# Patient Record
Sex: FEMALE | Race: ASIAN | NOT HISPANIC OR LATINO | URBAN - METROPOLITAN AREA
[De-identification: names, ages, dates, MRNs, and addresses within clinical notes are randomized per-mention and may not be internally consistent; named-entity substitution may affect disease eponyms.]

---

## 2020-08-17 ENCOUNTER — EMERGENCY (EMERGENCY)
Facility: HOSPITAL | Age: 45
LOS: 0 days | Discharge: HOME | End: 2020-08-18
Attending: STUDENT IN AN ORGANIZED HEALTH CARE EDUCATION/TRAINING PROGRAM | Admitting: STUDENT IN AN ORGANIZED HEALTH CARE EDUCATION/TRAINING PROGRAM
Payer: MEDICAID

## 2020-08-17 VITALS
DIASTOLIC BLOOD PRESSURE: 97 MMHG | OXYGEN SATURATION: 100 % | SYSTOLIC BLOOD PRESSURE: 128 MMHG | WEIGHT: 156.09 LBS | HEART RATE: 89 BPM | TEMPERATURE: 98 F | RESPIRATION RATE: 16 BRPM

## 2020-08-17 DIAGNOSIS — N93.9 ABNORMAL UTERINE AND VAGINAL BLEEDING, UNSPECIFIED: ICD-10-CM

## 2020-08-17 DIAGNOSIS — R10.9 UNSPECIFIED ABDOMINAL PAIN: ICD-10-CM

## 2020-08-17 DIAGNOSIS — D25.9 LEIOMYOMA OF UTERUS, UNSPECIFIED: ICD-10-CM

## 2020-08-17 PROCEDURE — 99285 EMERGENCY DEPT VISIT HI MDM: CPT

## 2020-08-17 NOTE — ED ADULT TRIAGE NOTE - CHIEF COMPLAINT QUOTE
pt c.o. lower abdominal pain associated with vaginal bleeding   pt states she was sent in from her OB who spoke with

## 2020-08-18 ENCOUNTER — APPOINTMENT (OUTPATIENT)
Dept: GYNECOLOGIC ONCOLOGY | Facility: CLINIC | Age: 45
End: 2020-08-18
Payer: MEDICAID

## 2020-08-18 ENCOUNTER — INPATIENT (INPATIENT)
Facility: HOSPITAL | Age: 45
LOS: 2 days | Discharge: HOME | End: 2020-08-21
Attending: OBSTETRICS & GYNECOLOGY | Admitting: OBSTETRICS & GYNECOLOGY
Payer: MEDICAID

## 2020-08-18 VITALS
SYSTOLIC BLOOD PRESSURE: 131 MMHG | HEART RATE: 86 BPM | OXYGEN SATURATION: 97 % | TEMPERATURE: 99 F | RESPIRATION RATE: 18 BRPM | DIASTOLIC BLOOD PRESSURE: 70 MMHG

## 2020-08-18 VITALS
HEIGHT: 62 IN | DIASTOLIC BLOOD PRESSURE: 78 MMHG | TEMPERATURE: 97.6 F | BODY MASS INDEX: 28.71 KG/M2 | SYSTOLIC BLOOD PRESSURE: 122 MMHG | WEIGHT: 156 LBS

## 2020-08-18 DIAGNOSIS — Z78.9 OTHER SPECIFIED HEALTH STATUS: ICD-10-CM

## 2020-08-18 DIAGNOSIS — N92.0 EXCESSIVE AND FREQUENT MENSTRUATION WITH REGULAR CYCLE: ICD-10-CM

## 2020-08-18 PROBLEM — Z00.00 ENCOUNTER FOR PREVENTIVE HEALTH EXAMINATION: Status: ACTIVE | Noted: 2020-08-18

## 2020-08-18 LAB
ALBUMIN SERPL ELPH-MCNC: 4.2 G/DL — SIGNIFICANT CHANGE UP (ref 3.5–5.2)
ALP SERPL-CCNC: 100 U/L — SIGNIFICANT CHANGE UP (ref 30–115)
ALT FLD-CCNC: 9 U/L — SIGNIFICANT CHANGE UP (ref 0–41)
ANION GAP SERPL CALC-SCNC: 8 MMOL/L — SIGNIFICANT CHANGE UP (ref 7–14)
APPEARANCE UR: ABNORMAL
APPEARANCE UR: ABNORMAL
APTT BLD: 36.6 SEC — SIGNIFICANT CHANGE UP (ref 27–39.2)
APTT BLD: 40.2 SEC — HIGH (ref 27–39.2)
AST SERPL-CCNC: 14 U/L — SIGNIFICANT CHANGE UP (ref 0–41)
BACTERIA # UR AUTO: NEGATIVE — SIGNIFICANT CHANGE UP
BACTERIA # UR AUTO: NEGATIVE — SIGNIFICANT CHANGE UP
BASOPHILS # BLD AUTO: 0.04 K/UL — SIGNIFICANT CHANGE UP (ref 0–0.2)
BASOPHILS # BLD AUTO: 0.04 K/UL — SIGNIFICANT CHANGE UP (ref 0–0.2)
BASOPHILS NFR BLD AUTO: 0.5 % — SIGNIFICANT CHANGE UP (ref 0–1)
BASOPHILS NFR BLD AUTO: 0.6 % — SIGNIFICANT CHANGE UP (ref 0–1)
BILIRUB SERPL-MCNC: <0.2 MG/DL — SIGNIFICANT CHANGE UP (ref 0.2–1.2)
BILIRUB UR-MCNC: NEGATIVE — SIGNIFICANT CHANGE UP
BILIRUB UR-MCNC: NEGATIVE — SIGNIFICANT CHANGE UP
BLD GP AB SCN SERPL QL: SIGNIFICANT CHANGE UP
BUN SERPL-MCNC: 10 MG/DL — SIGNIFICANT CHANGE UP (ref 10–20)
CALCIUM SERPL-MCNC: 8.7 MG/DL — SIGNIFICANT CHANGE UP (ref 8.5–10.1)
CHLORIDE SERPL-SCNC: 106 MMOL/L — SIGNIFICANT CHANGE UP (ref 98–110)
CO2 SERPL-SCNC: 25 MMOL/L — SIGNIFICANT CHANGE UP (ref 17–32)
COLOR SPEC: ABNORMAL
COLOR SPEC: ABNORMAL
CREAT SERPL-MCNC: 0.7 MG/DL — SIGNIFICANT CHANGE UP (ref 0.7–1.5)
DIFF PNL FLD: ABNORMAL
DIFF PNL FLD: ABNORMAL
EOSINOPHIL # BLD AUTO: 0.09 K/UL — SIGNIFICANT CHANGE UP (ref 0–0.7)
EOSINOPHIL # BLD AUTO: 0.13 K/UL — SIGNIFICANT CHANGE UP (ref 0–0.7)
EOSINOPHIL NFR BLD AUTO: 1.1 % — SIGNIFICANT CHANGE UP (ref 0–8)
EOSINOPHIL NFR BLD AUTO: 1.8 % — SIGNIFICANT CHANGE UP (ref 0–8)
EPI CELLS # UR: 1 /HPF — SIGNIFICANT CHANGE UP (ref 0–5)
EPI CELLS # UR: 5 /HPF — SIGNIFICANT CHANGE UP (ref 0–5)
GLUCOSE SERPL-MCNC: 171 MG/DL — HIGH (ref 70–99)
GLUCOSE UR QL: NEGATIVE — SIGNIFICANT CHANGE UP
GLUCOSE UR QL: SIGNIFICANT CHANGE UP
HCG SERPL QL: NEGATIVE — SIGNIFICANT CHANGE UP
HCT VFR BLD CALC: 38.4 % — SIGNIFICANT CHANGE UP (ref 37–47)
HCT VFR BLD CALC: 39 % — SIGNIFICANT CHANGE UP (ref 37–47)
HGB BLD-MCNC: 12.1 G/DL — SIGNIFICANT CHANGE UP (ref 12–16)
HGB BLD-MCNC: 12.2 G/DL — SIGNIFICANT CHANGE UP (ref 12–16)
HYALINE CASTS # UR AUTO: 1 /LPF — SIGNIFICANT CHANGE UP (ref 0–7)
HYALINE CASTS # UR AUTO: 9 /LPF — HIGH (ref 0–7)
IMM GRANULOCYTES NFR BLD AUTO: 0.2 % — SIGNIFICANT CHANGE UP (ref 0.1–0.3)
IMM GRANULOCYTES NFR BLD AUTO: 0.3 % — SIGNIFICANT CHANGE UP (ref 0.1–0.3)
INR BLD: 0.93 RATIO — SIGNIFICANT CHANGE UP (ref 0.65–1.3)
INR BLD: 0.97 RATIO — SIGNIFICANT CHANGE UP (ref 0.65–1.3)
KETONES UR-MCNC: NEGATIVE — SIGNIFICANT CHANGE UP
KETONES UR-MCNC: NEGATIVE — SIGNIFICANT CHANGE UP
LACTATE SERPL-SCNC: 2 MMOL/L — SIGNIFICANT CHANGE UP (ref 0.7–2)
LEUKOCYTE ESTERASE UR-ACNC: ABNORMAL
LEUKOCYTE ESTERASE UR-ACNC: ABNORMAL
LYMPHOCYTES # BLD AUTO: 1.41 K/UL — SIGNIFICANT CHANGE UP (ref 1.2–3.4)
LYMPHOCYTES # BLD AUTO: 1.92 K/UL — SIGNIFICANT CHANGE UP (ref 1.2–3.4)
LYMPHOCYTES # BLD AUTO: 16.7 % — LOW (ref 20.5–51.1)
LYMPHOCYTES # BLD AUTO: 26.6 % — SIGNIFICANT CHANGE UP (ref 20.5–51.1)
MCHC RBC-ENTMCNC: 27.5 PG — SIGNIFICANT CHANGE UP (ref 27–31)
MCHC RBC-ENTMCNC: 27.6 PG — SIGNIFICANT CHANGE UP (ref 27–31)
MCHC RBC-ENTMCNC: 31.3 G/DL — LOW (ref 32–37)
MCHC RBC-ENTMCNC: 31.5 G/DL — LOW (ref 32–37)
MCV RBC AUTO: 87.5 FL — SIGNIFICANT CHANGE UP (ref 81–99)
MCV RBC AUTO: 88 FL — SIGNIFICANT CHANGE UP (ref 81–99)
MONOCYTES # BLD AUTO: 0.53 K/UL — SIGNIFICANT CHANGE UP (ref 0.1–0.6)
MONOCYTES # BLD AUTO: 0.57 K/UL — SIGNIFICANT CHANGE UP (ref 0.1–0.6)
MONOCYTES NFR BLD AUTO: 6.8 % — SIGNIFICANT CHANGE UP (ref 1.7–9.3)
MONOCYTES NFR BLD AUTO: 7.4 % — SIGNIFICANT CHANGE UP (ref 1.7–9.3)
NEUTROPHILS # BLD AUTO: 4.57 K/UL — SIGNIFICANT CHANGE UP (ref 1.4–6.5)
NEUTROPHILS # BLD AUTO: 6.29 K/UL — SIGNIFICANT CHANGE UP (ref 1.4–6.5)
NEUTROPHILS NFR BLD AUTO: 63.3 % — SIGNIFICANT CHANGE UP (ref 42.2–75.2)
NEUTROPHILS NFR BLD AUTO: 74.7 % — SIGNIFICANT CHANGE UP (ref 42.2–75.2)
NITRITE UR-MCNC: NEGATIVE — SIGNIFICANT CHANGE UP
NITRITE UR-MCNC: NEGATIVE — SIGNIFICANT CHANGE UP
NRBC # BLD: 0 /100 WBCS — SIGNIFICANT CHANGE UP (ref 0–0)
NRBC # BLD: 0 /100 WBCS — SIGNIFICANT CHANGE UP (ref 0–0)
PH UR: 8 — SIGNIFICANT CHANGE UP (ref 5–8)
PH UR: 8.5 — HIGH (ref 5–8)
PLATELET # BLD AUTO: 272 K/UL — SIGNIFICANT CHANGE UP (ref 130–400)
PLATELET # BLD AUTO: 288 K/UL — SIGNIFICANT CHANGE UP (ref 130–400)
POTASSIUM SERPL-MCNC: 4.1 MMOL/L — SIGNIFICANT CHANGE UP (ref 3.5–5)
POTASSIUM SERPL-SCNC: 4.1 MMOL/L — SIGNIFICANT CHANGE UP (ref 3.5–5)
PROT SERPL-MCNC: 6.8 G/DL — SIGNIFICANT CHANGE UP (ref 6–8)
PROT UR-MCNC: ABNORMAL
PROT UR-MCNC: ABNORMAL
PROTHROM AB SERPL-ACNC: 10.7 SEC — SIGNIFICANT CHANGE UP (ref 9.95–12.87)
PROTHROM AB SERPL-ACNC: 11.2 SEC — SIGNIFICANT CHANGE UP (ref 9.95–12.87)
RBC # BLD: 4.39 M/UL — SIGNIFICANT CHANGE UP (ref 4.2–5.4)
RBC # BLD: 4.43 M/UL — SIGNIFICANT CHANGE UP (ref 4.2–5.4)
RBC # FLD: 14.6 % — HIGH (ref 11.5–14.5)
RBC # FLD: 14.6 % — HIGH (ref 11.5–14.5)
RBC CASTS # UR COMP ASSIST: >720 /HPF — HIGH (ref 0–4)
RBC CASTS # UR COMP ASSIST: >720 /HPF — HIGH (ref 0–4)
SODIUM SERPL-SCNC: 139 MMOL/L — SIGNIFICANT CHANGE UP (ref 135–146)
SP GR SPEC: 1.02 — SIGNIFICANT CHANGE UP (ref 1.01–1.02)
SP GR SPEC: 1.02 — SIGNIFICANT CHANGE UP (ref 1.01–1.02)
UROBILINOGEN FLD QL: SIGNIFICANT CHANGE UP
UROBILINOGEN FLD QL: SIGNIFICANT CHANGE UP
WBC # BLD: 7.21 K/UL — SIGNIFICANT CHANGE UP (ref 4.8–10.8)
WBC # BLD: 8.42 K/UL — SIGNIFICANT CHANGE UP (ref 4.8–10.8)
WBC # FLD AUTO: 7.21 K/UL — SIGNIFICANT CHANGE UP (ref 4.8–10.8)
WBC # FLD AUTO: 8.42 K/UL — SIGNIFICANT CHANGE UP (ref 4.8–10.8)
WBC UR QL: 88 /HPF — HIGH (ref 0–5)
WBC UR QL: >720 /HPF — HIGH (ref 0–5)

## 2020-08-18 PROCEDURE — 99285 EMERGENCY DEPT VISIT HI MDM: CPT

## 2020-08-18 PROCEDURE — 99204 OFFICE O/P NEW MOD 45 MIN: CPT

## 2020-08-18 PROCEDURE — 99222 1ST HOSP IP/OBS MODERATE 55: CPT | Mod: 57

## 2020-08-18 PROCEDURE — 76856 US EXAM PELVIC COMPLETE: CPT | Mod: 26

## 2020-08-18 RX ORDER — ACETAMINOPHEN 500 MG
650 TABLET ORAL ONCE
Refills: 0 | Status: COMPLETED | OUTPATIENT
Start: 2020-08-18 | End: 2020-08-18

## 2020-08-18 RX ORDER — SODIUM CHLORIDE 9 MG/ML
1000 INJECTION, SOLUTION INTRAVENOUS ONCE
Refills: 0 | Status: COMPLETED | OUTPATIENT
Start: 2020-08-18 | End: 2020-08-18

## 2020-08-18 RX ORDER — SODIUM CHLORIDE 9 MG/ML
1000 INJECTION, SOLUTION INTRAVENOUS
Refills: 0 | Status: DISCONTINUED | OUTPATIENT
Start: 2020-08-19 | End: 2020-08-19

## 2020-08-18 RX ORDER — IBUPROFEN 200 MG
600 TABLET ORAL EVERY 6 HOURS
Refills: 0 | Status: DISCONTINUED | OUTPATIENT
Start: 2020-08-18 | End: 2020-08-19

## 2020-08-18 RX ORDER — ACETAMINOPHEN 500 MG
650 TABLET ORAL EVERY 6 HOURS
Refills: 0 | Status: DISCONTINUED | OUTPATIENT
Start: 2020-08-18 | End: 2020-08-19

## 2020-08-18 RX ADMIN — Medication 650 MILLIGRAM(S): at 02:38

## 2020-08-18 RX ADMIN — SODIUM CHLORIDE 1000 MILLILITER(S): 9 INJECTION, SOLUTION INTRAVENOUS at 00:36

## 2020-08-18 RX ADMIN — SODIUM CHLORIDE 1000 MILLILITER(S): 9 INJECTION, SOLUTION INTRAVENOUS at 02:38

## 2020-08-18 RX ADMIN — Medication 650 MILLIGRAM(S): at 00:36

## 2020-08-18 NOTE — ED PROVIDER NOTE - NS ED ROS FT
Eyes:  No visual changes, eye pain or discharge.  ENMT:  No hearing changes, pain, no sore throat or runny nose, no difficulty swallowing  Cardiac:  No chest pain, SOB or edema. No chest pain with exertion.  Respiratory:  No cough or respiratory distress.    GI:  + abdominal pain.  :  No dysuria, frequency or burning. +vaginal bleeding  MS:  No myalgia, muscle weakness, joint pain or back pain.  Neuro:  No headache or weakness.  No LOC.  Skin:  No skin rash.   Endocrine: No history of thyroid disease or diabetes.

## 2020-08-18 NOTE — ED PROVIDER NOTE - ATTENDING CONTRIBUTION TO CARE
46 yo F vaginal bleeding. recent dx of fibroids. seen by gyn onc. dr bowles. sent in for admission.   vss, nontoxic, well appearing, pink conj, anicteric, MMM, neck supple, CTAB, RRR, equal radial pulses bilat, abd soft/nt/nd, no cva tend. no calves tend, no edema, no fnd. no rashes.  a/p: labs, imaging, reassess

## 2020-08-18 NOTE — HISTORY OF PRESENT ILLNESS
[FreeTextEntry1] : 45 year old patient of Dr. Robles  NSVDx1,  referred for menorrhagia and large fibroid Intramural myoma (10CM).    LMP 2020.   Patient states that after she had vaginal exam yesterday in Dr. Robles's office,after which,  she started bleeding accompanied by pain. Her pain scale is 7/10. \par She is requesting further management. the patient was seen in the [facility] ER w/u with sonogram confirmed the above Large intramural myoma. Her hct in the ER was 39%.

## 2020-08-18 NOTE — ED PROVIDER NOTE - NSFOLLOWUPINSTRUCTIONS_ED_ALL_ED_FT
Uterine Fibroids    WHAT YOU NEED TO KNOW:    What are uterine fibroids? Uterine fibroids are growths found inside your uterus. Uterine fibroids also may be called tumors or leiomyomas. Uterine fibroids often appear in groups, or you may have only one. They can be small or large, and they can grow. They are almost always benign (not cancer) and likely will not spread to other parts of your body. They may grow when you are pregnant and shrink after you no longer have a monthly period.    What increases my risk for uterine fibroids? The cause of uterine fibroids is not clear. Ask your healthcare provider about these and other risk factors for uterine fibroids:    - A family history of uterine fibroids  - Too many female hormones, especially estrogen  - Menstrual periods starting before age 13  - Too much body weight  - Not having children  - Drinking alcohol    What are the signs and symptoms of uterine fibroids? You may have no signs or symptoms. Symptoms depend on the size, type, and number of fibroids you have. Symptoms also depend on where the fibroids are inside your uterus:    - Heavy or painful menstrual bleeding  - Pelvic pressure and pain  - Increased pelvic pain during sex  - Constipation or pain when you have a bowel movement  - Need to urinate often    How are uterine fibroids diagnosed? Your healthcare provider will examine you and ask about your symptoms. Tell the provider if any women if your family have had uterine fibroids. You may also need any of the following:     A pelvic exam is also called an internal or vaginal exam. During a pelvic exam, your healthcare provider gently puts a speculum into your vagina. A speculum is a tool that opens your vagina. This lets your healthcare provider see your cervix (bottom part of your uterus). With gloved hands, your healthcare provider will check the size and shape of your uterus and ovaries.     A vaginal ultrasound is used to see inside your uterus and to check your ovaries. During this test, your healthcare provider places a small wand in your vagina. Sound waves from the wand show pictures of the uterus and ovaries.    A biopsy is a tissue sample of a fibroid that your healthcare provider takes from your uterus for testing.    How are uterine fibroids treated? You may not need treatment for your fibroids if you do not have symptoms. The following treatments may shrink your fibroids and help your pain:     Hormones may help shrink your fibroids.    Contraceptives help prevent pregnancy. They also may help shrink your fibroids.    NSAIDs help decrease swelling and pain or fever. This medicine is available with or without a doctor's order. NSAIDs can cause stomach bleeding or kidney problems in certain people. If you take blood thinner medicine, always ask your healthcare provider if NSAIDs are safe for you. Always read the medicine label and follow directions.    Surgery may be used to remove your uterine fibroids. Surgery may instead be used to block or slow the flow of blood to the fibroid. This may make your fibroids shrink or disappear. Surgery called a hysterectomy may be needed if your fibroids are severe. For this surgery, your healthcare provider removes your entire uterus. After this surgery, you will no longer be able to have children.    What can I do to prevent uterine fibroids?     Maintain a healthy weight. Extra weight can cause fibroids to grow. Talk to your healthcare provider about a healthy weight for you. He or she can help you create a healthy weight loss plan if you are overweight.    Eat a variety of healthy foods. Healthy foods include fruits, vegetables, lean meats, fish, low-fat dairy foods, cooked beans, and whole-grain breads and cereals. Fruits and vegetables are especially important for helping lower the risk for fibroids. Your healthcare provider or a dietitian can help you create a healthy meal plan.    Limit or do not drink alcohol as directed. Alcohol can increase your risk for fibroids. A drink of alcohol is 12 ounces of beer, 1½ ounces of liquor, or 5 ounces of wine. Ask your healthcare provider for information if you need help to quit drinking alcohol.    When should I seek immediate care?     - Your heart begins to race, and you feel faint.  - You begin to pass large blood clots from your vagina.    When should I contact my healthcare provider?     - Your symptoms, such as heavy bleeding, pain, or pelvic pressure, worsen.  - You feel weak and are more tired than usual.  - You do not feel like your bladder is empty after you urinate. You also may urinate small amounts more often.   - You have questions or concerns about your condition or care.    CARE AGREEMENT:    You have the right to help plan your care. Learn about your health condition and how it may be treated. Discuss treatment options with your healthcare providers to decide what care you want to receive. You always have the right to refuse treatment.     © Copyright [employer] 2020

## 2020-08-18 NOTE — ED PROVIDER NOTE - CARE PROVIDER_API CALL
Arianne Villela  GYNECOLOGIC ONCOLOGY  [address] [place] [id number]  Phone: [phone]  Fax: [phone]  Follow Up Time:

## 2020-08-18 NOTE — ED PROVIDER NOTE - PATIENT PORTAL LINK FT
Assumed care of pt You can access the FollowMyHealth Patient Portal offered by [facility] by registering at the following website: [email or web address]. By joining [employer]’s FollowMyHealth portal, you will also be able to view your health information using other applications (apps) compatible with our system.

## 2020-08-18 NOTE — ED PROVIDER NOTE - PHYSICAL EXAMINATION
Vital Signs: I have reviewed the initial vital signs.  Constitutional: NAD, well-nourished, appears stated age, no acute distress.  HEENT: Airway patent, moist MM, no erythema/swelling/deformity of oral structures. EOMI, PERRLA.  CV: regular rate, regular rhythm, well-perfused extremities, 2+ b/l DP and radial pulses equal.  Lungs: BCTA, no increased WOB.  ABD: BL LQ ttp otherwise NTND, no guarding or rebound, no pulsatile mass, no hernias.   : no active vaginal bleeding  MSK: Neck supple, nontender, nl ROM, no stepoff. Chest nontender. Back nontender in TLS spine or to b/l bony structures or flanks. Ext nontender, nl rom, no deformity.   INTEG: Skin warm, dry, no rash.  NEURO: A&Ox3, moving all extremities, normal speech  PSYCH: Calm, cooperative, normal affect and interaction.

## 2020-08-18 NOTE — ED PROVIDER NOTE - NS ED ROS FT
GEN:  no fever, no chills, no generalized weakness  NEURO:  no headache, no dizziness  ENT: no sore throat, no runny nose  CV:  no chest pain, no palpitations  RESP:  no sob, no cough  GI:  no nausea, no vomiting, + abdominal pain, no diarrhea  :  no dysuria, no urinary frequency, no hematuria  MSK:  no joint pain, no edema  SKIN:  no rash, no bruising  HEME: no hematochezia, no melena

## 2020-08-18 NOTE — H&P ADULT - NSHPLABSRESULTS_GEN_ALL_CORE
LABS:  serum pregnancy- negative                         12.1   8.42  )-----------( 272      ( 18 Aug 2020 11:00 )             38.4       Antibody Screen: NEG (20 @ 00:15)  ABO RH Interpretation: O POS (20 @ 00:15)        139  |  106  |  10  ----------------------------<  171<H>  4.1   |  25  |  0.7    Ca    8.7      18 Aug 2020 00:15    TPro  6.8  /  Alb  4.2  /  TBili  <0.2  /  DBili  x   /  AST  14  /  ALT  9   /  AlkPhos  100      PT/INR - ( 18 Aug 2020 11:00 )   PT: 11.20 sec;   INR: 0.97 ratio         PTT - ( 18 Aug 2020 11:00 )  PTT:36.6 sec  Urinalysis Basic - ( 18 Aug 2020 10:55 )    Color: Dark Brown / Appearance: Turbid / S.021 / pH: x  Gluc: x / Ketone: Negative  / Bili: Negative / Urobili: <2 mg/dL   Blood: x / Protein: >600 mg/dL / Nitrite: Negative   Leuk Esterase: Large / RBC: >720 /HPF / WBC >720 /HPF   Sq Epi: x / Non Sq Epi: 1 /HPF / Bacteria: Negative        RADIOLOGY & ADDITIONAL STUDIES:  < from: US Pelvis Complete (20 @ 02:19) >  EXAM:  US PELVIC COMPLETE        PROCEDURE DATE:  2020    UINTERPRETATION:  CLINICAL HISTORY: Lower abdominal pain, vaginal bleeding.  COMPARISON: None.  PROCEDURE: Transabdominal and transvaginal ultrasound of the pelvis was performed, including Doppler.  LMP: 2020.  FINDINGS:    UTERUS: Anteverted measuring 20 x 11.5 x 10.8, with large intramural fibroid measuring 10.7 x 9.4 x 8.2 cm. The endometrial echo complex is not visualized secondary to large fibroid.    RIGHT OVARY: measures 2.5 x 2.6 x 1.8 cm, and is unremarkable. Doppler flow is demonstrated to the right ovary.    LEFT OVARY: Left ovary not delineated.    OTHER: No free fluid in the pelvis.    IMPRESSION:    Enlarged uterus with 10.7 cm intramural fibroid.    Right ovary unremarkable. Left ovary not delineated.            TRAN FERNÁNDEZ M.D., RESIDENT RADIOLOGIST  This document has been electronically signed.  GABRIELE MEJIA M.D., ATTENDING RADIOLOGIST  This document has been electronically signed. Aug 18 2020  2:24AM  < end of copied text >

## 2020-08-18 NOTE — PROGRESS NOTE ADULT - ASSESSMENT
Fibroid uterus vs ?Uterine Sarcoma  Patient counselled at length through .  For PAULIE/BS vs BSO.  Risks of surgery discussed including but not limited to infection, bleeding, adjacent organ injury. Patient also told that in the event she has cancer, she may need additional therapy.  All questions were answered.

## 2020-08-18 NOTE — H&P ADULT - HISTORY OF PRESENT ILLNESS
ID: [id number]     46yo P1 with LMP  presents to the ED for evaluation after being seen in PMD's office for vaginal bleeding and abdominal pain. Pt reports that for the past six months she has experienced heavy menstrual bleeding associated with the passage of clots. Pt reports passing orange-sized clots. Reports menstrual cycle lasts 3-4 days, soaking two pads per day. Reports associated dizziness, palpitations, and weakness with each menstrual cycle.   Pt noted over the last several months she noticed her abdomen was enlarging. Reports 10lbs weight gain over the last 6 months with abdominal distension. Denies changes in bowel or bladder habits. Denies night sweats, fevers, chills, nausea, vomiting, diarrhea, cough, chest pain, shortness of breath.   Pt reports that she was seen by Dr. Mariann Robles on  where she was told she likely had a fibroid. Immediately after the pelvic exam, she began to bleed heavily and had vaginal packing placed. Pt then subsequently presented to the ED on the evening of  with complaint of vaginal bleeding through the vaginal packing and abdominal pain, RLQ and LLQ rated 7/10 in intensity not responding to OTC medications. Pt seen and evaluated in the ER, determined to be stable, have minimal vaginal bleeding, and discharged home. Pt then followed up with Dr. Villela on  who removed the vaginal packing and saw heavy vaginal bleeding on examination. Since the vaginal packing was removed, pt states that she has only stained a menstrual pad.   Pt reports that she has never had a biopsy of her fibroid.     Ob/Gyn History:   (1 FT )                 LMP-2020                Cycle Length -q28-32 days, lasting 3-4 days in length; using 2-3 pads per day with the passage of clots   Denies history of ovarian cysts, uterine fibroids, abnormal paps, or STIs  Last Pap Smear - Over 5 years ago   Last Mammogram -Never   Last Colonoscopy -Never

## 2020-08-18 NOTE — CONSULT NOTE ADULT - ASSESSMENT
A/P: 44yo  with LMP  with intramural fibroid with intermittent vaginal bleeding, currently clinically and hemodynamically stable    **********************************************************INCOMPLETE: ADDITIONAL RECOMMENDATIONS TO FOLLOW****************************************************************************************

## 2020-08-18 NOTE — ED PROVIDER NOTE - PROGRESS NOTE DETAILS
TC: 44 yo F with no PMHx,  who presents with bilateral lower quadrant pain x 3 wks with new vaginal bleeding 10 min prior to arrival. Sent in by OBGYN in [place] to see Dr. Villela here. Here in ED, vitals wnl. Spoke with OBGYN who state they did not receive any information about pt and do not know why she is here. They said to reconsult prn. Ordered labs, urine, US. Given tylenol, IVF. Will reassess. TC: Labs wnl. Ua with large blood and leuks but no bacteria. US shows enlarged 10.7cm uterus with intramural fibroid. Instructed to f/u with OBGYN. Strict ED return precautions given. Pt verbalized understanding and was agreeable with plan.

## 2020-08-18 NOTE — H&P ADULT - NSHPPHYSICALEXAM_GEN_ALL_CORE
Vital Signs Last 24 Hrs  T(C): 37.1 (18 Aug 2020 10:28), Max: 37.1 (18 Aug 2020 10:28)  T(F): 98.8 (18 Aug 2020 10:28), Max: 98.8 (18 Aug 2020 10:28)  HR: 86 (18 Aug 2020 10:28) (86 - 89)  BP: 131/70 (18 Aug 2020 10:28) (128/97 - 131/70)  RR: 18 (18 Aug 2020 10:28) (16 - 18)  SpO2: 97% (18 Aug 2020 10:28) (97% - 100%)    General Appearance - AAOx3, NAD  Heart - S1S2 regular rate and rhythm  Lung - CTA Bilaterally  Abdomen - Soft, tender to palpation in the RLQ and LLQ region, distended, no rebound, no rigidity, no guarding, bowel sounds present    GYN/Pelvis:  Labia Majora - Normal  Labia Minora - Normal  Clitoris - Normal  Urethra - Normal  Vagina - 5cc of bright red blood noted in the vagina   Cervix - Hard, approx 10cm mass palpable in anterior fornix distorting anatomy, cervix very posterior and difficult to palpate     Uterus:  Size - Approx 20 weeks in size   Tenderness - non tender  Mass - Yes, enlarged to approx 20 weeks in size   Non-mobile     Adnexa:  Masses - None  Tenderness - None

## 2020-08-18 NOTE — ED PROVIDER NOTE - PHYSICAL EXAMINATION
CONSTITUTIONAL: well developed, nontoxic appearing, in no acute distress, speaking in full sentences  SKIN: warm, dry, no rash, cap refill < 2 seconds  HEENT: normocephalic, atraumatic, no conjunctival erythema, moist mucous membranes, patent airway  NECK: supple  CV:  regular rate, regular rhythm, 2+ radial pulses bilaterally  RESP: no wheezes, no rales, no rhonchi, normal work of breathing  ABD: soft, bilateral lower quadrant tenderness, nondistended, no rebound, no guarding  BACK: no CVA tenderness  MSK: normal ROM, no cyanosis, no edema  NEURO: alert, oriented, grossly unremarkable  PSYCH: cooperative, appropriate CONSTITUTIONAL: well developed, nontoxic appearing, in no acute distress, speaking in full sentences  SKIN: warm, dry, no rash, cap refill < 2 seconds  HEENT: normocephalic, atraumatic, no conjunctival erythema, moist mucous membranes, patent airway  NECK: supple  CV:  regular rate, regular rhythm, 2+ radial pulses bilaterally  RESP: no wheezes, no rales, no rhonchi, normal work of breathing  ABD: soft, mild bilateral lower quadrant tenderness, nondistended, no rebound, no guarding  BACK: no CVA tenderness  MSK: normal ROM, no cyanosis, no edema  NEURO: alert, oriented, grossly unremarkable  PSYCH: cooperative, appropriate

## 2020-08-18 NOTE — PROGRESS NOTE ADULT - SUBJECTIVE AND OBJECTIVE BOX
45 p1 admitted to hospital with 20 week fibroid uterus, vaginal bleeding, and pelvic pain. Patient reports that she saw her GYN last year and "everything was fine". She saw her gyn yesterday c/o heavy vaginal bleeding and pain where she was found to have an enlarged uterus and was sent to ER for evaluation and management. Patient interviewed with [facility]     45 p1 admitted to hospital with 20 week fibroid uterus, vaginal bleeding, and pelvic pain. Patient reports that she saw her GYN last year and "everything was fine". She saw her gyn yesterday c/o heavy vaginal bleeding and pain where she was found to have an enlarged uterus and was sent to ER for evaluation and management.    PE:  Patient is visibly uncomfortable. Abdomen: soft/uterus palpable at umbilicus. Diffuse non specific tenderness.  No visible abdominal scars.  VE: Very large (approx 10cm) fibroid palpated in anterior vaginal fornix. Cervix pushed posteriorly and cannot be palpated. Uterus is 20 weeks in size and minimally mobile. Mild tenderness. Minimal vaginal bleeding.

## 2020-08-18 NOTE — H&P ADULT - ASSESSMENT
40yo P1 with LMP 7/30, with pelvic pain and abnormal uterine bleeding, likely secondary to fibroid uterus, currently clinically and hemodynamically stable     - R/B/A of medical vs surgical management discussed with the patient, pt opted for surgical management. Discussed abdominal hysterectomy with bilateral salpingectomy. Discussed that there is small chance of cancer and if cancer found, would proceed with B/L oophorectomy and all indicated procedures. Also discussed risks of bleeding, infection, damage to surrounding structures like bowel/bladder. Informed consent for surgery and blood transfusion obtained.     - Admit to GYN   - monitor vitals, bleeding   - pain management PRN   - NPO from midnight, IVF   - on call to OR 8/19 @1300   - SCDs for DVT ppx   - AM labs ordered   - crossed for 2U pRBC     Discussed with Dr. Molina

## 2020-08-18 NOTE — ED ADULT NURSE REASSESSMENT NOTE - NS ED NURSE REASSESS COMMENT FT1
Spoke with resident at extension [phone]. Pt ordered PRBC to be given during surgery tomorrow if needed. Pt stable H/H . Will continue to monitor.

## 2020-08-18 NOTE — ED ADULT NURSE NOTE - NSIMPLEMENTINTERV_GEN_ALL_ED
Implemented All Universal Safety Interventions:  [place] to call system. Call bell, personal items and telephone within reach. Instruct patient to call for assistance. Room bathroom lighting operational. Non-slip footwear when patient is off stretcher. Physically safe environment: no spills, clutter or unnecessary equipment. Stretcher in lowest position, wheels locked, appropriate side rails in place.

## 2020-08-18 NOTE — ED PROVIDER NOTE - ATTENDING CONTRIBUTION TO CARE
44 yo F with no PMHx,  who presents with constant, lower abd pain x 3 weeks. pt w/ 1 day vaginal bleeding. no vaginal discharge. pt saw her obgyn who told her to "go to the ER at 8am so you can be evaluated by obgyn." no f/c/n/v/dysuria/back pain. no diarrhea. no vaginal discharge.    vss  gen- NAD, aaox3  card-rrr  lungs-ctab, no wheezing or rhonchi  abd-sntnd, no guarding or rebound  neuro- full str/sensation, cn ii-xii grossly intact, normal coordination and gait      ?fibroid?, abdomen benign  will get screening labs/pre-ops, pregnancy test, pelvic sono

## 2020-08-18 NOTE — ED PROVIDER NOTE - OBJECTIVE STATEMENT
44 yo F with no PMHx,  who presents with constant, nonradiating, bilateral lower quadrant pain x 3 wks without alleviating/aggravating factors. Went to OBGYN Dr. Mariann Robles today and was told to see Dr. Villela here today for "procedure" although pt does not know which kind of procedure. Today developed vaginal bleeding 10min prior to arrival so came to ED. No fever, chills, nausea, vomiting, dysuria, other vaginal discharge. LMP . No hx of abd surg. 46 yo F with no PMHx,  who presents with constant, nonradiating, bilateral lower quadrant pain x 3 wks without alleviating/aggravating factors. Went to OBGYN Dr. Mariann Robles today and was told to see Dr. Villela here tomorrow for "procedure" although pt does not know which kind of procedure. Today developed vaginal bleeding 10min prior to arrival so came to ED. No fever, chills, nausea, vomiting, dysuria, other vaginal discharge. LMP . No hx of abd surg.

## 2020-08-18 NOTE — CONSULT NOTE ADULT - SUBJECTIVE AND OBJECTIVE BOX
Chief Complaint: vaginal bleeding and abdominal pain     HPI: 44yo  with LMP  presents to the ED for evaluation after being seen in PMD's office for vaginal bleeding and abdominal pain. Pt reports that for the past six months she has experienced heavy menstrual bleeding associated with the passage of clots. Pt reports passing orange-sized clots. Reports menstrual cycle lasts 3-4 days, soaking two pads per day. Reports associated dizziness, palpitations, and weakness with each menstrual cycle.   Pt noted over the last several months she noticed her abdomen was enlarging. Reports 10lbs weight gain over the last 6 months. Reports associated abdominal distension. Denies changes in bowel or bladder habits. Denies night sweats, fevers, chills, nausea, vomiting, diarrhea, cough, chest pain, shortness of breath.   Pt reports that she was seen by Dr. Mariann Robles on  for the heavy menstrual cycle and abdominal distension, where she was told she likely had a fibroid. Immediately after the pelvic exam, she began to bleed heavily and had vaginal packing placed. Pt then subsequently presented to the ED on the evening of  with complaint of vaginal bleeding through the vaginal packing and abdominal pain, RLQ and LLQ rated 7/10 in intensity not responding to OTC medications. Pt seen and evaluated in the ER, determined to be stable, have minimal vaginal bleeding, and discharged home. Pt then followed up with Dr. Villela on  who removed the vaginal packing and saw heavy vaginal bleeding on examination. Since the vaginal packing was removed, pt states that she has only stained a menstrual pad.   Pt reports that she has never had a biopsy of her fibroid.       Ob/Gyn History:   (1 FT )                 LMP-2020                Cycle Length -q28-32 days, lasting 3-4 days in length; using 2-3 pads per day with the passage of clots   Denies history of ovarian cysts, uterine fibroids, abnormal paps, or STIs  Last Pap Smear - Over 5 years ago   Last Mammogram -Never   Last Colonoscopy -Never         Denies the following: constitutional symptoms, visual symptoms, cardiovascular symptoms, respiratory symptoms, GI symptoms, musculoskeletal symptoms, skin symptoms, neurologic symptoms, hematologic symptoms, allergic symptoms, psychiatric symptoms  Except any pertinent positives listed.     Home Medications: Allergies    No Known Allergies: Intolerances      PAST MEDICAL & SURGICAL HISTORY:  No pertinent past medical history  No significant past surgical history      FAMILY HISTORY: No relevant family history       SOCIAL HISTORY: Denies cigarette use, alcohol use, or illicit drug use    Vital Signs Last 24 Hrs  T(F): 98.8 (18 Aug 2020 10:28), Max: 98.8 (18 Aug 2020 10:28)  HR: 86 (18 Aug 2020 10:28) (86 - 89)  BP: 131/70 (18 Aug 2020 10:28) (128/97 - 131/70)  RR: 18 (18 Aug 2020 10:28) (16 - 18)    General Appearance - AAOx3, NAD  Heart - S1S2 regular rate and rhythm  Lung - CTA Bilaterally  Abdomen - Soft, tender to palpation in the RLQ and LLQ region, distended, no rebound, no rigidity, no guarding, bowel sounds present    GYN/Pelvis:  Labia Majora - Normal  Labia Minora - Normal  Clitoris - Normal  Urethra - Normal  Vagina - 5cc of bright red blood noted in the vagina   Cervix -Flushed with vagina, difficult to visualize secondary to large myoma in lower uterine segment     Uterus:  Size - Approx 20 weeks in size   Tenderness - Fundal tenderness appreciated   Mass - Yes, enlarged to approx 20 weeks in size   Non-mobile     Adnexa:  Masses - None  Tenderness - None      LABS:                        12.1   8.42  )-----------( 272      ( 18 Aug 2020 11:00 )             38.4       Antibody Screen: NEG (20 @ 00:15)  ABO RH Interpretation: O POS (20 @ 00:15)        139  |  106  |  10  ----------------------------<  171<H>  4.1   |  25  |  0.7    Ca    8.7      18 Aug 2020 00:15    TPro  6.8  /  Alb  4.2  /  TBili  <0.2  /  DBili  x   /  AST  14  /  ALT  9   /  AlkPhos  100      PT/INR - ( 18 Aug 2020 11:00 )   PT: 11.20 sec;   INR: 0.97 ratio         PTT - ( 18 Aug 2020 11:00 )  PTT:36.6 sec  Urinalysis Basic - ( 18 Aug 2020 10:55 )    Color: Dark Brown / Appearance: Turbid / S.021 / pH: x  Gluc: x / Ketone: Negative  / Bili: Negative / Urobili: <2 mg/dL   Blood: x / Protein: >600 mg/dL / Nitrite: Negative   Leuk Esterase: Large / RBC: >720 /HPF / WBC >720 /HPF   Sq Epi: x / Non Sq Epi: 1 /HPF / Bacteria: Negative        RADIOLOGY & ADDITIONAL STUDIES:  < from: US Pelvis Complete (20 @ 02:19) >  EXAM:  US PELVIC COMPLETE        PROCEDURE DATE:  2020    UINTERPRETATION:  CLINICAL HISTORY: Lower abdominal pain, vaginal bleeding.  COMPARISON: None.  PROCEDURE: Transabdominal and transvaginal ultrasound of the pelvis was performed, including Doppler.  LMP: 2020.  FINDINGS:    UTERUS: Anteverted measuring 20 x 11.5 x 10.8, with large intramural fibroid measuring 10.7 x 9.4 x 8.2 cm. The endometrial echo complex is not visualized secondary to large fibroid.    RIGHT OVARY: measures 2.5 x 2.6 x 1.8 cm, and is unremarkable. Doppler flow is demonstrated to the right ovary.    LEFT OVARY: Left ovary not delineated.    OTHER: No free fluid in the pelvis.    IMPRESSION:    Enlarged uterus with 10.7 cm intramural fibroid.    Right ovary unremarkable. Left ovary not delineated.            TRAN FERNÁNDEZ M.D., RESIDENT RADIOLOGIST  This document has been electronically signed.  GABRIELE MEJIA M.D., ATTENDING RADIOLOGIST  This document has been electronically signed. Aug 18 2020  2:24AM  < end of copied text >

## 2020-08-18 NOTE — ASSESSMENT
[FreeTextEntry1] : 45 year old patient of Dr. Robles  NSVDx1,  referred for menorrhagia and large fibroid Intramural myoma (10CM).    LMP 2020.   Patient states that after she had vaginal exam yesterday in Dr. Robles's office,after which,  she started bleeding accompanied by pain. Her pain scale was 7/10. but is now 10/10.\par She is requesting further management. The patient was seen in the [facility] ER w/u with sonogram confirmed the above Large intramural myoma. Her hct in the ER was 39%. She is being referred back to ER for immediate admission and urgent surgery.\par

## 2020-08-18 NOTE — ED PROVIDER NOTE - CLINICAL SUMMARY MEDICAL DECISION MAKING FREE TEXT BOX
pt here for chronic pelvic pain, 1 day bleeding. hgb stable, pt not needing to change pads in ED. sono showing fibroid. no emergent pathology found. pt can f/u as outpt w/ gyn to discuss elective surgery. discussed w/ GYN who is not familiar with the patient/is not expecting patient. no need for urgent ER GYN consultation

## 2020-08-18 NOTE — ED PROVIDER NOTE - OBJECTIVE STATEMENT
45yF, diagnosed with uterine fibroids on last ed visit earlier today, p/w vaginal bleeding from Dr. Villela clinic. patient has gone through 2 pads since discharge. no dizziness, nausea. still with BL LQ nonradiating abd pain.  ob Dr. Mariann Robles.  LMP 7/31. No hx of abd surg.

## 2020-08-19 ENCOUNTER — RESULT REVIEW (OUTPATIENT)
Age: 45
End: 2020-08-19

## 2020-08-19 LAB
ALBUMIN SERPL ELPH-MCNC: 4 G/DL — SIGNIFICANT CHANGE UP (ref 3.5–5.2)
ALP SERPL-CCNC: 69 U/L — SIGNIFICANT CHANGE UP (ref 30–115)
ALT FLD-CCNC: 8 U/L — SIGNIFICANT CHANGE UP (ref 0–41)
ANION GAP SERPL CALC-SCNC: 14 MMOL/L — SIGNIFICANT CHANGE UP (ref 7–14)
ANION GAP SERPL CALC-SCNC: 9 MMOL/L — SIGNIFICANT CHANGE UP (ref 7–14)
APTT BLD: 33.2 SEC — SIGNIFICANT CHANGE UP (ref 27–39.2)
AST SERPL-CCNC: 15 U/L — SIGNIFICANT CHANGE UP (ref 0–41)
BASOPHILS # BLD AUTO: 0.03 K/UL — SIGNIFICANT CHANGE UP (ref 0–0.2)
BASOPHILS # BLD AUTO: 0.05 K/UL — SIGNIFICANT CHANGE UP (ref 0–0.2)
BASOPHILS NFR BLD AUTO: 0.2 % — SIGNIFICANT CHANGE UP (ref 0–1)
BASOPHILS NFR BLD AUTO: 0.9 % — SIGNIFICANT CHANGE UP (ref 0–1)
BILIRUB SERPL-MCNC: 0.3 MG/DL — SIGNIFICANT CHANGE UP (ref 0.2–1.2)
BLD GP AB SCN SERPL QL: SIGNIFICANT CHANGE UP
BUN SERPL-MCNC: 7 MG/DL — LOW (ref 10–20)
BUN SERPL-MCNC: 9 MG/DL — LOW (ref 10–20)
CALCIUM SERPL-MCNC: 8.5 MG/DL — SIGNIFICANT CHANGE UP (ref 8.5–10.1)
CALCIUM SERPL-MCNC: 8.8 MG/DL — SIGNIFICANT CHANGE UP (ref 8.5–10.1)
CHLORIDE SERPL-SCNC: 100 MMOL/L — SIGNIFICANT CHANGE UP (ref 98–110)
CHLORIDE SERPL-SCNC: 105 MMOL/L — SIGNIFICANT CHANGE UP (ref 98–110)
CO2 SERPL-SCNC: 23 MMOL/L — SIGNIFICANT CHANGE UP (ref 17–32)
CO2 SERPL-SCNC: 26 MMOL/L — SIGNIFICANT CHANGE UP (ref 17–32)
CREAT SERPL-MCNC: 0.7 MG/DL — SIGNIFICANT CHANGE UP (ref 0.7–1.5)
CREAT SERPL-MCNC: 0.7 MG/DL — SIGNIFICANT CHANGE UP (ref 0.7–1.5)
CULTURE RESULTS: SIGNIFICANT CHANGE UP
EOSINOPHIL # BLD AUTO: 0 K/UL — SIGNIFICANT CHANGE UP (ref 0–0.7)
EOSINOPHIL # BLD AUTO: 0.16 K/UL — SIGNIFICANT CHANGE UP (ref 0–0.7)
EOSINOPHIL NFR BLD AUTO: 0 % — SIGNIFICANT CHANGE UP (ref 0–8)
EOSINOPHIL NFR BLD AUTO: 2.8 % — SIGNIFICANT CHANGE UP (ref 0–8)
GLUCOSE BLDC GLUCOMTR-MCNC: 108 MG/DL — HIGH (ref 70–99)
GLUCOSE BLDC GLUCOMTR-MCNC: 87 MG/DL — SIGNIFICANT CHANGE UP (ref 70–99)
GLUCOSE SERPL-MCNC: 143 MG/DL — HIGH (ref 70–99)
GLUCOSE SERPL-MCNC: 90 MG/DL — SIGNIFICANT CHANGE UP (ref 70–99)
HCG SERPL-ACNC: <0.6 MIU/ML — SIGNIFICANT CHANGE UP
HCT VFR BLD CALC: 39.7 % — SIGNIFICANT CHANGE UP (ref 37–47)
HCT VFR BLD CALC: 41.8 % — SIGNIFICANT CHANGE UP (ref 37–47)
HGB BLD-MCNC: 12.4 G/DL — SIGNIFICANT CHANGE UP (ref 12–16)
HGB BLD-MCNC: 13 G/DL — SIGNIFICANT CHANGE UP (ref 12–16)
IMM GRANULOCYTES NFR BLD AUTO: 0.3 % — SIGNIFICANT CHANGE UP (ref 0.1–0.3)
IMM GRANULOCYTES NFR BLD AUTO: 0.3 % — SIGNIFICANT CHANGE UP (ref 0.1–0.3)
INR BLD: 1.07 RATIO — SIGNIFICANT CHANGE UP (ref 0.65–1.3)
LYMPHOCYTES # BLD AUTO: 0.75 K/UL — LOW (ref 1.2–3.4)
LYMPHOCYTES # BLD AUTO: 1.99 K/UL — SIGNIFICANT CHANGE UP (ref 1.2–3.4)
LYMPHOCYTES # BLD AUTO: 34.5 % — SIGNIFICANT CHANGE UP (ref 20.5–51.1)
LYMPHOCYTES # BLD AUTO: 5.3 % — LOW (ref 20.5–51.1)
MAGNESIUM SERPL-MCNC: 1.8 MG/DL — SIGNIFICANT CHANGE UP (ref 1.8–2.4)
MCHC RBC-ENTMCNC: 27.1 PG — SIGNIFICANT CHANGE UP (ref 27–31)
MCHC RBC-ENTMCNC: 27.1 PG — SIGNIFICANT CHANGE UP (ref 27–31)
MCHC RBC-ENTMCNC: 31.1 G/DL — LOW (ref 32–37)
MCHC RBC-ENTMCNC: 31.2 G/DL — LOW (ref 32–37)
MCV RBC AUTO: 86.9 FL — SIGNIFICANT CHANGE UP (ref 81–99)
MCV RBC AUTO: 87.1 FL — SIGNIFICANT CHANGE UP (ref 81–99)
MONOCYTES # BLD AUTO: 0.43 K/UL — SIGNIFICANT CHANGE UP (ref 0.1–0.6)
MONOCYTES # BLD AUTO: 0.88 K/UL — HIGH (ref 0.1–0.6)
MONOCYTES NFR BLD AUTO: 6.2 % — SIGNIFICANT CHANGE UP (ref 1.7–9.3)
MONOCYTES NFR BLD AUTO: 7.5 % — SIGNIFICANT CHANGE UP (ref 1.7–9.3)
NEUTROPHILS # BLD AUTO: 12.45 K/UL — HIGH (ref 1.4–6.5)
NEUTROPHILS # BLD AUTO: 3.11 K/UL — SIGNIFICANT CHANGE UP (ref 1.4–6.5)
NEUTROPHILS NFR BLD AUTO: 54 % — SIGNIFICANT CHANGE UP (ref 42.2–75.2)
NEUTROPHILS NFR BLD AUTO: 88 % — HIGH (ref 42.2–75.2)
NRBC # BLD: 0 /100 WBCS — SIGNIFICANT CHANGE UP (ref 0–0)
NRBC # BLD: 0 /100 WBCS — SIGNIFICANT CHANGE UP (ref 0–0)
PHOSPHATE SERPL-MCNC: 3.8 MG/DL — SIGNIFICANT CHANGE UP (ref 2.1–4.9)
PLATELET # BLD AUTO: 231 K/UL — SIGNIFICANT CHANGE UP (ref 130–400)
PLATELET # BLD AUTO: 283 K/UL — SIGNIFICANT CHANGE UP (ref 130–400)
POTASSIUM SERPL-MCNC: 3.9 MMOL/L — SIGNIFICANT CHANGE UP (ref 3.5–5)
POTASSIUM SERPL-MCNC: 4.2 MMOL/L — SIGNIFICANT CHANGE UP (ref 3.5–5)
POTASSIUM SERPL-SCNC: 3.9 MMOL/L — SIGNIFICANT CHANGE UP (ref 3.5–5)
POTASSIUM SERPL-SCNC: 4.2 MMOL/L — SIGNIFICANT CHANGE UP (ref 3.5–5)
PROT SERPL-MCNC: 6.6 G/DL — SIGNIFICANT CHANGE UP (ref 6–8)
PROTHROM AB SERPL-ACNC: 12.3 SEC — SIGNIFICANT CHANGE UP (ref 9.95–12.87)
RBC # BLD: 4.57 M/UL — SIGNIFICANT CHANGE UP (ref 4.2–5.4)
RBC # BLD: 4.8 M/UL — SIGNIFICANT CHANGE UP (ref 4.2–5.4)
RBC # FLD: 14.6 % — HIGH (ref 11.5–14.5)
RBC # FLD: 15 % — HIGH (ref 11.5–14.5)
SARS-COV-2 RNA SPEC QL NAA+PROBE: SIGNIFICANT CHANGE UP
SODIUM SERPL-SCNC: 137 MMOL/L — SIGNIFICANT CHANGE UP (ref 135–146)
SODIUM SERPL-SCNC: 140 MMOL/L — SIGNIFICANT CHANGE UP (ref 135–146)
SPECIMEN SOURCE: SIGNIFICANT CHANGE UP
WBC # BLD: 14.15 K/UL — HIGH (ref 4.8–10.8)
WBC # BLD: 5.76 K/UL — SIGNIFICANT CHANGE UP (ref 4.8–10.8)
WBC # FLD AUTO: 14.15 K/UL — HIGH (ref 4.8–10.8)
WBC # FLD AUTO: 5.76 K/UL — SIGNIFICANT CHANGE UP (ref 4.8–10.8)

## 2020-08-19 PROCEDURE — 52332 CYSTOSCOPY AND TREATMENT: CPT | Mod: 50

## 2020-08-19 PROCEDURE — 58150 TOTAL HYSTERECTOMY: CPT

## 2020-08-19 PROCEDURE — 88307 TISSUE EXAM BY PATHOLOGIST: CPT | Mod: 26

## 2020-08-19 RX ORDER — SODIUM CHLORIDE 9 MG/ML
1000 INJECTION, SOLUTION INTRAVENOUS
Refills: 0 | Status: DISCONTINUED | OUTPATIENT
Start: 2020-08-19 | End: 2020-08-21

## 2020-08-19 RX ORDER — SIMETHICONE 80 MG/1
80 TABLET, CHEWABLE ORAL EVERY 4 HOURS
Refills: 0 | Status: DISCONTINUED | OUTPATIENT
Start: 2020-08-19 | End: 2020-08-21

## 2020-08-19 RX ORDER — ONDANSETRON 8 MG/1
4 TABLET, FILM COATED ORAL EVERY 6 HOURS
Refills: 0 | Status: DISCONTINUED | OUTPATIENT
Start: 2020-08-19 | End: 2020-08-21

## 2020-08-19 RX ORDER — GABAPENTIN 400 MG/1
300 CAPSULE ORAL EVERY 8 HOURS
Refills: 0 | Status: DISCONTINUED | OUTPATIENT
Start: 2020-08-19 | End: 2020-08-21

## 2020-08-19 RX ORDER — HYDROMORPHONE HYDROCHLORIDE 2 MG/ML
30 INJECTION INTRAMUSCULAR; INTRAVENOUS; SUBCUTANEOUS
Refills: 0 | Status: DISCONTINUED | OUTPATIENT
Start: 2020-08-19 | End: 2020-08-20

## 2020-08-19 RX ORDER — NALOXONE HYDROCHLORIDE 4 MG/.1ML
0.1 SPRAY NASAL
Refills: 0 | Status: DISCONTINUED | OUTPATIENT
Start: 2020-08-19 | End: 2020-08-21

## 2020-08-19 RX ORDER — MORPHINE SULFATE 50 MG/1
2 CAPSULE, EXTENDED RELEASE ORAL
Refills: 0 | Status: DISCONTINUED | OUTPATIENT
Start: 2020-08-19 | End: 2020-08-21

## 2020-08-19 RX ORDER — SENNA PLUS 8.6 MG/1
1 TABLET ORAL EVERY 12 HOURS
Refills: 0 | Status: DISCONTINUED | OUTPATIENT
Start: 2020-08-19 | End: 2020-08-21

## 2020-08-19 RX ORDER — IBUPROFEN 200 MG
600 TABLET ORAL EVERY 6 HOURS
Refills: 0 | Status: DISCONTINUED | OUTPATIENT
Start: 2020-08-19 | End: 2020-08-21

## 2020-08-19 RX ORDER — ACETAMINOPHEN 500 MG
975 TABLET ORAL EVERY 6 HOURS
Refills: 0 | Status: DISCONTINUED | OUTPATIENT
Start: 2020-08-19 | End: 2020-08-21

## 2020-08-19 RX ADMIN — Medication 650 MILLIGRAM(S): at 12:13

## 2020-08-19 RX ADMIN — SIMETHICONE 80 MILLIGRAM(S): 80 TABLET, CHEWABLE ORAL at 22:09

## 2020-08-19 RX ADMIN — SODIUM CHLORIDE 125 MILLILITER(S): 9 INJECTION, SOLUTION INTRAVENOUS at 01:51

## 2020-08-19 RX ADMIN — SODIUM CHLORIDE 125 MILLILITER(S): 9 INJECTION, SOLUTION INTRAVENOUS at 12:10

## 2020-08-19 RX ADMIN — HYDROMORPHONE HYDROCHLORIDE 30 MILLILITER(S): 2 INJECTION INTRAMUSCULAR; INTRAVENOUS; SUBCUTANEOUS at 17:45

## 2020-08-19 RX ADMIN — SODIUM CHLORIDE 100 MILLILITER(S): 9 INJECTION, SOLUTION INTRAVENOUS at 17:09

## 2020-08-19 NOTE — PROGRESS NOTE ADULT - SUBJECTIVE AND OBJECTIVE BOX
PGY 2 Note    Patient examined at bedside, pain well controlled on PO medications. Currently reports abdominal pain, rated 5/10 in intensity described as sharp and cramping with radiation to the back. Reports using three pads overnight, not soaked. Pt denies associated dizziness or palpitations but reports intermittent weakness. Denies fevers/chills, HA/N/V, CP/SOB/palpitations, hematuria/dysuria, constipation/diarrhea. Currently NPO for procedure, passing flatus. Ambulating.     T(F): 98 (08-18-20 @ 23:12), Max: 98.8 (08-18-20 @ 10:28)  HR: 68 (08-19-20 @ 03:30) (65 - 86)  BP: 104/65 (08-19-20 @ 03:30) (104/65 - 131/70)  RR: 14 (08-19-20 @ 03:30) (12 - 18)  SpO2: 98% (08-19-20 @ 03:30) (97% - 98%)    Physical Exam:  General: AAOx3. NAD  CVS: RRR. Nl S1S2  Lungs: CTAB  Abdomen: soft, tenderness to palpation in LLQ and RLQ, mildly distended, +BSx4  VE: deferred, minimal bleeding on pad/chux  Ext: No edema. SCDs in place    Labs:             12.4   5.76  )-----------( 283      ( 08-19 @ 05:19 )             39.7                12.1   8.42  )-----------( 272      ( 08-18 @ 11:00 )             38.4                12.2   7.21  )-----------( 288      ( 08-18 @ 00:15 )             39.0      08-18    139  |  106  |  10  ----------------------------<  171<H>  4.1   |  25  |  0.7    Ca    8.7      18 Aug 2020 00:15    TPro  6.8  /  Alb  4.2  /  TBili  <0.2  /  DBili  x   /  AST  14  /  ALT  9   /  AlkPhos  100  08-18            Trend:             12.4   5.76  )-----------( 283      ( 08-19 @ 05:19 )             39.7                12.1   8.42  )-----------( 272      ( 08-18 @ 11:00 )             38.4                12.2   7.21  )-----------( 288      ( 08-18 @ 00:15 )             39.0         Creatinine, Serum: 0.7 (08-18)      Medications:  MEDICATIONS  (STANDING):  lactated ringers. 1000 milliLiter(s) (125 mL/Hr) IV Continuous <Continuous>    MEDICATIONS  (PRN):  acetaminophen   Tablet .. 650 milliGRAM(s) Oral every 6 hours PRN Mild Pain (1 - 3)  ibuprofen  Tablet. 600 milliGRAM(s) Oral every 6 hours PRN Moderate Pain (4 - 6)

## 2020-08-19 NOTE — BRIEF OPERATIVE NOTE - OPERATION/FINDINGS
Large bulky fibroid uterus measuring 20 cm in size, largest fibroid cervical 15 cm in diameter. Normal fallopian tubes and ovaries bilaterally.  Urology consulted preop for ureteral stent placement.

## 2020-08-19 NOTE — BRIEF OPERATIVE NOTE - NSICDXBRIEFPREOP_GEN_ALL_CORE_FT
PRE-OP DIAGNOSIS:  Menorrhagia 19-Aug-2020 16:50:14  Trista Carlin  Fibroid uterus 19-Aug-2020 16:50:08  Trista Carlin

## 2020-08-19 NOTE — PROGRESS NOTE ADULT - ASSESSMENT
42yo P1 with LMP 7/30, with pelvic pain and abnormal uterine bleeding, likely secondary to fibroid uterus, admitted for definitive surgical management for abnormal uterine bleeding   - monitor vitals & bleeding   - pain management PRN   - diet: NPO  - IVF hydration   - DVT ppx: SCDs  - s/p urology consult for stent placement prior to OR  - f/u AM labs   - crossed for 2u pRBCs  - on call to OR 8/19 @1300     Dr. Arroyo aware and Dr. Molina to be made aware 42yo P1 with LMP 7/30, with pelvic pain and abnormal uterine bleeding, likely secondary to fibroid uterus, admitted for definitive surgical management for abnormal uterine bleeding   - monitor vitals & bleeding   - pain management PRN   - diet: NPO  - IVF hydration   - DVT ppx: SCDs  - s/p urology consult for stent placement prior to OR  - COVID-19 neg   - f/u Ucx   - f/u AM labs   - crossed for 2u pRBCs  - on call to OR 8/19 @1300     Dr. Arroyo aware and Dr. Molina to be made aware

## 2020-08-19 NOTE — BRIEF OPERATIVE NOTE - NSICDXBRIEFPOSTOP_GEN_ALL_CORE_FT
POST-OP DIAGNOSIS:  Menorrhagia 19-Aug-2020 16:50:40  Trista Carlin  Fibroid uterus 19-Aug-2020 16:50:32  Trista Carlin  Fibroid of cervix 19-Aug-2020 16:50:27  Trista Carlin

## 2020-08-19 NOTE — BRIEF OPERATIVE NOTE - NSICDXBRIEFPROCEDURE_GEN_ALL_CORE_FT
PROCEDURES:  Insertion of ureteral stent 19-Aug-2020 16:51:46 bilateral Trista Carlin  Abdominal myomectomy 19-Aug-2020 16:50:00  Trista Carlin  Salpingectomy, bilateral 19-Aug-2020 16:49:50  Trista Carlin  Total abdominal hysterectomy 19-Aug-2020 16:49:40  Trista Carlin

## 2020-08-20 LAB
ANION GAP SERPL CALC-SCNC: 9 MMOL/L — SIGNIFICANT CHANGE UP (ref 7–14)
BASOPHILS # BLD AUTO: 0.03 K/UL — SIGNIFICANT CHANGE UP (ref 0–0.2)
BASOPHILS NFR BLD AUTO: 0.3 % — SIGNIFICANT CHANGE UP (ref 0–1)
BUN SERPL-MCNC: 6 MG/DL — LOW (ref 10–20)
CALCIUM SERPL-MCNC: 7.8 MG/DL — LOW (ref 8.5–10.1)
CHLORIDE SERPL-SCNC: 100 MMOL/L — SIGNIFICANT CHANGE UP (ref 98–110)
CO2 SERPL-SCNC: 26 MMOL/L — SIGNIFICANT CHANGE UP (ref 17–32)
CREAT SERPL-MCNC: 0.7 MG/DL — SIGNIFICANT CHANGE UP (ref 0.7–1.5)
CULTURE RESULTS: SIGNIFICANT CHANGE UP
EOSINOPHIL # BLD AUTO: 0.03 K/UL — SIGNIFICANT CHANGE UP (ref 0–0.7)
EOSINOPHIL NFR BLD AUTO: 0.3 % — SIGNIFICANT CHANGE UP (ref 0–8)
GLUCOSE SERPL-MCNC: 114 MG/DL — HIGH (ref 70–99)
HCT VFR BLD CALC: 34.5 % — LOW (ref 37–47)
HGB BLD-MCNC: 11.1 G/DL — LOW (ref 12–16)
IMM GRANULOCYTES NFR BLD AUTO: 0.5 % — HIGH (ref 0.1–0.3)
LYMPHOCYTES # BLD AUTO: 1.5 K/UL — SIGNIFICANT CHANGE UP (ref 1.2–3.4)
LYMPHOCYTES # BLD AUTO: 17.3 % — LOW (ref 20.5–51.1)
MAGNESIUM SERPL-MCNC: 1.8 MG/DL — SIGNIFICANT CHANGE UP (ref 1.8–2.4)
MCHC RBC-ENTMCNC: 27.9 PG — SIGNIFICANT CHANGE UP (ref 27–31)
MCHC RBC-ENTMCNC: 32.2 G/DL — SIGNIFICANT CHANGE UP (ref 32–37)
MCV RBC AUTO: 86.7 FL — SIGNIFICANT CHANGE UP (ref 81–99)
MONOCYTES # BLD AUTO: 0.81 K/UL — HIGH (ref 0.1–0.6)
MONOCYTES NFR BLD AUTO: 9.4 % — HIGH (ref 1.7–9.3)
NEUTROPHILS # BLD AUTO: 6.24 K/UL — SIGNIFICANT CHANGE UP (ref 1.4–6.5)
NEUTROPHILS NFR BLD AUTO: 72.2 % — SIGNIFICANT CHANGE UP (ref 42.2–75.2)
NRBC # BLD: 0 /100 WBCS — SIGNIFICANT CHANGE UP (ref 0–0)
PHOSPHATE SERPL-MCNC: 3.1 MG/DL — SIGNIFICANT CHANGE UP (ref 2.1–4.9)
PLATELET # BLD AUTO: 224 K/UL — SIGNIFICANT CHANGE UP (ref 130–400)
POTASSIUM SERPL-MCNC: 4 MMOL/L — SIGNIFICANT CHANGE UP (ref 3.5–5)
POTASSIUM SERPL-SCNC: 4 MMOL/L — SIGNIFICANT CHANGE UP (ref 3.5–5)
RBC # BLD: 3.98 M/UL — LOW (ref 4.2–5.4)
RBC # FLD: 14.6 % — HIGH (ref 11.5–14.5)
SODIUM SERPL-SCNC: 135 MMOL/L — SIGNIFICANT CHANGE UP (ref 135–146)
SPECIMEN SOURCE: SIGNIFICANT CHANGE UP
WBC # BLD: 8.65 K/UL — SIGNIFICANT CHANGE UP (ref 4.8–10.8)
WBC # FLD AUTO: 8.65 K/UL — SIGNIFICANT CHANGE UP (ref 4.8–10.8)

## 2020-08-20 RX ORDER — OXYCODONE HYDROCHLORIDE 5 MG/1
5 TABLET ORAL EVERY 6 HOURS
Refills: 0 | Status: DISCONTINUED | OUTPATIENT
Start: 2020-08-20 | End: 2020-08-21

## 2020-08-20 RX ORDER — ENOXAPARIN SODIUM 100 MG/ML
40 INJECTION SUBCUTANEOUS EVERY 24 HOURS
Refills: 0 | Status: DISCONTINUED | OUTPATIENT
Start: 2020-08-20 | End: 2020-08-21

## 2020-08-20 RX ORDER — SENNA PLUS 8.6 MG/1
1 TABLET ORAL AT BEDTIME
Refills: 0 | Status: DISCONTINUED | OUTPATIENT
Start: 2020-08-20 | End: 2020-08-20

## 2020-08-20 RX ADMIN — SIMETHICONE 80 MILLIGRAM(S): 80 TABLET, CHEWABLE ORAL at 17:07

## 2020-08-20 RX ADMIN — SENNA PLUS 1 TABLET(S): 8.6 TABLET ORAL at 17:09

## 2020-08-20 RX ADMIN — ENOXAPARIN SODIUM 40 MILLIGRAM(S): 100 INJECTION SUBCUTANEOUS at 16:58

## 2020-08-20 RX ADMIN — Medication 975 MILLIGRAM(S): at 23:45

## 2020-08-20 RX ADMIN — Medication 975 MILLIGRAM(S): at 05:34

## 2020-08-20 RX ADMIN — Medication 600 MILLIGRAM(S): at 05:34

## 2020-08-20 RX ADMIN — Medication 975 MILLIGRAM(S): at 17:11

## 2020-08-20 RX ADMIN — GABAPENTIN 300 MILLIGRAM(S): 400 CAPSULE ORAL at 13:11

## 2020-08-20 RX ADMIN — Medication 600 MILLIGRAM(S): at 23:45

## 2020-08-20 RX ADMIN — Medication 600 MILLIGRAM(S): at 13:15

## 2020-08-20 RX ADMIN — SIMETHICONE 80 MILLIGRAM(S): 80 TABLET, CHEWABLE ORAL at 05:34

## 2020-08-20 RX ADMIN — Medication 600 MILLIGRAM(S): at 23:16

## 2020-08-20 RX ADMIN — Medication 975 MILLIGRAM(S): at 23:15

## 2020-08-20 RX ADMIN — Medication 600 MILLIGRAM(S): at 17:12

## 2020-08-20 RX ADMIN — GABAPENTIN 300 MILLIGRAM(S): 400 CAPSULE ORAL at 05:36

## 2020-08-20 RX ADMIN — SENNA PLUS 1 TABLET(S): 8.6 TABLET ORAL at 05:36

## 2020-08-20 RX ADMIN — Medication 975 MILLIGRAM(S): at 13:15

## 2020-08-20 RX ADMIN — SIMETHICONE 80 MILLIGRAM(S): 80 TABLET, CHEWABLE ORAL at 08:57

## 2020-08-20 RX ADMIN — GABAPENTIN 300 MILLIGRAM(S): 400 CAPSULE ORAL at 21:15

## 2020-08-20 RX ADMIN — Medication 975 MILLIGRAM(S): at 17:02

## 2020-08-20 RX ADMIN — Medication 600 MILLIGRAM(S): at 13:10

## 2020-08-20 RX ADMIN — SIMETHICONE 80 MILLIGRAM(S): 80 TABLET, CHEWABLE ORAL at 21:15

## 2020-08-20 RX ADMIN — SIMETHICONE 80 MILLIGRAM(S): 80 TABLET, CHEWABLE ORAL at 13:11

## 2020-08-20 RX ADMIN — Medication 975 MILLIGRAM(S): at 13:14

## 2020-08-20 RX ADMIN — Medication 600 MILLIGRAM(S): at 17:03

## 2020-08-20 NOTE — PROGRESS NOTE ADULT - SUBJECTIVE AND OBJECTIVE BOX
PGY1 Note:  Patient seen and examined at bedside, recovering well. Denies fever, chills, chest pain, SOB, nausea, vomiting, LE pain. Complaining of pain, helped with medication. Minimal bleeding, escalera in place, tolerating PO. Has not passed flatus or ambulated.    Physical Exam  Vital Signs Last 24 Hrs  T(F): 100.2 (20 Aug 2020 05:00), Max: 100.2 (20 Aug 2020 05:00) (repeat at 0600 99F)  HR: 98 (20 Aug 2020 05:00) (62 - 98)  BP: 106/62 (20 Aug 2020 05:00) (99/54 - 130/73)  RR: 17 (20 Aug 2020 05:00) (11 - 18)  SpO2: 99% (19 Aug 2020 21:00) (98% - 100%)    Gen: AAOx3, NAD  Heart: Normal S1S2, RRR, no m/r/g  Lungs: CTA b/l, no r/r/w   Wound: staples in place, dressing c/d/i   Abd: Soft, nontender, nondistended  Ext: No calf tenderness, no swelling, SCDs in place  UO: ([id number]) 175cc    Labs:                        13.0   14.15 )-----------( 231      ( 19 Aug 2020 19:30 )             41.8                         12.4   5.76  )-----------( 283      ( 19 Aug 2020 05:19 )             39.7        acetaminophen   Tablet .. 975 milliGRAM(s) Oral every 6 hours  gabapentin 300 milliGRAM(s) Oral every 8 hours  HYDROmorphone PCA (1 mG/mL) 30 milliLiter(s) PCA Continuous PCA Continuous  ibuprofen  Tablet. 600 milliGRAM(s) Oral every 6 hours  lactated ringers. 1000 milliLiter(s) IV Continuous <Continuous>  lactated ringers. 1000 milliLiter(s) IV Continuous <Continuous>  morphine  - Injectable 2 milliGRAM(s) IV Push every 10 minutes PRN  naloxone Injectable 0.1 milliGRAM(s) IV Push every 3 minutes PRN  ondansetron Injectable 4 milliGRAM(s) IV Push every 6 hours PRN  ondansetron Injectable 4 milliGRAM(s) IV Push every 6 hours PRN  senna 1 Tablet(s) Oral every 12 hours  simethicone 80 milliGRAM(s) Chew every 4 hours PGY1 Note:  Patient seen and examined at bedside, recovering well. Denies fever, chills, chest pain, SOB, nausea, vomiting, LE pain. Complaining of pain, helped with medication. Denies heavy bleeding. Silva in place, tolerating PO fluids. Has not passed flatus or ambulated.    Physical Exam  Vital Signs Last 24 Hrs  T(F): 100.2 (20 Aug 2020 05:00), Max: 100.2 (20 Aug 2020 05:00) (repeat at 0600 99F)  HR: 98 (20 Aug 2020 05:00) (62 - 98)  BP: 106/62 (20 Aug 2020 05:00) (99/54 - 130/73)  RR: 17 (20 Aug 2020 05:00) (11 - 18)  SpO2: 99% (19 Aug 2020 21:00) (98% - 100%)    Gen: AAOx3, NAD  Heart: Normal S1S2, RRR, no m/r/g  Lungs: CTA b/l, no r/r/w   Wound: staples in place, dressing c/d/i   Abd: Soft, nontender, nondistended  Ext: No calf tenderness, no swelling, SCDs in place  UO: ([id number]) 175cc    Labs:                        13.0   14.15 )-----------( 231      ( 19 Aug 2020 19:30 )             41.8                         12.4   5.76  )-----------( 283      ( 19 Aug 2020 05:19 )             39.7        acetaminophen   Tablet .. 975 milliGRAM(s) Oral every 6 hours  gabapentin 300 milliGRAM(s) Oral every 8 hours  HYDROmorphone PCA (1 mG/mL) 30 milliLiter(s) PCA Continuous PCA Continuous  ibuprofen  Tablet. 600 milliGRAM(s) Oral every 6 hours  lactated ringers. 1000 milliLiter(s) IV Continuous <Continuous>  lactated ringers. 1000 milliLiter(s) IV Continuous <Continuous>  morphine  - Injectable 2 milliGRAM(s) IV Push every 10 minutes PRN  naloxone Injectable 0.1 milliGRAM(s) IV Push every 3 minutes PRN  ondansetron Injectable 4 milliGRAM(s) IV Push every 6 hours PRN  ondansetron Injectable 4 milliGRAM(s) IV Push every 6 hours PRN  senna 1 Tablet(s) Oral every 12 hours  simethicone 80 milliGRAM(s) Chew every 4 hours PGY1 Note:  Patient seen and examined at bedside, recovering well. Denies fever, chills, chest pain, SOB, nausea, vomiting, LE pain. Complaining of pain, helped with medication. Denies heavy bleeding. Silva in place, tolerating PO fluids. Has not passed flatus or ambulated.    Physical Exam  Vital Signs Last 24 Hrs  T(F): 100.2 (20 Aug 2020 05:00), Max: 100.2 (20 Aug 2020 05:00) (repeat at 0600 99F)  HR: 98 (20 Aug 2020 05:00) (62 - 98)  BP: 106/62 (20 Aug 2020 05:00) (99/54 - 130/73)  RR: 17 (20 Aug 2020 05:00) (11 - 18)  SpO2: 99% (19 Aug 2020 21:00) (98% - 100%)    Gen: AAOx3, NAD  Heart: Normal S1S2, RRR, no m/r/g  Lungs: CTA b/l, no r/r/w   Wound: staples in place, dressing c/d/i   Abd: Soft, nontender, nondistended, no bowel sounds  Ext: No calf tenderness, no swelling, SCDs in place  SVE: deferred, no blood on pad  UO: ([id number]) 175cc    Labs:                        13.0   14.15 )-----------( 231      ( 19 Aug 2020 19:30 )             41.8              08-20    135  |  100  |  6<L>  ----------------------------<  114<H>  4.0   |  26  |  0.7    Ca    7.8<L>      20 Aug 2020 05:33  Phos  3.1     08-20  Mg     1.8     08-20    TPro  6.6  /  Alb  4.0  /  TBili  0.3  /  DBili  x   /  AST  15  /  ALT  8   /  AlkPhos  69  08-19         MEDICATIONS  (STANDING):  acetaminophen   Tablet .. 975 milliGRAM(s) Oral every 6 hours  gabapentin 300 milliGRAM(s) Oral every 8 hours  ibuprofen  Tablet. 600 milliGRAM(s) Oral every 6 hours  lactated ringers. 1000 milliLiter(s) (100 mL/Hr) IV Continuous <Continuous>  lactated ringers. 1000 milliLiter(s) (125 mL/Hr) IV Continuous <Continuous>  senna 1 Tablet(s) Oral every 12 hours  simethicone 80 milliGRAM(s) Chew every 4 hours    MEDICATIONS  (PRN):  morphine  - Injectable 2 milliGRAM(s) IV Push every 10 minutes PRN Severe Pain (7 - 10)  naloxone Injectable 0.1 milliGRAM(s) IV Push every 3 minutes PRN For ANY of the following changes in patient status:  A. RR LESS THAN 10 breaths per minute, B. Oxygen saturation LESS THAN 90%, C. Sedation score of 6  ondansetron Injectable 4 milliGRAM(s) IV Push every 6 hours PRN Nausea  ondansetron Injectable 4 milliGRAM(s) IV Push every 6 hours PRN Nausea and/or Vomiting  oxyCODONE    IR 5 milliGRAM(s) Oral every 6 hours PRN Severe Pain (7 - 10)

## 2020-08-20 NOTE — PROGRESS NOTE ADULT - ASSESSMENT
44yo P1, s/p PAULIE/BS for fibroid uterus, myomectomy of cervical fibroid, ureteral stent placement, EBL 700cc, s/p 1 unit PRBCs intraop, POD#1, recovering well    - encourage ambulation, PO hydration  - f/u AM CBC   - monitor vitals, bleeding   - advance diet as tolerated   - encourage incentive spirometry   - pain management ERAS, PCA d/c   - simethicone  - routine postop care   - SCDs for DVT ppx  - escalera removed, trial of void 1230     Dr. Arroyo aware, Dr. Molina to be made aware. 44yo P1, s/p PAULIE/BS for fibroid uterus, myomectomy of cervical fibroid, ureteral stent placement, EBL 700cc, s/p 1 unit PRBCs intraop, POD#1, recovering well    - encourage ambulation, PO hydration  - f/u AM labs   - monitor vitals, bleeding   - advance diet as tolerated   - encourage incentive spirometry   - pain management ERAS, PCA d/c   - simethicone  - routine postop care   - SCDs for DVT ppx  - escalera removed, trial of void 1230     Dr. Arroyo aware, Dr. Molina to be made aware.

## 2020-08-20 NOTE — PROGRESS NOTE ADULT - SUBJECTIVE AND OBJECTIVE BOX
PGY 2 Note    Patient seen and evaluated at bedside. Doing well, no acute complaints. Denies fever, chills, CP, SOB, N/V, severe abdominal pain, heavy VB, or LE swelling/pain. Pain well controlled. Tolerating PO fluids, not ambulating, escalera catheter in place.    Physical exam:    Vital Signs Last 24 Hrs  T(F): 98 (19 Aug 2020 21:00), Max: 98.7 (19 Aug 2020 07:41)  HR: 78 (19 Aug 2020 21:00) (62 - 82)  BP: 111/62 (19 Aug 2020 21:00) (104/65 - 130/73)  RR: 17 (19 Aug 2020 21:00) (11 - 18)  SpO2: 99% (19 Aug 2020 21:00) (98% - 100%)    Gen: alert, oriented  CVS: RRR  Lungs: CTABL  Abdomen: Soft, nontender, non distended. No rebound, rigidity or guarding. Dressing clean, dry, intact.  Perineum: no active bleeding. Escalera in place, concentrated urine  Ext: No calf tenderness    f/u UO (min: 36cc/hr): ([id number]) 200cc    Diet: Regular    MEDICATIONS  (STANDING):  acetaminophen   Tablet .. 975 milliGRAM(s) Oral every 6 hours  gabapentin 300 milliGRAM(s) Oral every 8 hours  HYDROmorphone PCA (1 mG/mL) 30 milliLiter(s) PCA Continuous PCA Continuous  ibuprofen  Tablet. 600 milliGRAM(s) Oral every 6 hours  lactated ringers. 1000 milliLiter(s) (100 mL/Hr) IV Continuous <Continuous>  lactated ringers. 1000 milliLiter(s) (125 mL/Hr) IV Continuous <Continuous>  senna 1 Tablet(s) Oral every 12 hours  simethicone 80 milliGRAM(s) Chew every 4 hours    MEDICATIONS  (PRN):  morphine  - Injectable 2 milliGRAM(s) IV Push every 10 minutes PRN Severe Pain (7 - 10)  naloxone Injectable 0.1 milliGRAM(s) IV Push every 3 minutes PRN For ANY of the following changes in patient status:  A. RR LESS THAN 10 breaths per minute, B. Oxygen saturation LESS THAN 90%, C. Sedation score of 6  ondansetron Injectable 4 milliGRAM(s) IV Push every 6 hours PRN Nausea  ondansetron Injectable 4 milliGRAM(s) IV Push every 6 hours PRN Nausea and/or Vomiting      LABS:                        13.0   14.15 )-----------( 231      ( 19 Aug 2020 19:30 )             41.8                         12.4   5.76  )-----------( 283      ( 19 Aug 2020 05:19 )             39.7                         12.1   8.42  )-----------( 272      ( 18 Aug 2020 11:00 )             38.4     Magnesium, Serum: 1.8 mg/dL (08-19 @ 19:30)  Antibody Screen: NEG (08-19 @ 05:19)    08-19-20 @ 19:30      137  |  100  |  7<L>  ----------------------------<  143<H>  4.2   |  23  |  0.7    08-19-20 @ 05:19      140  |  105  |  9<L>  ----------------------------<  90  3.9   |  26  |  0.7    08-18-20 @ 00:15      139  |  106  |  10  ----------------------------<  171<H>  4.1   |  25  |  0.7        Ca    8.5      19 Aug 2020 19:30  Ca    8.8      19 Aug 2020 05:19  Ca    8.7      18 Aug 2020 00:15  Phos  3.8     08-19  Mg     1.8     08-19    TPro  6.6  /  Alb  4.0  /  TBili  0.3  /  DBili  x   /  AST  15  /  ALT  8   /  AlkPhos  69  08-19-20 @ 05:19  TPro  6.8  /  Alb  4.2  /  TBili  <0.2  /  DBili  x   /  AST  14  /  ALT  9   /  AlkPhos  100  08-18-20 @ 00:15        Culture - Urine (collected 08-18-20 @ 01:15)  Source: .Urine Clean Catch (Midstream)  Final Report (08-19-20 @ 13:55):    10,000 - 49,000 CFU/mL Streptococcus agalactiae (Group B)    <10,000 CFU/ml Normal Urogenital wanda present    Streptococcus agalactiae (Group B) isolated    Group B streptococci are susceptible to ampicillin,    penicillin and cefazolin, but may be resistant to    erythromycin and clindamycin.    Recommendations for intrapartum prophylaxis for Group B    streptococci are penicillin or ampicillin.          08-19-20 @ 07:01  -  08-20-20 @ 00:40  --------------------------------------------------------  IN: 200 mL / OUT: 400 mL / NET: -200 mL

## 2020-08-20 NOTE — PROGRESS NOTE ADULT - ASSESSMENT
A/P: 44yo P1, s/p PAULIE/BS for fibroid uterus, myomectomy of cervical fibroid, ureteral stent placement, EBL 700cc, s/p 1 unit PRBCs intraop, POD#1, recovering well  - routine postoperative care  - PCA and ERAS for pain management  - escalera until AM, f/u UO  - IVF/regular diet  - crossed 1U pRBCs  - f/u AM labs  - monitor vitals and bleeding  - SCDs for DVT prophylaxis    Dr. Bird aware, will discuss with Dr. Molina.

## 2020-08-21 ENCOUNTER — TRANSCRIPTION ENCOUNTER (OUTPATIENT)
Age: 45
End: 2020-08-21

## 2020-08-21 VITALS
HEART RATE: 80 BPM | DIASTOLIC BLOOD PRESSURE: 53 MMHG | TEMPERATURE: 97 F | SYSTOLIC BLOOD PRESSURE: 110 MMHG | RESPIRATION RATE: 17 BRPM

## 2020-08-21 LAB
ANION GAP SERPL CALC-SCNC: 8 MMOL/L — SIGNIFICANT CHANGE UP (ref 7–14)
BASOPHILS # BLD AUTO: 0.03 K/UL — SIGNIFICANT CHANGE UP (ref 0–0.2)
BASOPHILS NFR BLD AUTO: 0.3 % — SIGNIFICANT CHANGE UP (ref 0–1)
BUN SERPL-MCNC: 9 MG/DL — LOW (ref 10–20)
CALCIUM SERPL-MCNC: 8.4 MG/DL — LOW (ref 8.5–10.1)
CHLORIDE SERPL-SCNC: 108 MMOL/L — SIGNIFICANT CHANGE UP (ref 98–110)
CO2 SERPL-SCNC: 25 MMOL/L — SIGNIFICANT CHANGE UP (ref 17–32)
CREAT SERPL-MCNC: 0.6 MG/DL — LOW (ref 0.7–1.5)
EOSINOPHIL # BLD AUTO: 0.13 K/UL — SIGNIFICANT CHANGE UP (ref 0–0.7)
EOSINOPHIL NFR BLD AUTO: 1.5 % — SIGNIFICANT CHANGE UP (ref 0–8)
GLUCOSE SERPL-MCNC: 98 MG/DL — SIGNIFICANT CHANGE UP (ref 70–99)
HCT VFR BLD CALC: 34.8 % — LOW (ref 37–47)
HGB BLD-MCNC: 10.9 G/DL — LOW (ref 12–16)
IMM GRANULOCYTES NFR BLD AUTO: 0.3 % — SIGNIFICANT CHANGE UP (ref 0.1–0.3)
LYMPHOCYTES # BLD AUTO: 1.43 K/UL — SIGNIFICANT CHANGE UP (ref 1.2–3.4)
LYMPHOCYTES # BLD AUTO: 16.5 % — LOW (ref 20.5–51.1)
MCHC RBC-ENTMCNC: 27.5 PG — SIGNIFICANT CHANGE UP (ref 27–31)
MCHC RBC-ENTMCNC: 31.3 G/DL — LOW (ref 32–37)
MCV RBC AUTO: 87.9 FL — SIGNIFICANT CHANGE UP (ref 81–99)
MONOCYTES # BLD AUTO: 0.8 K/UL — HIGH (ref 0.1–0.6)
MONOCYTES NFR BLD AUTO: 9.2 % — SIGNIFICANT CHANGE UP (ref 1.7–9.3)
NEUTROPHILS # BLD AUTO: 6.26 K/UL — SIGNIFICANT CHANGE UP (ref 1.4–6.5)
NEUTROPHILS NFR BLD AUTO: 72.2 % — SIGNIFICANT CHANGE UP (ref 42.2–75.2)
NRBC # BLD: 0 /100 WBCS — SIGNIFICANT CHANGE UP (ref 0–0)
PLATELET # BLD AUTO: 226 K/UL — SIGNIFICANT CHANGE UP (ref 130–400)
POTASSIUM SERPL-MCNC: 3.9 MMOL/L — SIGNIFICANT CHANGE UP (ref 3.5–5)
POTASSIUM SERPL-SCNC: 3.9 MMOL/L — SIGNIFICANT CHANGE UP (ref 3.5–5)
RBC # BLD: 3.96 M/UL — LOW (ref 4.2–5.4)
RBC # FLD: 14.9 % — HIGH (ref 11.5–14.5)
SODIUM SERPL-SCNC: 141 MMOL/L — SIGNIFICANT CHANGE UP (ref 135–146)
WBC # BLD: 8.68 K/UL — SIGNIFICANT CHANGE UP (ref 4.8–10.8)
WBC # FLD AUTO: 8.68 K/UL — SIGNIFICANT CHANGE UP (ref 4.8–10.8)

## 2020-08-21 RX ORDER — IBUPROFEN 200 MG
1 TABLET ORAL
Qty: 28 | Refills: 0
Start: 2020-08-21 | End: 2020-08-27

## 2020-08-21 RX ORDER — ACETAMINOPHEN 500 MG
2 TABLET ORAL
Qty: 56 | Refills: 0
Start: 2020-08-21 | End: 2020-08-27

## 2020-08-21 RX ORDER — ACETAMINOPHEN 500 MG
2 TABLET ORAL
Qty: 0 | Refills: 0 | DISCHARGE
Start: 2020-08-21

## 2020-08-21 RX ORDER — IBUPROFEN 200 MG
1 TABLET ORAL
Qty: 0 | Refills: 0 | DISCHARGE
Start: 2020-08-21

## 2020-08-21 RX ADMIN — Medication 975 MILLIGRAM(S): at 11:07

## 2020-08-21 RX ADMIN — Medication 600 MILLIGRAM(S): at 11:07

## 2020-08-21 RX ADMIN — Medication 975 MILLIGRAM(S): at 11:08

## 2020-08-21 RX ADMIN — Medication 600 MILLIGRAM(S): at 11:08

## 2020-08-21 RX ADMIN — Medication 600 MILLIGRAM(S): at 05:40

## 2020-08-21 RX ADMIN — SENNA PLUS 1 TABLET(S): 8.6 TABLET ORAL at 05:10

## 2020-08-21 RX ADMIN — GABAPENTIN 300 MILLIGRAM(S): 400 CAPSULE ORAL at 05:10

## 2020-08-21 RX ADMIN — Medication 600 MILLIGRAM(S): at 05:10

## 2020-08-21 RX ADMIN — Medication 975 MILLIGRAM(S): at 05:10

## 2020-08-21 RX ADMIN — Medication 975 MILLIGRAM(S): at 05:40

## 2020-08-21 RX ADMIN — SIMETHICONE 80 MILLIGRAM(S): 80 TABLET, CHEWABLE ORAL at 05:11

## 2020-08-21 RX ADMIN — SIMETHICONE 80 MILLIGRAM(S): 80 TABLET, CHEWABLE ORAL at 09:29

## 2020-08-21 NOTE — DISCHARGE NOTE PROVIDER - PROVIDER TOKENS
PROVIDER:[TOKEN:[[id number]]] PROVIDER:[TOKEN:[[id number]],SCHEDULEDAPPT:[08/24/2020],SCHEDULEDAPPTTIME:[09:00 AM]]

## 2020-08-21 NOTE — DISCHARGE NOTE PROVIDER - HOSPITAL COURSE
46yo P1, s/p PAULIE/BS for fibroid uterus, myomectomy of cervical fibroid, ureteral stent placement, EBL 700cc, s/p 1 unit PRBCs intraop. Postop course uncomplicated. 44yo P1, s/p PAULIE/BS for fibroid uterus, myomectomy of cervical fibroid, ureteral stent placement, EBL 700cc, s/p 1 unit PRBCs intraop. Postoperative course was uncomplicated except for acute blood loss anemia.

## 2020-08-21 NOTE — DISCHARGE NOTE PROVIDER - NSDCMRMEDTOKEN_GEN_ALL_CORE_FT
acetaminophen 325 mg oral tablet: 2 tab(s) orally every 6 hours, As Needed  ibuprofen 600 mg oral tablet: 1 tab(s) orally every 6 hours, As Needed acetaminophen 325 mg oral tablet: 2 tab(s) orally every 6 hours, As Needed  Alivio 600 mg oral tablet: 1 tab(s) orally every 6 hours, As Needed   ibuprofen 600 mg oral tablet: 1 tab(s) orally every 6 hours, As Needed  Tylenol 325 mg oral tablet: 2 tab(s) orally every 6 hours, As Needed

## 2020-08-21 NOTE — DISCHARGE NOTE NURSING/CASE MANAGEMENT/SOCIAL WORK - NSDCFUADDAPPT_GEN_ALL_CORE_FT
Please follow up with Dr. Molina in 1 week for a postop visit and staple removal. To make an appointment please call [phone].

## 2020-08-21 NOTE — DISCHARGE NOTE PROVIDER - NSDCCPGOAL_GEN_ALL_CORE_FT
Nothing in the vagina for 6 weeks (no tampons, no intercourse, no douching, no bath tubs, no swimming pools). May shower.  If you have a temperature above 100.4F, excessive vaginal bleeding or severe abdominal pain please call your doctor or come to the emergency department.

## 2020-08-21 NOTE — DISCHARGE NOTE PROVIDER - CARE PROVIDER_API CALL
Kehinde Molina  OBSTETRICS AND GYNECOLOGY  [address] [place] [address]  Phone: [phone]  Fax: [phone]  Follow Up Time: Kehinde Molina  OBSTETRICS AND GYNECOLOGY  [address] [place] [address]  Phone: [phone]  Fax: [phone]  Scheduled Appointment: 08/24/2020 09:00 AM

## 2020-08-21 NOTE — CDI QUERY NOTE - NSCDIOTHERTXTBX_GEN_ALL_CORE_HH
DOCUMENTATION CLARIFICATION FORM     Encounter #: [id number]                                                               Patient’s Name: Inna Baptiste  Medical Record #: [id number]                                                     Admit Date: 8-  CDI Specialist/: Jr                                                     Contact #: [phone]    Dear Dr. Molina,                      Date: 8-                   The Physician’s or Provider’s documentation of the patient’s presentation, evaluation and  medical management, as identified below, may support a diagnosis that is not documented in the medical record.  In order to accurately capture all diagnoses to the greatest degree of specificity reflecting the patient’s actual severity of illness, the documentation in this patient’s medical record requires additional clarification.  Please include more specific documentation, either known or suspected, of a corresponding diagnosis associated with the clinical information described below in your Progress Note and/or Discharge Summary.    •	8-19 Hemoglobin 13.0  •	8-20 Hemoglobin 11.1  •	8-21 Hemoglobin 10.9  •	8-18 PN states: “s/p PAULIE/BS for fibroid uterus, myomectomy of cervical fibroid, ureteral stent placement, EBL 700cc, s/p 1 unit PRBCs intraop”  •	Treatment: 1 Unit of Packed Red Blood Cells, Serial CBC Lab Draws      Based on your clinical judgement, can you provide a diagnosis that represents the above clinical indicators?    (  ) Acute blood loss anemia  (  ) Other (please specify)  (  ) Unable to clinically determine          Documentation clarification is required for compliance, accuracy in coding and billing, and reporting severity of illness, quality data   and risk of mortality.  --------------------------------------------------------------------------------------------------------------------------------------------  DO NOT REMOVE THIS RECORD WITHOUT FIRST NOTIFYING THE CDI SPECIALIST  This form is NOT a part of the permanent Medical Record. DOCUMENTATION CLARIFICATION FORM     Encounter #: [id number]                                                               Patient’s Name: Inna Baptiste  Medical Record #: [id number]                                                     Admit Date: 8-  CDI Specialist/: Jr                                                     Contact #: [phone]    Dear Dr. Molina,                      Date: 8-                   The Physician’s or Provider’s documentation of the patient’s presentation, evaluation and  medical management, as identified below, may support a diagnosis that is not documented in the medical record.  In order to accurately capture all diagnoses to the greatest degree of specificity reflecting the patient’s actual severity of illness, the documentation in this patient’s medical record requires additional clarification.  Please include more specific documentation, either known or suspected, of a corresponding diagnosis associated with the clinical information described below in your Progress Note and/or Discharge Summary.    •	8-19 Hemoglobin 13.0  •	8-20 Hemoglobin 11.1  •	8-21 Hemoglobin 10.9  •	8-21 PN states: “s/p PAULIE/BS for fibroid uterus, myomectomy of cervical fibroid, ureteral stent placement, EBL 700cc, s/p 1 unit PRBCs intraop”  •	Treatment: 1 Unit of Packed Red Blood Cells, Serial CBC Lab Draws      Based on your clinical judgement, can you provide a diagnosis that represents the above clinical indicators?    (  ) Acute blood loss anemia  (  ) Other (please specify)  (  ) Unable to clinically determine          Documentation clarification is required for compliance, accuracy in coding and billing, and reporting severity of illness, quality data   and risk of mortality.  --------------------------------------------------------------------------------------------------------------------------------------------  DO NOT REMOVE THIS RECORD WITHOUT FIRST NOTIFYING THE CDI SPECIALIST  This form is NOT a part of the permanent Medical Record.

## 2020-08-21 NOTE — DISCHARGE NOTE PROVIDER - NSDCQMCOGNITION_NEU_ALL_CORE
[FreeTextEntry6] : 6yo F presenting with cough and congestion x 2 days with associated fever Tmax 100.7. Last night started to have complaints of R ear pain. Mom noticed that the R nose appeared "blocked: gave little remedy nasal spray. advil prn for fever. No sick contacts at home. Mom now having URI sxs. Brittny does attend school and after school . Tolerating PO. Denies n/v/d.Today mom states that she looks better.  \par No difficulty breathing or increased WOB. Father with a history of asthma. No hx of albuterol use. 
No difficulties

## 2020-08-21 NOTE — PROGRESS NOTE ADULT - ASSESSMENT
44yo P1, s/p PAULIE/BS for fibroid uterus, myomectomy of cervical fibroid, ureteral stent placement, EBL 700cc, s/p 1 unit PRBCs intraop, POD#2, recovering well.  - regular diet  - encourage hydration and ambulation  - ERAS for pain management  - lovenox for VTE prophylaxis  - vitals per routine  - f/u AM labs  - anticipate d/c home today    Will inform Dr. Molina.

## 2020-08-21 NOTE — PROGRESS NOTE ADULT - SUBJECTIVE AND OBJECTIVE BOX
PGY4 progress note    Patient seen and examined at bedside, resting comfortably. Reports pain well controlled with motrin. Minimal vaginal bleeding. Ambulating and tolerating regular diet. Urinating without difficulty. Passing flatus.     PE:  Vital Signs Last 24 Hrs  T(C): 36.9 (21 Aug 2020 00:13), Max: 37.3 (20 Aug 2020 08:00)  T(F): 98.5 (21 Aug 2020 00:13), Max: 99.1 (20 Aug 2020 08:00)  HR: 88 (21 Aug 2020 00:13) (88 - 99)  BP: 100/55 (21 Aug 2020 00:13) (100/55 - 124/77)  BP(mean): --  RR: 18 (21 Aug 2020 00:13) (16 - 18)  SpO2: --    GEN: NAD, AAOX3  CVS: RRR, normal S1/S2  lungs: CTAB  abd: soft, nontender, nondistended, no r/g/r, +BS  incision: vertical incision c/d/i, staples in place  ext: no calf tenderness or edema  SVE: deferred, no blood on pad    labs:                        11.1   8.65  )-----------( 224      ( 20 Aug 2020 05:33 )             34.5     08-20    135  |  100  |  6<L>  ----------------------------<  114<H>  4.0   |  26  |  0.7    Ca    7.8<L>      20 Aug 2020 05:33  Phos  3.1     08-20  Mg     1.8     08-20      MEDICATIONS  (STANDING):  acetaminophen   Tablet .. 975 milliGRAM(s) Oral every 6 hours  enoxaparin Injectable 40 milliGRAM(s) SubCutaneous every 24 hours  gabapentin 300 milliGRAM(s) Oral every 8 hours  ibuprofen  Tablet. 600 milliGRAM(s) Oral every 6 hours  lactated ringers. 1000 milliLiter(s) (100 mL/Hr) IV Continuous <Continuous>  lactated ringers. 1000 milliLiter(s) (125 mL/Hr) IV Continuous <Continuous>  senna 1 Tablet(s) Oral every 12 hours  simethicone 80 milliGRAM(s) Chew every 4 hours    MEDICATIONS  (PRN):  morphine  - Injectable 2 milliGRAM(s) IV Push every 10 minutes PRN Severe Pain (7 - 10)  naloxone Injectable 0.1 milliGRAM(s) IV Push every 3 minutes PRN For ANY of the following changes in patient status:  A. RR LESS THAN 10 breaths per minute, B. Oxygen saturation LESS THAN 90%, C. Sedation score of 6  ondansetron Injectable 4 milliGRAM(s) IV Push every 6 hours PRN Nausea  ondansetron Injectable 4 milliGRAM(s) IV Push every 6 hours PRN Nausea and/or Vomiting  oxyCODONE    IR 5 milliGRAM(s) Oral every 6 hours PRN Severe Pain (7 - 10)

## 2020-08-21 NOTE — DISCHARGE NOTE PROVIDER - NSDCFUADDAPPT_GEN_ALL_CORE_FT
Please follow up with Dr. Molina in 1 week for a postop visit and staple removal. To make an appointment please call [phone]. Please follow up with Dr. Molina on 8/24/20 at 0900 AM for a  postop visit and staple removal. Appointment location:   [facility]  [address] [place] [address]

## 2020-08-21 NOTE — DISCHARGE NOTE NURSING/CASE MANAGEMENT/SOCIAL WORK - PATIENT PORTAL LINK FT
You can access the FollowMyHealth Patient Portal offered by [facility] by registering at the following website: [email or web address]. By joining [employer]’s FollowMyHealth portal, you will also be able to view your health information using other applications (apps) compatible with our system.

## 2020-08-24 ENCOUNTER — APPOINTMENT (OUTPATIENT)
Dept: OBGYN | Facility: CLINIC | Age: 45
End: 2020-08-24
Payer: MEDICAID

## 2020-08-24 ENCOUNTER — OUTPATIENT (OUTPATIENT)
Dept: OUTPATIENT SERVICES | Facility: HOSPITAL | Age: 45
LOS: 1 days | Discharge: HOME | End: 2020-08-24

## 2020-08-24 VITALS
BODY MASS INDEX: 26.76 KG/M2 | WEIGHT: 145.44 LBS | DIASTOLIC BLOOD PRESSURE: 80 MMHG | HEIGHT: 62 IN | SYSTOLIC BLOOD PRESSURE: 120 MMHG

## 2020-08-24 PROCEDURE — 99024 POSTOP FOLLOW-UP VISIT: CPT

## 2020-08-26 LAB — SURGICAL PATHOLOGY STUDY: SIGNIFICANT CHANGE UP

## 2020-08-27 DIAGNOSIS — D25.9 LEIOMYOMA OF UTERUS, UNSPECIFIED: ICD-10-CM

## 2020-08-27 DIAGNOSIS — D62 ACUTE POSTHEMORRHAGIC ANEMIA: ICD-10-CM

## 2020-08-27 DIAGNOSIS — N92.0 EXCESSIVE AND FREQUENT MENSTRUATION WITH REGULAR CYCLE: ICD-10-CM

## 2020-08-29 ENCOUNTER — INPATIENT (INPATIENT)
Facility: HOSPITAL | Age: 45
LOS: 2 days | Discharge: HOME | End: 2020-09-01
Attending: STUDENT IN AN ORGANIZED HEALTH CARE EDUCATION/TRAINING PROGRAM | Admitting: STUDENT IN AN ORGANIZED HEALTH CARE EDUCATION/TRAINING PROGRAM
Payer: MEDICAID

## 2020-08-29 VITALS
SYSTOLIC BLOOD PRESSURE: 110 MMHG | OXYGEN SATURATION: 98 % | HEART RATE: 117 BPM | RESPIRATION RATE: 111 BRPM | DIASTOLIC BLOOD PRESSURE: 70 MMHG | HEIGHT: 62 IN | TEMPERATURE: 99 F | WEIGHT: 156.09 LBS

## 2020-08-29 LAB
ALBUMIN SERPL ELPH-MCNC: 4.2 G/DL — SIGNIFICANT CHANGE UP (ref 3.5–5.2)
ALP SERPL-CCNC: 73 U/L — SIGNIFICANT CHANGE UP (ref 30–115)
ALT FLD-CCNC: 11 U/L — SIGNIFICANT CHANGE UP (ref 0–41)
ANION GAP SERPL CALC-SCNC: 12 MMOL/L — SIGNIFICANT CHANGE UP (ref 7–14)
AST SERPL-CCNC: 22 U/L — SIGNIFICANT CHANGE UP (ref 0–41)
BASOPHILS # BLD AUTO: 0.03 K/UL — SIGNIFICANT CHANGE UP (ref 0–0.2)
BASOPHILS NFR BLD AUTO: 0.2 % — SIGNIFICANT CHANGE UP (ref 0–1)
BILIRUB SERPL-MCNC: 0.3 MG/DL — SIGNIFICANT CHANGE UP (ref 0.2–1.2)
BUN SERPL-MCNC: 13 MG/DL — SIGNIFICANT CHANGE UP (ref 10–20)
CALCIUM SERPL-MCNC: 9.1 MG/DL — SIGNIFICANT CHANGE UP (ref 8.5–10.1)
CHLORIDE SERPL-SCNC: 102 MMOL/L — SIGNIFICANT CHANGE UP (ref 98–110)
CO2 SERPL-SCNC: 24 MMOL/L — SIGNIFICANT CHANGE UP (ref 17–32)
CREAT SERPL-MCNC: 0.7 MG/DL — SIGNIFICANT CHANGE UP (ref 0.7–1.5)
EOSINOPHIL # BLD AUTO: 0.06 K/UL — SIGNIFICANT CHANGE UP (ref 0–0.7)
EOSINOPHIL NFR BLD AUTO: 0.4 % — SIGNIFICANT CHANGE UP (ref 0–8)
GLUCOSE SERPL-MCNC: 111 MG/DL — HIGH (ref 70–99)
HCG SERPL QL: NEGATIVE — SIGNIFICANT CHANGE UP
HCT VFR BLD CALC: 41.4 % — SIGNIFICANT CHANGE UP (ref 37–47)
HGB BLD-MCNC: 13.1 G/DL — SIGNIFICANT CHANGE UP (ref 12–16)
IMM GRANULOCYTES NFR BLD AUTO: 0.4 % — HIGH (ref 0.1–0.3)
LACTATE SERPL-SCNC: 1.4 MMOL/L — SIGNIFICANT CHANGE UP (ref 0.7–2)
LIDOCAIN IGE QN: 17 U/L — SIGNIFICANT CHANGE UP (ref 7–60)
LYMPHOCYTES # BLD AUTO: 1.13 K/UL — LOW (ref 1.2–3.4)
LYMPHOCYTES # BLD AUTO: 8.2 % — LOW (ref 20.5–51.1)
MCHC RBC-ENTMCNC: 27.7 PG — SIGNIFICANT CHANGE UP (ref 27–31)
MCHC RBC-ENTMCNC: 31.6 G/DL — LOW (ref 32–37)
MCV RBC AUTO: 87.5 FL — SIGNIFICANT CHANGE UP (ref 81–99)
MONOCYTES # BLD AUTO: 0.45 K/UL — SIGNIFICANT CHANGE UP (ref 0.1–0.6)
MONOCYTES NFR BLD AUTO: 3.3 % — SIGNIFICANT CHANGE UP (ref 1.7–9.3)
NEUTROPHILS # BLD AUTO: 12.06 K/UL — HIGH (ref 1.4–6.5)
NEUTROPHILS NFR BLD AUTO: 87.5 % — HIGH (ref 42.2–75.2)
NRBC # BLD: 0 /100 WBCS — SIGNIFICANT CHANGE UP (ref 0–0)
PLATELET # BLD AUTO: 425 K/UL — HIGH (ref 130–400)
POTASSIUM SERPL-MCNC: 4.7 MMOL/L — SIGNIFICANT CHANGE UP (ref 3.5–5)
POTASSIUM SERPL-SCNC: 4.7 MMOL/L — SIGNIFICANT CHANGE UP (ref 3.5–5)
PROT SERPL-MCNC: 7.2 G/DL — SIGNIFICANT CHANGE UP (ref 6–8)
RBC # BLD: 4.73 M/UL — SIGNIFICANT CHANGE UP (ref 4.2–5.4)
RBC # FLD: 13.9 % — SIGNIFICANT CHANGE UP (ref 11.5–14.5)
SODIUM SERPL-SCNC: 138 MMOL/L — SIGNIFICANT CHANGE UP (ref 135–146)
WBC # BLD: 13.79 K/UL — HIGH (ref 4.8–10.8)
WBC # FLD AUTO: 13.79 K/UL — HIGH (ref 4.8–10.8)

## 2020-08-29 PROCEDURE — 99285 EMERGENCY DEPT VISIT HI MDM: CPT

## 2020-08-29 PROCEDURE — 74177 CT ABD & PELVIS W/CONTRAST: CPT | Mod: 26

## 2020-08-29 RX ORDER — SODIUM CHLORIDE 9 MG/ML
1000 INJECTION INTRAMUSCULAR; INTRAVENOUS; SUBCUTANEOUS ONCE
Refills: 0 | Status: COMPLETED | OUTPATIENT
Start: 2020-08-29 | End: 2020-08-29

## 2020-08-29 RX ORDER — MORPHINE SULFATE 50 MG/1
4 CAPSULE, EXTENDED RELEASE ORAL ONCE
Refills: 0 | Status: DISCONTINUED | OUTPATIENT
Start: 2020-08-29 | End: 2020-08-29

## 2020-08-29 RX ADMIN — MORPHINE SULFATE 4 MILLIGRAM(S): 50 CAPSULE, EXTENDED RELEASE ORAL at 20:04

## 2020-08-29 RX ADMIN — MORPHINE SULFATE 4 MILLIGRAM(S): 50 CAPSULE, EXTENDED RELEASE ORAL at 20:50

## 2020-08-29 RX ADMIN — SODIUM CHLORIDE 1000 MILLILITER(S): 9 INJECTION INTRAMUSCULAR; INTRAVENOUS; SUBCUTANEOUS at 21:00

## 2020-08-29 RX ADMIN — SODIUM CHLORIDE 1000 MILLILITER(S): 9 INJECTION INTRAMUSCULAR; INTRAVENOUS; SUBCUTANEOUS at 20:04

## 2020-08-29 NOTE — ED PROVIDER NOTE - PROGRESS NOTE DETAILS
gyn was consulted, will evaluate pt Pt has early SBO. Talked with surgery. They will come evaluate pt. surgery evaluated pt, recommends CT scan with PO contrast

## 2020-08-29 NOTE — ED ADULT NURSE NOTE - OBJECTIVE STATEMENT
Pt. complains of abdominal pain which began yesterday and has since increased in intensity. Pt. accompanied nausea, vomiting and diarrhea. Pt. had uterine fibroid sx last week.

## 2020-08-29 NOTE — ED PROVIDER NOTE - NS ED ROS FT
Review of Systems:  	•	CONSTITUTIONAL - no fever, no diaphoresis, no chills  	•	SKIN - no rash  	•	HEMATOLOGIC - no bleeding, no bruising  	•	EYES - no eye pain, no blurry vision  	•	ENT - no change in hearing, no sore throat, no ear pain or tinnitus  	•	RESPIRATORY - no shortness of breath, no cough  	•	CARDIAC - no chest pain, no palpitations  	•	GI - + abd pain, + nausea, + vomiting, + diarrhea, no constipation  	•	GENITO-URINARY - no discharge, no dysuria; no hematuria, no increased urinary frequency  	•	MUSCULOSKELETAL - no joint paint, no swelling, no redness  	•	NEUROLOGIC - no weakness, no headache, no paresthesias, no LOC  	•	PSYCH - no anxiety, non suicidal, non homicidal, no hallucination, no depression

## 2020-08-29 NOTE — ED PROVIDER NOTE - CLINICAL SUMMARY MEDICAL DECISION MAKING FREE TEXT BOX
44 yo F presented to ED for abdominal pain. Pt found to have SBO vs illeus. Surgery evaluated pt and do not believe it to be SBO. Patient admitted to gyn for further management.

## 2020-08-29 NOTE — ED PROVIDER NOTE - PHYSICAL EXAMINATION
CONST: Well appearing in NAD  EYES: PERRL, EOMI, Sclera and conjunctiva clear.   ENT: No nasal discharge. Oropharynx normal appearing, no erythema or exudates. No abscess or swelling. Uvula midline.   NECK: Non-tender, no meningeal signs. normal ROM. supple   CARD: Normal S1 S2; Normal rate and rhythm  RESP: Equal BS B/L, No wheezes, rhonchi or rales. No distress  GI: Soft, diffuse tenderness to lower abdomen, non-distended. no cva tenderness. normal BS  MS: Normal ROM in all extremities. No midline spinal tenderness. pulses 2 +. no calf tenderness or swelling  SKIN: Warm, dry, no acute rashes. Good turgor. incision clean , with no redness or drainage  NEURO: A&Ox3, No focal deficits. Strength 5/5 with no sensory deficits. Steady gait.

## 2020-08-29 NOTE — ED ADULT TRIAGE NOTE - CHIEF COMPLAINT QUOTE
uterine fibroid removal last week, c/o abdominal pain started today, vaginal bleeding nausea, poor appetite, diarrhea x 2 days

## 2020-08-29 NOTE — ED PROVIDER NOTE - ATTENDING CONTRIBUTION TO CARE
46 yo F with PMH of myomectomy of cervical fibroid, ureteral stent placement on August 19th presents to ED for 1 day history of nausea, vomiting, diarrhea and abdominal pain. Patient has not eaten anything today. She has had 3 episodes of NBNB emesis and 5-6 episodes of diarrhea. Unknown if there is blood in her diarrhea due to vaginal bleeding since her surgery. She states that the wound has been heeling. No discharge, crepitus. She states the abdominal pain improved after surgery until today.     Const: Well nourished, well developed, appears stated age  Eyes: PERRL, no conjunctival injection  HENT:  Neck supple without meningismus   CV: RRR, Warm, well-perfused extremities  RESP: CTA B/L, no tachypnea   GI: soft, diffusely tender. Heeling vertical scar.   MSK: No gross deformities appreciated  Skin: Warm, dry. No rashes  Neuro: Alert, CNs II-XII grossly intact. Sensation and motor function of extremities grossly intact.  Psych: Appropriate mood and affect.    Will do labs, CT, consult GYN

## 2020-08-29 NOTE — CONSULT NOTE ADULT - SUBJECTIVE AND OBJECTIVE BOX
Chief Complaint: abdominal pain    HPI: 44yo P1 with no significant PMH presenting to ED for generalized abdominal pain beginning last night, cramping in nature, nonradiating, not relieved with motrin or tylenol. Patient is s/p abdominal myomectomy for approx 15cm fibroid, now POD#10.  Also reports several episodes of NBNB vomiting since last night and inability to tolerate PO.  Last meal was last night.  Had a loss bowel movement this morning, not passing flatus. Denies fevers or chills.  Minimal vaginal bleeding at this time, denies vaginal discharge.  Otherwise denies HA, CP, SOB, dysuria, hematuria.       Ob/Gyn History:                   LMP                    Cycle Length q30d   Obhx: FT  x1  Gynhx: H/o fibroid uterus s/p abd myomectomy.  Otherwise denies cysts, abnormal pap smears, STDs.  Last Pap Smear - , wnl       Denies the following: constitutional symptoms, visual symptoms, cardiovascular symptoms, respiratory symptoms, GI symptoms, musculoskeletal symptoms, skin symptoms, neurologic symptoms, hematologic symptoms, allergic symptoms, psychiatric symptoms  Except any pertinent positives listed.     Home Medications:  acetaminophen 325 mg oral tablet: 2 tab(s) orally every 6 hours, As Needed (21 Aug 2020 06:17)  ibuprofen 600 mg oral tablet: 1 tab(s) orally every 6 hours, As Needed (21 Aug 2020 06:17)      Allergies  No Known Allergies    PAST MEDICAL & SURGICAL HISTORY:  No pertinent past medical history  No significant past surgical history      FAMILY HISTORY:  No pertinent family history in first degree relatives      SOCIAL HISTORY: Denies cigarette use, alcohol use, or illicit drug use    Vital Signs Last 24 Hrs  T(F): 98.6 (29 Aug 2020 19:02), Max: 98.6 (29 Aug 2020 19:02)  HR: 117 (29 Aug 2020 19:02) (117 - 117)  BP: 110/70 (29 Aug 2020 19:02) (110/70 - 110/70)  RR: 18 (29 Aug 2020 19:45) (18 - 111)  Height (cm): 157.48 (20 @ 19:02)  Weight (kg): 70.8 (20 @ 19:02)  BMI (kg/m2): 28.5 (20 @ 19:02)  BSA (m2): 1.72 (20 @ 19:02)      General Appearance - AAOx3, NAD  Heart - S1S2 regular rate and rhythm  Lung - CTA Bilaterally  Abdomen - Soft, moderate generalized tenderness in all 4 quadrants, nondistended, no rebound, no rigidity, no guarding, bowel sounds present  Incision - vertical skin incision c/d/i, healing well, no induration or erythema    GYN/Pelvis: deferred      Meds:   morphine  - Injectable 4 milliGRAM(s) IV Push Once  sodium chloride 0.9% Bolus 1000 milliLiter(s) IV Bolus once    Height (cm): 157.48 (20 @ 19:02)  Weight (kg): 70.8 (20 @ 19:02)  BMI (kg/m2): 28.5 (20 @ 19:02)  BSA (m2): 1.72 (20 @ 19:02)    LABS:                        13.1   13.79 )-----------( 425      ( 29 Aug 2020 20:01 )             41.4             138  |  102  |  13  ----------------------------<  111<H>  4.7   |  24  |  0.7    Ca    9.1      29 Aug 2020 20:01    TPro  7.2  /  Alb  4.2  /  TBili  0.3  /  DBili  x   /  AST  22  /  ALT  11  /  AlkPhos  73      serum preg neg        RADIOLOGY & ADDITIONAL STUDIES:  CT pending Chief Complaint: abdominal pain    HPI: 46yo P1 with no significant PMH presenting to ED for generalized abdominal pain beginning last night, cramping in nature, nonradiating, not relieved with motrin or tylenol. Patient is s/p PAULIE/BS for approx 15cm fibroid, now POD#10.  Also reports several episodes of NBNB vomiting since last night and inability to tolerate PO.  Last meal was last night.  Had a loss bowel movement this morning, not passing flatus. Denies fevers or chills.  Minimal vaginal bleeding at this time, denies vaginal discharge.  Otherwise denies HA, CP, SOB, dysuria, hematuria.       Ob/Gyn History:                   LMP                    Cycle Length q30d   Obhx: FT  x1  Gynhx: H/o fibroid uterus s/p PAULIE/BS.  Otherwise denies cysts, abnormal pap smears, STDs.  Last Pap Smear - , wnl       Denies the following: constitutional symptoms, visual symptoms, cardiovascular symptoms, respiratory symptoms, GI symptoms, musculoskeletal symptoms, skin symptoms, neurologic symptoms, hematologic symptoms, allergic symptoms, psychiatric symptoms  Except any pertinent positives listed.     Home Medications:  acetaminophen 325 mg oral tablet: 2 tab(s) orally every 6 hours, As Needed (21 Aug 2020 06:17)  ibuprofen 600 mg oral tablet: 1 tab(s) orally every 6 hours, As Needed (21 Aug 2020 06:17)      Allergies  No Known Allergies    PAST MEDICAL HISTORY:  Fibroid uterus    PAST SURGICAL HISTORY:  PAULIE/BS for fibroid uterus     FAMILY HISTORY:  No pertinent family history in first degree relatives      SOCIAL HISTORY: Denies cigarette use, alcohol use, or illicit drug use    Vital Signs Last 24 Hrs  T(F): 98.6 (29 Aug 2020 19:02), Max: 98.6 (29 Aug 2020 19:02)  HR: 117 (29 Aug 2020 19:02) (117 - 117)  BP: 110/70 (29 Aug 2020 19:02) (110/70 - 110/70)  RR: 18 (29 Aug 2020 19:45) (18 - 111)  Height (cm): 157.48 (20 @ 19:02)  Weight (kg): 70.8 (20 @ 19:02)  BMI (kg/m2): 28.5 (20 @ 19:02)  BSA (m2): 1.72 (20 @ 19:02)      General Appearance - AAOx3, NAD  Heart - S1S2 regular rate and rhythm  Lung - CTA Bilaterally  Abdomen - Soft, moderate generalized tenderness in all 4 quadrants, nondistended, no rebound, no rigidity, no guarding, bowel sounds present  Incision - vertical skin incision c/d/i, healing well, no induration or erythema    GYN/Pelvis: deferred      Meds:   morphine  - Injectable 4 milliGRAM(s) IV Push Once  sodium chloride 0.9% Bolus 1000 milliLiter(s) IV Bolus once    Height (cm): 157.48 (20 @ 19:02)  Weight (kg): 70.8 (20 @ 19:02)  BMI (kg/m2): 28.5 (20 @ 19:02)  BSA (m2): 1.72 (20 @ 19:02)    LABS:                        13.1   13.79 )-----------( 425      ( 29 Aug 2020 20:01 )             41.4             138  |  102  |  13  ----------------------------<  111<H>  4.7   |  24  |  0.7    Ca    9.1      29 Aug 2020 20:01    TPro  7.2  /  Alb  4.2  /  TBili  0.3  /  DBili  x   /  AST  22  /  ALT  11  /  AlkPhos  73      serum preg neg        RADIOLOGY & ADDITIONAL STUDIES:  CT pending Chief Complaint: abdominal pain    HPI: 46yo P1 with no significant PMH presenting to ED for generalized abdominal pain beginning last night, cramping in nature, nonradiating, not relieved with motrin or tylenol. Patient is s/p PAULIE/BS for approx 15cm fibroid, now POD#10.  Also reports several episodes of NBNB vomiting since last night and inability to tolerate PO.  Last meal was last night.  Had a loss bowel movement this morning, not passing flatus. Denies fevers or chills.  Minimal vaginal bleeding at this time, denies vaginal discharge.  Otherwise denies HA, CP, SOB, dysuria, hematuria.       Ob/Gyn History:                   LMP                    Cycle Length q30d   Obhx: FT  x1  Gynhx: H/o fibroid uterus s/p PAULIE/BS.  Otherwise denies cysts, abnormal pap smears, STDs.  Last Pap Smear - , wnl       Denies the following: constitutional symptoms, visual symptoms, cardiovascular symptoms, respiratory symptoms, GI symptoms, musculoskeletal symptoms, skin symptoms, neurologic symptoms, hematologic symptoms, allergic symptoms, psychiatric symptoms  Except any pertinent positives listed.     Home Medications:  acetaminophen 325 mg oral tablet: 2 tab(s) orally every 6 hours, As Needed (21 Aug 2020 06:17)  ibuprofen 600 mg oral tablet: 1 tab(s) orally every 6 hours, As Needed (21 Aug 2020 06:17)      Allergies  No Known Allergies    PAST MEDICAL HISTORY:  Fibroid uterus    PAST SURGICAL HISTORY:  PAULIE/BS for fibroid uterus     FAMILY HISTORY:  No pertinent family history in first degree relatives      SOCIAL HISTORY: Denies cigarette use, alcohol use, or illicit drug use    Vital Signs Last 24 Hrs  T(F): 98.6 (29 Aug 2020 19:02), Max: 98.6 (29 Aug 2020 19:02)  HR: 117 (29 Aug 2020 19:02) (117 - 117)  BP: 110/70 (29 Aug 2020 19:02) (110/70 - 110/70)  RR: 18 (29 Aug 2020 19:45) (18 - 111)  Height (cm): 157.48 (20 @ 19:02)  Weight (kg): 70.8 (20 @ 19:02)  BMI (kg/m2): 28.5 (20 @ 19:02)  BSA (m2): 1.72 (20 @ 19:02)      General Appearance - AAOx3, NAD  Heart - S1S2 regular rate and rhythm  Lung - CTA Bilaterally  Abdomen - Soft, moderate generalized tenderness in all 4 quadrants, nondistended, no rebound, no rigidity, no guarding, bowel sounds present  Incision - vertical skin incision c/d/i, healing well, no induration or erythema    GYN/Pelvis: deferred      Meds:   morphine  - Injectable 4 milliGRAM(s) IV Push Once  sodium chloride 0.9% Bolus 1000 milliLiter(s) IV Bolus once    Height (cm): 157.48 (20 @ 19:02)  Weight (kg): 70.8 (20 @ 19:02)  BMI (kg/m2): 28.5 (20 @ 19:02)  BSA (m2): 1.72 (20 @ 19:02)    LABS:                        13.1   13.79 )-----------( 425      ( 29 Aug 2020 20:01 )             41.4             138  |  102  |  13  ----------------------------<  111<H>  4.7   |  24  |  0.7    Ca    9.1      29 Aug 2020 20:01    TPro  7.2  /  Alb  4.2  /  TBili  0.3  /  DBili  x   /  AST  22  /  ALT  11  /  AlkPhos  73      serum preg neg        RADIOLOGY & ADDITIONAL STUDIES:      INTERPRETATION:  CLINICAL STATEMENT: Abdominal pain  TECHNIQUE: Contiguous CT images were obtained of the abdomen and pelvis.  Intravenous Contrast:  Intravenous contrast administered.  Oral contrast: was not administered.  COMPARISON:  None  FINDINGS:  LOWER CHEST: There is mild bibasilar subsegmental atelectasis.  LIVER: Unremarkable.  SPLEEN: Unremarkable.  PANCREAS: Unremarkable.  GALLBLADDERAND BILIARY TREE: Trace nonspecific gallbladder wall edema without definitive pericholecystic fat stranding. No biliary ductal dilatation.  ADRENALS: Unremarkable.  KIDNEYS: Symmetric pattern of renal enhancement. No hydronephrosis. Subcentimeterrenal hypodensities too small to characterize.  LYMPH NODES: There are no enlarged abdominal or pelvic lymph nodes.  VASCULATURE: The abdominal aorta is normal in caliber.  BOWEL: There are dilated loops of small bowel measuring up to 3.5 cm with transition to decompressed distal ileal and colonic loops in the anterior midline posterior to the midline pelvic incision (series 5, image 298). There is wall thickening of the upstream mildly dilated small bowel.  PERITONEUM/RETROPERITONEUM/MESENTERY: There is trace ascites most notably in the pelvis and in the perihepatic and perisplenic regions. No pneumoperitoneum. The trace fluid in the cul-de-sac appears to demonstrate early loculation.  PELVIC VISCERA: The uterus is not visualized. Presumed a post hysterectomy with fluid noted at the vaginal cuff. Unremarkable CT appearance of the urinary bladder  BONES AND SOFT TISSUES: Postsurgical changes to the anterior midline pelvis.  IMPRESSION:  Dilated loops of small bowel measuring up to 3.5 cm with decompressed distal ileal and colonic loops favored to reflect a component of early small bowel obstruction. Changes from dilated to decompressed small bowel is in the anterior midline pelvis posterior to the incision site. Limited ability to differentiate partial versus complete obstruction. There is wall thickening of the mildly dilated upstream small bowel which may be reactive or reflect superimposed enteritis. Chief Complaint: abdominal pain    HPI: 44yo P1 with no significant PMH presenting to ED for generalized abdominal pain beginning last night, cramping in nature, nonradiating, not relieved with motrin or tylenol. Patient is s/p PAULIE/BS for approx 15cm fibroid, now POD#10.  Also reports several episodes of NBNB vomiting since last night and inability to tolerate PO.  Last meal was last night.  Had a loose bowel movement this morning, not passing flatus for the past day. Denies fevers or chills.  Minimal vaginal bleeding at this time, denies vaginal discharge.  Otherwise denies HA, CP, SOB, dysuria, hematuria.       Ob/Gyn History:                   LMP                    Cycle Length q30d   Obhx: FT  x1  Gynhx: H/o fibroid uterus s/p PAULIE/BS.  Otherwise denies cysts, abnormal pap smears, STDs.  Last Pap Smear - , wnl       Denies the following: constitutional symptoms, visual symptoms, cardiovascular symptoms, respiratory symptoms, GI symptoms, musculoskeletal symptoms, skin symptoms, neurologic symptoms, hematologic symptoms, allergic symptoms, psychiatric symptoms  Except any pertinent positives listed.     Home Medications:  acetaminophen 325 mg oral tablet: 2 tab(s) orally every 6 hours, As Needed (21 Aug 2020 06:17)  ibuprofen 600 mg oral tablet: 1 tab(s) orally every 6 hours, As Needed (21 Aug 2020 06:17)      Allergies  No Known Allergies    PAST MEDICAL HISTORY:  Fibroid uterus    PAST SURGICAL HISTORY:  PAULIE/BS for fibroid uterus     FAMILY HISTORY:  No pertinent family history in first degree relatives      SOCIAL HISTORY: Denies cigarette use, alcohol use, or illicit drug use    Vital Signs Last 24 Hrs  T(F): 98.6 (29 Aug 2020 19:02), Max: 98.6 (29 Aug 2020 19:02)  HR: 117 (29 Aug 2020 19:02) (117 - 117)  BP: 110/70 (29 Aug 2020 19:02) (110/70 - 110/70)  RR: 18 (29 Aug 2020 19:45) (18 - 111)  Height (cm): 157.48 (20 @ 19:02)  Weight (kg): 70.8 (20 @ 19:02)  BMI (kg/m2): 28.5 (20 @ 19:02)  BSA (m2): 1.72 (20 @ 19:02)      General Appearance - AAOx3, NAD  Heart - S1S2 regular rate and rhythm  Lung - CTA Bilaterally  Abdomen - Soft, moderate generalized tenderness in all 4 quadrants, nondistended, no rebound, no rigidity, no guarding, bowel sounds present.   Incision - vertical skin incision c/d/i, healing well, no induration or erythema. minimal bruising near incision site.     GYN/Pelvis: deferred      Meds:   morphine  - Injectable 4 milliGRAM(s) IV Push Once  sodium chloride 0.9% Bolus 1000 milliLiter(s) IV Bolus once    Height (cm): 157.48 (20 @ 19:02)  Weight (kg): 70.8 (20 @ 19:02)  BMI (kg/m2): 28.5 (20 @ 19:02)  BSA (m2): 1.72 (20 @ 19:02)    LABS:                        13.1   13.79 )-----------( 425      ( 29 Aug 2020 20:01 )             41.4             138  |  102  |  13  ----------------------------<  111<H>  4.7   |  24  |  0.7    Ca    9.1      29 Aug 2020 20:01    TPro  7.2  /  Alb  4.2  /  TBili  0.3  /  DBili  x   /  AST  22  /  ALT  11  /  AlkPhos  73      serum preg neg        RADIOLOGY & ADDITIONAL STUDIES:      INTERPRETATION:  CLINICAL STATEMENT: Abdominal pain  TECHNIQUE: Contiguous CT images were obtained of the abdomen and pelvis.  Intravenous Contrast:  Intravenous contrast administered.  Oral contrast: was not administered.  COMPARISON:  None  FINDINGS:  LOWER CHEST: There is mild bibasilar subsegmental atelectasis.  LIVER: Unremarkable.  SPLEEN: Unremarkable.  PANCREAS: Unremarkable.  GALLBLADDERAND BILIARY TREE: Trace nonspecific gallbladder wall edema without definitive pericholecystic fat stranding. No biliary ductal dilatation.  ADRENALS: Unremarkable.  KIDNEYS: Symmetric pattern of renal enhancement. No hydronephrosis. Subcentimeterrenal hypodensities too small to characterize.  LYMPH NODES: There are no enlarged abdominal or pelvic lymph nodes.  VASCULATURE: The abdominal aorta is normal in caliber.  BOWEL: There are dilated loops of small bowel measuring up to 3.5 cm with transition to decompressed distal ileal and colonic loops in the anterior midline posterior to the midline pelvic incision (series 5, image 298). There is wall thickening of the upstream mildly dilated small bowel.  PERITONEUM/RETROPERITONEUM/MESENTERY: There is trace ascites most notably in the pelvis and in the perihepatic and perisplenic regions. No pneumoperitoneum. The trace fluid in the cul-de-sac appears to demonstrate early loculation.  PELVIC VISCERA: The uterus is not visualized. Presumed a post hysterectomy with fluid noted at the vaginal cuff. Unremarkable CT appearance of the urinary bladder  BONES AND SOFT TISSUES: Postsurgical changes to the anterior midline pelvis.  IMPRESSION:  Dilated loops of small bowel measuring up to 3.5 cm with decompressed distal ileal and colonic loops favored to reflect a component of early small bowel obstruction. Changes from dilated to decompressed small bowel is in the anterior midline pelvis posterior to the incision site. Limited ability to differentiate partial versus complete obstruction. There is wall thickening of the mildly dilated upstream small bowel which may be reactive or reflect superimposed enteritis.

## 2020-08-29 NOTE — ED PROVIDER NOTE - OBJECTIVE STATEMENT
45 year old female, s/p PAULIE/BS for fibroid uterus, myomectomy of cervical fibroid, ureteral stent placement x 1 week ago, presents with lower abdominal pain since last night. + nausea, vomiting, and diarrhea. no fever, chills, urinary symptoms, chest pain, or sob. no drainage or redness to incision site. no sick contacts. Also admits is still bleeding vaginally s/p procedure. no discharge.

## 2020-08-29 NOTE — CONSULT NOTE ADULT - ASSESSMENT
44yo P1 s/p abdominal myomectomy for fibroid uterus, POD#10, now with N/V and abdominal pain, r/o SBO vs. postop infection, clinically and hemodynamically stable  -f/u CT scan      **NOTE IS INCOMPLETE** 44yo P1 s/p PAULIE/BS for fibroid uterus, POD#10, now with N/V and abdominal pain, r/o SBO vs. postop infection, clinically and hemodynamically stable  -f/u CT scan      **NOTE IS INCOMPLETE** 44yo P1 s/p PAULIE/BS for fibroid uterus, POD#10, with new onset N/V and abdominal pain, differential includes partial vs. complete SBO vs. ileus, no evidence of postoperative infection at this time, currently hemodynamically stable  -no acute gyn intervention at this time  -recommend surgery consult  -gyn to follow      Dr. Ewing and Dr. Eckert aware. 42yo P1 s/p PAULIE/BS for fibroid uterus, POD#10, with new onset N/V and abdominal pain, ddx SBO vs. ileus, pain unlikely secondary to postoperative infection/hematoma at this time, currently hemodynamically stable  -no acute gyn intervention at this time  -recommend surgery consult  -gyn to follow      Dr. Ewing and Dr. Eckert aware.

## 2020-08-30 DIAGNOSIS — Z90.710 ACQUIRED ABSENCE OF BOTH CERVIX AND UTERUS: Chronic | ICD-10-CM

## 2020-08-30 DIAGNOSIS — Z90.79 ACQUIRED ABSENCE OF OTHER GENITAL ORGAN(S): Chronic | ICD-10-CM

## 2020-08-30 LAB
ALBUMIN SERPL ELPH-MCNC: 3.7 G/DL — SIGNIFICANT CHANGE UP (ref 3.5–5.2)
ALP SERPL-CCNC: 67 U/L — SIGNIFICANT CHANGE UP (ref 30–115)
ALT FLD-CCNC: 7 U/L — SIGNIFICANT CHANGE UP (ref 0–41)
ANION GAP SERPL CALC-SCNC: 11 MMOL/L — SIGNIFICANT CHANGE UP (ref 7–14)
APPEARANCE UR: CLEAR — SIGNIFICANT CHANGE UP
AST SERPL-CCNC: 12 U/L — SIGNIFICANT CHANGE UP (ref 0–41)
BASOPHILS # BLD AUTO: 0.03 K/UL — SIGNIFICANT CHANGE UP (ref 0–0.2)
BASOPHILS NFR BLD AUTO: 0.4 % — SIGNIFICANT CHANGE UP (ref 0–1)
BILIRUB SERPL-MCNC: 0.7 MG/DL — SIGNIFICANT CHANGE UP (ref 0.2–1.2)
BILIRUB UR-MCNC: NEGATIVE — SIGNIFICANT CHANGE UP
BUN SERPL-MCNC: 8 MG/DL — LOW (ref 10–20)
CALCIUM SERPL-MCNC: 8.7 MG/DL — SIGNIFICANT CHANGE UP (ref 8.5–10.1)
CHLORIDE SERPL-SCNC: 102 MMOL/L — SIGNIFICANT CHANGE UP (ref 98–110)
CO2 SERPL-SCNC: 25 MMOL/L — SIGNIFICANT CHANGE UP (ref 17–32)
COLOR SPEC: SIGNIFICANT CHANGE UP
CREAT SERPL-MCNC: 0.6 MG/DL — LOW (ref 0.7–1.5)
DIFF PNL FLD: SIGNIFICANT CHANGE UP
EOSINOPHIL # BLD AUTO: 0.17 K/UL — SIGNIFICANT CHANGE UP (ref 0–0.7)
EOSINOPHIL NFR BLD AUTO: 2.2 % — SIGNIFICANT CHANGE UP (ref 0–8)
GLUCOSE SERPL-MCNC: 95 MG/DL — SIGNIFICANT CHANGE UP (ref 70–99)
GLUCOSE UR QL: NEGATIVE — SIGNIFICANT CHANGE UP
HCT VFR BLD CALC: 35.1 % — LOW (ref 37–47)
HGB BLD-MCNC: 11.2 G/DL — LOW (ref 12–16)
IMM GRANULOCYTES NFR BLD AUTO: 0.1 % — SIGNIFICANT CHANGE UP (ref 0.1–0.3)
KETONES UR-MCNC: NEGATIVE — SIGNIFICANT CHANGE UP
LEUKOCYTE ESTERASE UR-ACNC: NEGATIVE — SIGNIFICANT CHANGE UP
LYMPHOCYTES # BLD AUTO: 1.61 K/UL — SIGNIFICANT CHANGE UP (ref 1.2–3.4)
LYMPHOCYTES # BLD AUTO: 21 % — SIGNIFICANT CHANGE UP (ref 20.5–51.1)
MAGNESIUM SERPL-MCNC: 2.2 MG/DL — SIGNIFICANT CHANGE UP (ref 1.8–2.4)
MCHC RBC-ENTMCNC: 27.9 PG — SIGNIFICANT CHANGE UP (ref 27–31)
MCHC RBC-ENTMCNC: 31.9 G/DL — LOW (ref 32–37)
MCV RBC AUTO: 87.3 FL — SIGNIFICANT CHANGE UP (ref 81–99)
MONOCYTES # BLD AUTO: 0.61 K/UL — HIGH (ref 0.1–0.6)
MONOCYTES NFR BLD AUTO: 7.9 % — SIGNIFICANT CHANGE UP (ref 1.7–9.3)
NEUTROPHILS # BLD AUTO: 5.25 K/UL — SIGNIFICANT CHANGE UP (ref 1.4–6.5)
NEUTROPHILS NFR BLD AUTO: 68.4 % — SIGNIFICANT CHANGE UP (ref 42.2–75.2)
NITRITE UR-MCNC: NEGATIVE — SIGNIFICANT CHANGE UP
NRBC # BLD: 0 /100 WBCS — SIGNIFICANT CHANGE UP (ref 0–0)
PH UR: 6.5 — SIGNIFICANT CHANGE UP (ref 5–8)
PHOSPHATE SERPL-MCNC: 3.2 MG/DL — SIGNIFICANT CHANGE UP (ref 2.1–4.9)
PLATELET # BLD AUTO: 390 K/UL — SIGNIFICANT CHANGE UP (ref 130–400)
POTASSIUM SERPL-MCNC: 4 MMOL/L — SIGNIFICANT CHANGE UP (ref 3.5–5)
POTASSIUM SERPL-SCNC: 4 MMOL/L — SIGNIFICANT CHANGE UP (ref 3.5–5)
PROT SERPL-MCNC: 6.1 G/DL — SIGNIFICANT CHANGE UP (ref 6–8)
PROT UR-MCNC: SIGNIFICANT CHANGE UP
RBC # BLD: 4.02 M/UL — LOW (ref 4.2–5.4)
RBC # FLD: 14.1 % — SIGNIFICANT CHANGE UP (ref 11.5–14.5)
SARS-COV-2 RNA SPEC QL NAA+PROBE: SIGNIFICANT CHANGE UP
SODIUM SERPL-SCNC: 138 MMOL/L — SIGNIFICANT CHANGE UP (ref 135–146)
SP GR SPEC: >1.05 (ref 1.01–1.02)
UROBILINOGEN FLD QL: SIGNIFICANT CHANGE UP
WBC # BLD: 7.68 K/UL — SIGNIFICANT CHANGE UP (ref 4.8–10.8)
WBC # FLD AUTO: 7.68 K/UL — SIGNIFICANT CHANGE UP (ref 4.8–10.8)

## 2020-08-30 PROCEDURE — 99283 EMERGENCY DEPT VISIT LOW MDM: CPT

## 2020-08-30 PROCEDURE — 99221 1ST HOSP IP/OBS SF/LOW 40: CPT | Mod: 24,AI

## 2020-08-30 PROCEDURE — 74176 CT ABD & PELVIS W/O CONTRAST: CPT | Mod: 26

## 2020-08-30 RX ORDER — MORPHINE SULFATE 50 MG/1
4 CAPSULE, EXTENDED RELEASE ORAL ONCE
Refills: 0 | Status: DISCONTINUED | OUTPATIENT
Start: 2020-08-30 | End: 2020-08-30

## 2020-08-30 RX ORDER — IOHEXOL 300 MG/ML
30 INJECTION, SOLUTION INTRAVENOUS ONCE
Refills: 0 | Status: COMPLETED | OUTPATIENT
Start: 2020-08-30 | End: 2020-08-30

## 2020-08-30 RX ORDER — ACETAMINOPHEN 500 MG
1000 TABLET ORAL ONCE
Refills: 0 | Status: COMPLETED | OUTPATIENT
Start: 2020-08-30 | End: 2020-08-30

## 2020-08-30 RX ORDER — SODIUM CHLORIDE 9 MG/ML
1000 INJECTION, SOLUTION INTRAVENOUS
Refills: 0 | Status: DISCONTINUED | OUTPATIENT
Start: 2020-08-30 | End: 2020-09-01

## 2020-08-30 RX ORDER — ACETAMINOPHEN 500 MG
650 TABLET ORAL EVERY 6 HOURS
Refills: 0 | Status: DISCONTINUED | OUTPATIENT
Start: 2020-08-30 | End: 2020-09-01

## 2020-08-30 RX ORDER — IBUPROFEN 200 MG
600 TABLET ORAL EVERY 6 HOURS
Refills: 0 | Status: DISCONTINUED | OUTPATIENT
Start: 2020-08-30 | End: 2020-09-01

## 2020-08-30 RX ADMIN — SODIUM CHLORIDE 125 MILLILITER(S): 9 INJECTION, SOLUTION INTRAVENOUS at 19:47

## 2020-08-30 RX ADMIN — Medication 650 MILLIGRAM(S): at 21:18

## 2020-08-30 RX ADMIN — IOHEXOL 30 MILLILITER(S): 300 INJECTION, SOLUTION INTRAVENOUS at 01:43

## 2020-08-30 RX ADMIN — MORPHINE SULFATE 4 MILLIGRAM(S): 50 CAPSULE, EXTENDED RELEASE ORAL at 01:11

## 2020-08-30 RX ADMIN — Medication 400 MILLIGRAM(S): at 09:00

## 2020-08-30 RX ADMIN — Medication 650 MILLIGRAM(S): at 22:02

## 2020-08-30 RX ADMIN — SODIUM CHLORIDE 125 MILLILITER(S): 9 INJECTION, SOLUTION INTRAVENOUS at 13:11

## 2020-08-30 RX ADMIN — Medication 1000 MILLIGRAM(S): at 15:13

## 2020-08-30 RX ADMIN — MORPHINE SULFATE 4 MILLIGRAM(S): 50 CAPSULE, EXTENDED RELEASE ORAL at 01:43

## 2020-08-30 NOTE — H&P ADULT - ATTENDING COMMENTS
46yo POD10 w/ findings and symptoms c/w partial SBO or ileus; admit for bowel rest, IV fluid, and pain control

## 2020-08-30 NOTE — PROGRESS NOTE ADULT - SUBJECTIVE AND OBJECTIVE BOX
PGY3 Note    Pt seen and examined at bedside. Pain is 8/10. She has not had a vomiting episode since yesterday at 1700 and denies nausea currently. She has been NPO. Lat noticed passing gas upon presentation. Denies BM since admission. difficulty. Denies fevers, chills, urinary problems.      PHYSICAL EXAM:  Vital Signs Last 24 Hrs  T(C): 37.1 (30 Aug 2020 04:15), Max: 37.1 (30 Aug 2020 00:00)  T(F): 98.7 (30 Aug 2020 04:15), Max: 98.8 (30 Aug 2020 00:00)  HR: 71 (30 Aug 2020 06:17) (71 - 117)  BP: 109/73 (30 Aug 2020 06:17) (93/79 - 110/70)  RR: 18 (30 Aug 2020 04:15) (18 - 111)  SpO2: 98% (30 Aug 2020 00:00) (98% - 98%)  General Appearance: NAD, AA0x3  Cardiac: RRR  Pulmonary: CTAB  Abdomen: Pos bowel sounds in all four quadrants. Soft, moderate tenderness in the lower abdominal quadrants, nondistended, no rebound, guarding, or rigidity.   Vertical midline incision: clean/dry/inact, healing well  Extremities: no swelling or calf tenderness    LABS:                        13.1   13.79 )-----------( 425      ( 29 Aug 2020 20:01 )             41.4   08-29    138  |  102  |  13  ----------------------------<  111<H>  4.7   |  24  |  0.7    Ca    9.1      29 Aug 2020 20:01    TPro  7.2  /  Alb  4.2  /  TBili  0.3  /  DBili  x   /  AST  22  /  ALT  11  /  AlkPhos  73  08-29      Urinalysis Basic - ( 30 Aug 2020 00:30 )    Color: Light Yellow / Appearance: Clear / SG: >1.050 / pH: x  Gluc: x / Ketone: Negative  / Bili: Negative / Urobili: <2 mg/dL   Blood: x / Protein: Trace / Nitrite: Negative   Leuk Esterase: Negative / RBC: x / WBC x   Sq Epi: x / Non Sq Epi: x / Bacteria: x        RADIOLOGY AND ADDITIONAL TESTS:    EXAM:  CT ABDOMEN AND PELVIS OC            PROCEDURE DATE:  08/30/2020            INTERPRETATION:  CLINICAL STATEMENT: Small bowel obstruction, follow-up examination.    TECHNIQUE: Contiguous axial CT images were obtained from the lower chest to the pubic symphysis without intravenous contrast.  Oral contrast was administered.  Reformatted images in the coronal and sagittal planes were acquired.    COMPARISON CT: Abdomen and pelvis dated 8/29/2020 at 11:38 PM.    FINDINGS:    Following the administration of oral contrast, there is redemonstration of dilated small bowel loops measuring up to 3.5 cm with distally collapsed ileal and colonic loops, however oral contrast is noted to reach the colon. Transition from mildly dilated to decompressed bowel is noted posterior to the incision site. Findings may reflect partial obstruction versus ileus. Redemonstrated is small bowel wall thickening in the lower abdomen and pelvis which may be reactive or reflect superimposed enteritis.    Vicariousexcretion of contrast into the gallbladder. Excreted contrast into the renal collecting systems and urinary bladder. Remainder findings not significantly changed.      IMPRESSION:    When compared to prior study performed approximately 4 hours prior:    Oral contrast reaches the ascending colon excluding complete bowel obstruction.  There are persistent mildly dilated small bowel with distally collapsed distal/terminal ileum compatible with ileus versus partial obstruction.  Mild wall thickening of small bowel in the lower abdomen/pelvis may be reactive or reflect enteritis.

## 2020-08-30 NOTE — CONSULT NOTE ADULT - SUBJECTIVE AND OBJECTIVE BOX
RASHI SCHAFER [place] [id number]  45y Female    HPI: 45F s/p [facility]/BS with Dr. Molina 8/19/20 presents to ED with epigastric and bilateral lower quadrant abdominal pain that began 2-3 days ago, the pain is described as cramping in nature, non-radiating, no alleviating or aggravating factors. The patient reports 3 episodes of vomiting, multiple episodes of diarrhea and inability to tolerate PO for 1 day. The patient had normal bowel function post operatively, last normal bowel movement was 2-3 days ago, was able to tolerate PO without any issue, was seen for a routine post op visit with Dr. Molina. Taking only tylenol and motrin for pain. The patient denies any fever, chills, admits to blood in urine, no bloody bowel movements.          PAST MEDICAL & SURGICAL HISTORY:  No pertinent past medical history  s/p andres/bs 08/19/20        MEDICATIONS  (STANDING):    MEDICATIONS  (PRN):      Allergies    No Known Allergies    Intolerances        REVIEW OF SYSTEMS    [ ] A ten-point review of systems was otherwise negative except as noted.  [ ] Due to altered mental status/intubation, subjective information were not able to be obtained from the patient. History was obtained, to the extent possible, from review of the chart and collateral sources of information.      Vital Signs Last 24 Hrs  T(C): 37.1 (30 Aug 2020 00:00), Max: 37.1 (30 Aug 2020 00:00)  T(F): 98.8 (30 Aug 2020 00:00), Max: 98.8 (30 Aug 2020 00:00)  HR: 85 (30 Aug 2020 00:00) (85 - 117)  BP: 101/57 (30 Aug 2020 00:00) (101/57 - 110/70)  BP(mean): --  RR: 18 (30 Aug 2020 00:00) (18 - 111)  SpO2: 98% (30 Aug 2020 00:00) (98% - 98%)    PHYSICAL EXAM:  GENERAL: NAD, well-appearing  CHEST/LUNG: Clear to auscultation bilaterally  HEART: Regular rate and rhythm  ABDOMEN: mild tenderness in all 4 quadrants, no rebound, guarding or rigidity, nondistended.  Midline incision closed with staples, no induration or erythema  EXTREMITIES:  No clubbing, cyanosis, or edema      LABS:  Labs:  CAPILLARY BLOOD GLUCOSE                              13.1   13.79 )-----------( 425      ( 29 Aug 2020 20:01 )             41.4       Auto Neutrophil %: 87.5 % (08-29-20 @ 20:01)  Auto Immature Granulocyte %: 0.4 % (08-29-20 @ 20:01)    08-29    138  |  102  |  13  ----------------------------<  111<H>  4.7   |  24  |  0.7      Calcium, Total Serum: 9.1 mg/dL (08-29-20 @ 20:01)      LFTs:             7.2  | 0.3  | 22       ------------------[73      ( 29 Aug 2020 20:01 )  4.2  | x    | 11          Lipase:17     Amylase:x         Lactate, Blood: 1.4 mmol/L (08-29-20 @ 20:01)      Coags:            Urinalysis Basic - ( 30 Aug 2020 00:30 )    Color: Light Yellow / Appearance: Clear / SG: >1.050 / pH: x  Gluc: x / Ketone: Negative  / Bili: Negative / Urobili: <2 mg/dL   Blood: x / Protein: Trace / Nitrite: Negative   Leuk Esterase: Negative / RBC: x / WBC x   Sq Epi: x / Non Sq Epi: x / Bacteria: x            RADIOLOGY & ADDITIONAL STUDIES:  < from: CT Abdomen and Pelvis w/ IV Cont (08.29.20 @ 23:38) >  IMPRESSION:    Dilated loops of small bowel measuring up to 3.5 cm with decompressed distal ileal and colonic loops favored to reflect a component of early small bowel obstruction. Changes from dilated to decompressed small bowel is in the anterior midline pelvis posterior to the incision site. Limited ability to differentiate partial versus complete obstruction. There is wall thickening of the mildly dilated upstream small bowel which may be reactive or reflect superimposed enteritis.              < end of copied text > RASHI SCHAFER [place] [id number]  45y Female    HPI: 45F s/p [facility]/BS with Dr. Molina 8/19/20 presents to ED with epigastric and bilateral lower quadrant abdominal pain that began 2-3 days ago, the pain is described as cramping in nature, non-radiating, no alleviating or aggravating factors. The patient reports 3 episodes of vomiting, multiple episodes of diarrhea and inability to tolerate PO for 1 day. The patient had normal bowel function post operatively, last normal bowel movement was 2-3 days ago, was able to tolerate PO without any issue, was seen for a routine post op visit with Dr. Molina. Taking only tylenol and motrin for pain. The patient denies any fever, chills, admits to blood in urine, no bloody bowel movements.     PAST MEDICAL & SURGICAL HISTORY:  No pertinent past medical history  s/p andres/bs 08/19/20    MEDICATIONS  (STANDING): x  MEDICATIONS  (PRN): x    Allergies  No Known Allergies  Intolerances    REVIEW OF SYSTEMS  [ ] A ten-point review of systems was otherwise negative except as noted.  [ ] Due to altered mental status/intubation, subjective information were not able to be obtained from the patient. History was obtained, to the extent possible, from review of the chart and collateral sources of information.    Vital Signs Last 24 Hrs  T(C): 37.1 (30 Aug 2020 00:00), Max: 37.1 (30 Aug 2020 00:00)  T(F): 98.8 (30 Aug 2020 00:00), Max: 98.8 (30 Aug 2020 00:00)  HR: 85 (30 Aug 2020 00:00) (85 - 117)  BP: 101/57 (30 Aug 2020 00:00) (101/57 - 110/70)  BP(mean): --  RR: 18 (30 Aug 2020 00:00) (18 - 111)  SpO2: 98% (30 Aug 2020 00:00) (98% - 98%)    PHYSICAL EXAM:  GENERAL: NAD, well-appearing  CHEST/LUNG: Clear to auscultation bilaterally  HEART: Regular rate and rhythm  ABDOMEN: mild tenderness in all 4 quadrants, no rebound, guarding or rigidity, nondistended.  Midline incision closed with staples, no induration or erythema  EXTREMITIES:  No clubbing, cyanosis, or edema    LABS:  CAPILLARY BLOOD GLUCOSE                       13.1   13.79 )-----------( 425      ( 29 Aug 2020 20:01 )             41.4       Auto Neutrophil %: 87.5 % (08-29-20 @ 20:01)  Auto Immature Granulocyte %: 0.4 % (08-29-20 @ 20:01)    08-29  138  |  102  |  13  ----------------------------<  111<H>  4.7   |  24  |  0.7    Calcium, Total Serum: 9.1 mg/dL (08-29-20 @ 20:01)    LFTs:      7.2  | 0.3  | 22       ------------------[73      ( 29 Aug 2020 20:01 )  4.2  | x    | 11          Lipase:17     Amylase:x      Lactate, Blood: 1.4 mmol/L (08-29-20 @ 20:01)    Coags: x    Urinalysis Basic - ( 30 Aug 2020 00:30 )  Color: Light Yellow / Appearance: Clear / SG: >1.050 / pH: x  Gluc: x / Ketone: Negative  / Bili: Negative / Urobili: <2 mg/dL   Blood: x / Protein: Trace / Nitrite: Negative   Leuk Esterase: Negative / RBC: x / WBC x   Sq Epi: x / Non Sq Epi: x / Bacteria: x    RADIOLOGY & ADDITIONAL STUDIES:  < from: CT Abdomen and Pelvis w/ IV Cont (08.29.20 @ 23:38) >  IMPRESSION:  Dilated loops of small bowel measuring up to 3.5 cm with decompressed distal ileal and colonic loops favored to reflect a component of early small bowel obstruction. Changes from dilated to decompressed small bowel is in the anterior midline pelvis posterior to the incision site. Limited ability to differentiate partial versus complete obstruction. There is wall thickening of the mildly dilated upstream small bowel which may be reactive or reflect superimposed enteritis.  < end of copied text >

## 2020-08-30 NOTE — H&P ADULT - NSHPPHYSICALEXAM_GEN_ALL_CORE
Vital Signs Last 24 Hrs  T(C): 37.1 (30 Aug 2020 04:15), Max: 37.1 (30 Aug 2020 00:00)  T(F): 98.7 (30 Aug 2020 04:15), Max: 98.8 (30 Aug 2020 00:00)  HR: 74 (30 Aug 2020 04:15) (74 - 117)  BP: 93/79 (30 Aug 2020 04:15) (93/79 - 110/70)  RR: 18 (30 Aug 2020 04:15) (18 - 111)  SpO2: 98% (30 Aug 2020 00:00) (98% - 98%)    General Appearance - AAOx3, NAD  Heart - S1S2 regular rate and rhythm  Lung - CTA Bilaterally  Abdomen - Soft, moderate generalized tenderness in all 4 quadrants, nondistended, no rebound, no rigidity, no guarding, bowel sounds present.   Incision - vertical skin incision c/d/i, healing well, no induration or erythema. minimal bruising near incision site.     GYN/Pelvis: deferred

## 2020-08-30 NOTE — H&P ADULT - NSHPLABSRESULTS_GEN_ALL_CORE
13.1   13.79 )-----------( 425      ( 29 Aug 2020 20:01 )             41.4   08-29  138  |  102  |  13  ----------------------------<  111<H>  4.7   |  24  |  0.7  Ca    9.1      29 Aug 2020 20:01  TPro  7.2  /  Alb  4.2  /  TBili  0.3  /  DBili  x   /  AST  22  /  ALT  11  /  AlkPhos  73  08-29  serum preg neg      CT ABD with IV contrast: IMPRESSION:  Dilated loops of small bowel measuring up to 3.5 cm with decompressed distal ileal and colonic loops favored to reflect a component of early small bowel obstruction. Changes from dilated to decompressed small bowel is in the anterior midline pelvis posterior to the incision site. Limited ability to differentiate partial versus complete obstruction. There is wall thickening of the mildly dilated upstream small bowel which may be reactive or reflect superimposed enteritis.      CT ABD oral contrast: IMPRESSION:  Multiple dilated small bowel loops with focal short segment narrowing in the anterior midline, posterior to the ventral abdominal wall incision. There is transit of contrast into the ascending colon. Findings likely to reflect early/partial small bowel obstruction versus ileus.  Mildly thickened small bowel loops proximal to this region may reflect a component of enteritis.  The remaining abdominopelvic findings are without significantly changed on this short interval examination

## 2020-08-30 NOTE — PROGRESS NOTE ADULT - ASSESSMENT
42yo P1 s/p [facility]/BS for fibroid uterus, POD11, presented with abdominal pain, nausea, vomiting and inability to tolerate PO, differential diagnosis on admission include SBO vs. ileus, pain unlikely secondary to postoperative infection/hematoma at this time, last vomiting episode at 1700 yesterday, clinically stable    -No NG tube needed at this time, if vomiting returns and persists we will consider  -Pain management with Tylenol and Iburofen, hold narcotics  -Vital signs  -NPO and IV hydration  -f/u 1100 labs  -Surgery service reccs appreciated    Dr. Hernandez to be made aware

## 2020-08-30 NOTE — CONSULT NOTE ADULT - ASSESSMENT
45f s/p [facility]/BS 08/19 with Dr. Molina presents to ED with 2-3 days of epigastric, bilateral lower quadrant abdominal pain, 3 episodes of vomiting and diarrhea, intolerance to PO x 1 day, ct scan with IV contrast suggests early SBO with dilated loops of small bowel measuring 3.5cm with decompressed distal ileal and colonic loops. On examination that patient has mild diffuse abdominal tenderness, no rebound, guarding or rigidity; midline incision c/d/i, no induration or erythema.   -Repeat CT scan with PO contrast to rule out SBO, wait 2 hours after po contrast is given to scan   -Keep NPO, continue IVF  -Zofran for nausea  -Will follow along with OBGYN ASSESSMENT  This is a 46y/o F patient s/p open PAULIE/BS 08/19 with Dr. Molina for uterine fibroids (15cm) and myomectomy of cervical polyp, presents POD10 to the ED with 2-3 days of stabbing epigastric and bilateral lower quadrant abdominal pain, associated 3 episodes of vomiting and watery diarrhea, intolerance to PO x 1 day. Mild tenderness yanique-incisional on exam with no peritoneal signs, her midline incision c/d/i, no induration or erythema, no drainage. WBC 13, CT A/P IV contrast done in ED suggests possible SBO (dilated small bowel to 3.5cm) with transition at the lower midline incision to decompressed small bowel. She claims to be passing flatus and having watery diarrhea.     PLAN  -Repeat CT scan with PO contrast to rule out SBO, wait 2 hours after po contrast is given to scan, assess for obstruction with contrast progression to r/o gastroenteritis  -Keep NPO, continue IVF for now  -Zofran for nausea, currently no N/V and stomach appears decompressed, will continue to monitor and if she vomits subsequently she will need an NG tube, strict I+O  -Monitor and correct electrolytes, K+ to 4, Mg to 2, and PO4 to 4  -OB/GYN for further management as patient is post-op  - Will f/u pending above w/u    Senior Surgical Resident Note  I have edited the above note and agree with the current treatment plan  Above plan was discussed with Dr. Rubio, patient, and the ED team  ---------------------------------------------------------------------------------------  08-30-20 @ 03:38 ASSESSMENT  This is a 46y/o F patient s/p open PAULIE/BS 08/19 with Dr. Molina for uterine fibroids (15cm) and myomectomy of cervical polyp, presents POD10 to the ED with 2-3 days of stabbing epigastric and bilateral lower quadrant abdominal pain, associated 3 episodes of vomiting and watery diarrhea, intolerance to PO x 1 day. Mild tenderness yanique-incisional on exam with no peritoneal signs, her midline incision c/d/i, no induration or erythema, no drainage. WBC 13, CT A/P IV contrast done in ED suggests possible SBO (dilated small bowel to 3.5cm) with transition at the lower midline incision to decompressed small bowel. She claims to be passing flatus and having watery diarrhea.     PLAN  -Repeat CT scan with PO contrast to rule out SBO, wait 2 hours after po contrast is given to scan, assess for obstruction with contrast progression to r/o gastroenteritis  -Keep NPO, continue IVF for now  -Zofran for nausea, currently no N/V and stomach appears decompressed, will continue to monitor and if she vomits subsequently she will need an NG tube, strict I+O  -Monitor and correct electrolytes, K+ to 4, Mg to 2, and PO4 to 4  -OB/GYN for further management as patient is post-op  - Will f/u pending above w/u: repeat CT A/P with PO contrast demonstrates progression of oral contrast to Cecum with no evidence of obstruction  - No further imaging workup, no acute surgery intervention, will follow patient if admitted; Please notify Blue Team if the surgical team is requested for any potential intervention by the GYN team on this admission; [phone]    Senior Surgical Resident Note  I have edited the above note and agree with the current treatment plan  Above plan was discussed with Dr. Rubio, patient, and the ED team  ---------------------------------------------------------------------------------------  08-30-20 @ 03:38

## 2020-08-30 NOTE — H&P ADULT - ASSESSMENT
44yo P1 s/p [facility]/BS for fibroid uterus, POD#10, with new onset N/V and abdominal pain, ddx SBO vs. ileus, pain unlikely secondary to postoperative infection/hematoma at this time, currently hemodynamically stable  -admit to gyn  -NPO, IVF  -ambulate as tolerated  -SCDs  -vitals per routine  -f/u 1100 labs  -tylenol prn for pain  -appreciate surgery input      Dr. Ewing and Dr. Eckert aware. 44yo P1 s/p [facility]/BS for fibroid uterus, POD#10, with new onset N/V and abdominal pain, ddx SBO vs. ileus, pain unlikely secondary to postoperative infection/hematoma at this time, currently hemodynamically stable, CT abd c/w likely early partial SBO or ileus  -admit to gyn  -NPO, IVF  -ambulate as tolerated  -SCDs  -vitals per routine  -f/u 1100 labs  -pain control PRN  -appreciate surgery input      Dr. Ewing and Dr. Eckert aware.

## 2020-08-30 NOTE — H&P ADULT - HISTORY OF PRESENT ILLNESS
Chief Complaint: abdominal pain    HPI: 46yo P1 with no significant PMH presenting to ED for generalized abdominal pain beginning last night, cramping in nature, nonradiating, not relieved with motrin or tylenol. Patient is s/p PAULIE/BS for approx 15cm fibroid, now POD#10.  Also reports several episodes of NBNB vomiting since last night and inability to tolerate PO.  Last meal was last night.  Had a loose bowel movement this morning, not passing flatus for the past day. Denies fevers or chills.  Minimal vaginal bleeding at this time, denies vaginal discharge.  Otherwise denies HA, CP, SOB, dysuria, hematuria.     Ob/Gyn History:                   LMP                    Cycle Length q30d   Obhx: FT  x1  Gynhx: H/o fibroid uterus s/p PAULIE/BS.  Otherwise denies cysts, abnormal pap smears, STDs.  Last Pap Smear - , wnl per patient

## 2020-08-30 NOTE — PROGRESS NOTE ADULT - SUBJECTIVE AND OBJECTIVE BOX
PGY3 Note    Pt seen and examined at bedside for serial exams. Pain is improving, 6/10 in intensity and is intermittent. She is passing flatus. Denies a bowel movements. Still NPO. Denies fevers, chills, nausea or vomiting since presentation. No vaginal bleeding or discharge.      PHYSICAL EXAM:  Vital Signs Last 24 Hrs  T(C): 35.9 (30 Aug 2020 13:09), Max: 37.1 (30 Aug 2020 00:00)  T(F): 96.7 (30 Aug 2020 13:09), Max: 98.8 (30 Aug 2020 00:00)  HR: 64 (30 Aug 2020 13:09) (64 - 117)  BP: 93/54 (30 Aug 2020 13:09) (93/54 - 110/70)  RR: 16 (30 Aug 2020 13:09) (16 - 111)  SpO2: 98% (30 Aug 2020 00:00) (98% - 98%)  General Appearance: NAD, AA0x3  Cardiac: RRR  Pulmonary: CTAB  Abdomen: soft, very mildly tender in the lower quadrants, nondistended, no rebound, guarding, or rigidity. Pos BS.  Incision: clean, dry and intact. Midline vertical incision  Extremities: no swelling or calf tenderness    PELVIC EXAM  On speculum: Cuff appears intact, no signs of separation or dehiscence, no signs of infection  On bimanual: cuff palpated to be intact.    LABS:                                   11.2   7.68  )-----------( 390      ( 30 Aug 2020 11:47 )             35.1                         13.1   13.79 )-----------( 425      ( 29 Aug 2020 20:01 )             41.4     08-30    138  |  102  |  8<L>  ----------------------------<  95  4.0   |  25  |  0.6<L>    Ca    8.7      30 Aug 2020 11:47  Phos  3.2     08-30  Mg     2.2     08-30    TPro  6.1  /  Alb  3.7  /  TBili  0.7  /  DBili  x   /  AST  12  /  ALT  7   /  AlkPhos  67  08-30      Urinalysis Basic - ( 30 Aug 2020 00:30 )    Color: Light Yellow / Appearance: Clear / SG: >1.050 / pH: x  Gluc: x / Ketone: Negative  / Bili: Negative / Urobili: <2 mg/dL   Blood: x / Protein: Trace / Nitrite: Negative   Leuk Esterase: Negative / RBC: x / WBC x   Sq Epi: x / Non Sq Epi: x / Bacteria: x        MEDICATIONS  (PRN):  acetaminophen   Tablet .. 650 milliGRAM(s) Oral every 6 hours PRN Moderate Pain (4 - 6)  ibuprofen  Tablet. 600 milliGRAM(s) Oral every 6 hours PRN Mild Pain (1 - 3)

## 2020-08-30 NOTE — CONSULT NOTE ADULT - ATTENDING COMMENTS
44yo female with PMHx/PSHx of uterine fibroids s/p open PAULIE/BSO 8/19/2020 presenting with abdominal pain, vomiting and diarrhea for 2 days. Last BM 8/29 around 5PM. Exam significant for tenderness bilateral lower quadrants, well-healing lower midline wound. WBC 13.79. CT A/P with PO contrast demonstrates contrast reaching ascending colon excluding complete bowel obstruction, persistent mildly dilated small bowel with distally collapsed distal/terminal ileum compatible with ileus versus partial obstruction, mild wall thickening of small bowel in the lower abdomen/pelvis may be reactive or reflect enteritis. Recommend NPO, IV fluids, serial exams, monitor bowel function, electrolyte replacement, avoid narcotics and other ileus-causing medications. Recommend NG tube if develops vomiting. No acute surgical intervention. Primary care as per primary team.
44yo POD10 s/p PAULIE/BS w/ acute and new onset abdominal pain, vomiting; pending surgery consult, no fevers/chills, but elevated WBC; ddx infection vs ileus vs obstruction

## 2020-08-30 NOTE — PROGRESS NOTE ADULT - ASSESSMENT
2yo P1 s/p [facility]/BS for fibroid uterus, POD11, presented with abdominal pain, nausea, vomiting and inability to tolerate PO, differential diagnosis on admission include SBO vs. ileus, pain unlikely secondary to postoperative infection/hematoma at this time, last vomiting episode at 1700 yesterday, passing flatus, improving clinically    -No NG tube needed at this time, if vomiting returns and persists we will consider  -Pain management with Tylenol and Iburofen, hold narcotics  -Vital signs  -NPO and IV hydration, may consider advancing to clear liquid diet tomorrow.  -Surgery service reccs appreciated    Dr. Hernandez aware

## 2020-08-30 NOTE — H&P ADULT - NSICDXPASTSURGICALHX_GEN_ALL_CORE_FT
PAST SURGICAL HISTORY:  S/P PAULIE (total abdominal hysterectomy)     Status post bilateral salpingectomy

## 2020-08-31 LAB
ANION GAP SERPL CALC-SCNC: 12 MMOL/L — SIGNIFICANT CHANGE UP (ref 7–14)
BUN SERPL-MCNC: 8 MG/DL — LOW (ref 10–20)
C DIFF BY PCR RESULT: NEGATIVE — SIGNIFICANT CHANGE UP
C DIFF TOX GENS STL QL NAA+PROBE: SIGNIFICANT CHANGE UP
CALCIUM SERPL-MCNC: 8.4 MG/DL — LOW (ref 8.5–10.1)
CHLORIDE SERPL-SCNC: 103 MMOL/L — SIGNIFICANT CHANGE UP (ref 98–110)
CO2 SERPL-SCNC: 25 MMOL/L — SIGNIFICANT CHANGE UP (ref 17–32)
CREAT SERPL-MCNC: 0.6 MG/DL — LOW (ref 0.7–1.5)
GLUCOSE SERPL-MCNC: 73 MG/DL — SIGNIFICANT CHANGE UP (ref 70–99)
HCT VFR BLD CALC: 34.7 % — LOW (ref 37–47)
HGB BLD-MCNC: 11.1 G/DL — LOW (ref 12–16)
MCHC RBC-ENTMCNC: 27.8 PG — SIGNIFICANT CHANGE UP (ref 27–31)
MCHC RBC-ENTMCNC: 32 G/DL — SIGNIFICANT CHANGE UP (ref 32–37)
MCV RBC AUTO: 86.8 FL — SIGNIFICANT CHANGE UP (ref 81–99)
NRBC # BLD: 0 /100 WBCS — SIGNIFICANT CHANGE UP (ref 0–0)
PLATELET # BLD AUTO: 365 K/UL — SIGNIFICANT CHANGE UP (ref 130–400)
POTASSIUM SERPL-MCNC: 4.1 MMOL/L — SIGNIFICANT CHANGE UP (ref 3.5–5)
POTASSIUM SERPL-SCNC: 4.1 MMOL/L — SIGNIFICANT CHANGE UP (ref 3.5–5)
RBC # BLD: 4 M/UL — LOW (ref 4.2–5.4)
RBC # FLD: 13.8 % — SIGNIFICANT CHANGE UP (ref 11.5–14.5)
SODIUM SERPL-SCNC: 140 MMOL/L — SIGNIFICANT CHANGE UP (ref 135–146)
WBC # BLD: 6.5 K/UL — SIGNIFICANT CHANGE UP (ref 4.8–10.8)
WBC # FLD AUTO: 6.5 K/UL — SIGNIFICANT CHANGE UP (ref 4.8–10.8)

## 2020-08-31 PROCEDURE — 99232 SBSQ HOSP IP/OBS MODERATE 35: CPT

## 2020-08-31 PROCEDURE — 99231 SBSQ HOSP IP/OBS SF/LOW 25: CPT | Mod: 24

## 2020-08-31 RX ORDER — SENNA PLUS 8.6 MG/1
1 TABLET ORAL
Refills: 0 | Status: DISCONTINUED | OUTPATIENT
Start: 2020-08-31 | End: 2020-09-01

## 2020-08-31 RX ORDER — ENOXAPARIN SODIUM 100 MG/ML
60 INJECTION SUBCUTANEOUS AT BEDTIME
Refills: 0 | Status: DISCONTINUED | OUTPATIENT
Start: 2020-08-31 | End: 2020-08-31

## 2020-08-31 RX ORDER — SIMETHICONE 80 MG/1
80 TABLET, CHEWABLE ORAL EVERY 4 HOURS
Refills: 0 | Status: DISCONTINUED | OUTPATIENT
Start: 2020-08-31 | End: 2020-09-01

## 2020-08-31 RX ORDER — ENOXAPARIN SODIUM 100 MG/ML
40 INJECTION SUBCUTANEOUS AT BEDTIME
Refills: 0 | Status: DISCONTINUED | OUTPATIENT
Start: 2020-08-31 | End: 2020-09-01

## 2020-08-31 RX ADMIN — SIMETHICONE 80 MILLIGRAM(S): 80 TABLET, CHEWABLE ORAL at 05:27

## 2020-08-31 RX ADMIN — SIMETHICONE 80 MILLIGRAM(S): 80 TABLET, CHEWABLE ORAL at 01:24

## 2020-08-31 RX ADMIN — ENOXAPARIN SODIUM 40 MILLIGRAM(S): 100 INJECTION SUBCUTANEOUS at 21:24

## 2020-08-31 RX ADMIN — SODIUM CHLORIDE 125 MILLILITER(S): 9 INJECTION, SOLUTION INTRAVENOUS at 03:16

## 2020-08-31 NOTE — PROGRESS NOTE ADULT - SUBJECTIVE AND OBJECTIVE BOX
Patient examined and chart reviewed. Hospital course reviewed. Imaging studies reviewed.  PAtient is feeling a bit bitter. C/O diarrhea with up to 7 BMs today so far. No fever. Able to tolerate full liquid diet.    Exam:  Incision -c/d/i. Abdomen soft. No signs of infection.                           11.1   6.50  )-----------( 365      ( 31 Aug 2020 07:37 )             34.7

## 2020-08-31 NOTE — PROGRESS NOTE ADULT - ASSESSMENT
44 y/o P1 s/p PA[facility]IE/BS for fibroid uterus, POD12, presented with abdominal pain, vomiting and lack of flatus, admitted for SBO vs ileus, now with flatus and diarrhea, clinically no obstruction, doing well.   - d/c senna  - will consider advancing diet  - f/u AM labs  - per surgery no evidence of obstruction  - vitals per routine    Will inform GYN attending.

## 2020-08-31 NOTE — PROGRESS NOTE ADULT - SUBJECTIVE AND OBJECTIVE BOX
PGY4 progress note    Patient seen and examined at bedside, resting comfortably. Reports moderate abdominal pain, controlled with tylenol. Passing flatus. Complaining of two loose stools today and urge to defalcate even though she doesn't have any more stools. Stools are soft, nonbloody. Denies fever, n/v, chest pain, SOB, dysuria/urgency/frequency.     PE:  Vital Signs Last 24 Hrs  T(C): 36.1 (31 Aug 2020 04:13), Max: 36.3 (30 Aug 2020 21:00)  T(F): 97 (31 Aug 2020 04:13), Max: 97.3 (30 Aug 2020 21:00)  HR: 80 (31 Aug 2020 04:13) (62 - 80)  BP: 111/75 (31 Aug 2020 04:13) (93/54 - 111/75)  BP(mean): --  RR: 18 (31 Aug 2020 04:13) (16 - 18)  SpO2: 98% (30 Aug 2020 20:31) (98% - 98%)    GEN: NAD, AAOX3  CVS: RRR, normal S1/S2  lungs: CTAB  abd: soft, nontender, no r/g/r, +BS, vertical midline incision c/d/il  ext: no calf tenderness or edema    labs:                        11.2   7.68  )-----------( 390      ( 30 Aug 2020 11:47 )             35.1     08-30    138  |  102  |  8<L>  ----------------------------<  95  4.0   |  25  |  0.6<L>    Ca    8.7      30 Aug 2020 11:47  Phos  3.2     08-30  Mg     2.2     08-30    TPro  6.1  /  Alb  3.7  /  TBili  0.7  /  DBili  x   /  AST  12  /  ALT  7   /  AlkPhos  67  08-30    MEDICATIONS  (STANDING):  lactated ringers. 1000 milliLiter(s) (125 mL/Hr) IV Continuous <Continuous>  senna 1 Tablet(s) Oral two times a day  simethicone 80 milliGRAM(s) Chew every 4 hours    MEDICATIONS  (PRN):  acetaminophen   Tablet .. 650 milliGRAM(s) Oral every 6 hours PRN Moderate Pain (4 - 6)  ibuprofen  Tablet. 600 milliGRAM(s) Oral every 6 hours PRN Mild Pain (1 - 3)

## 2020-08-31 NOTE — PROGRESS NOTE ADULT - ATTENDING COMMENTS
44yo female with PMHx/PSHx of uterine fibroids s/p open PAULIE/BSO 8/19/2020 presenting with abdominal pain, vomiting and diarrhea for 2 days. Last BM 8/29 around 5PM. Exam significant for tenderness bilateral lower quadrants, well-healing lower midline wound. WBC 13.79. CT A/P with PO contrast demonstrates contrast reaching ascending colon excluding complete bowel obstruction, persistent mildly dilated small bowel with distally collapsed distal/terminal ileum compatible with ileus versus partial obstruction, mild wall thickening of small bowel in the lower abdomen/pelvis may be reactive or reflect enteritis.     This morning, patient states she is feeling better overall. Lower abdominal pain improving, +diarrhea, +flatus, continue IV fluids, serial exams, monitor bowel function, electrolyte replacement. Consider advancing diet. No acute surgical intervention. Primary care as per primary team.

## 2020-08-31 NOTE — PROGRESS NOTE ADULT - SUBJECTIVE AND OBJECTIVE BOX
GENERAL SURGERY PROGRESS NOTE     RASHI SCHAFER  45y  Female  Hospital day :1d    OVERNIGHT EVENTS: no acute events overnight     T(F): 97 (08-31-20 @ 04:13), Max: 97.3 (08-30-20 @ 21:00)  HR: 80 (08-31-20 @ 04:13) (62 - 80)  BP: 111/75 (08-31-20 @ 04:13) (93/54 - 111/75)  RR: 18 (08-31-20 @ 04:13) (16 - 18)  SpO2: 98% (08-30-20 @ 20:31) (98% - 98%)    DIET/FLUIDS: lactated ringers. 1000 milliLiter(s) IV Continuous <Continuous>    PHYSICAL EXAM:  GENERAL: NAD, well-appearing  CHEST/LUNG: Clear to auscultation bilaterally  HEART: Regular rate and rhythm  ABDOMEN: Soft, non distended, some tenderness in lower abdomen  EXTREMITIES:  No clubbing, cyanosis, or edema    LABS  CAPILLARY BLOOD GLUCOSE                        11.1   6.50  )-----------( 365      ( 31 Aug 2020 07:37 )             34.7       Auto Neutrophil %: 68.4 % (08-30-20 @ 11:47)  Auto Immature Granulocyte %: 0.1 % (08-30-20 @ 11:47)    08-30    138  |  102  |  8<L>  ----------------------------<  95  4.0   |  25  |  0.6<L>      Calcium, Total Serum: 8.7 mg/dL (08-30-20 @ 11:47)      LFTs:             6.1  | 0.7  | 12       ------------------[67      ( 30 Aug 2020 11:47 )  3.7  | x    | 7           Lipase:x      Amylase:x         Lactate, Blood: 1.4 mmol/L (08-29-20 @ 20:01)    Urinalysis Basic - ( 30 Aug 2020 00:30 )    Color: Light Yellow / Appearance: Clear / SG: >1.050 / pH: x  Gluc: x / Ketone: Negative  / Bili: Negative / Urobili: <2 mg/dL   Blood: x / Protein: Trace / Nitrite: Negative   Leuk Esterase: Negative / RBC: x / WBC x   Sq Epi: x / Non Sq Epi: x / Bacteria: x

## 2020-08-31 NOTE — PROGRESS NOTE ADULT - ASSESSMENT
ASSESSMENT  44y/o F patient s/p open PAULIE/BS 08/19 with Dr. Molina for uterine fibroids (15cm) and myomectomy of cervical polyp, presents POD10 to the ED with 2-3 days of stabbing epigastric and bilateral lower quadrant abdominal pain, associated 3 episodes of vomiting and watery diarrhea, intolerance to PO x 1 day. Mild tenderness yanique-incisional on exam with no peritoneal signs, her midline incision c/d/i, no induration or erythema, no drainage. CT with PO contrast reaches colon, no objective evidence of SBO, patient is passing gas and BM.    PLAN  - no acute surgical intervention  - advance diet as tolerated  - if vomits again consider NG tube  - please recall surgery as needed

## 2020-09-01 ENCOUNTER — TRANSCRIPTION ENCOUNTER (OUTPATIENT)
Age: 45
End: 2020-09-01

## 2020-09-01 VITALS
HEART RATE: 77 BPM | DIASTOLIC BLOOD PRESSURE: 60 MMHG | SYSTOLIC BLOOD PRESSURE: 107 MMHG | TEMPERATURE: 98 F | RESPIRATION RATE: 18 BRPM

## 2020-09-01 LAB
ANION GAP SERPL CALC-SCNC: 10 MMOL/L — SIGNIFICANT CHANGE UP (ref 7–14)
BASOPHILS # BLD AUTO: 0.03 K/UL — SIGNIFICANT CHANGE UP (ref 0–0.2)
BASOPHILS NFR BLD AUTO: 0.5 % — SIGNIFICANT CHANGE UP (ref 0–1)
BUN SERPL-MCNC: 5 MG/DL — LOW (ref 10–20)
CALCIUM SERPL-MCNC: 8.9 MG/DL — SIGNIFICANT CHANGE UP (ref 8.5–10.1)
CHLORIDE SERPL-SCNC: 102 MMOL/L — SIGNIFICANT CHANGE UP (ref 98–110)
CO2 SERPL-SCNC: 26 MMOL/L — SIGNIFICANT CHANGE UP (ref 17–32)
CREAT SERPL-MCNC: 0.6 MG/DL — LOW (ref 0.7–1.5)
EOSINOPHIL # BLD AUTO: 0.17 K/UL — SIGNIFICANT CHANGE UP (ref 0–0.7)
EOSINOPHIL NFR BLD AUTO: 3 % — SIGNIFICANT CHANGE UP (ref 0–8)
GLUCOSE SERPL-MCNC: 89 MG/DL — SIGNIFICANT CHANGE UP (ref 70–99)
HCT VFR BLD CALC: 34.9 % — LOW (ref 37–47)
HGB BLD-MCNC: 11.2 G/DL — LOW (ref 12–16)
IMM GRANULOCYTES NFR BLD AUTO: 0.2 % — SIGNIFICANT CHANGE UP (ref 0.1–0.3)
LYMPHOCYTES # BLD AUTO: 1.55 K/UL — SIGNIFICANT CHANGE UP (ref 1.2–3.4)
LYMPHOCYTES # BLD AUTO: 27.4 % — SIGNIFICANT CHANGE UP (ref 20.5–51.1)
MAGNESIUM SERPL-MCNC: 2 MG/DL — SIGNIFICANT CHANGE UP (ref 1.8–2.4)
MCHC RBC-ENTMCNC: 27.5 PG — SIGNIFICANT CHANGE UP (ref 27–31)
MCHC RBC-ENTMCNC: 32.1 G/DL — SIGNIFICANT CHANGE UP (ref 32–37)
MCV RBC AUTO: 85.7 FL — SIGNIFICANT CHANGE UP (ref 81–99)
MONOCYTES # BLD AUTO: 0.42 K/UL — SIGNIFICANT CHANGE UP (ref 0.1–0.6)
MONOCYTES NFR BLD AUTO: 7.4 % — SIGNIFICANT CHANGE UP (ref 1.7–9.3)
NEUTROPHILS # BLD AUTO: 3.47 K/UL — SIGNIFICANT CHANGE UP (ref 1.4–6.5)
NEUTROPHILS NFR BLD AUTO: 61.5 % — SIGNIFICANT CHANGE UP (ref 42.2–75.2)
NRBC # BLD: 0 /100 WBCS — SIGNIFICANT CHANGE UP (ref 0–0)
PHOSPHATE SERPL-MCNC: 2.9 MG/DL — SIGNIFICANT CHANGE UP (ref 2.1–4.9)
PLATELET # BLD AUTO: 377 K/UL — SIGNIFICANT CHANGE UP (ref 130–400)
POTASSIUM SERPL-MCNC: 4 MMOL/L — SIGNIFICANT CHANGE UP (ref 3.5–5)
POTASSIUM SERPL-SCNC: 4 MMOL/L — SIGNIFICANT CHANGE UP (ref 3.5–5)
RBC # BLD: 4.07 M/UL — LOW (ref 4.2–5.4)
RBC # FLD: 13.7 % — SIGNIFICANT CHANGE UP (ref 11.5–14.5)
SODIUM SERPL-SCNC: 138 MMOL/L — SIGNIFICANT CHANGE UP (ref 135–146)
WBC # BLD: 5.65 K/UL — SIGNIFICANT CHANGE UP (ref 4.8–10.8)
WBC # FLD AUTO: 5.65 K/UL — SIGNIFICANT CHANGE UP (ref 4.8–10.8)

## 2020-09-01 PROCEDURE — 99232 SBSQ HOSP IP/OBS MODERATE 35: CPT

## 2020-09-01 RX ORDER — ACETAMINOPHEN 500 MG
2 TABLET ORAL
Qty: 0 | Refills: 0 | DISCHARGE
Start: 2020-09-01

## 2020-09-01 RX ORDER — IBUPROFEN 200 MG
1 TABLET ORAL
Qty: 0 | Refills: 0 | DISCHARGE
Start: 2020-09-01

## 2020-09-01 RX ADMIN — SODIUM CHLORIDE 125 MILLILITER(S): 9 INJECTION, SOLUTION INTRAVENOUS at 06:45

## 2020-09-01 NOTE — DISCHARGE NOTE PROVIDER - HOSPITAL COURSE
HPI:    Chief Complaint: abdominal pain        HPI: 44yo P1 with no significant PMH presenting to ED for generalized abdominal pain beginning last night, cramping in nature, nonradiating, not relieved with motrin or tylenol. Patient is s/p PAULIE/BS for approx 15cm fibroid, now POD#10.  Also reports several episodes of NBNB vomiting since last night and inability to tolerate PO.  Last meal was last night.  Had a loose bowel movement this morning, not passing flatus for the past day. Denies fevers or chills.  Minimal vaginal bleeding at this time, denies vaginal discharge.  Otherwise denies HA, CP, SOB, dysuria, hematuria.         Patient admitted for presumed SBO vs ileus. During her hospital course passed flatus and started having diarrhea. Cdiff was negative and stool studies are pending. Tolerated regular diet.

## 2020-09-01 NOTE — PROGRESS NOTE ADULT - SUBJECTIVE AND OBJECTIVE BOX
GENERAL SURGERY PROGRESS NOTE     RASHI SCHAFER  [address] [place] [address] day :2d  POD:  Procedure:   Surgical Attending: Jacobo Ewing      24H events/hpi:  Patient seen & examined at bedside. In NAD no acute events ovenright  tolerating cld    T(F): 97.7 (08-31-20 @ 20:52), Max: 97.7 (08-31-20 @ 20:52)  HR: 72 (08-31-20 @ 20:52) (59 - 80)  BP: 90/53 (08-31-20 @ 20:52) (90/53 - 117/67)  ABP: --  ABP(mean): --  RR: 20 (08-31-20 @ 20:52) (16 - 20)  SpO2: --      08-30-20 @ 07:01  -  08-31-20 @ 07:00  --------------------------------------------------------  IN:    lactated ringers.: 1625 mL  Total IN: 1625 mL    OUT:    Voided: 525 mL  Total OUT: 525 mL    Total NET: 1100 mL      08-31-20 @ 07:01  -  09-01-20 @ 01:54  --------------------------------------------------------  IN:    lactated ringers.: 2375 mL    Oral Fluid: 330 mL  Total IN: 2705 mL    OUT:  Total OUT: 0 mL    Total NET: 2705 mL        DIET/FLUIDS: lactated ringers. 1000 milliLiter(s) IV Continuous <Continuous>      I&O    08-30-20 @ 07:01  -  08-31-20 @ 07:00  --------------------------------------------------------  IN: 1625 mL / OUT: 525 mL / NET: 1100 mL    08-31-20 @ 07:01  -  09-01-20 @ 01:54  --------------------------------------------------------  IN: 2705 mL / OUT: 0 mL / NET: 2705 mL        URINE:      GI proph:    AC/ proph:   ABx:       Physical Examination:  General Appearance: NAD  HEENT: EOMI, sclera non-icteric.  Heart: RRR   Lungs: No increased work of breathing or accessory muscle use. Symmetric chest wall rise and fall.   Abdomen:  Soft, nontender, nondistended.   MSK/Extremities: Warm & well-perfused.   Skin: Warm, dry. No jaundice.       LABS  Labs:  CAPILLARY BLOOD GLUCOSE                              11.1   6.50  )-----------( 365      ( 31 Aug 2020 07:37 )             34.7         08-31    140  |  103  |  8<L>  ----------------------------<  73  4.1   |  25  |  0.6<L>      Calcium, Total Serum: 8.4 mg/dL (08-31-20 @ 07:37)      LFTs:             6.1  | 0.7  | 12       ------------------[67      ( 30 Aug 2020 11:47 )  3.7  | x    | 7              Lactate, Blood: 1.4 mmol/L (08-29-20 @ 20:01)

## 2020-09-01 NOTE — DISCHARGE NOTE PROVIDER - CARE PROVIDER_API CALL
Kehinde Molina  OBSTETRICS AND GYNECOLOGY  [address] [place] [address]  Phone: [phone]  Fax: [phone]  Follow Up Time:

## 2020-09-01 NOTE — PROGRESS NOTE ADULT - ATTENDING COMMENTS
44yo female with PMHx/PSHx of uterine fibroids s/p open PAULIE/BSO 8/19/2020 presenting with abdominal pain, vomiting and diarrhea for 2 days. Last BM 8/29 around 5PM. Exam significant for tenderness bilateral lower quadrants, well-healing lower midline wound. WBC 13.79. CT A/P with PO contrast demonstrates contrast reaching ascending colon excluding complete bowel obstruction, persistent mildly dilated small bowel with distally collapsed distal/terminal ileum compatible with ileus versus partial obstruction, mild wall thickening of small bowel in the lower abdomen/pelvis may be reactive or reflect enteritis. Tolerating diet. No acute surgical intervention. Primary care as per primary team.

## 2020-09-01 NOTE — DISCHARGE NOTE PROVIDER - NSDCMRMEDTOKEN_GEN_ALL_CORE_FT
acetaminophen 325 mg oral tablet: 2 tab(s) orally every 6 hours, As needed, Moderate Pain (4 - 6)  ibuprofen 600 mg oral tablet: 1 tab(s) orally every 6 hours, As needed, Mild Pain (1 - 3)

## 2020-09-01 NOTE — DISCHARGE NOTE NURSING/CASE MANAGEMENT/SOCIAL WORK - NSDCFUADDAPPT_GEN_ALL_CORE_FT
Acute Otitis Media with Infection (Child)    Your child has a middle ear infection (acute otitis media). It is caused by bacteria or fungi. The middle ear is the space behind the eardrum. The eustachian tube connects the ear to the nasal passage. The eustachian tubes help drain fluid from the ears. They also keep the air pressure equal inside and outside the ears. These tubes are shorter and more horizontal in children. This makes it more likely for the tubes to become blocked. A blockage lets fluid and pressure build up in the middle ear. Bacteria or fungi can grow in this fluid and cause an ear infection. This infection is commonly known as an earache.  The main symptom of an ear infection is ear pain. Other symptoms may include pulling at the ear, being more fussy than usual, decreased appetie, vomiting or diarrhea.Your child’s hearing may also be affected. Your child may have had a respiratory infection first.  An ear infection may clear up on its own. Or your child may need to take medicine. After the infection goes away, your child may still have fluid in the middle ear. It may take weeks or months for this fluid to go away. During that time, your child may have temporary hearing loss. But all other symptoms of the earache should be gone.  Home care  Follow these guidelines when caring for your child at home:  · The health care provider will likely prescribe medicines for pain. The provider may also prescribe antibiotics or antifungals to treat the infection. These may be liquid medicines to give by mouth. Or they may be ear drops. Follow the provider’s instructions for giving these medicines to your child.  · Because ear infections can clear up on their own, the provider may suggest waiting for a few days before giving your child medicines for infection.  · To reduce pain, have your child rest in an upright position. Hot or cold compresses held against the ear may help ease pain.  · Keep the ear dry. Have  your child wear a shower cap when bathing.  To help prevent future infections:  · Avoid smoking near your child. Secondhand smoke raises the risk for ear infections in children.  · Make sure your child gets all appropriate vaccinations.  · Do not bottle feed while your baby is lying on his or her back. (This position can cause  middle ear infections because it allows milk to run into the eustacian tubes.)      · If you breastfeed ccontinue until your child is 6-12 months of age.  To apply ear drops:  1. Put the bottle in warm water if the medicine is kept in the refrigerator. Cold drops in the ear are uncomfortable.  2. Have your child lie down on a flat surface. Gently hold your child’s head to one side.  3. Remove any drainage from the ear with a clean tissue or cotton swab. Clean only the outer ear. Don’t put the cotton swab into the ear canal.  4. Straighten the ear canal by gently pulling the earlobe up and back.  5. Keep the dropper a half-inch above the ear canal. This will keep the dropper from becoming contaminated. Put the drops against the side of the ear canal.  6. Have your child stay lying down for 2 to 3 minutes. This gives time for the medicine to enter the ear canal. If your child doesn’t have pain, gently massage the outer ear near the opening.  7. Wipe any extra medicine away from the outer ear with a clean cotton ball.  Follow-up care  Follow up with your child’s healthcare provider as directed. Your child will need to have the ear rechecked to make sure the infection has resolved. Check with your doctor to see when they want to see your child.  Special note to parents  If your child continues to get earaches, he or she may need ear tubes. The provider will put small tubes in your child’s eardrum to help keep fluid from building up. This procedure is a simple and works well.  When to seek medical advice  Unless advised otherwise, call your child's healthcare provider if:  · Your child is 3 months  old or younger and has a fever of 100.4°F (38°C) or higher. Your child may need to see a healthcare provider.  · Your child is of any age and has fevers higher than 104°F (40°C) that come back again and again.  Call your child's healthcare provider for any of the following:  · New symptoms, especially swelling around the ear or weakness of face muscles  · Severe pain  · Infection seems to get worse, not better   · Neck pain  · Your child acts very sick or not themself  · Fever or pain do not improve with antibiotics after 48 hours  © 9997-7052 [employer]. [address] [place] [address], [place] [address]. All rights reserved. This information is not intended as a substitute for professional medical care. Always follow your healthcare professional's instructions.      IBUPROFEN (Child)  Ibuprofen is an anti-inflammatory medication, also known as a nonsteroidal anti-inflammatory drug (NSAID). It is used to treat fever, pain, or inflammation in children. Ibuprofen is sold as Motrin, Advil, and other brands. Ibuprofen comes in liquid, chewable tablets, and non-chewable tablets. Be sure to give the form appropriate for your child’s age. If you’re not sure which form you should give to your child, ask your child’s healthcare provider.  DIRECTIONS FOR USE:  · Follow your child’s healthcare provider’s instructions about how to give your child ibuprofen, especially if your child is younger than 6 months old.  · Use the measuring device that comes with the package for liquid ibuprofen.  · Give your child 1 dose every 6 to 8 hours as long as your child has symptoms. Do NOT give more than 4 doses in 24 hours. Giving more than the recommended doses can cause serious health problems (see Warning below).  · You may have your child take the tablets with food or milk to avoid upset stomach.  · To prevent choking when giving your child tablets, make sure he or she is sitting up:  ¨ For chewable tablets, instruct your child to  chew the tablet completely and swallow before getting up.  ¨ For non-chewable tablets, instruct your child to swallow the tablet before getting up.  Ibuprofen is given based on body weight and age. Unless your child’s healthcare provider tells you otherwise, you may use the table below as a guide. Important: Read and follow the directions on the package. Different brands of ibuprofen may have slightly different instructions.  Child’s Weight   (pounds) Child’s Age  Infants’ Drops   (50 mg / 1.25 mL)  Children’s Suspension Liquid   (100 mg / 5 mL)  Frankie Chewable Tablets   (100 mg each)   Frankie Tablets   (100 mg each)    Younger than 6 months Ask child’s healthcare provider. Not recommended Not recommended Not recommended   12-17 6-11 mos 1.25 mL Not recommended Not recommended Not recommended   18-23 12-23 mos 1.875 mL Ask child’s healthcare provider. Ask child’s healthcare provider. Not recommended   24-35 2-3 yrs Not recommended 5 mL (1 tsp) 1 tablet Not recommended   36-47 4-5 yrs Not recommended 7.5 mL (1½ tsp) 1½ tablets Ask child’s healthcare provider.   48-59 6-8 yrs Not recommended  10 mL (2 tsp) 2 tablets 2 tablets   60-71 9-10 yrs Not recommended  12.5 mL (2½ tsp) 2½ tablets 2½ tablets   72-95 11 yrs Not recommended  15 mL (3 tsp) 3 tablets 3 tablets   MEDICAL CONDITIONS  Before starting your child on ibuprofen, be sure your child’s healthcare provider knows if your child has any of the following:  · An allergy to ibuprofen, aspirin, or other NSAIDs (see Warning below) or to any of the ingredients in the product  · Not drinking fluids, or has lost a lot of fluid due to vomiting or diarrhea  · Heartburn, also known as acid reflux or gastroesophageal reflux disease (GERD)  · Stomach bleeding or ulcer (now or in the past) or a history of vomiting blood or bloody stool  · Prior allergic reaction to aspirin or other anti-inflammatory medications  · Nasal polyps  · Swelling of the hands, arms, legs, or  feet  · Liver, kidney, or heart disease, or high blood pressure  · Asthma  · Bleeding disorder  · Phenylketonuria (PKU), an inherited condition that requires your child follow a special diet; some brands of ibuprofen have the sweetener aspartame that contains the chemical phenylalanine, which can worsen PKU; check for aspartame by reading the ingredients listed on the package  DRUG INTERACTIONS  Before starting your child on ibuprofen, be sure your child’s healthcare provider knows if your child is taking other over-the-counter or prescribed medications, including any of the following:  · Other NSAIDs, such as aspirin  · Blood thinning medication, such as warfarin (Coumadin)  · Heart or blood pressure medication  · Water pills (diuretics)  · Steroids, such as prednisone  WARNING  · Before giving your child other over-the-counter medications, such as cold remedies, read the label to be sure that they do not also contain ibuprofen. An overdose of ibuprofen can cause serious harm, such as bleeding.  · Stop giving your child ibuprofen and contact your child’s healthcare provider right away if any of the following occur:  ¨ Fever gets worse or lasts more than 3 days  ¨ Pain gets worse or lasts more than 5 days  ¨ Redness or swelling develops  ¨ New symptoms develop  · Allergy warning: Stop giving ibuprofen and return to this facility right away or contact emergency services if your child develops signs of an allergic reaction to ibuprofen: rash, severe itching, swelling of the face or neck, trouble breathing or swallowing.  · Stomach bleeding warning: Stop giving ibuprofen and contact your child’s healthcare provider right away if your child develops signs of stomach bleeding: stomach pain that doesn’t get better or gets worse, feeling faint, vomiting blood,  or red or black stool.  · Sore throat warning: Stop giving ibuprofen if your child has a severe sore throat that lasts longer than 2 days, and also has a fever,  headache, rash, nausea, or vomiting. These can be signs of a more serious illness. Contact your child’s healthcare provider right away.  · Do NOT give your child adult ibuprofen. The strength and dosing of the adult version are not appropriate for children and can cause serious health problems in your child.  [Note: This information may not include all instructions, precautions, medical conditions, drug/food interactions, and warnings for ibuprofen. Consult your doctor, nurse, or pharmacist if you have questions.]  © 9595-5050 [employer], [address] [place] [address]. All rights reserved. This information is not intended as a substitute for professional medical care. Always follow your healthcare professional's instructions.      MEDICATION: ACETAMINOPHEN (TYLENOL) (Child)  Acetaminophen is a medication that treats fever and pain in children. It has no anti-inflammatory effect. Acetaminophen is sold as Tylenol, Tempra, and other brands. Acetaminophen comes in liquid and chewable tablets. Be sure to give the form appropriate for your child’s age. If you’re not sure which form you should give to your child, ask your child’s healthcare provider.  DIRECTIONS FOR USE  · Follow your child’s healthcare provider’s instructions about how to give your child acetaminophen, especially if your child is younger than 2 years old.  · Use the measuring device that comes with the package for liquid acetaminophen.  · Give your child 1 dose every 4 hours as long as your child has symptoms. Do NOT give more than 5 doses in 24 hours. Giving more than the recommended doses can cause serious liver damage.  · To prevent choking, when giving your child chewable tablets, make sure he or she is sitting up. Instruct your child to chew the tablet completely and swallow before getting up.   Acetaminophen is given based on body weight and age. Unless your child’s healthcare provider tells you otherwise, you may use the table  below as a guide. Important: Read and follow the directions on the package. Different brands of acetaminophen may have slightly different instructions.  Child’s Weight   (pounds) Child’s Age Infants’ Suspension Liquid   (160 mg / 5 mL) Children’s Suspension Liquid   (160 mg / 5 mL) Children’s Chewable Tablets   (80 mg each)  Frankie Chewable Tablets   (160 mg each)   6-11 0-3 mos Ask child’s healthcare provider.   12-17 4-11 mos       18-23 12-23 mos       24-35 2-3 yrs 5 mL 5 mL (1 tsp) 2 tablets Not recommended   36-47 4-5 yrs Not recommended 7.5 mL (1½ tsp) 3 tablets Not recommended   48-59 6-8 yrs Not recommended 10 mL (2 tsp) 4 tablets 2 tablets   60-71 9-10 yrs Not recommended 12.5 mL (2½ tsp) 5 tablets 2½ tablets   72-95 11 yrs Not recommended 15 mL (3 tsp) 6 tablets 3 tablets      ------------------  MEDICAL CONDITIONS  Before starting your child on acetaminophen, be sure your child’s healthcare provider knows if your child has any of the following:  · An allergy to acetaminophen or any of the ingredients in the product  · A serious medical condition, such as liver disease  · Phenylketonuria (PKU), an inherited condition that requires your child follow a special diet; some brands of acetaminophen have the sweetener aspartame that contains the chemical phenylalanine, which can worsen PKU; check for aspartame by reading the ingredients listed on the package  DRUG INTERACTIONS  Before starting your child on acetaminophen, be sure your child’s healthcare provider knows if your child is taking other over-the-counter or prescribed medications, including any of the following:  · Blood-thinning medication, such as warfarin (Coumadin)  · Seizure medication  · Nausea medication  WARNINGS  · Before giving your child other over-the-counter medications, such as cold remedies, read the label to be sure that they do not also contain acetaminophen. An overdose of acetaminophen can cause liver damage.  · Stop giving your  Please follow up with Dr. Molina at your next scheduled appointment 10/5/20 @1015 AM. child acetaminophen and contact your child’s healthcare provider right away if any of the following occur:  ¨ Fever gets worse or lasts more than 3 days  ¨ Pain gets worse or lasts more than 5 days  ¨ Redness or swelling develops  ¨ New symptoms develop  · Sore throat warning: Stop giving acetaminophen if your child has a severe sore throat that lasts longer than 2 days, and also has a fever, headache, rash, nausea, or vomiting. These can be signs of a more serious illness. Contact your child’s healthcare provider right away.  · Do NOT give your child adult acetaminophen. The strength and dosing of the adult version are not appropriate for children and can cause serious health problems in your child.  [Note: This information may not include all instructions, precautions, medical conditions, drug/food interactions, and warnings for acetaminophen. Consult your doctor, nurse, or pharmacist if you have questions.]  © 3073-6649 [employer], [address] [place] [address], [place] [address]. All rights reserved. This information is not intended as a substitute for professional medical care. Always follow your healthcare professional's instructions.

## 2020-09-01 NOTE — PROGRESS NOTE ADULT - SUBJECTIVE AND OBJECTIVE BOX
PGY4 progress note    Patient seen and examined at bedside, resting comfortably. Reports she had several loose stools yesterday and two loose stools since yesterday evening. Passing flatus. Not vomiting. Tolerating clear liquid diet. Ambulating. Denies fever, n/v, chest pain, SOB, abdominal pain, dysuria, leg pain.    PE:  Vital Signs Last 24 Hrs  T(C): 36.6 (01 Sep 2020 04:41), Max: 36.6 (01 Sep 2020 04:41)  T(F): 97.9 (01 Sep 2020 04:41), Max: 97.9 (01 Sep 2020 04:41)  HR: 61 (01 Sep 2020 04:41) (59 - 72)  BP: 126/60 (01 Sep 2020 04:41) (90/53 - 126/60)  BP(mean): --  RR: 18 (01 Sep 2020 04:41) (16 - 20)  SpO2: --    GEN: NAD, AAOX3  CVS: RRR, normal S1/S2  lungs: CTAB  abd: soft, nontender, no r/g/r, +BS, vertical midline incision c/d/i  ext: no calf tenderness or edema    labs:                        11.1   6.50  )-----------( 365      ( 31 Aug 2020 07:37 )             34.7     08-31    140  |  103  |  8<L>  ----------------------------<  73  4.1   |  25  |  0.6<L>    Ca    8.4<L>      31 Aug 2020 07:37  Phos  3.2     08-30  Mg     2.2     08-30    TPro  6.1  /  Alb  3.7  /  TBili  0.7  /  DBili  x   /  AST  12  /  ALT  7   /  AlkPhos  67  08-30    Cdiff negative  f/u stool studies (culture, ova/parasites)      MEDICATIONS  (STANDING):  enoxaparin Injectable 40 milliGRAM(s) SubCutaneous at bedtime  lactated ringers. 1000 milliLiter(s) (125 mL/Hr) IV Continuous <Continuous>  senna 1 Tablet(s) Oral two times a day  simethicone 80 milliGRAM(s) Chew every 4 hours    MEDICATIONS  (PRN):  acetaminophen   Tablet .. 650 milliGRAM(s) Oral every 6 hours PRN Moderate Pain (4 - 6)  ibuprofen  Tablet. 600 milliGRAM(s) Oral every 6 hours PRN Mild Pain (1 - 3)

## 2020-09-01 NOTE — DISCHARGE NOTE NURSING/CASE MANAGEMENT/SOCIAL WORK - PATIENT PORTAL LINK FT
You can access the FollowMyHealth Patient Portal offered by [facility] by registering at the following website: [email or web address]. By joining [facility]’s FollowMyHealth portal, you will also be able to view your health information using other applications (apps) compatible with our system.

## 2020-09-01 NOTE — PROGRESS NOTE ADULT - ASSESSMENT
A/P:  RASHI SCHAFER is a 46y/o F patient s/p open PAULIE/BS 08/19 with Dr. Molina for uterine fibroids (15cm) and myomectomy of cervical polyp, +flatus +bm, pain improving      PLAN  - no acute surgical intervention  - advance diet as tolerated  - if vomits again consider NG tube  - pt tolerating cld  - please recall surgery as needed    09-01-20 @ 01:54

## 2020-09-01 NOTE — PROGRESS NOTE ADULT - ASSESSMENT
42 y/o P1 s/p PAULIE/BS for fibroid uterus, POD13, admitted for suspected SBO vs ileus, now with flatus and diarrhea, likely gastroenteritis, clinically no obstruction.   - f/u stool studies (culture, ova/parasites)  - advance diet  - f/u AM labs  - per surgery no evidence of obstruction  - vitals per routine    Will inform Dr. Molina. 44 y/o P1 s/p PAULIE/BS for fibroid uterus, POD13, admitted for suspected SBO vs ileus, now with flatus and diarrhea, likely gastroenteritis, clinically no obstruction.   - f/u stool studies (culture, ova/parasites)  - advance diet  - f/u AM labs  - per surgery no evidence of obstruction  - vitals per routine  - anticipate d/c home today    Will inform Dr. Molina.

## 2020-09-02 LAB
CULTURE RESULTS: SIGNIFICANT CHANGE UP
SPECIMEN SOURCE: SIGNIFICANT CHANGE UP

## 2020-09-03 DIAGNOSIS — K52.9 NONINFECTIVE GASTROENTERITIS AND COLITIS, UNSPECIFIED: ICD-10-CM

## 2020-09-03 DIAGNOSIS — R10.9 UNSPECIFIED ABDOMINAL PAIN: ICD-10-CM

## 2020-09-03 LAB
CULTURE RESULTS: SIGNIFICANT CHANGE UP
SPECIMEN SOURCE: SIGNIFICANT CHANGE UP

## 2020-09-04 ENCOUNTER — INPATIENT (INPATIENT)
Facility: HOSPITAL | Age: 45
LOS: 2 days | Discharge: HOME | End: 2020-09-07
Attending: OBSTETRICS & GYNECOLOGY | Admitting: OBSTETRICS & GYNECOLOGY
Payer: MEDICAID

## 2020-09-04 VITALS
SYSTOLIC BLOOD PRESSURE: 110 MMHG | OXYGEN SATURATION: 100 % | HEIGHT: 62 IN | RESPIRATION RATE: 20 BRPM | DIASTOLIC BLOOD PRESSURE: 72 MMHG | WEIGHT: 156.09 LBS | HEART RATE: 110 BPM | TEMPERATURE: 98 F

## 2020-09-04 DIAGNOSIS — K29.70 GASTRITIS, UNSPECIFIED, WITHOUT BLEEDING: ICD-10-CM

## 2020-09-04 DIAGNOSIS — Z90.710 ACQUIRED ABSENCE OF BOTH CERVIX AND UTERUS: Chronic | ICD-10-CM

## 2020-09-04 DIAGNOSIS — K25.9 GASTRIC ULCER, UNSPECIFIED AS ACUTE OR CHRONIC, WITHOUT HEMORRHAGE OR PERFORATION: ICD-10-CM

## 2020-09-04 DIAGNOSIS — Z90.79 ACQUIRED ABSENCE OF OTHER GENITAL ORGAN(S): Chronic | ICD-10-CM

## 2020-09-04 LAB
ALBUMIN SERPL ELPH-MCNC: 4.3 G/DL — SIGNIFICANT CHANGE UP (ref 3.5–5.2)
ALP SERPL-CCNC: 71 U/L — SIGNIFICANT CHANGE UP (ref 30–115)
ALT FLD-CCNC: 11 U/L — SIGNIFICANT CHANGE UP (ref 0–41)
ANION GAP SERPL CALC-SCNC: 13 MMOL/L — SIGNIFICANT CHANGE UP (ref 7–14)
APTT BLD: 31.5 SEC — SIGNIFICANT CHANGE UP (ref 27–39.2)
AST SERPL-CCNC: 17 U/L — SIGNIFICANT CHANGE UP (ref 0–41)
BASOPHILS # BLD AUTO: 0.04 K/UL — SIGNIFICANT CHANGE UP (ref 0–0.2)
BASOPHILS NFR BLD AUTO: 0.4 % — SIGNIFICANT CHANGE UP (ref 0–1)
BILIRUB DIRECT SERPL-MCNC: <0.2 MG/DL — SIGNIFICANT CHANGE UP (ref 0–0.2)
BILIRUB INDIRECT FLD-MCNC: >0.2 MG/DL — SIGNIFICANT CHANGE UP (ref 0.2–1.2)
BILIRUB SERPL-MCNC: 0.4 MG/DL — SIGNIFICANT CHANGE UP (ref 0.2–1.2)
BLD GP AB SCN SERPL QL: SIGNIFICANT CHANGE UP
BUN SERPL-MCNC: 11 MG/DL — SIGNIFICANT CHANGE UP (ref 10–20)
CALCIUM SERPL-MCNC: 9.6 MG/DL — SIGNIFICANT CHANGE UP (ref 8.5–10.1)
CHLORIDE SERPL-SCNC: 100 MMOL/L — SIGNIFICANT CHANGE UP (ref 98–110)
CO2 SERPL-SCNC: 26 MMOL/L — SIGNIFICANT CHANGE UP (ref 17–32)
CREAT SERPL-MCNC: 0.7 MG/DL — SIGNIFICANT CHANGE UP (ref 0.7–1.5)
EOSINOPHIL # BLD AUTO: 0.12 K/UL — SIGNIFICANT CHANGE UP (ref 0–0.7)
EOSINOPHIL NFR BLD AUTO: 1.3 % — SIGNIFICANT CHANGE UP (ref 0–8)
GLUCOSE SERPL-MCNC: 122 MG/DL — HIGH (ref 70–99)
HCT VFR BLD CALC: 38.9 % — SIGNIFICANT CHANGE UP (ref 37–47)
HGB BLD-MCNC: 12.6 G/DL — SIGNIFICANT CHANGE UP (ref 12–16)
IMM GRANULOCYTES NFR BLD AUTO: 0.3 % — SIGNIFICANT CHANGE UP (ref 0.1–0.3)
INR BLD: 1.09 RATIO — SIGNIFICANT CHANGE UP (ref 0.65–1.3)
LACTATE SERPL-SCNC: 0.8 MMOL/L — SIGNIFICANT CHANGE UP (ref 0.7–2)
LIDOCAIN IGE QN: 9 U/L — SIGNIFICANT CHANGE UP (ref 7–60)
LYMPHOCYTES # BLD AUTO: 1.11 K/UL — LOW (ref 1.2–3.4)
LYMPHOCYTES # BLD AUTO: 11.7 % — LOW (ref 20.5–51.1)
MCHC RBC-ENTMCNC: 27.2 PG — SIGNIFICANT CHANGE UP (ref 27–31)
MCHC RBC-ENTMCNC: 32.4 G/DL — SIGNIFICANT CHANGE UP (ref 32–37)
MCV RBC AUTO: 84 FL — SIGNIFICANT CHANGE UP (ref 81–99)
MONOCYTES # BLD AUTO: 0.74 K/UL — HIGH (ref 0.1–0.6)
MONOCYTES NFR BLD AUTO: 7.8 % — SIGNIFICANT CHANGE UP (ref 1.7–9.3)
NEUTROPHILS # BLD AUTO: 7.42 K/UL — HIGH (ref 1.4–6.5)
NEUTROPHILS NFR BLD AUTO: 78.5 % — HIGH (ref 42.2–75.2)
NRBC # BLD: 0 /100 WBCS — SIGNIFICANT CHANGE UP (ref 0–0)
PLATELET # BLD AUTO: 437 K/UL — HIGH (ref 130–400)
POTASSIUM SERPL-MCNC: 4 MMOL/L — SIGNIFICANT CHANGE UP (ref 3.5–5)
POTASSIUM SERPL-SCNC: 4 MMOL/L — SIGNIFICANT CHANGE UP (ref 3.5–5)
PROT SERPL-MCNC: 7.2 G/DL — SIGNIFICANT CHANGE UP (ref 6–8)
PROTHROM AB SERPL-ACNC: 12.5 SEC — SIGNIFICANT CHANGE UP (ref 9.95–12.87)
RBC # BLD: 4.63 M/UL — SIGNIFICANT CHANGE UP (ref 4.2–5.4)
RBC # FLD: 14.1 % — SIGNIFICANT CHANGE UP (ref 11.5–14.5)
SODIUM SERPL-SCNC: 139 MMOL/L — SIGNIFICANT CHANGE UP (ref 135–146)
WBC # BLD: 9.46 K/UL — SIGNIFICANT CHANGE UP (ref 4.8–10.8)
WBC # FLD AUTO: 9.46 K/UL — SIGNIFICANT CHANGE UP (ref 4.8–10.8)

## 2020-09-04 PROCEDURE — 71045 X-RAY EXAM CHEST 1 VIEW: CPT | Mod: 26

## 2020-09-04 PROCEDURE — 43753 TX GASTRO INTUB W/ASP: CPT

## 2020-09-04 PROCEDURE — 99285 EMERGENCY DEPT VISIT HI MDM: CPT | Mod: 25

## 2020-09-04 PROCEDURE — 99232 SBSQ HOSP IP/OBS MODERATE 35: CPT

## 2020-09-04 PROCEDURE — 74177 CT ABD & PELVIS W/CONTRAST: CPT | Mod: 26

## 2020-09-04 PROCEDURE — 93010 ELECTROCARDIOGRAM REPORT: CPT

## 2020-09-04 RX ORDER — MORPHINE SULFATE 50 MG/1
4 CAPSULE, EXTENDED RELEASE ORAL ONCE
Refills: 0 | Status: DISCONTINUED | OUTPATIENT
Start: 2020-09-04 | End: 2020-09-04

## 2020-09-04 RX ORDER — IOHEXOL 300 MG/ML
30 INJECTION, SOLUTION INTRAVENOUS ONCE
Refills: 0 | Status: COMPLETED | OUTPATIENT
Start: 2020-09-04 | End: 2020-09-04

## 2020-09-04 RX ORDER — FAMOTIDINE 10 MG/ML
20 INJECTION INTRAVENOUS ONCE
Refills: 0 | Status: COMPLETED | OUTPATIENT
Start: 2020-09-04 | End: 2020-09-04

## 2020-09-04 RX ORDER — SODIUM CHLORIDE 9 MG/ML
1000 INJECTION INTRAMUSCULAR; INTRAVENOUS; SUBCUTANEOUS ONCE
Refills: 0 | Status: COMPLETED | OUTPATIENT
Start: 2020-09-04 | End: 2020-09-04

## 2020-09-04 RX ORDER — SODIUM CHLORIDE 9 MG/ML
1000 INJECTION, SOLUTION INTRAVENOUS
Refills: 0 | Status: DISCONTINUED | OUTPATIENT
Start: 2020-09-04 | End: 2020-09-06

## 2020-09-04 RX ORDER — BENZOCAINE AND MENTHOL 5; 1 G/100ML; G/100ML
1 LIQUID ORAL EVERY 4 HOURS
Refills: 0 | Status: DISCONTINUED | OUTPATIENT
Start: 2020-09-04 | End: 2020-09-07

## 2020-09-04 RX ORDER — ONDANSETRON 8 MG/1
4 TABLET, FILM COATED ORAL ONCE
Refills: 0 | Status: COMPLETED | OUTPATIENT
Start: 2020-09-04 | End: 2020-09-04

## 2020-09-04 RX ADMIN — SODIUM CHLORIDE 1000 MILLILITER(S): 9 INJECTION INTRAMUSCULAR; INTRAVENOUS; SUBCUTANEOUS at 20:54

## 2020-09-04 RX ADMIN — SODIUM CHLORIDE 1000 MILLILITER(S): 9 INJECTION INTRAMUSCULAR; INTRAVENOUS; SUBCUTANEOUS at 21:54

## 2020-09-04 RX ADMIN — MORPHINE SULFATE 4 MILLIGRAM(S): 50 CAPSULE, EXTENDED RELEASE ORAL at 16:40

## 2020-09-04 RX ADMIN — MORPHINE SULFATE 4 MILLIGRAM(S): 50 CAPSULE, EXTENDED RELEASE ORAL at 21:46

## 2020-09-04 RX ADMIN — FAMOTIDINE 20 MILLIGRAM(S): 10 INJECTION INTRAVENOUS at 16:26

## 2020-09-04 RX ADMIN — MORPHINE SULFATE 4 MILLIGRAM(S): 50 CAPSULE, EXTENDED RELEASE ORAL at 22:01

## 2020-09-04 RX ADMIN — MORPHINE SULFATE 4 MILLIGRAM(S): 50 CAPSULE, EXTENDED RELEASE ORAL at 16:25

## 2020-09-04 RX ADMIN — MORPHINE SULFATE 4 MILLIGRAM(S): 50 CAPSULE, EXTENDED RELEASE ORAL at 19:08

## 2020-09-04 RX ADMIN — MORPHINE SULFATE 4 MILLIGRAM(S): 50 CAPSULE, EXTENDED RELEASE ORAL at 18:53

## 2020-09-04 RX ADMIN — IOHEXOL 30 MILLILITER(S): 300 INJECTION, SOLUTION INTRAVENOUS at 16:26

## 2020-09-04 RX ADMIN — ONDANSETRON 4 MILLIGRAM(S): 8 TABLET, FILM COATED ORAL at 16:25

## 2020-09-04 NOTE — H&P ADULT - NSHPLABSRESULTS_GEN_ALL_CORE
LABS:                        12.6   9.46  )-----------( 437      ( 04 Sep 2020 16:35 )             38.9       ABO RH Interpretation: O POS (09-04-20 @ 19:18)  Antibody Screen: NEG (09-04-20 @ 19:18)    09-04    139  |  100  |  11  ----------------------------<  122<H>  4.0   |  26  |  0.7    Ca    9.6      04 Sep 2020 16:35    TPro  7.2  /  Alb  4.3  /  TBili  0.4  /  DBili  <0.2  /  AST  17  /  ALT  11  /  AlkPhos  71  09-04    PT/INR - ( 04 Sep 2020 16:35 )   PT: 12.50 sec;   INR: 1.09 ratio         PTT - ( 04 Sep 2020 16:35 )  PTT:31.5 sec      RADIOLOGY & ADDITIONAL STUDIES:  < from: CT Abdomen and Pelvis w/ Oral Cont and w/ IV Cont (09.04.20 @ 18:17) >  EXAM:  CT ABDOMEN AND PELVIS OC IC        PROCEDURE DATE:  09/04/2020    INTERPRETATION:  CLINICAL STATEMENT: Abdominal pain  TECHNIQUE: Contiguous axial CT images were obtained from the lower chest to the pubic symphysis following administration of 100 cc Omnipaque 350 intravenous contrast.  Oral contrast was administered.  Reformatted images in the coronal and sagittal planes were acquired.  COMPARISON CT: CT abdomen and pelvis 8/30/2020  FINDINGS:    LOWER CHEST: Dependent atelectatic changes.    HEPATOBILIARY: Unremarkable.    SPLEEN: Unremarkable.    PANCREAS: Unremarkable.    ADRENAL GLANDS: Unremarkable.    KIDNEYS: Several peripherally located wedge-shaped enhancement defects in the right kidney suggesting the possibility of pyelonephritis.. No hydronephrosis. Subcentimeter hypodensities, too small to characterize.    ABDOMINOPELVIC NODES: No abdominal or pelvic lymphadenopathy.    PELVIC ORGANS: Status post hysterectomy. 3 cm left adnexal cyst    PERITONEUM/MESENTERY/BOWEL: Unremarkable appendix. Multiple dilated loops of small bowel, measuring up to  3.5 cm, with decompressed distal loops of small and large bowel. Small amount of fluid in the cul-de-sac. No evidence of pneumoperitoneum.    BONES/SOFT TISSUES: Postsurgical appearance anterior abdominal soft tissues.    IMPRESSION:    Multiple dilated loops of small bowel measuring up to 3.5 cm, with distal decompressed small bowel and large bowel, compatible with small bowel obstruction.  Questionable right pyelonephritis.  Dr. Ryan Jensen discussed preliminary findings with LA NENA LACKEY DO on 9/4/2020 6:47 PM with readback.            RYAN JENSEN M.D., RESIDENT RADIOLOGIST  This document has been electronically signed.  RAYNE DIXON M.D., ATTENDING RADIOLOGIST  This document has been electronically signed. Sep  4 2020  7:10PM  < end of copied text >

## 2020-09-04 NOTE — CONSULT NOTE ADULT - SUBJECTIVE AND OBJECTIVE BOX
RASHI SCHAFER [place] [id number]    HPI: 45F s/p PAULIE/BS with Dr. Molina 8/19/20 for fibroid uterus, recently discharged after admission to gyn for partial SBO from POD10 8/30-9/1. She had return of bowel  function on hospital day 2 with diarrhea and was discharged on hospital day 3. Per the patient, upon discharge, she went to the pharmacy to start taking immodium after speaking with her doctor. She has been taking it regularly for the past 3 days with her last bowel movement yesterday. She has been passing gas since discharge. She started to have worsening abdominal pain today accompanied by nonbilious emesis.     PAST MEDICAL & SURGICAL HISTORY:  Uterine fibroid  Status post bilateral salpingectomy  S/P PAULIE (total abdominal hysterectomy)        MEDICATIONS  (STANDING):    MEDICATIONS  (PRN):      Allergies    No Known Allergies    Intolerances        REVIEW OF SYSTEMS    [X ] A ten-point review of systems was otherwise negative except as noted.  [ ] Due to altered mental status/intubation, subjective information were not able to be obtained from the patient. History was obtained, to the extent possible, from review of the chart and collateral sources of information.      Vital Signs Last 24 Hrs  T(C): 36.8 (04 Sep 2020 14:44), Max: 36.8 (04 Sep 2020 14:44)  T(F): 98.3 (04 Sep 2020 14:44), Max: 98.3 (04 Sep 2020 14:44)  HR: 110 (04 Sep 2020 14:44) (110 - 110)  BP: 110/72 (04 Sep 2020 14:44) (110/72 - 110/72)  BP(mean): --  RR: 20 (04 Sep 2020 14:44) (20 - 20)  SpO2: 100% (04 Sep 2020 14:44) (100% - 100%)    PHYSICAL EXAM:  GENERAL: NAD, well-appearing  CHEST/LUNG: Clear to auscultation bilaterally  HEART: Regular rate and rhythm  ABDOMEN: Soft, Nontender, Nondistended;   EXTREMITIES:  No clubbing, cyanosis, or edema      LABS:  Labs:  CAPILLARY BLOOD GLUCOSE                              12.6   9.46  )-----------( 437      ( 04 Sep 2020 16:35 )             38.9       Auto Neutrophil %: 78.5 % (09-04-20 @ 16:35)  Auto Immature Granulocyte %: 0.3 % (09-04-20 @ 16:35)    09-04    139  |  100  |  11  ----------------------------<  122<H>  4.0   |  26  |  0.7      Calcium, Total Serum: 9.6 mg/dL (09-04-20 @ 16:35)      LFTs:             7.2  | 0.4  | 17       ------------------[71      ( 04 Sep 2020 16:35 )  4.3  | <0.2 | 11          Lipase:9      Amylase:x         Lactate, Blood: 0.8 mmol/L (09-04-20 @ 16:35)      Coags:     12.50  ----< 1.09    ( 04 Sep 2020 16:35 )     31.5        RADIOLOGY & ADDITIONAL STUDIES:  < from: CT Abdomen and Pelvis w/ Oral Cont and w/ IV Cont (09.04.20 @ 18:17) >  PERITONEUM/MESENTERY/BOWEL: Unremarkable appendix. Multiple dilated loops of small bowel, measuring up to  3.5 cm, with decompressed distal loops of small and large bowel. Small amount of fluid in the cul-de-sac. No evidence of pneumoperitoneum.    IMPRESSION:    Multiple dilated loops of small bowel measuring up to 3.5 cm, with distal decompressed small bowel and large bowel, compatible with small bowel obstruction.  Questionable right pyelonephritis.    < end of copied text >    < from: CT Abdomen and Pelvis w/ Oral Cont (08.30.20 @ 04:04) >  IMPRESSION:    When compared to prior study performed approximately 4 hours prior:    Oral contrast reaches the ascending colon excluding complete bowel obstruction.  There are persistent mildly dilated small bowel with distally collapsed distal/terminal ileum compatible with ileus versus partial obstruction.  Mild wall thickening of small bowel in the lower abdomen/pelvis may be reactive or reflect enteritis.    FINDINGS:    Following the administration of oral contrast, there is redemonstration of dilated small bowel loops measuring up to 3.5 cm with distally collapsed ileal and colonic loops, however oral contrast is noted to reach the colon.     < end of copied text > RASHI SCHAFER [place] [id number]    HPI: 45F s/p PAULIE/BS with Dr. Molina 8/19/20 for fibroid uterus, recently discharged after admission to gyn for partial SBO from POD10 8/30-9/1. She had return of bowel  function on hospital day 2 with diarrhea and was discharged on hospital day 3. Per the patient, upon discharge, she went to the pharmacy to start taking immodium after speaking with her doctor. She has been taking it regularly for the past 3 days with her last bowel movement yesterday. She has been passing gas since discharge. She started to have worsening abdominal pain today accompanied by nonbilious emesis.     PAST MEDICAL & SURGICAL HISTORY:  Uterine fibroid  Status post bilateral salpingectomy  S/P PAULIE (total abdominal hysterectomy)        MEDICATIONS  (STANDING):    MEDICATIONS  (PRN):      Allergies    No Known Allergies    Intolerances        REVIEW OF SYSTEMS    [X ] A ten-point review of systems was otherwise negative except as noted.  [ ] Due to altered mental status/intubation, subjective information were not able to be obtained from the patient. History was obtained, to the extent possible, from review of the chart and collateral sources of information.      Vital Signs Last 24 Hrs  T(C): 36.8 (04 Sep 2020 14:44), Max: 36.8 (04 Sep 2020 14:44)  T(F): 98.3 (04 Sep 2020 14:44), Max: 98.3 (04 Sep 2020 14:44)  HR: 110 (04 Sep 2020 14:44) (110 - 110)  BP: 110/72 (04 Sep 2020 14:44) (110/72 - 110/72)  BP(mean): --  RR: 20 (04 Sep 2020 14:44) (20 - 20)  SpO2: 100% (04 Sep 2020 14:44) (100% - 100%)    PHYSICAL EXAM:  GENERAL: NAD, well-appearing  CHEST/LUNG: Clear to auscultation bilaterally  HEART: Regular rate and rhythm  ABDOMEN: Soft, generalized tenderness, well-healed surgical scar, Nondistended;   EXTREMITIES:  No clubbing, cyanosis, or edema    LABS:  Labs:  CAPILLARY BLOOD GLUCOSE                              12.6   9.46  )-----------( 437      ( 04 Sep 2020 16:35 )             38.9       Auto Neutrophil %: 78.5 % (09-04-20 @ 16:35)  Auto Immature Granulocyte %: 0.3 % (09-04-20 @ 16:35)    09-04    139  |  100  |  11  ----------------------------<  122<H>  4.0   |  26  |  0.7      Calcium, Total Serum: 9.6 mg/dL (09-04-20 @ 16:35)      LFTs:             7.2  | 0.4  | 17       ------------------[71      ( 04 Sep 2020 16:35 )  4.3  | <0.2 | 11          Lipase:9      Amylase:x         Lactate, Blood: 0.8 mmol/L (09-04-20 @ 16:35)      Coags:     12.50  ----< 1.09    ( 04 Sep 2020 16:35 )     31.5        RADIOLOGY & ADDITIONAL STUDIES:  < from: CT Abdomen and Pelvis w/ Oral Cont and w/ IV Cont (09.04.20 @ 18:17) >  PERITONEUM/MESENTERY/BOWEL: Unremarkable appendix. Multiple dilated loops of small bowel, measuring up to  3.5 cm, with decompressed distal loops of small and large bowel. Small amount of fluid in the cul-de-sac. No evidence of pneumoperitoneum.    IMPRESSION:    Multiple dilated loops of small bowel measuring up to 3.5 cm, with distal decompressed small bowel and large bowel, compatible with small bowel obstruction.  Questionable right pyelonephritis.    < end of copied text >    < from: CT Abdomen and Pelvis w/ Oral Cont (08.30.20 @ 04:04) >  IMPRESSION:    When compared to prior study performed approximately 4 hours prior:    Oral contrast reaches the ascending colon excluding complete bowel obstruction.  There are persistent mildly dilated small bowel with distally collapsed distal/terminal ileum compatible with ileus versus partial obstruction.  Mild wall thickening of small bowel in the lower abdomen/pelvis may be reactive or reflect enteritis.    FINDINGS:    Following the administration of oral contrast, there is redemonstration of dilated small bowel loops measuring up to 3.5 cm with distally collapsed ileal and colonic loops, however oral contrast is noted to reach the colon.     < end of copied text >

## 2020-09-04 NOTE — H&P ADULT - NSHPPHYSICALEXAM_GEN_ALL_CORE
Vital Signs Last 24 Hrs  T(F): 98.3 (04 Sep 2020 14:44), Max: 98.3 (04 Sep 2020 14:44)  HR: 110 (04 Sep 2020 14:44) (110 - 110)  BP: 110/72 (04 Sep 2020 14:44) (110/72 - 110/72)  RR: 20 (04 Sep 2020 14:44) (20 - 20)    General Appearance - AAOx3, NAD  Heart - S1S2 regular rate and rhythm  Lung - CTA Bilaterally  Abdomen - Soft, mild tenderness to palpation in all quadrants, mildly distended, no rebound, no rigidity, no guarding, bowel sounds present; no CVA tenderness   Incision: vertical incision clean/dry/intact; appears well healing     GYN/Pelvis:  Labia Majora - Normal  Labia Minora - Normal  Clitoris - Normal  Urethra - Normal  Vagina - No bleeding noted, vaginal cuff appears to be intact by palpation   Cervix - Absent     Speculum: vaginal cuff intact, no bleeding noted   Uterus: Absent     Adnexa:  Mass: None  Tenderness: none

## 2020-09-04 NOTE — ED ADULT NURSE NOTE - OBJECTIVE STATEMENT
Patient c.o abdominal pain, N/V, and constipation for the past few days. As per patient, she has been treated and discharged here last week.

## 2020-09-04 NOTE — ED PROVIDER NOTE - NS ED ROS FT
Eyes:  No visual changes, eye pain or discharge.  ENMT:  No hearing changes, pain, discharge or infections. No neck pain or stiffness.  Cardiac:  No chest pain, SOB or edema. No chest pain with exertion.  Respiratory:  No cough or respiratory distress. No hemoptysis. No history of asthma or RAD.  GI: +nausea, +vomiting, +abd pain. - diarrhea  :  No dysuria, frequency or burning.  MS:  No myalgia, muscle weakness, joint pain or back pain.  Neuro:  No headache or weakness.  No LOC.  Skin:  No skin rash.   Endocrine: No history of thyroid disease or diabetes.

## 2020-09-04 NOTE — H&P ADULT - ASSESSMENT
A/P: 46yo P1 POD#16 s/p PAULIE-BS for 15cm fibroid and symptomatic anemia, with abdominal distension with NBNB vomiting; admitted for SBO    -discussed with patient r/b/a of NGT insertion, pt demonstrated understanding and is agreeable to NGT insertion at this point in time. Appreciate NGT placement by ED.   -IVF hydration   -NPO   -DVT ppx: SCDs  -NGT to continuos wall suction   -recommend UA and urine culture for possible pyelonephritis noted on CT Abd/pelvis   -pain management prn  -in light of second re-admission for possible SBO vs ileus, PMD (Dr. Molina) recommends admission to surgery service for specialized care of SBO vs ileus. Discussed with surgical team who is refusing admission at this point in time. As surgical team is refusing admission, will admit to GYN service under Dr. Molina. Requesting additional recommendations from surgical team.       Dr. Arroyo and Dr. Dutta aware. Dr. Molina aware A/P: 44yo P1 POD#16 s/p PAULIE-BS for 15cm fibroid and symptomatic anemia, with abdominal distension with NBNB vomiting; admitted for SBO    -discussed with patient r/b/a of NGT insertion, pt demonstrated understanding and is agreeable to NGT insertion at this point in time. Appreciate NGT placement by ED.   -IVF hydration   -NPO   -DVT ppx: SCDs  -NGT to continuos wall suction   -recommend UA and urine culture for possible pyelonephritis noted on CT Abd/pelvis   -pain management prn  -in light of second re-admission for possible SBO vs ileus, PMD (Dr. Molina) recommends admission to surgery service for specialized care of SBO vs ileus. Discussed with surgical team who is declining admission at this point in time. Will admit to GYN service under Dr. Molina. Requesting additional recommendations from surgical team.       Dr. Arroyo and Dr. Dutta aware. Dr. Molina aware

## 2020-09-04 NOTE — CONSULT NOTE ADULT - ASSESSMENT
45F s/p PAULIE/BS with Dr. Molina 8/19/20 for fibroid uterus, recently discharged after admission to gyn for partial SBO from POD10 8/30-9/1 with consult for r/o SBO    Plan:  - patient currently with symptoms consistent with partial SBO  - CT AP relatively unchanged from that performed prior to discharge  - d/w patient that diarrhea after SBO is not uncommon, recommend avoiding medications like immodium so soon after improvement of SBO  - discussed NGT placement w/ patient, currently refusing  - recommend NPO, IVF, nonoperative management  - will follow 45F s/p PAULIE/BS with Dr. Molina 8/19/20 for fibroid uterus, recently discharged after admission to gyn for partial SBO from POD10 8/30-9/1 with consult for r/o SBO    Plan:  - patient currently with symptoms consistent with partial SBO  - CT AP relatively unchanged from that performed prior to discharge  - d/w patient that diarrhea after SBO is not uncommon, recommend avoiding medications like immodium so soon after improvement of SBO  - discussed NGT placement w/ patient, currently refusing  - recommend NPO, IVF, nonoperative management  - will follow  - case d/w Dr. Rubio, ED, patient 45F s/p PAULIE/BS with Dr. Molina 8/19/20 for fibroid uterus, recently discharged after admission to gyn for partial SBO from POD10 8/30-9/1 with consult for r/o SBO    Plan:  - patient currently with symptoms consistent with partial SBO  - CT AP relatively unchanged from that performed prior to discharge  - d/w patient that diarrhea after SBO is not uncommon, recommend avoiding medications like immodium so soon after improvement of SBO  - discussed NGT placement w/ patient, currently refusing  - recommend NPO, IVF, nonoperative management  - recommend UA for questionable pyelonephritis  - will follow  - case d/w Dr. Rubio, ED, patient

## 2020-09-04 NOTE — ED PROVIDER NOTE - PROGRESS NOTE DETAILS
Attending Note: 46 y/o F PMHx s/p PAULIE, hysterectomy 8/19 /20 complicated by SBO, p/w SBO on post op day number 10, pt was admitted and d/c, x3 day s/p medical management. Pt had diarrhea at time and took Imodium, diarrhea resolved.  Pt now has worsening  abd pain, vomiting, and constipation since last night. Pt states she is passing gas. No fevers, chills, CP, SOB, or back pain. On exam: Pt in uncomfortable, actively vomiting in ED, AAOX3. Head NCAT. CN II-XII intact. Lungs CTABL. CV S1S2 regular. Abdomen generalized discomfort on palpation, abd slightly distended, (+) BS. Extremities (-) edema. Motor 5/5 x4, sensation intact. Will do labs, CT, Morphine, Zofran, and reeval. OB, surgery consulted. NGT placed, awaiting dispo from OB/surgery Attending Note: 46 y/o F s/p PAULIE 8/19/2020 complicated by SBO, discharged from the hospital 3 days ago where she was treated medically. Pt had diarrhea at that time and took Imodium, diarrhea resolved.  Pt now has worsening  abd pain, vomiting, and constipation since last night. Pt states she is passing gas. No fevers/chills, CP/SOB, or back pain. No urinary symptoms. On exam: Pt in uncomfortable, actively vomiting in ED, AAOX3. Head NCAT. CN II-XII intact. Lungs CTABL. CV S1S2 regular. Abdomen generalized discomfort on palpation, abd slightly distended, (+)BS. Extremities (-) edema. Motor 5/5 x4, sensation intact. Will do labs, CT, Morphine, Zofran, and reeval.

## 2020-09-04 NOTE — ED PROVIDER NOTE - PHYSICAL EXAMINATION
CONSTITUTIONAL: Well-developed; well-nourished; in no acute distress.   SKIN: warm, dry  HEAD: Normocephalic; atraumatic.  EYES: PERRL, EOMI, normal sclera and conjunctiva   ENT: No nasal discharge; airway clear.  NECK: Supple; non tender.  CARD: S1, S2 normal; no murmurs, gallops, or rubs. Regular rate and rhythm.   RESP: No wheezes, rales or rhonchi.  ABD: soft +diffuse tenderness. non peritonitic  EXT: Normal ROM.  No clubbing, cyanosis or edema.   LYMPH: No acute cervical adenopathy.  NEURO: Alert, oriented, grossly unremarkable  PSYCH: Cooperative, appropriate.

## 2020-09-04 NOTE — H&P ADULT - HISTORY OF PRESENT ILLNESS
Chief Complaint: abdominal pain with vomiting     HPI: 46yo P1 POD#16 s/p PAULIE-BS for 15cm fibroid and symptomatic anemia presents to ED for several episodes of vomiting and generalized abdominal pain. Pt reports since 2020 she has not had a bowel movement. Pt describes abdominal pain as cramping in nature, non-radiating, rated 4/10 in intensity, not relieved with motrin or tylenol.  Pt also reports four episodes of NBNB vomiting since last night and inability to tolerate PO. Pt reports each episode of vomiting occurs after PO intake. Pt reports last attempted PO intake at 1500. Pt states that she has not passed flatus in over 24 hours.   Pt recently admitted on 2020 to 2020 for several episodes of NBNB vomiting associated with abdominal pain and diarrhea. Pt found to be taking Immodium during that admission. Immodium has been discontinued since last admission. Pt has not taken any Immodium since 2020. During the most recent admission, pt was conservatively treated with NPO, IVF hydration; refused NGT at that time.   Denies fevers or chills.  Minimal vaginal bleeding at this time, denies vaginal discharge.  Otherwise denies HA, CP, SOB, dysuria, hematuria.     Ob/Gyn History:                   LMP                    Cycle Length q30d   Obhx: FT  x1  Gynhx: H/o fibroid uterus s/p PAULIE/BS.  Otherwise denies cysts, abnormal pap smears, STDs.  Last Pap Smear - , wnl per patient

## 2020-09-04 NOTE — CONSULT NOTE ADULT - SUBJECTIVE AND OBJECTIVE BOX
Chief Complaint: abdominal pain with vomiting     HPI: 46yo P1 POD#16 s/p PAULIE-BS for 15cm fibroid and symptomatic anemia presents to ED for several episodes of vomiting and generalized abdominal pain. Pt reports since 2020 she has not had a bowel movement. Pt describes abdominal pain as cramping in nature, non-radiating, rated 4/10 in intensity, not relieved with motrin or tylenol.  Pt also reports four episodes of NBNB vomiting since last night and inability to tolerate PO. Pt reports each episode of vomiting occurs after PO intake. Pt reports last attempted PO intake at 1500. Pt states that she has not passed flatus in over 24 hours.   Pt recently admitted on 2020 to 2020 for several episodes of NBNB vomiting associated with abdominal pain and diarrhea. Pt found to be taking Immodium during that admission. Immodium has been discontinued since last admission. Pt has not taken any Immodium since 2020. During the most recent admission, pt was conservatively treated with NPO, IVF hydration; refused NGT at that time.   Denies fevers or chills.  Minimal vaginal bleeding at this time, denies vaginal discharge.  Otherwise denies HA, CP, SOB, dysuria, hematuria.     Ob/Gyn History:                   LMP                    Cycle Length q30d   Obhx: FT  x1  Gynhx: H/o fibroid uterus s/p PAULIE/BS.  Otherwise denies cysts, abnormal pap smears, STDs.  Last Pap Smear - , wnl per patient      Denies the following: constitutional symptoms, visual symptoms, cardiovascular symptoms, respiratory symptoms, GI symptoms, musculoskeletal symptoms, skin symptoms, neurologic symptoms, hematologic symptoms, allergic symptoms, psychiatric symptoms  Except any pertinent positives listed.     Home Medications:  acetaminophen 325 mg oral tablet: 2 tab(s) orally every 6 hours, As needed, Moderate Pain (4 - 6) (01 Sep 2020 06:26)  ibuprofen 600 mg oral tablet: 1 tab(s) orally every 6 hours, As needed, Mild Pain (1 - 3) (01 Sep 2020 06:26)      Allergies: No Known Allergies    Intolerances: None     PAST MEDICAL & SURGICAL HISTORY:  Uterine fibroid  Status post bilateral salpingectomy  S/P PAULIE (total abdominal hysterectomy)      FAMILY HISTORY:  No pertinent family history in first degree relatives      SOCIAL HISTORY: Denies cigarette use, alcohol use, or illicit drug use    Vital Signs Last 24 Hrs  T(F): 98.3 (04 Sep 2020 14:44), Max: 98.3 (04 Sep 2020 14:44)  HR: 110 (04 Sep 2020 14:44) (110 - 110)  BP: 110/72 (04 Sep 2020 14:44) (110/72 - 110/72)  RR: 20 (04 Sep 2020 14:44) (20 - 20)    General Appearance - AAOx3, NAD  Heart - S1S2 regular rate and rhythm  Lung - CTA Bilaterally  Abdomen - Soft, mild tenderness to palpation in all quadrants, mildly distended, no rebound, no rigidity, no guarding, bowel sounds present; no CVA tenderness   Incision: vertical incision clean/dry/intact; appears well healing     GYN/Pelvis:  Labia Majora - Normal  Labia Minora - Normal  Clitoris - Normal  Urethra - Normal  Vagina - No bleeding noted, vaginal cuff appears to be intact by palpation   Cervix - Absent     Speculum: vaginal cuff intact, no bleeding noted     Uterus: Absent     Adnexa:  Mass: None  Tenderness: none      LABS:                        12.6   9.46  )-----------( 437      ( 04 Sep 2020 16:35 )             38.9       ABO RH Interpretation: O POS (20 @ 19:18)  Antibody Screen: NEG (20 @ 19:18)        139  |  100  |  11  ----------------------------<  122<H>  4.0   |  26  |  0.7    Ca    9.6      04 Sep 2020 16:35    TPro  7.2  /  Alb  4.3  /  TBili  0.4  /  DBili  <0.2  /  AST  17  /  ALT  11  /  AlkPhos  71  -    PT/INR - ( 04 Sep 2020 16:35 )   PT: 12.50 sec;   INR: 1.09 ratio         PTT - ( 04 Sep 2020 16:35 )  PTT:31.5 sec        RADIOLOGY & ADDITIONAL STUDIES:  < from: CT Abdomen and Pelvis w/ Oral Cont and w/ IV Cont (20 @ 18:17) >  EXAM:  CT ABDOMEN AND PELVIS OC IC        PROCEDURE DATE:  2020    INTERPRETATION:  CLINICAL STATEMENT: Abdominal pain  TECHNIQUE: Contiguous axial CT images were obtained from the lower chest to the pubic symphysis following administration of 100 cc Omnipaque 350 intravenous contrast.  Oral contrast was administered.  Reformatted images in the coronal and sagittal planes were acquired.  COMPARISON CT: CT abdomen and pelvis 2020  FINDINGS:    LOWER CHEST: Dependent atelectatic changes.    HEPATOBILIARY: Unremarkable.    SPLEEN: Unremarkable.    PANCREAS: Unremarkable.    ADRENAL GLANDS: Unremarkable.    KIDNEYS: Several peripherally located wedge-shaped enhancement defects in the right kidney suggesting the possibility of pyelonephritis.. No hydronephrosis. Subcentimeter hypodensities, too small to characterize.    ABDOMINOPELVIC NODES: No abdominal or pelvic lymphadenopathy.    PELVIC ORGANS: Status post hysterectomy. 3 cm left adnexal cyst    PERITONEUM/MESENTERY/BOWEL: Unremarkable appendix. Multiple dilated loops of small bowel, measuring up to  3.5 cm, with decompressed distal loops of small and large bowel. Small amount of fluid in the cul-de-sac. No evidence of pneumoperitoneum.    BONES/SOFT TISSUES: Postsurgical appearance anterior abdominal soft tissues.    IMPRESSION:    Multiple dilated loops of small bowel measuring up to 3.5 cm, with distal decompressed small bowel and large bowel, compatible with small bowel obstruction.  Questionable right pyelonephritis.  Dr. Yadi Jensen discussed preliminary findings with LA NENA LACKEY DO on 2020 6:47 PM with readback.            YADI JENSEN M.D., RESIDENT RADIOLOGIST  This document has been electronically signed.  RAYNE DIXON M.D., ATTENDING RADIOLOGIST  This document has been electronically signed. Sep  4 2020  7:10PM  < end of copied text >      < from: CT Abdomen and Pelvis w/ IV Cont (20 @ 23:38) >  EXAM:  CT ABDOMEN AND PELVIS IC        PROCEDURE DATE:  2020    INTERPRETATION:  CLINICAL STATEMENT: Abdominal pain  TECHNIQUE: Contiguous CT images were obtained of the abdomen and pelvis.  Intravenous Contrast:  Intravenous contrast administered.  Oral contrast: was not administered.  COMPARISON:  None  FINDINGS:  LOWER CHEST: There is mild bibasilar subsegmental atelectasis.    LIVER: Unremarkable.    SPLEEN: Unremarkable.    PANCREAS: Unremarkable.    GALLBLADDERAND BILIARY TREE: Trace nonspecific gallbladder wall edema without definitive pericholecystic fat stranding. No biliary ductal dilatation.    ADRENALS: Unremarkable.    KIDNEYS: Symmetric pattern of renal enhancement. No hydronephrosis. Subcentimeterrenal hypodensities too small to characterize.    LYMPH NODES: There are no enlarged abdominal or pelvic lymph nodes.    VASCULATURE: The abdominal aorta is normal in caliber.    BOWEL: There are dilated loops of small bowel measuring up to 3.5 cm with transition to decompressed distal ileal and colonic loops in the anterior midline posterior to the midline pelvic incision (series 5, image 298). There is wall thickening of the upstream mildly dilated small bowel.    PERITONEUM/RETROPERITONEUM/MESENTERY: There is trace ascites most notably in the pelvis and in the perihepatic and perisplenic regions. No pneumoperitoneum. The trace fluid in the cul-de-sac appears to demonstrate early loculation.    PELVIC VISCERA: The uterus is not visualized. Presumed a post hysterectomy with fluid noted at the vaginal cuff. Unremarkable CT appearance of the urinary bladder    BONES AND SOFT TISSUES: Postsurgical changes to the anterior midline pelvis.      IMPRESSION:    Dilated loops of small bowel measuring up to 3.5 cm with decompressed distal ileal and colonic loops favored to reflect a component of early small bowel obstruction. Changes from dilated to decompressed small bowel is in the anterior midline pelvis posterior to the incision site. Limited ability to differentiate partial versus complete obstruction. There is wall thickening of the mildly dilated upstream small bowel which may be reactive or reflect superimposed enteritis.            BINTA HUBER M.D., ATTENDING RADIOLOGIST  This document has been electronically signed. Aug 30 2020 12:00AM  < end of copied text >

## 2020-09-04 NOTE — ED PROVIDER NOTE - OBJECTIVE STATEMENT
pt is a 45 yof w/ hx PAULIE 2/2 uterine fibroids 3 weeks ago complicated by SBO vs ileus, admitted to OB and discharged after resolution of sx 3 days ago here for abd pain and n/v x 3 d. pt had diarrhea on discharge so pt decided to take immodium, which she has been taking for 3 days. pt has been passing gas, no stools. denies fever, cp, sob

## 2020-09-04 NOTE — CONSULT NOTE ADULT - ASSESSMENT
A/P: 46yo P1 POD#16 s/p PAULIE-BS for 15cm fibroid and symptomatic anemia, with abdominal distension with NBNB vomiting; likely SBO vs ileus   -discussed with patient r/b/a of NGT insertion, pt demonstrated understanding and is agreeable to NGT insertion at this point in time. Discussed with ED personnel, who will place NGT  -recommend IVF hydration   -recommend NPO   -recommend UA and urine culture for possible pyelonephritis noted on CT Abd/pelvis   -pain management per ED   -will follow while in house   -in light of second re-admission for possible SBO vs ileus, PMD (Dr. Molina) recommends admission to surgery service for specialized care of SBO vs ileus. Will discuss with surgical team.       Dr. Arroyo and Dr. Dutta aware. Dr. Molina aware

## 2020-09-04 NOTE — ED ADULT NURSE NOTE - ALCOHOL PRE SCREEN (AUDIT - C)
Patient presents to [facility] clinic today for lab only.  Kristan Fine CMA(AAMA)..................11/6/2019   10:36 AM    
Statement Selected

## 2020-09-05 LAB
ALBUMIN SERPL ELPH-MCNC: 3.6 G/DL — SIGNIFICANT CHANGE UP (ref 3.5–5.2)
ALP SERPL-CCNC: 61 U/L — SIGNIFICANT CHANGE UP (ref 30–115)
ALT FLD-CCNC: 11 U/L — SIGNIFICANT CHANGE UP (ref 0–41)
ANION GAP SERPL CALC-SCNC: 11 MMOL/L — SIGNIFICANT CHANGE UP (ref 7–14)
APPEARANCE UR: CLEAR — SIGNIFICANT CHANGE UP
AST SERPL-CCNC: 18 U/L — SIGNIFICANT CHANGE UP (ref 0–41)
BACTERIA # UR AUTO: NEGATIVE — SIGNIFICANT CHANGE UP
BASOPHILS # BLD AUTO: 0.03 K/UL — SIGNIFICANT CHANGE UP (ref 0–0.2)
BASOPHILS NFR BLD AUTO: 0.4 % — SIGNIFICANT CHANGE UP (ref 0–1)
BILIRUB SERPL-MCNC: 0.6 MG/DL — SIGNIFICANT CHANGE UP (ref 0.2–1.2)
BILIRUB UR-MCNC: NEGATIVE — SIGNIFICANT CHANGE UP
BUN SERPL-MCNC: 10 MG/DL — SIGNIFICANT CHANGE UP (ref 10–20)
CALCIUM SERPL-MCNC: 8.9 MG/DL — SIGNIFICANT CHANGE UP (ref 8.5–10.1)
CHLORIDE SERPL-SCNC: 103 MMOL/L — SIGNIFICANT CHANGE UP (ref 98–110)
CO2 SERPL-SCNC: 26 MMOL/L — SIGNIFICANT CHANGE UP (ref 17–32)
COLOR SPEC: YELLOW — SIGNIFICANT CHANGE UP
CREAT SERPL-MCNC: 0.7 MG/DL — SIGNIFICANT CHANGE UP (ref 0.7–1.5)
DIFF PNL FLD: ABNORMAL
EOSINOPHIL # BLD AUTO: 0.09 K/UL — SIGNIFICANT CHANGE UP (ref 0–0.7)
EOSINOPHIL NFR BLD AUTO: 1.2 % — SIGNIFICANT CHANGE UP (ref 0–8)
EPI CELLS # UR: 3 /HPF — SIGNIFICANT CHANGE UP (ref 0–5)
GLUCOSE SERPL-MCNC: 109 MG/DL — HIGH (ref 70–99)
GLUCOSE UR QL: NEGATIVE — SIGNIFICANT CHANGE UP
HCT VFR BLD CALC: 37.2 % — SIGNIFICANT CHANGE UP (ref 37–47)
HGB BLD-MCNC: 11.6 G/DL — LOW (ref 12–16)
HYALINE CASTS # UR AUTO: 12 /LPF — HIGH (ref 0–7)
IMM GRANULOCYTES NFR BLD AUTO: 0.3 % — SIGNIFICANT CHANGE UP (ref 0.1–0.3)
KETONES UR-MCNC: ABNORMAL
LEUKOCYTE ESTERASE UR-ACNC: NEGATIVE — SIGNIFICANT CHANGE UP
LYMPHOCYTES # BLD AUTO: 1.16 K/UL — LOW (ref 1.2–3.4)
LYMPHOCYTES # BLD AUTO: 15.5 % — LOW (ref 20.5–51.1)
MAGNESIUM SERPL-MCNC: 2 MG/DL — SIGNIFICANT CHANGE UP (ref 1.8–2.4)
MCHC RBC-ENTMCNC: 27.1 PG — SIGNIFICANT CHANGE UP (ref 27–31)
MCHC RBC-ENTMCNC: 31.2 G/DL — LOW (ref 32–37)
MCV RBC AUTO: 86.9 FL — SIGNIFICANT CHANGE UP (ref 81–99)
MONOCYTES # BLD AUTO: 0.79 K/UL — HIGH (ref 0.1–0.6)
MONOCYTES NFR BLD AUTO: 10.6 % — HIGH (ref 1.7–9.3)
NEUTROPHILS # BLD AUTO: 5.39 K/UL — SIGNIFICANT CHANGE UP (ref 1.4–6.5)
NEUTROPHILS NFR BLD AUTO: 72 % — SIGNIFICANT CHANGE UP (ref 42.2–75.2)
NITRITE UR-MCNC: NEGATIVE — SIGNIFICANT CHANGE UP
NRBC # BLD: 0 /100 WBCS — SIGNIFICANT CHANGE UP (ref 0–0)
PH UR: 6 — SIGNIFICANT CHANGE UP (ref 5–8)
PHOSPHATE SERPL-MCNC: 3.7 MG/DL — SIGNIFICANT CHANGE UP (ref 2.1–4.9)
PLATELET # BLD AUTO: 377 K/UL — SIGNIFICANT CHANGE UP (ref 130–400)
POTASSIUM SERPL-MCNC: 4.1 MMOL/L — SIGNIFICANT CHANGE UP (ref 3.5–5)
POTASSIUM SERPL-SCNC: 4.1 MMOL/L — SIGNIFICANT CHANGE UP (ref 3.5–5)
PROT SERPL-MCNC: 6.2 G/DL — SIGNIFICANT CHANGE UP (ref 6–8)
PROT UR-MCNC: SIGNIFICANT CHANGE UP
RBC # BLD: 4.28 M/UL — SIGNIFICANT CHANGE UP (ref 4.2–5.4)
RBC # FLD: 14.2 % — SIGNIFICANT CHANGE UP (ref 11.5–14.5)
RBC CASTS # UR COMP ASSIST: 15 /HPF — HIGH (ref 0–4)
SARS-COV-2 RNA SPEC QL NAA+PROBE: SIGNIFICANT CHANGE UP
SODIUM SERPL-SCNC: 140 MMOL/L — SIGNIFICANT CHANGE UP (ref 135–146)
SP GR SPEC: 1.02 — SIGNIFICANT CHANGE UP (ref 1.01–1.02)
UROBILINOGEN FLD QL: SIGNIFICANT CHANGE UP
WBC # BLD: 7.48 K/UL — SIGNIFICANT CHANGE UP (ref 4.8–10.8)
WBC # FLD AUTO: 7.48 K/UL — SIGNIFICANT CHANGE UP (ref 4.8–10.8)
WBC UR QL: 7 /HPF — HIGH (ref 0–5)

## 2020-09-05 PROCEDURE — 74021 RADEX ABDOMEN 3+ VIEWS: CPT | Mod: 26

## 2020-09-05 PROCEDURE — 99231 SBSQ HOSP IP/OBS SF/LOW 25: CPT | Mod: 24

## 2020-09-05 RX ORDER — MORPHINE SULFATE 50 MG/1
4 CAPSULE, EXTENDED RELEASE ORAL ONCE
Refills: 0 | Status: DISCONTINUED | OUTPATIENT
Start: 2020-09-05 | End: 2020-09-05

## 2020-09-05 RX ORDER — ACETAMINOPHEN 500 MG
1000 TABLET ORAL ONCE
Refills: 0 | Status: COMPLETED | OUTPATIENT
Start: 2020-09-05 | End: 2020-09-05

## 2020-09-05 RX ORDER — PANTOPRAZOLE SODIUM 20 MG/1
40 TABLET, DELAYED RELEASE ORAL DAILY
Refills: 0 | Status: DISCONTINUED | OUTPATIENT
Start: 2020-09-05 | End: 2020-09-07

## 2020-09-05 RX ORDER — ACETAMINOPHEN 500 MG
650 TABLET ORAL EVERY 6 HOURS
Refills: 0 | Status: DISCONTINUED | OUTPATIENT
Start: 2020-09-05 | End: 2020-09-05

## 2020-09-05 RX ADMIN — PANTOPRAZOLE SODIUM 40 MILLIGRAM(S): 20 TABLET, DELAYED RELEASE ORAL at 11:25

## 2020-09-05 RX ADMIN — MORPHINE SULFATE 4 MILLIGRAM(S): 50 CAPSULE, EXTENDED RELEASE ORAL at 03:54

## 2020-09-05 RX ADMIN — MORPHINE SULFATE 4 MILLIGRAM(S): 50 CAPSULE, EXTENDED RELEASE ORAL at 04:09

## 2020-09-05 RX ADMIN — SODIUM CHLORIDE 125 MILLILITER(S): 9 INJECTION, SOLUTION INTRAVENOUS at 17:09

## 2020-09-05 RX ADMIN — Medication 400 MILLIGRAM(S): at 13:06

## 2020-09-05 RX ADMIN — Medication 1000 MILLIGRAM(S): at 13:20

## 2020-09-05 NOTE — PROVIDER CONTACT NOTE (OTHER) - SITUATION
Patient's current blood pressure is 97/56 HR 84. Patient complaining of abdominal discomfort at this time with no other s/s.

## 2020-09-05 NOTE — PROGRESS NOTE ADULT - SUBJECTIVE AND OBJECTIVE BOX
GENERAL SURGERY PROGRESS NOTE     RASHI SCHAFER  45y  Female  Hospital day :1d    OVERNIGHT EVENTS: NGT in place    T(F): 96.6 (09-05-20 @ 05:00), Max: 98.3 (09-04-20 @ 14:44)  HR: 96 (09-05-20 @ 05:00) (96 - 110)  BP: 113/72 (09-05-20 @ 05:00) (110/72 - 137/79)  RR: 18 (09-05-20 @ 05:00) (17 - 20)  SpO2: 98% (09-05-20 @ 05:00) (98% - 100%)    DIET/FLUIDS: lactated ringers. 1000 milliLiter(s) IV Continuous <Continuous>    PHYSICAL EXAM:  GENERAL: NAD  ABDOMEN: Soft, non tender, no guarding or rigidity      LABS  Labs:  CAPILLARY BLOOD GLUCOSE                              12.6   9.46  )-----------( 437      ( 04 Sep 2020 16:35 )             38.9       Auto Neutrophil %: 78.5 % (09-04-20 @ 16:35)  Auto Immature Granulocyte %: 0.3 % (09-04-20 @ 16:35)    09-04    139  |  100  |  11  ----------------------------<  122<H>  4.0   |  26  |  0.7      Calcium, Total Serum: 9.6 mg/dL (09-04-20 @ 16:35)      LFTs:             7.2  | 0.4  | 17       ------------------[71      ( 04 Sep 2020 16:35 )  4.3  | <0.2 | 11          Lipase:9      Amylase:x         Lactate, Blood: 0.8 mmol/L (09-04-20 @ 16:35)      Coags:     12.50  ----< 1.09    ( 04 Sep 2020 16:35 )     31.5

## 2020-09-05 NOTE — PROGRESS NOTE ADULT - ASSESSMENT
46yo P1, s/p PAULIE-BS for 15cm fibroid and symptomatic anemia, POD#17, now with abdominal distension with non billious, nonbloody vomiting; admitted for small bowel obstruction    -s/p NGT placement on admission  -IVF hydration   -NPO   -DVT ppx: SCDs  -NGT to continuos wall suction, possible d/c in the evening if continues to be wihtout output  -recommend UA and urine culture for possible pyelonephritis noted on CT Abd/pelvis   -pain management prn  -per surgery: recommend C.diff and obstructive series  - IV tylenol for pain management  -Avoid narcotics      Dr. Strong aware. To be discussed with Dr. Molina 44yo P1, s/p PAULIE-BS for 15cm fibroid and symptomatic anemia, POD#17, now with abdominal distension with non billious, nonbloody vomiting; admitted for small bowel obstruction    -s/p NGT placement on admission  -IVF hydration   -NPO   -DVT ppx: SCDs  -NGT to continous suction, possible d/c in the evening if continues to be without output  -recommend UA and urine culture for possible pyelonephritis noted on CT Abd/pelvis   -pain management prn  -per surgery: recommend C.diff and obstructive series  - IV tylenol for pain management  -Avoid narcotics      Dr. Strong aware. To be discussed with Dr. Molina

## 2020-09-05 NOTE — CHART NOTE - NSCHARTNOTEFT_GEN_A_CORE
PGY3 note    Patient has not has any vomiting episodes since presentation to the emergency room, denies nausea. She is passing flatus. NG tube output has been 0 to scant for the past 24 hours.   Upon discussion with Dr. Molina and the surgical team, resident Dr. Nunez, we agreed that given minimal output and no vomiting, it is safe to remove the NGT and cautiously advance her diet.    Risks and benefits were discussed with the patient, she agreed.    NG tube was removed uncomplicated.    Dr. Sarkar aware

## 2020-09-05 NOTE — PROGRESS NOTE ADULT - ASSESSMENT
Assess  45F s/p PAULIE/BS with Dr. Molina 8/19/20 for fibroid uterus, recently discharged after admission to gyn for partial SBO from POD10 8/30-9/1 with consult for r/o SBO. CT AP relatively unchanged from that performed prior to discharge    Plan:  - NGT in place, no output recorded, canister empty, 100cc on insertion  - avoid immodium  - recommend NPO, IVF, nonoperative management  - recommend UA for questionable pyelonephritis  - monitor bowel function Assess  45F s/p PAULIE/BS with Dr. Molina 8/19/20 for fibroid uterus, recently discharged after admission to gyn for partial SBO from POD10 8/30-9/1 with consult for r/o SBO. CT AP relatively unchanged from that performed prior to discharge    Plan:  - NGT in place, no output recorded, canister empty, 100cc on insertion  - avoid immodium  - recommend NPO, IVF, nonoperative management  - recommend UA for questionable pyelonephritis  - monitor bowel function  - obtain obstructive series today  - recommend sending C diff for diarrhea

## 2020-09-05 NOTE — PROGRESS NOTE ADULT - SUBJECTIVE AND OBJECTIVE BOX
PGY3 Note    Pt seen and examined at bedside. No overnight events, no new complaints. Recovering well, tolerating regular diet, passing gas, ambulating without difficulty.    MEDICATIONS  (STANDING):  acetaminophen  IVPB .. 1000 milliGRAM(s) IV Intermittent once  lactated ringers. 1000 milliLiter(s) (125 mL/Hr) IV Continuous <Continuous>  pantoprazole  Injectable 40 milliGRAM(s) IV Push daily    MEDICATIONS  (PRN):  benzocaine 15 mG/menthol 3.6 mG (Sugar-Free) Lozenge 1 Lozenge Oral every 4 hours PRN Sore Throat      PHYSICAL EXAM:  Vital Signs Last 24 Hrs  T(C): 36.9 (05 Sep 2020 09:11), Max: 36.9 (05 Sep 2020 09:11)  T(F): 98.5 (05 Sep 2020 09:11), Max: 98.5 (05 Sep 2020 09:11)  HR: 95 (05 Sep 2020 09:11) (95 - 110)  BP: 113/72 (05 Sep 2020 09:11) (110/72 - 137/79)  RR: 18 (05 Sep 2020 09:11) (17 - 20)  SpO2: 95% (05 Sep 2020 09:11) (95% - 100%)  General Appearance: NAD, AA0x3  NG tube in place, output 0cc since insertion  Cardiac: RRR  Pulmonary: CTAB  Abdomen: High pitched bowel sounds, soft, , moderate tenderness in the epigastric region, mild tenderness in the lower quadrants, nondistended, no rebound, guarding, or rigidity. Pos BS.  Incision: clean, dry and intact; midline vertical  Extremities: no swelling or calf tenderness    LABS:                        11.6   7.48  )-----------( 377      ( 05 Sep 2020 06:43 )             37.2   09-05    140  |  103  |  10  ----------------------------<  109<H>  4.1   |  26  |  0.7    Ca    8.9      05 Sep 2020 06:43  Phos  3.7     09-05  Mg     2.0     09-05    TPro  6.2  /  Alb  3.6  /  TBili  0.6  /  DBili  x   /  AST  18  /  ALT  11  /  AlkPhos  61  09-05    PT/INR - ( 04 Sep 2020 16:35 )   PT: 12.50 sec;   INR: 1.09 ratio         PTT - ( 04 Sep 2020 16:35 )  PTT:31.5 sec      RADIOLOGY AND ADDITIONAL TESTS:    PROCEDURE DATE:  09/04/2020            INTERPRETATION:  CLINICAL STATEMENT: Abdominal pain  COMPARISON CT: CT abdomen and pelvis 8/30/2020    FINDINGS:    LOWER CHEST: Dependent atelectatic changes.  HEPATOBILIARY: Unremarkable.  SPLEEN: Unremarkable.  PANCREAS: Unremarkable.  ADRENAL GLANDS: Unremarkable.  KIDNEYS: Several peripherally located wedge-shaped enhancement defects in the right kidney suggesting the possibility of pyelonephritis.. No hydronephrosis. Subcentimeter hypodensities, too small to characterize.  ABDOMINOPELVIC NODES: No abdominal or pelvic lymphadenopathy.  PELVIC ORGANS: Status post hysterectomy. 3 cm left adnexal cyst  PERITONEUM/MESENTERY/BOWEL: Unremarkable appendix. Multiple dilated loops of small bowel, measuring up to  3.5 cm, with decompressed distal loops of small and large bowel. Small amount of fluid in the cul-de-sac. No evidence of pneumoperitoneum.  BONES/SOFT TISSUES: Postsurgical appearance anterior abdominal soft tissues.    IMPRESSION:    Multiple dilated loops of small bowel measuring up to 3.5 cm, with distal decompressed small bowel and large bowel, compatible with small bowel obstruction.  Questionable right pyelonephritis.

## 2020-09-06 LAB
ANION GAP SERPL CALC-SCNC: 14 MMOL/L — SIGNIFICANT CHANGE UP (ref 7–14)
BASOPHILS # BLD AUTO: 0.02 K/UL — SIGNIFICANT CHANGE UP (ref 0–0.2)
BASOPHILS NFR BLD AUTO: 0.4 % — SIGNIFICANT CHANGE UP (ref 0–1)
BUN SERPL-MCNC: 11 MG/DL — SIGNIFICANT CHANGE UP (ref 10–20)
CALCIUM SERPL-MCNC: 8 MG/DL — LOW (ref 8.5–10.1)
CHLORIDE SERPL-SCNC: 100 MMOL/L — SIGNIFICANT CHANGE UP (ref 98–110)
CO2 SERPL-SCNC: 23 MMOL/L — SIGNIFICANT CHANGE UP (ref 17–32)
CREAT SERPL-MCNC: 0.6 MG/DL — LOW (ref 0.7–1.5)
CULTURE RESULTS: SIGNIFICANT CHANGE UP
EOSINOPHIL # BLD AUTO: 0.18 K/UL — SIGNIFICANT CHANGE UP (ref 0–0.7)
EOSINOPHIL NFR BLD AUTO: 3.3 % — SIGNIFICANT CHANGE UP (ref 0–8)
GLUCOSE SERPL-MCNC: 64 MG/DL — LOW (ref 70–99)
HCT VFR BLD CALC: 31.8 % — LOW (ref 37–47)
HGB BLD-MCNC: 10.1 G/DL — LOW (ref 12–16)
IMM GRANULOCYTES NFR BLD AUTO: 0.4 % — HIGH (ref 0.1–0.3)
LYMPHOCYTES # BLD AUTO: 1.34 K/UL — SIGNIFICANT CHANGE UP (ref 1.2–3.4)
LYMPHOCYTES # BLD AUTO: 24.9 % — SIGNIFICANT CHANGE UP (ref 20.5–51.1)
MAGNESIUM SERPL-MCNC: 1.7 MG/DL — LOW (ref 1.8–2.4)
MCHC RBC-ENTMCNC: 27.4 PG — SIGNIFICANT CHANGE UP (ref 27–31)
MCHC RBC-ENTMCNC: 31.8 G/DL — LOW (ref 32–37)
MCV RBC AUTO: 86.2 FL — SIGNIFICANT CHANGE UP (ref 81–99)
MONOCYTES # BLD AUTO: 0.49 K/UL — SIGNIFICANT CHANGE UP (ref 0.1–0.6)
MONOCYTES NFR BLD AUTO: 9.1 % — SIGNIFICANT CHANGE UP (ref 1.7–9.3)
NEUTROPHILS # BLD AUTO: 3.34 K/UL — SIGNIFICANT CHANGE UP (ref 1.4–6.5)
NEUTROPHILS NFR BLD AUTO: 61.9 % — SIGNIFICANT CHANGE UP (ref 42.2–75.2)
NRBC # BLD: 0 /100 WBCS — SIGNIFICANT CHANGE UP (ref 0–0)
PHOSPHATE SERPL-MCNC: 2.4 MG/DL — SIGNIFICANT CHANGE UP (ref 2.1–4.9)
PLATELET # BLD AUTO: 314 K/UL — SIGNIFICANT CHANGE UP (ref 130–400)
POTASSIUM SERPL-MCNC: 3.7 MMOL/L — SIGNIFICANT CHANGE UP (ref 3.5–5)
POTASSIUM SERPL-SCNC: 3.7 MMOL/L — SIGNIFICANT CHANGE UP (ref 3.5–5)
RBC # BLD: 3.69 M/UL — LOW (ref 4.2–5.4)
RBC # FLD: 13.2 % — SIGNIFICANT CHANGE UP (ref 11.5–14.5)
SODIUM SERPL-SCNC: 137 MMOL/L — SIGNIFICANT CHANGE UP (ref 135–146)
SPECIMEN SOURCE: SIGNIFICANT CHANGE UP
WBC # BLD: 5.39 K/UL — SIGNIFICANT CHANGE UP (ref 4.8–10.8)
WBC # FLD AUTO: 5.39 K/UL — SIGNIFICANT CHANGE UP (ref 4.8–10.8)

## 2020-09-06 PROCEDURE — 99232 SBSQ HOSP IP/OBS MODERATE 35: CPT

## 2020-09-06 RX ORDER — SENNA PLUS 8.6 MG/1
1 TABLET ORAL ONCE
Refills: 0 | Status: COMPLETED | OUTPATIENT
Start: 2020-09-06 | End: 2020-09-06

## 2020-09-06 RX ORDER — MAGNESIUM SULFATE 500 MG/ML
2 VIAL (ML) INJECTION ONCE
Refills: 0 | Status: COMPLETED | OUTPATIENT
Start: 2020-09-06 | End: 2020-09-06

## 2020-09-06 RX ORDER — ACETAMINOPHEN 500 MG
650 TABLET ORAL ONCE
Refills: 0 | Status: COMPLETED | OUTPATIENT
Start: 2020-09-06 | End: 2020-09-06

## 2020-09-06 RX ORDER — SENNA PLUS 8.6 MG/1
1 TABLET ORAL DAILY
Refills: 0 | Status: DISCONTINUED | OUTPATIENT
Start: 2020-09-07 | End: 2020-09-07

## 2020-09-06 RX ORDER — SIMETHICONE 80 MG/1
80 TABLET, CHEWABLE ORAL EVERY 6 HOURS
Refills: 0 | Status: DISCONTINUED | OUTPATIENT
Start: 2020-09-06 | End: 2020-09-07

## 2020-09-06 RX ADMIN — Medication 50 GRAM(S): at 12:00

## 2020-09-06 RX ADMIN — SENNA PLUS 1 TABLET(S): 8.6 TABLET ORAL at 17:20

## 2020-09-06 RX ADMIN — SODIUM CHLORIDE 125 MILLILITER(S): 9 INJECTION, SOLUTION INTRAVENOUS at 13:28

## 2020-09-06 RX ADMIN — PANTOPRAZOLE SODIUM 40 MILLIGRAM(S): 20 TABLET, DELAYED RELEASE ORAL at 12:01

## 2020-09-06 RX ADMIN — SIMETHICONE 80 MILLIGRAM(S): 80 TABLET, CHEWABLE ORAL at 17:20

## 2020-09-06 RX ADMIN — Medication 650 MILLIGRAM(S): at 15:32

## 2020-09-06 RX ADMIN — SIMETHICONE 80 MILLIGRAM(S): 80 TABLET, CHEWABLE ORAL at 15:29

## 2020-09-06 RX ADMIN — Medication 650 MILLIGRAM(S): at 15:29

## 2020-09-06 RX ADMIN — SIMETHICONE 80 MILLIGRAM(S): 80 TABLET, CHEWABLE ORAL at 23:21

## 2020-09-06 NOTE — PROGRESS NOTE ADULT - ASSESSMENT
46yo P1, s/p PAULIE-BS for 15cm fibroid and symptomatic anemia, POD#18, now with abdominal distension with non billious, nonbloody vomiting; admitted for small bowel obstruction  -s/p NGT placement on admission  -IVF hydration   -NPO   -DVT ppx: SCDs  -NGT to continous suction, possible d/c in the evening if continues to be without output  -recommend UA and urine culture for possible pyelonephritis noted on CT Abd/pelvis   -pain management prn  -per surgery: recommend C.diff and obstructive series  - IV tylenol for pain management  -Avoid narcotics    Dr. Garcia         Surgery on board    [ ] UCx    9/5 per surgery obstructive series is good. No signs of obstructions.  s/pNG tube removed at 2130  diet: sips and chips    9/6 if tolerated, advance to clears        9/6 AM labs ordered 46yo P1, s/p PAULIE-BS for 15cm fibroid and symptomatic anemia, POD#18, with abdominal distension with non-billious, nonbloody vomiting; admitted for pain and distention, no signs on obstruction at this time.   -Replace Magnesium  -Advance Diet to Clears   -s/p NGT, removed   -DVT ppx: SCDs  -UA negative, f/u culture   -pain management prn  -per surgery: recommend C.diff and obstructive series  -IV tylenol for pain management; Avoid narcotics  -Surgery on board     Dr. Garcia aware

## 2020-09-06 NOTE — PROGRESS NOTE ADULT - ASSESSMENT
Assessment:  45y Female with nausea/vomiting post op from PAULIE/BSO. Surgery following for possible SBO. Imaging showing dilated loops for small bowel with gas and contrast in colon. XR abdomen shows transit of contrast in distal colon and rectum. GIven imaging findings and benign physical exam, abdomen was soft non distended, doubt any obstruction.     Plan:  - advance diet as tolerated  - monitor bowel function  - aspiration precautions in case patient vomits  - avoid narcotics  - ensure electrolytes corrected  - ambulate as tolerated

## 2020-09-06 NOTE — PROGRESS NOTE ADULT - SUBJECTIVE AND OBJECTIVE BOX
PGY-3 Note:    Patient seen and examined. Pain well controlled at this time. No complaints at this time. Denies fever, chills, nausea, vomiting, chest pain, shortness of breath, severe abdominal pain, heavy vaginal bleeding.   Ambulating.   Passing flatus, Denies bowel movement.   Diet: Regular, tolearting PO  Voiding: Transurethral catheter   /     Voiding without difficulty, no dysuria   Lines:  Peripheral IV      Physical Exam:  Vital Signs:  T(C): 37.2 (09-06-20 @ 04:49), Max: 37.2 (09-05-20 @ 20:25)  HR: 92 (09-06-20 @ 04:49) (84 - 95)  BP: 116/69 (09-06-20 @ 04:49) (97/56 - 116/69)  RR: 18 (09-06-20 @ 04:49) (18 - 18)  SpO2: 94% (09-05-20 @ 14:04) (94% - 98%)      Gen: NAD, A&Ox3  Heart: S1S2,RRR. No murmors.   Lungs: CTAB. Normal resiprations.   Abd: ND, soft, NT, BS+, incision c/d/i with steri strips in place,  dressing c/d/i  VE: Deferred, no active bleeding  Ext: SCDs, no edema or calf tenderness bilaterally    Labs:      Medications:  benzocaine 15 mG/menthol 3.6 mG (Sugar-Free) Lozenge 1 Lozenge Oral every 4 hours PRN  lactated ringers. 1000 milliLiter(s) IV Continuous <Continuous>  pantoprazole  Injectable 40 milliGRAM(s) IV Push daily             S/P C/S POD#  , recovering well   - VS [id number]  encourage ambulation  -PO hydration   - Continue Diet as tolerated  - DVT ppx: SCDs and Lovenox   - Incentive Spirometry bedside and encouraged   - f/u AM CBC   - Vital signs [id number]   - Silva catheter discontinued, TOV BY:     -Hx of hypertension, Metoprolol ordered PRNq6 for systolic BP >160 or diastolic >110 PGY-3 Note:    Patient seen and examined. Pain well controlled at this time. No complaints at this time. Denies fever, chills, nausea, vomiting, chest pain, shortness of breath, severe abdominal pain, heavy vaginal bleeding. Ambulating.   Passing flatus, Denies bowel movement.   Diet: NPO, sips and chips   Voiding:  Voiding without difficulty, no dysuria   Lines:  Peripheral IV      Physical Exam:  Vital Signs:  T(C): 37.2 (09-06-20 @ 04:49), Max: 37.2 (09-05-20 @ 20:25)  HR: 92 (09-06-20 @ 04:49) (84 - 95)  BP: 116/69 (09-06-20 @ 04:49) (97/56 - 116/69)  RR: 18 (09-06-20 @ 04:49) (18 - 18)  SpO2: 94% (09-05-20 @ 14:04) (94% - 98%)      Gen: NAD, A&Ox3  Heart: S1S2,RRR. No murmors.   Lungs: CTAB. Normal resiprations.   Abd: ND, soft, NT, BS+, incision c/d/i with steri strips in place,  dressing c/d/i  VE: Deferred, no active bleeding  Ext: SCDs, no edema or calf tenderness bilaterally    Labs:                        10.1   5.39  )-----------( 314      ( 06 Sep 2020 07:10 )             31.8   09-06    137  |  100  |  11  ----------------------------<  64<L>  3.7   |  23  |  0.6<L>    Ca    8.0<L>      06 Sep 2020 07:10  Phos  2.4     09-06  Mg     1.7     09-06    TPro  6.2  /  Alb  3.6  /  TBili  0.6  /  DBili  x   /  AST  18  /  ALT  11  /  AlkPhos  61  09-05      Medications:  benzocaine 15 mG/menthol 3.6 mG (Sugar-Free) Lozenge 1 Lozenge Oral every 4 hours PRN  lactated ringers. 1000 milliLiter(s) IV Continuous <Continuous>  pantoprazole  Injectable 40 milliGRAM(s) IV Push daily

## 2020-09-06 NOTE — PROGRESS NOTE ADULT - SUBJECTIVE AND OBJECTIVE BOX
GENERAL SURGERY PROGRESS NOTE     RASHI SCHAFER  45y  Female  Hospital day :2d  OVERNIGHT EVENTS: stable, NGT removed without issues    T(F): 98.9 (20 @ 20:25), Max: 98.9 (20 @ 20:25)  HR: 88 (20 @ 20:25) (84 - 96)  BP: 100/61 (20 @ 20:25) (97/56 - 113/72)  RR: 18 (20 @ 20:25) (18 - 18)  SpO2: 94% (20 @ 14:04) (94% - 98%)    DIET/FLUIDS: lactated ringers. 1000 milliLiter(s) IV Continuous <Continuous>     GI proph:  pantoprazole  Injectable 40 milliGRAM(s) IV Push daily    AC/ proph:   ABx:     PHYSICAL EXAM:  GENERAL: NAD, well-appearing  ABDOMEN: Soft, Nontender, Nondistended;   EXTREMITIES:  No clubbing, cyanosis, or edema      LABS  CAPILLARY BLOOD GLUCOSE                              11.6   7.48  )-----------( 377      ( 05 Sep 2020 06:43 )             37.2       Auto Neutrophil %: 72.0 % (20 @ 06:43)  Auto Immature Granulocyte %: 0.3 % (20 @ 06:43)        140  |  103  |  10  ----------------------------<  109<H>  4.1   |  26  |  0.7      Calcium, Total Serum: 8.9 mg/dL (20 @ 06:43)      LFTs:             6.2  | 0.6  | 18       ------------------[61      ( 05 Sep 2020 06:43 )  3.6  | x    | 11          Lipase:x      Amylase:x         Lactate, Blood: 0.8 mmol/L (20 @ 16:35)      Coags:     12.50  ----< 1.09    ( 04 Sep 2020 16:35 )     31.5        Urinalysis Basic - ( 05 Sep 2020 14:34 )    Color: Yellow / Appearance: Clear / S.025 / pH: x  Gluc: x / Ketone: Large  / Bili: Negative / Urobili: <2 mg/dL   Blood: x / Protein: Trace / Nitrite: Negative   Leuk Esterase: Negative / RBC: 15 /HPF / WBC 7 /HPF   Sq Epi: x / Non Sq Epi: 3 /HPF / Bacteria: Negative GENERAL SURGERY PROGRESS NOTE     RASHI SCHAFER  45y  Female  Hospital day :2d  OVERNIGHT EVENTS: stable, NGT removed without issues    T(F): 98.9 (20 @ 20:25), Max: 98.9 (20 @ 20:25)  HR: 88 (20 @ 20:25) (84 - 96)  BP: 100/61 (20 @ 20:25) (97/56 - 113/72)  RR: 18 (20 @ 20:25) (18 - 18)  SpO2: 94% (20 @ 14:04) (94% - 98%)    DIET/FLUIDS: lactated ringers. 1000 milliLiter(s) IV Continuous <Continuous>     GI proph:  pantoprazole  Injectable 40 milliGRAM(s) IV Push daily    AC/ proph:   ABx:     PHYSICAL EXAM:  GENERAL: NAD, well-appearing  ABDOMEN: Soft, Nontender, Nondistended; lower abd wound well healed now  EXTREMITIES:  No clubbing, cyanosis, or edema      LABS  CAPILLARY BLOOD GLUCOSE                              11.6   7.48  )-----------( 377      ( 05 Sep 2020 06:43 )             37.2       Auto Neutrophil %: 72.0 % (20 @ 06:43)  Auto Immature Granulocyte %: 0.3 % (20 @ 06:43)        140  |  103  |  10  ----------------------------<  109<H>  4.1   |  26  |  0.7      Calcium, Total Serum: 8.9 mg/dL (20 @ 06:43)      LFTs:             6.2  | 0.6  | 18       ------------------[61      ( 05 Sep 2020 06:43 )  3.6  | x    | 11          Lipase:x      Amylase:x         Lactate, Blood: 0.8 mmol/L (20 @ 16:35)      Coags:     12.50  ----< 1.09    ( 04 Sep 2020 16:35 )     31.5        Urinalysis Basic - ( 05 Sep 2020 14:34 )    Color: Yellow / Appearance: Clear / S.025 / pH: x  Gluc: x / Ketone: Large  / Bili: Negative / Urobili: <2 mg/dL   Blood: x / Protein: Trace / Nitrite: Negative   Leuk Esterase: Negative / RBC: 15 /HPF / WBC 7 /HPF   Sq Epi: x / Non Sq Epi: 3 /HPF / Bacteria: Negative

## 2020-09-07 ENCOUNTER — TRANSCRIPTION ENCOUNTER (OUTPATIENT)
Age: 45
End: 2020-09-07

## 2020-09-07 VITALS
DIASTOLIC BLOOD PRESSURE: 63 MMHG | TEMPERATURE: 98 F | HEART RATE: 80 BPM | SYSTOLIC BLOOD PRESSURE: 105 MMHG | RESPIRATION RATE: 18 BRPM

## 2020-09-07 LAB
ANION GAP SERPL CALC-SCNC: 9 MMOL/L — SIGNIFICANT CHANGE UP (ref 7–14)
BASOPHILS # BLD AUTO: 0.02 K/UL — SIGNIFICANT CHANGE UP (ref 0–0.2)
BASOPHILS NFR BLD AUTO: 0.4 % — SIGNIFICANT CHANGE UP (ref 0–1)
BUN SERPL-MCNC: 4 MG/DL — LOW (ref 10–20)
CALCIUM SERPL-MCNC: 8.7 MG/DL — SIGNIFICANT CHANGE UP (ref 8.5–10.1)
CHLORIDE SERPL-SCNC: 102 MMOL/L — SIGNIFICANT CHANGE UP (ref 98–110)
CO2 SERPL-SCNC: 25 MMOL/L — SIGNIFICANT CHANGE UP (ref 17–32)
CREAT SERPL-MCNC: 0.5 MG/DL — LOW (ref 0.7–1.5)
EOSINOPHIL # BLD AUTO: 0.21 K/UL — SIGNIFICANT CHANGE UP (ref 0–0.7)
EOSINOPHIL NFR BLD AUTO: 4.2 % — SIGNIFICANT CHANGE UP (ref 0–8)
GLUCOSE SERPL-MCNC: 95 MG/DL — SIGNIFICANT CHANGE UP (ref 70–99)
HCT VFR BLD CALC: 34.5 % — LOW (ref 37–47)
HGB BLD-MCNC: 11 G/DL — LOW (ref 12–16)
IMM GRANULOCYTES NFR BLD AUTO: 0.2 % — SIGNIFICANT CHANGE UP (ref 0.1–0.3)
LYMPHOCYTES # BLD AUTO: 1.35 K/UL — SIGNIFICANT CHANGE UP (ref 1.2–3.4)
LYMPHOCYTES # BLD AUTO: 26.7 % — SIGNIFICANT CHANGE UP (ref 20.5–51.1)
MAGNESIUM SERPL-MCNC: 2.2 MG/DL — SIGNIFICANT CHANGE UP (ref 1.8–2.4)
MCHC RBC-ENTMCNC: 27.2 PG — SIGNIFICANT CHANGE UP (ref 27–31)
MCHC RBC-ENTMCNC: 31.9 G/DL — LOW (ref 32–37)
MCV RBC AUTO: 85.4 FL — SIGNIFICANT CHANGE UP (ref 81–99)
MONOCYTES # BLD AUTO: 0.45 K/UL — SIGNIFICANT CHANGE UP (ref 0.1–0.6)
MONOCYTES NFR BLD AUTO: 8.9 % — SIGNIFICANT CHANGE UP (ref 1.7–9.3)
NEUTROPHILS # BLD AUTO: 3.01 K/UL — SIGNIFICANT CHANGE UP (ref 1.4–6.5)
NEUTROPHILS NFR BLD AUTO: 59.6 % — SIGNIFICANT CHANGE UP (ref 42.2–75.2)
NRBC # BLD: 0 /100 WBCS — SIGNIFICANT CHANGE UP (ref 0–0)
PHOSPHATE SERPL-MCNC: 2.4 MG/DL — SIGNIFICANT CHANGE UP (ref 2.1–4.9)
PLATELET # BLD AUTO: 361 K/UL — SIGNIFICANT CHANGE UP (ref 130–400)
POTASSIUM SERPL-MCNC: 3.5 MMOL/L — SIGNIFICANT CHANGE UP (ref 3.5–5)
POTASSIUM SERPL-SCNC: 3.5 MMOL/L — SIGNIFICANT CHANGE UP (ref 3.5–5)
RBC # BLD: 4.04 M/UL — LOW (ref 4.2–5.4)
RBC # FLD: 13.2 % — SIGNIFICANT CHANGE UP (ref 11.5–14.5)
SODIUM SERPL-SCNC: 136 MMOL/L — SIGNIFICANT CHANGE UP (ref 135–146)
WBC # BLD: 5.05 K/UL — SIGNIFICANT CHANGE UP (ref 4.8–10.8)
WBC # FLD AUTO: 5.05 K/UL — SIGNIFICANT CHANGE UP (ref 4.8–10.8)

## 2020-09-07 RX ORDER — SENNA PLUS 8.6 MG/1
1 TABLET ORAL
Qty: 20 | Refills: 0
Start: 2020-09-07

## 2020-09-07 RX ORDER — FAMOTIDINE 10 MG/ML
1 INJECTION INTRAVENOUS
Qty: 28 | Refills: 0
Start: 2020-09-07 | End: 2020-09-20

## 2020-09-07 RX ORDER — PANTOPRAZOLE SODIUM 20 MG/1
20 TABLET, DELAYED RELEASE ORAL
Refills: 0 | Status: DISCONTINUED | OUTPATIENT
Start: 2020-09-07 | End: 2020-09-07

## 2020-09-07 RX ORDER — ACETAMINOPHEN 500 MG
2 TABLET ORAL
Qty: 50 | Refills: 0
Start: 2020-09-07

## 2020-09-07 RX ORDER — FAMOTIDINE 10 MG/ML
1 INJECTION INTRAVENOUS
Qty: 14 | Refills: 0
Start: 2020-09-07 | End: 2020-09-20

## 2020-09-07 RX ORDER — OXYCODONE HYDROCHLORIDE 5 MG/1
1 TABLET ORAL
Qty: 4 | Refills: 0
Start: 2020-09-07

## 2020-09-07 RX ORDER — PANTOPRAZOLE SODIUM 20 MG/1
40 TABLET, DELAYED RELEASE ORAL DAILY
Refills: 0 | Status: DISCONTINUED | OUTPATIENT
Start: 2020-09-07 | End: 2020-09-07

## 2020-09-07 RX ORDER — PANTOPRAZOLE SODIUM 20 MG/1
1 TABLET, DELAYED RELEASE ORAL
Qty: 60 | Refills: 0
Start: 2020-09-07 | End: 2020-10-06

## 2020-09-07 RX ADMIN — SIMETHICONE 80 MILLIGRAM(S): 80 TABLET, CHEWABLE ORAL at 05:33

## 2020-09-07 RX ADMIN — SENNA PLUS 1 TABLET(S): 8.6 TABLET ORAL at 11:05

## 2020-09-07 RX ADMIN — PANTOPRAZOLE SODIUM 40 MILLIGRAM(S): 20 TABLET, DELAYED RELEASE ORAL at 11:05

## 2020-09-07 RX ADMIN — SIMETHICONE 80 MILLIGRAM(S): 80 TABLET, CHEWABLE ORAL at 17:28

## 2020-09-07 RX ADMIN — SIMETHICONE 80 MILLIGRAM(S): 80 TABLET, CHEWABLE ORAL at 11:05

## 2020-09-07 NOTE — PROGRESS NOTE ADULT - ATTENDING COMMENTS
Patient seen and examined with surgery team on rounds and discussed management plans with patient. NG out started on po liquids. abd benign. Management per gyn.
Pt seen  case reviewed  all questions answered
Patient seen  Agree w/ note as above  Consider gastritis/ulcer given symptoms more likely than obstruction, awaiting GI consult

## 2020-09-07 NOTE — PROGRESS NOTE ADULT - ASSESSMENT
46yo P1, s/p PAULIE-BS for 15cm fibroid and symptomatic anemia, POD19, admitted with suspected small bowel obstruction, course more consistent with ileus, now tolerating     -Regular diet  -DVT ppx: SCDs  -UA and urine culture NEGATIVE for possible pyelonephritis noted on CT Abd/pelvis   -pain management prn  -IV tylenol for pain management  -Avoid narcotics  -Anticipate d/c home today    Dr. Ewing aware. To be discussed with Dr. Molina 44yo P1, s/p PAULIE-BS for 15cm fibroid and symptomatic anemia, POD19, admitted with suspected small bowel obstruction, course more consistent with gastritis/ gastric ulcer, now doing well,     -Regular diet  -GI consult for rule out upper GI pathology  -DVT ppx: SCDs  -UA and urine culture NEGATIVE for possible pyelonephritis noted on CT Abd/pelvis   -pain management prn  -IV tylenol for pain management  -Avoid narcotics  -Anticipate d/c home today    Dr. Ewing aware. To be discussed with Dr. Molina 46yo P1, s/p PAULIE-BS for 15cm fibroid and symptomatic anemia, POD19, admitted with suspected small bowel obstruction vs ileus, course more consistent with gastritis/ gastric ulcer, now doing well    -Regular diet/bland food  -GI consult for rule out upper GI pathology  -GI prophylaxis  -DVT ppx: SCDs  -UA and urine culture NEGATIVE for possible pyelonephritis noted on CT Abd/pelvis   -pain management prn  -IV tylenol for pain management  -Avoid narcotics  -Anticipate d/c home today    Dr. Ewing aware. To be discussed with Dr. Molina

## 2020-09-07 NOTE — DISCHARGE NOTE PROVIDER - NSDCFUADDINST_GEN_ALL_CORE_FT
-Follow up with GI clinic on Friday.  GYN team will schedule an appointment for you and will call you with time and address. It will be Friday in the afternoon.

## 2020-09-07 NOTE — DISCHARGE NOTE PROVIDER - NSDCFUSCHEDAPPT_GEN_ALL_CORE_FT
RASHI SCHAFER ; 10/05/2020 ; MATEO CARRASCO [facility] RASHI SCHAFER ; 10/05/2020 ; MATEO CARRASCO [address] [facility] RASHI SCHAFER ; 10/05/2020 ; MATEO CARRASCO [address] [facility] RASHI SCHAFER ; 10/05/2020 ; MATEO CARRASCO [facility]

## 2020-09-07 NOTE — DISCHARGE NOTE PROVIDER - NSDCMRMEDTOKEN_GEN_ALL_CORE_FT
acetaminophen 325 mg oral tablet: 2 tab(s) orally every 6 hours, As needed, Moderate Pain (4 - 6)  ibuprofen 600 mg oral tablet: 1 tab(s) orally every 6 hours, As needed, Mild Pain (1 - 3)  oxyCODONE 5 mg oral tablet: 1 tab(s) orally every 6 hours MDD:max of 4 pills per day  Pepcid 40 mg oral tablet: 1 tab(s) orally once a day   senna oral tablet: 1 tab(s) orally once a day acetaminophen 325 mg oral tablet: 2 tab(s) orally every 6 hours, As needed, Moderate Pain (4 - 6)  ibuprofen 600 mg oral tablet: 1 tab(s) orally every 6 hours, As needed, Mild Pain (1 - 3)  oxyCODONE 5 mg oral tablet: 1 tab(s) orally every 6 hours MDD:max of 4 pills per day  Protonix 20 mg oral delayed release tablet: 1 tab(s) orally 2 times a day   senna oral tablet: 1 tab(s) orally once a day

## 2020-09-07 NOTE — DISCHARGE NOTE PROVIDER - HOSPITAL COURSE
"44yo P1 POD#16 s/p PAULIE-BS for 15cm fibroid and symptomatic anemia presents to ED for several episodes of vomiting and generalized abdominal pain. Pt reports since 9/1/2020 she has not had a bowel movement. Pt describes abdominal pain as cramping in nature, non-radiating, rated 4/10 in intensity, not relieved with motrin or tylenol.  Pt also reports four episodes of NBNB vomiting since last night and inability to tolerate PO. Pt reports each episode of vomiting occurs after PO intake. Pt reports last attempted PO intake at 1500.     Pt recently admitted on 8/29/2020 to 09/01/2020 for several episodes of NBNB vomiting associated with abdominal pain and diarrhea. Pt found to be taking Immodium during that admission. Immodium has been discontinued since last admission. Pt has not taken any Immodium since 8/28/2020. During the most recent admission, pt was conservatively treated with NPO, IVF hydration, was tolerating regular diet and met all milestones for discharge.        Ddx for admission: SBO vs ileus        Hospital Course    NGT was placed on admission by the ED team. Patient was admitted to GYN service with comanagement with surgery.    Patient had not has any vomiting episodes since presentation to the emergency room, denies nausea. She is passing flatus. NG tube output has been 0 to scant for the past 24 hours.     Upon discussion with Dr. Molina and the surgical team, we agreed that given minimal output and no vomiting, it is safe to remove the NGT and cautiously advance her diet        Patient had been passing flatus throughout her admission and diet was advanced slowly and now she's tolerating regular diet        Labs        9/4 9.46>12.6/38.9<437, 139/4.0/100/26/11/0.7<122, AST/ALT 17/11, lipase 9, lactate 0.8, coags 12.5/1.09/31.5    9/5 7.48>11.6/37.2<377, 140/4.1/103/26/10/0.7<109, AST/ALT 18/11, Mag 2, UA large kets/small blood        9/6: 5.39>10.1/31.8<314, 137/3.7/100/23/ 11/0.6<64, Mag 1.7, Phos 2.4             UA and urine culture obtained to rule out pyelonephritis seen on imaging, both negative        imaging        9/4 CTAbd: LOWER CHEST: Dependent atelectatic changes. HEPATOBILIARY: Unremarkable. SPLEEN: Unremarkable. PANCREAS: Unremarkable. ADRENAL GLANDS: Unremarkable. KIDNEYS: Several peripherally located wedge-shaped enhancement defects in the right kidney suggesting the possibility of pyelonephritis.. No hydronephrosis. Subcentimeter hypodensities, too small to characterize. ABDOMINOPELVIC NODES: No abdominal or pelvic lymphadenopathy. PELVIC ORGANS: Status post hysterectomy. 3 cm left adnexal cyst PERITONEUM/MESENTERY/BOWEL: Unremarkable appendix. Multiple dilated loops of small bowel, measuring up to  3.5 cm, with decompressed distal loops of small and large bowel. Small amount of fluid in the cul-de-sac. No evidence of pneumoperitoneum. BONES/SOFT TISSUES: Postsurgical appearance anterior abdominal soft tissues.    Obstructive series: partial SBO            Discharged home on HD 3 "44yo P1 POD#16 s/p PAULIE-BS for 15cm fibroid and symptomatic anemia presents to ED for several episodes of vomiting and generalized abdominal pain. Pt reports since 9/1/2020 she has not had a bowel movement. Pt describes abdominal pain as cramping in nature, non-radiating, rated 4/10 in intensity, not relieved with motrin or tylenol.  Pt also reports four episodes of NBNB vomiting since last night and inability to tolerate PO. Pt reports each episode of vomiting occurs after PO intake. Pt reports last attempted PO intake at 1500.     Pt recently admitted on 8/29/2020 to 09/01/2020 for several episodes of NBNB vomiting associated with abdominal pain and diarrhea. Pt found to be taking Immodium during that admission. Immodium has been discontinued since last admission. Pt has not taken any Immodium since 8/28/2020. During the most recent admission, pt was conservatively treated with NPO, IVF hydration, was tolerating regular diet and met all milestones for discharge.        Ddx for admission: SBO vs ileus        Hospital Course    NGT was placed on admission by the ED team. Patient was admitted to GYN service with comanagement with surgery.    Patient had not has any vomiting episodes since presentation to the emergency room, denies nausea. She is passing flatus. NG tube output has been 0 to scant for the past 24 hours.     Upon discussion with Dr. Molina and the surgical team, we agreed that given minimal output and no vomiting, it is safe to remove the NGT and cautiously advance her diet        Patient had been passing flatus throughout her admission and diet was advanced slowly and now she's tolerating regular diet.        -GI was consulted for possible gastritis vs gastric ulcer. GYN team spoke to GI fellow who expressed that management would likely be outpatient. Patient was agreeable to outpatient follow up.        Labs        9/4 9.46>12.6/38.9<437, 139/4.0/100/26/11/0.7<122, AST/ALT 17/11, lipase 9, lactate 0.8, coags 12.5/1.09/31.5    9/5 7.48>11.6/37.2<377, 140/4.1/103/26/10/0.7<109, AST/ALT 18/11, Mag 2, UA large kets/small blood        9/6: 5.39>10.1/31.8<314, 137/3.7/100/23/ 11/0.6<64, Mag 1.7, Phos 2.4             UA and urine culture obtained to rule out pyelonephritis seen on imaging, both negative        imaging        9/4 CTAbd: LOWER CHEST: Dependent atelectatic changes. HEPATOBILIARY: Unremarkable. SPLEEN: Unremarkable. PANCREAS: Unremarkable. ADRENAL GLANDS: Unremarkable. KIDNEYS: Several peripherally located wedge-shaped enhancement defects in the right kidney suggesting the possibility of pyelonephritis.. No hydronephrosis. Subcentimeter hypodensities, too small to characterize. ABDOMINOPELVIC NODES: No abdominal or pelvic lymphadenopathy. PELVIC ORGANS: Status post hysterectomy. 3 cm left adnexal cyst PERITONEUM/MESENTERY/BOWEL: Unremarkable appendix. Multiple dilated loops of small bowel, measuring up to  3.5 cm, with decompressed distal loops of small and large bowel. Small amount of fluid in the cul-de-sac. No evidence of pneumoperitoneum. BONES/SOFT TISSUES: Postsurgical appearance anterior abdominal soft tissues.    Obstructive series: partial SBO            Discharged home on HD 3

## 2020-09-07 NOTE — PROGRESS NOTE ADULT - SUBJECTIVE AND OBJECTIVE BOX
PGY3 Note    Pt seen and examined at bedside. No overnight events, no new complaints. Recovering well, tolerating regular diet, passing gas, ambulating without difficulty. Had a bowel movement overnight. Denies N/V, fevers, chills, chest pain, SOB, extremity pain or swelling.      PHYSICAL EXAM:  Vital Signs Last 24 Hrs  T(F): 97.8 (07 Sep 2020 05:53), Max: 97.9 (06 Sep 2020 12:30)  HR: 80 (06 Sep 2020 20:22) (67 - 80)  BP: 103/56 (07 Sep 2020 05:53) (103/56 - 104/70)  RR: 18 (07 Sep 2020 05:53) (18 - 18)    General Appearance: NAD, AA0x3  Cardiac: RRR  Pulmonary: CTAB  Abdomen: BS +, soft, no tenderness, nondistended, no rebound, guarding, or rigidity. Pos BS.  Incision: clean, dry and intact; well healed midline vertical  Extremities: no swelling or calf tenderness    LABS:                                  10.1   5.39  )-----------( 314      ( 06 Sep 2020 07:10 )             31.8                         11.6   7.48  )-----------( 377      ( 05 Sep 2020 06:43 )             37.2                         12.6   9.46  )-----------( 437      ( 04 Sep 2020 16:35 )             38.9   09-06 @ 07:10    137  |  100  |  11  ----------------------------<  64  3.7   |  23  |  0.6    09-05 @ 06:43    140  |  103  |  10  ----------------------------<  109  4.1   |  26  |  0.7    09-04 @ 16:35    139  |  100  |  11  ----------------------------<  122  4.0   |  26  |  0.7      Phos  2.4     09-06 @ 07:10  Phos  3.7     09-05 @ 06:43  Mg     1.7     09-06 @ 07:10  Mg     2.0     09-05 @ 06:43    TPro  6.2  /  Alb  3.6  /  TBili  0.6  /  DBili  x   /  AST  18  /  ALT  11  /  AlkPhos  61  09-05 @ 06:43  TPro  7.2  /  Alb  4.3  /  TBili  0.4  /  DBili  <0.2  /  AST  17  /  ALT  11  /  AlkPhos  71  09-04 @ 16:35 PGY3 Note    Pt seen and examined at bedside. No overnight events, no new complaints. Recovering well, tolerating regular diet, passing gas, ambulating without difficulty. Had a bowel movement overnight. Denies N/V, fevers, chills, chest pain, SOB, extremity pain or swelling. Patient reports that she mainly eats milk and cheese, that she has pain that is mainly epigastric and after eating.      PHYSICAL EXAM:  Vital Signs Last 24 Hrs  T(F): 97.8 (07 Sep 2020 05:53), Max: 97.9 (06 Sep 2020 12:30)  HR: 80 (06 Sep 2020 20:22) (67 - 80)  BP: 103/56 (07 Sep 2020 05:53) (103/56 - 104/70)  RR: 18 (07 Sep 2020 05:53) (18 - 18)    General Appearance: NAD, AA0x3  Cardiac: RRR  Pulmonary: CTAB  Abdomen: BS +, soft, no tenderness, nondistended, no rebound, guarding, or rigidity. Pos BS.  Incision: clean, dry and intact; well healed midline vertical  Extremities: no swelling or calf tenderness    LABS:                                  10.1   5.39  )-----------( 314      ( 06 Sep 2020 07:10 )             31.8                         11.6   7.48  )-----------( 377      ( 05 Sep 2020 06:43 )             37.2                         12.6   9.46  )-----------( 437      ( 04 Sep 2020 16:35 )             38.9   09-06 @ 07:10    137  |  100  |  11  ----------------------------<  64  3.7   |  23  |  0.6    09-05 @ 06:43    140  |  103  |  10  ----------------------------<  109  4.1   |  26  |  0.7    09-04 @ 16:35    139  |  100  |  11  ----------------------------<  122  4.0   |  26  |  0.7      Phos  2.4     09-06 @ 07:10  Phos  3.7     09-05 @ 06:43  Mg     1.7     09-06 @ 07:10  Mg     2.0     09-05 @ 06:43    TPro  6.2  /  Alb  3.6  /  TBili  0.6  /  DBili  x   /  AST  18  /  ALT  11  /  AlkPhos  61  09-05 @ 06:43  TPro  7.2  /  Alb  4.3  /  TBili  0.4  /  DBili  <0.2  /  AST  17  /  ALT  11  /  AlkPhos  71  09-04 @ 16:35

## 2020-09-07 NOTE — DISCHARGE NOTE PROVIDER - CARE PROVIDER_API CALL
Kehinde Molina  OBSTETRICS AND GYNECOLOGY  [address] [place] [address]  Phone: [phone]  Fax: [phone]  Follow Up Time: 1 week

## 2020-09-07 NOTE — DISCHARGE NOTE NURSING/CASE MANAGEMENT/SOCIAL WORK - PATIENT PORTAL LINK FT
You can access the FollowMyHealth Patient Portal offered by [facility] by registering at the following website: [email or web address]. By joining [facility]’s FollowMyHealth portal, you will also be able to view your health information using other applications (apps) compatible with our system.

## 2020-09-09 PROBLEM — D25.9 LEIOMYOMA OF UTERUS, UNSPECIFIED: Chronic | Status: ACTIVE | Noted: 2020-09-04

## 2020-09-11 ENCOUNTER — APPOINTMENT (OUTPATIENT)
Dept: GASTROENTEROLOGY | Facility: CLINIC | Age: 45
End: 2020-09-11
Payer: MEDICAID

## 2020-09-11 ENCOUNTER — OUTPATIENT (OUTPATIENT)
Dept: OUTPATIENT SERVICES | Facility: HOSPITAL | Age: 45
LOS: 1 days | Discharge: HOME | End: 2020-09-11

## 2020-09-11 VITALS
BODY MASS INDEX: 26.68 KG/M2 | HEART RATE: 93 BPM | DIASTOLIC BLOOD PRESSURE: 80 MMHG | SYSTOLIC BLOOD PRESSURE: 110 MMHG | WEIGHT: 145 LBS | HEIGHT: 62 IN | TEMPERATURE: 96.5 F

## 2020-09-11 DIAGNOSIS — Z90.710 ACQUIRED ABSENCE OF BOTH CERVIX AND UTERUS: Chronic | ICD-10-CM

## 2020-09-11 DIAGNOSIS — Z90.79 ACQUIRED ABSENCE OF OTHER GENITAL ORGAN(S): Chronic | ICD-10-CM

## 2020-09-11 PROCEDURE — 99203 OFFICE O/P NEW LOW 30 MIN: CPT

## 2020-09-11 RX ORDER — LACTOSE-REDUCED FOOD
LIQUID (ML) ORAL
Qty: 60 | Refills: 0 | Status: ACTIVE | COMMUNITY
Start: 2020-09-11 | End: 1900-01-01

## 2020-09-11 NOTE — ASSESSMENT
[FreeTextEntry1] : 45 year old female with a PMhx of uterine leiomyoma s/p abdominal hysterectomy and bilateral salpingo-oophorectomy on 8/19/2020 (open surgery)\par As per the patient, she was told to see a gastroenterologist because she was noted to have "inflammation in her bowel" during surgery, \par Since her surgery she has been unable to tolerate solid foods and has been having 3-4 episodes of watery diarrhea \par \par # chronic diarrhea- likely infectious etiology \par - will order stool studies, ova, parasites\par - c.diff colitis, stool PCR\par - TSH\par - ESR/CRP\par - transglutaminase \par - will schedule patient for colonoscopy(should be performed after Oct 19 after stool studies are resulted (and any infection identified has been treated)\par \par # inability to tolerate solid foods\par Crampy abdominal pain with eating solid foods\par - could be secondary to infectious colitis\par - recommend drinking ensure or boostwhile she is unable to tolerate solid foods\par \par Follow up in 3 months\par \par \par

## 2020-09-11 NOTE — HISTORY OF PRESENT ILLNESS
[Heartburn] : denies heartburn [Vomiting] : denies vomiting [Constipation] : denies constipation [Yellow Skin Or Eyes (Jaundice)] : denies jaundice [Abdominal Swelling] : denies abdominal swelling [Nausea] : nausea [Rectal Pain] : denies rectal pain [Diarrhea] : diarrhea [Abdominal Pain] : abdominal pain [Abdominal Surgery] : abdominal surgery [Hiatus Hernia] : no hiatus hernia [GERD] : no gastroesophageal reflux disease [Pancreatitis] : no pancreatitis [Peptic Ulcer Disease] : no peptic ulcer disease [Cholelithiasis] : no cholelithiasis [Kidney Stone] : no kidney stone [Inflammatory Bowel Disease] : no inflammatory bowel disease [Irritable Bowel Syndrome] : no irritable bowel syndrome [Diverticulitis] : no diverticulitis [Malignancy] : no malignancy [Alcohol Abuse] : no alcohol abuse [Cholecystectomy] : no cholecystectomy [Appendectomy] : no appendectomy [de-identified] : 45 year old female with a PMhx of uterine leiomyoma s/p abdominal hysterectomy and bilateral salpingo-oophorectomy on 8/19/2020 (open surgery)\par As per the patient, she was told to see a gastroenterologist because she was noted to have "inflammation in her bowel" during surgery\par Since her surgery she reports abdominal pain. \par Prior to surgery she did not have any gastrointestinal issues. \par Patient is still eating liquid diet since her surgery- has not been able to tolerate solid diet secondary to crampy pain. \par Currently she reports diarrhea, 3-4x/day watery stool since her surgery. \par She reports nausea, but no vomiting. \par \par Patient has never had a colonoscopy\par Family history: negative for any GI malignancies\par

## 2020-09-11 NOTE — PHYSICAL EXAM
[General Appearance - Alert] : alert [General Appearance - Well Nourished] : well nourished [Apical Impulse] : the apical impulse was normal [Edema] : there was no peripheral edema [Bowel Sounds] : normal bowel sounds [Abdomen Soft] : soft [No CVA Tenderness] : no ~M costovertebral angle tenderness [Oriented To Time, Place, And Person] : oriented to person, place, and time [Sclera] : the sclera and conjunctiva were normal [Neck Appearance] : the appearance of the neck was normal [Jugular Venous Distention Increased] : there was no jugular-venous distention [Abnormal Walk] : normal gait [Musculoskeletal - Swelling] : no joint swelling seen [] : no rash [Skin Lesions] : no skin lesions [FreeTextEntry1] : mild tenderness, abdominal binder

## 2020-09-11 NOTE — HISTORY OF PRESENT ILLNESS
[Heartburn] : denies heartburn [Vomiting] : denies vomiting [Constipation] : denies constipation [Yellow Skin Or Eyes (Jaundice)] : denies jaundice [Abdominal Swelling] : denies abdominal swelling [Rectal Pain] : denies rectal pain [Nausea] : nausea [Diarrhea] : diarrhea [Abdominal Pain] : abdominal pain [Abdominal Surgery] : abdominal surgery [GERD] : no gastroesophageal reflux disease [Hiatus Hernia] : no hiatus hernia [Peptic Ulcer Disease] : no peptic ulcer disease [Pancreatitis] : no pancreatitis [Cholelithiasis] : no cholelithiasis [Kidney Stone] : no kidney stone [Inflammatory Bowel Disease] : no inflammatory bowel disease [Irritable Bowel Syndrome] : no irritable bowel syndrome [Diverticulitis] : no diverticulitis [Malignancy] : no malignancy [Alcohol Abuse] : no alcohol abuse [Appendectomy] : no appendectomy [Cholecystectomy] : no cholecystectomy [de-identified] : 45 year old female with a PMhx of uterine leiomyoma s/p abdominal hysterectomy and bilateral salpingo-oophorectomy on 8/19/2020 (open surgery)\par As per the patient, she was told to see a gastroenterologist because she was noted to have "inflammation in her bowel" during surgery\par Since her surgery she reports abdominal pain. \par Prior to surgery she did not have any gastrointestinal issues. \par Patient is still eating liquid diet since her surgery- has not been able to tolerate solid diet secondary to crampy pain. \par Currently she reports diarrhea, 3-4x/day watery stool since her surgery. \par She reports nausea, but no vomiting. \par \par Patient has never had a colonoscopy\par Family history: negative for any GI malignancies\par

## 2020-09-11 NOTE — PHYSICAL EXAM
[General Appearance - Alert] : alert [Apical Impulse] : the apical impulse was normal [General Appearance - Well Nourished] : well nourished [Edema] : there was no peripheral edema [Bowel Sounds] : normal bowel sounds [Abdomen Soft] : soft [No CVA Tenderness] : no ~M costovertebral angle tenderness [Oriented To Time, Place, And Person] : oriented to person, place, and time [Sclera] : the sclera and conjunctiva were normal [Neck Appearance] : the appearance of the neck was normal [Jugular Venous Distention Increased] : there was no jugular-venous distention [Abnormal Walk] : normal gait [Musculoskeletal - Swelling] : no joint swelling seen [] : no rash [Skin Lesions] : no skin lesions [FreeTextEntry1] : mild tenderness, abdominal binder

## 2020-09-11 NOTE — REVIEW OF SYSTEMS
[Abdominal Pain] : abdominal pain [Diarrhea] : diarrhea [Fever] : no fever [Chills] : no chills [Chest Pain] : no chest pain [Shortness Of Breath] : no shortness of breath [Cough] : no cough [Vomiting] : no vomiting [Melena] : no melena [Dysuria] : no dysuria [Incontinence] : no incontinence

## 2020-09-14 ENCOUNTER — INPATIENT (INPATIENT)
Facility: HOSPITAL | Age: 45
LOS: 24 days | Discharge: NURSING, REHAB OR LONG-TERM CARE | End: 2020-10-09
Attending: STUDENT IN AN ORGANIZED HEALTH CARE EDUCATION/TRAINING PROGRAM | Admitting: STUDENT IN AN ORGANIZED HEALTH CARE EDUCATION/TRAINING PROGRAM
Payer: MEDICAID

## 2020-09-14 ENCOUNTER — APPOINTMENT (OUTPATIENT)
Dept: OBGYN | Facility: CLINIC | Age: 45
End: 2020-09-14
Payer: MEDICAID

## 2020-09-14 ENCOUNTER — OUTPATIENT (OUTPATIENT)
Dept: OUTPATIENT SERVICES | Facility: HOSPITAL | Age: 45
LOS: 1 days | Discharge: HOME | End: 2020-09-14

## 2020-09-14 VITALS — SYSTOLIC BLOOD PRESSURE: 100 MMHG | DIASTOLIC BLOOD PRESSURE: 70 MMHG

## 2020-09-14 VITALS
TEMPERATURE: 98 F | OXYGEN SATURATION: 100 % | HEIGHT: 63 IN | RESPIRATION RATE: 18 BRPM | HEART RATE: 115 BPM | SYSTOLIC BLOOD PRESSURE: 107 MMHG | DIASTOLIC BLOOD PRESSURE: 73 MMHG

## 2020-09-14 DIAGNOSIS — Z90.710 ACQUIRED ABSENCE OF BOTH CERVIX AND UTERUS: Chronic | ICD-10-CM

## 2020-09-14 DIAGNOSIS — Z90.79 ACQUIRED ABSENCE OF OTHER GENITAL ORGAN(S): Chronic | ICD-10-CM

## 2020-09-14 LAB
ALBUMIN SERPL ELPH-MCNC: 4.1 G/DL — SIGNIFICANT CHANGE UP (ref 3.5–5.2)
ALP SERPL-CCNC: 82 U/L — SIGNIFICANT CHANGE UP (ref 30–115)
ALT FLD-CCNC: 13 U/L — SIGNIFICANT CHANGE UP (ref 0–41)
ANION GAP SERPL CALC-SCNC: 14 MMOL/L — SIGNIFICANT CHANGE UP (ref 7–14)
APTT BLD: 33.6 SEC — SIGNIFICANT CHANGE UP (ref 27–39.2)
AST SERPL-CCNC: 15 U/L — SIGNIFICANT CHANGE UP (ref 0–41)
BASOPHILS # BLD AUTO: 0.04 K/UL — SIGNIFICANT CHANGE UP (ref 0–0.2)
BASOPHILS NFR BLD AUTO: 0.3 % — SIGNIFICANT CHANGE UP (ref 0–1)
BILIRUB SERPL-MCNC: 0.4 MG/DL — SIGNIFICANT CHANGE UP (ref 0.2–1.2)
BLD GP AB SCN SERPL QL: SIGNIFICANT CHANGE UP
BUN SERPL-MCNC: 12 MG/DL — SIGNIFICANT CHANGE UP (ref 10–20)
CALCIUM SERPL-MCNC: 9.3 MG/DL — SIGNIFICANT CHANGE UP (ref 8.5–10.1)
CHLORIDE SERPL-SCNC: 96 MMOL/L — LOW (ref 98–110)
CO2 SERPL-SCNC: 25 MMOL/L — SIGNIFICANT CHANGE UP (ref 17–32)
CREAT SERPL-MCNC: 0.7 MG/DL — SIGNIFICANT CHANGE UP (ref 0.7–1.5)
EOSINOPHIL # BLD AUTO: 0.09 K/UL — SIGNIFICANT CHANGE UP (ref 0–0.7)
EOSINOPHIL NFR BLD AUTO: 0.8 % — SIGNIFICANT CHANGE UP (ref 0–8)
GLUCOSE SERPL-MCNC: 106 MG/DL — HIGH (ref 70–99)
HCG SERPL QL: NEGATIVE — SIGNIFICANT CHANGE UP
HCT VFR BLD CALC: 40 % — SIGNIFICANT CHANGE UP (ref 37–47)
HGB BLD-MCNC: 13.1 G/DL — SIGNIFICANT CHANGE UP (ref 12–16)
IMM GRANULOCYTES NFR BLD AUTO: 0.4 % — HIGH (ref 0.1–0.3)
INR BLD: 1.1 RATIO — SIGNIFICANT CHANGE UP (ref 0.65–1.3)
LACTATE SERPL-SCNC: 1.1 MMOL/L — SIGNIFICANT CHANGE UP (ref 0.7–2)
LIDOCAIN IGE QN: 62 U/L — HIGH (ref 7–60)
LYMPHOCYTES # BLD AUTO: 1.42 K/UL — SIGNIFICANT CHANGE UP (ref 1.2–3.4)
LYMPHOCYTES # BLD AUTO: 12.2 % — LOW (ref 20.5–51.1)
MCHC RBC-ENTMCNC: 27.2 PG — SIGNIFICANT CHANGE UP (ref 27–31)
MCHC RBC-ENTMCNC: 32.8 G/DL — SIGNIFICANT CHANGE UP (ref 32–37)
MCV RBC AUTO: 83 FL — SIGNIFICANT CHANGE UP (ref 81–99)
MONOCYTES # BLD AUTO: 0.64 K/UL — HIGH (ref 0.1–0.6)
MONOCYTES NFR BLD AUTO: 5.5 % — SIGNIFICANT CHANGE UP (ref 1.7–9.3)
NEUTROPHILS # BLD AUTO: 9.38 K/UL — HIGH (ref 1.4–6.5)
NEUTROPHILS NFR BLD AUTO: 80.8 % — HIGH (ref 42.2–75.2)
NRBC # BLD: 0 /100 WBCS — SIGNIFICANT CHANGE UP (ref 0–0)
PLATELET # BLD AUTO: 417 K/UL — HIGH (ref 130–400)
POTASSIUM SERPL-MCNC: 4.2 MMOL/L — SIGNIFICANT CHANGE UP (ref 3.5–5)
POTASSIUM SERPL-SCNC: 4.2 MMOL/L — SIGNIFICANT CHANGE UP (ref 3.5–5)
PROT SERPL-MCNC: 7 G/DL — SIGNIFICANT CHANGE UP (ref 6–8)
PROTHROM AB SERPL-ACNC: 12.7 SEC — SIGNIFICANT CHANGE UP (ref 9.95–12.87)
RAPID RVP RESULT: SIGNIFICANT CHANGE UP
RBC # BLD: 4.82 M/UL — SIGNIFICANT CHANGE UP (ref 4.2–5.4)
RBC # FLD: 13.3 % — SIGNIFICANT CHANGE UP (ref 11.5–14.5)
SARS-COV-2 RNA SPEC QL NAA+PROBE: SIGNIFICANT CHANGE UP
SODIUM SERPL-SCNC: 135 MMOL/L — SIGNIFICANT CHANGE UP (ref 135–146)
WBC # BLD: 11.62 K/UL — HIGH (ref 4.8–10.8)
WBC # FLD AUTO: 11.62 K/UL — HIGH (ref 4.8–10.8)

## 2020-09-14 PROCEDURE — 99284 EMERGENCY DEPT VISIT MOD MDM: CPT

## 2020-09-14 PROCEDURE — 71045 X-RAY EXAM CHEST 1 VIEW: CPT | Mod: 26,59

## 2020-09-14 PROCEDURE — 93010 ELECTROCARDIOGRAM REPORT: CPT

## 2020-09-14 PROCEDURE — 99285 EMERGENCY DEPT VISIT HI MDM: CPT

## 2020-09-14 PROCEDURE — 99214 OFFICE O/P EST MOD 30 MIN: CPT | Mod: 24

## 2020-09-14 PROCEDURE — 74177 CT ABD & PELVIS W/CONTRAST: CPT | Mod: 26

## 2020-09-14 PROCEDURE — 11005 DBRDMT SKIN ABDOMINAL WALL: CPT | Mod: 78

## 2020-09-14 PROCEDURE — 74022 RADEX COMPL AQT ABD SERIES: CPT | Mod: 26

## 2020-09-14 RX ORDER — PANTOPRAZOLE SODIUM 20 MG/1
40 TABLET, DELAYED RELEASE ORAL ONCE
Refills: 0 | Status: DISCONTINUED | OUTPATIENT
Start: 2020-09-14 | End: 2020-09-14

## 2020-09-14 RX ORDER — HEPARIN SODIUM 5000 [USP'U]/ML
5000 INJECTION INTRAVENOUS; SUBCUTANEOUS EVERY 8 HOURS
Refills: 0 | Status: DISCONTINUED | OUTPATIENT
Start: 2020-09-14 | End: 2020-09-15

## 2020-09-14 RX ORDER — IOHEXOL 300 MG/ML
30 INJECTION, SOLUTION INTRAVENOUS ONCE
Refills: 0 | Status: COMPLETED | OUTPATIENT
Start: 2020-09-14 | End: 2020-09-14

## 2020-09-14 RX ORDER — MORPHINE SULFATE 50 MG/1
4 CAPSULE, EXTENDED RELEASE ORAL ONCE
Refills: 0 | Status: DISCONTINUED | OUTPATIENT
Start: 2020-09-14 | End: 2020-09-14

## 2020-09-14 RX ORDER — PANTOPRAZOLE SODIUM 20 MG/1
40 TABLET, DELAYED RELEASE ORAL DAILY
Refills: 0 | Status: DISCONTINUED | OUTPATIENT
Start: 2020-09-14 | End: 2020-09-15

## 2020-09-14 RX ORDER — ONDANSETRON 8 MG/1
4 TABLET, FILM COATED ORAL ONCE
Refills: 0 | Status: COMPLETED | OUTPATIENT
Start: 2020-09-14 | End: 2020-09-14

## 2020-09-14 RX ORDER — SODIUM CHLORIDE 9 MG/ML
1000 INJECTION INTRAMUSCULAR; INTRAVENOUS; SUBCUTANEOUS ONCE
Refills: 0 | Status: COMPLETED | OUTPATIENT
Start: 2020-09-14 | End: 2020-09-14

## 2020-09-14 RX ADMIN — ONDANSETRON 4 MILLIGRAM(S): 8 TABLET, FILM COATED ORAL at 13:50

## 2020-09-14 RX ADMIN — IOHEXOL 30 MILLILITER(S): 300 INJECTION, SOLUTION INTRAVENOUS at 13:59

## 2020-09-14 RX ADMIN — SODIUM CHLORIDE 2000 MILLILITER(S): 9 INJECTION INTRAMUSCULAR; INTRAVENOUS; SUBCUTANEOUS at 13:56

## 2020-09-14 RX ADMIN — MORPHINE SULFATE 4 MILLIGRAM(S): 50 CAPSULE, EXTENDED RELEASE ORAL at 13:51

## 2020-09-14 NOTE — H&P ADULT - HISTORY OF PRESENT ILLNESS
Patient is a 45F w/recent history of PAULIE/BSO on 8/19/20 for 15cm bleeding fibroid with readmission for abdominal pain and inability to tolerate PO on 8/30, found to have partial obstruction vs. ileus, resolved without operative intervention, patient was discharged. She represented on 9/4 with recurrent abdominal pain, consistent with partial SBO, NGT was placed and patient's symptoms improved with nonoperative management. She presents today with two days of abdominal pain with associated nausea and vomiting since the onset of pain. She reports having her last BM two days ago, passing flatus. In the ED she is afebrile, tachycardic to 115. On exam her abdomen is soft, nondistended, tender in the epigastrium. Labs significant for WBC of 11.6. Lactate 1.1.  CT scan demonstrated persistent findings of small bowel obstruction with a transition point in the lower anterior mid abdomen with mild wall thickening and hyperenhancement of the dilated small bowel loops, suspicious for infectious/inflammatory enteritis.

## 2020-09-14 NOTE — ED PROVIDER NOTE - PHYSICAL EXAMINATION
VITALS: Reviewed  CONSTITUTIONAL: well developed, well nourished, mildly in discomfort, speaking in full sentences, nontoxic appearing  SKIN: warm, dry, no rash  HEAD: normocephalic, atraumatic  EYES: PERRL, EOMI, no conjunctival erythema, sclera clear  ENT: patent airway, moist mucous membranes  NECK: supple, no masses  CV:  regular rate, regular rhythm, 2+ radial pulses bilaterally  RESP: no wheezes, no rales, no rhonchi, normal work of breathing  ABD: soft, distended, guarding, diffuse tenderness  MSK: normal ROM, no cyanosis, no edema  NEURO: alert, oriented x3  PSYCH: cooperative, appropriate

## 2020-09-14 NOTE — H&P ADULT - ASSESSMENT
45F w/recent history of PAULIE/BSO on 8/19/20 for 15cm bleeding fibroid complicated by multiple readmissions for partial SBO. Patient presents today with two days of abdominal pain, n/v with CT findings with persistent partial SBO with transition point located in the lower anterior mid abdomen, just deep to the abdominal wall.     Plan:   -Admission to surgical service  -Added on for diagnostic laparoscopy, possible exploratory laparotomy, possible bowel resection, and all indicated procedures   -NPO  -IVF resuscitation   -Pre-op labs and studies (type & screen, coags, EKG, CXR)   -NGT placed, keep to low continuous suction for decompression (f/u CXR for placement confirmation)  -Continue home medications   -HSQ for DVT ppx   -PTX for GI ppx   -Adequate pain control   -Plan discussed with Dr. Rubio

## 2020-09-14 NOTE — ED PROVIDER NOTE - OBJECTIVE STATEMENT
45Y F with PMH of PAULIE-BS 4 weeks ago complicated by SBO vs Ileus requiring admission recently presents with CC of abdominal distension. Patient sent in by Dr. Molina, seen in office few days ago, patient has been having abdominal distension for 3 days duration, associated with N/V. Denies fevers, chills, LBM 2 days ago. Dr. Molina would like CT abdomen and pelvis with IV and oral contrast.

## 2020-09-14 NOTE — ED PROVIDER NOTE - ATTENDING CONTRIBUTION TO CARE
45 F to ED with abd pain  s/p PAULIE-BSO 1mo ago, and + SBO treated non-operatively  no returns with 3d of worsening abd distention, N/V and pain.  AVSS, exam as noted, CTAB, RRR, abdomen  tender diffusely

## 2020-09-14 NOTE — CONSULT NOTE ADULT - ASSESSMENT
44yo P1, s/p PAULIE-BS for 15cm fibroid and symptomatic anemia, POD#26, suspected small bowel obstruction with transition point seen on CT today, on call to OR with gen surg, currently clinically stable.   -Management Reccs per Gen Surgery  -Recc. UA/Uclx with Catheterized specimen   -GYN to follow     Dr. Molina to be aware 46yo P1, s/p PAULIE-BS for 15cm fibroid and symptomatic anemia, POD#26, persistent partial SBO with transition point seen on CT today, on call to OR with gen surg, currently clinically stable.   -Management Reccs per Gen Surgery  -Recc. UA/Uclx with Catheterized specimen   -GYN to follow     Dr. Molina to be aware

## 2020-09-14 NOTE — H&P ADULT - ATTENDING COMMENTS
44yo female with PSHx PAULIE/BSO 8/19/2020 for fibroids presenting with worsening abdominal pain. She has been admitted multiple times since surgery for abdominal pain, nausea and vomiting, treated non-operatively. Each time, CT A/P demonstrates small bowel obstruction with transition point in lower abdomen. At this time, patient is tender, well-healing scar, +guarding. WBC 11.6. CT A/P consistent with previous imaging, including thickened and dilated small bowel loops. Admit, NPO, IV fluids, NG tube placement, pain control. Risks, benefits, and alternatives were explained the patient, including bleeding, infection, pain, and injury to neighboring structures. All questions were answered. Consent was obtained for diagnostic laparoscopy, possible bowel resection, possible open and all indicated procedures. Patient booked as ADD ON.

## 2020-09-14 NOTE — ED PROVIDER NOTE - NS ED ROS FT
Review of Systems:  CONSTITUTIONAL - No fever  SKIN - No rash  HEMATOLOGIC - No abnormal bleeding or bruising  RESPIRATORY - No shortness of breath, No cough  CARDIAC -No chest pain, No palpitations  - No dysuria, frequency, hematuria  MUSCULOSKELETAL - No joint paint, No swelling, No back pain  NEUROLOGIC - No numbness, No focal weakness  All other systems negative, unless specified in HPI

## 2020-09-14 NOTE — CONSULT NOTE ADULT - SUBJECTIVE AND OBJECTIVE BOX
Chief Complaint:    HPI:   44yo P1, s/p PAULIE-BS for 15cm fibroid and symptomatic anemia, now POD#26, with abdominal pain, nausea and vomiting. She reports the pain is up to 8/10 to 10/10, not alleviated by morphine or zofran. Reports minimal amount of PO intake at home, Reports vomiting today, last vomit upon arrival for the past 48 hours, unsure of amount. Last bowel movement , Last passed flatus last night. Denies fevers, chills, chest pain, shortness of breath, heavy vaginal bleeding. Seen today by Gynecologist, and was sent into the ED for further evaluation.     Pt recently admitted on 2020 to 2020 for several episodes of NBNB vomiting associated with abdominal pain and diarrhea Pt found to be taking Immodium during that admission. Immodium has been discontinued since last admission. Pt has not taken any Immodium since 2020. During the most recent admission, pt was conservatively treated with NPO, IVF hydration; refused NGT at that time. Patient was again admitted on - for abdominal pain, nausea and constipation, likely gastritis vs partial SBO.   Denies fevers or chills.  Minimal vaginal bleeding at this time, denies vaginal discharge.    Ob/Gyn History:                   LMP                    Cycle Length q30d   Obhx: FT  x1  Gynhx: H/o fibroid uterus s/p PAULIE/BS.  Otherwise denies cysts, abnormal pap smears, STDs.  Last Pap Smear - , wnl per patient  Denies the following: constitutional symptoms, visual symptoms, cardiovascular symptoms, respiratory symptoms, GI symptoms, musculoskeletal symptoms, skin symptoms, neurologic symptoms, hematologic symptoms, allergic symptoms, psychiatric symptoms  Except any pertinent positives listed.     Home Medications:  ibuprofen 600 mg oral tablet: 1 tab(s) orally every 6 hours, As needed, Mild Pain (1 - 3) (01 Sep 2020 06:26)    Allergies: No Known Allergies    PAST MEDICAL & SURGICAL HISTORY: Uterine fibroid    tatus post bilateral salpingectomy S/P PAULIE (total abdominal hysterectomy)    FAMILY HISTORY:No pertinent family history in first degree relative    SOCIAL HISTORY: Denies cigarette use, alcohol use, or illicit drug use    Vital Signs Last 24 Hrs  T(F): 97.7 (14 Sep 2020 11:42), Max: 97.7 (14 Sep 2020 11:42)  HR: 115 (14 Sep 2020 11:42) (115 - 115)  BP: 107/73 (14 Sep 2020 11:42) (107/73 - 107/73)  RR: 18 (14 Sep 2020 11:42) (18 - 18)  Height (cm): 160 (20 @ 11:42)    General Appearance - AAOx3, NAD  Heart - S1S2 regular rate and rhythm  Lung - CTA Bilaterally  Abdomen - Soft, moderate tenderness, minimal audible bowel sounds, moderate distention, no rebound, no rigidity, no guarding  LE: No LE edema or erythema   GYN/Pelvis: Deferred at this time     Meds:   (Floorstock) 2 each &lt;see task&gt; GiveOnce  (Floorstock) 1 each &lt;see task&gt; GiveOnce  (Floorstock) 1 each &lt;see task&gt; GiveOnce  iohexol 300 mG (iodine)/mL Oral Solution 30 milliLiter(s) Oral once  morphine  - Injectable 4 milliGRAM(s) IV Push Once  ondansetron Injectable 4 milliGRAM(s) IV Push once  sodium chloride 0.9% Bolus 1000 milliLiter(s) IV Bolus once    Height (cm): 160 (20 @ 11:42)    LABS:                        13.1   11.62 )-----------( 417      ( 14 Sep 2020 12:17 )             40.0         -14    135  |  96<L>  |  12  ----------------------------<  106<H>  4.2   |  25  |  0.7    Ca    9.3      14 Sep 2020 12:17    TPro  7.0  /  Alb  4.1  /  TBili  0.4  /  DBili  x   /  AST  15  /  ALT  13  /  AlkPhos  82  -14          RADIOLOGY & ADDITIONAL STUDIES:  LOWER CHEST: Subsegmental dependent atelectasis.    HEPATOBILIARY: Unremarkable.    SPLEEN: Unremarkable.    PANCREAS: Unremarkable.    ADRENAL GLANDS: Unremarkable.    KIDNEYS: Few ill-defined linear hypodense foci in the right kidney again noted, decreased since prior CT. No hydronephrosis. Few subcentimeter hypodense lesions in both kidneys, too small to characterize. Punctate nonobstructing left intrarenal calculus.    ABDOMINOPELVIC NODES: No adenopathy.    PELVIC ORGANS: Post hysterectomy. Left adnexal cyst measuring up to 3.4 cm.    PERITONEUM/MESENTERY/BOWEL: Multiple dilated, fluid-filled loops of small bowel seen throughout the abdomen, measuring up to 4 cm in diameter with a transition point in the lower anterior mid abdomen, just deep to the abdominal wall (series 3 image 69). Mild circumferential wall thickening and hyperenhancement of several dilated small bowel loops noted. No evidence for pneumatosis. No intraperitoneal free air. Small volume pelvic ascites. Colonic diverticulosis, without evidence for acute diverticulitis. Normal appendix.    BONES/SOFT TISSUES: Stable postsurgical changes in the anterior abdominal wall. Stable degenerative changes.    IMPRESSION:  1.  Since 2020, persistent findings of small bowel obstruction with a transition point in the lower anterior mid abdomen.  2.  Mild wall thickening and hyperenhancement of the dilated small bowel loops, suspicious for infectious/inflammatory enteritis.  3.  Ill-defined linear hypodense foci in the right kidney, decreased since prior CT, may represent sequela of pyelonephritis.

## 2020-09-14 NOTE — PHYSICAL EXAM
[Soft] : soft [FreeTextEntry7] : mildly distended. No rebound. No guarding. No masses. Incision is healed well. [FreeTextEntry2] : Appears ill [Labia Majora] : normal [Labia Minora] : normal [Normal] : normal [Absent] : absent [Uterine Adnexae] : non-palpable

## 2020-09-14 NOTE — HISTORY OF PRESENT ILLNESS
[FreeTextEntry1] : 45 s/p  PAULIE/BS 4 weeks ago for very large cervical fibroid. Surgery complicated with two readmissions for Ileus and bowel obstruction. Patient was discharged about a week ago after being treated with an NG tube. Patient reports that she felt better for a day or two after returning home but that the pain and distention returned. She has been eating light food including oatmeal. She is not taking any narcotics and is using Tylenol, ibuprofen, and Pepcid. Patient reports that she lost 10 lbs since the surgery and is unable to eat. She has been vomiting almost every day for the past 4-5 days. She has not had a bowel movement in the past 2-3 days. Prior to that - she was having diarrhea.\par

## 2020-09-14 NOTE — PLAN
[FreeTextEntry1] : I counselled that patient at length with her brother in the room.\par I referred her to the ER for CT scan with IV/PO contrast. \par Patient will likely require an NGT and admission again.\par

## 2020-09-14 NOTE — ED PROVIDER NOTE - PROGRESS NOTE DETAILS
Patient sent in by Dr. Molina for CTAP IV/PO contrast for likely re-current SBO vs Ileus. s/o to Nneka f/u and dispo Nneka:  Pt signed out to me by Dr. Soriano. 44 yo F recent PAULIE/BSO 8/9, afterwards had an obstruction vs ileus that resolved with no surgery. Returned 9/4 with partial SBO and was treated with NGT. P/w 2 days of abd pain, nausea, vomiting and no BM. CT showing small bowel obstruction. Pt seen by surgery and GYN. NGT placed. Admitted for diagnostic and exploratory laparoctomy. Nneka:  Pt signed out to me by Dr. Soriano. 46 yo F recent PAULIE/BSO 8/9, afterwards had an obstruction vs ileus that resolved with no surgery. Returned 9/4 with partial SBO and was treated with NGT. P/w 2 days of abd pain, nausea, vomiting and no BM. CT showing small bowel obstruction. Pt seen by surgery and GYN. NGT placed. Admitted for diagnostic and exploratory laparotomy.

## 2020-09-14 NOTE — H&P ADULT - NSHPLABSRESULTS_GEN_ALL_CORE
LABS:                         13.1   11.62 )-----------( 417      ( 14 Sep 2020 12:17 )             40.0       Auto Neutrophil %: 80.8 % (09-14-20 @ 12:17)  Auto Immature Granulocyte %: 0.4 % (09-14-20 @ 12:17)    09-14    135  |  96<L>  |  12  ----------------------------<  106<H>  4.2   |  25  |  0.7    Calcium, Total Serum: 9.3 mg/dL (09-14-20 @ 12:17)    LFTs:             7.0  | 0.4  | 15       ------------------[82      ( 14 Sep 2020 12:17 )  4.1  | x    | 13          Lipase:62       Lactate, Blood: 1.1 mmol/L (09-14-20 @ 12:17)    RADIOLOGY:   < from: CT Abdomen and Pelvis w/ Oral Cont and w/ IV Cont (09.14.20 @ 16:23) >  PERITONEUM/MESENTERY/BOWEL: Multiple dilated, fluid-filled loops of small bowel seen throughout the abdomen, measuring up to 4 cm in diameter with a transition point in the lower anterior mid abdomen, just deep to the abdominal wall (series 3 image 69). Mild circumferential wall thickening and hyperenhancement of several dilated small bowel loops noted. No evidence for pneumatosis. No intraperitoneal free air. Small volume pelvic ascites. Colonic diverticulosis, without evidence for acute diverticulitis. Normal appendix.    IMPRESSION:  1.  Since September 4, 2020, persistent findings of small bowel obstruction with a transition point in the lower anterior mid abdomen.    2.  Mild wall thickening and hyperenhancement of the dilated small bowel loops, suspicious for infectious/inflammatory enteritis.    3.  Ill-defined linear hypodense foci in the right kidney, decreased since prior CT, may represent sequela of pyelonephritis.

## 2020-09-14 NOTE — REASON FOR VISIT
[Follow-Up] : a follow-up evaluation of [[employer] ] : provided by [employer]   [FreeTextEntry1] : [id number]

## 2020-09-14 NOTE — H&P ADULT - NSHPPHYSICALEXAM_GEN_ALL_CORE
PHYSICAL EXAM:  GENERAL: NAD  CHEST/LUNG: Clear to auscultation bilaterally  HEART: Regular rate and rhythm  ABDOMEN: Soft, nondistended, tender in the epigastrium, no rebound or guarding   EXTREMITIES:  No clubbing, cyanosis, or edema

## 2020-09-15 ENCOUNTER — RESULT REVIEW (OUTPATIENT)
Age: 45
End: 2020-09-15

## 2020-09-15 DIAGNOSIS — K56.600 PARTIAL INTESTINAL OBSTRUCTION, UNSPECIFIED AS TO CAUSE: ICD-10-CM

## 2020-09-15 DIAGNOSIS — K56.609 UNSPECIFIED INTESTINAL OBSTRUCTION, UNSPECIFIED AS TO PARTIAL VERSUS COMPLETE OBSTRUCTION: ICD-10-CM

## 2020-09-15 DIAGNOSIS — Z02.9 ENCOUNTER FOR ADMINISTRATIVE EXAMINATIONS, UNSPECIFIED: ICD-10-CM

## 2020-09-15 DIAGNOSIS — D64.9 ANEMIA, UNSPECIFIED: ICD-10-CM

## 2020-09-15 LAB
ANION GAP SERPL CALC-SCNC: 11 MMOL/L — SIGNIFICANT CHANGE UP (ref 7–14)
ANION GAP SERPL CALC-SCNC: 13 MMOL/L — SIGNIFICANT CHANGE UP (ref 7–14)
BASOPHILS # BLD AUTO: 0.02 K/UL — SIGNIFICANT CHANGE UP (ref 0–0.2)
BASOPHILS NFR BLD AUTO: 0.2 % — SIGNIFICANT CHANGE UP (ref 0–1)
BUN SERPL-MCNC: 5 MG/DL — LOW (ref 10–20)
BUN SERPL-MCNC: 8 MG/DL — LOW (ref 10–20)
CALCIUM SERPL-MCNC: 6.8 MG/DL — LOW (ref 8.5–10.1)
CALCIUM SERPL-MCNC: 7.7 MG/DL — LOW (ref 8.5–10.1)
CHLORIDE SERPL-SCNC: 100 MMOL/L — SIGNIFICANT CHANGE UP (ref 98–110)
CHLORIDE SERPL-SCNC: 99 MMOL/L — SIGNIFICANT CHANGE UP (ref 98–110)
CK SERPL-CCNC: 241 U/L — HIGH (ref 0–225)
CO2 SERPL-SCNC: 18 MMOL/L — SIGNIFICANT CHANGE UP (ref 17–32)
CO2 SERPL-SCNC: 22 MMOL/L — SIGNIFICANT CHANGE UP (ref 17–32)
CREAT SERPL-MCNC: 0.6 MG/DL — LOW (ref 0.7–1.5)
CREAT SERPL-MCNC: 0.6 MG/DL — LOW (ref 0.7–1.5)
EOSINOPHIL # BLD AUTO: 0.08 K/UL — SIGNIFICANT CHANGE UP (ref 0–0.7)
EOSINOPHIL NFR BLD AUTO: 0.7 % — SIGNIFICANT CHANGE UP (ref 0–8)
GLUCOSE BLDC GLUCOMTR-MCNC: 167 MG/DL — HIGH (ref 70–99)
GLUCOSE BLDC GLUCOMTR-MCNC: 199 MG/DL — HIGH (ref 70–99)
GLUCOSE SERPL-MCNC: 130 MG/DL — HIGH (ref 70–99)
GLUCOSE SERPL-MCNC: 233 MG/DL — HIGH (ref 70–99)
HCT VFR BLD CALC: 31.4 % — LOW (ref 37–47)
HCT VFR BLD CALC: 34.1 % — LOW (ref 37–47)
HCT VFR BLD CALC: 36.1 % — LOW (ref 37–47)
HGB BLD-MCNC: 10.1 G/DL — LOW (ref 12–16)
HGB BLD-MCNC: 10.9 G/DL — LOW (ref 12–16)
HGB BLD-MCNC: 11.3 G/DL — LOW (ref 12–16)
IMM GRANULOCYTES NFR BLD AUTO: 0.2 % — SIGNIFICANT CHANGE UP (ref 0.1–0.3)
INR BLD: 1.95 RATIO — HIGH (ref 0.65–1.3)
LACTATE SERPL-SCNC: 1.8 MMOL/L — SIGNIFICANT CHANGE UP (ref 0.7–2)
LACTATE SERPL-SCNC: 4.6 MMOL/L — CRITICAL HIGH (ref 0.7–2)
LACTATE SERPL-SCNC: 4.9 MMOL/L — CRITICAL HIGH (ref 0.7–2)
LACTATE SERPL-SCNC: 6.5 MMOL/L — CRITICAL HIGH (ref 0.7–2)
LYMPHOCYTES # BLD AUTO: 1.31 K/UL — SIGNIFICANT CHANGE UP (ref 1.2–3.4)
LYMPHOCYTES # BLD AUTO: 10.8 % — LOW (ref 20.5–51.1)
MAGNESIUM SERPL-MCNC: 1.4 MG/DL — LOW (ref 1.8–2.4)
MAGNESIUM SERPL-MCNC: 1.5 MG/DL — LOW (ref 1.8–2.4)
MCHC RBC-ENTMCNC: 27 PG — SIGNIFICANT CHANGE UP (ref 27–31)
MCHC RBC-ENTMCNC: 27.3 PG — SIGNIFICANT CHANGE UP (ref 27–31)
MCHC RBC-ENTMCNC: 27.3 PG — SIGNIFICANT CHANGE UP (ref 27–31)
MCHC RBC-ENTMCNC: 31.3 G/DL — LOW (ref 32–37)
MCHC RBC-ENTMCNC: 32 G/DL — SIGNIFICANT CHANGE UP (ref 32–37)
MCHC RBC-ENTMCNC: 32.2 G/DL — SIGNIFICANT CHANGE UP (ref 32–37)
MCV RBC AUTO: 84.9 FL — SIGNIFICANT CHANGE UP (ref 81–99)
MCV RBC AUTO: 85.5 FL — SIGNIFICANT CHANGE UP (ref 81–99)
MCV RBC AUTO: 86.2 FL — SIGNIFICANT CHANGE UP (ref 81–99)
MONOCYTES # BLD AUTO: 0.2 K/UL — SIGNIFICANT CHANGE UP (ref 0.1–0.6)
MONOCYTES NFR BLD AUTO: 1.7 % — SIGNIFICANT CHANGE UP (ref 1.7–9.3)
NEUTROPHILS # BLD AUTO: 10.46 K/UL — HIGH (ref 1.4–6.5)
NEUTROPHILS NFR BLD AUTO: 86.4 % — HIGH (ref 42.2–75.2)
NRBC # BLD: 0 /100 WBCS — SIGNIFICANT CHANGE UP (ref 0–0)
PHOSPHATE SERPL-MCNC: 2.6 MG/DL — SIGNIFICANT CHANGE UP (ref 2.1–4.9)
PHOSPHATE SERPL-MCNC: 3 MG/DL — SIGNIFICANT CHANGE UP (ref 2.1–4.9)
PLATELET # BLD AUTO: 234 K/UL — SIGNIFICANT CHANGE UP (ref 130–400)
PLATELET # BLD AUTO: 317 K/UL — SIGNIFICANT CHANGE UP (ref 130–400)
PLATELET # BLD AUTO: SIGNIFICANT CHANGE UP K/UL (ref 130–400)
POTASSIUM SERPL-MCNC: 3.4 MMOL/L — LOW (ref 3.5–5)
POTASSIUM SERPL-MCNC: 4.1 MMOL/L — SIGNIFICANT CHANGE UP (ref 3.5–5)
POTASSIUM SERPL-SCNC: 3.4 MMOL/L — LOW (ref 3.5–5)
POTASSIUM SERPL-SCNC: 4.1 MMOL/L — SIGNIFICANT CHANGE UP (ref 3.5–5)
PROTHROM AB SERPL-ACNC: 22.4 SEC — HIGH (ref 9.95–12.87)
RBC # BLD: 3.7 M/UL — LOW (ref 4.2–5.4)
RBC # BLD: 3.99 M/UL — LOW (ref 4.2–5.4)
RBC # BLD: 4.19 M/UL — LOW (ref 4.2–5.4)
RBC # FLD: 13.4 % — SIGNIFICANT CHANGE UP (ref 11.5–14.5)
RBC # FLD: 13.5 % — SIGNIFICANT CHANGE UP (ref 11.5–14.5)
RBC # FLD: 13.7 % — SIGNIFICANT CHANGE UP (ref 11.5–14.5)
SODIUM SERPL-SCNC: 130 MMOL/L — LOW (ref 135–146)
SODIUM SERPL-SCNC: 133 MMOL/L — LOW (ref 135–146)
TROPONIN T SERPL-MCNC: <0.01 NG/ML — SIGNIFICANT CHANGE UP
WBC # BLD: 12.1 K/UL — HIGH (ref 4.8–10.8)
WBC # BLD: 12.54 K/UL — HIGH (ref 4.8–10.8)
WBC # BLD: 4.47 K/UL — LOW (ref 4.8–10.8)
WBC # FLD AUTO: 12.1 K/UL — HIGH (ref 4.8–10.8)
WBC # FLD AUTO: 12.54 K/UL — HIGH (ref 4.8–10.8)
WBC # FLD AUTO: 4.47 K/UL — LOW (ref 4.8–10.8)

## 2020-09-15 PROCEDURE — 88307 TISSUE EXAM BY PATHOLOGIST: CPT | Mod: 26,59

## 2020-09-15 PROCEDURE — 88307 TISSUE EXAM BY PATHOLOGIST: CPT | Mod: 26

## 2020-09-15 PROCEDURE — 88304 TISSUE EXAM BY PATHOLOGIST: CPT | Mod: 26

## 2020-09-15 PROCEDURE — 44120 REMOVAL OF SMALL INTESTINE: CPT

## 2020-09-15 PROCEDURE — 71045 X-RAY EXAM CHEST 1 VIEW: CPT | Mod: 26,77

## 2020-09-15 PROCEDURE — 44955 APPENDECTOMY ADD-ON: CPT

## 2020-09-15 PROCEDURE — 58740 ADHESIOLYSIS TUBE OVARY: CPT

## 2020-09-15 PROCEDURE — 93010 ELECTROCARDIOGRAM REPORT: CPT | Mod: 76

## 2020-09-15 PROCEDURE — 71045 X-RAY EXAM CHEST 1 VIEW: CPT | Mod: 26

## 2020-09-15 PROCEDURE — 99291 CRITICAL CARE FIRST HOUR: CPT | Mod: 24

## 2020-09-15 RX ORDER — SODIUM CHLORIDE 9 MG/ML
1000 INJECTION, SOLUTION INTRAVENOUS
Refills: 0 | Status: DISCONTINUED | OUTPATIENT
Start: 2020-09-15 | End: 2020-09-15

## 2020-09-15 RX ORDER — MORPHINE SULFATE 50 MG/1
4 CAPSULE, EXTENDED RELEASE ORAL EVERY 4 HOURS
Refills: 0 | Status: DISCONTINUED | OUTPATIENT
Start: 2020-09-15 | End: 2020-09-15

## 2020-09-15 RX ORDER — CEFEPIME 1 G/1
1000 INJECTION, POWDER, FOR SOLUTION INTRAMUSCULAR; INTRAVENOUS ONCE
Refills: 0 | Status: COMPLETED | OUTPATIENT
Start: 2020-09-15 | End: 2020-09-15

## 2020-09-15 RX ORDER — METRONIDAZOLE 500 MG
500 TABLET ORAL EVERY 8 HOURS
Refills: 0 | Status: DISCONTINUED | OUTPATIENT
Start: 2020-09-15 | End: 2020-09-16

## 2020-09-15 RX ORDER — METOPROLOL TARTRATE 50 MG
2.5 TABLET ORAL ONCE
Refills: 0 | Status: COMPLETED | OUTPATIENT
Start: 2020-09-15 | End: 2020-09-15

## 2020-09-15 RX ORDER — PANTOPRAZOLE SODIUM 20 MG/1
40 TABLET, DELAYED RELEASE ORAL EVERY 24 HOURS
Refills: 0 | Status: DISCONTINUED | OUTPATIENT
Start: 2020-09-15 | End: 2020-09-15

## 2020-09-15 RX ORDER — METOPROLOL TARTRATE 50 MG
5 TABLET ORAL ONCE
Refills: 0 | Status: COMPLETED | OUTPATIENT
Start: 2020-09-15 | End: 2020-09-15

## 2020-09-15 RX ORDER — PANTOPRAZOLE SODIUM 20 MG/1
40 TABLET, DELAYED RELEASE ORAL DAILY
Refills: 0 | Status: DISCONTINUED | OUTPATIENT
Start: 2020-09-15 | End: 2020-09-15

## 2020-09-15 RX ORDER — KETOROLAC TROMETHAMINE 30 MG/ML
30 SYRINGE (ML) INJECTION EVERY 8 HOURS
Refills: 0 | Status: DISCONTINUED | OUTPATIENT
Start: 2020-09-15 | End: 2020-09-15

## 2020-09-15 RX ORDER — SODIUM CHLORIDE 9 MG/ML
1000 INJECTION, SOLUTION INTRAVENOUS
Refills: 0 | Status: DISCONTINUED | OUTPATIENT
Start: 2020-09-15 | End: 2020-09-16

## 2020-09-15 RX ORDER — SODIUM CHLORIDE 9 MG/ML
250 INJECTION, SOLUTION INTRAVENOUS ONCE
Refills: 0 | Status: COMPLETED | OUTPATIENT
Start: 2020-09-15 | End: 2020-09-15

## 2020-09-15 RX ORDER — HYDROMORPHONE HYDROCHLORIDE 2 MG/ML
1 INJECTION INTRAMUSCULAR; INTRAVENOUS; SUBCUTANEOUS
Refills: 0 | Status: DISCONTINUED | OUTPATIENT
Start: 2020-09-15 | End: 2020-09-15

## 2020-09-15 RX ORDER — SODIUM CHLORIDE 9 MG/ML
1000 INJECTION, SOLUTION INTRAVENOUS ONCE
Refills: 0 | Status: COMPLETED | OUTPATIENT
Start: 2020-09-15 | End: 2020-09-15

## 2020-09-15 RX ORDER — CEFEPIME 1 G/1
INJECTION, POWDER, FOR SOLUTION INTRAMUSCULAR; INTRAVENOUS
Refills: 0 | Status: DISCONTINUED | OUTPATIENT
Start: 2020-09-15 | End: 2020-09-15

## 2020-09-15 RX ORDER — VANCOMYCIN HCL 1 G
1000 VIAL (EA) INTRAVENOUS EVERY 12 HOURS
Refills: 0 | Status: DISCONTINUED | OUTPATIENT
Start: 2020-09-15 | End: 2020-09-15

## 2020-09-15 RX ORDER — HYDROMORPHONE HYDROCHLORIDE 2 MG/ML
0.25 INJECTION INTRAMUSCULAR; INTRAVENOUS; SUBCUTANEOUS EVERY 4 HOURS
Refills: 0 | Status: DISCONTINUED | OUTPATIENT
Start: 2020-09-15 | End: 2020-09-16

## 2020-09-15 RX ORDER — ACETAMINOPHEN 500 MG
1000 TABLET ORAL ONCE
Refills: 0 | Status: COMPLETED | OUTPATIENT
Start: 2020-09-15 | End: 2020-09-15

## 2020-09-15 RX ORDER — HEPARIN SODIUM 5000 [USP'U]/ML
5000 INJECTION INTRAVENOUS; SUBCUTANEOUS EVERY 8 HOURS
Refills: 0 | Status: DISCONTINUED | OUTPATIENT
Start: 2020-09-15 | End: 2020-09-15

## 2020-09-15 RX ORDER — MAGNESIUM SULFATE 500 MG/ML
2 VIAL (ML) INJECTION ONCE
Refills: 0 | Status: COMPLETED | OUTPATIENT
Start: 2020-09-15 | End: 2020-09-15

## 2020-09-15 RX ORDER — POTASSIUM CHLORIDE 20 MEQ
20 PACKET (EA) ORAL ONCE
Refills: 0 | Status: COMPLETED | OUTPATIENT
Start: 2020-09-15 | End: 2020-09-15

## 2020-09-15 RX ORDER — MORPHINE SULFATE 50 MG/1
2 CAPSULE, EXTENDED RELEASE ORAL ONCE
Refills: 0 | Status: DISCONTINUED | OUTPATIENT
Start: 2020-09-15 | End: 2020-09-15

## 2020-09-15 RX ORDER — METRONIDAZOLE 500 MG
500 TABLET ORAL ONCE
Refills: 0 | Status: COMPLETED | OUTPATIENT
Start: 2020-09-15 | End: 2020-09-15

## 2020-09-15 RX ORDER — CEFEPIME 1 G/1
1000 INJECTION, POWDER, FOR SOLUTION INTRAMUSCULAR; INTRAVENOUS EVERY 8 HOURS
Refills: 0 | Status: DISCONTINUED | OUTPATIENT
Start: 2020-09-15 | End: 2020-09-15

## 2020-09-15 RX ORDER — HYDROMORPHONE HYDROCHLORIDE 2 MG/ML
0.25 INJECTION INTRAMUSCULAR; INTRAVENOUS; SUBCUTANEOUS EVERY 4 HOURS
Refills: 0 | Status: DISCONTINUED | OUTPATIENT
Start: 2020-09-15 | End: 2020-09-15

## 2020-09-15 RX ORDER — CEFOTETAN DISODIUM 1 G
2 VIAL (EA) INJECTION EVERY 12 HOURS
Refills: 0 | Status: DISCONTINUED | OUTPATIENT
Start: 2020-09-15 | End: 2020-09-15

## 2020-09-15 RX ORDER — ONDANSETRON 8 MG/1
4 TABLET, FILM COATED ORAL EVERY 6 HOURS
Refills: 0 | Status: DISCONTINUED | OUTPATIENT
Start: 2020-09-15 | End: 2020-09-15

## 2020-09-15 RX ORDER — HYDROMORPHONE HYDROCHLORIDE 2 MG/ML
0.5 INJECTION INTRAMUSCULAR; INTRAVENOUS; SUBCUTANEOUS EVERY 6 HOURS
Refills: 0 | Status: DISCONTINUED | OUTPATIENT
Start: 2020-09-15 | End: 2020-09-15

## 2020-09-15 RX ORDER — ACETAMINOPHEN 500 MG
650 TABLET ORAL EVERY 6 HOURS
Refills: 0 | Status: DISCONTINUED | OUTPATIENT
Start: 2020-09-15 | End: 2020-09-15

## 2020-09-15 RX ORDER — CEFEPIME 1 G/1
1000 INJECTION, POWDER, FOR SOLUTION INTRAMUSCULAR; INTRAVENOUS EVERY 8 HOURS
Refills: 0 | Status: DISCONTINUED | OUTPATIENT
Start: 2020-09-15 | End: 2020-09-16

## 2020-09-15 RX ORDER — HYDROMORPHONE HYDROCHLORIDE 2 MG/ML
0.5 INJECTION INTRAMUSCULAR; INTRAVENOUS; SUBCUTANEOUS
Refills: 0 | Status: DISCONTINUED | OUTPATIENT
Start: 2020-09-15 | End: 2020-09-15

## 2020-09-15 RX ORDER — POTASSIUM PHOSPHATE, MONOBASIC POTASSIUM PHOSPHATE, DIBASIC 236; 224 MG/ML; MG/ML
15 INJECTION, SOLUTION INTRAVENOUS ONCE
Refills: 0 | Status: COMPLETED | OUTPATIENT
Start: 2020-09-15 | End: 2020-09-15

## 2020-09-15 RX ORDER — ELECTROLYTE SOLUTION,INJ
1 VIAL (ML) INTRAVENOUS
Refills: 0 | Status: DISCONTINUED | OUTPATIENT
Start: 2020-09-15 | End: 2020-09-15

## 2020-09-15 RX ORDER — PANTOPRAZOLE SODIUM 20 MG/1
40 TABLET, DELAYED RELEASE ORAL DAILY
Refills: 0 | Status: DISCONTINUED | OUTPATIENT
Start: 2020-09-15 | End: 2020-09-21

## 2020-09-15 RX ORDER — HEPARIN SODIUM 5000 [USP'U]/ML
5000 INJECTION INTRAVENOUS; SUBCUTANEOUS EVERY 8 HOURS
Refills: 0 | Status: DISCONTINUED | OUTPATIENT
Start: 2020-09-15 | End: 2020-09-18

## 2020-09-15 RX ORDER — PHENYLEPHRINE HYDROCHLORIDE 10 MG/ML
0.01 INJECTION INTRAVENOUS
Qty: 160 | Refills: 0 | Status: DISCONTINUED | OUTPATIENT
Start: 2020-09-15 | End: 2020-09-15

## 2020-09-15 RX ORDER — NOREPINEPHRINE BITARTRATE/D5W 8 MG/250ML
0.05 PLASTIC BAG, INJECTION (ML) INTRAVENOUS
Qty: 16 | Refills: 0 | Status: DISCONTINUED | OUTPATIENT
Start: 2020-09-15 | End: 2020-09-17

## 2020-09-15 RX ADMIN — HYDROMORPHONE HYDROCHLORIDE 0.25 MILLIGRAM(S): 2 INJECTION INTRAMUSCULAR; INTRAVENOUS; SUBCUTANEOUS at 23:29

## 2020-09-15 RX ADMIN — HYDROMORPHONE HYDROCHLORIDE 0.25 MILLIGRAM(S): 2 INJECTION INTRAMUSCULAR; INTRAVENOUS; SUBCUTANEOUS at 22:03

## 2020-09-15 RX ADMIN — Medication 12.5 GRAM(S): at 20:17

## 2020-09-15 RX ADMIN — SODIUM CHLORIDE 3000 MILLILITER(S): 9 INJECTION, SOLUTION INTRAVENOUS at 18:50

## 2020-09-15 RX ADMIN — HYDROMORPHONE HYDROCHLORIDE 0.25 MILLIGRAM(S): 2 INJECTION INTRAMUSCULAR; INTRAVENOUS; SUBCUTANEOUS at 18:42

## 2020-09-15 RX ADMIN — SODIUM CHLORIDE 1000 MILLILITER(S): 9 INJECTION, SOLUTION INTRAVENOUS at 07:12

## 2020-09-15 RX ADMIN — Medication 100 MILLIGRAM(S): at 22:04

## 2020-09-15 RX ADMIN — PANTOPRAZOLE SODIUM 40 MILLIGRAM(S): 20 TABLET, DELAYED RELEASE ORAL at 23:31

## 2020-09-15 RX ADMIN — Medication 50 MILLIEQUIVALENT(S): at 20:00

## 2020-09-15 RX ADMIN — HEPARIN SODIUM 5000 UNIT(S): 5000 INJECTION INTRAVENOUS; SUBCUTANEOUS at 22:03

## 2020-09-15 RX ADMIN — CEFEPIME 100 MILLIGRAM(S): 1 INJECTION, POWDER, FOR SOLUTION INTRAMUSCULAR; INTRAVENOUS at 12:07

## 2020-09-15 RX ADMIN — POTASSIUM PHOSPHATE, MONOBASIC POTASSIUM PHOSPHATE, DIBASIC 63.75 MILLIMOLE(S): 236; 224 INJECTION, SOLUTION INTRAVENOUS at 22:04

## 2020-09-15 RX ADMIN — Medication 100 GRAM(S): at 06:35

## 2020-09-15 RX ADMIN — Medication 2.5 MILLIGRAM(S): at 23:52

## 2020-09-15 RX ADMIN — Medication 400 MILLIGRAM(S): at 23:53

## 2020-09-15 RX ADMIN — Medication 5 MILLIGRAM(S): at 17:31

## 2020-09-15 RX ADMIN — ONDANSETRON 4 MILLIGRAM(S): 8 TABLET, FILM COATED ORAL at 06:52

## 2020-09-15 RX ADMIN — Medication 250 MILLIGRAM(S): at 12:07

## 2020-09-15 RX ADMIN — HYDROMORPHONE HYDROCHLORIDE 0.25 MILLIGRAM(S): 2 INJECTION INTRAMUSCULAR; INTRAVENOUS; SUBCUTANEOUS at 19:12

## 2020-09-15 RX ADMIN — Medication 50 MILLIEQUIVALENT(S): at 23:52

## 2020-09-15 RX ADMIN — CEFEPIME 100 MILLIGRAM(S): 1 INJECTION, POWDER, FOR SOLUTION INTRAMUSCULAR; INTRAVENOUS at 23:32

## 2020-09-15 RX ADMIN — SODIUM CHLORIDE 110 MILLILITER(S): 9 INJECTION, SOLUTION INTRAVENOUS at 06:00

## 2020-09-15 RX ADMIN — Medication 1000 MILLIGRAM(S): at 07:30

## 2020-09-15 RX ADMIN — Medication 3.2 MICROGRAM(S)/KG/MIN: at 19:01

## 2020-09-15 RX ADMIN — SODIUM CHLORIDE 110 MILLILITER(S): 9 INJECTION, SOLUTION INTRAVENOUS at 23:53

## 2020-09-15 RX ADMIN — SODIUM CHLORIDE 6000 MILLILITER(S): 9 INJECTION, SOLUTION INTRAVENOUS at 11:49

## 2020-09-15 RX ADMIN — HEPARIN SODIUM 5000 UNIT(S): 5000 INJECTION INTRAVENOUS; SUBCUTANEOUS at 06:12

## 2020-09-15 RX ADMIN — Medication 400 MILLIGRAM(S): at 07:13

## 2020-09-15 RX ADMIN — Medication 2.5 MILLIGRAM(S): at 20:45

## 2020-09-15 RX ADMIN — SODIUM CHLORIDE 1000 MILLILITER(S): 9 INJECTION, SOLUTION INTRAVENOUS at 08:41

## 2020-09-15 RX ADMIN — HYDROMORPHONE HYDROCHLORIDE 0.5 MILLIGRAM(S): 2 INJECTION INTRAMUSCULAR; INTRAVENOUS; SUBCUTANEOUS at 10:00

## 2020-09-15 RX ADMIN — Medication 25 GRAM(S): at 07:55

## 2020-09-15 NOTE — PROGRESS NOTE ADULT - SUBJECTIVE AND OBJECTIVE BOX
GENERAL SURGERY PROGRESS NOTE     GLORIA SCHAFER  [address] [place] [address] day :1d  POD:  Procedure: Small bowel resection with anastomosis    Lysis of intestinal adhesions    Diagnostic laparoscopy      Surgical Attending: Mariann Rubio      24H events/hpi:  Patient seen & examined at bedside. In NAD   afebrile vitals stable.   s/p NARCISO and SB resection with primary anastamosis    OR Stats  OR Time: 4hr              EBL:   50       IV Fluids:   2L    Blood Products:       None         UOP:    500  Findings- diagnostic lap converted to ex-lap, with many adhesions that were lysed, had 43 cm of small bowel resected with primary anastomosis with staples        T(F): 98.6 (09-15-20 @ 07:30), Max: 98.7 (09-15-20 @ 05:30)  HR: 110 (09-15-20 @ 09:00) (88 - 120)  BP: 121/79 (09-15-20 @ 09:00) (107/73 - 135/74)  ABP: --  ABP(mean): --  RR: 14 (09-15-20 @ 09:00) (13 - 18)  SpO2: 98% (09-15-20 @ 09:00) (98% - 100%)      09-14-20 @ 07:01  -  09-15-20 @ 07:00  --------------------------------------------------------  IN:    IV PiggyBack: 150 mL    Lactated Ringers: 110 mL    Lactated Ringers: 100 mL  Total IN: 360 mL    OUT:    Indwelling Catheter - Urethral (mL): 275 mL  Total OUT: 275 mL    Total NET: 85 mL      09-15-20 @ 07:01  -  09-15-20 @ 10:49  --------------------------------------------------------  IN:    IV PiggyBack: 50 mL    Lactated Ringers: 110 mL    Lactated Ringers Bolus: 2000 mL  Total IN: 2160 mL    OUT:    Indwelling Catheter - Urethral (mL): 315 mL    Nasogastric/Oral tube (mL): 100 mL  Total OUT: 415 mL    Total NET: 1745 mL        DIET/FLUIDS: lactated ringers. 1000 milliLiter(s) IV Continuous <Continuous>      I&O    09-14-20 @ 07:01  -  09-15-20 @ 07:00  --------------------------------------------------------  IN: 360 mL / OUT: 275 mL / NET: 85 mL    09-15-20 @ 07:01  -  09-15-20 @ 10:49  --------------------------------------------------------  IN: 2160 mL / OUT: 415 mL / NET: 1745 mL        URINE:   09-14-20 @ 07:01  -  09-15-20 @ 07:00  --------------------------------------------------------  OUT: 275 mL     Indwelling Urethral Catheter:     Connect To:  Straight Drainage/Longs    Indication:  Perioperative use for Selected Surgical Procedures    Additional Instructions:  DO NOT REMOVE without a discontinuation order (09-15-20 @ 05:52)    GI proph:  pantoprazole  Injectable 40 milliGRAM(s) IV Push daily    AC/ proph: heparin   Injectable 5000 Unit(s) SubCutaneous every 8 hours    ABx: cefoTEtan  IVPB 2 Gram(s) IV Intermittent every 12 hours        Physical Examination:  General Appearance: NAD  HEENT: EOMI, sclera non-icteric.  Heart: RRR   Lungs: No increased work of breathing or accessory muscle use. Symmetric chest wall rise and fall.   Abdomen:  Soft, nontender, nondistended.   Incisions: CDI  MSK/Extremities: Warm & well-perfused.   Skin: Warm, dry. No jaundice.       LABS  Labs:  CAPILLARY BLOOD GLUCOSE                              10.9   12.10 )-----------( 317      ( 15 Sep 2020 06:40 )             34.1       Auto Neutrophil %: 86.4 % (09-15-20 @ 06:40)  Auto Immature Granulocyte %: 0.2 % (09-15-20 @ 06:40)  Auto Neutrophil %: 80.8 % (09-14-20 @ 12:17)  Auto Immature Granulocyte %: 0.4 % (09-14-20 @ 12:17)    09-15    133<L>  |  100  |  8<L>  ----------------------------<  130<H>  4.1   |  22  |  0.6<L>      Calcium, Total Serum: 7.7 mg/dL (09-15-20 @ 05:40)      LFTs:             4.7  | 0.4  | 13       ------------------[51      ( 15 Sep 2020 05:40 )  2.7  | 0.2  | 8           Lipase:x      Amylase:x         Lactate, Blood: 1.8 mmol/L (09-15-20 @ 06:00)  Lactate, Blood: 1.1 mmol/L (09-14-20 @ 12:17)      Coags:     12.70  ----< 1.10    ( 14 Sep 2020 19:26 )     33.6

## 2020-09-15 NOTE — CHART NOTE - NSCHARTNOTEFT_GEN_A_CORE
NUTRITION SUPPORT CONSULTATION    HPI:  Patient is a 45F w/recent history of PAULIE/BSO on 8/19/20 for 15cm bleeding fibroid with readmission for abdominal pain and inability to tolerate PO on 8/30, found to have partial obstruction vs. ileus, resolved without operative intervention, patient was discharged. She represented on 9/4 with recurrent abdominal pain, consistent with partial SBO, NGT was placed and patient's symptoms improved with nonoperative management. She presents today with two days of abdominal pain with associated nausea and vomiting since the onset of pain. She reports having her last BM two days ago, passing flatus. In the ED she is afebrile, tachycardic to 115. On exam her abdomen is soft, nondistended, tender in the epigastrium. Labs significant for WBC of 11.6. Lactate 1.1.  CT scan demonstrated persistent findings of small bowel obstruction with a transition point in the lower anterior mid abdomen with mild wall thickening and hyperenhancement of the dilated small bowel loops, suspicious for infectious/inflammatory enteritis. (14 Sep 2020 19:00)      PAST MEDICAL & SURGICAL HISTORY:  Uterine fibroid  Status post bilateral salpingectomy  S/P PAULIE (total abdominal hysterectomy)    Allergies  No Known Allergies    MEDICATIONS  (STANDING):  cefepime   IVPB 1000 milliGRAM(s) IV Intermittent every 8 hours  cefepime   IVPB      cefoTEtan  IVPB 2 Gram(s) IV Intermittent every 12 hours  heparin   Injectable 5000 Unit(s) SubCutaneous every 8 hours  lactated ringers. 1000 milliLiter(s) (110 mL/Hr) IV Continuous <Continuous>  pantoprazole  Injectable 40 milliGRAM(s) IV Push daily  vancomycin  IVPB 1000 milliGRAM(s) IV Intermittent every 12 hours    MEDICATIONS  (PRN):  acetaminophen    Suspension .. 650 milliGRAM(s) Oral every 6 hours PRN Mild Pain (1 - 3)  HYDROmorphone  Injectable 0.5 milliGRAM(s) IV Push every 6 hours PRN Severe Pain (7 - 10)  HYDROmorphone  Injectable 0.25 milliGRAM(s) IV Push every 4 hours PRN Mild Pain (1 - 3)  ketorolac   Injectable 30 milliGRAM(s) IV Push every 8 hours PRN Moderate Pain (4 - 6)  ondansetron Injectable 4 milliGRAM(s) IV Push every 6 hours PRN Nausea    ICU Vital Signs Last 24 Hrs  T(C): 37.3 (15 Sep 2020 11:42), Max: 37.3 (15 Sep 2020 11:42)  T(F): 99.1 (15 Sep 2020 11:42), Max: 99.1 (15 Sep 2020 11:42)  HR: 137 (15 Sep 2020 11:42) (88 - 137)  BP: 91/61 (15 Sep 2020 11:42) (91/61 - 135/74)  RR: 16 (15 Sep 2020 11:42) (13 - 18)  SpO2: 98% (15 Sep 2020 11:42) (98% - 100%)    Drug Dosing Weight  Height (cm): 160 (15 Sep 2020 00:35)  Weight (kg): 68.3 (15 Sep 2020 00:35)  BMI (kg/m2): 26.7 (15 Sep 2020 00:35)  BSA (m2): 1.71 (15 Sep 2020 00:35)    EXAM     Abd:     LE:   Enteral access:  IV access:    LABS  09-15    133<L>  |  100  |  8<L>  ----------------------------<  130<H>  4.1   |  22  |  0.6<L>    Ca    7.7<L>      15 Sep 2020 05:40  Phos  3.0     09-15  Mg     1.5     09-15    TPro  4.7<L>  /  Alb  2.7<L>  /  TBili  0.4  /  DBili  0.2  /  AST  13  /  ALT  8   /  AlkPhos  51  09-15                    10.9   12.10 )-----------( 317      ( 15 Sep 2020 06:40 )             34.1        CAPILLARY BLOOD GLUCOSE  POCT Blood Glucose.: 167 mg/dL (15 Sep 2020 11:40)      Radiology:    Diet, NPO (09-15-20 @ 05:49)      ASSESSMENT        PLAN  - check baseline TG leve  - place central line in OR today  - start TPN tonight via central line  - BMP, In phos, Mg, vitamin D, zinc levels in the AM 9/16 NUTRITION SUPPORT CONSULTATION    HPI:  Patient is a 45F w/recent history of PAULIE/BSO on 8/19/20 for 15cm bleeding fibroid with readmission for abdominal pain and inability to tolerate PO on 8/30, found to have partial obstruction vs. ileus, resolved without operative intervention, patient was discharged. She represented on 9/4 with recurrent abdominal pain, consistent with partial SBO, NGT was placed and patient's symptoms improved with nonoperative management. She presents today with two days of abdominal pain with associated nausea and vomiting since the onset of pain. She reports having her last BM two days ago, passing flatus. In the ED she is afebrile, tachycardic to 115. On exam her abdomen is soft, nondistended, tender in the epigastrium. Labs significant for WBC of 11.6. Lactate 1.1.  CT scan demonstrated persistent findings of small bowel obstruction with a transition point in the lower anterior mid abdomen with mild wall thickening and hyperenhancement of the dilated small bowel loops, suspicious for infectious/inflammatory enteritis. (14 Sep 2020 19:00)    Taken to OR overnight, s/p Small bowel resection with anastomosis, Lysis of intestinal adhesions, Diagnostic laparoscopy    · Operative Findings  multiple, dense adhesions of small and large bowel to anterior abdominal wall at the site of her prior laparotomy incision  43cm of ileum resected secondary to serosal tears, enterotomy  stapled, primary side to side ileoileal anastomosis    This am tachycardic with increased abdominal pain and stool draining from midline abdomen. Pt taken back to the OR now    PAST MEDICAL & SURGICAL HISTORY:  Uterine fibroid  Status post bilateral salpingectomy  S/P PAULIE (total abdominal hysterectomy)    Allergies  No Known Allergies    MEDICATIONS  (STANDING):  cefepime   IVPB 1000 milliGRAM(s) IV Intermittent every 8 hours  cefepime   IVPB      cefoTEtan  IVPB 2 Gram(s) IV Intermittent every 12 hours  heparin   Injectable 5000 Unit(s) SubCutaneous every 8 hours  lactated ringers. 1000 milliLiter(s) (110 mL/Hr) IV Continuous <Continuous>  pantoprazole  Injectable 40 milliGRAM(s) IV Push daily  vancomycin  IVPB 1000 milliGRAM(s) IV Intermittent every 12 hours    MEDICATIONS  (PRN):  acetaminophen    Suspension .. 650 milliGRAM(s) Oral every 6 hours PRN Mild Pain (1 - 3)  HYDROmorphone  Injectable 0.5 milliGRAM(s) IV Push every 6 hours PRN Severe Pain (7 - 10)  HYDROmorphone  Injectable 0.25 milliGRAM(s) IV Push every 4 hours PRN Mild Pain (1 - 3)  ketorolac   Injectable 30 milliGRAM(s) IV Push every 8 hours PRN Moderate Pain (4 - 6)  ondansetron Injectable 4 milliGRAM(s) IV Push every 6 hours PRN Nausea    ICU Vital Signs Last 24 Hrs  T(C): 37.3 (15 Sep 2020 11:42), Max: 37.3 (15 Sep 2020 11:42)  T(F): 99.1 (15 Sep 2020 11:42), Max: 99.1 (15 Sep 2020 11:42)  HR: 137 (15 Sep 2020 11:42) (88 - 137)  BP: 91/61 (15 Sep 2020 11:42) (91/61 - 135/74)  RR: 16 (15 Sep 2020 11:42) (13 - 18)  SpO2: 98% (15 Sep 2020 11:42) (98% - 100%)    Drug Dosing Weight  Height (cm): 160 (15 Sep 2020 00:35)  Weight (kg): 68.3 (15 Sep 2020 00:35)  BMI (kg/m2): 26.7 (15 Sep 2020 00:35)  BSA (m2): 1.71 (15 Sep 2020 00:35)    EXAM (deferred, pt in OR)     Abd:     LE:   Enteral access:  IV access:    LABS  09-15    133<L>  |  100  |  8<L>  ----------------------------<  130<H>  4.1   |  22  |  0.6<L>    Ca    7.7<L>      15 Sep 2020 05:40  Phos  3.0     09-15  Mg     1.5     09-15    TPro  4.7<L>  /  Alb  2.7<L>  /  TBili  0.4  /  DBili  0.2  /  AST  13  /  ALT  8   /  AlkPhos  51  09-15                    10.9   12.10 )-----------( 317      ( 15 Sep 2020 06:40 )             34.1        CAPILLARY BLOOD GLUCOSE  POCT Blood Glucose.: 167 mg/dL (15 Sep 2020 11:40)    Radiology:  < from: CT Abdomen and Pelvis w/ Oral Cont and w/ IV Cont (09.14.20 @ 16:23) >    EXAM:  CT ABDOMEN AND PELVIS OC IC          PROCEDURE DATE:  09/14/2020      INTERPRETATION:  CLINICAL STATEMENT: Abdominal distention.    TECHNIQUE: Contiguous axial CT images were obtained from the lower chest to the pubic symphysis following administration of 100cc Optiray 320 intravenous contrast.  Oral contrast was administered.  Reformatted images in the coronal and sagittal planes were acquired.    COMPARISON CT: September 4, 2020    OTHER STUDIES USED FOR CORRELATION: None.    FINDINGS:    LOWER CHEST: Subsegmental dependent atelectasis.    HEPATOBILIARY: Unremarkable.    SPLEEN: Unremarkable.    PANCREAS: Unremarkable.    ADRENAL GLANDS: Unremarkable.    KIDNEYS: Few ill-defined linear hypodense foci in the right kidney again noted, decreased since prior CT. No hydronephrosis. Few subcentimeter hypodense lesions in both kidneys, too small to characterize. Punctate nonobstructing left intrarenal calculus.    ABDOMINOPELVIC NODES: No adenopathy.    PELVIC ORGANS: Post hysterectomy. Left adnexal cyst measuring up to 3.4 cm.    PERITONEUM/MESENTERY/BOWEL: Multiple dilated, fluid-filled loops of small bowel seen throughout the abdomen, measuring up to 4 cm in diameter with a transition point in the lower anterior mid abdomen, just deep to the abdominal wall (series 3 image 69). Mild circumferential wall thickening and hyperenhancement of several dilated small bowel loops noted. No evidence for pneumatosis. No intraperitoneal free air. Small volume pelvic ascites. Colonic diverticulosis, without evidence for acute diverticulitis. Normal appendix.    BONES/SOFT TISSUES: Stable postsurgical changes in the anterior abdominal wall. Stable degenerative changes.    IMPRESSION:  1.  Since September 4, 2020, persistent findings of small bowel obstruction with a transition point in the lower anterior mid abdomen.    2.  Mild wall thickening and hyperenhancement of the dilated small bowel loops, suspicious for infectious/inflammatory enteritis.    3.  Ill-defined linear hypodense foci in the right kidney, decreased since prior CT, may represent sequela of pyelonephritis    Diet, NPO (09-15-20 @ 05:49)    ASSESSMENT  45y Female s/p small bowel resection with primary anastomosis 9/15/20, taken back to the OR now for possible leak    PLAN  - check baseline TG level  - place central line in OR today  - start TPN tonight via central line  - BMP, In phos, Mg, vitamin D, zinc levels in the AM 9/16

## 2020-09-15 NOTE — PROGRESS NOTE ADULT - ASSESSMENT
44 y/o no Pmhx s/p PAULIE/BS on 8/19 for fibroid uterus POD#27, s/p diagnostic laparoscopy converted to exploratory laparotomy and small bowel reanastamosis, with breakdown of staple line of proximal limb of anastamosis on POD0, now s/p repeat exploratory laparotomy with small bowel resection, reanastomosis and appendectomy, POD0, tachycardic and hypotensive postop requiring pressors, likely septic shock, transferred to SICU for close hemodynamic monitoring.   - Neuro: AAOX3, dilaudid PRN for pain  - CVS: tachycardic, metoprolol PRN, on levophed drip for hypotension  - GI: NPO, NG tube, plan to start TPN  - : IV fluids @110 cc/hr, monitor UO, trend Cr  - heme/onc: H/H stable, heparin for VTE prophylaxis  - Inf: likely septic shock, cefepime and flagyl  - Lines: Right IJ central line, A line left radial, escalera  - management per SICU team  - gyn to follow    Dr. Molina aware.

## 2020-09-15 NOTE — CHART NOTE - NSCHARTNOTEFT_GEN_A_CORE
GLORIA SCHAFER  [id number]    Indication for ICU admission: septic shock  Admit Date:20  ICU Date: 9/15/2020  OR Date: 9/15/2020      Subjective:  44yo female with history of PAULIE/BSO on 20 (POD #27) for 15cm fibroid with readmission for abdominal pain and inability to tolerate PO on , found to have partial obstruction vs. ileus, resolved without operative intervention, patient was discharged. Pt returned on  with similar symptoms, CT unchanged, NG tube placed and pt improved, was discharged home. Pt returned again on  for the same symptoms, worsening abdominal pain, N/V. Last bowel movement was three days ago. While in the ED, pt was afebrile tachy to 115 as per note with documented HR in the 80s. Exam in the ED was abdomen soft, nondistended, and tender to the epigastrium.  CT scan demonstrated persistent findings of small bowel obstruction with a transition point in the lower anterior mid abdomen with mild wall thickening and hyperenhancement of the dilated small bowel loops, suspicious for infectious/inflammatory enteritis. Patient went for diagnostic lap converted to ex-lap, with many adhesions that were lysed, had 43 cm of small bowel resected with primary anastomosis with staples. Pt was hemodynamically stable throughout the case. Pt was extubated, was tachy to 110s in PACU.   Postop, patient began to have stool leaking from her abdominal incision.  Decision was made at that time to take patient back to OR emergent for exploratory laparotomy.  Patient was intubated without difficulty.  Right IJ central line placed immediately after induction.  Intraop patient received 1g vanc, 500mg flagyl, 8mg decadron, 4mg zofran, 200mg fentanyl.  Per anesthesia handoff, patient was initially hypotensive at induction however received 1L bolus LR and BPs returned back to normal. Intraop she remained tachycardic to the 140s, not relayed by anesthesia during handoff.  Surgically, bowel was run and perforation was noted <1cm from staple line of previous small bowel anastamosis.  Small bowel resection was performed, anastasmosis was taken down, appendix was torn so appendectomy was performed.  Distal small bowel was re-anastamosed to terminal ileum.  7cm of terminal ileum remain.  Abdomen was washed, skin remains open and packed with kerlex.  She was extubated without difficulty.   Postop she had a TAP block for pain management.     Spoke with [facility] [employer], made aware 9/15 @181 that patient was started on pressors.    No Known Allergies  PAST MEDICAL & SURGICAL HISTORY:  Uterine fibroid    Status post bilateral salpingectomy  S/P PAULIE (total abdominal hysterectomy)    Home Medications:  ibuprofen 600 mg oral tablet: 1 tab(s) orally every 6 hours, As needed, Mild Pain (1 - 3) (01 Sep 2020 06:26)      Objective:  OR time: 3.5hrs    EBL: 50cc      IVF: 4L LR, 500cc 5% albumin              UO: 1100cc    Daily Height in cm: 160.02 (15 Sep 2020 14:11)    Daily Weight in k.2 (14 Sep 2020 20:45)    Diet, NPO (09-15-20 @ 15:53)      CURRENT MEDS:  Neurologic Medications  HYDROmorphone  Injectable 0.25 milliGRAM(s) IV Push every 4 hours PRN Moderate Pain (4 - 6)    Respiratory Medications    Cardiovascular Medications  norepinephrine Infusion 0.05 MICROgram(s)/kG/Min IV Continuous <Continuous>    Gastrointestinal Medications  lactated ringers Bolus 250 milliLiter(s) IV Bolus once  lactated ringers. 1000 milliLiter(s) IV Continuous <Continuous>  magnesium sulfate  IVPB 2 Gram(s) IV Intermittent once  pantoprazole  Injectable 40 milliGRAM(s) IV Push daily  Parenteral Nutrition - Adult 1 Each TPN Continuous <Continuous>  potassium chloride  20 mEq/100 mL IVPB 20 milliEquivalent(s) IV Intermittent once  potassium chloride  20 mEq/100 mL IVPB 20 milliEquivalent(s) IV Intermittent once  potassium phosphate IVPB 15 milliMole(s) IV Intermittent once    Genitourinary Medications    Hematologic/Oncologic Medications  heparin   Injectable 5000 Unit(s) SubCutaneous every 8 hours    Antimicrobial/Immunologic Medications  cefepime   IVPB 1000 milliGRAM(s) IV Intermittent every 8 hours  metroNIDAZOLE  IVPB 500 milliGRAM(s) IV Intermittent every 8 hours    Endocrine/Metabolic Medications    Topical/Other Medications      ICU Vital Signs Last 24 Hrs  T(C): 37.3 (15 Sep 2020 14:11), Max: 37.3 (15 Sep 2020 11:42)  T(F): 99.1 (15 Sep 2020 11:42), Max: 99.1 (15 Sep 2020 11:42)  HR: 126 (15 Sep 2020 14:11) (88 - 137)  BP: 114/62 (15 Sep 2020 14:11) (91/61 - 135/74)  RR: 16 (15 Sep 2020 14:11) (13 - 18)  SpO2: 98% (15 Sep 2020 14:11) (98% - 100%)        I&O's Summary    14 Sep 2020 07:  -  15 Sep 2020 07:00  --------------------------------------------------------  IN: 360 mL / OUT: 275 mL / NET: 85 mL    15 Sep 2020 07:01  -  15 Sep 2020 19:01  --------------------------------------------------------  IN: 2460 mL / OUT: 660 mL / NET: 1800 mL      I&O's Detail    14 Sep 2020 07:  -  15 Sep 2020 07:00  --------------------------------------------------------  IN:    IV PiggyBack: 150 mL    Lactated Ringers: 110 mL    Lactated Ringers: 100 mL  Total IN: 360 mL    OUT:    Indwelling Catheter - Urethral (mL): 275 mL  Total OUT: 275 mL    Total NET: 85 mL      15 Sep 2020 07:  -  15 Sep 2020 19:01  --------------------------------------------------------  IN:    IV PiggyBack: 350 mL    Lactated Ringers: 110 mL    Lactated Ringers Bolus: 2000 mL  Total IN: 2460 mL    OUT:    Indwelling Catheter - Urethral (mL): 560 mL    Nasogastric/Oral tube (mL): 100 mL  Total OUT: 660 mL    Total NET: 1800 mL          PHYSICAL EXAM:  NEURO:  NAD, alert, oriented x 3, no focal deficits    RESP: lungs clear to auscultation, normal expansion/effort      CV: S1, S2, tachycardic to 120s  Cardiac rhythm: normal sinus rhythm    GI: abdomen soft, nontender, nondistended, midline laparotomy incision packed with kerlex and dressing c/d/i  Current diet:  NPO    EXTREMITIES: extremities warm, pink, well perfused    DERM: good skin turgor, no skin breakdown      : escalera catheter in place***    LINES/TUBES/DRAINS:  - peripheral IV - right AC  - arterial line - left wrist  - central line - right IJ  - urinary escalera catheter - placed 9/15    CXR:     LABS:  CAPILLARY BLOOD GLUCOSE      POCT Blood Glucose.: 199 mg/dL (15 Sep 2020 17:05)  POCT Blood Glucose.: 167 mg/dL (15 Sep 2020 11:40)                          10.1   4.47  )-----------( 234      ( 15 Sep 2020 17:51 )             31.4       09-15    130<L>  |  99  |  5<L>  ----------------------------<  233<H>  3.4<L>   |  18  |  0.6<L>    Ca    6.8<L>      15 Sep 2020 17:51  Phos  2.6     09-15  Mg     1.4     -15    TPro  4.7<L>  /  Alb  2.7<L>  /  TBili  0.4  /  DBili  0.2  /  AST  13  /  ALT  8   /  AlkPhos  51  09-15      PT/INR - ( 15 Sep 2020 17:51 )   PT: 22.40 sec;   INR: 1.95 ratio         PTT - ( 14 Sep 2020 19:26 )  PTT:33.6 sec  CARDIAC MARKERS ( 15 Sep 2020 17:51 )  x     / <0.01 ng/mL / 241 U/L / x     / x          DVT Prophylaxis: heparin   Injectable 5000 Unit(s) SubCutaneous every 8 hours      GI Prophylaxis:pantoprazole  Injectable 40 milliGRAM(s) IV Push daily          [X] A ten-point review of systems was otherwise negative except as noted above.  [  ] Due to altered mental status/intubation, subjective information was not attained from the patient. History was obtained, to the extent possible, from review of the chart and collateral sources of information.    d/w Dr. Yen. GLORIA SCHAFER  [id number]    Indication for ICU admission: septic shock  Admit Date:20  ICU Date: 9/15/2020  OR Date: 9/15/2020      Subjective:  46yo female with history of PAULIE/BSO on 20 (POD #27) for 15cm fibroid with readmission for abdominal pain and inability to tolerate PO on , found to have partial obstruction vs. ileus, resolved without operative intervention, patient was discharged. Pt returned on  with similar symptoms, CT unchanged, NG tube placed and pt improved, was discharged home. Pt returned again on  for the same symptoms, worsening abdominal pain, N/V. Last bowel movement was three days ago. While in the ED, pt was afebrile tachy to 115 as per note with documented HR in the 80s. Exam in the ED was abdomen soft, nondistended, and tender to the epigastrium.  CT scan demonstrated persistent findings of small bowel obstruction with a transition point in the lower anterior mid abdomen with mild wall thickening and hyperenhancement of the dilated small bowel loops, suspicious for infectious/inflammatory enteritis. Patient went for diagnostic lap converted to ex-lap, with many adhesions that were lysed, had 43 cm of small bowel resected with primary anastomosis with staples. Pt was hemodynamically stable throughout the case. Pt was extubated, was tachy to 110s in PACU.   Postop, patient began to have stool leaking from her abdominal incision.  Decision was made at that time to take patient back to OR emergent for exploratory laparotomy.  Patient was intubated without difficulty.  Right IJ central line placed immediately after induction.  Intraop patient received 1g vanc, 500mg flagyl, 8mg decadron, 4mg zofran, 200mg fentanyl.  Per anesthesia handoff, patient was initially hypotensive at induction however received 1L bolus LR and BPs returned back to normal. Intraop she remained tachycardic to the 140s, not relayed by anesthesia during handoff.  Surgically, bowel was run and perforation was noted <1cm from staple line of previous small bowel anastamosis.  Small bowel resection was performed, anastasmosis was taken down, appendix was torn so appendectomy was performed.  Distal small bowel was re-anastamosed to terminal ileum.  7cm of terminal ileum remain.  Abdomen was washed, skin remains open and packed with kerlex.  She was extubated without difficulty.   Postop she had a TAP block for pain management.     Spoke with [facility] [employer], made aware 9/15 @181 that patient was started on pressors.    No Known Allergies  PAST MEDICAL & SURGICAL HISTORY:  Uterine fibroid    Status post bilateral salpingectomy  S/P PAULIE (total abdominal hysterectomy)    Home Medications:  ibuprofen 600 mg oral tablet: 1 tab(s) orally every 6 hours, As needed, Mild Pain (1 - 3) (01 Sep 2020 06:26)      Objective:  OR time: 3.5hrs    EBL: 50cc      IVF: 4L LR, 500cc 5% albumin              UO: 1100cc    Daily Height in cm: 160.02 (15 Sep 2020 14:11)    Daily Weight in k.2 (14 Sep 2020 20:45)    Diet, NPO (09-15-20 @ 15:53)      CURRENT MEDS:  Neurologic Medications  HYDROmorphone  Injectable 0.25 milliGRAM(s) IV Push every 4 hours PRN Moderate Pain (4 - 6)    Respiratory Medications    Cardiovascular Medications  norepinephrine Infusion 0.05 MICROgram(s)/kG/Min IV Continuous <Continuous>    Gastrointestinal Medications  lactated ringers Bolus 250 milliLiter(s) IV Bolus once  lactated ringers. 1000 milliLiter(s) IV Continuous <Continuous>  magnesium sulfate  IVPB 2 Gram(s) IV Intermittent once  pantoprazole  Injectable 40 milliGRAM(s) IV Push daily  Parenteral Nutrition - Adult 1 Each TPN Continuous <Continuous>  potassium chloride  20 mEq/100 mL IVPB 20 milliEquivalent(s) IV Intermittent once  potassium chloride  20 mEq/100 mL IVPB 20 milliEquivalent(s) IV Intermittent once  potassium phosphate IVPB 15 milliMole(s) IV Intermittent once    Genitourinary Medications    Hematologic/Oncologic Medications  heparin   Injectable 5000 Unit(s) SubCutaneous every 8 hours    Antimicrobial/Immunologic Medications  cefepime   IVPB 1000 milliGRAM(s) IV Intermittent every 8 hours  metroNIDAZOLE  IVPB 500 milliGRAM(s) IV Intermittent every 8 hours    Endocrine/Metabolic Medications    Topical/Other Medications      ICU Vital Signs Last 24 Hrs  T(C): 37.3 (15 Sep 2020 14:11), Max: 37.3 (15 Sep 2020 11:42)  T(F): 99.1 (15 Sep 2020 11:42), Max: 99.1 (15 Sep 2020 11:42)  HR: 126 (15 Sep 2020 14:11) (88 - 137)  BP: 114/62 (15 Sep 2020 14:11) (91/61 - 135/74)  RR: 16 (15 Sep 2020 14:11) (13 - 18)  SpO2: 98% (15 Sep 2020 14:11) (98% - 100%)        I&O's Summary    14 Sep 2020 07:  -  15 Sep 2020 07:00  --------------------------------------------------------  IN: 360 mL / OUT: 275 mL / NET: 85 mL    15 Sep 2020 07:01  -  15 Sep 2020 19:01  --------------------------------------------------------  IN: 2460 mL / OUT: 660 mL / NET: 1800 mL      I&O's Detail    14 Sep 2020 07:  -  15 Sep 2020 07:00  --------------------------------------------------------  IN:    IV PiggyBack: 150 mL    Lactated Ringers: 110 mL    Lactated Ringers: 100 mL  Total IN: 360 mL    OUT:    Indwelling Catheter - Urethral (mL): 275 mL  Total OUT: 275 mL    Total NET: 85 mL      15 Sep 2020 07:  -  15 Sep 2020 19:01  --------------------------------------------------------  IN:    IV PiggyBack: 350 mL    Lactated Ringers: 110 mL    Lactated Ringers Bolus: 2000 mL  Total IN: 2460 mL    OUT:    Indwelling Catheter - Urethral (mL): 560 mL    Nasogastric/Oral tube (mL): 100 mL  Total OUT: 660 mL    Total NET: 1800 mL          PHYSICAL EXAM:  NEURO:  NAD, alert, oriented x 3, no focal deficits    RESP: lungs clear to auscultation, normal expansion/effort      CV: S1, S2, tachycardic to 120s  Cardiac rhythm: normal sinus rhythm    GI: abdomen soft, nontender, nondistended, midline laparotomy incision packed with kerlex and dressing c/d/i  Current diet:  NPO    EXTREMITIES: extremities warm, pink, well perfused    DERM: good skin turgor, no skin breakdown      : escalera catheter in place***    LINES/TUBES/DRAINS:  - peripheral IV - right AC  - arterial line - left wrist  - central line - right IJ  - urinary escalera catheter - placed 9/15    CXR:     LABS:  CAPILLARY BLOOD GLUCOSE      POCT Blood Glucose.: 199 mg/dL (15 Sep 2020 17:05)  POCT Blood Glucose.: 167 mg/dL (15 Sep 2020 11:40)                          10.1   4.47  )-----------( 234      ( 15 Sep 2020 17:51 )             31.4       09-15    130<L>  |  99  |  5<L>  ----------------------------<  233<H>  3.4<L>   |  18  |  0.6<L>    Ca    6.8<L>      15 Sep 2020 17:51  Phos  2.6     09-15  Mg     1.4     09-15    TPro  4.7<L>  /  Alb  2.7<L>  /  TBili  0.4  /  DBili  0.2  /  AST  13  /  ALT  8   /  AlkPhos  51  09-15      PT/INR - ( 15 Sep 2020 17:51 )   PT: 22.40 sec;   INR: 1.95 ratio         PTT - ( 14 Sep 2020 19:26 )  PTT:33.6 sec  CARDIAC MARKERS ( 15 Sep 2020 17:51 )  x     / <0.01 ng/mL / 241 U/L / x     / x        [X] A ten-point review of systems was otherwise negative except as noted above.  [  ] Due to altered mental status/intubation, subjective information was not attained from the patient. History was obtained, to the extent possible, from review of the chart and collateral sources of information.      Assessment & Plan    45y Female s/p PAULIE/BSO on , s/p diagnostic laparoscopy converted to exploratory laparotomy and small bowel re-anastamosis, with SBO perforation and repeat exploratory laparotomy with small bowel     NEURO:    Acute pain-controlled with     RESP:     Oxygen insufficiency-wean off NC to RA as tolerate    Activity-      CARDS:     Imaging:     Labs:   norepinephrine Infusion 0.05 <Continuous>      GI/NUTR:     Diet  Diet, NPO      GI Prophylaxis-    Bowel regimen-  pantoprazole  Injectable 40 milliGRAM(s) IV Push daily    GI Prophylaxis:pantoprazole  Injectable 40 milliGRAM(s) IV Push daily      /RENAL:     Strict I/O-    BUN/Cr- 5/0.6   <----,  8/0.6   <----,  12/0.7   <----       14 @ 07:01  -  09-15 @ 07:00  --------------------------------------------------------  IN: 0 mL / OUT: 275 mL / NET: -275 mL    09-15 @ 07:01  -  09-15 @ 19:08  --------------------------------------------------------  IN: 0 mL / OUT: 560 mL / NET: -560 mL        HEME/ONC:     DVT Prophylaxis: heparin   Injectable 5000 Unit(s) SubCutaneous every 8 hours, SCDs    Acute anemia-H/H 10.1 (09-15 @ 17:51)  10.9 (09-15 @ 06:40)  TNP (09-15 @ 05:40)      ID:  cefepime   IVPB 1000 IV Intermittent every 8 hours  metroNIDAZOLE  IVPB 500 IV Intermittent every 8 hours      ENDO:    Glucose Glucose, Serum: 233 (09-15 @ 17:51)      HA1C     LINES/DRAINS:  Jessica RAYO Foley     DISPO:    SICU      d/w Dr. Glover GLORIA SCHAFER  [id number]    Indication for ICU admission: Septic shock  Admit Date:20  ICU Date: 9/15/2020  OR Date: 9/15/2020      Subjective:  46yo female with history of PAULIE/BSO on 20 (POD #27) for 15cm fibroid with readmission for abdominal pain and inability to tolerate PO on , found to have partial obstruction vs. ileus, resolved without operative intervention, patient was discharged. Pt returned on  with similar symptoms, CT unchanged, NG tube placed and pt improved, was discharged home. Pt returned again on  for the same symptoms, worsening abdominal pain, N/V. Last bowel movement was three days ago. While in the ED, pt was afebrile tachy to 115 as per note with documented HR in the 80s. Exam in the ED was abdomen soft, nondistended, and tender to the epigastrium.  CT scan demonstrated persistent findings of small bowel obstruction with a transition point in the lower anterior mid abdomen with mild wall thickening and hyperenhancement of the dilated small bowel loops, suspicious for infectious/inflammatory enteritis. Patient went for diagnostic lap converted to ex-lap, with many adhesions that were lysed, had 43 cm of small bowel resected with primary anastomosis with staples. Pt was hemodynamically stable throughout the case. Pt was extubated, was tachy to 110s in PACU.   Postop, patient began to have stool leaking from her abdominal incision.  Decision was made at that time to take patient back to OR emergent for exploratory laparotomy.  Patient was intubated without difficulty.  Right IJ central line placed immediately after induction.  Intraop patient received 1g vanc, 500mg flagyl, 8mg decadron, 4mg zofran, 200mg fentanyl.  Per anesthesia handoff, patient was initially hypotensive at induction however received 1L bolus LR and BPs returned back to normal. Intraop she remained tachycardic to the 140s, not relayed by anesthesia during handoff.  Surgically, bowel was run and perforation was noted <1cm from staple line of previous small bowel anastamosis.  Small bowel resection was performed, anastasmosis was taken down, appendix was torn so appendectomy was performed.  Distal small bowel was re-anastamosed to terminal ileum.  7cm of terminal ileum remain.  Abdomen was washed, skin remains open and packed with kerlex.  She was extubated without difficulty.   Postop she had a TAP block for pain management.     Patient was seen and evaluated at bedside, found to be A&Ox3 but lethargic, tachycardic to 140s, BP borderline at 95/50 MAP 62, satting 100% on 3L face tent, NGT in place. Abd midlined incision packed with kerlex c/d/i, abd soft, tender, nondistended. SBP dropped to 88. Patient was bolused 250 LR with no response. Decision was made to give Lopressor 5 IVP to increase cardiac filling. Initially SBP improved to 103, however, SBP then decreased to 70s with MAP in 50s. Fluids was run wide open through TLC port. A-line placement was initiated. Patient then stated on levophed through TLC. At the same time, fluids continued to run. Jessica was placed, BP matched cuff. Levophed started with limited response in BP, but MAP titrated to 65. HR post Lopressor 5IVP decreased to 112 but rebounded to 120. NICOM was negative at 1.2% TPR 1330, CO 3.9, CI 2.3.  Patient is getting electrolytes repleted, on Cefepime and Vancomycin. Preop lacate was 4.6, post op lactated increased to 6.5. Upon leaving bedside, patient was on levophed 0.23, BP on [place] 98/49, MAP 65; she had gotten 2L total in boluses and NICOM,  (after Lopressor 5IVP and Dilauided 0.25).  Patient is A&Ox3 and following commands.     Spoke with [facility], made aware 9/15 @1815 that patient was started on pressors.    No Known Allergies  PAST MEDICAL & SURGICAL HISTORY:  Uterine fibroid    Status post bilateral salpingectomy  S/P PAULIE (total abdominal hysterectomy)    Home Medications:  ibuprofen 600 mg oral tablet: 1 tab(s) orally every 6 hours, As needed, Mild Pain (1 - 3) (01 Sep 2020 06:26)      Objective:  OR time: 3.5hrs    EBL: 50cc      IVF: 4L LR, 500cc 5% albumin              UO: 1100cc    Daily Height in cm: 160.02 (15 Sep 2020 14:11)    Daily Weight in k.2 (14 Sep 2020 20:45)    Diet, NPO (09-15-20 @ 15:53)      CURRENT MEDS:  Neurologic Medications  HYDROmorphone  Injectable 0.25 milliGRAM(s) IV Push every 4 hours PRN Moderate Pain (4 - 6)    Respiratory Medications    Cardiovascular Medications  norepinephrine Infusion 0.05 MICROgram(s)/kG/Min IV Continuous <Continuous>    Gastrointestinal Medications  lactated ringers Bolus 250 milliLiter(s) IV Bolus once  lactated ringers. 1000 milliLiter(s) IV Continuous <Continuous>  magnesium sulfate  IVPB 2 Gram(s) IV Intermittent once  pantoprazole  Injectable 40 milliGRAM(s) IV Push daily  Parenteral Nutrition - Adult 1 Each TPN Continuous <Continuous>  potassium chloride  20 mEq/100 mL IVPB 20 milliEquivalent(s) IV Intermittent once  potassium chloride  20 mEq/100 mL IVPB 20 milliEquivalent(s) IV Intermittent once  potassium phosphate IVPB 15 milliMole(s) IV Intermittent once    Genitourinary Medications    Hematologic/Oncologic Medications  heparin   Injectable 5000 Unit(s) SubCutaneous every 8 hours    Antimicrobial/Immunologic Medications  cefepime   IVPB 1000 milliGRAM(s) IV Intermittent every 8 hours  metroNIDAZOLE  IVPB 500 milliGRAM(s) IV Intermittent every 8 hours    Endocrine/Metabolic Medications    Topical/Other Medications      ICU Vital Signs Last 24 Hrs  T(C): 37.3 (15 Sep 2020 14:11), Max: 37.3 (15 Sep 2020 11:42)  T(F): 99.1 (15 Sep 2020 11:42), Max: 99.1 (15 Sep 2020 11:42)  HR: 126 (15 Sep 2020 14:11) (88 - 137)  BP: 114/62 (15 Sep 2020 14:11) (91/61 - 135/74)  RR: 16 (15 Sep 2020 14:11) (13 - 18)  SpO2: 98% (15 Sep 2020 14:11) (98% - 100%)        I&O's Summary    14 Sep 2020 07:01  -  15 Sep 2020 07:00  --------------------------------------------------------  IN: 360 mL / OUT: 275 mL / NET: 85 mL    15 Sep 2020 07:01  -  15 Sep 2020 19:01  --------------------------------------------------------  IN: 2460 mL / OUT: 660 mL / NET: 1800 mL      I&O's Detail    14 Sep 2020 07:01  -  15 Sep 2020 07:00  --------------------------------------------------------  IN:    IV PiggyBack: 150 mL    Lactated Ringers: 110 mL    Lactated Ringers: 100 mL  Total IN: 360 mL    OUT:    Indwelling Catheter - Urethral (mL): 275 mL  Total OUT: 275 mL    Total NET: 85 mL      15 Sep 2020 07:01  -  15 Sep 2020 19:01  --------------------------------------------------------  IN:    IV PiggyBack: 350 mL    Lactated Ringers: 110 mL    Lactated Ringers Bolus: 2000 mL  Total IN: 2460 mL    OUT:    Indwelling Catheter - Urethral (mL): 560 mL    Nasogastric/Oral tube (mL): 100 mL  Total OUT: 660 mL    Total NET: 1800 mL          PHYSICAL EXAM:  NEURO:  NAD, alert, oriented x 3, no focal deficits    RESP: lungs clear to auscultation, normal expansion/effort      CV: S1, S2, tachycardic to 120s  Cardiac rhythm: normal sinus rhythm    GI: abdomen soft, nontender, nondistended, midline laparotomy incision packed with kerlex and dressing c/d/i  Current diet:  NPO    EXTREMITIES: extremities warm, pink, well perfused    DERM: good skin turgor, no skin breakdown      : escalera catheter in place***    LINES/TUBES/DRAINS:  - peripheral IV - right AC  - arterial line - left wrist  - central line - right IJ  - urinary escalera catheter - placed 9/15    CXR:     LABS:  CAPILLARY BLOOD GLUCOSE      POCT Blood Glucose.: 199 mg/dL (15 Sep 2020 17:05)  POCT Blood Glucose.: 167 mg/dL (15 Sep 2020 11:40)                          10.1   4.47  )-----------( 234      ( 15 Sep 2020 17:51 )             31.4       15    130<L>  |  99  |  5<L>  ----------------------------<  233<H>  3.4<L>   |  18  |  0.6<L>    Ca    6.8<L>      15 Sep 2020 17:51  Phos  2.6     09-15  Mg     1.4     09-15    TPro  4.7<L>  /  Alb  2.7<L>  /  TBili  0.4  /  DBili  0.2  /  AST  13  /  ALT  8   /  AlkPhos  51  15      PT/INR - ( 15 Sep 2020 17:51 )   PT: 22.40 sec;   INR: 1.95 ratio         PTT - ( 14 Sep 2020 19:26 )  PTT:33.6 sec  CARDIAC MARKERS ( 15 Sep 2020 17:51 )  x     / <0.01 ng/mL / 241 U/L / x     / x        [X] A ten-point review of systems was otherwise negative except as noted above.  [  ] Due to altered mental status/intubation, subjective information was not attained from the patient. History was obtained, to the extent possible, from review of the chart and collateral sources of information.      Assessment & Plan    45y Female s/p PAULIE/BSO on , s/p diagnostic laparoscopy converted to exploratory laparotomy and small bowel re-anastamosis, with SBO perforation and repeat exploratory laparotomy with small bowel     NEURO:    Acute pain-controlled with     RESP:     Oxygen insufficiency-wean off NC to RA as tolerate    Activity-      CARDS:     Imaging:     Labs:   norepinephrine Infusion 0.05 <Continuous>      GI/NUTR:     Diet  Diet, NPO      GI Prophylaxis-    Bowel regimen-  pantoprazole  Injectable 40 milliGRAM(s) IV Push daily    GI Prophylaxis:pantoprazole  Injectable 40 milliGRAM(s) IV Push daily      /RENAL:     Strict I/O-    BUN/Cr- 5/0.6   <----,  8/0.6   <----,  12/0.7   <----       14 @ 07:01  -  -15 @ 07:00  --------------------------------------------------------  IN: 0 mL / OUT: 275 mL / NET: -275 mL    09-15 @ 07:01  -  09-15 @ 19:08  --------------------------------------------------------  IN: 0 mL / OUT: 560 mL / NET: -560 mL        HEME/ONC:     DVT Prophylaxis: heparin   Injectable 5000 Unit(s) SubCutaneous every 8 hours, SCDs    Acute anemia-H/H 10.1 (09-15 @ 17:51)  10.9 (09-15 @ 06:40)  TNP (09-15 @ 05:40)      ID:  cefepime   IVPB 1000 IV Intermittent every 8 hours  metroNIDAZOLE  IVPB 500 IV Intermittent every 8 hours      ENDO:    Glucose Glucose, Serum: 233 (09-15 @ 17:51)      HA1C     LINES/DRAINS:  Jessica RAYO Foley     DISPO:    SICU      d/w Dr. Yen. GLORIA SCHAFER  [id number]    Indication for ICU admission: Septic shock  Admit Date:20  ICU Date: 9/15/2020  OR Date: 9/15/2020      Subjective:  46yo female with history of PAULIE/BSO on 20 (POD #27) for 15cm fibroid with readmission for abdominal pain and inability to tolerate PO on , found to have partial obstruction vs. ileus, resolved without operative intervention, patient was discharged. Pt returned on  with similar symptoms, CT unchanged, NG tube placed and pt improved, was discharged home. Pt returned again on  for the same symptoms, worsening abdominal pain, N/V. Last bowel movement was three days ago. While in the ED, pt was afebrile tachy to 115 as per note with documented HR in the 80s. Exam in the ED was abdomen soft, nondistended, and tender to the epigastrium.  CT scan demonstrated persistent findings of small bowel obstruction with a transition point in the lower anterior mid abdomen with mild wall thickening and hyperenhancement of the dilated small bowel loops, suspicious for infectious/inflammatory enteritis. Patient went for diagnostic lap converted to ex-lap, with many adhesions that were lysed, had 43 cm of small bowel resected with primary anastomosis with staples. Pt was hemodynamically stable throughout the case. Pt was extubated, was tachy to 110s in PACU.   Postop, patient began to have stool leaking from her abdominal incision.  Decision was made at that time to take patient back to OR emergent for exploratory laparotomy.  Patient was intubated without difficulty.  Right IJ central line placed immediately after induction.  Intraop patient received 1g vanc, 500mg flagyl, 8mg decadron, 4mg zofran, 200mg fentanyl.  Per anesthesia handoff, patient was initially hypotensive at induction however received 1L bolus LR and BPs returned back to normal. Intraop she remained tachycardic to the 140s, not relayed by anesthesia during handoff.  Surgically, bowel was run and perforation was noted <1cm from staple line of previous small bowel anastamosis.  Small bowel resection was performed, anastasmosis was taken down, appendix was torn so appendectomy was performed.  Distal small bowel was re-anastamosed to terminal ileum.  7cm of terminal ileum remain.  Abdomen was washed, skin remains open and packed with kerlex.  She was extubated without difficulty.   Postop she had a TAP block for pain management.     Patient was seen and evaluated at bedside, found to be A&Ox3 but lethargic, tachycardic to 140s, BP borderline at 95/50 MAP 62, satting 100% on 3L face tent, NGT in place. Abd midlined incision packed with kerlex c/d/i, abd soft, tender, nondistended. SBP dropped to 88. Patient was bolused 250 LR with no response. Decision was made to give Lopressor 5 IVP to increase cardiac filling. Initially SBP improved to 103, however, SBP then decreased to 70s with MAP in 50s. Fluids was run wide open through TLC port. A-line placement was initiated. Patient then stated on levophed through TLC. At the same time, fluids continued to run. Jessica was placed, BP matched cuff. Levophed started with limited response in BP, but MAP titrated to 65. HR post Lopressor 5IVP decreased to 112 but rebounded to 120. NICOM was negative at 1.2% TPR 1330, CO 3.9, CI 2.3.  Patient is getting electrolytes repleted, on Cefepime and Vancomycin. Preop lacate was 4.6, post op lactated increased to 6.5. Upon leaving bedside, patient was on levophed 0.23, BP on [place] 98/49, MAP 65; she had gotten 2L total in boluses and NICOM,  (after Lopressor 5IVP and Dilauided 0.25).  Patient is A&Ox3 and following commands. SICU attending made aware, plan is to keep MAP over 65, get ABG in 2 hours, Metoprolol 2.5 IVP if need for persistent tachycardic above 120.     Spoke with Ansley [facility]uff[facility] [employer], made aware 9/15 @1815 that patient was started on pressors.    No Known Allergies  PAST MEDICAL & SURGICAL HISTORY:  Uterine fibroid    Status post bilateral salpingectomy  S/P PAULIE (total abdominal hysterectomy)    Home Medications:  ibuprofen 600 mg oral tablet: 1 tab(s) orally every 6 hours, As needed, Mild Pain (1 - 3) (01 Sep 2020 06:26)      Objective:  OR time: 3.5hrs    EBL: 50cc      IVF: 4L LR, 500cc 5% albumin              UO: 1100cc    Daily Height in cm: 160.02 (15 Sep 2020 14:11)    Daily Weight in k.2 (14 Sep 2020 20:45)    Diet, NPO (09-15-20 @ 15:53)      CURRENT MEDS:  Neurologic Medications  HYDROmorphone  Injectable 0.25 milliGRAM(s) IV Push every 4 hours PRN Moderate Pain (4 - 6)    Respiratory Medications    Cardiovascular Medications  norepinephrine Infusion 0.05 MICROgram(s)/kG/Min IV Continuous <Continuous>    Gastrointestinal Medications  lactated ringers Bolus 250 milliLiter(s) IV Bolus once  lactated ringers. 1000 milliLiter(s) IV Continuous <Continuous>  magnesium sulfate  IVPB 2 Gram(s) IV Intermittent once  pantoprazole  Injectable 40 milliGRAM(s) IV Push daily  Parenteral Nutrition - Adult 1 Each TPN Continuous <Continuous>  potassium chloride  20 mEq/100 mL IVPB 20 milliEquivalent(s) IV Intermittent once  potassium chloride  20 mEq/100 mL IVPB 20 milliEquivalent(s) IV Intermittent once  potassium phosphate IVPB 15 milliMole(s) IV Intermittent once    Genitourinary Medications    Hematologic/Oncologic Medications  heparin   Injectable 5000 Unit(s) SubCutaneous every 8 hours    Antimicrobial/Immunologic Medications  cefepime   IVPB 1000 milliGRAM(s) IV Intermittent every 8 hours  metroNIDAZOLE  IVPB 500 milliGRAM(s) IV Intermittent every 8 hours    Endocrine/Metabolic Medications    Topical/Other Medications      ICU Vital Signs Last 24 Hrs  T(C): 37.3 (15 Sep 2020 14:11), Max: 37.3 (15 Sep 2020 11:42)  T(F): 99.1 (15 Sep 2020 11:42), Max: 99.1 (15 Sep 2020 11:42)  HR: 126 (15 Sep 2020 14:11) (88 - 137)  BP: 114/62 (15 Sep 2020 14:11) (91/61 - 135/74)  RR: 16 (15 Sep 2020 14:11) (13 - 18)  SpO2: 98% (15 Sep 2020 14:11) (98% - 100%)        I&O's Summary    14 Sep 2020 07:01  -  15 Sep 2020 07:00  --------------------------------------------------------  IN: 360 mL / OUT: 275 mL / NET: 85 mL    15 Sep 2020 07:  -  15 Sep 2020 19:01  --------------------------------------------------------  IN: 2460 mL / OUT: 660 mL / NET: 1800 mL      I&O's Detail    14 Sep 2020 07:  -  15 Sep 2020 07:00  --------------------------------------------------------  IN:    IV PiggyBack: 150 mL    Lactated Ringers: 110 mL    Lactated Ringers: 100 mL  Total IN: 360 mL    OUT:    Indwelling Catheter - Urethral (mL): 275 mL  Total OUT: 275 mL    Total NET: 85 mL      15 Sep 2020 07:  -  15 Sep 2020 19:01  --------------------------------------------------------  IN:    IV PiggyBack: 350 mL    Lactated Ringers: 110 mL    Lactated Ringers Bolus: 2000 mL  Total IN: 2460 mL    OUT:    Indwelling Catheter - Urethral (mL): 560 mL    Nasogastric/Oral tube (mL): 100 mL  Total OUT: 660 mL    Total NET: 1800 mL          PHYSICAL EXAM:  NEURO:  NAD, alert, oriented x 3, no focal deficits    RESP: lungs clear to auscultation, normal expansion/effort      CV: S1, S2, tachycardic to 120s  Cardiac rhythm: normal sinus rhythm    GI: abdomen soft, nontender, nondistended, midline laparotomy incision packed with kerlex and dressing c/d/i  Current diet:  NPO    EXTREMITIES: extremities warm, pink, well perfused    DERM: good skin turgor, no skin breakdown      : escalera catheter in place***    LINES/TUBES/DRAINS:  - peripheral IV - right AC  - arterial line - left wrist  - central line - right IJ  - urinary escalera catheter - placed 9/15    CXR:     LABS:  CAPILLARY BLOOD GLUCOSE      POCT Blood Glucose.: 199 mg/dL (15 Sep 2020 17:05)  POCT Blood Glucose.: 167 mg/dL (15 Sep 2020 11:40)                          10.1   4.47  )-----------( 234      ( 15 Sep 2020 17:51 )             31.4       09-15    130<L>  |  99  |  5<L>  ----------------------------<  233<H>  3.4<L>   |  18  |  0.6<L>    Ca    6.8<L>      15 Sep 2020 17:51  Phos  2.6     09-15  Mg     1.4     15    TPro  4.7<L>  /  Alb  2.7<L>  /  TBili  0.4  /  DBili  0.2  /  AST  13  /  ALT  8   /  AlkPhos  51  -15      PT/INR - ( 15 Sep 2020 17:51 )   PT: 22.40 sec;   INR: 1.95 ratio         PTT - ( 14 Sep 2020 19:26 )  PTT:33.6 sec  CARDIAC MARKERS ( 15 Sep 2020 17:51 )  x     / <0.01 ng/mL / 241 U/L / x     / x        [X] A ten-point review of systems was otherwise negative except as noted above.  [  ] Due to altered mental status/intubation, subjective information was not attained from the patient. History was obtained, to the extent possible, from review of the chart and collateral sources of information.      Assessment & Plan    45y Female s/p PAULIE/BSO on , s/p diagnostic laparoscopy converted to exploratory laparotomy and small bowel re-anastamosis, with SBO perforation and repeat exploratory laparotomy with small bowel     NEURO:    Acute pain-controlled with     RESP:     Oxygen insufficiency-wean off NC to RA as tolerate    Activity-      CARDS:     Imaging:     Labs:   norepinephrine Infusion 0.05 <Continuous>      GI/NUTR:     Diet  Diet, NPO      GI Prophylaxis-    Bowel regimen-  pantoprazole  Injectable 40 milliGRAM(s) IV Push daily    GI Prophylaxis:pantoprazole  Injectable 40 milliGRAM(s) IV Push daily      /RENAL:     Strict I/O-    BUN/Cr- 5/0.6   <----,  8/0.6   <----,  12/0.7   <----        @ 07:01  -  09-15 @ 07:00  --------------------------------------------------------  IN: 0 mL / OUT: 275 mL / NET: -275 mL    09-15 @ 07:01  -  09-15 @ 19:08  --------------------------------------------------------  IN: 0 mL / OUT: 560 mL / NET: -560 mL        HEME/ONC:     DVT Prophylaxis: heparin   Injectable 5000 Unit(s) SubCutaneous every 8 hours, SCDs    Acute anemia-H/H 10.1 (09-15 @ 17:51)  10.9 (09-15 @ 06:40)  TNP (09-15 @ 05:40)      ID:  cefepime   IVPB 1000 IV Intermittent every 8 hours  metroNIDAZOLE  IVPB 500 IV Intermittent every 8 hours      ENDO:    Glucose Glucose, Serum: 233 (09-15 @ 17:51)      HA1C     LINES/DRAINS:  Jessica RAYO Foley     DISPO:    SICU      d/w Dr. Glover GLORIA SCHAFER  [id number]    Indication for ICU admission: Septic shock  Admit Date:09-14-20  ICU Date: 9/15/2020  OR Date: 9/15/2020      Subjective:  46yo female with history of PAULIE/BSO on 8/19/20 (POD #27) for 15cm fibroid with readmission for abdominal pain and inability to tolerate PO on 8/30, found to have partial obstruction vs. ileus, resolved without operative intervention, patient was discharged. Pt returned on 9/4 with similar symptoms, CT unchanged, NG tube placed and pt improved, was discharged home. Pt returned again on 9/14 for the same symptoms, worsening abdominal pain, N/V. Last bowel movement was three days ago. While in the ED, pt was afebrile tachy to 115 as per note with documented HR in the 80s. Exam in the ED was abdomen soft, nondistended, and tender to the epigastrium.  CT scan demonstrated persistent findings of small bowel obstruction with a transition point in the lower anterior mid abdomen with mild wall thickening and hyperenhancement of the dilated small bowel loops, suspicious for infectious/inflammatory enteritis. Patient went for diagnostic lap converted to ex-lap, with many adhesions that were lysed, had 43 cm of small bowel resected with primary anastomosis with staples. Pt was hemodynamically stable throughout the case. Pt was extubated, was tachy to 110s in PACU.   Postop, patient began to have stool leaking from her abdominal incision.  Decision was made at that time to take patient back to OR emergent for exploratory laparotomy.  Patient was intubated without difficulty.  Right IJ central line placed immediately after induction.  Intraop patient received 1g vanc, 500mg flagyl, 8mg decadron, 4mg zofran, 200mg fentanyl.  Per anesthesia handoff, patient was initially hypotensive at induction however received 1L bolus LR and BPs returned back to normal. Intraop she remained tachycardic to the 140s, not relayed by anesthesia during handoff.  Surgically, bowel was run and perforation was noted <1cm from staple line of previous small bowel anastamosis.  Small bowel resection was performed, anastasmosis was taken down, appendix was torn so appendectomy was performed.  Distal small bowel was re-anastamosed to terminal ileum.  7cm of terminal ileum remain.  Abdomen was washed, skin remains open and packed with kerlex.  She was extubated without difficulty.   Postop she had a TAP block for pain management.     Patient was seen and evaluated at bedside, found to be A&Ox3 but lethargic, tachycardic to 140s, BP borderline at 95/50 MAP 62, satting 100% on 3L face tent, NGT in place. Abd midlined incision packed with kerlex c/d/i, abd soft, tender, nondistended. SBP dropped to 88. Patient was bolused 250 LR with no response. Decision was made to give Lopressor 5 IVP to increase cardiac filling. Initially SBP improved to 103, however, SBP then decreased to 70s with MAP in 50s. Fluids was run wide open through TLC port. A-line placement was initiated. Patient then stated on levophed through TLC. At the same time, fluids continued to run. Jessica was placed, BP matched cuff. Levophed started with limited response in BP, but MAP titrated to 65. HR post Lopressor 5IVP decreased to 112 but rebounded to 120. NICOM was negative at 1.2% TPR 1330, CO 3.9, CI 2.3.  Patient is getting electrolytes repleted, on Cefepime and Vancomycin. Preop lacate was 4.6, post op lactated increased to 6.5. Upon leaving bedside, patient was on levophed 0.23, BP on [place] 98/49, MAP 65; she had gotten 2L total in boluses and NICOM,  (after Lopressor 5IVP and Dilauided 0.25).  Patient is A&Ox3 and following commands. SICU attending made aware, plan is to keep MAP over 65, get ABG in 2 hours, Metoprolol 2.5 IVP if need for persistent tachycardic above 120.     Spoke with Ansley huff[facility] [employer], made aware 9/15 @1815 that patient was started on pressors.    No Known Allergies  PAST MEDICAL & SURGICAL HISTORY:  Uterine fibroid    Status post bilateral salpingectomy  S/P PAULIE (total abdominal hysterectomy)    Home Medications:  ibuprofen 600 mg oral tablet: 1 tab(s) orally every 6 hours, As needed, Mild Pain (1 - 3) (01 Sep 2020 06:26)      Objective:  OR time: 3.5hrs    EBL: 50cc      IVF: 4L LR, 500cc 5% albumin              UO: 1100cc    CURRENT MEDS:  Neurologic Medications  HYDROmorphone  Injectable 0.25 milliGRAM(s) IV Push every 4 hours PRN Moderate Pain (4 - 6)    Cardiovascular Medications  norepinephrine Infusion 0.05 MICROgram(s)/kG/Min IV Continuous <Continuous>    Gastrointestinal Medications  lactated ringers Bolus 250 milliLiter(s) IV Bolus once  lactated ringers. 1000 milliLiter(s) IV Continuous <Continuous>  magnesium sulfate  IVPB 2 Gram(s) IV Intermittent once  pantoprazole  Injectable 40 milliGRAM(s) IV Push daily  Parenteral Nutrition - Adult 1 Each TPN Continuous <Continuous>  potassium chloride  20 mEq/100 mL IVPB 20 milliEquivalent(s) IV Intermittent once  potassium chloride  20 mEq/100 mL IVPB 20 milliEquivalent(s) IV Intermittent once  potassium phosphate IVPB 15 milliMole(s) IV Intermittent once    Genitourinary Medications    Hematologic/Oncologic Medications  heparin   Injectable 5000 Unit(s) SubCutaneous every 8 hours    Antimicrobial/Immunologic Medications  cefepime   IVPB 1000 milliGRAM(s) IV Intermittent every 8 hours  metroNIDAZOLE  IVPB 500 milliGRAM(s) IV Intermittent every 8 hours    ICU Vital Signs Last 24 Hrs  T(C): 37.3 (15 Sep 2020 14:11), Max: 37.3 (15 Sep 2020 11:42)  T(F): 99.1 (15 Sep 2020 11:42), Max: 99.1 (15 Sep 2020 11:42)  HR: 126 (15 Sep 2020 14:11) (88 - 137)  BP: 114/62 (15 Sep 2020 14:11) (91/61 - 135/74)  RR: 16 (15 Sep 2020 14:11) (13 - 18)  SpO2: 98% (15 Sep 2020 14:11) (98% - 100%)    I&O's Summary    14 Sep 2020 07:01  -  15 Sep 2020 07:00  --------------------------------------------------------  IN: 360 mL / OUT: 275 mL / NET: 85 mL    15 Sep 2020 07:01  -  15 Sep 2020 19:01  --------------------------------------------------------  IN: 2460 mL / OUT: 660 mL / NET: 1800 mL      PHYSICAL EXAM:  NEURO:  NAD, alert, oriented x 3, no focal deficits    RESP: lungs clear to auscultation, normal expansion/effort      CV: S1, S2, tachycardic to 120s  Cardiac rhythm: normal sinus rhythm    GI: abdomen soft, nontender, nondistended, midline laparotomy incision packed with kerlex and dressing c/d/i  Current diet:  NPO    EXTREMITIES: extremities warm, pink, well perfused    DERM: good skin turgor, no skin breakdown      : escalera catheter in place    LINES/TUBES/DRAINS:  - peripheral IV - right AC  - arterial line - left wrist  - central line - right IJ  - urinary escalera catheter - placed 9/15    CXR:     LABS:  CAPILLARY BLOOD GLUCOSE      POCT Blood Glucose.: 199 mg/dL (15 Sep 2020 17:05)  POCT Blood Glucose.: 167 mg/dL (15 Sep 2020 11:40)                          10.1   4.47  )-----------( 234      ( 15 Sep 2020 17:51 )             31.4       09-15    130<L>  |  99  |  5<L>  ----------------------------<  233<H>  3.4<L>   |  18  |  0.6<L>    Ca    6.8<L>      15 Sep 2020 17:51  Phos  2.6     09-15  Mg     1.4     09-15    TPro  4.7<L>  /  Alb  2.7<L>  /  TBili  0.4  /  DBili  0.2  /  AST  13  /  ALT  8   /  AlkPhos  51  09-15      PT/INR - ( 15 Sep 2020 17:51 )   PT: 22.40 sec;   INR: 1.95 ratio         PTT - ( 14 Sep 2020 19:26 )  PTT:33.6 sec  CARDIAC MARKERS ( 15 Sep 2020 17:51 )  x     / <0.01 ng/mL / 241 U/L / x     / x        [X] A ten-point review of systems was otherwise negative except as noted above.  [  ] Due to altered mental status/intubation, subjective information was not attained from the patient. History was obtained, to the extent possible, from review of the chart and collateral sources of information.      Assessment & Plan    45y Female s/p PAULIE/BSO on 8/19, s/p diagnostic laparoscopy converted to exploratory laparotomy and small bowel re-anastamosis, with SBO perforation and now s/p repeat exploratory laparotomy with small bowel resection and re-anastamosis and appendectomy, POD0, tachycardic and hypotensive postop requiring pressors likely septic shock, for close hemodynamic monitoring in SICU.    NEURO:    Lethargic but arousable, AAOx3      Acute pain-controlled with dilaudid and IV tylenol prn    RESP:     Currently on 3L face tent, O2 sat 100% --> wean as tolerated    Activity as tolerated    ABG @2100      CARDS:     Hypotension: norepinephrine Infusion 0.05 <Continuous>, titrate to MAP >65     CE neg x1, f/u repeat     f/u stat EKG      GI/NUTR:     Diet, NPO    IVF 110cc/hr (per NICOM)    GI Prophylaxis:pantoprazole  Injectable 40 milliGRAM(s) IV Push daily      /RENAL:     Strict I/O, escalera in place    UO 150cc/hr    BUN/Cr- 5/0.6   <----,  8/0.6   <----,  12/0.7    Hypokalemia - repleted (K rider x2, kphos x1)    Hypomag - repleted (2g magnesium IVPB)    Hyponatremia - 135-->133-->130, continue to trend, BMP ordered    Lactate uptrending 1.8 --> 4.6 --> 6.5, continue to trend, repeat ordered @2000      HEME/ONC:     DVT Prophylaxis: heparin   Injectable 5000 Unit(s) SubCutaneous every 8 hours, SCDs    Acute anemia-H/H 10.1/31.4 (09-15 @ 17:51)    10.9 (09-15 @ 06:40)    Continue to trend, CBC ordered    ID:    s/p vanc 1g intraop    cefepime   IVPB 1000 IV Intermittent every 8 hours     metroNIDAZOLE  IVPB 500 IV Intermittent every 8 hours    trend WBC - last 4.47    ENDO:    Glucose Serum: 233 (09-15 @ 17:51)    LINES/DRAINS:  Jessica RAYO Foley     DISPO:    SICU      d/w Dr. Yen. GLORIA SCHAFER  [id number]    Indication for ICU admission: Septic shock  Admit Date:09-14-20  ICU Date: 9/15/2020  OR Date: 9/15/2020      Subjective:  44yo female with history of PAULIE/BSO on 8/19/20 (POD #27) for 15cm fibroid with readmission for abdominal pain and inability to tolerate PO on 8/30, found to have partial obstruction vs. ileus, resolved without operative intervention, patient was discharged. Pt returned on 9/4 with similar symptoms, CT unchanged, NG tube placed and pt improved, was discharged home. Pt returned again on 9/14 for the same symptoms, worsening abdominal pain, N/V. Last bowel movement was three days ago. While in the ED, pt was afebrile tachy to 115 as per note with documented HR in the 80s. Exam in the ED was abdomen soft, nondistended, and tender to the epigastrium.  CT scan demonstrated persistent findings of small bowel obstruction with a transition point in the lower anterior mid abdomen with mild wall thickening and hyperenhancement of the dilated small bowel loops, suspicious for infectious/inflammatory enteritis. Patient went for diagnostic lap converted to ex-lap, with many adhesions that were lysed, had 43 cm of small bowel resected with primary anastomosis with staples. Pt was hemodynamically stable throughout the case. Pt was extubated, was tachy to 110s in PACU.   Postop, patient began to have stool leaking from her abdominal incision.  Decision was made at that time to take patient back to OR emergent for exploratory laparotomy.  Patient was intubated without difficulty.  Right IJ central line placed immediately after induction.  Intraop patient received 1g vanc, 500mg flagyl, 8mg decadron, 4mg zofran, 200mg fentanyl.  Per anesthesia handoff, patient was initially hypotensive at induction however received 1L bolus LR and BPs returned back to normal. Intraop she remained tachycardic to the 140s, not relayed by anesthesia during handoff.  Surgically, bowel was run and perforation was noted <1cm from staple line of previous small bowel anastamosis.  Small bowel resection was performed, anastasmosis was taken down, appendix was torn so appendectomy was performed.  Distal small bowel was re-anastamosed to terminal ileum.  7cm of terminal ileum remain.  Abdomen was washed, skin remains open and packed with kerlex.  She was extubated without difficulty.   Postop she had a TAP block for pain management.     Patient was seen and evaluated at bedside, found to be A&Ox3 but lethargic, tachycardic to 140s, BP borderline at 95/50 MAP 62, satting 100% on 3L face tent, NGT in place. Abd midlined incision packed with kerlex c/d/i, abd soft, tender, nondistended. SBP dropped to 88. Patient was bolused 250 LR with no response. Decision was made to give Lopressor 5 IVP to increase cardiac filling. Initially SBP improved to 103, however, SBP then decreased to 70s with MAP in 50s. Fluids was run wide open through TLC port. A-line placement was initiated. Patient then stated on levophed through TLC. At the same time, fluids continued to run. Jessica was placed, BP matched cuff. Levophed started with limited response in BP, but MAP titrated to 65. HR post Lopressor 5IVP decreased to 112 but rebounded to 120. NICOM was negative at 1.2% TPR 1330, CO 3.9, CI 2.3.  Patient is getting electrolytes repleted, on Cefepime and Vancomycin. Preop lacate was 4.6, post op lactated increased to 6.5. Upon leaving bedside, patient was on levophed 0.23, BP on [place] 98/49, MAP 65; she had gotten 2L total in boluses and NICOM,  (after Lopressor 5IVP and Dilaudid 0.25).  Patient is A&Ox3 and following commands. SICU attending made aware, plan is to keep MAP over 65, get ABG in 2 hours, Metoprolol 2.5 IVP if need for persistent tachycardic above 120.     Spoke with [facility] [employer], made aware 9/15 @1815 that patient was started on pressors.    No Known Allergies  PAST MEDICAL & SURGICAL HISTORY:  Uterine fibroid    Status post bilateral salpingectomy  S/P PAULIE (total abdominal hysterectomy)    Home Medications:  ibuprofen 600 mg oral tablet: 1 tab(s) orally every 6 hours, As needed, Mild Pain (1 - 3) (01 Sep 2020 06:26)      Objective:  OR time: 3.5hrs    EBL: 50cc      IVF: 4L LR, 500cc 5% albumin              UO: 1100cc    CURRENT MEDS:  Neurologic Medications  HYDROmorphone  Injectable 0.25 milliGRAM(s) IV Push every 4 hours PRN Moderate Pain (4 - 6)    Cardiovascular Medications  norepinephrine Infusion 0.05 MICROgram(s)/kG/Min IV Continuous <Continuous>    Gastrointestinal Medications  lactated ringers Bolus 250 milliLiter(s) IV Bolus once  lactated ringers. 1000 milliLiter(s) IV Continuous <Continuous>  magnesium sulfate  IVPB 2 Gram(s) IV Intermittent once  pantoprazole  Injectable 40 milliGRAM(s) IV Push daily  Parenteral Nutrition - Adult 1 Each TPN Continuous <Continuous>  potassium chloride  20 mEq/100 mL IVPB 20 milliEquivalent(s) IV Intermittent once  potassium chloride  20 mEq/100 mL IVPB 20 milliEquivalent(s) IV Intermittent once  potassium phosphate IVPB 15 milliMole(s) IV Intermittent once    Genitourinary Medications    Hematologic/Oncologic Medications  heparin   Injectable 5000 Unit(s) SubCutaneous every 8 hours    Antimicrobial/Immunologic Medications  cefepime   IVPB 1000 milliGRAM(s) IV Intermittent every 8 hours  metroNIDAZOLE  IVPB 500 milliGRAM(s) IV Intermittent every 8 hours    ICU Vital Signs Last 24 Hrs  T(C): 37.3 (15 Sep 2020 14:11), Max: 37.3 (15 Sep 2020 11:42)  T(F): 99.1 (15 Sep 2020 11:42), Max: 99.1 (15 Sep 2020 11:42)  HR: 126 (15 Sep 2020 14:11) (88 - 137)  BP: 114/62 (15 Sep 2020 14:11) (91/61 - 135/74)  RR: 16 (15 Sep 2020 14:11) (13 - 18)  SpO2: 98% (15 Sep 2020 14:11) (98% - 100%)    I&O's Summary    14 Sep 2020 07:01  -  15 Sep 2020 07:00  --------------------------------------------------------  IN: 360 mL / OUT: 275 mL / NET: 85 mL    15 Sep 2020 07:01  -  15 Sep 2020 19:01  --------------------------------------------------------  IN: 2460 mL / OUT: 660 mL / NET: 1800 mL      PHYSICAL EXAM:  NEURO:  NAD, alert, oriented x 3, no focal deficits    RESP: lungs clear to auscultation, normal expansion/effort      CV: S1, S2, tachycardic to 120s  Cardiac rhythm: normal sinus rhythm    GI: abdomen soft, nontender, nondistended, midline laparotomy incision packed with kerlex and dressing c/d/i  Current diet:  NPO    EXTREMITIES: extremities warm, pink, well perfused    DERM: good skin turgor, no skin breakdown      : escalera catheter in place    LINES/TUBES/DRAINS:  - peripheral IV - right AC  - arterial line - left wrist  - central line - right IJ  - urinary escalera catheter - placed 9/15    CXR:     LABS:  CAPILLARY BLOOD GLUCOSE      POCT Blood Glucose.: 199 mg/dL (15 Sep 2020 17:05)  POCT Blood Glucose.: 167 mg/dL (15 Sep 2020 11:40)                          10.1   4.47  )-----------( 234      ( 15 Sep 2020 17:51 )             31.4       09-15    130<L>  |  99  |  5<L>  ----------------------------<  233<H>  3.4<L>   |  18  |  0.6<L>    Ca    6.8<L>      15 Sep 2020 17:51  Phos  2.6     09-15  Mg     1.4     09-15    TPro  4.7<L>  /  Alb  2.7<L>  /  TBili  0.4  /  DBili  0.2  /  AST  13  /  ALT  8   /  AlkPhos  51  09-15      PT/INR - ( 15 Sep 2020 17:51 )   PT: 22.40 sec;   INR: 1.95 ratio         PTT - ( 14 Sep 2020 19:26 )  PTT:33.6 sec  CARDIAC MARKERS ( 15 Sep 2020 17:51 )  x     / <0.01 ng/mL / 241 U/L / x     / x        [X] A ten-point review of systems was otherwise negative except as noted above.  [  ] Due to altered mental status/intubation, subjective information was not attained from the patient. History was obtained, to the extent possible, from review of the chart and collateral sources of information.      Assessment & Plan    45y Female s/p PAULIE/BSO on 8/19, s/p diagnostic laparoscopy converted to exploratory laparotomy and small bowel re-anastamosis, with SBO perforation and now s/p repeat exploratory laparotomy with small bowel resection and re-anastamosis and appendectomy, POD0, tachycardic and hypotensive postop requiring pressors likely septic shock, for close hemodynamic monitoring in SICU.    NEURO:    Lethargic but arousable, AAOx3      Acute pain-controlled with dilaudid and IV tylenol prn    RESP:     Currently on 3L face tent, O2 sat 100% --> wean as tolerated    Activity as tolerated    ABG @2100      CARDS:     Hypotension: norepinephrine Infusion 0.05 <Continuous>, titrate to MAP >65     CE neg x1, f/u repeat     f/u stat EKG      GI/NUTR:     Diet, NPO    IVF 110cc/hr (per NICOM)    GI Prophylaxis:pantoprazole  Injectable 40 milliGRAM(s) IV Push daily      /RENAL:     Strict I/O, escalera in place    UO 150cc/hr    BUN/Cr- 5/0.6   <----,  8/0.6   <----,  12/0.7    Hypokalemia - repleted (K rider x2, kphos x1)    Hypomag - repleted (2g magnesium IVPB)    Hyponatremia - 135-->133-->130, continue to trend, BMP ordered    Lactate uptrending 1.8 --> 4.6 --> 6.5, continue to trend, repeat ordered @2000      HEME/ONC:     DVT Prophylaxis: heparin   Injectable 5000 Unit(s) SubCutaneous every 8 hours, SCDs    Acute anemia-H/H 10.1/31.4 (09-15 @ 17:51)    10.9 (09-15 @ 06:40)    Continue to trend, CBC ordered    ID:    s/p vanc 1g intraop    cefepime   IVPB 1000 IV Intermittent every 8 hours     metroNIDAZOLE  IVPB 500 IV Intermittent every 8 hours    trend WBC - last 4.47    ENDO:    Glucose Serum: 233 (09-15 @ 17:51)    LINES/DRAINS:  Jessica RAYO Foley     DISPO:    SICU      d/w Dr. Yen. GLORIA SCHAFER  [id number]    Indication for ICU admission: Septic shock  Admit Date:09-14-20  ICU Date: 9/15/2020  OR Date: 9/15/2020      Subjective:  46yo female with history of PAULIE/BSO on 8/19/20 (POD #27) for 15cm fibroid with readmission for abdominal pain and inability to tolerate PO on 8/30, found to have partial obstruction vs. ileus, resolved without operative intervention, patient was discharged. Pt returned on 9/4 with similar symptoms, CT unchanged, NG tube placed and pt improved, was discharged home. Pt returned again on 9/14 for the same symptoms, worsening abdominal pain, N/V. Last bowel movement was three days ago. While in the ED, pt was afebrile tachy to 115 as per note with documented HR in the 80s. Exam in the ED was abdomen soft, nondistended, and tender to the epigastrium.  CT scan demonstrated persistent findings of small bowel obstruction with a transition point in the lower anterior mid abdomen with mild wall thickening and hyperenhancement of the dilated small bowel loops, suspicious for infectious/inflammatory enteritis. Patient went for diagnostic lap converted to ex-lap, with many adhesions that were lysed, had 43 cm of small bowel resected with primary anastomosis with staples. Pt was hemodynamically stable throughout the case. Pt was extubated, was tachy to 110s in PACU.   Postop, patient began to have stool leaking from her abdominal incision.  Decision was made at that time to take patient back to OR emergent for exploratory laparotomy.  Patient was intubated without difficulty.  Right IJ central line placed immediately after induction.  Intraop patient received 1g vanc, 500mg flagyl, 8mg decadron, 4mg zofran, 200mg fentanyl.  Per anesthesia handoff, patient was initially hypotensive at induction however received 1L bolus LR and BPs returned back to normal. Intraop she remained tachycardic to the 140s, not relayed by anesthesia during handoff.  Surgically, bowel was run and perforation was noted <1cm from staple line of previous small bowel anastamosis.  Small bowel resection was performed, anastasmosis was taken down, appendix was torn so appendectomy was performed.  Distal small bowel was re-anastamosed to terminal ileum.  7cm of terminal ileum remain.  Abdomen was washed, skin remains open and packed with kerlex.  She was extubated without difficulty.   Postop she had a TAP block for pain management.     Patient was seen and evaluated at bedside, found to be A&Ox3 but lethargic, tachycardic to 140s, BP borderline at 95/50 MAP 62, satting 100% on 3L face tent, NGT in place. Abd midlined incision packed with kerlex c/d/i, abd soft, tender, nondistended. SBP dropped to 88. Patient was bolused 250 LR with no response. Decision was made to give Lopressor 5 IVP to increase cardiac filling. Initially SBP improved to 103, however, SBP then decreased to 70s with MAP in 50s. Fluids was run wide open through TLC port. A-line placement was initiated. Patient then stated on levophed through TLC. At the same time, fluids continued to run. Jessica was placed, BP matched cuff. Levophed started with limited response in BP, but MAP titrated to 65. HR post Lopressor 5IVP decreased to 112 but rebounded to 120. NICOM was negative at 1.2% TPR 1330, CO 3.9, CI 2.3.  Patient is getting electrolytes repleted, on Cefepime and Vancomycin. Preop lacate was 4.6, post op lactated increased to 6.5. Upon leaving bedside, patient was on levophed 0.23, BP on [place] 98/49, MAP 65; she had gotten 2L total in boluses and NICOM,  (after Lopressor 5IVP and Dilaudid 0.25).  Patient is A&Ox3 and following commands. SICU attending made aware, plan is to keep MAP over 65, get ABG in 2 hours, Metoprolol 2.5 IVP if need for persistent tachycardic above 120.     Spoke with [facility] [employer], made aware 9/15 @1815 that patient was started on pressors.    No Known Allergies  PAST MEDICAL & SURGICAL HISTORY:  Uterine fibroid    Status post bilateral salpingectomy  S/P PAULIE (total abdominal hysterectomy)    Home Medications:  ibuprofen 600 mg oral tablet: 1 tab(s) orally every 6 hours, As needed, Mild Pain (1 - 3) (01 Sep 2020 06:26)      Objective:  OR time: 3.5hrs    EBL: 50cc      IVF: 4L LR, 500cc 5% albumin              UO: 1100cc    CURRENT MEDS:  Neurologic Medications  HYDROmorphone  Injectable 0.25 milliGRAM(s) IV Push every 4 hours PRN Moderate Pain (4 - 6)    Cardiovascular Medications  norepinephrine Infusion 0.05 MICROgram(s)/kG/Min IV Continuous <Continuous>    Gastrointestinal Medications  lactated ringers Bolus 250 milliLiter(s) IV Bolus once  lactated ringers. 1000 milliLiter(s) IV Continuous <Continuous>  magnesium sulfate  IVPB 2 Gram(s) IV Intermittent once  pantoprazole  Injectable 40 milliGRAM(s) IV Push daily  Parenteral Nutrition - Adult 1 Each TPN Continuous <Continuous>  potassium chloride  20 mEq/100 mL IVPB 20 milliEquivalent(s) IV Intermittent once  potassium chloride  20 mEq/100 mL IVPB 20 milliEquivalent(s) IV Intermittent once  potassium phosphate IVPB 15 milliMole(s) IV Intermittent once    Genitourinary Medications    Hematologic/Oncologic Medications  heparin   Injectable 5000 Unit(s) SubCutaneous every 8 hours    Antimicrobial/Immunologic Medications  cefepime   IVPB 1000 milliGRAM(s) IV Intermittent every 8 hours  metroNIDAZOLE  IVPB 500 milliGRAM(s) IV Intermittent every 8 hours    ICU Vital Signs Last 24 Hrs  T(C): 37.3 (15 Sep 2020 14:11), Max: 37.3 (15 Sep 2020 11:42)  T(F): 99.1 (15 Sep 2020 11:42), Max: 99.1 (15 Sep 2020 11:42)  HR: 126 (15 Sep 2020 14:11) (88 - 137)  BP: 114/62 (15 Sep 2020 14:11) (91/61 - 135/74)  RR: 16 (15 Sep 2020 14:11) (13 - 18)  SpO2: 98% (15 Sep 2020 14:11) (98% - 100%)    I&O's Summary    14 Sep 2020 07:01  -  15 Sep 2020 07:00  --------------------------------------------------------  IN: 360 mL / OUT: 275 mL / NET: 85 mL    15 Sep 2020 07:01  -  15 Sep 2020 19:01  --------------------------------------------------------  IN: 2460 mL / OUT: 660 mL / NET: 1800 mL      PHYSICAL EXAM:  NEURO:  NAD, alert, oriented x 3, no focal deficits    RESP: lungs clear to auscultation, normal expansion/effort      CV: S1, S2, tachycardic to 120s  Cardiac rhythm: normal sinus rhythm    GI: abdomen soft, nontender, nondistended, midline laparotomy incision packed with kerlex and dressing c/d/i  Current diet:  NPO    EXTREMITIES: extremities warm, pink, well perfused    DERM: good skin turgor, no skin breakdown      : escalera catheter in place    LINES/TUBES/DRAINS:  - peripheral IV - right AC  - arterial line - left wrist  - central line - right IJ  - urinary escalera catheter - placed 9/15    CXR:     LABS:  CAPILLARY BLOOD GLUCOSE      POCT Blood Glucose.: 199 mg/dL (15 Sep 2020 17:05)  POCT Blood Glucose.: 167 mg/dL (15 Sep 2020 11:40)                          10.1   4.47  )-----------( 234      ( 15 Sep 2020 17:51 )             31.4       09-15    130<L>  |  99  |  5<L>  ----------------------------<  233<H>  3.4<L>   |  18  |  0.6<L>    Ca    6.8<L>      15 Sep 2020 17:51  Phos  2.6     09-15  Mg     1.4     09-15    TPro  4.7<L>  /  Alb  2.7<L>  /  TBili  0.4  /  DBili  0.2  /  AST  13  /  ALT  8   /  AlkPhos  51  09-15      PT/INR - ( 15 Sep 2020 17:51 )   PT: 22.40 sec;   INR: 1.95 ratio         PTT - ( 14 Sep 2020 19:26 )  PTT:33.6 sec  CARDIAC MARKERS ( 15 Sep 2020 17:51 )  x     / <0.01 ng/mL / 241 U/L / x     / x        [X] A ten-point review of systems was otherwise negative except as noted above.  [  ] Due to altered mental status/intubation, subjective information was not attained from the patient. History was obtained, to the extent possible, from review of the chart and collateral sources of information.      Assessment & Plan    45y Female s/p PAULIE/BSO on 8/19, s/p diagnostic laparoscopy converted to exploratory laparotomy and small bowel re-anastamosis, with SBO perforation and now s/p repeat exploratory laparotomy with small bowel resection and re-anastamosis and appendectomy, POD0, tachycardic and hypotensive postop requiring pressors likely septic shock, for close hemodynamic monitoring in SICU.    NEURO:    Lethargic but arousable, AAOx3      Acute pain-controlled with dilaudid and IV tylenol prn    RESP:     Currently on 3L face tent, O2 sat 100% --> wean as tolerated    Activity as tolerated    ABG @2100      CARDS:     Hypotension: norepinephrine Infusion 0.05 <Continuous>, titrate to MAP >65     CE neg x1, f/u repeat     f/u stat EKG      GI/NUTR:     Diet, NPO    IVF 110cc/hr (per NICOM)    GI Prophylaxis:pantoprazole  Injectable 40 milliGRAM(s) IV Push daily      /RENAL:     Strict I/O, escalera in place    UO 150cc/hr    BUN/Cr- 5/0.6   <----,  8/0.6   <----,  12/0.7    Hypokalemia - repleted (K rider x2, kphos x1)    Hypomag - repleted (2g magnesium IVPB)    Hyponatremia - 135-->133-->130, continue to trend, BMP ordered    Lactate uptrending 1.8 --> 4.6 --> 6.5, continue to trend, repeat ordered @2000      HEME/ONC:     DVT Prophylaxis: heparin   Injectable 5000 Unit(s) SubCutaneous every 8 hours, SCDs    Acute anemia-H/H 10.1/31.4 (09-15 @ 17:51)    10.9 (09-15 @ 06:40)    Continue to trend, CBC ordered    ID:    s/p vanc 1g intraop    cefepime   IVPB 1000 IV Intermittent every 8 hours     metroNIDAZOLE  IVPB 500 IV Intermittent every 8 hours    trend WBC - last 4.47    ENDO:    Glucose Serum: 233 (09-15 @ 17:51)    LINES/DRAINS:  PIV, [place], central line, Escalera     DISPO:    SICU      d/w Dr. Yen. GLORIA SCHAFER  [id number]    Indication for ICU admission: Septic shock  Admit Date:09-14-20  ICU Date: 9/15/2020  OR Date: 9/15/2020      Subjective:  44yo female with history of PAULIE/BSO on 8/19/20 (POD #27) for 15cm fibroid with readmission for abdominal pain and inability to tolerate PO on 8/30, found to have partial obstruction vs. ileus, resolved without operative intervention, patient was discharged. Pt returned on 9/4 with similar symptoms, CT unchanged, NG tube placed and pt improved, was discharged home. Pt returned again on 9/14 for the same symptoms, worsening abdominal pain, N/V. Last bowel movement was three days ago. While in the ED, pt was afebrile tachy to 115 as per note with documented HR in the 80s. Exam in the ED was abdomen soft, nondistended, and tender to the epigastrium.  CT scan demonstrated persistent findings of small bowel obstruction with a transition point in the lower anterior mid abdomen with mild wall thickening and hyperenhancement of the dilated small bowel loops, suspicious for infectious/inflammatory enteritis. Patient went for diagnostic lap converted to ex-lap, with many adhesions that were lysed, had 43 cm of small bowel resected with primary anastomosis with staples. Pt was hemodynamically stable throughout the case. Pt was extubated, was tachy to 110s in PACU.   Postop, patient began to have stool leaking from her abdominal incision.  Decision was made at that time to take patient back to OR emergent for exploratory laparotomy.  Patient was intubated without difficulty.  Right IJ central line placed immediately after induction.  Intraop patient received 1g vanc, 500mg flagyl, 8mg decadron, 4mg zofran, 200mg fentanyl.  Per anesthesia handoff, patient was initially hypotensive at induction however received 1L bolus LR and BPs returned back to normal. Intraop she remained tachycardic to the 140s, not relayed by anesthesia during handoff.  Surgically, bowel was run and perforation was noted <1cm from staple line of previous small bowel anastamosis.  Small bowel resection was performed, anastasmosis was taken down, appendix was torn so appendectomy was performed.  Distal small bowel was re-anastamosed to terminal ileum.  7cm of terminal ileum remain.  Abdomen was washed, skin remains open and packed with kerlex.  She was extubated without difficulty.   Postop she had a TAP block for pain management.     Patient was seen and evaluated at bedside, found to be A&Ox3 but lethargic, tachycardic to 140s, BP borderline at 95/50 MAP 62, satting 100% on 3L face tent, NGT in place. Abd midlined incision packed with kerlex c/d/i, abd soft, tender, nondistended. SBP dropped to 88. Patient was bolused 250 LR with no response. Decision was made to give Lopressor 5 IVP to increase cardiac filling. Initially SBP improved to 103, however, SBP then decreased to 70s with MAP in 50s. Fluids was run wide open through TLC port. A-line placement was initiated. Patient then stated on levophed through TLC. At the same time, fluids continued to run. Jessica was placed, BP matched cuff. Levophed started with limited response in BP, but MAP titrated to 65. HR post Lopressor 5IVP decreased to 112 but rebounded to 120. NICOM was negative at 1.2% TPR 1330, CO 3.9, CI 2.3.  Patient is getting electrolytes repleted, on Cefepime and Vancomycin. Preop lacate was 4.6, post op lactated increased to 6.5. Upon leaving bedside, patient was on levophed 0.23, BP on [place] 98/49, MAP 65; she had gotten 2L total in boluses and NICOM,  (after Lopressor 5IVP and Dilaudid 0.25).  Patient is A&Ox3 and following commands. SICU attending made aware, plan is to keep MAP over 65, get ABG in 2 hours, Metoprolol 2.5 IVP if need for persistent tachycardic above 120.     Spoke with [facility] [employer], made aware 9/15 @1815 that patient was started on pressors.    No Known Allergies  PAST MEDICAL & SURGICAL HISTORY:  Uterine fibroid    Status post bilateral salpingectomy  S/P PAULIE (total abdominal hysterectomy)    Home Medications:  ibuprofen 600 mg oral tablet: 1 tab(s) orally every 6 hours, As needed, Mild Pain (1 - 3) (01 Sep 2020 06:26)      Objective:  OR time: 3.5hrs    EBL: 50cc      IVF: 4L LR, 500cc 5% albumin              UO: 1100cc    CURRENT MEDS:  Neurologic Medications  HYDROmorphone  Injectable 0.25 milliGRAM(s) IV Push every 4 hours PRN Moderate Pain (4 - 6)    Cardiovascular Medications  norepinephrine Infusion 0.05 MICROgram(s)/kG/Min IV Continuous <Continuous>    Gastrointestinal Medications  lactated ringers Bolus 250 milliLiter(s) IV Bolus once  lactated ringers. 1000 milliLiter(s) IV Continuous <Continuous>  magnesium sulfate  IVPB 2 Gram(s) IV Intermittent once  pantoprazole  Injectable 40 milliGRAM(s) IV Push daily  Parenteral Nutrition - Adult 1 Each TPN Continuous <Continuous>  potassium chloride  20 mEq/100 mL IVPB 20 milliEquivalent(s) IV Intermittent once  potassium chloride  20 mEq/100 mL IVPB 20 milliEquivalent(s) IV Intermittent once  potassium phosphate IVPB 15 milliMole(s) IV Intermittent once    Genitourinary Medications    Hematologic/Oncologic Medications  heparin   Injectable 5000 Unit(s) SubCutaneous every 8 hours    Antimicrobial/Immunologic Medications  cefepime   IVPB 1000 milliGRAM(s) IV Intermittent every 8 hours  metroNIDAZOLE  IVPB 500 milliGRAM(s) IV Intermittent every 8 hours    ICU Vital Signs Last 24 Hrs  T(C): 37.3 (15 Sep 2020 14:11), Max: 37.3 (15 Sep 2020 11:42)  T(F): 99.1 (15 Sep 2020 11:42), Max: 99.1 (15 Sep 2020 11:42)  HR: 126 (15 Sep 2020 14:11) (88 - 137)  BP: 114/62 (15 Sep 2020 14:11) (91/61 - 135/74)  RR: 16 (15 Sep 2020 14:11) (13 - 18)  SpO2: 98% (15 Sep 2020 14:11) (98% - 100%)    I&O's Summary    14 Sep 2020 07:01  -  15 Sep 2020 07:00  --------------------------------------------------------  IN: 360 mL / OUT: 275 mL / NET: 85 mL    15 Sep 2020 07:01  -  15 Sep 2020 19:01  --------------------------------------------------------  IN: 2460 mL / OUT: 660 mL / NET: 1800 mL      PHYSICAL EXAM:  NEURO:  NAD, alert, oriented x 3, no focal deficits    RESP: lungs clear to auscultation, normal expansion/effort      CV: S1, S2, tachycardic to 120s  Cardiac rhythm: normal sinus rhythm    GI: abdomen soft, nontender, nondistended, midline laparotomy incision packed with kerlex and dressing c/d/i  Current diet:  NPO    EXTREMITIES: extremities warm, pink, well perfused    DERM: good skin turgor, no skin breakdown      : escalera catheter in place    LINES/TUBES/DRAINS:  - peripheral IV - right AC  - arterial line - left wrist  - central line - right IJ  - urinary escalera catheter - placed 9/15    CXR:     LABS:  CAPILLARY BLOOD GLUCOSE      POCT Blood Glucose.: 199 mg/dL (15 Sep 2020 17:05)  POCT Blood Glucose.: 167 mg/dL (15 Sep 2020 11:40)                          10.1   4.47  )-----------( 234      ( 15 Sep 2020 17:51 )             31.4       09-15    130<L>  |  99  |  5<L>  ----------------------------<  233<H>  3.4<L>   |  18  |  0.6<L>    Ca    6.8<L>      15 Sep 2020 17:51  Phos  2.6     09-15  Mg     1.4     09-15    TPro  4.7<L>  /  Alb  2.7<L>  /  TBili  0.4  /  DBili  0.2  /  AST  13  /  ALT  8   /  AlkPhos  51  09-15      PT/INR - ( 15 Sep 2020 17:51 )   PT: 22.40 sec;   INR: 1.95 ratio         PTT - ( 14 Sep 2020 19:26 )  PTT:33.6 sec  CARDIAC MARKERS ( 15 Sep 2020 17:51 )  x     / <0.01 ng/mL / 241 U/L / x     / x        [X] A ten-point review of systems was otherwise negative except as noted above.  [  ] Due to altered mental status/intubation, subjective information was not attained from the patient. History was obtained, to the extent possible, from review of the chart and collateral sources of information.      Assessment & Plan    45y Female s/p PAULIE/BSO on 8/19, s/p diagnostic laparoscopy converted to exploratory laparotomy and small bowel re-anastamosis, with SBO perforation and now s/p repeat exploratory laparotomy with small bowel resection and re-anastamosis and appendectomy, POD0, tachycardic and hypotensive postop requiring pressors likely septic shock, for close hemodynamic monitoring in SICU.    NEURO:    Lethargic but arousable, AAOx3      Acute pain-controlled with dilaudid and IV tylenol prn    RESP:     Currently on 3L face tent, O2 sat 100% --> wean as tolerated    Activity as tolerated    ABG @2100      CARDS:     Hypotension: norepinephrine Infusion 0.05 <Continuous>, titrate to MAP >65     S/p metprolol 5mg IVP, may give metoprolol 2mg IVP if persistent tachycardia >120bpm    CE neg x1, f/u repeat     f/u stat EKG      GI/NUTR:     Diet, NPO    IVF 110cc/hr (per NICOM)    GI Prophylaxis: pantoprazole  Injectable 40 milliGRAM(s) IV Push daily      /RENAL:     Strict I/O, escalera in place    UO 150cc/hr    BUN/Cr- 5/0.6   <----,  8/0.6   <----,  12/0.7    Hypokalemia - repleted (K rider x2, kphos x1)    Hypomag - repleted (2g magnesium IVPB)    Hyponatremia - 135-->133-->130, continue to trend, BMP ordered    Lactate uptrending 1.8 --> 4.6 --> 6.5, continue to trend, repeat ordered @2000      HEME/ONC:     DVT Prophylaxis: heparin   Injectable 5000 Unit(s) SubCutaneous every 8 hours, SCDs    Acute anemia-H/H 10.1/31.4 (09-15 @ 17:51)    10.9 (09-15 @ 06:40)    Continue to trend, CBC ordered    ID:    s/p vanc 1g intraop    cefepime   IVPB 1000 IV Intermittent every 8 hours     metroNIDAZOLE  IVPB 500 IV Intermittent every 8 hours    trend WBC - last 4.47    ENDO:    Glucose Serum: 233 (09-15 @ 17:51)    LINES/DRAINS:  PIV, [place], central line, [place]     DISPO:    BEHZAD      d/w Dr. Glover

## 2020-09-15 NOTE — CONSULT NOTE ADULT - SUBJECTIVE AND OBJECTIVE BOX
SICU Consultation Note  ======================================================================================================  GLORIA SCHAFER  MRN-[id number]      45F w/recent history of PAULIE/BSO on 8/19/20 (POD #27) for 15cm bleeding fibroid with readmission for abdominal pain and inability to tolerate PO on 8/30, found to have partial obstruction vs. ileus, resolved without operative intervention, patient was discharged. Pt returned on 9/4 with similar symptoms, CT unchanged, NG tube placed and pt improved, was discharged home. Pt returned again on 9/14 for the same symptoms, worsening abdominal pain, N/V. Last bowel movement was three days ago. While in the ED, pt was afebrile tachy to 115 as per note with documented HR in the 80s. Exam in the ED was abdomen soft, nondistended, and tender to the epigastrium.  CT scan demonstrated persistent findings of small bowel obstruction with a transition point in the lower anterior mid abdomen with mild wall thickening and hyperenhancement of the dilated small bowel loops, suspicious for infectious/inflammatory enteritis. Patient went for diagnostic lap converted to ex-lap, with many adhesions that were lysed, had 43 cm of small bowel resected with primary anastomosis with staples. Pt was hemodynamically stable throughout the case. Pt was extubated, was tachy to 110s in PACU. SICU was consulted for CEU to monitor pts hemodynamic status    Consults-  Consult Note Adult-GYN Resident [GILL Melisas] (09-14-20)  Consult Note Adult-GYN Resident [CARLOS Elizalde] (09-04-20)  Consult Note Adult-Surgery Resident [CARLYLE Tineo] (09-04-20)  Consult Note Adult-Surgery Physician Assistant/Attending [CARLYLE Bar N. Champion] (08-30-20)  Consult Note Adult-GYN Resident/Attending [GILL Kebede O. Roy] (08-29-20)  Consult Note Adult-GYN Resident [SWETHA Elizalde] (08-18-20)      Procedure:  OR Stats  OR Time: 4hr              EBL:   50       IV Fluids:   2L    Blood Products:       None         UOP:    500  Findings- diagnostic lap converted to ex-lap, with many adhesions that were lysed, had 43 cm of small bowel resected with primary anastomosis with staples    PMH  PAST MEDICAL & SURGICAL HISTORY:  Uterine fibroid    Status post bilateral salpingectomy    S/P PAULIE (total abdominal hysterectomy)        Home Meds:  Home Medications:  ibuprofen 600 mg oral tablet: 1 tab(s) orally every 6 hours, As needed, Mild Pain (1 - 3) (01 Sep 2020 06:26)         Allergies  Allergies    No Known Allergies    Intolerances         Current Medications:  acetaminophen  IVPB .. 1000 milliGRAM(s) IV Intermittent once  cefoTEtan  IVPB 2 Gram(s) IV Intermittent every 12 hours  heparin   Injectable 5000 Unit(s) SubCutaneous every 8 hours  HYDROmorphone  Injectable 0.5 milliGRAM(s) IV Push every 10 minutes PRN Moderate Pain (4 - 6)  HYDROmorphone  Injectable 1 milliGRAM(s) IV Push every 10 minutes PRN Severe Pain (7 - 10)  ketorolac   Injectable 30 milliGRAM(s) IV Push every 8 hours PRN Moderate Pain (4 - 6)  lactated ringers Bolus 1000 milliLiter(s) IV Bolus once  lactated ringers. 1000 milliLiter(s) (110 mL/Hr) IV Continuous <Continuous>  morphine  - Injectable 4 milliGRAM(s) IV Push every 4 hours PRN Severe Pain (7 - 10)  ondansetron Injectable 4 milliGRAM(s) IV Push every 6 hours PRN Nausea  pantoprazole  Injectable 40 milliGRAM(s) IV Push daily      Advanced Directives: Presumed Full Code    VITAL SIGNS, INS/OUTS (Last 24hours):    ICU Vital Signs Last 24 Hrs  T(C): 36.6 (15 Sep 2020 06:30), Max: 37.1 (15 Sep 2020 05:30)  T(F): 97.9 (15 Sep 2020 06:30), Max: 98.7 (15 Sep 2020 05:30)  HR: 112 (15 Sep 2020 06:30) (88 - 115)  BP: 122/76 (15 Sep 2020 06:30) (107/73 - 135/74)  BP(mean): --  ABP: --  ABP(mean): --  RR: 14 (15 Sep 2020 06:30) (13 - 18)  SpO2: 100% (15 Sep 2020 06:30) (98% - 100%)    I&O's Summary    14 Sep 2020 07:01  -  15 Sep 2020 06:49  --------------------------------------------------------  IN: 150 mL / OUT: 200 mL / NET: -50 mL        Height (cm): 160 (09-15-20)  Weight (kg): 68.3 (09-15-20)  BMI (kg/m2): 26.7 (09-15-20)  BSA (m2): 1.71 (09-15-20)    Physical Exam:   ---------------------------------------------------------------------------------------  NEURO: Pt lethargic from surgery but answering questions by shaking her head    RESPIRATORY:  Normal expansion/effort  Saturating 100% on 3L NC    CARDIOVASCULAR:   S1/S2.  Regular, tachycardic  No peripheral edema    GASTROINTESTINAL:  Abdomen soft, non-tender, non-distended.  Lap surgical sites with skin glue. Ex-lap wound C/D/I  NG tube in place    MUSCULOSKELETAL:  Extremities warm, pink, well-perfused.    DERM:  No skin breakdown     :   Exam: Silva catheter in place.       Tubes/Lines/Drains   ----------------------------------------------------------------------------------------------------------  [x] Peripheral IV  [X]NG tube                                                                    [X] Urinary Catheter Silva                                           LABS  --------------------------------------------------------------------------------------  LFTs  LIVER FUNCTIONS - ( 15 Sep 2020 05:40 )  Alb: 2.7 g/dL / Pro: 4.7 g/dL / ALK PHOS: 51 U/L / ALT: 8 U/L / AST: 13 U/L / GGT: x             Cardiac Markers        Coagulation  PT/INR - ( 14 Sep 2020 19:26 )   PT: 12.70 sec;   INR: 1.10 ratio         PTT - ( 14 Sep 2020 19:26 )  PTT:33.6 sec    Arterial Blood Gas      Blood Sugar  CAPILLARY BLOOD GLUCOSE          Urinalysis          CT/XRAY/ECHO/TCD/EEG  ----------------------------------------------------------------------------------------------            --------------------------------------------------------------------------------------

## 2020-09-15 NOTE — CHART NOTE - NSCHARTNOTEFT_GEN_A_CORE
PACU ANESTHESIA ADMISSION NOTE      Procedure: Washing out stomach contents    Small bowel resection with anastomosis    Lysis of intestinal adhesions    Diagnostic laparoscopy      Post op diagnosis:  Perforated viscus    Small bowel obstruction    Intermittent small bowel obstruction due to adhesions        ____  Intubated  TV:______       Rate: ______      FiO2: ______    __x__  Patent Airway    __x__  Full return of protective reflexes    __x__  Full recovery from anesthesia / back to baseline status    Vitals:  T(C): 37.3 (09-15-20 @ 14:11), Max: 37.3 (09-15-20 @ 11:42)  HR: 126 (09-15-20 @ 14:11) (88 - 137)  BP: 114/62 (09-15-20 @ 14:11) (91/61 - 135/74)  RR: 16 (09-15-20 @ 14:11) (13 - 18)  SpO2: 98% (09-15-20 @ 14:11) (98% - 100%)    Mental Status:  __x__ Awake   ___x__ Alert   _____ Drowsy   _____ Sedated    Nausea/Vomiting:  __x__ NO  ______Yes,   See Post - Op Orders          Pain Scale (0-10):  _____    Treatment: ____ None    __x__ See Post - Op/PCA Orders    Post - Operative Fluids:   ____ Oral   __x__ See Post - Op Orders    Plan: Discharge:   ____Home       _____Floor     _____Critical Care    _____  Other:_________________    Comments: Patient had smooth intraoperative event, no anesthesia complication.  PACU Vital signs: HR:     140       BP:     113   / 65         RR:   20          O2 Sat:    98   %     Temp 97.7

## 2020-09-15 NOTE — PROGRESS NOTE ADULT - ASSESSMENT
A/P:  GLORIA SCHAFER is a 45yFemale with PMH of SBOs s/p PAULIE-BSO for bleeding fibroids. Pt is HD2 POD1 s/p Small bowel resection with anastomosis, Lysis of intestinal adhesions.    · Operative Findings	multiple, dense adhesions of small and large bowel to anterior abdominal wall at the site of her prior laparotomy incision  43cm of ileum resected secondary to serosal tears, enterotomy  stapled, primary side to side ileoileal anastomosis      Plan:   - SICU consult for stepdown unit   - Pain management  - f/u post op labs  - nutrition consult for anticipated NPO status x1week      09-15-20 @ 10:49

## 2020-09-15 NOTE — CONSULT NOTE ADULT - ASSESSMENT
Assessment & Plan    45y Female 1d s/p small bowel resection with primary anastomosis     NEURO:    Acute pain-controlled with Tylenol, Toradol PRN, Morphine PRN, Dilaudid PRN    RESP:     No chronic hx    Oxygen insufficiency-wean off NC to RA as tolerate      CARDS:     -110    BP: 135/75 Postop    No chronic hx    EKG: Sinus tach @ 102     Labs:       GI/NUTR:     Diet, NPO    GI Prophylaxis- PPI    Zofran PRN    NGT LCWS    Hold Bowel Regimen     /RENAL:     Silva    Strict I/O    BUN/Cr- BUN/Cr- 8/0.6   <----,  12/0.7   <----    LR @110cc/hr + 1L bolus    09-14 @ 07:01  -  09-15 @ 06:49  --------------------------------------------------------  IN: 0 mL / OUT: 200 mL / NET: -200 mL    HEME/ONC:     DVT prophylaxis-HSQ    Preop Hgb 13.1    Follow up postop Hgb    ID:    Intraop: cefoTEtan intraop 2 doses     Preop wbc 11.6     Lactate 1.1    Afebrile    ENDO:    No chronic hx    Monitor glucose q4 while NPO    LINES/DRAINS:  PIV, NG , [place]     DISPO:    Will reevaluate for placement    To be discussed with attending Dr. Yen Assessment & Plan    45y Female 1d s/p small bowel resection with primary anastomosis     NEURO:    Acute pain-controlled with Tylenol, Toradol PRN, Morphine PRN, Dilaudid PRN    RESP:     No chronic hx    Oxygen insufficiency-wean off NC to RA as tolerate      CARDS:     -110    BP: 135/75 Postop    No chronic hx    EKG: Sinus tach @ 102     Labs:       GI/NUTR:     Diet, NPO    GI Prophylaxis- PPI    Zofran PRN    NGT LCWS    Hold Bowel Regimen     /RENAL:     Silva    Strict I/O    BUN/Cr- BUN/Cr- 8/0.6   <----,  12/0.7   <----    LR @110cc/hr + 1L bolus    Replete lytes PRN    09-14 @ 07:01  -  09-15 @ 06:49  --------------------------------------------------------  IN: 0 mL / OUT: 200 mL / NET: -200 mL    HEME/ONC:     DVT prophylaxis-HSQ    Preop Hgb 13.1    Follow up postop Hgb    ID:    Intraop: cefoTEtan intraop 2 doses     Preop wbc 11.6     Lactate 1.1    Afebrile    ENDO:    No chronic hx    Monitor glucose q4 while NPO    LINES/DRAINS:  PIV, NG , Silva     DISPO:    Will reevaluate for placement    To be discussed with attending Dr. Yen Assessment & Plan    45y Female 1d s/p small bowel resection with primary anastomosis     NEURO:    Acute pain-controlled with Tylenol, Toradol PRN, Morphine PRN, Dilaudid PRN    RESP:     No chronic hx    Oxygen insufficiency-wean off NC to RA as tolerate      CARDS:     -110    BP: 135/75 Postop    No chronic hx    EKG: Sinus tach @ 102     Labs:       GI/NUTR:     Diet, NPO    GI Prophylaxis- PPI    Zofran PRN    NGT LCWS    Hold Bowel Regimen     /RENAL:     Silva    Strict I/O    BUN/Cr- BUN/Cr- 8/0.6   <----,  12/0.7   <----    LR @110cc/hr + 1L bolus    Replete lytes PRN    09-14 @ 07:01  -  09-15 @ 06:49  --------------------------------------------------------  IN: 0 mL / OUT: 200 mL / NET: -200 mL    HEME/ONC:     DVT prophylaxis-HSQ    Preop Hgb 13.1    Follow up postop Hgb    ID:    Intraop: cefoTEtan intraop 2 doses     Preop wbc 11.6     Lactate 1.1    Afebrile    ENDO:    No chronic hx    Monitor glucose q4 while NPO    LINES/DRAINS:  PIV, NG , Silva     DISPO:    CEU    To be discussed with attending Dr. Yen

## 2020-09-15 NOTE — CHART NOTE - NSCHARTNOTEFT_GEN_A_CORE
46yo female with PSHx PAULIE/BSO 8/19/2020 for fibroids with recurrent SBO s/p laparoscopic NARCISO, exploratory laparotomy, extensive NARCISO, small bowel resection with primary anastomosis 9/15 POD#0. Was recovering in PACU and transferred to CEU. At this time, she is tachycardic to 110's, stool from wound, and patient complains of worsening abdominal pain. Consent obtained for EXPLORATORY LAPAROTOMY, POSSIBLE BOWEL RESECTION, POSSIBLE OSTOMY, AND ALL INDICATED PROCEDURES. Book as EMERGENCY. Concerns explained to patient and her sister-in-law. All questions were answered.

## 2020-09-15 NOTE — PROGRESS NOTE ADULT - SUBJECTIVE AND OBJECTIVE BOX
PGY4 progress note    POD#27 Ex lap PAULIE/BS, ureteral stent placement  POD#0 ex lap, resection of 43 cm of ileum secondary to serosal tears, side-to-side ileoileal anastomosis, enterotomy stapled  POD#0 Ex lap small bowel resection with anastomosis appendectomy    Patient seen and examined at bedside. Reports abdominal pain and requesting more pain medication. Feels very tired. Denies vomiting, chest pain, SOB. On levophed drip.     PE:  Vital Signs Last 24 Hrs  T(C): 36.3 (15 Sep 2020 20:00), Max: 37.3 (15 Sep 2020 11:42)  T(F): 97.4 (15 Sep 2020 20:00), Max: 99.1 (15 Sep 2020 11:42)  HR: 129 (15 Sep 2020 21:00) (88 - 140)  BP: 90/60 (15 Sep 2020 21:00) (70/48 - 135/74)  BP(mean): 71 (15 Sep 2020 21:00) (53 - 90)  RR: 23 (15 Sep 2020 21:00) (13 - 31)  SpO2: 100% (15 Sep 2020 21:00) (96% - 100%)    GEN: AAOX3, appears very weak and tired  CVS: tachycardic, normal rhythm   lungs: CTAB  abd: packed with kerlex, abdominal pad and dressing over vertical incision c/d/i. Abdomen soft, appropriately tender diffusely, no r/g/r  ext: no calf tenderness or edema, SCDs in place      NG tube output: [id number] 100cc  UO: [id number] 225cc    labs:                        10.1   4.47  )-----------( 234      ( 15 Sep 2020 17:51 )             31.4     09-15    130<L>  |  99  |  5<L>  ----------------------------<  233<H>  3.4<L>   |  18  |  0.6<L>    Ca    6.8<L>      15 Sep 2020 17:51  Phos  2.6     09-15  Mg     1.4     09-15    TPro  4.7<L>  /  Alb  2.7<L>  /  TBili  0.4  /  DBili  0.2  /  AST  13  /  ALT  8   /  AlkPhos  51  09-15    @1119 lactate 4.6  @1800 lactate 6.5,  (high), trop neg  @2031 lactate 4.9    PT/INR - ( 15 Sep 2020 17:51 )   PT: 22.40 sec;   INR: 1.95 ratio         PTT - ( 14 Sep 2020 19:26 )  PTT:33.6 sec    MEDICATIONS  (STANDING):  cefepime   IVPB 1000 milliGRAM(s) IV Intermittent every 8 hours  heparin   Injectable 5000 Unit(s) SubCutaneous every 8 hours  lactated ringers Bolus 1000 milliLiter(s) IV Bolus once  lactated ringers Bolus 1000 milliLiter(s) IV Bolus once  lactated ringers. 1000 milliLiter(s) (110 mL/Hr) IV Continuous <Continuous>  metroNIDAZOLE  IVPB 500 milliGRAM(s) IV Intermittent every 8 hours  norepinephrine Infusion 0.05 MICROgram(s)/kG/Min (3.2 mL/Hr) IV Continuous <Continuous>  pantoprazole  Injectable 40 milliGRAM(s) IV Push daily  Parenteral Nutrition - Adult 1 Each (85 mL/Hr) TPN Continuous <Continuous>  potassium chloride  20 mEq/100 mL IVPB 20 milliEquivalent(s) IV Intermittent once    MEDICATIONS  (PRN):  acetaminophen  IVPB .. 1000 milliGRAM(s) IV Intermittent once PRN Moderate Pain (4 - 6)  HYDROmorphone  Injectable 0.25 milliGRAM(s) IV Push every 4 hours PRN Moderate Pain (4 - 6)

## 2020-09-15 NOTE — PROGRESS NOTE ADULT - ATTENDING COMMENTS
46yo female with PSHx PAULIE/BSO 8/19/2020 for fibroids presenting with worsening abdominal pain. She has been admitted multiple times since surgery for abdominal pain, nausea and vomiting, treated non-operatively. Each time, CT A/P demonstrates small bowel obstruction with transition point in lower abdomen. At this time, patient is tender, well-healing scar, +guarding. WBC 11.6. CT A/P consistent with previous imaging, including thickened and dilated small bowel loops s/p laparoscopic NARCISO, exploratory laparotomy, extensive NARCISO, small bowel resection with primary anastomosis 9/15 POD#0. Patient remains in PACU. Tachycardic, normotensive. Complains of pain, pain control. NPO, Ng tube, IV fluids, monitor bowel function. Trend labs, WBC 12. Continue antibiotics x 24h. Nutritional consult - patient has been eating poorly for the past month. Encourage ambulation/OOB, incentive spirometer, DVT ppx. ICU/CEU consult.

## 2020-09-16 LAB
A1C WITH ESTIMATED AVERAGE GLUCOSE RESULT: 5.4 % — SIGNIFICANT CHANGE UP (ref 4–5.6)
ALBUMIN SERPL ELPH-MCNC: 1.7 G/DL — LOW (ref 3.5–5.2)
ALP SERPL-CCNC: 36 U/L — SIGNIFICANT CHANGE UP (ref 30–115)
ALT FLD-CCNC: 14 U/L — SIGNIFICANT CHANGE UP (ref 0–41)
ANION GAP SERPL CALC-SCNC: 11 MMOL/L — SIGNIFICANT CHANGE UP (ref 7–14)
ANION GAP SERPL CALC-SCNC: 7 MMOL/L — SIGNIFICANT CHANGE UP (ref 7–14)
ANION GAP SERPL CALC-SCNC: 9 MMOL/L — SIGNIFICANT CHANGE UP (ref 7–14)
ANION GAP SERPL CALC-SCNC: 9 MMOL/L — SIGNIFICANT CHANGE UP (ref 7–14)
APTT BLD: 48.1 SEC — HIGH (ref 27–39.2)
APTT BLD: 55.2 SEC — HIGH (ref 27–39.2)
AST SERPL-CCNC: 29 U/L — SIGNIFICANT CHANGE UP (ref 0–41)
BASOPHILS # BLD AUTO: 0 K/UL — SIGNIFICANT CHANGE UP (ref 0–0.2)
BASOPHILS NFR BLD AUTO: 0 % — SIGNIFICANT CHANGE UP (ref 0–1)
BILIRUB DIRECT SERPL-MCNC: 0.5 MG/DL — HIGH (ref 0–0.2)
BILIRUB INDIRECT FLD-MCNC: 0.3 MG/DL — SIGNIFICANT CHANGE UP (ref 0.2–1.2)
BILIRUB SERPL-MCNC: 0.8 MG/DL — SIGNIFICANT CHANGE UP (ref 0.2–1.2)
BUN SERPL-MCNC: 5 MG/DL — LOW (ref 10–20)
BUN SERPL-MCNC: 6 MG/DL — LOW (ref 10–20)
CALCIUM SERPL-MCNC: 6.6 MG/DL — LOW (ref 8.5–10.1)
CALCIUM SERPL-MCNC: 6.7 MG/DL — LOW (ref 8.5–10.1)
CHLORIDE SERPL-SCNC: 102 MMOL/L — SIGNIFICANT CHANGE UP (ref 98–110)
CHLORIDE SERPL-SCNC: 103 MMOL/L — SIGNIFICANT CHANGE UP (ref 98–110)
CHLORIDE SERPL-SCNC: 104 MMOL/L — SIGNIFICANT CHANGE UP (ref 98–110)
CHLORIDE SERPL-SCNC: 105 MMOL/L — SIGNIFICANT CHANGE UP (ref 98–110)
CK MB CFR SERPL CALC: 11.6 NG/ML — HIGH (ref 0.6–6.3)
CK MB CFR SERPL CALC: 2.2 NG/ML — SIGNIFICANT CHANGE UP (ref 0.6–6.3)
CK MB CFR SERPL CALC: 5.3 NG/ML — SIGNIFICANT CHANGE UP (ref 0.6–6.3)
CK SERPL-CCNC: 477 U/L — HIGH (ref 0–225)
CK SERPL-CCNC: 705 U/L — HIGH (ref 0–225)
CK SERPL-CCNC: 839 U/L — HIGH (ref 0–225)
CO2 SERPL-SCNC: 19 MMOL/L — SIGNIFICANT CHANGE UP (ref 17–32)
CO2 SERPL-SCNC: 19 MMOL/L — SIGNIFICANT CHANGE UP (ref 17–32)
CO2 SERPL-SCNC: 20 MMOL/L — SIGNIFICANT CHANGE UP (ref 17–32)
CO2 SERPL-SCNC: 21 MMOL/L — SIGNIFICANT CHANGE UP (ref 17–32)
CREAT SERPL-MCNC: 0.5 MG/DL — LOW (ref 0.7–1.5)
CREAT SERPL-MCNC: 0.6 MG/DL — LOW (ref 0.7–1.5)
CRP SERPL-MCNC: 1.71 MG/DL — HIGH (ref 0–0.4)
EOSINOPHIL # BLD AUTO: 0.04 K/UL — SIGNIFICANT CHANGE UP (ref 0–0.7)
EOSINOPHIL NFR BLD AUTO: 0.9 % — SIGNIFICANT CHANGE UP (ref 0–8)
ESTIMATED AVERAGE GLUCOSE: 108 MG/DL — SIGNIFICANT CHANGE UP (ref 68–114)
GAS PNL BLDA: SIGNIFICANT CHANGE UP
GLUCOSE BLDC GLUCOMTR-MCNC: 132 MG/DL — HIGH (ref 70–99)
GLUCOSE BLDC GLUCOMTR-MCNC: 135 MG/DL — HIGH (ref 70–99)
GLUCOSE BLDC GLUCOMTR-MCNC: 141 MG/DL — HIGH (ref 70–99)
GLUCOSE BLDC GLUCOMTR-MCNC: 154 MG/DL — HIGH (ref 70–99)
GLUCOSE BLDC GLUCOMTR-MCNC: 160 MG/DL — HIGH (ref 70–99)
GLUCOSE BLDC GLUCOMTR-MCNC: 165 MG/DL — HIGH (ref 70–99)
GLUCOSE BLDC GLUCOMTR-MCNC: 171 MG/DL — HIGH (ref 70–99)
GLUCOSE BLDC GLUCOMTR-MCNC: 172 MG/DL — HIGH (ref 70–99)
GLUCOSE BLDC GLUCOMTR-MCNC: 175 MG/DL — HIGH (ref 70–99)
GLUCOSE BLDC GLUCOMTR-MCNC: 187 MG/DL — HIGH (ref 70–99)
GLUCOSE BLDC GLUCOMTR-MCNC: 192 MG/DL — HIGH (ref 70–99)
GLUCOSE BLDC GLUCOMTR-MCNC: 197 MG/DL — HIGH (ref 70–99)
GLUCOSE BLDC GLUCOMTR-MCNC: 197 MG/DL — HIGH (ref 70–99)
GLUCOSE BLDC GLUCOMTR-MCNC: 198 MG/DL — HIGH (ref 70–99)
GLUCOSE BLDC GLUCOMTR-MCNC: 209 MG/DL — HIGH (ref 70–99)
GLUCOSE BLDC GLUCOMTR-MCNC: 218 MG/DL — HIGH (ref 70–99)
GLUCOSE SERPL-MCNC: 194 MG/DL — HIGH (ref 70–99)
GLUCOSE SERPL-MCNC: 224 MG/DL — HIGH (ref 70–99)
GLUCOSE SERPL-MCNC: 237 MG/DL — HIGH (ref 70–99)
GLUCOSE SERPL-MCNC: 247 MG/DL — HIGH (ref 70–99)
HCT VFR BLD CALC: 29.9 % — LOW (ref 37–47)
HCT VFR BLD CALC: 30.6 % — LOW (ref 37–47)
HCT VFR BLD CALC: 32.1 % — LOW (ref 37–47)
HGB BLD-MCNC: 10 G/DL — LOW (ref 12–16)
HGB BLD-MCNC: 10.5 G/DL — LOW (ref 12–16)
HGB BLD-MCNC: 9.9 G/DL — LOW (ref 12–16)
INR BLD: 2.35 RATIO — HIGH (ref 0.65–1.3)
INR BLD: 2.58 RATIO — HIGH (ref 0.65–1.3)
LACTATE SERPL-SCNC: 3.4 MMOL/L — HIGH (ref 0.7–2)
LACTATE SERPL-SCNC: 4.4 MMOL/L — CRITICAL HIGH (ref 0.7–2)
LACTATE SERPL-SCNC: 4.5 MMOL/L — CRITICAL HIGH (ref 0.7–2)
LACTATE SERPL-SCNC: 5 MMOL/L — CRITICAL HIGH (ref 0.7–2)
LACTATE SERPL-SCNC: 5.2 MMOL/L — CRITICAL HIGH (ref 0.7–2)
LACTATE SERPL-SCNC: 5.2 MMOL/L — CRITICAL HIGH (ref 0.7–2)
LYMPHOCYTES # BLD AUTO: 0.37 K/UL — LOW (ref 1.2–3.4)
LYMPHOCYTES # BLD AUTO: 7.8 % — LOW (ref 20.5–51.1)
MAGNESIUM SERPL-MCNC: 1.9 MG/DL — SIGNIFICANT CHANGE UP (ref 1.8–2.4)
MAGNESIUM SERPL-MCNC: 1.9 MG/DL — SIGNIFICANT CHANGE UP (ref 1.8–2.4)
MAGNESIUM SERPL-MCNC: 2 MG/DL — SIGNIFICANT CHANGE UP (ref 1.8–2.4)
MAGNESIUM SERPL-MCNC: 2 MG/DL — SIGNIFICANT CHANGE UP (ref 1.8–2.4)
MCHC RBC-ENTMCNC: 27.1 PG — SIGNIFICANT CHANGE UP (ref 27–31)
MCHC RBC-ENTMCNC: 27.2 PG — SIGNIFICANT CHANGE UP (ref 27–31)
MCHC RBC-ENTMCNC: 27.3 PG — SIGNIFICANT CHANGE UP (ref 27–31)
MCHC RBC-ENTMCNC: 32.7 G/DL — SIGNIFICANT CHANGE UP (ref 32–37)
MCHC RBC-ENTMCNC: 32.7 G/DL — SIGNIFICANT CHANGE UP (ref 32–37)
MCHC RBC-ENTMCNC: 33.1 G/DL — SIGNIFICANT CHANGE UP (ref 32–37)
MCV RBC AUTO: 82.4 FL — SIGNIFICANT CHANGE UP (ref 81–99)
MCV RBC AUTO: 82.9 FL — SIGNIFICANT CHANGE UP (ref 81–99)
MCV RBC AUTO: 83.2 FL — SIGNIFICANT CHANGE UP (ref 81–99)
MONOCYTES # BLD AUTO: 0.08 K/UL — LOW (ref 0.1–0.6)
MONOCYTES NFR BLD AUTO: 1.7 % — SIGNIFICANT CHANGE UP (ref 1.7–9.3)
NEUTROPHILS # BLD AUTO: 4.21 K/UL — SIGNIFICANT CHANGE UP (ref 1.4–6.5)
NEUTROPHILS NFR BLD AUTO: 55.6 % — SIGNIFICANT CHANGE UP (ref 42.2–75.2)
NRBC # BLD: 0 /100 WBCS — SIGNIFICANT CHANGE UP (ref 0–0)
NRBC # BLD: 0 /100 WBCS — SIGNIFICANT CHANGE UP (ref 0–0)
PHOSPHATE SERPL-MCNC: 1.2 MG/DL — LOW (ref 2.1–4.9)
PHOSPHATE SERPL-MCNC: 1.7 MG/DL — LOW (ref 2.1–4.9)
PHOSPHATE SERPL-MCNC: 2.2 MG/DL — SIGNIFICANT CHANGE UP (ref 2.1–4.9)
PHOSPHATE SERPL-MCNC: 2.8 MG/DL — SIGNIFICANT CHANGE UP (ref 2.1–4.9)
PLATELET # BLD AUTO: 145 K/UL — SIGNIFICANT CHANGE UP (ref 130–400)
PLATELET # BLD AUTO: 191 K/UL — SIGNIFICANT CHANGE UP (ref 130–400)
PLATELET # BLD AUTO: 278 K/UL — SIGNIFICANT CHANGE UP (ref 130–400)
POTASSIUM SERPL-MCNC: 3.8 MMOL/L — SIGNIFICANT CHANGE UP (ref 3.5–5)
POTASSIUM SERPL-MCNC: 4.1 MMOL/L — SIGNIFICANT CHANGE UP (ref 3.5–5)
POTASSIUM SERPL-MCNC: 4.5 MMOL/L — SIGNIFICANT CHANGE UP (ref 3.5–5)
POTASSIUM SERPL-MCNC: 4.7 MMOL/L — SIGNIFICANT CHANGE UP (ref 3.5–5)
POTASSIUM SERPL-SCNC: 3.8 MMOL/L — SIGNIFICANT CHANGE UP (ref 3.5–5)
POTASSIUM SERPL-SCNC: 4.1 MMOL/L — SIGNIFICANT CHANGE UP (ref 3.5–5)
POTASSIUM SERPL-SCNC: 4.5 MMOL/L — SIGNIFICANT CHANGE UP (ref 3.5–5)
POTASSIUM SERPL-SCNC: 4.7 MMOL/L — SIGNIFICANT CHANGE UP (ref 3.5–5)
PREALB SERPL-MCNC: 13 MG/DL — LOW (ref 20–40)
PROT SERPL-MCNC: 3.2 G/DL — LOW (ref 6–8)
PROTHROM AB SERPL-ACNC: 27 SEC — HIGH (ref 9.95–12.87)
PROTHROM AB SERPL-ACNC: 29.7 SEC — HIGH (ref 9.95–12.87)
RBC # BLD: 3.63 M/UL — LOW (ref 4.2–5.4)
RBC # BLD: 3.69 M/UL — LOW (ref 4.2–5.4)
RBC # BLD: 3.86 M/UL — LOW (ref 4.2–5.4)
RBC # FLD: 14 % — SIGNIFICANT CHANGE UP (ref 11.5–14.5)
RBC # FLD: 14 % — SIGNIFICANT CHANGE UP (ref 11.5–14.5)
RBC # FLD: 14.2 % — SIGNIFICANT CHANGE UP (ref 11.5–14.5)
SODIUM SERPL-SCNC: 130 MMOL/L — LOW (ref 135–146)
SODIUM SERPL-SCNC: 132 MMOL/L — LOW (ref 135–146)
SODIUM SERPL-SCNC: 133 MMOL/L — LOW (ref 135–146)
SODIUM SERPL-SCNC: 134 MMOL/L — LOW (ref 135–146)
SURGICAL PATHOLOGY STUDY: SIGNIFICANT CHANGE UP
TRIGL SERPL-MCNC: 35 MG/DL — SIGNIFICANT CHANGE UP (ref 10–149)
TROPONIN T SERPL-MCNC: 0.04 NG/ML — CRITICAL HIGH
TROPONIN T SERPL-MCNC: 0.16 NG/ML — CRITICAL HIGH
TROPONIN T SERPL-MCNC: <0.01 NG/ML — SIGNIFICANT CHANGE UP
WBC # BLD: 4.8 K/UL — SIGNIFICANT CHANGE UP (ref 4.8–10.8)
WBC # BLD: 5.12 K/UL — SIGNIFICANT CHANGE UP (ref 4.8–10.8)
WBC # BLD: 5.22 K/UL — SIGNIFICANT CHANGE UP (ref 4.8–10.8)
WBC # FLD AUTO: 4.8 K/UL — SIGNIFICANT CHANGE UP (ref 4.8–10.8)
WBC # FLD AUTO: 5.12 K/UL — SIGNIFICANT CHANGE UP (ref 4.8–10.8)
WBC # FLD AUTO: 5.22 K/UL — SIGNIFICANT CHANGE UP (ref 4.8–10.8)

## 2020-09-16 PROCEDURE — 99024 POSTOP FOLLOW-UP VISIT: CPT

## 2020-09-16 PROCEDURE — 71045 X-RAY EXAM CHEST 1 VIEW: CPT | Mod: 26

## 2020-09-16 PROCEDURE — 93010 ELECTROCARDIOGRAM REPORT: CPT

## 2020-09-16 PROCEDURE — 36620 INSERTION CATHETER ARTERY: CPT | Mod: 78

## 2020-09-16 PROCEDURE — 99291 CRITICAL CARE FIRST HOUR: CPT | Mod: 24

## 2020-09-16 PROCEDURE — 93306 TTE W/DOPPLER COMPLETE: CPT | Mod: 26

## 2020-09-16 RX ORDER — MEROPENEM 1 G/30ML
1000 INJECTION INTRAVENOUS EVERY 8 HOURS
Refills: 0 | Status: DISCONTINUED | OUTPATIENT
Start: 2020-09-16 | End: 2020-09-17

## 2020-09-16 RX ORDER — METOPROLOL TARTRATE 50 MG
2.5 TABLET ORAL ONCE
Refills: 0 | Status: COMPLETED | OUTPATIENT
Start: 2020-09-16 | End: 2020-09-16

## 2020-09-16 RX ORDER — ESMOLOL HCL 100MG/10ML
50 VIAL (ML) INTRAVENOUS
Qty: 2500 | Refills: 0 | Status: DISCONTINUED | OUTPATIENT
Start: 2020-09-16 | End: 2020-09-17

## 2020-09-16 RX ORDER — FENTANYL CITRATE 50 UG/ML
25 INJECTION INTRAVENOUS ONCE
Refills: 0 | Status: DISCONTINUED | OUTPATIENT
Start: 2020-09-16 | End: 2020-09-16

## 2020-09-16 RX ORDER — ACETAMINOPHEN 500 MG
1000 TABLET ORAL ONCE
Refills: 0 | Status: COMPLETED | OUTPATIENT
Start: 2020-09-16 | End: 2020-09-16

## 2020-09-16 RX ORDER — HYDROMORPHONE HYDROCHLORIDE 2 MG/ML
30 INJECTION INTRAMUSCULAR; INTRAVENOUS; SUBCUTANEOUS
Refills: 0 | Status: DISCONTINUED | OUTPATIENT
Start: 2020-09-16 | End: 2020-09-18

## 2020-09-16 RX ORDER — POTASSIUM CHLORIDE 20 MEQ
20 PACKET (EA) ORAL ONCE
Refills: 0 | Status: COMPLETED | OUTPATIENT
Start: 2020-09-16 | End: 2020-09-16

## 2020-09-16 RX ORDER — PHENYLEPHRINE HYDROCHLORIDE 10 MG/ML
0.1 INJECTION INTRAVENOUS
Qty: 160 | Refills: 0 | Status: DISCONTINUED | OUTPATIENT
Start: 2020-09-16 | End: 2020-09-18

## 2020-09-16 RX ORDER — CASPOFUNGIN ACETATE 7 MG/ML
50 INJECTION, POWDER, LYOPHILIZED, FOR SOLUTION INTRAVENOUS EVERY 24 HOURS
Refills: 0 | Status: DISCONTINUED | OUTPATIENT
Start: 2020-09-17 | End: 2020-09-21

## 2020-09-16 RX ORDER — SODIUM CHLORIDE 9 MG/ML
1000 INJECTION, SOLUTION INTRAVENOUS
Refills: 0 | Status: DISCONTINUED | OUTPATIENT
Start: 2020-09-16 | End: 2020-09-17

## 2020-09-16 RX ORDER — HYDROMORPHONE HYDROCHLORIDE 2 MG/ML
30 INJECTION INTRAMUSCULAR; INTRAVENOUS; SUBCUTANEOUS
Refills: 0 | Status: DISCONTINUED | OUTPATIENT
Start: 2020-09-16 | End: 2020-09-16

## 2020-09-16 RX ORDER — ELECTROLYTE SOLUTION,INJ
1 VIAL (ML) INTRAVENOUS
Refills: 0 | Status: DISCONTINUED | OUTPATIENT
Start: 2020-09-16 | End: 2020-09-16

## 2020-09-16 RX ORDER — INSULIN HUMAN 100 [IU]/ML
1 INJECTION, SOLUTION SUBCUTANEOUS
Qty: 100 | Refills: 0 | Status: DISCONTINUED | OUTPATIENT
Start: 2020-09-16 | End: 2020-09-17

## 2020-09-16 RX ORDER — CASPOFUNGIN ACETATE 7 MG/ML
70 INJECTION, POWDER, LYOPHILIZED, FOR SOLUTION INTRAVENOUS ONCE
Refills: 0 | Status: COMPLETED | OUTPATIENT
Start: 2020-09-16 | End: 2020-09-16

## 2020-09-16 RX ORDER — SODIUM CHLORIDE 9 MG/ML
500 INJECTION, SOLUTION INTRAVENOUS
Refills: 0 | Status: DISCONTINUED | OUTPATIENT
Start: 2020-09-16 | End: 2020-09-17

## 2020-09-16 RX ORDER — ESMOLOL HCL 100MG/10ML
50 VIAL (ML) INTRAVENOUS
Qty: 2500 | Refills: 0 | Status: DISCONTINUED | OUTPATIENT
Start: 2020-09-16 | End: 2020-09-16

## 2020-09-16 RX ORDER — SODIUM CHLORIDE 9 MG/ML
250 INJECTION, SOLUTION INTRAVENOUS ONCE
Refills: 0 | Status: COMPLETED | OUTPATIENT
Start: 2020-09-16 | End: 2020-09-16

## 2020-09-16 RX ORDER — MAGNESIUM SULFATE 500 MG/ML
1 VIAL (ML) INJECTION ONCE
Refills: 0 | Status: COMPLETED | OUTPATIENT
Start: 2020-09-16 | End: 2020-09-16

## 2020-09-16 RX ORDER — INSULIN HUMAN 100 [IU]/ML
INJECTION, SOLUTION SUBCUTANEOUS EVERY 4 HOURS
Refills: 0 | Status: DISCONTINUED | OUTPATIENT
Start: 2020-09-16 | End: 2020-09-16

## 2020-09-16 RX ORDER — SODIUM CHLORIDE 9 MG/ML
500 INJECTION, SOLUTION INTRAVENOUS ONCE
Refills: 0 | Status: COMPLETED | OUTPATIENT
Start: 2020-09-16 | End: 2020-09-16

## 2020-09-16 RX ORDER — CASPOFUNGIN ACETATE 7 MG/ML
INJECTION, POWDER, LYOPHILIZED, FOR SOLUTION INTRAVENOUS
Refills: 0 | Status: DISCONTINUED | OUTPATIENT
Start: 2020-09-16 | End: 2020-09-21

## 2020-09-16 RX ORDER — NALOXONE HYDROCHLORIDE 4 MG/.1ML
0.1 SPRAY NASAL
Refills: 0 | Status: DISCONTINUED | OUTPATIENT
Start: 2020-09-16 | End: 2020-09-21

## 2020-09-16 RX ADMIN — FENTANYL CITRATE 25 MICROGRAM(S): 50 INJECTION INTRAVENOUS at 15:26

## 2020-09-16 RX ADMIN — Medication 100 MILLIGRAM(S): at 13:11

## 2020-09-16 RX ADMIN — FENTANYL CITRATE 25 MICROGRAM(S): 50 INJECTION INTRAVENOUS at 10:55

## 2020-09-16 RX ADMIN — MEROPENEM 100 MILLIGRAM(S): 1 INJECTION INTRAVENOUS at 22:39

## 2020-09-16 RX ADMIN — Medication 100 MILLIEQUIVALENT(S): at 18:56

## 2020-09-16 RX ADMIN — SODIUM CHLORIDE 110 MILLILITER(S): 9 INJECTION, SOLUTION INTRAVENOUS at 05:59

## 2020-09-16 RX ADMIN — HEPARIN SODIUM 5000 UNIT(S): 5000 INJECTION INTRAVENOUS; SUBCUTANEOUS at 21:40

## 2020-09-16 RX ADMIN — MEROPENEM 100 MILLIGRAM(S): 1 INJECTION INTRAVENOUS at 19:06

## 2020-09-16 RX ADMIN — INSULIN HUMAN 1 UNIT(S)/HR: 100 INJECTION, SOLUTION SUBCUTANEOUS at 12:23

## 2020-09-16 RX ADMIN — CASPOFUNGIN ACETATE 70 MILLIGRAM(S): 7 INJECTION, POWDER, LYOPHILIZED, FOR SOLUTION INTRAVENOUS at 20:03

## 2020-09-16 RX ADMIN — Medication 62.5 MILLIMOLE(S): at 03:48

## 2020-09-16 RX ADMIN — PANTOPRAZOLE SODIUM 40 MILLIGRAM(S): 20 TABLET, DELAYED RELEASE ORAL at 12:23

## 2020-09-16 RX ADMIN — HEPARIN SODIUM 5000 UNIT(S): 5000 INJECTION INTRAVENOUS; SUBCUTANEOUS at 13:11

## 2020-09-16 RX ADMIN — Medication 1 EACH: at 21:38

## 2020-09-16 RX ADMIN — Medication 400 MILLIGRAM(S): at 17:35

## 2020-09-16 RX ADMIN — FENTANYL CITRATE 25 MICROGRAM(S): 50 INJECTION INTRAVENOUS at 13:40

## 2020-09-16 RX ADMIN — Medication 2.5 MILLIGRAM(S): at 12:21

## 2020-09-16 RX ADMIN — HYDROMORPHONE HYDROCHLORIDE 0.25 MILLIGRAM(S): 2 INJECTION INTRAMUSCULAR; INTRAVENOUS; SUBCUTANEOUS at 03:43

## 2020-09-16 RX ADMIN — Medication 100 MILLIGRAM(S): at 06:08

## 2020-09-16 RX ADMIN — Medication 2.5 MILLIGRAM(S): at 15:22

## 2020-09-16 RX ADMIN — Medication 50 GRAM(S): at 20:30

## 2020-09-16 RX ADMIN — SODIUM CHLORIDE 1500 MILLILITER(S): 9 INJECTION, SOLUTION INTRAVENOUS at 12:22

## 2020-09-16 RX ADMIN — HYDROMORPHONE HYDROCHLORIDE 0.25 MILLIGRAM(S): 2 INJECTION INTRAMUSCULAR; INTRAVENOUS; SUBCUTANEOUS at 07:23

## 2020-09-16 RX ADMIN — HYDROMORPHONE HYDROCHLORIDE 0.25 MILLIGRAM(S): 2 INJECTION INTRAMUSCULAR; INTRAVENOUS; SUBCUTANEOUS at 04:15

## 2020-09-16 RX ADMIN — Medication 85 MILLIMOLE(S): at 21:37

## 2020-09-16 RX ADMIN — CEFEPIME 100 MILLIGRAM(S): 1 INJECTION, POWDER, FOR SOLUTION INTRAMUSCULAR; INTRAVENOUS at 14:19

## 2020-09-16 RX ADMIN — FENTANYL CITRATE 25 MICROGRAM(S): 50 INJECTION INTRAVENOUS at 10:11

## 2020-09-16 RX ADMIN — SODIUM CHLORIDE 3000 MILLILITER(S): 9 INJECTION, SOLUTION INTRAVENOUS at 16:40

## 2020-09-16 RX ADMIN — Medication 1000 MILLIGRAM(S): at 00:39

## 2020-09-16 RX ADMIN — CEFEPIME 100 MILLIGRAM(S): 1 INJECTION, POWDER, FOR SOLUTION INTRAMUSCULAR; INTRAVENOUS at 05:58

## 2020-09-16 RX ADMIN — SODIUM CHLORIDE 3000 MILLILITER(S): 9 INJECTION, SOLUTION INTRAVENOUS at 06:01

## 2020-09-16 RX ADMIN — HYDROMORPHONE HYDROCHLORIDE 0.25 MILLIGRAM(S): 2 INJECTION INTRAMUSCULAR; INTRAVENOUS; SUBCUTANEOUS at 07:59

## 2020-09-16 RX ADMIN — Medication 1000 MILLIGRAM(S): at 17:50

## 2020-09-16 RX ADMIN — PHENYLEPHRINE HYDROCHLORIDE 1.33 MICROGRAM(S)/KG/MIN: 10 INJECTION INTRAVENOUS at 17:35

## 2020-09-16 RX ADMIN — Medication 21.3 MICROGRAM(S)/KG/MIN: at 18:28

## 2020-09-16 RX ADMIN — SODIUM CHLORIDE 3000 MILLILITER(S): 9 INJECTION, SOLUTION INTRAVENOUS at 06:33

## 2020-09-16 RX ADMIN — Medication 2.5 MILLIGRAM(S): at 06:33

## 2020-09-16 NOTE — PROGRESS NOTE ADULT - ASSESSMENT
44 y/o no Pmhx s/p PAULIE/BS on 8/19 for fibroid uterus POD#28, s/p diagnostic laparoscopy converted to exploratory laparotomy and small bowel reanastomosis, with breakdown of staple line of proximal limb of anastomosis on POD0, now s/p repeat exploratory laparotomy with small bowel resection, reanastomosis and appendectomy, POD1, tachycardic and hypotensive postop requiring pressors, likely septic shock, transferred to SICU for close hemodynamic monitoring.   - Neuro: AAOX3, dilaudid PRN for pain  - CVS: tachycardic, metoprolol PRN, on levophed drip for hypotension  - GI: NPO, NG tube, TPN started  - : IV fluids @110 cc/hr, monitor UO, trend Cr  - heme/onc: H/H stable, heparin for VTE prophylaxis  - Inf: likely septic shock, cefepime and flagyl  - Lines: Right IJ central line, A line left radial, escalera  - management per SICU team  - gyn to follow    Will inform Dr. Molina. 46 y/o no Pmhx s/p PAULIE/BS on 8/19 for fibroid uterus POD#28, s/p diagnostic laparoscopy converted to exploratory laparotomy and small bowel reanastomosis, with breakdown of staple line of proximal limb of anastomosis on POD0, now s/p repeat exploratory laparotomy with small bowel resection, reanastomosis and appendectomy, POD1, tachycardic and hypotensive postop requiring pressors, likely septic shock, transferred to SICU for close hemodynamic monitoring.   - Neuro: AAOX3, dilaudid PRN for pain  - CVS: tachycardic, metoprolol PRN, on levophed drip for hypotension  - Pulm: sat 100% on 3LNC  - GI: NPO, NG tube, TPN started  - : IV fluids @110 cc/hr, monitor UO, trend Cr  - heme/onc: H/H stable, heparin for VTE prophylaxis  - Inf: likely septic shock, cefepime and flagyl  - Lines: Right IJ central line, A line left radial, escalera  - management per SICU team  - gyn to follow    Will inform Dr. Molina.

## 2020-09-16 NOTE — PROGRESS NOTE ADULT - SUBJECTIVE AND OBJECTIVE BOX
PGY4 progress note    Patient seen and examined at bedside, sleeping. On levophed for hypotension.     PE:  Vital Signs Last 24 Hrs  T(C): 36.5 (16 Sep 2020 02:00), Max: 37.3 (15 Sep 2020 11:42)  T(F): 97.7 (16 Sep 2020 02:00), Max: 99.1 (15 Sep 2020 11:42)  HR: 125 (16 Sep 2020 06:00) (109 - 140)  BP: 100/63 (16 Sep 2020 06:00) (70/48 - 122/76)  BP(mean): 67 (16 Sep 2020 05:00) (53 - 90)  RR: 22 (15 Sep 2020 21:30) (13 - 31)  SpO2: 100% (16 Sep 2020 06:00) (96% - 100%)    UO [id number] 250cc  NG tube [id number] 0cc    GEN: AAOX3  CVS: tachycardic, normal rhythm   lungs: CTAB  abd: packed with kerlex, abdominal pad and dressing over vertical incision c/d/i. Abdomen soft, appropriately tender diffusely, no r/g/r  ext: no calf tenderness or edema, SCDs in place    labs:                        10.0   5.22  )-----------( 278      ( 16 Sep 2020 02:08 )             30.6     09-16    133<L>  |  103  |  5<L>  ----------------------------<  237<H>  4.1   |  19  |  0.6<L>    Ca    6.6<L>      16 Sep 2020 04:30  Phos  2.2     09-16  Mg     2.0     09-16    TPro  4.7<L>  /  Alb  2.7<L>  /  TBili  0.4  /  DBili  0.2  /  AST  13  /  ALT  8   /  AlkPhos  51  09-15  @0430 lactate 5.2    ABG - ( 16 Sep 2020 04:28 )  pH, Arterial: 7.39  pH, Blood: x     /  pCO2: 33    /  pO2: 133   / HCO3: 20    / Base Excess: -4.1  /  SaO2: 99        MEDICATIONS  (STANDING):  cefepime   IVPB 1000 milliGRAM(s) IV Intermittent every 8 hours  heparin   Injectable 5000 Unit(s) SubCutaneous every 8 hours  lactated ringers. 1000 milliLiter(s) (110 mL/Hr) IV Continuous <Continuous>  metroNIDAZOLE  IVPB 500 milliGRAM(s) IV Intermittent every 8 hours  norepinephrine Infusion 0.05 MICROgram(s)/kG/Min (3.2 mL/Hr) IV Continuous <Continuous>  pantoprazole  Injectable 40 milliGRAM(s) IV Push daily  Parenteral Nutrition - Adult 1 Each (85 mL/Hr) TPN Continuous <Continuous>    MEDICATIONS  (PRN):  HYDROmorphone  Injectable 0.25 milliGRAM(s) IV Push every 4 hours PRN Moderate Pain (4 - 6)

## 2020-09-16 NOTE — CONSULT NOTE ADULT - ASSESSMENT
REVIEW OF SYSTEMS    General:	NAD   Skin/Breast:	Neg  Ophthalmologic:	Neg  ENMT: Neg	  Respiratory and Thorax: Neg	  Cardiovascular:	Neg  Gastrointestinal:	Neg  Genitourinary:	hx of uterine fibroids, S/P PAULIE  Musculoskeletal:	Neg  Neurological:	Neg  Psychiatric:	Neg  Hematology/Lymphatics:	Neg  Endocrine:	Neg        PHYSICAL EXAM:    GENERAL: NAD, well-groomed, well-developed  HEAD:  Atraumatic, Normocephalic  EYES: EOMI, PERRLA, conjunctiva and sclera clear  ENMT: No tonsillar erythema, exudates, or enlargement; Moist mucous membranes, Good dentition, No lesions  NECK: Supple, No JVD, Normal thyroid  NERVOUS SYSTEM:  Alert & Oriented X3, Motor Strength 5/5 B/L upper and lower extremities  CHEST/LUNG: Clear to percussion bilaterally; No rales, rhonchi, wheezing, or rubs  HEART: S1, S2 present  ABDOMEN: Soft, tender to touch, Nondistended; Bowel sounds present  EXTREMITIES: No clubbing, cyanosis, or edema  LYMPH: No lymphadenopathy noted  SKIN: No rashes or lesions      Patient lying in bed. Patient states pain is 10/10 now. Pain is sharp and constant. PCA Dilaudid has not been started as of this time, but RN is preparing the medication. NGT in place. Neg resp distress noted.

## 2020-09-16 NOTE — CONSULT NOTE ADULT - SUBJECTIVE AND OBJECTIVE BOX
Rec'd call from Burn ICU. Requesting PCA for pain control. Reviewed chart. PCA Dilaudid started. Rec'd call from Burn ICU. Requesting PCA for pain control. Reviewed chart. PCA Dilaudid started.     Present on unit 1:41P    Chief Complaint:     45y Female w/recent history of PAULIE/BSO on 8/19/20 for 15cm bleeding fibroid with readmission for abdominal pain and inability to tolerate PO on 8/30. Patient was noted to have a partial obstruction vs. ileus which resolved without operative intervention and was discharged. Presented on 9/4 with recurrent abdominal pain, NGT placed, managed non-operative. Presented on 9/14/20 with two days of abdominal pain associated with nausea and vomiting. Patient is now s/p Washing out stomach contents, Small bowel resection with anastomosis, Lysis of intestinal adhesions 15-Sep-2020. Patient started on PCA Dilaudid due to low BP's.      Allergies    No Known Allergies    Intolerances        PAST MEDICAL & SURGICAL HISTORY:  Uterine fibroid    Status post bilateral salpingectomy    S/P PAULIE (total abdominal hysterectomy)          SOCIAL HISTORY:  Denies Smoking, Alcohol, or Drug Use    No Known Allergies      Current PAIN MEDICATIONS:  HYDROmorphone PCA (5 mG/mL) 30 milliLiter(s) PCA Continuous PCA Continuous    Heme:  heparin   Injectable 5000 Unit(s) SubCutaneous every 8 hours    Antibiotics:  cefepime   IVPB 1000 milliGRAM(s) IV Intermittent every 8 hours  metroNIDAZOLE  IVPB 500 milliGRAM(s) IV Intermittent every 8 hours    Cardiovascular:  norepinephrine Infusion 0.05 MICROgram(s)/kG/Min IV Continuous <Continuous>    GI:  pantoprazole  Injectable 40 milliGRAM(s) IV Push daily    Endocrine:  insulin regular Infusion 1 Unit(s)/Hr IV Continuous <Continuous>    All Other Medications:  lactated ringers. 1000 milliLiter(s) IV Continuous <Continuous>  Parenteral Nutrition - Adult 1 Each TPN Continuous <Continuous>  Parenteral Nutrition - Adult 1 Each TPN Continuous <Continuous>      Vital Signs Last 24 Hrs  T(C): 36.3 (16 Sep 2020 12:00), Max: 37.4 (16 Sep 2020 10:00)  T(F): 97.3 (16 Sep 2020 12:00), Max: 99.3 (16 Sep 2020 10:00)  HR: 132 (16 Sep 2020 13:00) (115 - 141)  BP: 84/55 (16 Sep 2020 08:00) (70/48 - 114/62)  BP(mean): 64 (16 Sep 2020 08:00) (53 - 90)  RR: 18 (16 Sep 2020 13:00) (16 - 31)  SpO2: 100% (16 Sep 2020 13:00) (96% - 100%)      09-15-20 @ 07:01  -  09-16-20 @ 07:00  --------------------------------------------------------  IN: 9291.2 mL / OUT: 2567 mL / NET: 6724.2 mL    09-16-20 @ 07:01  -  09-16-20 @ 13:41  --------------------------------------------------------  IN: 1620 mL / OUT: 1115 mL / NET: 505 mL        LABS:                          10.5   5.12  )-----------( 191      ( 16 Sep 2020 10:00 )             32.1     09-16    134<L>  |  105  |  5<L>  ----------------------------<  247<H>  4.5   |  20  |  0.5<L>    Ca    6.7<L>      16 Sep 2020 10:00  Phos  1.7     09-16  Mg     1.9     09-16    TPro  4.7<L>  /  Alb  2.7<L>  /  TBili  0.4  /  DBili  0.2  /  AST  13  /  ALT  8   /  AlkPhos  51  09-15    PT/INR - ( 16 Sep 2020 10:00 )   PT: 29.70 sec;   INR: 2.58 ratio         PTT - ( 16 Sep 2020 10:00 )  PTT:48.1 sec      RADIOLOGY:  EXAM:  CT ABDOMEN AND PELVIS OC IC            PROCEDURE DATE:  09/14/2020            INTERPRETATION:  CLINICAL STATEMENT: Abdominal distention.    TECHNIQUE: Contiguous axial CT images were obtained from the lower chest to the pubic symphysis following administration of 100cc Optiray 320 intravenous contrast.  Oral contrast was administered.  Reformatted images in the coronal and sagittal planes were acquired.    COMPARISON CT: September 4, 2020    OTHER STUDIES USED FOR CORRELATION: None.      FINDINGS:    LOWER CHEST: Subsegmental dependent atelectasis.    HEPATOBILIARY: Unremarkable.    SPLEEN: Unremarkable.    PANCREAS: Unremarkable.    ADRENAL GLANDS: Unremarkable.    KIDNEYS: Few ill-defined linear hypodense foci in the right kidney again noted, decreased since prior CT. No hydronephrosis. Few subcentimeter hypodense lesions in both kidneys, too small to characterize. Punctate nonobstructing left intrarenal calculus.    ABDOMINOPELVIC NODES: No adenopathy.    PELVIC ORGANS: Post hysterectomy. Left adnexal cyst measuring up to 3.4 cm.    PERITONEUM/MESENTERY/BOWEL: Multiple dilated, fluid-filled loops of small bowel seen throughout the abdomen, measuring up to 4 cm in diameter with a transition point in the lower anterior mid abdomen, just deep to the abdominal wall (series 3 image 69). Mild circumferential wall thickening and hyperenhancement of several dilated small bowel loops noted. No evidence for pneumatosis. No intraperitoneal free air. Small volume pelvic ascites. Colonic diverticulosis, without evidence for acute diverticulitis. Normal appendix.    BONES/SOFT TISSUES: Stable postsurgical changes in the anterior abdominal wall. Stable degenerative changes.    IMPRESSION:  1.  Since September 4, 2020, persistent findings of small bowel obstruction with a transition point in the lower anterior mid abdomen.    2.  Mild wall thickening and hyperenhancement of the dilated small bowel loops, suspicious for infectious/inflammatory enteritis.    3.  Ill-defined linear hypodense foci in the right kidney, decreased since prior CT, may represent sequela of pyelonephritis.    JUDI BREWSTER M.D., ATTENDING RADIOLOGIST  This document has been electronically signed. Sep 14 2020  5:05PM          Drug Screen:        [ ]  [place]  Reviewed on 9/16/20, 1:49P  There are no results for the search terms that you entered.

## 2020-09-16 NOTE — PROGRESS NOTE ADULT - ATTENDING COMMENTS
46yo female with PSHx PAULIE/BSO 8/19/2020 for fibroids presenting with worsening abdominal pain. She has been admitted multiple times since surgery for small bowel obstruction. She presented with WBC 11.6 and abdominal tenderness. She underwent laparoscopic NARCISO, laparotomy, small bowel resection with primary anastomosis 9/15 with subsequent return to OR for enteric contents from wound. She underwent re-exploration with small bowel resection with primary anastomosis 9/15. Over night, patient has remained on Levophed 0.5, tachycardia. Resuscitated with 2L, UOP adequate. NG tube 100cc output. WBC 5, Hb 10 stable. Will change dressing tomorrow and plan for possible delayed primary closure if wound is healing well. Monitor lower abdominal erythema. Patient is in pain - receiving multimodal pain regimen, received TAP block yesterday postop. Continue antibiotics IV as patient is high risk of intra-abdominal and wound infection. Nutritional consult - patient has been eating poorly for the past month. Encourage ambulation/OOB, incentive spirometer, DVT ppx. Primary care as per primary team.

## 2020-09-16 NOTE — CHART NOTE - NSCHARTNOTEFT_GEN_A_CORE
POD#1 s/p revisit to the OR with washing out stomach contents, Small bowel resection with anastomosis, Lysis of intestinal adhesions    · Operative Findings  staple line of proximal limb of anastomosis break down with gross spillage; anastomosis was intact, feculent peritonitis/sepsis     Tachycardic and hypotensive overnight, requiring levophed  RIJ TLC placed and TPN started post op    T(F): 97.3 (20 @ 12:00), Max: 99.3 (20 @ 10:00)  HR: 132 (20 @ 13:00) (115 - 141)  BP: 84/55 (20 @ 08:00) (70/48 - 114/62)  RR: 18 (20 @ 13:00) (16 - 31)  SpO2: 100% (20 @ 13:00) (96% - 100%)    I&O's Detail    15 Sep 2020 07:  -  16 Sep 2020 07:00  --------------------------------------------------------  IN:    IV PiggyBack: 275 mL    IV PiggyBack: 1034 mL    IV PiggyBack: 250 mL    Lactated Ringers: 2580 mL    Lactated Ringers: 110 mL    Lactated Ringers Bolus: 4000 mL    Norepinephrine: 362.2 mL    TPN (Total Parenteral Nutrition): 680 mL  Total IN: 9291.2 mL    OUT:    Drain (mL): 22 mL    Indwelling Catheter - Urethral (mL): 2445 mL    Nasogastric/Oral tube (mL): 100 mL  Total OUT: 2567 mL    Total NET: 6724.2 mL      16 Sep 2020 07:  -  16 Sep 2020 13:52  --------------------------------------------------------  IN:    Insulin: 8 mL    Lactated Ringers: 660 mL    Lactated Ringers Bolus: 250 mL    Norepinephrine: 192 mL    TPN (Total Parenteral Nutrition): 510 mL  Total IN: 1620 mL    OUT:    Indwelling Catheter - Urethral (mL): 1115 mL  Total OUT: 1115 mL    Total NET: 505 mL        134<L>  |  105  |  5<L>  ----------------------------<  247<H>  4.5   |  20  |  0.5<L>    Ca    6.7<L>      16 Sep 2020 10:00  Phos  1.7       Mg     1.9         TPro  4.7<L>  /  Alb  2.7<L>  /  TBili  0.4  /  DBili  0.2  /  AST  13  /  ALT  8   /  AlkPhos  51  09-15                       10.5   5.12  )-----------( 191      ( 16 Sep 2020 10:00 )             32.1     CAPILLARY BLOOD GLUCOSE  POCT Blood Glucose.: 165 mg/dL (16 Sep 2020 13:15)  POCT Blood Glucose.: 198 mg/dL (16 Sep 2020 12:03)  POCT Blood Glucose.: 209 mg/dL (16 Sep 2020 11:15)  POCT Blood Glucose.: 197 mg/dL (16 Sep 2020 10:26)  POCT Blood Glucose.: 218 mg/dL (16 Sep 2020 06:23)  POCT Blood Glucose.: 197 mg/dL (16 Sep 2020 01:12)  POCT Blood Glucose.: 199 mg/dL (15 Sep 2020 17:05)    Daily Height in cm: 165.1 (15 Sep 2020 21:30)    Daily Weight in k.3 (16 Sep 2020 06:00)    Diet, NPO (09-15-20 @ 15:53)    ASSESSMENT  45y Female s/p small bowel resection with primary anastomosis 9/15/20, taken back to the OR for washing out stomach contents, small bowel resection with anastomosis, lysis of intestinal adhesions (staple line of proximal limb of anastomosis break down with gross spillage    - feculent peritonitis/sepsis  - severe protein calorie malnutrition   - hyperglycemia  - hypophosphatemia    PLAN  - cont TPN tonight via central line  - monitor gluc closely, minimal insulin added to tonight's TPN  - BMP, In phos, Mg, vitamin D, zinc levels in the AM  POD#1 s/p revisit to the OR with washing out stomach contents, Small bowel resection with anastomosis, Lysis of intestinal adhesions    · Operative Findings  staple line of proximal limb of anastomosis break down with gross spillage; anastomosis was intact, feculent peritonitis/sepsis  Tachycardic and hypotensive overnight, requiring levophed  RIJ TLC placed and TPN started post op    T(F): 97.3 (20 @ 12:00), Max: 99.3 (20 @ 10:00)  HR: 132 (20 @ 13:00) (115 - 141)  BP: 84/55 (20 @ 08:00) (70/48 - 114/62)  RR: 18 (20 @ 13:00) (16 - 31)  SpO2: 100% (20 @ 13:00) (96% - 100%)    I&O's Detail    15 Sep 2020 07:  -  16 Sep 2020 07:00  --------------------------------------------------------  IN:    IV PiggyBack: 275 mL    IV PiggyBack: 1034 mL    IV PiggyBack: 250 mL    Lactated Ringers: 2580 mL    Lactated Ringers: 110 mL    Lactated Ringers Bolus: 4000 mL    Norepinephrine: 362.2 mL    TPN (Total Parenteral Nutrition): 680 mL  Total IN: 9291.2 mL    OUT:    Drain (mL): 22 mL    Indwelling Catheter - Urethral (mL): 2445 mL    Nasogastric/Oral tube (mL): 100 mL  Total OUT: 2567 mL    Total NET: 6724.2 mL      16 Sep 2020 07:  -  16 Sep 2020 13:52  --------------------------------------------------------  IN:    Insulin: 8 mL    Lactated Ringers: 660 mL    Lactated Ringers Bolus: 250 mL    Norepinephrine: 192 mL    TPN (Total Parenteral Nutrition): 510 mL  Total IN: 1620 mL    OUT:    Indwelling Catheter - Urethral (mL): 1115 mL  Total OUT: 1115 mL    Total NET: 505 mL        134<L>  |  105  |  5<L>  ----------------------------<  247<H>  4.5   |  20  |  0.5<L>    Ca    6.7<L>      16 Sep 2020 10:00  Phos  1.7       Mg     1.9         TPro  4.7<L>  /  Alb  2.7<L>  /  TBili  0.4  /  DBili  0.2  /  AST  13  /  ALT  8   /  AlkPhos  51  09-15                       10.5   5.12  )-----------( 191      ( 16 Sep 2020 10:00 )             32.1     CAPILLARY BLOOD GLUCOSE  POCT Blood Glucose.: 165 mg/dL (16 Sep 2020 13:15)  POCT Blood Glucose.: 198 mg/dL (16 Sep 2020 12:03)  POCT Blood Glucose.: 209 mg/dL (16 Sep 2020 11:15)  POCT Blood Glucose.: 197 mg/dL (16 Sep 2020 10:26)  POCT Blood Glucose.: 218 mg/dL (16 Sep 2020 06:23)  POCT Blood Glucose.: 197 mg/dL (16 Sep 2020 01:12)  POCT Blood Glucose.: 199 mg/dL (15 Sep 2020 17:05)    Daily Height in cm: 165.1 (15 Sep 2020 21:30)    Daily Weight in k.3 (16 Sep 2020 06:00)    Diet, NPO (09-15-20 @ 15:53)    ASSESSMENT  45y Female s/p small bowel resection with primary anastomosis 9/15/20, taken back to the OR for washing out stomach contents, small bowel resection with anastomosis, lysis of intestinal adhesions (staple line of proximal limb of anastomosis break down with gross spillage    - feculent peritonitis/sepsis  - severe protein calorie malnutrition   - hyperglycemia  - hypophosphatemia    PLAN  - cont TPN tonight via central line  - monitor gluc closely, minimal insulin added to tonight's TPN  - BMP, In phos, Mg, vitamin D, zinc levels in the AM

## 2020-09-16 NOTE — PROGRESS NOTE ADULT - SUBJECTIVE AND OBJECTIVE BOX
GENERAL SURGERY PROGRESS NOTE     GLORIA SCHAFER  [address] [place] [address] day :2d  POD:  Procedure: Washing out stomach contents    Small bowel resection with anastomosis    Lysis of intestinal adhesions    Diagnostic laparoscopy      Surgical Attending: Mariann Rubio      24H events/hpi:  Patient seen & examined at bedside. tachycardic and hypotensive overnight started on levophed       T(F): 97.7 (09-16-20 @ 02:00), Max: 99.1 (09-15-20 @ 11:42)  HR: 119 (09-16-20 @ 05:00) (104 - 140)  BP: 88/55 (09-16-20 @ 05:00) (70/48 - 135/74)  ABP: 102/55 (09-16-20 @ 05:00) (85/46 - 116/61)  ABP(mean): 69 (09-16-20 @ 05:00) (58 - 77)  RR: 22 (09-15-20 @ 21:30) (13 - 31)  SpO2: 100% (09-16-20 @ 05:00) (96% - 100%)      09-14-20 @ 07:01  -  09-15-20 @ 07:00  --------------------------------------------------------  IN:    IV PiggyBack: 150 mL    Lactated Ringers: 110 mL    Lactated Ringers: 100 mL  Total IN: 360 mL    OUT:    Indwelling Catheter - Urethral (mL): 275 mL  Total OUT: 275 mL    Total NET: 85 mL      09-15-20 @ 07:01  -  09-16-20 @ 05:28  --------------------------------------------------------  IN:    IV PiggyBack: 150 mL    IV PiggyBack: 225 mL    IV PiggyBack: 951 mL    Lactated Ringers: 110 mL    Lactated Ringers: 1470 mL    Lactated Ringers Bolus: 4000 mL    Norepinephrine: 330.2 mL    TPN (Total Parenteral Nutrition): 595 mL  Total IN: 7831.2 mL    OUT:    Drain (mL): 22 mL    Indwelling Catheter - Urethral (mL): 2075 mL    Nasogastric/Oral tube (mL): 100 mL  Total OUT: 2197 mL    Total NET: 5634.2 mL        DIET/FLUIDS: lactated ringers Bolus 500 milliLiter(s) IV Bolus once  lactated ringers. 1000 milliLiter(s) IV Continuous <Continuous>  Parenteral Nutrition - Adult 1 Each TPN Continuous <Continuous>      I&O    09-14-20 @ 07:01  -  09-15-20 @ 07:00  --------------------------------------------------------  IN: 360 mL / OUT: 275 mL / NET: 85 mL    09-15-20 @ 07:01  -  09-16-20 @ 05:28  --------------------------------------------------------  IN: 7831.2 mL / OUT: 2197 mL / NET: 5634.2 mL        URINE:   09-14-20 @ 07:01  -  09-15-20 @ 07:00  --------------------------------------------------------  OUT: 275 mL     Indwelling Urethral Catheter:     Connect To:  Straight Drainage/Gravity    Indication:  Urine Output Monitoring in Critically Ill (09-15-20 @ 19:31)    GI proph:  pantoprazole  Injectable 40 milliGRAM(s) IV Push daily    AC/ proph: heparin   Injectable 5000 Unit(s) SubCutaneous every 8 hours    ABx: cefepime   IVPB 1000 milliGRAM(s) IV Intermittent every 8 hours  metroNIDAZOLE  IVPB 500 milliGRAM(s) IV Intermittent every 8 hours        Physical Examination:  General Appearance: no acute distress   HEENT: EOMI, sclera non-icteric.  Heart: RRR   Lungs: No increased work of breathing or accessory muscle use. Symmetric chest wall rise and fall.   Abdomen:  Soft, nontender, nondistended. midline incision CDI  MSK/Extremities: Warm & well-perfused.   Skin: Warm, dry. No jaundice.       LABS  Labs:  CAPILLARY BLOOD GLUCOSE      POCT Blood Glucose.: 197 mg/dL (16 Sep 2020 01:12)  POCT Blood Glucose.: 199 mg/dL (15 Sep 2020 17:05)  POCT Blood Glucose.: 167 mg/dL (15 Sep 2020 11:40)                          10.0   5.22  )-----------( 278      ( 16 Sep 2020 02:08 )             30.6       Auto Neutrophil %: 86.4 % (09-15-20 @ 06:40)  Auto Immature Granulocyte %: 0.2 % (09-15-20 @ 06:40)    09-16    133<L>  |  103  |  5<L>  ----------------------------<  237<H>  4.1   |  19  |  0.6<L>      Calcium, Total Serum: 6.6 mg/dL (09-16-20 @ 04:30)      LFTs:             4.7  | 0.4  | 13       ------------------[51      ( 15 Sep 2020 05:40 )  2.7  | 0.2  | 8           Lipase:x      Amylase:x         Blood Gas Arterial, Lactate: 4.6 mmoL/L (09-16-20 @ 04:28)  Lactate, Blood: 4.5 mmol/L (09-16-20 @ 03:09)  Lactate, Blood: 4.9 mmol/L (09-15-20 @ 20:31)  Lactate, Blood: 6.5 mmol/L (09-15-20 @ 17:51)  Lactate, Blood: 4.6 mmol/L (09-15-20 @ 11:19)  Lactate, Blood: 1.8 mmol/L (09-15-20 @ 06:00)  Lactate, Blood: 1.1 mmol/L (09-14-20 @ 12:17)    ABG - ( 16 Sep 2020 04:28 )  pH: 7.39  /  pCO2: 33    /  pO2: 133   / HCO3: 20    / Base Excess: -4.1  /  SaO2: 99                Coags:     22.40  ----< 1.95    ( 15 Sep 2020 17:51 )     x           CARDIAC MARKERS ( 16 Sep 2020 02:07 )  x     / <0.01 ng/mL / 477 U/L / x     / 2.2 ng/mL  CARDIAC MARKERS ( 15 Sep 2020 17:51 )  x     / <0.01 ng/mL / 241 U/L / x     / x

## 2020-09-16 NOTE — PROGRESS NOTE ADULT - ATTENDING COMMENTS
hypotensive on levo   tachycardic   high glucose   c/o incisional pain  high lactate , will continue to resuscitate   continue sicu care   ID consult .

## 2020-09-16 NOTE — PROGRESS NOTE ADULT - ASSESSMENT
A/P:  GLORIA SCHAFER is a 45y Female s/p PAULIE/BSO on 8/19, s/p diagnostic laparoscopy converted to exploratory laparotomy and small bowel re-anastamosis, with SBO perforation and now s/p repeat exploratory laparotomy with small bowel resection and re-anastamosis and appendectomy, POD1, tachycardic and hypotensive postop requiring pressors likely septic shock, for close hemodynamic monitoring in SICU.      Plan:   - SICU monitoring  - critically ill    09-16-20 @ 05:28

## 2020-09-16 NOTE — CHART NOTE - NSCHARTNOTEFT_GEN_A_CORE
46yo female with PSHx PAULIE/BSO 8/19/2020 for fibroids who presents with abdominal pain  s/p 9/15 lapascopic NARCISO, laparotomy, with small bowel resection with primary anastomosis.   ID consulted for feculent peritonitis  Overnight, started on pressors, and empirically on vancomycin + cefepime + flagyl.   Currentlt on Levo 0.4, able to titrate from 0.5.    Can continue current regimen for now.  If pressors worsening or with significant clinical change, can broaden to meropenem + caspofungin.     Full Consult to follow.     Please call or message on Microsoft Teams if with any questions.  Spectra [phone]

## 2020-09-16 NOTE — PROGRESS NOTE ADULT - ASSESSMENT
Assessment & Plan    45y Female 1d s/p small bowel resection with primary anastomosis     NEURO:    Acute pain-controlled with Tylenol, Toradol PRN, Morphine PRN, Dilaudid PRN    RESP:     No chronic hx    Oxygen insufficiency-wean off NC to RA as tolerate      CARDS:     -110    BP: 135/75 Postop    No chronic hx    EKG: Sinus tach @ 102     Labs:       GI/NUTR:     Diet, NPO    GI Prophylaxis- PPI    Zofran PRN    NGT LCWS    Hold Bowel Regimen     /RENAL:     Silva    Strict I/O    BUN/Cr- BUN/Cr- 8/0.6   <----,  12/0.7   <----    LR @110cc/hr + 1L bolus    Replete lytes PRN    09-14 @ 07:01  -  09-15 @ 06:49  --------------------------------------------------------  IN: 0 mL / OUT: 200 mL / NET: -200 mL    HEME/ONC:     DVT prophylaxis-HSQ    Preop Hgb 13.1    Follow up postop Hgb    ID:    Intraop: cefoTEtan intraop 2 doses     Preop wbc 11.6     Lactate 1.1    Afebrile    ENDO:    No chronic hx    Monitor glucose q4 while NPO    LINES/DRAINS:  PIV, NG , Silva     DISPO:    CEU    To be discussed with attending Dr. Yen

## 2020-09-16 NOTE — PROGRESS NOTE ADULT - SUBJECTIVE AND OBJECTIVE BOX
GLORIA SCHAFER  [id number]  45y Female    Indication for ICU admission:  Admit Date:09-14-20  ICU Date:  OR Date:    No Known Allergies    PAST MEDICAL & SURGICAL HISTORY:  Uterine fibroid    Status post bilateral salpingectomy    S/P PAULIE (total abdominal hysterectomy)      Home Medications:  ibuprofen 600 mg oral tablet: 1 tab(s) orally every 6 hours, As needed, Mild Pain (1 - 3) (01 Sep 2020 06:26)        24HRS EVENT:  tachycardic to the 100's requiring labetalol   Repelted phos   Remains on levophed       Assessment & Plan    45y Female s/p PAULIE/BSO on 8/19, s/p diagnostic laparoscopy converted to exploratory laparotomy and small bowel re-anastamosis, with SBO perforation and now s/p repeat exploratory laparotomy with small bowel resection and re-anastamosis and appendectomy, POD0, tachycardic and hypotensive postop requiring pressors likely septic shock, for close hemodynamic monitoring in SICU.    NEURO:    Lethargic but arousable, AAOx3      Acute pain-controlled with dilaudid and IV tylenol prn    RESP:     Currently on 3L face tent, O2 sat 100% --> wean as tolerated    Activity as tolerated    CXR done, f/u read    ABG @04:30: pending       CARDS:     Hypotension: norepinephrine Infusion 0.05 <Continuous>, titrate to MAP >65     CE neg x2, f/u repeat     f/u stat EKG      GI/NUTR:     Diet, NPO    IVF 110cc/hr (per NICOM)    GI Prophylaxis:pantoprazole  Injectable 40 milliGRAM(s) IV Push daily      /RENAL:     Strict I/O, escalera in place    -200cc/hr    BUN/Cr- 5/0.6   <----,  8/0.6   <----,  12/0.7    Hypokalemia -     Hypomag -     Hyponatremia - 135-->133-->130 --> 132, continue to trend, BMP ordered    Lactate uptrending 1.8 --> 4.6 --> 6.5 --> 4.9    HEME/ONC:     DVT Prophylaxis: heparin   Injectable 5000 Unit(s) SubCutaneous every 8 hours, SCDs    Acute anemia-H/H 10.1/31.4 > 10.9 > 10    Continue to trend, CBC ordered    ID:    s/p vanc 1g intraop    cefepime   IVPB 1000 IV Intermittent every 8 hours     metroNIDAZOLE  IVPB 500 IV Intermittent every 8 hours    trend WBC - 4.47 > 5.2    ENDO:    Glucose Serum: 224  LINES/DRAINS:  PIV, NG , [place], central line, [place]     DISPO:    SICU    DVT Prophylaxis: heparin   Injectable 5000 Unit(s) SubCutaneous every 8 hours      GI Prophylaxis:pantoprazole  Injectable 40 milliGRAM(s) IV Push daily      ***Tubes/Lines/Drains  ***  Peripheral IV  NG Tube  Urinary Catheter		Indication: Strict I&O          [X] A ten-point review of systems was otherwise negative except as noted above.  [  ] Due to altered mental status/intubation, subjective information was not attained from the patient. History was obtained, to the extent possible, from review of the chart and collateral sources of information.

## 2020-09-17 DIAGNOSIS — R10.84 GENERALIZED ABDOMINAL PAIN: ICD-10-CM

## 2020-09-17 LAB
ALBUMIN SERPL ELPH-MCNC: 1.5 G/DL — LOW (ref 3.5–5.2)
ALP SERPL-CCNC: 40 U/L — SIGNIFICANT CHANGE UP (ref 30–115)
ALT FLD-CCNC: 11 U/L — SIGNIFICANT CHANGE UP (ref 0–41)
ANION GAP SERPL CALC-SCNC: 7 MMOL/L — SIGNIFICANT CHANGE UP (ref 7–14)
ANION GAP SERPL CALC-SCNC: 8 MMOL/L — SIGNIFICANT CHANGE UP (ref 7–14)
ANION GAP SERPL CALC-SCNC: 9 MMOL/L — SIGNIFICANT CHANGE UP (ref 7–14)
APTT BLD: 49.3 SEC — HIGH (ref 27–39.2)
APTT BLD: 59.9 SEC — HIGH (ref 27–39.2)
APTT BLD: 60.1 SEC — HIGH (ref 27–39.2)
AST SERPL-CCNC: 24 U/L — SIGNIFICANT CHANGE UP (ref 0–41)
BILIRUB DIRECT SERPL-MCNC: 0.3 MG/DL — HIGH (ref 0–0.2)
BILIRUB INDIRECT FLD-MCNC: 0.2 MG/DL — SIGNIFICANT CHANGE UP (ref 0.2–1.2)
BILIRUB SERPL-MCNC: 0.5 MG/DL — SIGNIFICANT CHANGE UP (ref 0.2–1.2)
BUN SERPL-MCNC: 10 MG/DL — SIGNIFICANT CHANGE UP (ref 10–20)
BUN SERPL-MCNC: 7 MG/DL — LOW (ref 10–20)
BUN SERPL-MCNC: 7 MG/DL — LOW (ref 10–20)
CALCIUM SERPL-MCNC: 6.5 MG/DL — LOW (ref 8.5–10.1)
CALCIUM SERPL-MCNC: 6.8 MG/DL — LOW (ref 8.5–10.1)
CALCIUM SERPL-MCNC: 6.8 MG/DL — LOW (ref 8.5–10.1)
CHLORIDE SERPL-SCNC: 103 MMOL/L — SIGNIFICANT CHANGE UP (ref 98–110)
CHLORIDE SERPL-SCNC: 105 MMOL/L — SIGNIFICANT CHANGE UP (ref 98–110)
CHLORIDE SERPL-SCNC: 99 MMOL/L — SIGNIFICANT CHANGE UP (ref 98–110)
CK MB CFR SERPL CALC: 11.2 NG/ML — HIGH (ref 0.6–6.3)
CK SERPL-CCNC: 824 U/L — HIGH (ref 0–225)
CO2 SERPL-SCNC: 22 MMOL/L — SIGNIFICANT CHANGE UP (ref 17–32)
CO2 SERPL-SCNC: 22 MMOL/L — SIGNIFICANT CHANGE UP (ref 17–32)
CO2 SERPL-SCNC: 23 MMOL/L — SIGNIFICANT CHANGE UP (ref 17–32)
CREAT SERPL-MCNC: <0.5 MG/DL — LOW (ref 0.7–1.5)
GAS PNL BLDA: SIGNIFICANT CHANGE UP
GLUCOSE BLDC GLUCOMTR-MCNC: 145 MG/DL — HIGH (ref 70–99)
GLUCOSE BLDC GLUCOMTR-MCNC: 157 MG/DL — HIGH (ref 70–99)
GLUCOSE BLDC GLUCOMTR-MCNC: 160 MG/DL — HIGH (ref 70–99)
GLUCOSE BLDC GLUCOMTR-MCNC: 168 MG/DL — HIGH (ref 70–99)
GLUCOSE BLDC GLUCOMTR-MCNC: 174 MG/DL — HIGH (ref 70–99)
GLUCOSE BLDC GLUCOMTR-MCNC: 180 MG/DL — HIGH (ref 70–99)
GLUCOSE BLDC GLUCOMTR-MCNC: 181 MG/DL — HIGH (ref 70–99)
GLUCOSE BLDC GLUCOMTR-MCNC: 187 MG/DL — HIGH (ref 70–99)
GLUCOSE BLDC GLUCOMTR-MCNC: 199 MG/DL — HIGH (ref 70–99)
GLUCOSE BLDC GLUCOMTR-MCNC: 213 MG/DL — HIGH (ref 70–99)
GLUCOSE SERPL-MCNC: 180 MG/DL — HIGH (ref 70–99)
GLUCOSE SERPL-MCNC: 182 MG/DL — HIGH (ref 70–99)
GLUCOSE SERPL-MCNC: 193 MG/DL — HIGH (ref 70–99)
HCT VFR BLD CALC: 26.1 % — LOW (ref 37–47)
HGB BLD-MCNC: 8.5 G/DL — LOW (ref 12–16)
INR BLD: 1.35 RATIO — HIGH (ref 0.65–1.3)
INR BLD: 1.7 RATIO — HIGH (ref 0.65–1.3)
INR BLD: 1.94 RATIO — HIGH (ref 0.65–1.3)
LACTATE SERPL-SCNC: 3 MMOL/L — HIGH (ref 0.7–2)
LACTATE SERPL-SCNC: 3 MMOL/L — HIGH (ref 0.7–2)
LACTATE SERPL-SCNC: 3.9 MMOL/L — HIGH (ref 0.7–2)
MAGNESIUM SERPL-MCNC: 1.9 MG/DL — SIGNIFICANT CHANGE UP (ref 1.8–2.4)
MAGNESIUM SERPL-MCNC: 2.2 MG/DL — SIGNIFICANT CHANGE UP (ref 1.8–2.4)
MAGNESIUM SERPL-MCNC: 2.3 MG/DL — SIGNIFICANT CHANGE UP (ref 1.8–2.4)
MCHC RBC-ENTMCNC: 27.3 PG — SIGNIFICANT CHANGE UP (ref 27–31)
MCHC RBC-ENTMCNC: 32.6 G/DL — SIGNIFICANT CHANGE UP (ref 32–37)
MCV RBC AUTO: 83.9 FL — SIGNIFICANT CHANGE UP (ref 81–99)
NRBC # BLD: 0 /100 WBCS — SIGNIFICANT CHANGE UP (ref 0–0)
NT-PROBNP SERPL-SCNC: 4550 PG/ML — HIGH (ref 0–300)
PHOSPHATE SERPL-MCNC: 2.1 MG/DL — SIGNIFICANT CHANGE UP (ref 2.1–4.9)
PHOSPHATE SERPL-MCNC: 2.2 MG/DL — SIGNIFICANT CHANGE UP (ref 2.1–4.9)
PHOSPHATE SERPL-MCNC: 2.3 MG/DL — SIGNIFICANT CHANGE UP (ref 2.1–4.9)
PLATELET # BLD AUTO: 97 K/UL — LOW (ref 130–400)
POTASSIUM SERPL-MCNC: 3.3 MMOL/L — LOW (ref 3.5–5)
POTASSIUM SERPL-MCNC: 3.5 MMOL/L — SIGNIFICANT CHANGE UP (ref 3.5–5)
POTASSIUM SERPL-MCNC: 3.6 MMOL/L — SIGNIFICANT CHANGE UP (ref 3.5–5)
POTASSIUM SERPL-SCNC: 3.3 MMOL/L — LOW (ref 3.5–5)
POTASSIUM SERPL-SCNC: 3.5 MMOL/L — SIGNIFICANT CHANGE UP (ref 3.5–5)
POTASSIUM SERPL-SCNC: 3.6 MMOL/L — SIGNIFICANT CHANGE UP (ref 3.5–5)
PROT SERPL-MCNC: 3 G/DL — LOW (ref 6–8)
PROTHROM AB SERPL-ACNC: 15.5 SEC — HIGH (ref 9.95–12.87)
PROTHROM AB SERPL-ACNC: 19.5 SEC — HIGH (ref 9.95–12.87)
PROTHROM AB SERPL-ACNC: 22.3 SEC — HIGH (ref 9.95–12.87)
RBC # BLD: 3.11 M/UL — LOW (ref 4.2–5.4)
RBC # FLD: 14.5 % — SIGNIFICANT CHANGE UP (ref 11.5–14.5)
SODIUM SERPL-SCNC: 131 MMOL/L — LOW (ref 135–146)
SODIUM SERPL-SCNC: 133 MMOL/L — LOW (ref 135–146)
SODIUM SERPL-SCNC: 134 MMOL/L — LOW (ref 135–146)
TROPONIN T SERPL-MCNC: 0.07 NG/ML — CRITICAL HIGH
WBC # BLD: 4.4 K/UL — LOW (ref 4.8–10.8)
WBC # FLD AUTO: 4.4 K/UL — LOW (ref 4.8–10.8)

## 2020-09-17 PROCEDURE — 71275 CT ANGIOGRAPHY CHEST: CPT | Mod: 26

## 2020-09-17 PROCEDURE — 71045 X-RAY EXAM CHEST 1 VIEW: CPT | Mod: 26

## 2020-09-17 PROCEDURE — 99291 CRITICAL CARE FIRST HOUR: CPT | Mod: 24

## 2020-09-17 PROCEDURE — 99024 POSTOP FOLLOW-UP VISIT: CPT

## 2020-09-17 PROCEDURE — 74177 CT ABD & PELVIS W/CONTRAST: CPT | Mod: 26

## 2020-09-17 RX ORDER — POTASSIUM CHLORIDE 20 MEQ
20 PACKET (EA) ORAL
Refills: 0 | Status: DISCONTINUED | OUTPATIENT
Start: 2020-09-17 | End: 2020-09-17

## 2020-09-17 RX ORDER — DEXTROSE 50 % IN WATER 50 %
12.5 SYRINGE (ML) INTRAVENOUS ONCE
Refills: 0 | Status: DISCONTINUED | OUTPATIENT
Start: 2020-09-17 | End: 2020-09-18

## 2020-09-17 RX ORDER — POTASSIUM PHOSPHATE, MONOBASIC POTASSIUM PHOSPHATE, DIBASIC 236; 224 MG/ML; MG/ML
30 INJECTION, SOLUTION INTRAVENOUS ONCE
Refills: 0 | Status: COMPLETED | OUTPATIENT
Start: 2020-09-17 | End: 2020-09-17

## 2020-09-17 RX ORDER — POTASSIUM CHLORIDE 20 MEQ
20 PACKET (EA) ORAL
Refills: 0 | Status: COMPLETED | OUTPATIENT
Start: 2020-09-17 | End: 2020-09-17

## 2020-09-17 RX ORDER — IOHEXOL 300 MG/ML
30 INJECTION, SOLUTION INTRAVENOUS ONCE
Refills: 0 | Status: COMPLETED | OUTPATIENT
Start: 2020-09-17 | End: 2020-09-17

## 2020-09-17 RX ORDER — INSULIN HUMAN 100 [IU]/ML
INJECTION, SOLUTION SUBCUTANEOUS EVERY 4 HOURS
Refills: 0 | Status: DISCONTINUED | OUTPATIENT
Start: 2020-09-17 | End: 2020-09-18

## 2020-09-17 RX ORDER — POTASSIUM CHLORIDE 20 MEQ
20 PACKET (EA) ORAL ONCE
Refills: 0 | Status: COMPLETED | OUTPATIENT
Start: 2020-09-17 | End: 2020-09-17

## 2020-09-17 RX ORDER — ELECTROLYTE SOLUTION,INJ
1 VIAL (ML) INTRAVENOUS
Refills: 0 | Status: DISCONTINUED | OUTPATIENT
Start: 2020-09-17 | End: 2020-09-17

## 2020-09-17 RX ORDER — MAGNESIUM SULFATE 500 MG/ML
1 VIAL (ML) INJECTION ONCE
Refills: 0 | Status: COMPLETED | OUTPATIENT
Start: 2020-09-17 | End: 2020-09-17

## 2020-09-17 RX ORDER — DEXTROSE 50 % IN WATER 50 %
25 SYRINGE (ML) INTRAVENOUS ONCE
Refills: 0 | Status: DISCONTINUED | OUTPATIENT
Start: 2020-09-17 | End: 2020-09-18

## 2020-09-17 RX ORDER — DEXTROSE 50 % IN WATER 50 %
15 SYRINGE (ML) INTRAVENOUS ONCE
Refills: 0 | Status: DISCONTINUED | OUTPATIENT
Start: 2020-09-17 | End: 2020-09-18

## 2020-09-17 RX ORDER — BENZOCAINE AND MENTHOL 5; 1 G/100ML; G/100ML
1 LIQUID ORAL
Refills: 0 | Status: DISCONTINUED | OUTPATIENT
Start: 2020-09-17 | End: 2020-09-18

## 2020-09-17 RX ORDER — SODIUM CHLORIDE 9 MG/ML
1000 INJECTION, SOLUTION INTRAVENOUS
Refills: 0 | Status: DISCONTINUED | OUTPATIENT
Start: 2020-09-17 | End: 2020-09-18

## 2020-09-17 RX ORDER — CALCIUM GLUCONATE 100 MG/ML
2 VIAL (ML) INTRAVENOUS ONCE
Refills: 0 | Status: COMPLETED | OUTPATIENT
Start: 2020-09-17 | End: 2020-09-17

## 2020-09-17 RX ORDER — HYDROMORPHONE HYDROCHLORIDE 2 MG/ML
0.5 INJECTION INTRAMUSCULAR; INTRAVENOUS; SUBCUTANEOUS ONCE
Refills: 0 | Status: DISCONTINUED | OUTPATIENT
Start: 2020-09-17 | End: 2020-09-17

## 2020-09-17 RX ORDER — GLUCAGON INJECTION, SOLUTION 0.5 MG/.1ML
1 INJECTION, SOLUTION SUBCUTANEOUS ONCE
Refills: 0 | Status: DISCONTINUED | OUTPATIENT
Start: 2020-09-17 | End: 2020-09-18

## 2020-09-17 RX ADMIN — HEPARIN SODIUM 5000 UNIT(S): 5000 INJECTION INTRAVENOUS; SUBCUTANEOUS at 22:25

## 2020-09-17 RX ADMIN — MEROPENEM 100 MILLIGRAM(S): 1 INJECTION INTRAVENOUS at 06:05

## 2020-09-17 RX ADMIN — Medication 100 MILLIEQUIVALENT(S): at 06:43

## 2020-09-17 RX ADMIN — HEPARIN SODIUM 5000 UNIT(S): 5000 INJECTION INTRAVENOUS; SUBCUTANEOUS at 06:06

## 2020-09-17 RX ADMIN — Medication 85 MILLIMOLE(S): at 20:46

## 2020-09-17 RX ADMIN — IOHEXOL 30 MILLILITER(S): 300 INJECTION, SOLUTION INTRAVENOUS at 12:44

## 2020-09-17 RX ADMIN — HEPARIN SODIUM 5000 UNIT(S): 5000 INJECTION INTRAVENOUS; SUBCUTANEOUS at 14:03

## 2020-09-17 RX ADMIN — Medication 200 GRAM(S): at 05:28

## 2020-09-17 RX ADMIN — PANTOPRAZOLE SODIUM 40 MILLIGRAM(S): 20 TABLET, DELAYED RELEASE ORAL at 13:59

## 2020-09-17 RX ADMIN — HYDROMORPHONE HYDROCHLORIDE 0.5 MILLIGRAM(S): 2 INJECTION INTRAMUSCULAR; INTRAVENOUS; SUBCUTANEOUS at 16:23

## 2020-09-17 RX ADMIN — CASPOFUNGIN ACETATE 50 MILLIGRAM(S): 7 INJECTION, POWDER, LYOPHILIZED, FOR SOLUTION INTRAVENOUS at 20:02

## 2020-09-17 RX ADMIN — Medication 50 GRAM(S): at 18:46

## 2020-09-17 RX ADMIN — INSULIN HUMAN 2: 100 INJECTION, SOLUTION SUBCUTANEOUS at 18:29

## 2020-09-17 RX ADMIN — POTASSIUM PHOSPHATE, MONOBASIC POTASSIUM PHOSPHATE, DIBASIC 83.33 MILLIMOLE(S): 236; 224 INJECTION, SOLUTION INTRAVENOUS at 13:21

## 2020-09-17 RX ADMIN — Medication 100 MILLIEQUIVALENT(S): at 05:28

## 2020-09-17 RX ADMIN — INSULIN HUMAN 4: 100 INJECTION, SOLUTION SUBCUTANEOUS at 22:24

## 2020-09-17 RX ADMIN — Medication 85 MILLIMOLE(S): at 05:43

## 2020-09-17 RX ADMIN — Medication 100 MILLIEQUIVALENT(S): at 09:00

## 2020-09-17 RX ADMIN — Medication 1 EACH: at 20:48

## 2020-09-17 RX ADMIN — Medication 100 MILLIEQUIVALENT(S): at 20:02

## 2020-09-17 NOTE — CONSULT NOTE ADULT - ASSESSMENT
IMPRESSION;  Feculent peritonitis secondary to perforated viscus  9/15 : s/p diagnostic lap, NARCISO, SBR (43cm ileum) w/ primary anastamosis (side to side)  RVP NGTD  WBC 4.4  CXR no infiltrates    RECOMMENDATIONS;  Zosyn 4.5 gm iv q6h over 4h  Flagyl 500 mg iv q6h  Caspofungin 50 mg iv q24h  BCx  OR cultures if sent

## 2020-09-17 NOTE — PROGRESS NOTE ADULT - ASSESSMENT
46 y/o no Pmhx s/p PAULIE/BS on 8/19 for fibroid uterus POD#29, s/p diagnostic laparoscopy converted to exploratory laparotomy and small bowel reanastomosis, with breakdown of staple line of proximal limb of anastomosis, s/p repeat exploratory laparotomy with small bowel resection, reanastomosis and appendectomy, POD2, tachycardic and hypotensive postop requiring pressors, likely septic shock, transferred to SICU for close hemodynamic monitoring.   - Neuro: AAOX3, PCA pump for pain control  - CVS: tachycardic, metoprolol PRN, on phenylephrine. Norepinephrine and esmolol discontinued.   - Pulm: sat 100% on 3LNC  - GI: NPO, NG tube, TPN started  - : IV fluids @110 cc/hr, monitor UO, trend Cr  - heme/onc: H/H stable, heparin for VTE prophylaxis  - Inf: likely septic shock, lactate downtrending, antibiotics changed to meropenem and caspofungin  - Lines: Right IJ central line, A line left radial, escalera, midline  - management per SICU team  - gyn to follow    Will inform Dr. Molina.

## 2020-09-17 NOTE — PROGRESS NOTE ADULT - SUBJECTIVE AND OBJECTIVE BOX
GLORIA SCHAFER  [id number]  45y Female    Indication for ICU admission: intraabdominal sepsis, s/p diagnostic lap, NARCISO, SBR (43cm ileum) w/ primary anastamosis (side to side)  Admit Date:09-14-20  ICU Date: 9/15/2020  OR Date: 9/15/2020 x2    No Known Allergies    PAST MEDICAL & SURGICAL HISTORY:  Uterine fibroid    Status post bilateral salpingectomy    S/P PAULIE (total abdominal hysterectomy)      Home Medications:  ibuprofen 600 mg oral tablet: 1 tab(s) orally every 6 hours, As needed, Mild Pain (1 - 3) (01 Sep 2020 06:26)        24HRS EVENT:    Overnight: Pt continued d/c of esmolol, tachy to 120s. Pt weaned off levo gtt (@1am), now only on sarbjit gtt @ 5.5. Pt continued on insulin gtt. Lactic acid improving to 2.3 with peak of 5 on 9/16 @1400. Pt on maintence fluids of LR @ 110 and TPN. S/p 3 Kriders, 30 NaPhos, K 3.1, Na 129, Mg 2.2, Phos 2.3. Pt's abdomen soft, nondistended. Pain controlled with PCA pump. Tmax 102.1 on 9/16 @18:00, came down with ice packs, since been afebrile.     Added Dilaudid PCA for adequate pain control. Tachycardic 130-140s, given Lopressor 2.5mg x2; started on Esmolol gtt. Changed Levo to Sarbjit gtt. NICOM not responsive, no additional boluses given. Troponins and lactate uptrending. ID c/s, antibiotic regimen changed. Spiked temp to 102, given IV Tylenol, on cooling blanket. Hyperglycemic, started on insulin gtt.      NEURO:    Lethargic but arousable, AAOx3      Acute pain - controlled with Dilaudid PCA    RESP:     On NC @ 3L    ABG: @448- 7.41 l 37 l 137 l 24 l 100 l Lactic of 2.3    IS ____    CXR AM      CARDS:     Hypotension - On Sarbjit gtt, Levo gtt dc    Tachycardia refractory to Lopressor 2.5mg pushes - started on Esmolol gtt for HR > 140,        d/c with HR in 120s    NICOM not responsive x2, no additional boluses given    CE neg, 0.04, 0.16, 0.07    EKG: Sinus tach on 9/16 @12pm    ECHO: EF 50-55%, G1DD, mild MR, mild TR      GI/NUTR:     Diet: NPO, TPN    LR @ 110cc/hr    GI ppx: PPI    no bowel regimen    LFTs wnl      /RENAL:     Silva - UO 30-100cc/hr    BUN/Cr- 6/0.6> 7/.5    Elytes: Na 129 / K 3.1 / Mg 2.2 / Ph 2.3, repleted    Lactate uptrending 1.8 >> 6.5 >> 4.4 > 4.7 > 5.2> 3.9 > 3.0 > 2.3- q4 lactates per dr gave     HEME/ONC:     Hgb 8.5> 9.9     INR 2.5 > 1.7    DVT Prophylaxis: HSQ, SCDs    ID:    Tmax 102 --> IV Tylenol, cooling blanket    WBC 5 > 4.4    Abx: Lachelle, Caspfungin    -ID following       ENDO:    Hyperglycemic - started on insulin gtt      LINES/DRAINS:  PIV, NGT , [place], central line, Silva         DVT Prophylaxis: heparin   Injectable 5000 Unit(s) SubCutaneous every 8 hours  GI Prophylaxis:pantoprazole  Injectable 40 milliGRAM(s) IV Push daily      ***Tubes/Lines/Drains  ***  Peripheral IV  NG Tube  Urinary Catheter		Indication: Strict I&O      R. Basilic Midline                      Date: 9/17      [X] A ten-point review of systems was otherwise negative except as noted above.  [  ] Due to altered mental status/intubation, subjective information was not attained from the patient. History was obtained, to the extent possible, from review of the chart and collateral sources of information.     GLORIA SCHAFER  [id number]  45y Female    Indication for ICU admission: intraabdominal sepsis, s/p diagnostic lap, NARCISO, SBR (43cm ileum) w/ primary anastamosis (side to side)  Admit Date:09-14-20  ICU Date: 9/15/2020  OR Date: 9/15/2020 x2    No Known Allergies    PAST MEDICAL & SURGICAL HISTORY:  Uterine fibroid    Status post bilateral salpingectomy    S/P PAULIE (total abdominal hysterectomy)      Home Medications:  ibuprofen 600 mg oral tablet: 1 tab(s) orally every 6 hours, As needed, Mild Pain (1 - 3) (01 Sep 2020 06:26)        24HRS EVENT:    Overnight: Pt continued d/c of esmolol, tachy to 120s. Pt weaned off levo gtt (@1am), now only on sarbjit gtt @ 5.5. Pt continued on insulin gtt. Lactic acid improving to 2.3 with peak of 5 on 9/16 @1400. Pt on maintence fluids of LR @ 110 and TPN. S/p 3 Kriders, 30 NaPhos, K 3.1, Na 129, Mg 2.2, Phos 2.3. Pt's abdomen soft, nondistended. Pain controlled with PCA pump. Tmax 102.1 on 9/16 @18:00, came down with ice packs, since been afebrile.       NEURO:    Lethargic but arousable, AAOx3      Acute pain - controlled with Dilaudid PCA    RESP:     On NC @ 3L    ABG: @448- 7.41 l 37 l 137 l 24 l 100 l Lactic of 2.3    IS ____    CXR AM      CARDS:     Hypotension - On Sarbjit gtt, Levo gtt dc    Tachycardia refractory to Lopressor 2.5mg pushes - started on Esmolol gtt for HR > 140,        d/c with HR in 120s    NICOM not responsive x2, no additional boluses given    CE neg, 0.04, 0.16, 0.07    EKG: Sinus tach on 9/16 @12pm    ECHO: EF 50-55%, G1DD, mild MR, mild TR      GI/NUTR:     Diet: NPO, TPN    LR @ 110cc/hr    GI ppx: PPI    no bowel regimen    LFTs wnl      /RENAL:     Silva - UO 30-100cc/hr    BUN/Cr- 6/0.6> 7/.5    Elytes: Na 129 / K 3.1 / Mg 2.2 / Ph 2.3, repleted    Lactate uptrending 1.8 >> 6.5 >> 4.4 > 4.7 > 5.2> 3.9 > 3.0 > 2.3- q4 lactates per dr gave     HEME/ONC:     Hgb 8.5> 9.9     INR 2.5 > 1.7    DVT Prophylaxis: HSQ, SCDs    ID:    Tmax 102 --> IV Tylenol, cooling blanket    WBC 5 > 4.4    Abx: Lachelle, Caspfungin    -ID following       ENDO:    Hyperglycemic - started on insulin gtt      LINES/DRAINS:  PIV, NGT , [place], central line, Silva         DVT Prophylaxis: heparin   Injectable 5000 Unit(s) SubCutaneous every 8 hours  GI Prophylaxis:pantoprazole  Injectable 40 milliGRAM(s) IV Push daily      ***Tubes/Lines/Drains  ***  Peripheral IV  NG Tube  Urinary Catheter		Indication: Strict I&O      R. Basilic Midline                      Date: 9/17      [X] A ten-point review of systems was otherwise negative except as noted above.  [  ] Due to altered mental status/intubation, subjective information was not attained from the patient. History was obtained, to the extent possible, from review of the chart and collateral sources of information.     GLORIA SCHAFER  [id number]  45y Female    Indication for ICU admission: intraabdominal sepsis, s/p diagnostic lap, NARCISO, SBR (43cm ileum) w/ primary anastamosis (side to side)  Admit Date:20  ICU Date: 9/15/2020  OR Date: 9/15/2020 x2    No Known Allergies    PAST MEDICAL & SURGICAL HISTORY:  Uterine fibroid    Status post bilateral salpingectomy    S/P PAULIE (total abdominal hysterectomy)      Home Medications:  ibuprofen 600 mg oral tablet: 1 tab(s) orally every 6 hours, As needed, Mild Pain (1 - 3) (01 Sep 2020 06:26)        24HRS EVENT:    Overnight: Pt continued d/c of esmolol, tachy to 120s. Pt weaned off levo gtt (@1am), now only on sarbjit gtt @ 5.5. Pt continued on insulin gtt. Lactic acid improving to 2.3 with peak of 5 on  @1400. Pt on maintence fluids of LR @ 110 and TPN. S/p 3 Kriders, 30 NaPhos, K 3.1, Na 129, Mg 2.2, Phos 2.3. Pt's abdomen soft, nondistended. Pain controlled with PCA pump. Tmax 102.1 on  @18:00, came down with ice packs, since been afebrile.       NEURO:    Lethargic but arousable, AAOx3      Acute pain - controlled with Dilaudid PCA    RESP:     On NC @ 3L    ABG: @448- 7.41 l 37 l 137 l 24 l 100 l Lactic of 2.3    IS ____    CXR AM      CARDS:     Hypotension - On Sarbjit gtt, Levo gtt dc    Tachycardia refractory to Lopressor 2.5mg pushes - started on Esmolol gtt for HR > 140,        d/c with HR in 120s    NICOM not responsive x2, no additional boluses given    CE neg, 0.04, 0.16, 0.07    EKG: Sinus tach on  @12pm    ECHO: EF 50-55%, G1DD, mild MR, mild TR      GI/NUTR:     Diet: NPO, TPN    LR @ 110cc/hr    GI ppx: PPI    no bowel regimen    LFTs wnl      /RENAL:     Silva - UO 30-100cc/hr    BUN/Cr- 6/0.6> 7/.5    Elytes: Na 129 / K 3.1 / Mg 2.2 / Ph 2.3, repleted    Lactate uptrending 1.8 >> 6.5 >> 4.4 > 4.7 > 5.2> 3.9 > 3.0 > 2.3- q4 lactates per dr gave     HEME/ONC:     Hgb 8.5> 9.9     INR 2.5 > 1.7    DVT Prophylaxis: HSQ, SCDs    ID:    Tmax 102 --> IV Tylenol, cooling blanket    WBC 5 > 4.4    Abx: Lachelle, Caspfungin    -ID following       ENDO:    Hyperglycemic - started on insulin gtt      LINES/DRAINS:  PIV, NGT , [place], central line, Silva         DVT Prophylaxis: heparin   Injectable 5000 Unit(s) SubCutaneous every 8 hours  GI Prophylaxis:pantoprazole  Injectable 40 milliGRAM(s) IV Push daily      ***Tubes/Lines/Drains  ***  Peripheral IV  NG Tube  Urinary Catheter		Indication: Strict I&O      R. Basilic Midline                      Date:       [X] A ten-point review of systems was otherwise negative except as noted above.  [  ] Due to altered mental status/intubation, subjective information was not attained from the patient. History was obtained, to the extent possible, from review of the chart and collateral sources of information.    Daily     Daily Weight in k.3 (17 Sep 2020 03:00)    Diet, NPO (09-15-20 @ 15:53)      CURRENT MEDS:  Neurologic Medications  HYDROmorphone PCA (1 mG/mL) 30 milliLiter(s) PCA Continuous PCA Continuous    Respiratory Medications    Cardiovascular Medications  metoprolol tartrate Injectable 2.5 milliGRAM(s) IV Push every 6 hours  phenylephrine    Infusion 0.1 MICROgram(s)/kG/Min IV Continuous <Continuous>    Gastrointestinal Medications  pantoprazole  Injectable 40 milliGRAM(s) IV Push daily  Parenteral Nutrition - Adult 1 Each TPN Continuous <Continuous>  Parenteral Nutrition - Adult 1 Each TPN Continuous <Continuous>  potassium phosphate IVPB 30 milliMole(s) IV Intermittent once    Genitourinary Medications    Hematologic/Oncologic Medications  heparin   Injectable 5000 Unit(s) SubCutaneous every 8 hours    Antimicrobial/Immunologic Medications  caspofungin IVPB      caspofungin IVPB 50 milliGRAM(s) IV Intermittent every 24 hours  metroNIDAZOLE  IVPB 500 milliGRAM(s) IV Intermittent every 6 hours  piperacillin/tazobactam IVPB.. 4.5 Gram(s) IV Intermittent every 6 hours    Endocrine/Metabolic Medications  hydrocortisone sodium succinate Injectable 50 milliGRAM(s) IV Push every 6 hours  insulin regular Infusion 1 Unit(s)/Hr IV Continuous <Continuous>  vasopressin Infusion 0.04 Unit(s)/Min IV Continuous <Continuous>    Topical/Other Medications  naloxone Injectable 0.1 milliGRAM(s) IV Push every 3 minutes PRN For ANY of the following changes in patient status:  A. RR LESS THAN 10 breaths per minute, B. Oxygen saturation LESS THAN 90%, C. Sedation score of 6      ICU Vital Signs Last 24 Hrs  T(C): 36.5 (17 Sep 2020 08:00), Max: 38.9 (16 Sep 2020 16:30)  T(F): 97.7 (17 Sep 2020 08:00), Max: 102.1 (16 Sep 2020 18:00)  HR: 127 (17 Sep 2020 12:30) (115 - 152)  BP: --  BP(mean): --  ABP: 117/59 (17 Sep 2020 12:30) (58/56 - 134/73)  ABP(mean): 75 (17 Sep 2020 12:30) (57 - 90)  RR: 14 (17 Sep 2020 11:15) (14 - 19)  SpO2: 100% (17 Sep 2020 12:30) (97% - 100%)      Adult Advanced Hemodynamics Last 24 Hrs  CVP(mm Hg): --  CVP(cm H2O): --  CO: 4.9 (17 Sep 2020 08:30) (4.8 - 4.9)  CI: 2.7 (17 Sep 2020 08:30) (2.7 - 2.7)  PA: --  PA(mean): --  PCWP: --  SVR: --  SVRI: --  PVR: --  PVRI: --      ABG - ( 17 Sep 2020 04:48 )  pH, Arterial: 7.41  pH, Blood: x     /  pCO2: 37    /  pO2: 137   / HCO3: 24    / Base Excess: -0.7  /  SaO2: 100                 I&O's Summary    16 Sep 2020 07:01  -  17 Sep 2020 07:00  --------------------------------------------------------  IN: 6723.5 mL / OUT: 2735 mL / NET: 3988.5 mL    17 Sep 2020 07:  -  17 Sep 2020 12:59  --------------------------------------------------------  IN: 1594.7 mL / OUT: 690 mL / NET: 904.7 mL      I&O's Detail    16 Sep 2020 07:  -  17 Sep 2020 07:00  --------------------------------------------------------  IN:    Insulin: 66 mL    IV PiggyBack: 150 mL    Lactated Ringers: 280 mL    Lactated Ringers: 1320 mL    Lactated Ringers: 1210 mL    Lactated Ringers Bolus: 250 mL    Norepinephrine: 399.9 mL    Phenylephrine: 1007.6 mL    TPN (Total Parenteral Nutrition): 2040 mL  Total IN: 6723.5 mL    OUT:    Drain (mL): 175 mL    Indwelling Catheter - Urethral (mL): 2385 mL    Nasogastric/Oral tube (mL): 175 mL  Total OUT: 2735 mL    Total NET: 3988.5 mL      17 Sep 2020 07:  -  17 Sep 2020 12:59  --------------------------------------------------------  IN:    Insulin: 12 mL    Lactated Ringers: 440 mL    Lactated Ringers Bolus: 250 mL    Phenylephrine: 377.9 mL    TPN (Total Parenteral Nutrition): 510 mL    Vasopressin: 4.8 mL  Total IN: 1594.7 mL    OUT:    Indwelling Catheter - Urethral (mL): 690 mL  Total OUT: 690 mL    Total NET: 904.7 mL          PHYSICAL EXAM:    General/Neuro   alert & oriented x 3, no focal deficits    Lungs:      clear to auscultation, Normal expansion/effort. on 3L NC    Cardiovascular : Sinus Tachycardia    GI: Abdomen soft, diffusely tender, Non-distended.   Nasogastric tube in place, draining bile  Wound:    Extremities: Extremities cold, pale, mild edema    Derm: no skin breakdown.      :       Silva catheter in place.      LABS:  CAPILLARY BLOOD GLUCOSE      POCT Blood Glucose.: 132 mg/dL (17 Sep 2020 12:06)  POCT Blood Glucose.: 139 mg/dL (17 Sep 2020 10:40)  POCT Blood Glucose.: 145 mg/dL (17 Sep 2020 07:47)  POCT Blood Glucose.: 157 mg/dL (17 Sep 2020 06:00)  POCT Blood Glucose.: 181 mg/dL (17 Sep 2020 03:54)  POCT Blood Glucose.: 199 mg/dL (17 Sep 2020 02:29)  POCT Blood Glucose.: 180 mg/dL (17 Sep 2020 00:35)  POCT Blood Glucose.: 171 mg/dL (16 Sep 2020 23:22)  POCT Blood Glucose.: 172 mg/dL (16 Sep 2020 22:31)  POCT Blood Glucose.: 154 mg/dL (16 Sep 2020 20:59)  POCT Blood Glucose.: 132 mg/dL (16 Sep 2020 19:59)  POCT Blood Glucose.: 141 mg/dL (16 Sep 2020 19:07)  POCT Blood Glucose.: 135 mg/dL (16 Sep 2020 18:05)  POCT Blood Glucose.: 160 mg/dL (16 Sep 2020 17:04)  POCT Blood Glucose.: 187 mg/dL (16 Sep 2020 16:10)  POCT Blood Glucose.: 192 mg/dL (16 Sep 2020 15:18)  POCT Blood Glucose.: 175 mg/dL (16 Sep 2020 14:33)  POCT Blood Glucose.: 165 mg/dL (16 Sep 2020 13:15)                          8.1    7.87  )-----------( 83       ( 17 Sep 2020 10:40 )             25.1       09-17    133<L>  |  103  |  9<L>  ----------------------------<  145<H>  3.8   |  23  |  <0.5<L>    Ca    6.9<L>      17 Sep 2020 10:20  Phos  1.6     09-17  Mg     2.1         TPro  3.0<L>  /  Alb  1.5<L>  /  TBili  0.5  /  DBili  0.3<H>  /  AST  24  /  ALT  11  /  AlkPhos  40        PT/INR - ( 17 Sep 2020 04:20 )   PT: 19.50 sec;   INR: 1.70 ratio         PTT - ( 17 Sep 2020 04:20 )  PTT:59.9 sec  CARDIAC MARKERS ( 16 Sep 2020 23:30 )  x     / 0.07 ng/mL / 824 U/L / x     / 11.2 ng/mL  CARDIAC MARKERS ( 16 Sep 2020 16:03 )  x     / 0.16 ng/mL / 839 U/L / x     / 11.6 ng/mL  CARDIAC MARKERS ( 16 Sep 2020 10:00 )  x     / 0.04 ng/mL / 705 U/L / x     / 5.3 ng/mL  CARDIAC MARKERS ( 16 Sep 2020 02:07 )  x     / <0.01 ng/mL / 477 U/L / x     / 2.2 ng/mL  CARDIAC MARKERS ( 15 Sep 2020 17:51 )  x     / <0.01 ng/mL / 241 U/L / x     / x

## 2020-09-17 NOTE — PROGRESS NOTE ADULT - SUBJECTIVE AND OBJECTIVE BOX
PGY4 progress note    POD#29 Ex lap PAULIE/BS, ureteral stent placement  POD#2 ex lap, resection of 43 cm of ileum secondary to serosal tears, side-to-side ileoileal anastomosis, enterotomy stapled  POD#2 Ex lap small bowel resection with anastomosis appendectomy    Patient seen and examined at bedside, sleeping comfortably. Reports pain better controlled. On phenylephrine drip only. Esmolol and norepinephrine discontinued.     PE:  Vital Signs Last 24 Hrs  T(C): 36.7 (17 Sep 2020 04:00), Max: 38.9 (16 Sep 2020 16:30)  T(F): 98.1 (17 Sep 2020 04:00), Max: 102.1 (16 Sep 2020 18:00)  HR: 122 (17 Sep 2020 03:45) (115 - 152)  BP: 84/55 (16 Sep 2020 08:00) (84/55 - 92/62)  BP(mean): 64 (16 Sep 2020 08:00) (64 - 64)  RR: 18 (16 Sep 2020 19:00) (18 - 19)  SpO2: 100% (17 Sep 2020 03:45) (97% - 100%)    UO: 2011-2058 460cc  NG tube output: 0469-8636 200cc    GEN: AAOX3  CVS: tachycardic, normal rhythm   lungs: CTAB  abd: packed with kerlex, abdominal pad and dressing over vertical incision, staining in inferior part of dressing. Abdomen soft, appropriately tender diffusely, no r/g/r  ext: no calf tenderness or edema, SCDs in place    labs:                        8.5    4.40  )-----------( 97       ( 17 Sep 2020 04:20 )             26.1     09-17    133<L>  |  103  |  7<L>  ----------------------------<  193<H>  3.3<L>   |  22  |  <0.5<L>    Ca    6.5<L>      17 Sep 2020 04:20  Phos  2.3     09-17  Mg     2.2     09-17    TPro  3.0<L>  /  Alb  1.5<L>  /  TBili  0.5  /  DBili  0.3<H>  /  AST  24  /  ALT  11  /  AlkPhos  40  09-17    lactate 2.3    MEDICATIONS  (STANDING):  caspofungin IVPB      caspofungin IVPB 50 milliGRAM(s) IV Intermittent every 24 hours  heparin   Injectable 5000 Unit(s) SubCutaneous every 8 hours  HYDROmorphone PCA (1 mG/mL) 30 milliLiter(s) PCA Continuous PCA Continuous  insulin regular Infusion 1 Unit(s)/Hr (1 mL/Hr) IV Continuous <Continuous>  lactated ringers. 1000 milliLiter(s) (110 mL/Hr) IV Continuous <Continuous>  meropenem  IVPB 1000 milliGRAM(s) IV Intermittent every 8 hours  pantoprazole  Injectable 40 milliGRAM(s) IV Push daily  Parenteral Nutrition - Adult 1 Each (85 mL/Hr) TPN Continuous <Continuous>  phenylephrine    Infusion 0.1 MICROgram(s)/kG/Min (1.33 mL/Hr) IV Continuous <Continuous>  potassium chloride  20 mEq/100 mL IVPB 20 milliEquivalent(s) IV Intermittent every 1 hour  sodium phosphate IVPB 30 milliMole(s) IV Intermittent once    MEDICATIONS  (PRN):  naloxone Injectable 0.1 milliGRAM(s) IV Push every 3 minutes PRN For ANY of the following changes in patient status:  A. RR LESS THAN 10 breaths per minute, B. Oxygen saturation LESS THAN 90%, C. Sedation score of 6

## 2020-09-17 NOTE — PROGRESS NOTE ADULT - ATTENDING COMMENTS
44yo female with PSHx PAULIE/BSO 8/19/2020 for fibroids admitted for persistent small bowel obstruction s/p laparoscopic NARCISO, laparotomy, extensive NARCISO, small bowel resection with primary anastomosis 9/15 complicated by small bowel leak s/p re-exploration, wash out, small bowel resection and primary anastomosis 9/15. Under ICU care at this time. Patient remains tachycardic to 130's on pressors. Extubated and breathing independently. Complains of abdominal pain, NG tube in place 175ml/24h. No bowel function. Dressing change today. WBC 4. Lactate 2.3. Pain control. Monitor bowel function, trend labs, continue antibiotics. Encourage ambulation/OOB, incentive spirometer, DVT ppx. Primary care as per ICU.

## 2020-09-17 NOTE — PROGRESS NOTE ADULT - SUBJECTIVE AND OBJECTIVE BOX
Pain Management Progress Note -   45y Female w/recent history of PAULIE/BSO on 8/19/20 for 15cm bleeding fibroid with readmission for abdominal pain and inability to tolerate PO on 8/30. Patient was noted to have a partial obstruction vs. ileus which resolved without operative intervention and was discharged. Presented on 9/4 with recurrent abdominal pain, NGT placed, managed non-operative. Presented on 9/14/20 with two days of abdominal pain associated with nausea and vomiting. Patient is now s/p Washing out stomach contents, Small bowel resection with anastomosis, Lysis of intestinal adhesions 15-Sep-2020. Patient started on PCA Dilaudid 0.2, bolus only, 10min interval. Patient lying in bed supine. Patient currently having line placed. PCA continues to be in place. Patient states the pain is controlled with PCA. Will maintain PCA  and reevaluate tomorrow for conversion.       Uterine fibroid    No pertinent past medical history    Perforated viscus    Small bowel obstruction    Intermittent small bowel obstruction due to adhesions    Perforated viscus    Intestinal anastomotic leak    Small bowel obstruction    Bowel obstruction    Washing out stomach contents    Small bowel resection with anastomosis    Lysis of intestinal adhesions    Diagnostic laparoscopy    Status post bilateral salpingectomy    S/P PAULIE (total abdominal hysterectomy)    No significant past surgical history    POST OP PROBLEM    6    [email or web address]        potassium chloride  20 mEq/100 mL IVPB  sodium phosphate IVPB  glucagon  Injectable  dextrose 50% Injectable  dextrose 50% Injectable  dextrose 50% Injectable  dextrose 40% Gel  dextrose 5%.  insulin regular  human corrective regimen sliding scale  Parenteral Nutrition - Adult  hydrocortisone sodium succinate Injectable  metroNIDAZOLE  IVPB  piperacillin/tazobactam IVPB..  metoprolol tartrate Injectable  vasopressin Infusion  caspofungin IVPB  caspofungin IVPB  phenylephrine    Infusion  naloxone Injectable  HYDROmorphone PCA (1 mG/mL)  Parenteral Nutrition - Adult  pantoprazole  Injectable  heparin   Injectable      caspofungin IVPB      caspofungin IVPB 50 milliGRAM(s) IV Intermittent every 24 hours  dextrose 40% Gel 15 Gram(s) Oral once PRN  dextrose 5%. 1000 milliLiter(s) IV Continuous <Continuous>  dextrose 50% Injectable 12.5 Gram(s) IV Push once  dextrose 50% Injectable 25 Gram(s) IV Push once  dextrose 50% Injectable 25 Gram(s) IV Push once  glucagon  Injectable 1 milliGRAM(s) IntraMuscular once PRN  heparin   Injectable 5000 Unit(s) SubCutaneous every 8 hours  hydrocortisone sodium succinate Injectable 50 milliGRAM(s) IV Push every 6 hours  HYDROmorphone PCA (1 mG/mL) 30 milliLiter(s) PCA Continuous PCA Continuous  insulin regular  human corrective regimen sliding scale   IV Push every 4 hours  metoprolol tartrate Injectable 2.5 milliGRAM(s) IV Push every 6 hours  metroNIDAZOLE  IVPB 500 milliGRAM(s) IV Intermittent every 6 hours  naloxone Injectable 0.1 milliGRAM(s) IV Push every 3 minutes PRN  pantoprazole  Injectable 40 milliGRAM(s) IV Push daily  Parenteral Nutrition - Adult 1 Each TPN Continuous <Continuous>  Parenteral Nutrition - Adult 1 Each TPN Continuous <Continuous>  phenylephrine    Infusion 0.1 MICROgram(s)/kG/Min IV Continuous <Continuous>  piperacillin/tazobactam IVPB.. 4.5 Gram(s) IV Intermittent every 6 hours  potassium chloride  20 mEq/100 mL IVPB 20 milliEquivalent(s) IV Intermittent once  sodium phosphate IVPB 30 milliMole(s) IV Intermittent once  vasopressin Infusion 0.04 Unit(s)/Min IV Continuous <Continuous>      09-17 @ 16:10184 mL/min/1.73M2      09-17 @ 10:59915 mL/min/1.73M2      09-17 @ 04:72281 mL/min/1.73M2      09-16 @ 23:80075 mL/min/1.73M2          Hemoglobin: 8.1 g/dL (09-17 @ 10:40)  Hemoglobin: 8.5 g/dL (09-17 @ 04:20)  Hemoglobin: 9.9 g/dL (09-16 @ 16:03)  Hemoglobin: 10.5 g/dL (09-16 @ 10:00)  Hemoglobin: 10.0 g/dL (09-16 @ 02:08)        T(C): 37.3 (09-17-20 @ 16:00), Max: 37.3 (09-16-20 @ 20:00)  HR: 126 (09-17-20 @ 19:15) (115 - 146)  BP: --  RR: 14 (09-17-20 @ 19:00) (14 - 16)  SpO2: 99% (09-17-20 @ 19:15) (94% - 100%)  Wt(kg): --     REVIEW OF SYSTEMS    General:	NAD   Skin/Breast:	Neg  Ophthalmologic:	Neg  ENMT: Neg	  Respiratory and Thorax: Neg	  Cardiovascular:	Neg  Gastrointestinal:	Neg  Genitourinary:	Neg  Musculoskeletal:	Neg  Neurological:	Neg  Psychiatric:	Neg  Hematology/Lymphatics:	Neg  Endocrine:	Neg        PHYSICAL EXAM:    GENERAL: NAD, well-groomed, well-developed  HEAD:  Atraumatic, Normocephalic  EYES: EOMI, PERRLA, conjunctiva and sclera clear  ENMT: No tonsillar erythema, exudates, or enlargement; Moist mucous membranes, Good dentition, No lesions  NECK: Supple, No JVD, Normal thyroid  NERVOUS SYSTEM:  Alert & Oriented X3, Good concentration  CHEST/LUNG: Clear to percussion bilaterally; No rales, rhonchi, wheezing, or rubs  HEART: Regular rate and rhythm; No murmurs, rubs, or gallops  ABDOMEN: Soft, Nontender, Nondistended; Bowel sounds present  EXTREMITIES:  No clubbing, cyanosis, or edema  LYMPH: No lymphadenopathy noted  SKIN: No rashes or lesions

## 2020-09-17 NOTE — PROGRESS NOTE ADULT - SUBJECTIVE AND OBJECTIVE BOX
GENERAL SURGERY PROGRESS NOTE     GLORIA SCHAFER  [address] [place] [address] day :3d  POD:  Procedure: Washing out stomach contents    Small bowel resection with anastomosis    Lysis of intestinal adhesions    Diagnostic laparoscopy      Surgical Attending: Mariann Rubio      24H events/hpi:  Patient seen & examined at bedside.   pt remains critical in ICU      T(F): 99.1 (09-16-20 @ 20:00), Max: 102.1 (09-16-20 @ 18:00)  HR: 129 (09-16-20 @ 21:00) (115 - 152)  BP: 84/55 (09-16-20 @ 08:00) (84/55 - 100/63)  ABP: 117/53 (09-16-20 @ 21:00) (75/47 - 134/73)  ABP(mean): 71 (09-16-20 @ 21:00) (57 - 90)  RR: 18 (09-16-20 @ 19:00) (18 - 19)  SpO2: 100% (09-16-20 @ 21:00) (97% - 100%)      09-15-20 @ 07:01  -  09-16-20 @ 07:00  --------------------------------------------------------  IN:    IV PiggyBack: 275 mL    IV PiggyBack: 1034 mL    IV PiggyBack: 250 mL    Lactated Ringers: 2580 mL    Lactated Ringers: 110 mL    Lactated Ringers Bolus: 4000 mL    Norepinephrine: 362.2 mL    TPN (Total Parenteral Nutrition): 680 mL  Total IN: 9291.2 mL    OUT:    Drain (mL): 22 mL    Indwelling Catheter - Urethral (mL): 2445 mL    Nasogastric/Oral tube (mL): 100 mL  Total OUT: 2567 mL    Total NET: 6724.2 mL      09-16-20 @ 07:01  -  09-17-20 @ 02:06  --------------------------------------------------------  IN:    Insulin: 41 mL    IV PiggyBack: 150 mL    Lactated Ringers: 1320 mL    Lactated Ringers: 160 mL    Lactated Ringers: 330 mL    Lactated Ringers Bolus: 250 mL    Norepinephrine: 399.9 mL    Phenylephrine: 421.6 mL    TPN (Total Parenteral Nutrition): 1360 mL  Total IN: 4432.5 mL    OUT:    Indwelling Catheter - Urethral (mL): 2165 mL    Nasogastric/Oral tube (mL): 175 mL  Total OUT: 2340 mL    Total NET: 2092.5 mL        DIET/FLUIDS: lactated ringers. 500 milliLiter(s) IV Continuous <Continuous>  lactated ringers. 1000 milliLiter(s) IV Continuous <Continuous>  Parenteral Nutrition - Adult 1 Each TPN Continuous <Continuous>      I&O    09-15-20 @ 07:01  -  09-16-20 @ 07:00  --------------------------------------------------------  IN: 9291.2 mL / OUT: 2567 mL / NET: 6724.2 mL    09-16-20 @ 07:01  -  09-17-20 @ 02:06  --------------------------------------------------------  IN: 4432.5 mL / OUT: 2340 mL / NET: 2092.5 mL        URINE:   09-15-20 @ 07:01  -  09-16-20 @ 07:00  --------------------------------------------------------  OUT: 2445 mL     Indwelling Urethral Catheter:     Connect To:  Straight Drainage/Gravity    Indication:  Urine Output Monitoring in Critically Ill (09-16-20 @ 09:43)    GI proph:  pantoprazole  Injectable 40 milliGRAM(s) IV Push daily    AC/ proph: heparin   Injectable 5000 Unit(s) SubCutaneous every 8 hours    ABx: caspofungin IVPB 50 milliGRAM(s) IV Intermittent every 24 hours  caspofungin IVPB      meropenem  IVPB 1000 milliGRAM(s) IV Intermittent every 8 hours        Physical Examination:  General Appearance: well developed.   HEENT: EOMI, sclera non-icteric.  Heart: RRR   Lungs: No increased work of breathing or accessory muscle use. Symmetric chest wall rise and fall.   Abdomen:  Soft, nontender, nondistended. midline incision dressing dry   MSK/Extremities: Warm & well-perfused.   Skin: Warm, dry. No jaundice.       LABS  Labs:  CAPILLARY BLOOD GLUCOSE  POCT Blood Glucose.: 180 mg/dL (17 Sep 2020 00:35)  POCT Blood Glucose.: 171 mg/dL (16 Sep 2020 23:22)  POCT Blood Glucose.: 172 mg/dL (16 Sep 2020 22:31)  POCT Blood Glucose.: 154 mg/dL (16 Sep 2020 20:59)  POCT Blood Glucose.: 132 mg/dL (16 Sep 2020 19:59)  POCT Blood Glucose.: 141 mg/dL (16 Sep 2020 19:07)  POCT Blood Glucose.: 135 mg/dL (16 Sep 2020 18:05)  POCT Blood Glucose.: 160 mg/dL (16 Sep 2020 17:04)  POCT Blood Glucose.: 187 mg/dL (16 Sep 2020 16:10)  POCT Blood Glucose.: 192 mg/dL (16 Sep 2020 15:18)  POCT Blood Glucose.: 175 mg/dL (16 Sep 2020 14:33)  POCT Blood Glucose.: 165 mg/dL (16 Sep 2020 13:15)  POCT Blood Glucose.: 198 mg/dL (16 Sep 2020 12:03)  POCT Blood Glucose.: 209 mg/dL (16 Sep 2020 11:15)  POCT Blood Glucose.: 197 mg/dL (16 Sep 2020 10:26)  POCT Blood Glucose.: 218 mg/dL (16 Sep 2020 06:23)                          9.9    4.80  )-----------( 145      ( 16 Sep 2020 16:03 )             29.9       Auto Neutrophil %: 55.6 % (09-16-20 @ 16:03)  Band Neutrophils %: 32.2 % (09-16-20 @ 16:03)    09-16    134<L>  |  105  |  7<L>  ----------------------------<  180<H>  3.6   |  22  |  <0.5<L>      Calcium, Total Serum: 6.8 mg/dL (09-16-20 @ 23:30)      LFTs:             3.2  | 0.8  | 29       ------------------[36      ( 16 Sep 2020 16:03 )  1.7  | 0.5  | 14          Lipase:x      Amylase:x         Lactate, Blood: 3.9 mmol/L (09-16-20 @ 23:30)  Lactate, Blood: 3.4 mmol/L (09-16-20 @ 21:20)  Lactate, Blood: 5.2 mmol/L (09-16-20 @ 16:03)  Lactate, Blood: 5.0 mmol/L (09-16-20 @ 14:00)  Blood Gas Arterial, Lactate: 4.7 mmoL/L (09-16-20 @ 13:35)  Blood Gas Arterial, Lactate: 4.2 mmoL/L (09-16-20 @ 10:10)  Lactate, Blood: 4.4 mmol/L (09-16-20 @ 10:00)  Lactate, Blood: 5.2 mmol/L (09-16-20 @ 04:30)  Blood Gas Arterial, Lactate: 4.6 mmoL/L (09-16-20 @ 04:28)  Lactate, Blood: 4.5 mmol/L (09-16-20 @ 03:09)  Lactate, Blood: 4.9 mmol/L (09-15-20 @ 20:31)  Lactate, Blood: 6.5 mmol/L (09-15-20 @ 17:51)  Lactate, Blood: 4.6 mmol/L (09-15-20 @ 11:19)  Lactate, Blood: 1.8 mmol/L (09-15-20 @ 06:00)  Lactate, Blood: 1.1 mmol/L (09-14-20 @ 12:17)    ABG - ( 16 Sep 2020 13:35 )  pH: 7.43  /  pCO2: 30    /  pO2: 139   / HCO3: 20    / Base Excess: -3.4  /  SaO2: 99              ABG - ( 16 Sep 2020 10:10 )  pH: 7.44  /  pCO2: 31    /  pO2: 122   / HCO3: 21    / Base Excess: -2.9  /  SaO2: 99              ABG - ( 16 Sep 2020 04:28 )  pH: 7.39  /  pCO2: 33    /  pO2: 133   / HCO3: 20    / Base Excess: -4.1  /  SaO2: 99                Coags:     22.30  ----< 1.94    ( 16 Sep 2020 23:30 )     60.1        CARDIAC MARKERS ( 16 Sep 2020 23:30 )  x     / 0.07 ng/mL / 824 U/L / x     / 11.2 ng/mL  CARDIAC MARKERS ( 16 Sep 2020 16:03 )  x     / 0.16 ng/mL / 839 U/L / x     / 11.6 ng/mL  CARDIAC MARKERS ( 16 Sep 2020 10:00 )  x     / 0.04 ng/mL / 705 U/L / x     / 5.3 ng/mL  CARDIAC MARKERS ( 16 Sep 2020 02:07 )  x     / <0.01 ng/mL / 477 U/L / x     / 2.2 ng/mL  CARDIAC MARKERS ( 15 Sep 2020 17:51 )  x     / <0.01 ng/mL / 241 U/L / x     / x          Serum Pro-Brain Natriuretic Peptide: 4550 pg/mL (09-16-20 @ 23:30)

## 2020-09-17 NOTE — PROGRESS NOTE ADULT - ASSESSMENT
NEURO:    Lethargic but arousable, AAOx3      Acute pain - controlled with Dilaudid PCA    RESP:     On NC @ 3L    ABG: @448- 7.41 l 37 l 137 l 24 l 100 l Lactic of 2.3    CXR AM      CARDS:     Hypotension - On Sarbjit gtt, Levo gtt dc    Tachycardia refractory to Lopressor 2.5mg pushes - started on Esmolol gtt for HR > 140,        d/c with HR in 120s    NICOM not responsive x2, no additional boluses given    CE neg, 0.04, 0.16, 0.07    EKG: Sinus tach on 9/16 @12pm    ECHO: EF 50-55%, G1DD, mild MR, mild TR      GI/NUTR:     Diet: NPO, TPN    LR @ 110cc/hr    GI ppx: PPI    no bowel regimen      /RENAL:     Silva - UO 30-100cc/hr    BUN/Cr- 6/0.6    Replete lytes PRN    Elevated lactates, continue  Lactate q4 h    HEME/ONC:     Trend Hgb    DVT Prophylaxis: HSQ, SCDs    ID:    Tmax 102 --> IV Tylenol, cooling blanket    Abx: Lachelle, Caspfungin    -ID following       ENDO:    Hyperglycemic on insulin gtt      LINES/DRAINS:  PIV, NGT , Jessica, central line, Silva , Midline    DISPO: SICU NEURO:    Lethargic but arousable, AAOx3      Acute pain - controlled with Dilaudid PCA    RESP:     On NC @ 3L    ABG: @448- 7.41 l 37 l 137 l 24 l 100 l Lactic of 2.3    CXR AM      CARDS:     Hypotension - On Sarbjit gtt, add Vasopressin.     Tachycardia refractory to Lopressor 2.5mg pushes     CE neg, 0.04, 0.16, 0.07    EKG: Sinus tach on 9/16 @12pm    ECHO: EF 50-55%, G1DD, mild MR, mild TR      GI/NUTR:     Diet: NPO, TPN    LR @ 110cc/hr- IVL     GI ppx: PPI    no bowel regimen      /RENAL:     Silva - UO 30-100cc/hr    BUN/Cr- 6/0.6    Replete lytes PRN    Elevated lactates, continue  Lactate q4 h    HEME/ONC:     Trend Hgb    DVT Prophylaxis: HSQ, SCDs    ID:    Tmax 102 --> IV Tylenol, cooling blanket    Abx: Lachelle, Caspfungin; switch lachelle to zosyn per ID    -ID following       ENDO:    Hyperglycemic on insulin gtt      LINES/DRAINS:  PIV, NGT , Jessica, central line, Silva , Midline    DISPO: SICU

## 2020-09-17 NOTE — CHART NOTE - NSCHARTNOTEFT_GEN_A_CORE
POD#2 s/p revisit to the OR with washing out stomach contents, Small bowel resection with anastomosis, Lysis of intestinal adhesions    · Operative Findings  staple line of proximal limb of anastomosis break down with gross spillage; anastomosis was intact, feculent peritonitis/sepsis  Esmolol d/c'd, tachycardic overnight. Norepinephrine d/c'd, on phenylephrine  Insulin drip minimal. Lactate improved  Abd soft, ND  Lytes replaced  NPO with TPN infusing    T(F): 97.7 (20 @ 08:00), Max: 102.1 (20 @ 18:00)  HR: 123 (20 @ 14:15) (115 - 152)  RR: 14 (20 @ 14:00) (14 - 19)  SpO2: 99% (20 @ 14:15) (97% - 100%)    I&O's Detail    16 Sep 2020 07:01  -  17 Sep 2020 07:00  --------------------------------------------------------  IN:    Insulin: 66 mL    IV PiggyBack: 150 mL    Lactated Ringers: 280 mL    Lactated Ringers: 1320 mL    Lactated Ringers: 1210 mL    Lactated Ringers Bolus: 250 mL    Norepinephrine: 399.9 mL    Phenylephrine: 1007.6 mL    TPN (Total Parenteral Nutrition): 2040 mL  Total IN: 6723.5 mL    OUT:    Drain (mL): 175 mL    Indwelling Catheter - Urethral (mL): 2385 mL    Nasogastric/Oral tube (mL): 175 mL  Total OUT: 2735 mL    Total NET: 3988.5 mL    17 Sep 2020 07:01  -  17 Sep 2020 14:58  --------------------------------------------------------  IN:    Insulin: 11 mL    IV PiggyBack: 600 mL    Lactated Ringers: 440 mL    Lactated Ringers Bolus: 250 mL    Phenylephrine: 444.5 mL    TPN (Total Parenteral Nutrition): 680 mL    Vasopressin: 9.6 mL  Total IN: 2435.1 mL    OUT:    Indwelling Catheter - Urethral (mL): 1040 mL  Total OUT: 1040 mL    Total NET: 1395.1 mL        133<L>  |  103  |  9<L>  ----------------------------<  145<H>  3.8   |  23  |  <0.5<L>    Ca    6.9<L>      17 Sep 2020 10:20  Phos  1.6       Mg     2.1         TPro  3.0<L>  /  Alb  1.5<L>  /  TBili  0.5  /  DBili  0.3<H>  /  AST  24  /  ALT  11  /  AlkPhos  40                          8.1    7.87  )-----------( 83       ( 17 Sep 2020 10:40 )             25.1     CAPILLARY BLOOD GLUCOSE  POCT Blood Glucose.: 160 mg/dL (17 Sep 2020 14:08)  POCT Blood Glucose.: 132 mg/dL (17 Sep 2020 12:06)  POCT Blood Glucose.: 139 mg/dL (17 Sep 2020 10:40)  POCT Blood Glucose.: 145 mg/dL (17 Sep 2020 07:47)  POCT Blood Glucose.: 157 mg/dL (17 Sep 2020 06:00)  POCT Blood Glucose.: 181 mg/dL (17 Sep 2020 03:54)  POCT Blood Glucose.: 199 mg/dL (17 Sep 2020 02:29)  POCT Blood Glucose.: 180 mg/dL (17 Sep 2020 00:35)  POCT Blood Glucose.: 171 mg/dL (16 Sep 2020 23:22)  POCT Blood Glucose.: 172 mg/dL (16 Sep 2020 22:31)  POCT Blood Glucose.: 154 mg/dL (16 Sep 2020 20:59)  POCT Blood Glucose.: 132 mg/dL (16 Sep 2020 19:59)  POCT Blood Glucose.: 141 mg/dL (16 Sep 2020 19:07)  POCT Blood Glucose.: 135 mg/dL (16 Sep 2020 18:05)  POCT Blood Glucose.: 160 mg/dL (16 Sep 2020 17:04)  POCT Blood Glucose.: 187 mg/dL (16 Sep 2020 16:10)  POCT Blood Glucose.: 192 mg/dL (16 Sep 2020 15:18)      Daily Weight in k.3 (17 Sep 2020 03:00)    Diet, NPO (09-15-20 @ 15:53)    ASSESSMENT  45y Female s/p small bowel resection with primary anastomosis 9/15/20, taken back to the OR for washing out stomach contents, small bowel resection with anastomosis, lysis of intestinal adhesions (staple line of proximal limb of anastomosis break down with gross spillage    - feculent peritonitis/sepsis  - severe protein calorie malnutrition   - hyperglycemia  - hypophosphatemia    PLAN  - cont TPN tonight via central line  - monitor gluc closely, minimal insulin added to tonight's TPN  - BMP, In phos, Mg, vitamin D, zinc levels in the AM

## 2020-09-17 NOTE — PROGRESS NOTE ADULT - ASSESSMENT
A/P:  GLORIA SCHAFER is a 45y Female. She underwent laparoscopic NARCISO, laparotomy, small bowel resection with primary anastomosis 9/15 with subsequent return to OR for enteric contents from wound. She underwent re-exploration with small bowel resection with primary anastomosis 9/15., POD2, tachycardic and hypotensive postop requiring pressors likely septic shock, for close hemodynamic monitoring in SICU.      Plan:   - SICU monitoring  - critically ill  - Will change dressing tomorrow and plan for possible delayed primary closure if wound is healing well.   - Monitor lower abdominal erythema    - Continue abx  - Nutritional consult

## 2020-09-17 NOTE — CONSULT NOTE ADULT - COMMENTS
unable to obtain history secondary to patient's mental status and/or sedation  Fio2 30%  on shari, communicative, no drains

## 2020-09-17 NOTE — PROGRESS NOTE ADULT - ATTENDING COMMENTS
remains tachycardic   improved lactate   for CTA chest   continue to titrate pressors   add Vaso, steroids   continue antibiotics   pain control   continue SICU Care

## 2020-09-17 NOTE — CONSULT NOTE ADULT - SUBJECTIVE AND OBJECTIVE BOX
GLORIA SCHAFER  45y, Female  Allergy: No Known Allergies      All historical available data reviewed.    HPI:  Patient is a 45F w/recent history of PAULIE/BSO on 8/19/20 for 15cm bleeding fibroid with readmission for abdominal pain and inability to tolerate PO on 8/30, found to have partial obstruction vs. ileus, resolved without operative intervention, patient was discharged. She represented on 9/4 with recurrent abdominal pain, consistent with partial SBO, NGT was placed and patient's symptoms improved with nonoperative management. She presents today with two days of abdominal pain with associated nausea and vomiting since the onset of pain. She reports having her last BM two days ago, passing flatus. In the ED she is afebrile, tachycardic to 115. On exam her abdomen is soft, nondistended, tender in the epigastrium. Labs significant for WBC of 11.6. Lactate 1.1.    CT scan demonstrated persistent findings of small bowel obstruction with a transition point in the lower anterior mid abdomen with mild wall thickening and hyperenhancement of the dilated small bowel loops, suspicious for infectious/inflammatory enteritis. (14 Sep 2020 19:00)    PROCEDURES: for perforated viscus 9/15  Washing out stomach contents , Small bowel resection with anastomosis, Lysis of intestinal adhesions     FAMILY HISTORY:  No pertinent family history in first degree relatives      PAST MEDICAL & SURGICAL HISTORY:  Uterine fibroid    Status post bilateral salpingectomy    S/P PAULIE (total abdominal hysterectomy)          VITALS:  T(F): 97.7, Max: 102.1 (09-16-20 @ 18:00)  HR: 134  BP: --  RR: 14Vital Signs Last 24 Hrs  T(C): 36.5 (17 Sep 2020 08:00), Max: 38.9 (16 Sep 2020 16:30)  T(F): 97.7 (17 Sep 2020 08:00), Max: 102.1 (16 Sep 2020 18:00)  HR: 134 (17 Sep 2020 09:15) (115 - 152)  BP: --  BP(mean): --  RR: 14 (17 Sep 2020 08:00) (14 - 19)  SpO2: 100% (17 Sep 2020 09:15) (97% - 100%)    TESTS & MEASUREMENTS:                        8.5    4.40  )-----------( 97       ( 17 Sep 2020 04:20 )             26.1     09-17    133<L>  |  103  |  7<L>  ----------------------------<  193<H>  3.3<L>   |  22  |  <0.5<L>    Ca    6.5<L>      17 Sep 2020 04:20  Phos  2.3     09-17  Mg     2.2     09-17    TPro  3.0<L>  /  Alb  1.5<L>  /  TBili  0.5  /  DBili  0.3<H>  /  AST  24  /  ALT  11  /  AlkPhos  40  09-17    LIVER FUNCTIONS - ( 17 Sep 2020 04:20 )  Alb: 1.5 g/dL / Pro: 3.0 g/dL / ALK PHOS: 40 U/L / ALT: 11 U/L / AST: 24 U/L / GGT: x                   RADIOLOGY & ADDITIONAL TESTS:  Personal review of radiological diagnostics performed  Echo and EKG results noted when applicable.     MEDICATIONS:  caspofungin IVPB      caspofungin IVPB 50 milliGRAM(s) IV Intermittent every 24 hours  heparin   Injectable 5000 Unit(s) SubCutaneous every 8 hours  HYDROmorphone PCA (1 mG/mL) 30 milliLiter(s) PCA Continuous PCA Continuous  insulin regular Infusion 1 Unit(s)/Hr IV Continuous <Continuous>  lactated ringers. 1000 milliLiter(s) IV Continuous <Continuous>  meropenem  IVPB 1000 milliGRAM(s) IV Intermittent every 8 hours  naloxone Injectable 0.1 milliGRAM(s) IV Push every 3 minutes PRN  pantoprazole  Injectable 40 milliGRAM(s) IV Push daily  Parenteral Nutrition - Adult 1 Each TPN Continuous <Continuous>  phenylephrine    Infusion 0.1 MICROgram(s)/kG/Min IV Continuous <Continuous>      ANTIBIOTICS:  caspofungin IVPB      caspofungin IVPB 50 milliGRAM(s) IV Intermittent every 24 hours  meropenem  IVPB 1000 milliGRAM(s) IV Intermittent every 8 hours

## 2020-09-18 LAB
ALBUMIN SERPL ELPH-MCNC: 1.8 G/DL — LOW (ref 3.5–5.2)
ALP SERPL-CCNC: 71 U/L — SIGNIFICANT CHANGE UP (ref 30–115)
ALT FLD-CCNC: 11 U/L — SIGNIFICANT CHANGE UP (ref 0–41)
ANION GAP SERPL CALC-SCNC: 8 MMOL/L — SIGNIFICANT CHANGE UP (ref 7–14)
ANION GAP SERPL CALC-SCNC: 9 MMOL/L — SIGNIFICANT CHANGE UP (ref 7–14)
ANION GAP SERPL CALC-SCNC: 9 MMOL/L — SIGNIFICANT CHANGE UP (ref 7–14)
APTT BLD: 45.6 SEC — HIGH (ref 27–39.2)
AST SERPL-CCNC: 18 U/L — SIGNIFICANT CHANGE UP (ref 0–41)
BILIRUB DIRECT SERPL-MCNC: 0.2 MG/DL — SIGNIFICANT CHANGE UP (ref 0–0.2)
BILIRUB INDIRECT FLD-MCNC: 0.2 MG/DL — SIGNIFICANT CHANGE UP (ref 0.2–1.2)
BILIRUB SERPL-MCNC: 0.4 MG/DL — SIGNIFICANT CHANGE UP (ref 0.2–1.2)
BLD GP AB SCN SERPL QL: SIGNIFICANT CHANGE UP
BUN SERPL-MCNC: 12 MG/DL — SIGNIFICANT CHANGE UP (ref 10–20)
BUN SERPL-MCNC: 13 MG/DL — SIGNIFICANT CHANGE UP (ref 10–20)
BUN SERPL-MCNC: 15 MG/DL — SIGNIFICANT CHANGE UP (ref 10–20)
CALCIUM SERPL-MCNC: 6.5 MG/DL — LOW (ref 8.5–10.1)
CALCIUM SERPL-MCNC: 6.6 MG/DL — LOW (ref 8.5–10.1)
CALCIUM SERPL-MCNC: 6.7 MG/DL — LOW (ref 8.5–10.1)
CHLORIDE SERPL-SCNC: 101 MMOL/L — SIGNIFICANT CHANGE UP (ref 98–110)
CHLORIDE SERPL-SCNC: 103 MMOL/L — SIGNIFICANT CHANGE UP (ref 98–110)
CHLORIDE SERPL-SCNC: 103 MMOL/L — SIGNIFICANT CHANGE UP (ref 98–110)
CO2 SERPL-SCNC: 23 MMOL/L — SIGNIFICANT CHANGE UP (ref 17–32)
CO2 SERPL-SCNC: 23 MMOL/L — SIGNIFICANT CHANGE UP (ref 17–32)
CO2 SERPL-SCNC: 25 MMOL/L — SIGNIFICANT CHANGE UP (ref 17–32)
CREAT SERPL-MCNC: <0.5 MG/DL — LOW (ref 0.7–1.5)
GLUCOSE BLDC GLUCOMTR-MCNC: 148 MG/DL — HIGH (ref 70–99)
GLUCOSE BLDC GLUCOMTR-MCNC: 162 MG/DL — HIGH (ref 70–99)
GLUCOSE BLDC GLUCOMTR-MCNC: 175 MG/DL — HIGH (ref 70–99)
GLUCOSE BLDC GLUCOMTR-MCNC: 180 MG/DL — HIGH (ref 70–99)
GLUCOSE BLDC GLUCOMTR-MCNC: 185 MG/DL — HIGH (ref 70–99)
GLUCOSE SERPL-MCNC: 179 MG/DL — HIGH (ref 70–99)
GLUCOSE SERPL-MCNC: 205 MG/DL — HIGH (ref 70–99)
GLUCOSE SERPL-MCNC: 217 MG/DL — HIGH (ref 70–99)
HCT VFR BLD CALC: 20.5 % — LOW (ref 37–47)
HCT VFR BLD CALC: 20.6 % — LOW (ref 37–47)
HCT VFR BLD CALC: 20.8 % — LOW (ref 37–47)
HCT VFR BLD CALC: 22.3 % — LOW (ref 37–47)
HGB BLD-MCNC: 6.7 G/DL — CRITICAL LOW (ref 12–16)
HGB BLD-MCNC: 7.5 G/DL — LOW (ref 12–16)
INR BLD: 1.33 RATIO — HIGH (ref 0.65–1.3)
LACTATE SERPL-SCNC: 2.3 MMOL/L — HIGH (ref 0.7–2)
MAGNESIUM SERPL-MCNC: 2.1 MG/DL — SIGNIFICANT CHANGE UP (ref 1.8–2.4)
MAGNESIUM SERPL-MCNC: 2.2 MG/DL — SIGNIFICANT CHANGE UP (ref 1.8–2.4)
MCHC RBC-ENTMCNC: 26.8 PG — LOW (ref 27–31)
MCHC RBC-ENTMCNC: 26.9 PG — LOW (ref 27–31)
MCHC RBC-ENTMCNC: 27 PG — SIGNIFICANT CHANGE UP (ref 27–31)
MCHC RBC-ENTMCNC: 28 PG — SIGNIFICANT CHANGE UP (ref 27–31)
MCHC RBC-ENTMCNC: 32.2 G/DL — SIGNIFICANT CHANGE UP (ref 32–37)
MCHC RBC-ENTMCNC: 32.5 G/DL — SIGNIFICANT CHANGE UP (ref 32–37)
MCHC RBC-ENTMCNC: 32.7 G/DL — SIGNIFICANT CHANGE UP (ref 32–37)
MCHC RBC-ENTMCNC: 33.6 G/DL — SIGNIFICANT CHANGE UP (ref 32–37)
MCV RBC AUTO: 82.3 FL — SIGNIFICANT CHANGE UP (ref 81–99)
MCV RBC AUTO: 82.4 FL — SIGNIFICANT CHANGE UP (ref 81–99)
MCV RBC AUTO: 83.2 FL — SIGNIFICANT CHANGE UP (ref 81–99)
MCV RBC AUTO: 83.9 FL — SIGNIFICANT CHANGE UP (ref 81–99)
NRBC # BLD: 0 /100 WBCS — SIGNIFICANT CHANGE UP (ref 0–0)
PHOSPHATE SERPL-MCNC: 2.1 MG/DL — SIGNIFICANT CHANGE UP (ref 2.1–4.9)
PLATELET # BLD AUTO: 45 K/UL — LOW (ref 130–400)
PLATELET # BLD AUTO: 51 K/UL — LOW (ref 130–400)
PLATELET # BLD AUTO: 53 K/UL — LOW (ref 130–400)
PLATELET # BLD AUTO: 55 K/UL — LOW (ref 130–400)
POTASSIUM SERPL-MCNC: 3.3 MMOL/L — LOW (ref 3.5–5)
POTASSIUM SERPL-MCNC: 3.3 MMOL/L — LOW (ref 3.5–5)
POTASSIUM SERPL-MCNC: 3.8 MMOL/L — SIGNIFICANT CHANGE UP (ref 3.5–5)
POTASSIUM SERPL-SCNC: 3.3 MMOL/L — LOW (ref 3.5–5)
POTASSIUM SERPL-SCNC: 3.3 MMOL/L — LOW (ref 3.5–5)
POTASSIUM SERPL-SCNC: 3.8 MMOL/L — SIGNIFICANT CHANGE UP (ref 3.5–5)
PROT SERPL-MCNC: 3.5 G/DL — LOW (ref 6–8)
PROTHROM AB SERPL-ACNC: 15.3 SEC — HIGH (ref 9.95–12.87)
RBC # BLD: 2.48 M/UL — LOW (ref 4.2–5.4)
RBC # BLD: 2.49 M/UL — LOW (ref 4.2–5.4)
RBC # BLD: 2.5 M/UL — LOW (ref 4.2–5.4)
RBC # BLD: 2.68 M/UL — LOW (ref 4.2–5.4)
RBC # FLD: 14.6 % — HIGH (ref 11.5–14.5)
RBC # FLD: 14.6 % — HIGH (ref 11.5–14.5)
RBC # FLD: 14.8 % — HIGH (ref 11.5–14.5)
RBC # FLD: 14.8 % — HIGH (ref 11.5–14.5)
SODIUM SERPL-SCNC: 134 MMOL/L — LOW (ref 135–146)
SODIUM SERPL-SCNC: 135 MMOL/L — SIGNIFICANT CHANGE UP (ref 135–146)
SODIUM SERPL-SCNC: 135 MMOL/L — SIGNIFICANT CHANGE UP (ref 135–146)
WBC # BLD: 8.24 K/UL — SIGNIFICANT CHANGE UP (ref 4.8–10.8)
WBC # BLD: 8.68 K/UL — SIGNIFICANT CHANGE UP (ref 4.8–10.8)
WBC # BLD: 9.46 K/UL — SIGNIFICANT CHANGE UP (ref 4.8–10.8)
WBC # BLD: 9.77 K/UL — SIGNIFICANT CHANGE UP (ref 4.8–10.8)
WBC # FLD AUTO: 8.24 K/UL — SIGNIFICANT CHANGE UP (ref 4.8–10.8)
WBC # FLD AUTO: 8.68 K/UL — SIGNIFICANT CHANGE UP (ref 4.8–10.8)
WBC # FLD AUTO: 9.46 K/UL — SIGNIFICANT CHANGE UP (ref 4.8–10.8)
WBC # FLD AUTO: 9.77 K/UL — SIGNIFICANT CHANGE UP (ref 4.8–10.8)

## 2020-09-18 PROCEDURE — 71045 X-RAY EXAM CHEST 1 VIEW: CPT | Mod: 26

## 2020-09-18 PROCEDURE — 99291 CRITICAL CARE FIRST HOUR: CPT | Mod: 24

## 2020-09-18 PROCEDURE — 99024 POSTOP FOLLOW-UP VISIT: CPT

## 2020-09-18 RX ORDER — I.V. FAT EMULSION 20 G/100ML
1.41 EMULSION INTRAVENOUS
Qty: 100.04 | Refills: 0 | Status: DISCONTINUED | OUTPATIENT
Start: 2020-09-20 | End: 2020-09-20

## 2020-09-18 RX ORDER — FUROSEMIDE 40 MG
20 TABLET ORAL ONCE
Refills: 0 | Status: COMPLETED | OUTPATIENT
Start: 2020-09-18 | End: 2020-09-18

## 2020-09-18 RX ORDER — HYDROMORPHONE HYDROCHLORIDE 2 MG/ML
0.5 INJECTION INTRAMUSCULAR; INTRAVENOUS; SUBCUTANEOUS ONCE
Refills: 0 | Status: DISCONTINUED | OUTPATIENT
Start: 2020-09-18 | End: 2020-09-18

## 2020-09-18 RX ORDER — HYDROMORPHONE HYDROCHLORIDE 2 MG/ML
30 INJECTION INTRAMUSCULAR; INTRAVENOUS; SUBCUTANEOUS
Refills: 0 | Status: DISCONTINUED | OUTPATIENT
Start: 2020-09-18 | End: 2020-09-21

## 2020-09-18 RX ORDER — INSULIN HUMAN 100 [IU]/ML
INJECTION, SOLUTION SUBCUTANEOUS EVERY 6 HOURS
Refills: 0 | Status: DISCONTINUED | OUTPATIENT
Start: 2020-09-18 | End: 2020-09-18

## 2020-09-18 RX ORDER — ELECTROLYTE SOLUTION,INJ
1 VIAL (ML) INTRAVENOUS
Refills: 0 | Status: DISCONTINUED | OUTPATIENT
Start: 2020-09-19 | End: 2020-09-19

## 2020-09-18 RX ORDER — FONDAPARINUX SODIUM 2.5 MG/.5ML
5 INJECTION, SOLUTION SUBCUTANEOUS DAILY
Refills: 0 | Status: DISCONTINUED | OUTPATIENT
Start: 2020-09-18 | End: 2020-09-19

## 2020-09-18 RX ORDER — METOPROLOL TARTRATE 50 MG
2.5 TABLET ORAL EVERY 6 HOURS
Refills: 0 | Status: DISCONTINUED | OUTPATIENT
Start: 2020-09-18 | End: 2020-09-19

## 2020-09-18 RX ORDER — CHLORHEXIDINE GLUCONATE 213 G/1000ML
1 SOLUTION TOPICAL DAILY
Refills: 0 | Status: DISCONTINUED | OUTPATIENT
Start: 2020-09-18 | End: 2020-09-23

## 2020-09-18 RX ORDER — INSULIN HUMAN 100 [IU]/ML
INJECTION, SOLUTION SUBCUTANEOUS EVERY 4 HOURS
Refills: 0 | Status: DISCONTINUED | OUTPATIENT
Start: 2020-09-18 | End: 2020-09-21

## 2020-09-18 RX ORDER — ELECTROLYTE SOLUTION,INJ
1 VIAL (ML) INTRAVENOUS
Refills: 0 | Status: DISCONTINUED | OUTPATIENT
Start: 2020-09-18 | End: 2020-09-18

## 2020-09-18 RX ORDER — BENZOCAINE 10 %
1 GEL (GRAM) MUCOUS MEMBRANE EVERY 4 HOURS
Refills: 0 | Status: DISCONTINUED | OUTPATIENT
Start: 2020-09-18 | End: 2020-09-21

## 2020-09-18 RX ORDER — METOPROLOL TARTRATE 50 MG
5 TABLET ORAL EVERY 6 HOURS
Refills: 0 | Status: DISCONTINUED | OUTPATIENT
Start: 2020-09-18 | End: 2020-09-19

## 2020-09-18 RX ORDER — POTASSIUM CHLORIDE 20 MEQ
20 PACKET (EA) ORAL
Refills: 0 | Status: COMPLETED | OUTPATIENT
Start: 2020-09-18 | End: 2020-09-18

## 2020-09-18 RX ORDER — ELECTROLYTE SOLUTION,INJ
1 VIAL (ML) INTRAVENOUS
Refills: 0 | Status: DISCONTINUED | OUTPATIENT
Start: 2020-09-20 | End: 2020-09-20

## 2020-09-18 RX ORDER — POTASSIUM PHOSPHATE, MONOBASIC POTASSIUM PHOSPHATE, DIBASIC 236; 224 MG/ML; MG/ML
30 INJECTION, SOLUTION INTRAVENOUS ONCE
Refills: 0 | Status: COMPLETED | OUTPATIENT
Start: 2020-09-18 | End: 2020-09-18

## 2020-09-18 RX ADMIN — HEPARIN SODIUM 5000 UNIT(S): 5000 INJECTION INTRAVENOUS; SUBCUTANEOUS at 05:21

## 2020-09-18 RX ADMIN — INSULIN HUMAN 6: 100 INJECTION, SOLUTION SUBCUTANEOUS at 06:51

## 2020-09-18 RX ADMIN — Medication 1 EACH: at 21:20

## 2020-09-18 RX ADMIN — CASPOFUNGIN ACETATE 50 MILLIGRAM(S): 7 INJECTION, POWDER, LYOPHILIZED, FOR SOLUTION INTRAVENOUS at 17:00

## 2020-09-18 RX ADMIN — POTASSIUM PHOSPHATE, MONOBASIC POTASSIUM PHOSPHATE, DIBASIC 83.33 MILLIMOLE(S): 236; 224 INJECTION, SOLUTION INTRAVENOUS at 09:50

## 2020-09-18 RX ADMIN — INSULIN HUMAN 6: 100 INJECTION, SOLUTION SUBCUTANEOUS at 22:02

## 2020-09-18 RX ADMIN — Medication 20 MILLIGRAM(S): at 08:21

## 2020-09-18 RX ADMIN — Medication 50 MILLIEQUIVALENT(S): at 05:11

## 2020-09-18 RX ADMIN — Medication 5 MILLIGRAM(S): at 11:07

## 2020-09-18 RX ADMIN — FONDAPARINUX SODIUM 5 MILLIGRAM(S): 2.5 INJECTION, SOLUTION SUBCUTANEOUS at 11:13

## 2020-09-18 RX ADMIN — Medication 20 MILLIGRAM(S): at 16:58

## 2020-09-18 RX ADMIN — Medication 50 MILLIEQUIVALENT(S): at 06:11

## 2020-09-18 RX ADMIN — PANTOPRAZOLE SODIUM 40 MILLIGRAM(S): 20 TABLET, DELAYED RELEASE ORAL at 11:08

## 2020-09-18 RX ADMIN — Medication 50 MILLIEQUIVALENT(S): at 06:52

## 2020-09-18 RX ADMIN — HYDROMORPHONE HYDROCHLORIDE 0.5 MILLIGRAM(S): 2 INJECTION INTRAMUSCULAR; INTRAVENOUS; SUBCUTANEOUS at 17:53

## 2020-09-18 RX ADMIN — INSULIN HUMAN 6: 100 INJECTION, SOLUTION SUBCUTANEOUS at 13:54

## 2020-09-18 RX ADMIN — INSULIN HUMAN 9: 100 INJECTION, SOLUTION SUBCUTANEOUS at 10:07

## 2020-09-18 RX ADMIN — Medication 5 MILLIGRAM(S): at 17:26

## 2020-09-18 RX ADMIN — HYDROMORPHONE HYDROCHLORIDE 0.5 MILLIGRAM(S): 2 INJECTION INTRAMUSCULAR; INTRAVENOUS; SUBCUTANEOUS at 18:33

## 2020-09-18 RX ADMIN — INSULIN HUMAN 3: 100 INJECTION, SOLUTION SUBCUTANEOUS at 17:43

## 2020-09-18 NOTE — PROCEDURE NOTE - NSPROCDETAILS_GEN_ALL_CORE
location identified, draped/prepped, sterile technique used/sterile dressing applied/ultrasound assessment
location identified, draped/prepped, sterile technique used, needle inserted/introduced/Seldinger technique/all materials/supplies accounted for at end of procedure/ultrasound guidance/sutured in place/hemostasis with direct pressure, dressing applied/connected to a pressurized flush line/positive blood return obtained via catheter
sutured in place/positive blood return obtained via catheter/location identified, draped/prepped, sterile technique used, needle inserted/introduced/ultrasound guidance/connected to a pressurized flush line

## 2020-09-18 NOTE — PROGRESS NOTE ADULT - SUBJECTIVE AND OBJECTIVE BOX
GENERAL SURGERY PROGRESS NOTE     GLOIRA SCHAFER  45y  Female  Hospital day :4d  POD:  Procedure: Washing out stomach contents    Small bowel resection with anastomosis    Lysis of intestinal adhesions    Diagnostic laparoscopy      OVERNIGHT EVENTS: persistent tachycardia, remains on pressors    T(F): 98.9 (20 @ 00:00), Max: 99.2 (20 @ 15:00)  HR: 112 (20 @ 00:00) (112 - 146)  ABP: 121/69 (20 @ 00:00) (58/56 - 132/68)  ABP(mean): 87 (20 @ 00:00) (57 - 93)  RR: 19 (20 @ 00:00) (14 - 19)  SpO2: 100% (20 @ 00:00) (94% - 100%)    DIET/FLUIDS: dextrose 5%. 1000 milliLiter(s) IV Continuous <Continuous>  Parenteral Nutrition - Adult 1 Each TPN Continuous <Continuous>  Parenteral Nutrition - Adult 1 Each TPN Continuous <Continuous>    N20 @ 07:01  -  20 @ 07:00  --------------------------------------------------------  OUT: 175 mL                                                                                 DRAINS:   20 @ 07:01  -  20 @ 07:00  --------------------------------------------------------  OUT: 175 mL      BM:     EMESIS:     URINE:   20 @ 07:01  -  20 @ 07:00  --------------------------------------------------------  OUT: 2385 mL     Indwelling Urethral Catheter:     Connect To:  Straight Drainage/Gravity    Indication:  Urine Output Monitoring in Critically Ill (20 @ 09:39)    GI proph:  pantoprazole  Injectable 40 milliGRAM(s) IV Push daily    AC/ proph: heparin   Injectable 5000 Unit(s) SubCutaneous every 8 hours    ABx: caspofungin IVPB 50 milliGRAM(s) IV Intermittent every 24 hours  caspofungin IVPB      metroNIDAZOLE  IVPB 500 milliGRAM(s) IV Intermittent every 6 hours  piperacillin/tazobactam IVPB.. 4.5 Gram(s) IV Intermittent every 6 hours      PHYSICAL EXAM:  GENERAL: uncomfortable  ABDOMEN: tender around incision, no drainage, wound packed and dressed, NG tube in place  EXTREMITIES:  No clubbing, cyanosis, or edema      LABS  Labs:  CAPILLARY BLOOD GLUCOSE      POCT Blood Glucose.: 187 mg/dL (17 Sep 2020 21:49)  POCT Blood Glucose.: 168 mg/dL (17 Sep 2020 18:22)  POCT Blood Glucose.: 174 mg/dL (17 Sep 2020 15:40)  POCT Blood Glucose.: 160 mg/dL (17 Sep 2020 14:08)  POCT Blood Glucose.: 132 mg/dL (17 Sep 2020 12:06)  POCT Blood Glucose.: 139 mg/dL (17 Sep 2020 10:40)  POCT Blood Glucose.: 145 mg/dL (17 Sep 2020 07:47)  POCT Blood Glucose.: 157 mg/dL (17 Sep 2020 06:00)  POCT Blood Glucose.: 181 mg/dL (17 Sep 2020 03:54)  POCT Blood Glucose.: 199 mg/dL (17 Sep 2020 02:29)                          8.1    7.87  )-----------( 83       ( 17 Sep 2020 10:40 )             25.1             131<L>  |  99  |  10  ----------------------------<  182<H>  3.5   |  23  |  <0.5<L>      Calcium, Total Serum: 6.8 mg/dL (20 @ 16:30)      LFTs:             3.0  | 0.5  | 24       ------------------[40      ( 17 Sep 2020 04:20 )  1.5  | 0.3  | 11          Lipase:x      Amylase:x         Lactate, Blood: 3.0 mmol/L (20 @ 16:30)  Lactate, Blood: 2.3 mmol/L (20 @ 10:20)  Blood Gas Arterial, Lactate: 2.3 mmoL/L (20 @ 04:48)  Lactate, Blood: 3.0 mmol/L (20 @ 04:20)  Lactate, Blood: 3.9 mmol/L (20 @ 23:30)  Lactate, Blood: 3.4 mmol/L (20 @ 21:20)  Lactate, Blood: 5.2 mmol/L (20 @ 16:03)  Lactate, Blood: 5.0 mmol/L (20 @ 14:00)  Blood Gas Arterial, Lactate: 4.7 mmoL/L (20 @ 13:35)  Blood Gas Arterial, Lactate: 4.2 mmoL/L (20 @ 10:10)  Lactate, Blood: 4.4 mmol/L (20 @ 10:00)  Lactate, Blood: 5.2 mmol/L (20 @ 04:30)  Blood Gas Arterial, Lactate: 4.6 mmoL/L (20 @ 04:28)  Lactate, Blood: 4.5 mmol/L (20 @ 03:09)  Lactate, Blood: 4.9 mmol/L (09-15-20 @ 20:31)  Lactate, Blood: 6.5 mmol/L (09-15-20 @ 17:51)  Lactate, Blood: 4.6 mmol/L (09-15-20 @ 11:19)  Lactate, Blood: 1.8 mmol/L (09-15-20 @ 06:00)    ABG - ( 17 Sep 2020 04:48 )  pH: 7.41  /  pCO2: 37    /  pO2: 137   / HCO3: 24    / Base Excess: -0.7  /  SaO2: 100             ABG - ( 16 Sep 2020 13:35 )  pH: 7.43  /  pCO2: 30    /  pO2: 139   / HCO3: 20    / Base Excess: -3.4  /  SaO2: 99              ABG - ( 16 Sep 2020 10:10 )  pH: 7.44  /  pCO2: 31    /  pO2: 122   / HCO3: 21    / Base Excess: -2.9  /  SaO2: 99                Coags:     15.50  ----< 1.35    ( 17 Sep 2020 23:15 )     49.3        CARDIAC MARKERS ( 16 Sep 2020 23:30 )  x     / 0.07 ng/mL / 824 U/L / x     / 11.2 ng/mL  CARDIAC MARKERS ( 16 Sep 2020 16:03 )  x     / 0.16 ng/mL / 839 U/L / x     / 11.6 ng/mL  CARDIAC MARKERS ( 16 Sep 2020 10:00 )  x     / 0.04 ng/mL / 705 U/L / x     / 5.3 ng/mL  CARDIAC MARKERS ( 16 Sep 2020 02:07 )  x     / <0.01 ng/mL / 477 U/L / x     / 2.2 ng/mL      Serum Pro-Brain Natriuretic Peptide: 1557 pg/mL (20 @ 00:00)  Serum Pro-Brain Natriuretic Peptide: 4550 pg/mL (20 @ 23:30)

## 2020-09-18 NOTE — PROGRESS NOTE ADULT - ASSESSMENT
NEURO:    AAOx3      Acute pain - controlled with Dilaudid PCA    RESP:     On NC @ 3L    ABG PRN    CXR AM      CARDS:     Hypotension - On Sarbjit gtt, add Vasopressin.     Tachycardia refractory to Lopressor 2.5mg pushes     CE neg, 0.04, 0.16, 0.07    EKG: Sinus tach on 9/16 @12pm    ECHO: EF 50-55%, G1DD, mild MR, mild TR      GI/NUTR:     Diet: NPO, TPN    LR @ 110cc/hr- IVL     GI ppx: PPI    No bowel regimen      /RENAL:     Silva - UO 70-100cc/hr    BUN/Cr- 10/0.5    Replete lytes PRN    Elevated lactates, continue  Lactate q4 h    HEME/ONC:     Trend Hgb    DVT Prophylaxis: HSQ, SCDs    ID:    Monitor temps    Abx: Lachelle, Caspfungin; switch lachelle to zosyn per ID    -ID following       ENDO:    Hyperglycemic on insulin gtt      LINES/DRAINS:  PIV, NGT , Jessica, central line, Silva , Midline    DISPO: SICU

## 2020-09-18 NOTE — CHART NOTE - NSCHARTNOTEFT_GEN_A_CORE
POD#30 Ex lap PAULIE/BS, ureteral stent placement  POD#3 ex lap, resection of 43 cm of ileum secondary to serosal tears, side-to-side ileoileal anastomosis, enterotomy stapled  POD#3 Ex lap small bowel resection with anastomosis appendectomy    Pain improved with PCA. Remains on vaso, phenylephrine off  NPO with TPN infusing    T(F): 99.2 (20 @ 12:00), Max: 100 (20 @ 04:00)  HR: 124 (20 @ 12:00) (111 - 140)  BP: 106/66 (20 @ 12:00) (106/66 - 124/83)  RR: 26 (20 @ 12:00) (14 - 31)  SpO2: 97% (20 @ 12:00) (94% - 100%)    I&O's Detail    17 Sep 2020 07:01  -  18 Sep 2020 07:00  --------------------------------------------------------  IN:    Insulin: 11 mL    IV PiggyBack: 225 mL    IV PiggyBack: 300 mL    IV PiggyBack: 2054 mL    Lactated Ringers: 440 mL    Lactated Ringers Bolus: 250 mL    Phenylephrine: 589.7 mL    TPN (Total Parenteral Nutrition): 2125 mL    Vasopressin: 46.2 mL  Total IN: 6040.9 mL    OUT:    Drain (mL): 0 mL    Indwelling Catheter - Urethral (mL): 2605 mL  Total OUT: 2605 mL    Total NET: 3435.9 mL    18 Sep 2020 07:01  -  18 Sep 2020 12:42  --------------------------------------------------------  IN:    IV PiggyBack: 299.9 mL    IV PiggyBack: 50 mL    IV PiggyBack: 125 mL    TPN (Total Parenteral Nutrition): 425 mL    Vasopressin: 10.8 mL  Total IN: 910.7 mL    OUT:    Indwelling Catheter - Urethral (mL): 1900 mL  Total OUT: 1900 mL    Total NET: -989.3 mL        135  |  103  |  13  ----------------------------<  205<H>  3.3<L>   |  23  |  <0.5<L>    Ca    6.6<L>      18 Sep 2020 04:00  Phos  2.1       Mg     2.2         TPro  3.0<L>  /  Alb  1.5<L>  /  TBili  0.5  /  DBili  0.3<H>  /  AST  24  /  ALT  11  /  AlkPhos  40                         6.7    8.68  )-----------( 55       ( 18 Sep 2020 05:00 )             20.5     CAPILLARY BLOOD GLUCOSE  POCT Blood Glucose.: 185 mg/dL (18 Sep 2020 10:03)  POCT Blood Glucose.: 180 mg/dL (18 Sep 2020 06:35)  POCT Blood Glucose.: 187 mg/dL (17 Sep 2020 21:49)  POCT Blood Glucose.: 168 mg/dL (17 Sep 2020 18:22)  POCT Blood Glucose.: 174 mg/dL (17 Sep 2020 15:40)  POCT Blood Glucose.: 160 mg/dL (17 Sep 2020 14:08)    Daily Weight in k.6 (18 Sep 2020 06:00)    Diet, NPO (09-15-20 @ 15:53)    ASSESSMENT  45y Female s/p small bowel resection with primary anastomosis 9/15/20, taken back to the OR for washing out stomach contents, small bowel resection with anastomosis, lysis of intestinal adhesions (staple line of proximal limb of anastomosis break down with gross spillage    - feculent peritonitis/sepsis  - severe protein calorie malnutrition   - hyperglycemia  - hypophosphatemia    PLAN  - cont TPN tonight   - monitor gluc closely, minimal insulin in TPN  - monitor BMP, In phos, Mg  - check vitamin D, zinc levels

## 2020-09-18 NOTE — PROGRESS NOTE ADULT - ASSESSMENT
IMPRESSION;  Feculent peritonitis secondary to perforated viscus  9/15 : s/p diagnostic lap, NARCISO, SBR (43cm ileum) w/ primary anastamosis (side to side)  RVP NGTD  WBC 8.6  CXR no infiltrates    RECOMMENDATIONS;  Zosyn 4.5 gm iv q6h over 4h  Flagyl 500 mg iv q6h  Caspofungin 50 mg iv q24h  BCx  OR cultures if sent

## 2020-09-18 NOTE — PROCEDURE NOTE - NSINDICATIONS_GEN_A_CORE
monitoring purposes/critical patient
venous access
critical patient/cannulation purposes/monitoring purposes

## 2020-09-18 NOTE — PROGRESS NOTE ADULT - ASSESSMENT
Assessment:  45F. She underwent laparoscopic NARCISO, laparotomy, small bowel resection with primary anastomosis 9/15 with subsequent return to OR for enteric contents from wound. She underwent re-exploration with small bowel resection with primary anastomosis 9/15. Persistently tachycardic on metoprolol, hypotensive on pressors. on TPN.    Plan:   - SICU monitoring  - continue TPN  - wean off pressors  - abx per ID

## 2020-09-18 NOTE — PROCEDURE NOTE - NSPOSTCAREGUIDE_GEN_A_CORE
Care for catheter as per unit/ICU protocols
Verbal/written post procedure instructions were given to patient/caregiver/Instructed patient/caregiver to follow-up with primary care physician

## 2020-09-18 NOTE — PROGRESS NOTE ADULT - ATTENDING COMMENTS
44yo female with PSHx PAULIE/BSO 8/19/2020 for fibroids admitted for persistent small bowel obstruction s/p laparoscopic NARCISO, laparotomy, extensive NARCISO, small bowel resection with primary anastomosis 9/15 complicated by small bowel leak s/p re-exploration, wash out, small bowel resection and primary anastomosis 9/15. Under ICU care at this time. Patient remains tachycardic to 120-130's on vasopressin, off shari and levo. Extubated postop but complains fo shortness of breath. Saturating 100% on room air, but taking shallow breaths. CTA chest 9/17 negative for PE. CT A/P with PO contrast 9/17 shows contrast into ascending colon without leak. Patient reports abdominal pain, but overall improving. NG tube in place 100cc output. No bowel function. Dressing change daily. WBC 8.6, Hb 6.7. Lactate 2.3. Pain control. Monitor bowel function, trend labs, continue antibiotics. Transfuse PRN for anemia. Encourage ambulation/OOB, incentive spirometer, DVT ppx. Primary care as per ICU.

## 2020-09-18 NOTE — PROGRESS NOTE ADULT - SUBJECTIVE AND OBJECTIVE BOX
GLORIA SCHAFER  [id number]  45y Female    Indication for ICU admission: intraabdominal sepsis, s/p diagnostic lap, NARCISO, SBR (43cm ileum) w/ primary anastamosis (side to side)  Admit Date:09-14-20  ICU Date: 9/15/2020  OR Date: 9/15/2020 x2    No Known Allergies    PAST MEDICAL & SURGICAL HISTORY:  Uterine fibroid    Status post bilateral salpingectomy    S/P PAULIE (total abdominal hysterectomy)      Home Medications:  ibuprofen 600 mg oral tablet: 1 tab(s) orally every 6 hours, As needed, Mild Pain (1 - 3) (01 Sep 2020 06:26)        24HRS EVENT:    Overnight: Overnight: Pt complaing of sore throat, given hurrican spray. Pt's BNP elevated to 1557, stat BMP sent to evaluate K as previously hypokalemic, recieving K-ryders. Potassium was ___. Abdomen soft, nondistneded. 430 AM labs (including Lactate) ­­­___. Continued on sarbjit 0.8 and vaso 0.04, has been normotensive. HR 110s-132      NEURO:    AAOx3      Acute pain - controlled with Dilaudid PCA    RESP:     On NC @ 2L    ABG PRN    AM CXR    CT Chest (PE protocol) 9/17 (prelim)- negative for PE; small-moderate b/l pleural effusions with compressive atelectasis      CARDS:     Hypotension - Off Levo; on Sarbjit, added Vaso    Tachycardia to 130s - Lopressor 2.5mg q6 for HR > 120    CE neg, 0.04, 0.16, 0.07 - no longer trending    EKG: Sinus tach on 9/16 @12pm    ECHO: EF 50-55%, G1DD, mild MR, mild TR    BNP 4550 > 1557, s/p 1 dose lasix on 9/17    CT A/P (9/17) (prelim) - negative for leak; postsurgical changes with post-op ileus      GI/NUTR:     Diet: NPO, TPN @85 ml/hr    IVLd    GI ppx: PPI    no bowel regimen    /RENAL:     Silva - UO 75-150cc/hr    BUN/Cr-  10/0.5 (baseline)     Elytes: Na __ / K __ / Mg __  / Ph ___    Lactate uptrending 1.8 >> 6.5 >> 4.4 > 4.7 > 5.2> 3.9 > 3.0 > 2.3- q4 lactates per dr gave     HEME/ONC:     Hgb 9.9 > 8.5 > 8.1 > __    Plt 97 > 83    INR 2.5 > 1.7    DVT Prophylaxis: HSQ, SCDs    ID:    Afebrile    WBC 5 > 4.4 > 7.8 > __    Abx: Caspfungin, Zosyn, Flagyl; d/c'ed Lachelle    -ID following       ENDO:    Hyperglycemic - off insulin gtt, started on ISS      LINES/DRAINS:  PIV, NGT , [place], central line, Silva         DVT Prophylaxis: heparin   Injectable 5000 Unit(s) SubCutaneous every 8 hours  GI Prophylaxis:pantoprazole  Injectable 40 milliGRAM(s) IV Push daily      ***Tubes/Lines/Drains  ***  Peripheral IV  NG Tube  Urinary Catheter		Indication: Strict I&O      R. Basilic Midline                      Date: 9/17      [X] A ten-point review of systems was otherwise negative except as noted above.  [  ] Due to altered mental status/intubation, subjective information was not attained from the patient. History was obtained, to the extent possible, from review of the chart and collateral sources of information.     GLORIA SCHAFER  [id number]  45y Female    Indication for ICU admission: intraabdominal sepsis, s/p diagnostic lap, NARCISO, SBR (43cm ileum) w/ primary anastamosis (side to side)  Admit Date:09-14-20  ICU Date: 9/15/2020  OR Date: 9/15/2020 x2    No Known Allergies    PAST MEDICAL & SURGICAL HISTORY:  Uterine fibroid    Status post bilateral salpingectomy    S/P PAULIE (total abdominal hysterectomy)      Home Medications:  ibuprofen 600 mg oral tablet: 1 tab(s) orally every 6 hours, As needed, Mild Pain (1 - 3) (01 Sep 2020 06:26)        24HRS EVENT:    Overnight: Pt complaing of sore throat, given hurrican spray. Pt's BNP elevated to 1557, stat BMP sent to evaluate K as previously hypokalemic, recieving K-ryders. Potassium was ___. Abdomen soft, mild tenderness throughout. 430 AM labs (including Lactate) ­­­___. K 3.3 (ordered for 3 Kryders). Continued on sarbjit 0.8 and vaso 0.04 for most of night, sarbjit d/c at 4am and vaso changed to 0.02 and has been normotensive. HR 110s-132. Glucose elevated, tightened ISS.   Hgb 8.1> 6.7, stat repeat CBC sent.         NEURO:    AAOx3      Acute pain - controlled with Dilaudid PCA    RESP:     On NC @ 2L    ABG PRN    AM CXR    CT Chest (PE protocol) 9/17 (prelim)- negative for PE; small-moderate b/l pleural effusions with compressive atelectasis      CARDS:     Hypotension - Off Levo; on Sarbjit, added Vaso    Tachycardia to 130s - Lopressor 2.5mg q6 for HR > 120    CE neg, 0.04, 0.16, 0.07 - no longer trending    EKG: Sinus tach on 9/16 @12pm    ECHO: EF 50-55%, G1DD, mild MR, mild TR    BNP 4550 > 1557, s/p 1 dose lasix on 9/17    CT A/P (9/17) (prelim) - negative for leak; postsurgical changes with post-op ileus      GI/NUTR:     Diet: NPO, TPN @85 ml/hr    IVLd    GI ppx: PPI    no bowel regimen    /RENAL:     Silva - UO 75-150cc/hr    BUN/Cr-  10/0.5 (baseline)     Elytes: Na __ / K __ / Mg __  / Ph ___    Lactate uptrending 1.8 >> 6.5 >> 4.4 > 4.7 > 5.2> 3.9 > 3.0 > 2.3- q4 lactates per dr gave     HEME/ONC:     Hgb 9.9 > 8.5 > 8.1 > __    Plt 97 > 83    INR 2.5 > 1.7    DVT Prophylaxis: HSQ, SCDs    ID:    Afebrile    WBC 5 > 4.4 > 7.8 > __    Abx: Caspfungin, Zosyn, Flagyl; d/c'ed Lachelle    -ID following       ENDO:    Hyperglycemic - off insulin gtt, started on ISS      LINES/DRAINS:  PIV, NGT , [place], central line, Silva         DVT Prophylaxis: heparin   Injectable 5000 Unit(s) SubCutaneous every 8 hours  GI Prophylaxis:pantoprazole  Injectable 40 milliGRAM(s) IV Push daily      ***Tubes/Lines/Drains  ***  Peripheral IV  NG Tube  Urinary Catheter		Indication: Strict I&O      R. Basilic Midline                      Date: 9/17      [X] A ten-point review of systems was otherwise negative except as noted above.  [  ] Due to altered mental status/intubation, subjective information was not attained from the patient. History was obtained, to the extent possible, from review of the chart and collateral sources of information.     GLORIA SCHAFER  [id number]  45y Female    Indication for ICU admission: intraabdominal sepsis, s/p diagnostic lap, NARCISO, SBR (43cm ileum) w/ primary anastamosis (side to side)  Admit Date:09-14-20  ICU Date: 9/15/2020  OR Date: 9/15/2020 x2    No Known Allergies    PAST MEDICAL & SURGICAL HISTORY:  Uterine fibroid    Status post bilateral salpingectomy    S/P PAULIE (total abdominal hysterectomy)      Home Medications:  ibuprofen 600 mg oral tablet: 1 tab(s) orally every 6 hours, As needed, Mild Pain (1 - 3) (01 Sep 2020 06:26)        24HRS EVENT:    9/18  Overnight:   -Pt's BNP elevated to 1557, stat BMP sent to evaluate K as previously hypokalemic, recieving K-ryders.   -Lactic acid improved to 2.3 from 3.0   -K 3.3 (ordered for 3 Kryders). Phos 2.1, given 30Kphos.   -Continued on sarbjit 0.8 and vaso 0.04 for most of night, sarbjit d/c at 4am and vaso changed to 0.02 and has been normotensive. HR 110s-132.   -Glucose elevated, tightened ISS.   -Hgb 8.1> 6.7, stat repeat CBC sent.   -Abdomen soft, mild tenderness throughout        NEURO:    AAOx3      Acute pain - controlled with Dilaudid PCA    RESP:     On NC @ 2L    ABG PRN    AM CXR    CT Chest (PE protocol) 9/17 (prelim)- negative for PE; small-moderate b/l pleural effusions with compressive atelectasis      CARDS:     Hypotension - Off Levo; on Sarbjit, added Vaso    Tachycardia to 130s - Lopressor 2.5mg q6 for HR > 120    CE neg, 0.04, 0.16, 0.07 - no longer trending    EKG: Sinus tach on 9/16 @12pm    ECHO: EF 50-55%, G1DD, mild MR, mild TR    BNP 4550 > 1557, s/p 1 dose lasix on 9/17    CT A/P (9/17) (prelim) - negative for leak; postsurgical changes with post-op ileus      GI/NUTR:     Diet: NPO, TPN @85 ml/hr    IVLd    GI ppx: PPI    no bowel regimen    /RENAL:     Silva - UO 75-150cc/hr    BUN/Cr-  10/0.5 (baseline)     Elytes: Na __ / K __ / Mg __  / Ph ___    Lactate uptrending 1.8 >> 6.5 >> 4.4 > 4.7 > 5.2> 3.9 > 3.0 > 2.3- q4 lactates per dr gave     HEME/ONC:     Hgb 9.9 > 8.5 > 8.1 > __    Plt 97 > 83    INR 2.5 > 1.7    DVT Prophylaxis: HSQ, SCDs    ID:    Afebrile    WBC 5 > 4.4 > 7.8 > __    Abx: Caspfungin, Zosyn, Flagyl; d/c'ed Lachelle    -ID following       ENDO:    Hyperglycemic - off insulin gtt, started on ISS      LINES/DRAINS:  PIV, NGT , [place], central line, Silva         DVT Prophylaxis: heparin   Injectable 5000 Unit(s) SubCutaneous every 8 hours  GI Prophylaxis:pantoprazole  Injectable 40 milliGRAM(s) IV Push daily      ***Tubes/Lines/Drains  ***  Peripheral IV  NG Tube  Urinary Catheter		Indication: Strict I&O      R. Basilic Midline                      Date: 9/17      [X] A ten-point review of systems was otherwise negative except as noted above.  [  ] Due to altered mental status/intubation, subjective information was not attained from the patient. History was obtained, to the extent possible, from review of the chart and collateral sources of information.     GLORIA SCHAFER  [id number]  45y Female    Indication for ICU admission: intraabdominal sepsis, s/p diagnostic lap, NARCISO, SBR (43cm ileum) w/ primary anastamosis (side to side)  Admit Date:20  ICU Date: 9/15/2020  OR Date: 9/15/2020 x2    No Known Allergies    PAST MEDICAL & SURGICAL HISTORY:  Uterine fibroid    Status post bilateral salpingectomy    S/P PAULIE (total abdominal hysterectomy)      Home Medications:  ibuprofen 600 mg oral tablet: 1 tab(s) orally every 6 hours, As needed, Mild Pain (1 - 3) (01 Sep 2020 06:26)        24HRS EVENT:      Overnight:   -Pt's BNP elevated to 1557, stat BMP sent to evaluate K as previously hypokalemic, recieving K-ryders.   -Lactic acid improved to 2.3 from 3.0   -K 3.3 (ordered for 3 Kryders). Phos 2.1, given 30Kphos.   -Continued on sarbjit 0.8 and vaso 0.04 for most of night, sarbjit d/c at 4am and vaso changed to 0.02 and has been normotensive. HR 110s-132.   -Glucose elevated, tightened ISS.   -Hgb 8.1> 6.7, stat repeat CBC sent.   -Abdomen soft, mild tenderness throughout        NEURO:    AAOx3      Acute pain - controlled with Dilaudid PCA    RESP:     On NC @ 2L    ABG PRN    AM CXR    CT Chest (PE protocol)  (prelim)- negative for PE; small-moderate b/l pleural effusions with compressive atelectasis      CARDS:     Hypotension - Off Levo; on Sarbjit, added Vaso    Tachycardia to 130s - Lopressor 2.5mg q6 for HR > 120    CE neg, 0.04, 0.16, 0.07 - no longer trending    EKG: Sinus tach on  @12pm    ECHO: EF 50-55%, G1DD, mild MR, mild TR    BNP 4550 > 1557, s/p 1 dose lasix on     CT A/P () (prelim) - negative for leak; postsurgical changes with post-op ileus      GI/NUTR:     Diet: NPO, TPN @85 ml/hr    IVLd    GI ppx: PPI    no bowel regimen    /RENAL:     Silva - UO 75-150cc/hr    BUN/Cr-  10/0.5 (baseline)     Elytes: Na __ / K __ / Mg __  / Ph ___    Lactate uptrending 1.8 >> 6.5 >> 4.4 > 4.7 > 5.2> 3.9 > 3.0 > 2.3- q4 lactates per dr gave     HEME/ONC:     Hgb 9.9 > 8.5 > 8.1 > __    Plt 97 > 83    INR 2.5 > 1.7    DVT Prophylaxis: HSQ, SCDs    ID:    Afebrile    WBC 5 > 4.4 > 7.8 > __    Abx: Caspfungin, Zosyn, Flagyl; d/c'ed Lachelle    -ID following       ENDO:    Hyperglycemic - off insulin gtt, started on ISS      LINES/DRAINS:  PIV, NGT , [place], central line, Silva         DVT Prophylaxis: heparin   Injectable 5000 Unit(s) SubCutaneous every 8 hours  GI Prophylaxis:pantoprazole  Injectable 40 milliGRAM(s) IV Push daily      ***Tubes/Lines/Drains  ***  Peripheral IV  NG Tube  Urinary Catheter		Indication: Strict I&O      R. Basilic Midline                      Date:       [X] A ten-point review of systems was otherwise negative except as noted above.  [  ] Due to altered mental status/intubation, subjective information was not attained from the patient. History was obtained, to the extent possible, from review of the chart and collateral sources of information.    Daily     Daily Weight in k.6 (18 Sep 2020 06:00)    Diet, NPO (09-15-20 @ 15:53)      CURRENT MEDS:  Neurologic Medications  HYDROmorphone PCA (1 mG/mL) 30 milliLiter(s) PCA Continuous PCA Continuous    Respiratory Medications    Cardiovascular Medications  metoprolol tartrate Injectable 2.5 milliGRAM(s) IV Push every 6 hours  phenylephrine    Infusion 0.1 MICROgram(s)/kG/Min IV Continuous <Continuous>    Gastrointestinal Medications  dextrose 5%. 1000 milliLiter(s) IV Continuous <Continuous>  pantoprazole  Injectable 40 milliGRAM(s) IV Push daily  Parenteral Nutrition - Adult 1 Each TPN Continuous <Continuous>  potassium chloride  20 mEq/100 mL IVPB 20 milliEquivalent(s) IV Intermittent every 2 hours  potassium phosphate IVPB 30 milliMole(s) IV Intermittent once    Genitourinary Medications    Hematologic/Oncologic Medications  heparin   Injectable 5000 Unit(s) SubCutaneous every 8 hours    Antimicrobial/Immunologic Medications  caspofungin IVPB      caspofungin IVPB 50 milliGRAM(s) IV Intermittent every 24 hours  metroNIDAZOLE  IVPB 500 milliGRAM(s) IV Intermittent every 6 hours  piperacillin/tazobactam IVPB.. 4.5 Gram(s) IV Intermittent every 6 hours    Endocrine/Metabolic Medications  dextrose 40% Gel 15 Gram(s) Oral once PRN Blood Glucose LESS THAN 70 milliGRAM(s)/deciliter  dextrose 50% Injectable 12.5 Gram(s) IV Push once  dextrose 50% Injectable 25 Gram(s) IV Push once  dextrose 50% Injectable 25 Gram(s) IV Push once  glucagon  Injectable 1 milliGRAM(s) IntraMuscular once PRN Glucose LESS THAN 70 milligrams/deciliter  hydrocortisone sodium succinate Injectable 50 milliGRAM(s) IV Push every 6 hours  insulin regular  human corrective regimen sliding scale   SubCutaneous every 6 hours  vasopressin Infusion 0.04 Unit(s)/Min IV Continuous <Continuous>    Topical/Other Medications  benzocaine 20% Spray 1 Spray(s) Topical every 4 hours PRN sore throat  naloxone Injectable 0.1 milliGRAM(s) IV Push every 3 minutes PRN For ANY of the following changes in patient status:  A. RR LESS THAN 10 breaths per minute, B. Oxygen saturation LESS THAN 90%, C. Sedation score of 6      ICU Vital Signs Last 24 Hrs  T(C): 37.8 (18 Sep 2020 04:00), Max: 37.8 (18 Sep 2020 04:00)  T(F): 100 (18 Sep 2020 04:00), Max: 100 (18 Sep 2020 04:00)  HR: 134 (18 Sep 2020 06:00) (111 - 146)  BP: --  BP(mean): --  ABP: 117/111 (18 Sep 2020 06:00) (58/56 - 198/190)  ABP(mean): 114 (18 Sep 2020 06:00) (57 - 192)  RR: 20 (18 Sep 2020 06:00) (14 - 20)  SpO2: 94% (18 Sep 2020 06:00) (94% - 100%)      Adult Advanced Hemodynamics Last 24 Hrs  CVP(mm Hg): --  CVP(cm H2O): --  CO: 4.9 (17 Sep 2020 08:30) (4.9 - 4.9)  CI: 2.7 (17 Sep 2020 08:30) (2.7 - 2.7)  PA: --  PA(mean): --  PCWP: --  SVR: --  SVRI: --  PVR: --  PVRI: --      ABG - ( 17 Sep 2020 04:48 )  pH, Arterial: 7.41  pH, Blood: x     /  pCO2: 37    /  pO2: 137   / HCO3: 24    / Base Excess: -0.7  /  SaO2: 100                 I&O's Summary    16 Sep 2020 07:  -  17 Sep 2020 07:00  --------------------------------------------------------  IN: 6723.5 mL / OUT: 2735 mL / NET: 3988.5 mL    17 Sep 2020 07:  -  18 Sep 2020 06:32  --------------------------------------------------------  IN: 6040.9 mL / OUT: 2530 mL / NET: 3510.9 mL      I&O's Detail    16 Sep 2020 07:01  -  17 Sep 2020 07:00  --------------------------------------------------------  IN:    Insulin: 66 mL    IV PiggyBack: 150 mL    Lactated Ringers: 280 mL    Lactated Ringers: 1320 mL    Lactated Ringers: 1210 mL    Lactated Ringers Bolus: 250 mL    Norepinephrine: 399.9 mL    Phenylephrine: 1007.6 mL    TPN (Total Parenteral Nutrition): 2040 mL  Total IN: 6723.5 mL    OUT:    Drain (mL): 175 mL    Indwelling Catheter - Urethral (mL): 2385 mL    Nasogastric/Oral tube (mL): 175 mL  Total OUT: 2735 mL    Total NET: 3988.5 mL      17 Sep 2020 07:01  -  18 Sep 2020 06:32  --------------------------------------------------------  IN:    Insulin: 11 mL    IV PiggyBack: 225 mL    IV PiggyBack: 300 mL    IV PiggyBack: 2054 mL    Lactated Ringers: 440 mL    Lactated Ringers Bolus: 250 mL    Phenylephrine: 589.7 mL    TPN (Total Parenteral Nutrition): 2125 mL    Vasopressin: 46.2 mL  Total IN: 6040.9 mL    OUT:    Drain (mL): 0 mL    Indwelling Catheter - Urethral (mL): 2530 mL  Total OUT: 2530 mL    Total NET: 3510.9 mL          PHYSICAL EXAM:    General/Neuro  RASS:      0       GCS: 15      alert & oriented x 3, no focal deficits    Lungs: clear to auscultation, Normal expansion/effort.     Cardiovascular : S1, S2.  Regular rate and rhythm.  Mid peripheral edema     GI: Abdomen soft, non-distended, but diffusely tender.   NG tube in place    Wound: Midline incision C/D/I    Extremities: Extremities warm, pink, well-perfused.     Derm: Good skin turgor, no skin breakdown.      : Silva catheter in place.      CXR:       LABS:  CAPILLARY BLOOD GLUCOSE      POCT Blood Glucose.: 187 mg/dL (17 Sep 2020 21:49)  POCT Blood Glucose.: 168 mg/dL (17 Sep 2020 18:22)  POCT Blood Glucose.: 174 mg/dL (17 Sep 2020 15:40)  POCT Blood Glucose.: 160 mg/dL (17 Sep 2020 14:08)  POCT Blood Glucose.: 132 mg/dL (17 Sep 2020 12:06)  POCT Blood Glucose.: 139 mg/dL (17 Sep 2020 10:40)  POCT Blood Glucose.: 145 mg/dL (17 Sep 2020 07:47)                          6.7    8.68  )-----------( 55       ( 18 Sep 2020 05:00 )             20.5       09-18    135  |  103  |  13  ----------------------------<  205<H>  3.3<L>   |  23  |  <0.5<L>    Ca    6.6<L>      18 Sep 2020 04:00  Phos  2.1     09-18  Mg     2.2     09-18    TPro  3.0<L>  /  Alb  1.5<L>  /  TBili  0.5  /  DBili  0.3<H>  /  AST  24  /  ALT  11  /  AlkPhos  40  09-17      PT/INR - ( 18 Sep 2020 04:00 )   PT: 15.30 sec;   INR: 1.33 ratio         PTT - ( 18 Sep 2020 04:00 )  PTT:45.6 sec  CARDIAC MARKERS ( 16 Sep 2020 23:30 )  x     / 0.07 ng/mL / 824 U/L / x     / 11.2 ng/mL  CARDIAC MARKERS ( 16 Sep 2020 16:03 )  x     / 0.16 ng/mL / 839 U/L / x     / 11.6 ng/mL  CARDIAC MARKERS ( 16 Sep 2020 10:00 )  x     / 0.04 ng/mL / 705 U/L / x     / 5.3 ng/mL

## 2020-09-18 NOTE — PROGRESS NOTE ADULT - ASSESSMENT
46 y/o no Pmhx s/p PAULIE/BS on 8/19 for fibroid uterus POD#30, s/p diagnostic laparoscopy converted to exploratory laparotomy and small bowel reanastomosis, with breakdown of staple line of proximal limb of anastomosis, s/p repeat exploratory laparotomy with small bowel resection, reanastomosis and appendectomy, POD3, tachycardic and hypotensive postop requiring pressors, likely septic shock, transferred to SICU for close hemodynamic monitoring.   - Neuro: AAOX3, dilaudid PCA pump for pain control  - CVS: tachycardic, metoprolol PRN, phenylephrine discontinued. On vasopressin only  - Pulm: tachypneic, 99% on 2LNC, CT negative for PE, f/u AM CXR  - GI: NPO, NG tube, TPN started, yesterday dressing changed by gen surg  - : IV fluids @110 cc/hr, monitor UO, trend Cr  - heme/onc: H/H decreasing 8.1/25.1 ->6.7/20.6, PLT decreasing 83->53 (possible HIT?), heparin for VTE prophylaxis  - Inf: likely septic shock, lactate downtrending, antibiotics changed to zosyn, flagyl and faspofungin per ID, solucortef started  - Lines: Right IJ central line, A line left radial, escalera, midline  - management per SICU team  - gyn to follow    Will inform Dr. Molina.

## 2020-09-18 NOTE — PROGRESS NOTE ADULT - SUBJECTIVE AND OBJECTIVE BOX
PGY4 progress note    POD#30 Ex lap PAULIE/BS, ureteral stent placement  POD#3 ex lap, resection of 43 cm of ileum secondary to serosal tears, side-to-side ileoileal anastomosis, enterotomy stapled  POD#3 Ex lap small bowel resection with anastomosis appendectomy    Patient seen and examined at bedside. Reports pain better controlled with PCA pump. Very bothered by NG tube and requesting to have it taken out. Complaining that it causes her difficulty breathing. Denies vomiting. No faltus but had hard stool yesterday. Silva in place with clear urine.   Phenylephrine discontinued. On vasopressin only.     PE:  Vital Signs Last 24 Hrs  T(C): 37.2 (18 Sep 2020 00:00), Max: 37.3 (17 Sep 2020 15:00)  T(F): 98.9 (18 Sep 2020 00:00), Max: 99.2 (17 Sep 2020 15:00)  HR: 119 (18 Sep 2020 04:00) (111 - 146)  BP: --  BP(mean): --  RR: 16 (18 Sep 2020 04:00) (14 - 19)  SpO2: 100% (18 Sep 2020 04:00) (94% - 100%)    UO: 4280-2094 840cc  NG output: [id number] 0cc    GEN: AAOX3  CVS: tachycardic, normal rhythm   lungs: CTAB, tachypneic  abd: vertical incision packed with kerlex, foam dressing over. Abdomen soft, appropriately tender diffusely, no r/g/r  ext: no calf tenderness or edema, SCDs in place    labs:                        6.7    9.46  )-----------( 53       ( 18 Sep 2020 04:00 )             20.6     09-18    135  |  103  |  13  ----------------------------<  205<H>  3.3<L>   |  23  |  <0.5<L>    Ca    6.6<L>      18 Sep 2020 04:00  Phos  2.1     -  Mg     2.2     -    TPro  3.0<L>  /  Alb  1.5<L>  /  TBili  0.5  /  DBili  0.3<H>  /  AST  24  /  ALT  11  /  AlkPhos  40  -  lactate 2.3    PT/INR - ( 18 Sep 2020 04:00 )   PT: 15.30 sec;   INR: 1.33 ratio         PTT - ( 18 Sep 2020 04:00 )  PTT:45.6 sec      MEDICATIONS  (STANDING):  caspofungin IVPB      caspofungin IVPB 50 milliGRAM(s) IV Intermittent every 24 hours  dextrose 5%. 1000 milliLiter(s) (50 mL/Hr) IV Continuous <Continuous>  dextrose 50% Injectable 12.5 Gram(s) IV Push once  dextrose 50% Injectable 25 Gram(s) IV Push once  dextrose 50% Injectable 25 Gram(s) IV Push once  heparin   Injectable 5000 Unit(s) SubCutaneous every 8 hours  hydrocortisone sodium succinate Injectable 50 milliGRAM(s) IV Push every 6 hours  HYDROmorphone PCA (1 mG/mL) 30 milliLiter(s) PCA Continuous PCA Continuous  insulin regular  human corrective regimen sliding scale   SubCutaneous every 6 hours  metoprolol tartrate Injectable 2.5 milliGRAM(s) IV Push every 6 hours  metroNIDAZOLE  IVPB 500 milliGRAM(s) IV Intermittent every 6 hours  pantoprazole  Injectable 40 milliGRAM(s) IV Push daily  Parenteral Nutrition - Adult 1 Each (85 mL/Hr) TPN Continuous <Continuous>  phenylephrine    Infusion 0.1 MICROgram(s)/kG/Min (1.33 mL/Hr) IV Continuous <Continuous>  piperacillin/tazobactam IVPB.. 4.5 Gram(s) IV Intermittent every 6 hours  potassium chloride  20 mEq/100 mL IVPB 20 milliEquivalent(s) IV Intermittent every 2 hours  potassium phosphate IVPB 30 milliMole(s) IV Intermittent once  vasopressin Infusion 0.04 Unit(s)/Min (2.4 mL/Hr) IV Continuous <Continuous>    MEDICATIONS  (PRN):  benzocaine 20% Spray 1 Spray(s) Topical every 4 hours PRN sore throat  dextrose 40% Gel 15 Gram(s) Oral once PRN Blood Glucose LESS THAN 70 milliGRAM(s)/deciliter  glucagon  Injectable 1 milliGRAM(s) IntraMuscular once PRN Glucose LESS THAN 70 milligrams/deciliter  naloxone Injectable 0.1 milliGRAM(s) IV Push every 3 minutes PRN For ANY of the following changes in patient status:  A. RR LESS THAN 10 breaths per minute, B. Oxygen saturation LESS THAN 90%, C. Sedation score of 6    Imagin/17 CT chest/abd/pelvis:   Interval bowel resection and distal bowel anastomosis. No extraluminal contrast to suggest leak. No bowel obstruction. Findings are compatible with postsurgical ileus. Moderate abdominopelvic fluid possibly with some loosely loculated areas in the pelvis as described above. No abscess. New bilateral small-to-moderate pleural effusions with compressive atelectasis. No pulmonary embolism.

## 2020-09-18 NOTE — PROCEDURE NOTE - NSINFORMCONSENT_GEN_A_CORE
Benefits, risks, and possible complications of procedure explained to patient/caregiver who verbalized understanding and gave verbal consent.
This was an emergent procedure.
Benefits, risks, and possible complications of procedure explained to patient/caregiver who verbalized understanding and gave verbal consent.

## 2020-09-18 NOTE — PROGRESS NOTE ADULT - SUBJECTIVE AND OBJECTIVE BOX
GILMAR, GLORIA  45y, Female    All available historical data reviewed    OVERNIGHT EVENTS:  no fevers  off vent  on vaso    ROS:  General: Denies rigors, nightsweats  HEENT: Denies headache, rhinorrhea, sore throat, eye pain  CV: Denies CP, palpitations  PULM: Denies wheezing, hemoptysis  GI: Denies hematemesis, hematochezia, melena  : Denies discharge, hematuria  MSK: Denies arthralgias, myalgias  SKIN: Denies rash, lesions  NEURO: Denies paresthesias, weakness  PSYCH: Denies depression, anxiety    VITALS:  T(F): 99.2, Max: 100 (09-18-20 @ 04:00)  HR: 124  BP: 106/66  RR: 26Vital Signs Last 24 Hrs  T(C): 37.3 (18 Sep 2020 12:00), Max: 37.8 (18 Sep 2020 04:00)  T(F): 99.2 (18 Sep 2020 12:00), Max: 100 (18 Sep 2020 04:00)  HR: 124 (18 Sep 2020 12:00) (111 - 140)  BP: 106/66 (18 Sep 2020 12:00) (106/66 - 124/83)  BP(mean): 80 (18 Sep 2020 12:00) (80 - 90)  RR: 26 (18 Sep 2020 12:00) (14 - 31)  SpO2: 97% (18 Sep 2020 12:00) (94% - 100%)    TESTS & MEASUREMENTS:                        6.7    8.68  )-----------( 55       ( 18 Sep 2020 05:00 )             20.5     09-18    135  |  103  |  13  ----------------------------<  205<H>  3.3<L>   |  23  |  <0.5<L>    Ca    6.6<L>      18 Sep 2020 04:00  Phos  2.1     09-18  Mg     2.2     09-18    TPro  3.0<L>  /  Alb  1.5<L>  /  TBili  0.5  /  DBili  0.3<H>  /  AST  24  /  ALT  11  /  AlkPhos  40  09-17    LIVER FUNCTIONS - ( 17 Sep 2020 04:20 )  Alb: 1.5 g/dL / Pro: 3.0 g/dL / ALK PHOS: 40 U/L / ALT: 11 U/L / AST: 24 U/L / GGT: x                   RADIOLOGY & ADDITIONAL TESTS:  Personal review of radiological diagnostics performed  Echo and EKG results noted when applicable.     MEDICATIONS:  benzocaine 20% Spray 1 Spray(s) Topical every 4 hours PRN  caspofungin IVPB 50 milliGRAM(s) IV Intermittent every 24 hours  caspofungin IVPB      fondaparinux Injectable 5 milliGRAM(s) SubCutaneous daily  hydrocortisone sodium succinate Injectable 50 milliGRAM(s) IV Push every 6 hours  HYDROmorphone PCA (1 mG/mL) 30 milliLiter(s) PCA Continuous PCA Continuous  insulin regular  human corrective regimen sliding scale   IV Push every 4 hours  metoprolol tartrate Injectable 5 milliGRAM(s) IV Push every 6 hours  metoprolol tartrate Injectable 2.5 milliGRAM(s) IV Push every 6 hours PRN  metroNIDAZOLE  IVPB 500 milliGRAM(s) IV Intermittent every 6 hours  naloxone Injectable 0.1 milliGRAM(s) IV Push every 3 minutes PRN  pantoprazole  Injectable 40 milliGRAM(s) IV Push daily  Parenteral Nutrition - Adult 1 Each TPN Continuous <Continuous>  Parenteral Nutrition - Adult 1 Each TPN Continuous <Continuous>  piperacillin/tazobactam IVPB.. 4.5 Gram(s) IV Intermittent every 6 hours  vasopressin Infusion 0.04 Unit(s)/Min IV Continuous <Continuous>      ANTIBIOTICS:  caspofungin IVPB 50 milliGRAM(s) IV Intermittent every 24 hours  caspofungin IVPB      metroNIDAZOLE  IVPB 500 milliGRAM(s) IV Intermittent every 6 hours  piperacillin/tazobactam IVPB.. 4.5 Gram(s) IV Intermittent every 6 hours

## 2020-09-18 NOTE — PROGRESS NOTE ADULT - SUBJECTIVE AND OBJECTIVE BOX
Pain Management Progress Note -     Patient is a 45F w/recent history of PAULIE/BSO on 8/19/20 for 15cm bleeding fibroid with readmission for abdominal pain and inability to tolerate PO on 8/30, found to have partial obstruction vs. ileus, resolved without operative intervention, patient was discharged. She represented on 9/4 with recurrent abdominal pain, consistent with partial SBO, NGT was placed and patient's symptoms improved with nonoperative management. Patient is s/p Washing out stomach contents, Small bowel resection with anastomosis, Lysis of intestinal adhesions 15-Sep-2020. Patient states her pain to the abdomen is currently 9/10 with the PCA. Pain is sharp, lucinda. with movement.Call pain mgmt for any adjustments, [phone].        No Known Allergies      BOWEL OBSTRUCTION    ^POST OP PROBLEM    No pertinent family history in first degree relatives    Handoff    MEWS Score    Uterine fibroid    No pertinent past medical history    Perforated viscus    Small bowel obstruction    Intermittent small bowel obstruction due to adhesions    Perforated viscus    Intestinal anastomotic leak    Small bowel obstruction    Bowel obstruction    Pain, abdominal, generalized    Washing out stomach contents    Small bowel resection with anastomosis    Lysis of intestinal adhesions    Diagnostic laparoscopy    Status post bilateral salpingectomy    S/P PAULIE (total abdominal hysterectomy)    No significant past surgical history    POST OP PROBLEM    6    [email or web address]        Parenteral Nutrition - Adult  metoprolol tartrate Injectable  metoprolol tartrate Injectable  fondaparinux Injectable  insulin regular  human corrective regimen sliding scale  benzocaine 20% [place]  Parenteral Nutrition - Adult  hydrocortisone sodium succinate Injectable  metroNIDAZOLE  IVPB  piperacillin/tazobactam IVPB..  vasopressin Infusion  caspofungin IVPB  caspofungin IVPB  naloxone Injectable  HYDROmorphone PCA (1 mG/mL)  pantoprazole  Injectable      benzocaine 20% Spray 1 Spray(s) Topical every 4 hours PRN  caspofungin IVPB 50 milliGRAM(s) IV Intermittent every 24 hours  caspofungin IVPB      fondaparinux Injectable 5 milliGRAM(s) SubCutaneous daily  hydrocortisone sodium succinate Injectable 50 milliGRAM(s) IV Push every 6 hours  HYDROmorphone PCA (1 mG/mL) 30 milliLiter(s) PCA Continuous PCA Continuous  insulin regular  human corrective regimen sliding scale   IV Push every 4 hours  metoprolol tartrate Injectable 5 milliGRAM(s) IV Push every 6 hours  metoprolol tartrate Injectable 2.5 milliGRAM(s) IV Push every 6 hours PRN  metroNIDAZOLE  IVPB 500 milliGRAM(s) IV Intermittent every 6 hours  naloxone Injectable 0.1 milliGRAM(s) IV Push every 3 minutes PRN  pantoprazole  Injectable 40 milliGRAM(s) IV Push daily  Parenteral Nutrition - Adult 1 Each TPN Continuous <Continuous>  Parenteral Nutrition - Adult 1 Each TPN Continuous <Continuous>  piperacillin/tazobactam IVPB.. 4.5 Gram(s) IV Intermittent every 6 hours  vasopressin Infusion 0.04 Unit(s)/Min IV Continuous <Continuous>      09-18 @ 04:00118 mL/min/1.73M2      09-18 @ 01:44541 mL/min/1.73M2      09-17 @ 16:86352 mL/min/1.73M2          Hemoglobin: 6.7 g/dL (09-18 @ 05:00)  Hemoglobin: 6.7 g/dL (09-18 @ 04:00)  Hemoglobin: 8.1 g/dL (09-17 @ 10:40)  Hemoglobin: 8.5 g/dL (09-17 @ 04:20)  Hemoglobin: 9.9 g/dL (09-16 @ 16:03)        T(C): 37.3 (09-18-20 @ 12:00), Max: 37.8 (09-18-20 @ 04:00)  HR: 124 (09-18-20 @ 12:00) (111 - 140)  BP: 106/66 (09-18-20 @ 12:00) (106/66 - 124/83)  RR: 26 (09-18-20 @ 12:00) (14 - 31)  SpO2: 97% (09-18-20 @ 12:00) (94% - 100%)  Wt(kg): --     REVIEW OF SYSTEMS    General:	NAD   Skin/Breast:	Neg  Ophthalmologic:	Neg  ENMT: Neg	  Respiratory and Thorax: Neg	  Cardiovascular:	Neg  Gastrointestinal:	Neg  Genitourinary:	Neg  Musculoskeletal:	Neg  Neurological:	Neg  Psychiatric:	Neg  Hematology/Lymphatics:	Neg  Endocrine:	Neg        PHYSICAL EXAM:    GENERAL: NAD, well-groomed, well-developed  HEAD:  Atraumatic, Normocephalic  EYES: EOMI, PERRLA, conjunctiva and sclera clear  ENMT: No tonsillar erythema, exudates, or enlargement; Moist mucous membranes, Good dentition, No lesions  NECK: Supple, No JVD, Normal thyroid  NERVOUS SYSTEM:  Alert & Oriented X3, Good concentration; Motor Strength 5/5 B/L upper and lower extremities; DTRs 2+ intact and symmetric  CHEST/LUNG: Clear to percussion bilaterally; No rales, rhonchi, wheezing, or rubs  HEART: Regular rate and rhythm; No murmurs, rubs, or gallops  ABDOMEN: Soft, Nontender, Nondistended; Bowel sounds present  EXTREMITIES:  2+ Peripheral Pulses, No clubbing, cyanosis, or edema  LYMPH: No lymphadenopathy noted  SKIN: No rashes or lesions

## 2020-09-18 NOTE — PROGRESS NOTE ADULT - ATTENDING COMMENTS
46yo female with PSHx PAULIE/BSO 8/19/2020 for fibroids admitted for persistent small bowel obstruction s/p laparoscopic NARCISO, laparotomy, extensive NARCISO, small bowel resection with primary anastomosis 9/15 complicated by small bowel leak s/p re-exploration, wash out, small bowel resection and primary anastomosis 9/15. Under ICU care at this time. Patient remains tachycardic to 120-130's on vasopressin, off shari and levo. Extubated postop but complains fo shortness of breath. Saturating 100% on room air, but taking shallow breaths. CTA chest 9/17 negative for PE. CT A/P with PO contrast 9/17 shows contrast into ascending colon without leak. Patient reports abdominal pain, but overall improving. NG tube in place 100cc output. No bowel function. Dressing change daily. WBC 8.6, Hb 6.7. Lactate 2.3. Pain control. Monitor bowel function, trend labs, continue antibiotics. Transfuse PRN for anemia. Encourage ambulation/OOB, incentive spirometer, DVT ppx. Primary care as per ICU. off shari   remains tachycardic   CHF   will continue to diurese   continue IV antibiotics   continue Vaso   continue SICU Care   replete KOLTON Murphy IP

## 2020-09-18 NOTE — PROCEDURE NOTE - NSSITEPREP_SKIN_A_CORE
Adherence to aseptic technique: hand hygiene prior to donning barriers (gown, gloves), don cap and mask, sterile drape over patient/chlorhexidine
chlorhexidine
chlorhexidine

## 2020-09-19 LAB
ANION GAP SERPL CALC-SCNC: 10 MMOL/L — SIGNIFICANT CHANGE UP (ref 7–14)
ANION GAP SERPL CALC-SCNC: 7 MMOL/L — SIGNIFICANT CHANGE UP (ref 7–14)
ANION GAP SERPL CALC-SCNC: 7 MMOL/L — SIGNIFICANT CHANGE UP (ref 7–14)
BUN SERPL-MCNC: 17 MG/DL — SIGNIFICANT CHANGE UP (ref 10–20)
BUN SERPL-MCNC: 20 MG/DL — SIGNIFICANT CHANGE UP (ref 10–20)
BUN SERPL-MCNC: 21 MG/DL — HIGH (ref 10–20)
CALCIUM SERPL-MCNC: 6.7 MG/DL — LOW (ref 8.5–10.1)
CALCIUM SERPL-MCNC: 7.1 MG/DL — LOW (ref 8.5–10.1)
CALCIUM SERPL-MCNC: 7.4 MG/DL — LOW (ref 8.5–10.1)
CHLORIDE SERPL-SCNC: 103 MMOL/L — SIGNIFICANT CHANGE UP (ref 98–110)
CHLORIDE SERPL-SCNC: 103 MMOL/L — SIGNIFICANT CHANGE UP (ref 98–110)
CHLORIDE SERPL-SCNC: 106 MMOL/L — SIGNIFICANT CHANGE UP (ref 98–110)
CO2 SERPL-SCNC: 27 MMOL/L — SIGNIFICANT CHANGE UP (ref 17–32)
CO2 SERPL-SCNC: 29 MMOL/L — SIGNIFICANT CHANGE UP (ref 17–32)
CO2 SERPL-SCNC: 29 MMOL/L — SIGNIFICANT CHANGE UP (ref 17–32)
CREAT SERPL-MCNC: 0.5 MG/DL — LOW (ref 0.7–1.5)
CREAT SERPL-MCNC: <0.5 MG/DL — LOW (ref 0.7–1.5)
CREAT SERPL-MCNC: <0.5 MG/DL — LOW (ref 0.7–1.5)
FUNGITELL: <31 PG/ML — SIGNIFICANT CHANGE UP
GLUCOSE BLDC GLUCOMTR-MCNC: 135 MG/DL — HIGH (ref 70–99)
GLUCOSE BLDC GLUCOMTR-MCNC: 138 MG/DL — HIGH (ref 70–99)
GLUCOSE BLDC GLUCOMTR-MCNC: 165 MG/DL — HIGH (ref 70–99)
GLUCOSE BLDC GLUCOMTR-MCNC: 173 MG/DL — HIGH (ref 70–99)
GLUCOSE BLDC GLUCOMTR-MCNC: 174 MG/DL — HIGH (ref 70–99)
GLUCOSE BLDC GLUCOMTR-MCNC: 192 MG/DL — HIGH (ref 70–99)
GLUCOSE SERPL-MCNC: 125 MG/DL — HIGH (ref 70–99)
GLUCOSE SERPL-MCNC: 147 MG/DL — HIGH (ref 70–99)
GLUCOSE SERPL-MCNC: 174 MG/DL — HIGH (ref 70–99)
HCT VFR BLD CALC: 21.9 % — LOW (ref 37–47)
HCT VFR BLD CALC: 25.4 % — LOW (ref 37–47)
HEPARIN-PF4 AB RESULT: <0.6 U/ML — SIGNIFICANT CHANGE UP (ref 0–0.9)
HGB BLD-MCNC: 7.2 G/DL — LOW (ref 12–16)
HGB BLD-MCNC: 8.3 G/DL — LOW (ref 12–16)
LACTATE SERPL-SCNC: 1.3 MMOL/L — SIGNIFICANT CHANGE UP (ref 0.7–2)
MAGNESIUM SERPL-MCNC: 2.1 MG/DL — SIGNIFICANT CHANGE UP (ref 1.8–2.4)
MAGNESIUM SERPL-MCNC: 2.2 MG/DL — SIGNIFICANT CHANGE UP (ref 1.8–2.4)
MAGNESIUM SERPL-MCNC: 2.2 MG/DL — SIGNIFICANT CHANGE UP (ref 1.8–2.4)
MCHC RBC-ENTMCNC: 27.4 PG — SIGNIFICANT CHANGE UP (ref 27–31)
MCHC RBC-ENTMCNC: 28 PG — SIGNIFICANT CHANGE UP (ref 27–31)
MCHC RBC-ENTMCNC: 32.7 G/DL — SIGNIFICANT CHANGE UP (ref 32–37)
MCHC RBC-ENTMCNC: 32.9 G/DL — SIGNIFICANT CHANGE UP (ref 32–37)
MCV RBC AUTO: 83.8 FL — SIGNIFICANT CHANGE UP (ref 81–99)
MCV RBC AUTO: 85.2 FL — SIGNIFICANT CHANGE UP (ref 81–99)
NRBC # BLD: 0 /100 WBCS — SIGNIFICANT CHANGE UP (ref 0–0)
NRBC # BLD: 0 /100 WBCS — SIGNIFICANT CHANGE UP (ref 0–0)
NT-PROBNP SERPL-SCNC: 1595 PG/ML — HIGH (ref 0–300)
PF4 HEPARIN CMPLX AB SER-ACNC: NEGATIVE — SIGNIFICANT CHANGE UP
PHOSPHATE SERPL-MCNC: 2.4 MG/DL — SIGNIFICANT CHANGE UP (ref 2.1–4.9)
PHOSPHATE SERPL-MCNC: 2.4 MG/DL — SIGNIFICANT CHANGE UP (ref 2.1–4.9)
PHOSPHATE SERPL-MCNC: 2.8 MG/DL — SIGNIFICANT CHANGE UP (ref 2.1–4.9)
PLATELET # BLD AUTO: 53 K/UL — LOW (ref 130–400)
PLATELET # BLD AUTO: 74 K/UL — LOW (ref 130–400)
POTASSIUM SERPL-MCNC: 3.1 MMOL/L — LOW (ref 3.5–5)
POTASSIUM SERPL-MCNC: 3.7 MMOL/L — SIGNIFICANT CHANGE UP (ref 3.5–5)
POTASSIUM SERPL-MCNC: 4.2 MMOL/L — SIGNIFICANT CHANGE UP (ref 3.5–5)
POTASSIUM SERPL-SCNC: 3.1 MMOL/L — LOW (ref 3.5–5)
POTASSIUM SERPL-SCNC: 3.7 MMOL/L — SIGNIFICANT CHANGE UP (ref 3.5–5)
POTASSIUM SERPL-SCNC: 4.2 MMOL/L — SIGNIFICANT CHANGE UP (ref 3.5–5)
RBC # BLD: 2.57 M/UL — LOW (ref 4.2–5.4)
RBC # BLD: 3.03 M/UL — LOW (ref 4.2–5.4)
RBC # FLD: 14.5 % — SIGNIFICANT CHANGE UP (ref 11.5–14.5)
RBC # FLD: 14.9 % — HIGH (ref 11.5–14.5)
SODIUM SERPL-SCNC: 137 MMOL/L — SIGNIFICANT CHANGE UP (ref 135–146)
SODIUM SERPL-SCNC: 139 MMOL/L — SIGNIFICANT CHANGE UP (ref 135–146)
SODIUM SERPL-SCNC: 145 MMOL/L — SIGNIFICANT CHANGE UP (ref 135–146)
WBC # BLD: 10.02 K/UL — SIGNIFICANT CHANGE UP (ref 4.8–10.8)
WBC # BLD: 13.08 K/UL — HIGH (ref 4.8–10.8)
WBC # FLD AUTO: 10.02 K/UL — SIGNIFICANT CHANGE UP (ref 4.8–10.8)
WBC # FLD AUTO: 13.08 K/UL — HIGH (ref 4.8–10.8)

## 2020-09-19 PROCEDURE — 99024 POSTOP FOLLOW-UP VISIT: CPT

## 2020-09-19 PROCEDURE — 71045 X-RAY EXAM CHEST 1 VIEW: CPT | Mod: 26

## 2020-09-19 PROCEDURE — 99291 CRITICAL CARE FIRST HOUR: CPT | Mod: 24

## 2020-09-19 RX ORDER — POTASSIUM CHLORIDE 20 MEQ
20 PACKET (EA) ORAL ONCE
Refills: 0 | Status: COMPLETED | OUTPATIENT
Start: 2020-09-19 | End: 2020-09-19

## 2020-09-19 RX ORDER — FUROSEMIDE 40 MG
40 TABLET ORAL ONCE
Refills: 0 | Status: COMPLETED | OUTPATIENT
Start: 2020-09-19 | End: 2020-09-19

## 2020-09-19 RX ORDER — METOPROLOL TARTRATE 50 MG
12.5 TABLET ORAL EVERY 12 HOURS
Refills: 0 | Status: DISCONTINUED | OUTPATIENT
Start: 2020-09-19 | End: 2020-09-19

## 2020-09-19 RX ORDER — HEPARIN SODIUM 5000 [USP'U]/ML
5000 INJECTION INTRAVENOUS; SUBCUTANEOUS EVERY 8 HOURS
Refills: 0 | Status: DISCONTINUED | OUTPATIENT
Start: 2020-09-19 | End: 2020-09-23

## 2020-09-19 RX ORDER — ACETAZOLAMIDE 250 MG/1
500 TABLET ORAL ONCE
Refills: 0 | Status: COMPLETED | OUTPATIENT
Start: 2020-09-19 | End: 2020-09-19

## 2020-09-19 RX ORDER — METOPROLOL TARTRATE 50 MG
12.5 TABLET ORAL EVERY 12 HOURS
Refills: 0 | Status: DISCONTINUED | OUTPATIENT
Start: 2020-09-19 | End: 2020-09-20

## 2020-09-19 RX ORDER — SENNA PLUS 8.6 MG/1
2 TABLET ORAL AT BEDTIME
Refills: 0 | Status: DISCONTINUED | OUTPATIENT
Start: 2020-09-19 | End: 2020-09-23

## 2020-09-19 RX ORDER — POTASSIUM PHOSPHATE, MONOBASIC POTASSIUM PHOSPHATE, DIBASIC 236; 224 MG/ML; MG/ML
30 INJECTION, SOLUTION INTRAVENOUS ONCE
Refills: 0 | Status: COMPLETED | OUTPATIENT
Start: 2020-09-19 | End: 2020-09-19

## 2020-09-19 RX ORDER — LABETALOL HCL 100 MG
10 TABLET ORAL ONCE
Refills: 0 | Status: COMPLETED | OUTPATIENT
Start: 2020-09-19 | End: 2020-09-19

## 2020-09-19 RX ORDER — POTASSIUM CHLORIDE 20 MEQ
20 PACKET (EA) ORAL
Refills: 0 | Status: COMPLETED | OUTPATIENT
Start: 2020-09-19 | End: 2020-09-19

## 2020-09-19 RX ADMIN — INSULIN HUMAN 6: 100 INJECTION, SOLUTION SUBCUTANEOUS at 22:14

## 2020-09-19 RX ADMIN — INSULIN HUMAN 6: 100 INJECTION, SOLUTION SUBCUTANEOUS at 05:56

## 2020-09-19 RX ADMIN — INSULIN HUMAN 6: 100 INJECTION, SOLUTION SUBCUTANEOUS at 14:28

## 2020-09-19 RX ADMIN — Medication 100 MILLIEQUIVALENT(S): at 20:34

## 2020-09-19 RX ADMIN — INSULIN HUMAN 9: 100 INJECTION, SOLUTION SUBCUTANEOUS at 10:20

## 2020-09-19 RX ADMIN — Medication 100 MILLIEQUIVALENT(S): at 01:26

## 2020-09-19 RX ADMIN — Medication 1 EACH: at 20:44

## 2020-09-19 RX ADMIN — Medication 5 MILLIGRAM(S): at 05:23

## 2020-09-19 RX ADMIN — Medication 10 MILLIGRAM(S): at 23:12

## 2020-09-19 RX ADMIN — CASPOFUNGIN ACETATE 50 MILLIGRAM(S): 7 INJECTION, POWDER, LYOPHILIZED, FOR SOLUTION INTRAVENOUS at 18:19

## 2020-09-19 RX ADMIN — POTASSIUM PHOSPHATE, MONOBASIC POTASSIUM PHOSPHATE, DIBASIC 83.33 MILLIMOLE(S): 236; 224 INJECTION, SOLUTION INTRAVENOUS at 01:26

## 2020-09-19 RX ADMIN — HYDROMORPHONE HYDROCHLORIDE 30 MILLILITER(S): 2 INJECTION INTRAMUSCULAR; INTRAVENOUS; SUBCUTANEOUS at 20:44

## 2020-09-19 RX ADMIN — PANTOPRAZOLE SODIUM 40 MILLIGRAM(S): 20 TABLET, DELAYED RELEASE ORAL at 11:55

## 2020-09-19 RX ADMIN — Medication 2.5 MILLIGRAM(S): at 01:26

## 2020-09-19 RX ADMIN — HEPARIN SODIUM 5000 UNIT(S): 5000 INJECTION INTRAVENOUS; SUBCUTANEOUS at 22:06

## 2020-09-19 RX ADMIN — Medication 12.5 MILLIGRAM(S): at 17:17

## 2020-09-19 RX ADMIN — ACETAZOLAMIDE 110 MILLIGRAM(S): 250 TABLET ORAL at 12:44

## 2020-09-19 RX ADMIN — CHLORHEXIDINE GLUCONATE 1 APPLICATION(S): 213 SOLUTION TOPICAL at 11:43

## 2020-09-19 RX ADMIN — HEPARIN SODIUM 5000 UNIT(S): 5000 INJECTION INTRAVENOUS; SUBCUTANEOUS at 14:28

## 2020-09-19 RX ADMIN — Medication 100 MILLIEQUIVALENT(S): at 19:16

## 2020-09-19 RX ADMIN — Medication 100 MILLIEQUIVALENT(S): at 21:32

## 2020-09-19 RX ADMIN — SENNA PLUS 2 TABLET(S): 8.6 TABLET ORAL at 22:07

## 2020-09-19 RX ADMIN — Medication 62.5 MILLIMOLE(S): at 07:46

## 2020-09-19 RX ADMIN — POTASSIUM PHOSPHATE, MONOBASIC POTASSIUM PHOSPHATE, DIBASIC 83.33 MILLIMOLE(S): 236; 224 INJECTION, SOLUTION INTRAVENOUS at 22:45

## 2020-09-19 RX ADMIN — Medication 40 MILLIGRAM(S): at 10:12

## 2020-09-19 NOTE — PROGRESS NOTE ADULT - ASSESSMENT
Assessment:  45y Female patient admitted POD 3 s/p SB resection with NARCISO , with the above physical exam, labs, and imaging findings.    Plan:  - monitor for bowel function  - trend H&H  - daily dressing changes  - GYN following, appreciate recommendations  - ID following, appreciate recommendations  -Pain control as needed  -Hemodynamic monitoring as per routine  -Encourage ambulation and incentive spirometer use (10x/hr when awake)  -GI and DVT prophylaxis  -Check and replete CBC and BMP q daily  -Strict input and output monitoring  -Continue current management    Date/Time: 09-19-20 @ 03:54

## 2020-09-19 NOTE — PROGRESS NOTE ADULT - SUBJECTIVE AND OBJECTIVE BOX
PGY 2 Note:    POD#31 Ex lap PAULIE/BS, ureteral stent placement  POD#4 ex lap, resection of 43 cm of ileum secondary to serosal tears, side-to-side ileoileal anastomosis, enterotomy stapled  POD#4 Ex lap small bowel resection with anastomosis appendectomy      Physical exam:  Vital Signs Last 24 Hrs  T(F): 98 (19 Sep 2020 08:00), Max: 99.2 (18 Sep 2020 12:00)  HR: 117 (19 Sep 2020 08:00) (104 - 124)  BP: 113/72 (18 Sep 2020 14:51) (102/67 - 124/83)  RR: 18 (19 Sep 2020 08:00) (11 - 31)  SpO2: 95% (19 Sep 2020 08:00) (94% - 99%)    9/18 UO: (6843-0364): 770cc  9/18 NGT: 25cc    GEN: AAOX3  CVS: tachycardic, normal rhythm   Lungs: CTAB, tachypneic  Abd: vertical incision packed with kerlex, foam dressing over. Abdomen soft, appropriately tender diffusely, no r/g/r  Ext: no calf tenderness or edema, SCDs in place      MEDICATIONS  (STANDING):  caspofungin IVPB 50 milliGRAM(s) IV Intermittent every 24 hours  caspofungin IVPB      chlorhexidine 4% Liquid 1 Application(s) Topical daily  fondaparinux Injectable 5 milliGRAM(s) SubCutaneous daily  hydrocortisone sodium succinate Injectable 50 milliGRAM(s) IV Push every 6 hours  HYDROmorphone PCA (1 mG/mL) 30 milliLiter(s) PCA Continuous PCA Continuous  insulin regular  human corrective regimen sliding scale   IV Push every 4 hours  metoprolol tartrate Injectable 5 milliGRAM(s) IV Push every 6 hours  metroNIDAZOLE  IVPB 500 milliGRAM(s) IV Intermittent every 6 hours  pantoprazole  Injectable 40 milliGRAM(s) IV Push daily  Parenteral Nutrition - Adult 1 Each (85 mL/Hr) TPN Continuous <Continuous>  Parenteral Nutrition - Adult 1 Each (85 mL/Hr) TPN Continuous <Continuous>  piperacillin/tazobactam IVPB.. 4.5 Gram(s) IV Intermittent every 6 hours  vasopressin Infusion 0.04 Unit(s)/Min (2.4 mL/Hr) IV Continuous <Continuous>    MEDICATIONS  (PRN):  benzocaine 20% Spray 1 Spray(s) Topical every 4 hours PRN sore throat  bisacodyl Suppository 10 milliGRAM(s) Rectal daily PRN Constipation  metoprolol tartrate Injectable 2.5 milliGRAM(s) IV Push every 6 hours PRN If HR >120  naloxone Injectable 0.1 milliGRAM(s) IV Push every 3 minutes PRN For ANY of the following changes in patient status:  A. RR LESS THAN 10 breaths per minute, B. Oxygen saturation LESS THAN 90%, C. Sedation score of 6      LABS:                        7.2    10.02 )-----------( 53       ( 19 Sep 2020 05:00 )             21.9                         7.5    9.77  )-----------( 51       ( 18 Sep 2020 21:52 )             22.3                         6.7    8.24  )-----------( 45       ( 18 Sep 2020 15:08 )             20.8                         6.7    8.68  )-----------( 55       ( 18 Sep 2020 05:00 )             20.5                         6.7    9.46  )-----------( 53       ( 18 Sep 2020 04:00 )             20.6                         8.1    7.87  )-----------( 83       ( 17 Sep 2020 10:40 )             25.1     Magnesium, Serum: 2.2 mg/dL (09-19 @ 05:00)  Magnesium, Serum: 2.2 mg/dL (09-18 @ 23:50)  Magnesium, Serum: 2.1 mg/dL (09-18 @ 15:08)  Antibody Screen: NEG (09-18 @ 09:38)    09-19-20 @ 05:00      137  |  103  |  21<H>  ----------------------------<  174<H>  4.2   |  27  |  <0.5<L>    09-18-20 @ 23:50      139  |  103  |  17  ----------------------------<  125<H>  3.7   |  29  |  <0.5<L>    09-18-20 @ 15:08      134<L>  |  101  |  15  ----------------------------<  179<H>  3.8   |  25  |  <0.5<L>    09-18-20 @ 04:00      135  |  103  |  13  ----------------------------<  205<H>  3.3<L>   |  23  |  <0.5<L>    09-18-20 @ 01:40      135  |  103  |  12  ----------------------------<  217<H>  3.3<L>   |  23  |  <0.5<L>    09-17-20 @ 16:30      131<L>  |  99  |  10  ----------------------------<  182<H>  3.5   |  23  |  <0.5<L>    09-17-20 @ 10:20      133<L>  |  103  |  9<L>  ----------------------------<  145<H>  3.8   |  23  |  <0.5<L>    09-17-20 @ 04:20      133<L>  |  103  |  7<L>  ----------------------------<  193<H>  3.3<L>   |  22  |  <0.5<L>    09-16-20 @ 23:30      134<L>  |  105  |  7<L>  ----------------------------<  180<H>  3.6   |  22  |  <0.5<L>    09-16-20 @ 16:03      130<L>  |  102  |  6<L>  ----------------------------<  194<H>  3.8   |  19  |  0.6<L>    09-16-20 @ 10:00      134<L>  |  105  |  5<L>  ----------------------------<  247<H>  4.5   |  20  |  0.5<L>        Ca    7.1<L>      19 Sep 2020 05:00  Ca    6.7<L>      18 Sep 2020 23:50  Ca    6.7<L>      18 Sep 2020 15:08  Ca    6.6<L>      18 Sep 2020 04:00  Ca    6.5<L>      18 Sep 2020 01:40  Ca    6.8<L>      17 Sep 2020 16:30  Ca    6.9<L>      17 Sep 2020 10:20  Ca    6.5<L>      17 Sep 2020 04:20  Ca    6.8<L>      16 Sep 2020 23:30  Ca    6.6<L>      16 Sep 2020 16:03  Ca    6.7<L>      16 Sep 2020 10:00  Phos  2.8     09-19  Phos  2.4     09-18  Phos  2.1     09-18  Phos  2.2     09-17  Phos  1.6<L>     09-17  Phos  2.3     09-17  Phos  2.1     09-16  Phos  1.2<L>     09-16  Phos  1.7<L>     09-16  Mg     2.2     09-19  Mg     2.2     09-18  Mg     2.1     09-18  Mg     2.2     09-18  Mg     1.9     09-17  Mg     2.1     09-17  Mg     2.2     09-17  Mg     2.3     09-16  Mg     1.9     09-16  Mg     1.9     09-16    TPro  3.5<L>  /  Alb  1.8<L>  /  TBili  0.4  /  DBili  0.2  /  AST  18  /  ALT  11  /  AlkPhos  71  09-18-20 @ 17:02  TPro  3.0<L>  /  Alb  1.5<L>  /  TBili  0.5  /  DBili  0.3<H>  /  AST  24  /  ALT  11  /  AlkPhos  40  09-17-20 @ 04:20  TPro  3.2<L>  /  Alb  1.7<L>  /  TBili  0.8  /  DBili  0.5<H>  /  AST  29  /  ALT  14  /  AlkPhos  36  09-16-20 @ 16:03          Culture - Blood (collected 09-17-20 @ 20:40)  Source: .Blood Blood-Peripheral  Preliminary Report (09-19-20 @ 04:01):    No growth to date.          09-18-20 @ 07:01  -  09-19-20 @ 07:00  --------------------------------------------------------  IN: 4717.3 mL / OUT: 4400 mL / NET: 317.3 mL    09-19-20 @ 07:01  -  09-19-20 @ 08:31  --------------------------------------------------------  IN: 174.8 mL / OUT: 60 mL / NET: 114.8 mL     PGY 2 Note:    POD#31 Ex lap PAULIE/BS, ureteral stent placement  POD#4 ex lap, resection of 43 cm of ileum secondary to serosal tears, side-to-side ileoileal anastomosis, enterotomy stapled  POD#4 Ex lap small bowel resection with anastomosis appendectomy    Patient seen and evaluated at bedside. Reports pain is well controlled with PCA pump. Denies fever, chills, N/V, severe abdominal pain, or VB. Reports passing flatus, had BM yesterday. Silva in place with clear urine. NGT dc'ed patient on sips and chips. Patient transfused 1U pRBCs, post transfusion Hgb 7.5. after 1 unit (split 1/2 and 1/2). Plts decreased to 50s, HIT Ab negative. Heparin switched to fondaparinux.     Physical exam:  Vital Signs Last 24 Hrs  T(F): 98 (19 Sep 2020 08:00), Max: 99.2 (18 Sep 2020 12:00)  HR: 117 (19 Sep 2020 08:00) (104 - 124)  BP: 113/72 (18 Sep 2020 14:51) (102/67 - 124/83)  RR: 18 (19 Sep 2020 08:00) (11 - 31)  SpO2: 95% (19 Sep 2020 08:00) (94% - 99%)    9/18 UO: ([id number]): 770cc  9/18 NGT: 25cc    GEN: AAOX3  CVS: tachycardic, normal rhythm   Lungs: CTABL  Abd: vertical incision packed with kerlex, foam dressing over. Abdomen soft, appropriately tender diffusely, no r/g/r  Ext: no calf tenderness or edema, SCDs in place      MEDICATIONS  (STANDING):  caspofungin IVPB 50 milliGRAM(s) IV Intermittent every 24 hours  caspofungin IVPB      chlorhexidine 4% Liquid 1 Application(s) Topical daily  fondaparinux Injectable 5 milliGRAM(s) SubCutaneous daily  hydrocortisone sodium succinate Injectable 50 milliGRAM(s) IV Push every 6 hours  HYDROmorphone PCA (1 mG/mL) 30 milliLiter(s) PCA Continuous PCA Continuous  insulin regular  human corrective regimen sliding scale   IV Push every 4 hours  metoprolol tartrate Injectable 5 milliGRAM(s) IV Push every 6 hours  metroNIDAZOLE  IVPB 500 milliGRAM(s) IV Intermittent every 6 hours  pantoprazole  Injectable 40 milliGRAM(s) IV Push daily  Parenteral Nutrition - Adult 1 Each (85 mL/Hr) TPN Continuous <Continuous>  Parenteral Nutrition - Adult 1 Each (85 mL/Hr) TPN Continuous <Continuous>  piperacillin/tazobactam IVPB.. 4.5 Gram(s) IV Intermittent every 6 hours  vasopressin Infusion 0.04 Unit(s)/Min (2.4 mL/Hr) IV Continuous <Continuous>    MEDICATIONS  (PRN):  benzocaine 20% Spray 1 Spray(s) Topical every 4 hours PRN sore throat  bisacodyl Suppository 10 milliGRAM(s) Rectal daily PRN Constipation  metoprolol tartrate Injectable 2.5 milliGRAM(s) IV Push every 6 hours PRN If HR >120  naloxone Injectable 0.1 milliGRAM(s) IV Push every 3 minutes PRN For ANY of the following changes in patient status:  A. RR LESS THAN 10 breaths per minute, B. Oxygen saturation LESS THAN 90%, C. Sedation score of 6      LABS:                        7.2    10.02 )-----------( 53       ( 19 Sep 2020 05:00 )             21.9                         7.5    9.77  )-----------( 51       ( 18 Sep 2020 21:52 )             22.3                         6.7    8.24  )-----------( 45       ( 18 Sep 2020 15:08 )             20.8                         6.7    8.68  )-----------( 55       ( 18 Sep 2020 05:00 )             20.5                         6.7    9.46  )-----------( 53       ( 18 Sep 2020 04:00 )             20.6                         8.1    7.87  )-----------( 83       ( 17 Sep 2020 10:40 )             25.1     Magnesium, Serum: 2.2 mg/dL (09-19 @ 05:00)  Magnesium, Serum: 2.2 mg/dL (09-18 @ 23:50)  Magnesium, Serum: 2.1 mg/dL (09-18 @ 15:08)  Antibody Screen: NEG (09-18 @ 09:38)    09-19-20 @ 05:00      137  |  103  |  21<H>  ----------------------------<  174<H>  4.2   |  27  |  <0.5<L>    09-18-20 @ 23:50      139  |  103  |  17  ----------------------------<  125<H>  3.7   |  29  |  <0.5<L>    09-18-20 @ 15:08      134<L>  |  101  |  15  ----------------------------<  179<H>  3.8   |  25  |  <0.5<L>    09-18-20 @ 04:00      135  |  103  |  13  ----------------------------<  205<H>  3.3<L>   |  23  |  <0.5<L>    09-18-20 @ 01:40      135  |  103  |  12  ----------------------------<  217<H>  3.3<L>   |  23  |  <0.5<L>    09-17-20 @ 16:30      131<L>  |  99  |  10  ----------------------------<  182<H>  3.5   |  23  |  <0.5<L>    09-17-20 @ 10:20      133<L>  |  103  |  9<L>  ----------------------------<  145<H>  3.8   |  23  |  <0.5<L>    09-17-20 @ 04:20      133<L>  |  103  |  7<L>  ----------------------------<  193<H>  3.3<L>   |  22  |  <0.5<L>    09-16-20 @ 23:30      134<L>  |  105  |  7<L>  ----------------------------<  180<H>  3.6   |  22  |  <0.5<L>    09-16-20 @ 16:03      130<L>  |  102  |  6<L>  ----------------------------<  194<H>  3.8   |  19  |  0.6<L>    09-16-20 @ 10:00      134<L>  |  105  |  5<L>  ----------------------------<  247<H>  4.5   |  20  |  0.5<L>        Ca    7.1<L>      19 Sep 2020 05:00  Ca    6.7<L>      18 Sep 2020 23:50  Ca    6.7<L>      18 Sep 2020 15:08  Ca    6.6<L>      18 Sep 2020 04:00  Ca    6.5<L>      18 Sep 2020 01:40  Ca    6.8<L>      17 Sep 2020 16:30  Ca    6.9<L>      17 Sep 2020 10:20  Ca    6.5<L>      17 Sep 2020 04:20  Ca    6.8<L>      16 Sep 2020 23:30  Ca    6.6<L>      16 Sep 2020 16:03  Ca    6.7<L>      16 Sep 2020 10:00  Phos  2.8     09-19  Phos  2.4     09-18  Phos  2.1     09-18  Phos  2.2     09-17  Phos  1.6<L>     09-17  Phos  2.3     09-17  Phos  2.1     09-16  Phos  1.2<L>     09-16  Phos  1.7<L>     09-16  Mg     2.2     09-19  Mg     2.2     09-18  Mg     2.1     09-18  Mg     2.2     09-18  Mg     1.9     09-17  Mg     2.1     09-17  Mg     2.2     09-17  Mg     2.3     09-16  Mg     1.9     09-16  Mg     1.9     09-16    TPro  3.5<L>  /  Alb  1.8<L>  /  TBili  0.4  /  DBili  0.2  /  AST  18  /  ALT  11  /  AlkPhos  71  09-18-20 @ 17:02  TPro  3.0<L>  /  Alb  1.5<L>  /  TBili  0.5  /  DBili  0.3<H>  /  AST  24  /  ALT  11  /  AlkPhos  40  09-17-20 @ 04:20  TPro  3.2<L>  /  Alb  1.7<L>  /  TBili  0.8  /  DBili  0.5<H>  /  AST  29  /  ALT  14  /  AlkPhos  36  09-16-20 @ 16:03          Culture - Blood (collected 09-17-20 @ 20:40)  Source: .Blood Blood-Peripheral  Preliminary Report (09-19-20 @ 04:01):    No growth to date.          09-18-20 @ 07:01  -  09-19-20 @ 07:00  --------------------------------------------------------  IN: 4717.3 mL / OUT: 4400 mL / NET: 317.3 mL    09-19-20 @ 07:01  -  09-19-20 @ 08:31  --------------------------------------------------------  IN: 174.8 mL / OUT: 60 mL / NET: 114.8 mL

## 2020-09-19 NOTE — PROGRESS NOTE ADULT - ATTENDING COMMENTS
44yo female with PSHx PAULIE/BSO 8/19/2020 for fibroids admitted for persistent small bowel obstruction s/p laparoscopic NARCISO, laparotomy, extensive NARCISO, small bowel resection with primary anastomosis 9/15 complicated by small bowel leak s/p re-exploration, wash out, small bowel resection and primary anastomosis 9/15. Under ICU care at this time. Tachycardia improving to 110's, on vasopressin. Extubated and breathing independently. Was complaining of shortness of breath yesterday, but feels better today. On nasal cannula O2. Complains of abdominal pain but improving, NG tube in place minimal output. No bowel function. Dressing change today. WBC 10. Lactate 1.3. Pain control. Monitor bowel function, trend labs, continue antibiotics. Encourage ambulation/OOB, incentive spirometer, DVT ppx. Primary care as per ICU.

## 2020-09-19 NOTE — PROGRESS NOTE ADULT - SUBJECTIVE AND OBJECTIVE BOX
GLORIA SCHAFER  [id number]  45y Female    Indication for ICU admission: intraabdominal sepsis, s/p diagnostic lap, NARCISO, SBR (43cm ileum) w/ primary anastamosis (side to side)  Admit Date:09-14-20  ICU Date: 9/15/2020  OR Date: 9/15/2020 x2    No Known Allergies    PAST MEDICAL & SURGICAL HISTORY:  Uterine fibroid    Status post bilateral salpingectomy    S/P PAULIE (total abdominal hysterectomy)      Home Medications:  ibuprofen 600 mg oral tablet: 1 tab(s) orally every 6 hours, As needed, Mild Pain (1 - 3) (01 Sep 2020 06:26)        24HRS EVENT:      NEURO:    AAOx3      Acute pain - controlled with Dilaudid PCA increased to 0.3    RESP:     On NC @ 2L    ABG PRN    AM CXR    CT Chest (PE protocol) 9/17- negative for PE; small-moderate b/l pleural effusions with compressive atelectasis      CARDS:     Hypotension Resolved- Off Levo; Off shari, Remains on Vaso for 24 h    Tachycardia: Lopressor 5mg q6 standing, Lopressor 2.5 q6 PRN for HR > 120    CE neg, 0.04, 0.16, 0.07 - no longer trending    EKG: Sinus tach on 9/16 @12pm    ECHO: EF 50-55%, G1DD, mild MR, mild TR    BNP 4550 > 1557 > 1595       GI/NUTR:     CT A/P (9/17) (prelim) - negative for leak; postsurgical changes with post-op ileus    Diet: NPO w sips and chips, TPN (no free water seen on order)    Hepatic panel 9/18: WNL    NGT dc'd 9/13    IVL    GI ppx: PPI    Dulcolax suppository       /RENAL:     Silva - UO 75-150cc/hr    BUN/Cr-  17/0.5 (at baseline)     Elytes: Lytes-Na 134 // K 3.8 // Phos 2.1 (repleted ON) //  Mag 2.1    Lactate 1.8 >> 6.5 >> 4.4 > 4.7 > 5.2> 3.9 > 3.0 > 2.3 > 3.0 > 2.3> 1.3 (no longer trending)    Lasix 20 IVP x2 9/18: First dose response was 2L UOP, Second dose respons _______    Midnight 9/19: 3.7 Phos 2.4, given 30 Kphos and 20 KChl (as pt had lasix in afternoon)    HEME/ONC:     Hgb 9.9 > 8.5 > 8.1 > 6.7 > 7.5    Post transfusion Hgb 7.5. after 1 u PRBC (split 1/2 and 1/2 on 9/18)    Transfused 1u PRBC divided into 2 doses 9/18 for hgb 6.7    Plt downtrending 97 > 83 > 45 >51    F/u HIT antibody     INR 2.5 > 1.7 >1.3    DVT Prophylaxis: Fondaparaneux, SCDs    ID:    Afebrile    WBC 5 > 4.4 > 7.8 > 9.4 > 8    Abx: Caspfungin, Zosyn, Flagyl; d/c'ed Lachelle    -ID following     ENDO:    Hyperglycemic - off insulin gtt, started on ISS    Glucose controlled, last 125, 162    Solucortef 50 q6      LINES/DRAINS:  PIV, NGT , [place], central line, Silva         DVT Prophylaxis: Fondaparaneux  GI Prophylaxis:pantoprazole  Injectable 40 milliGRAM(s) IV Push daily      ***Tubes/Lines/Drains  ***  Peripheral IV  NG Tube  Urinary Catheter		Indication: Strict I&O      R. Basilic Midline                      Date: 9/17      [X] A ten-point review of systems was otherwise negative except as noted above.  [  ] Due to altered mental status/intubation, subjective information was not attained from the patient. History was obtained, to the extent possible, from review of the chart and collateral sources of information.     GLORIA SCHAFER  [id number]  45y Female    Indication for ICU admission: intraabdominal sepsis, s/p diagnostic lap, NARCISO, SBR (43cm ileum) w/ primary anastamosis (side to side)  Admit Date:09-14-20  ICU Date: 9/15/2020  OR Date: 9/15/2020 x2    No Known Allergies    PAST MEDICAL & SURGICAL HISTORY:  Uterine fibroid    Status post bilateral salpingectomy    S/P PAULIE (total abdominal hysterectomy)      Home Medications:  ibuprofen 600 mg oral tablet: 1 tab(s) orally every 6 hours, As needed, Mild Pain (1 - 3) (01 Sep 2020 06:26)        24HRS EVENT:      NEURO:    AAOx3      Acute pain - controlled with Dilaudid PCA increased to 0.3    RESP:     On NC @ 2L    ABG PRN    AM CXR    CT Chest (PE protocol) 9/17- negative for PE; small-moderate b/l pleural effusions with compressive atelectasis      CARDS:     Hypotension Resolved- Off Levo; Off shari, Remains on Vaso for 24 h    Tachycardia: Lopressor 5mg q6 standing, Lopressor 2.5 q6 PRN for HR > 120    CE neg, 0.04, 0.16, 0.07 - no longer trending    EKG: Sinus tach on 9/16 @12pm    ECHO: EF 50-55%, G1DD, mild MR, mild TR    BNP 4550 > 1557 > 1595       GI/NUTR:     CT A/P (9/17) (prelim) - negative for leak; postsurgical changes with post-op ileus    Diet: NPO w sips and chips, TPN (no free water seen on order)    Hepatic panel 9/18: WNL    NGT dc'd 9/13    IVL    GI ppx: PPI    Dulcolax suppository       /RENAL:     Silva - UO 75-150cc/hr    BUN/Cr-  17/0.5 (at baseline)     Elytes: Lytes-Na 134 // K 3.8 // Phos 2.1 (repleted ON) //  Mag 2.1    Lactate 1.8 >> 6.5 >> 4.4 > 4.7 > 5.2> 3.9 > 3.0 > 2.3 > 3.0 > 2.3> 1.3 (no longer trending)    Lasix 20 IVP x2 9/18: First dose response was 2L UOP, Second dose respons _______    Midnight 9/19: 3.7 Phos 2.4, given 30 Kphos and 20 KChl (as pt had lasix in afternoon)    HEME/ONC:     Hgb 9.9 > 8.5 > 8.1 > 6.7 > 7.5    Post transfusion Hgb 7.5. after 1 u PRBC (split 1/2 and 1/2 on 9/18)    Transfused 1u PRBC divided into 2 doses 9/18 for hgb 6.7    Plt downtrending 97 > 83 > 45 >51    F/u HIT antibody     INR 2.5 > 1.7 >1.3    DVT Prophylaxis: Fondaparaneux, SCDs    ID:    Afebrile    WBC 5 > 4.4 > 7.8 > 9.4 > 8    Abx: Caspfungin, Zosyn, Flagyl; d/c'ed Lachelle    -ID following     ENDO:    Hyperglycemic - off insulin gtt, started on ISS    Glucose controlled, last 125, 162    Solucortef 50 q6      LINES/DRAINS:  PIV, NGT , [place], central line, Silva         DVT Prophylaxis: Fondaparaneux  GI Prophylaxis:pantoprazole  Injectable 40 milliGRAM(s) IV Push daily      ***Tubes/Lines/Drains  ***  Peripheral IV  NG Tube  Urinary Catheter		Indication: Strict I&O      R. Basilic Midline                      Date: 9/17      [X] A ten-point review of systems was otherwise negative except as noted above.  [  ] Due to altered mental status/intubation, subjective information was not attained from the patient. History was obtained, to the extent possible, from review of the chart and collateral sources of information.    Daily     Daily     Diet, Clear Liquid:   Consistent Carbohydrate No Snacks (09-19-20 @ 09:59)      CURRENT MEDS:  Neurologic Medications  HYDROmorphone PCA (1 mG/mL) 30 milliLiter(s) PCA Continuous PCA Continuous    Respiratory Medications    Cardiovascular Medications  metoprolol tartrate 12.5 milliGRAM(s) Oral every 12 hours    Gastrointestinal Medications  bisacodyl Suppository 10 milliGRAM(s) Rectal daily PRN Constipation  pantoprazole  Injectable 40 milliGRAM(s) IV Push daily  Parenteral Nutrition - Adult 1 Each TPN Continuous <Continuous>  Parenteral Nutrition - Adult 1 Each TPN Continuous <Continuous>    Genitourinary Medications    Hematologic/Oncologic Medications  heparin   Injectable 5000 Unit(s) SubCutaneous every 8 hours    Antimicrobial/Immunologic Medications  caspofungin IVPB 50 milliGRAM(s) IV Intermittent every 24 hours  caspofungin IVPB      metroNIDAZOLE  IVPB 500 milliGRAM(s) IV Intermittent every 6 hours  piperacillin/tazobactam IVPB.. 4.5 Gram(s) IV Intermittent every 6 hours    Endocrine/Metabolic Medications  hydrocortisone sodium succinate Injectable 50 milliGRAM(s) IV Push every 6 hours  insulin regular  human corrective regimen sliding scale   IV Push every 4 hours    Topical/Other Medications  benzocaine 20% Spray 1 Spray(s) Topical every 4 hours PRN sore throat  chlorhexidine 4% Liquid 1 Application(s) Topical daily  naloxone Injectable 0.1 milliGRAM(s) IV Push every 3 minutes PRN For ANY of the following changes in patient status:  A. RR LESS THAN 10 breaths per minute, B. Oxygen saturation LESS THAN 90%, C. Sedation score of 6      ICU Vital Signs Last 24 Hrs  T(C): 36.8 (19 Sep 2020 12:00), Max: 37.1 (18 Sep 2020 20:00)  T(F): 98.3 (19 Sep 2020 12:00), Max: 98.7 (18 Sep 2020 20:00)  HR: 117 (19 Sep 2020 14:00) (104 - 119)  BP: --  BP(mean): --  ABP: 156/76 (19 Sep 2020 14:00) (123/70 - 172/88)  ABP(mean): 110 (19 Sep 2020 14:00) (87 - 118)  RR: 18 (19 Sep 2020 14:00) (11 - 26)  SpO2: 98% (19 Sep 2020 14:00) (94% - 99%)      Adult Advanced Hemodynamics Last 24 Hrs  CVP(mm Hg): --  CVP(cm H2O): --  CO: --  CI: --  PA: --  PA(mean): --  PCWP: --  SVR: --  SVRI: --  PVR: --  PVRI: --          I&O's Summary    18 Sep 2020 07:01  -  19 Sep 2020 07:00  --------------------------------------------------------  IN: 4717.3 mL / OUT: 4400 mL / NET: 317.3 mL    19 Sep 2020 07:01  -  19 Sep 2020 15:24  --------------------------------------------------------  IN: 684.8 mL / OUT: 6255 mL / NET: -5570.2 mL      I&O's Detail    18 Sep 2020 07:01  -  19 Sep 2020 07:00  --------------------------------------------------------  IN:    IV PiggyBack: 1120 mL    IV PiggyBack: 600 mL    IV PiggyBack: 716.4 mL    PRBCs (Packed Red Blood Cells): 187.5 mL    TPN (Total Parenteral Nutrition): 2037 mL    Vasopressin: 56.4 mL  Total IN: 4717.3 mL    OUT:    Drain (mL): 25 mL    Indwelling Catheter - Urethral (mL): 4375 mL  Total OUT: 4400 mL    Total NET: 317.3 mL      19 Sep 2020 07:01  -  19 Sep 2020 15:24  --------------------------------------------------------  IN:    TPN (Total Parenteral Nutrition): 680 mL    Vasopressin: 4.8 mL  Total IN: 684.8 mL    OUT:    Indwelling Catheter - Urethral (mL): 6255 mL  Total OUT: 6255 mL    Total NET: -5570.2 mL      PHYSICAL EXAM:    General/Neuro  RASS:      0       GCS: 15      alert & oriented x 3, no focal deficits    Lungs: clear to auscultation, Normal expansion/effort.     Cardiovascular : S1, S2.  Regular rate and rhythm.  Mid peripheral edema     GI: Abdomen soft, mild distension, appropriately tender.     Wound: Midline incision C/D/I    Extremities: Extremities warm, pink, well-perfused.     Derm: Good skin turgor, no skin breakdown.      : Silva catheter in place.        CXR:       LABS:  CAPILLARY BLOOD GLUCOSE      POCT Blood Glucose.: 173 mg/dL (19 Sep 2020 14:07)  POCT Blood Glucose.: 192 mg/dL (19 Sep 2020 10:11)  POCT Blood Glucose.: 174 mg/dL (19 Sep 2020 05:53)  POCT Blood Glucose.: 138 mg/dL (19 Sep 2020 02:19)  POCT Blood Glucose.: 162 mg/dL (18 Sep 2020 21:55)  POCT Blood Glucose.: 148 mg/dL (18 Sep 2020 17:41)                          7.2    10.02 )-----------( 53       ( 19 Sep 2020 05:00 )             21.9       09-19    137  |  103  |  21<H>  ----------------------------<  174<H>  4.2   |  27  |  <0.5<L>    Ca    7.1<L>      19 Sep 2020 05:00  Phos  2.8     09-19  Mg     2.2     09-19    TPro  3.5<L>  /  Alb  1.8<L>  /  TBili  0.4  /  DBili  0.2  /  AST  18  /  ALT  11  /  AlkPhos  71  09-18      PT/INR - ( 18 Sep 2020 04:00 )   PT: 15.30 sec;   INR: 1.33 ratio         PTT - ( 18 Sep 2020 04:00 )  PTT:45.6 sec          Culture - Blood (collected 17 Sep 2020 20:40)  Source: .Blood Blood-Peripheral  Preliminary Report (19 Sep 2020 04:01):    No growth to date.

## 2020-09-19 NOTE — PROGRESS NOTE ADULT - ASSESSMENT
NEURO:    AAOx3      Acute pain - controlled with Dilaudid PCA increased to 0.3    RESP:     On NC @ 3L    ABG PRN    CXR AM    Solucortef      CARDS:     Hypotension:, Off Sarbjit gtt, On Vasopressin     Tachycardia refractory to Lopressor 2.5mg pushes     Started on  Lopressor 5 q6 IVP standing and Lopressor 2.5 PRN for HR >120    CHF: Continue diuresis    CE neg, 0.04, 0.16, 0.07    EKG: Sinus tach on 9/16 @12pm    ECHO: EF 50-55%, G1DD, mild MR, mild TR      GI/NUTR:     Diet: TPN, with no free water (as overloaded)    LR @ 110cc/hr- IVL     GI ppx: PPI    bowel regimen: Dulcolax supossitory      /RENAL:     Silva - UO 70-100cc/hr    BUN/Cr- 10/0.5    Replete lytes PRN    S/p lasix x2 on 9/18, monitor K     HEME/ONC:     Trend Hgb    DVT Prophylaxis: Fondaparaneux, SCDs    Monitor platelets    R/O HIT, pending abx    S/p 1 unit pRBC 9/18    ID:    Monitor temps    Abx: Lachelle, Caspfungin; switch lachelle to zosyn per ID    -ID following       ENDO:    Hyperglycemic on ISS      LINES/DRAINS:  PIV, NGT , [place], central line, Silva , Midline    DISPO: SICU   NEURO:    AAOx3      Acute pain - controlled with Dilaudid PCA increased to 0.3    RESP:     On NC @ 3L    ABG PRN    CXR AM    Solucortef      CARDS:     Hypotension:, Off Sarbjit gtt, On Vasopressin --discontinue vasopression    Tachycardia refractory to Lopressor 2.5mg pushes     Started on  Lopressor 5 q6 IVP standing and Lopressor 2.5 PRN for HR >120; will attempt to transition to PO metoprolol today    CHF: Continue diuresis    CE neg, 0.04, 0.16, 0.07    EKG: Sinus tach on 9/16 @12pm    ECHO: EF 50-55%, G1DD, mild MR, mild TR      GI/NUTR:     Diet: TPN,---> will reach out to dietary/nutrition to concentrate formula to limit volume     IVL     GI ppx: PPI    bowel regimen: Dulcolax supossitory      /RENAL:     Silva - UO 70-100cc/hr    BUN/Cr- 10/0.5    Replete lytes PRN  40 IV Lasix x1     HEME/ONC:     Trend Hgb    DVT Prophylaxis: Fondaparaneux, SCDs    Monitor platelets    R/O HIT, pending abx    S/p 1 unit pRBC 9/18    ID:    Monitor temps    Abx: Lachelle, Caspfungin; switch lachelle to zosyn per ID    -ID following       ENDO:    Hyperglycemic on ISS      LINES/DRAINS:  PIV, NGT , [place], central line, Silva , Midline    DISPO: SICU

## 2020-09-19 NOTE — PROGRESS NOTE ADULT - ASSESSMENT
IMPRESSION;  Feculent peritonitis secondary to perforated viscus  9/15 : s/p diagnostic lap, NARCISO, SBR (43cm ileum) w/ primary anastamosis (side to side)  RVP NGTD  WBC 10.0  CXR no infiltrates    RECOMMENDATIONS;  Zosyn 4.5 gm iv q6h over 4h  Flagyl 500 mg iv q6h  Caspofungin 50 mg iv q24h

## 2020-09-19 NOTE — PROGRESS NOTE ADULT - ASSESSMENT
A/P: 46 y/o no Pmhx s/p PAULIE/BS on 8/19 for fibroid uterus POD#30, s/p diagnostic laparoscopy converted to exploratory laparotomy and small bowel reanastomosis, with breakdown of staple line of proximal limb of anastomosis, s/p repeat exploratory laparotomy with small bowel resection, reanastomosis and appendectomy, POD#4, tachycardic and hypotensive postop requiring pressors, likely septic shock, transferred to SICU for close hemodynamic monitoring. A/P: 46 y/o no Pmhx s/p PAULIE/BS on 8/19 for fibroid uterus POD#30, s/p diagnostic laparoscopy converted to exploratory laparotomy and small bowel reanastomosis, with breakdown of staple line of proximal limb of anastomosis, s/p repeat exploratory laparotomy with small bowel resection, reanastomosis and appendectomy, POD#4, tachycardic and hypotensive postop requiring pressors, likely septic shock, transferred to SICU for close hemodynamic monitoring.   - Neuro: AAOX3, dilaudid PCA pump for pain control  - CVS: tachycardic, metoprolol PRN, phenylephrine discontinued. On vasopressin only  - Pulm: tachypneic, 99% on 2LNC, CT negative for PE, f/u AM CXR  - GI: NPO, NG tube, TPN started, yesterday dressing changed by gen surg  - : IV fluids @110 cc/hr, monitor UO, trend Cr  - heme/onc: s/p 1U pRBCs, post transfusion Hgb 7.5, plts 50s, HIT ab neg  - Inf: likely septic shock, lactate downtrending, antibiotics changed to zosyn, flagyl and faspofungin per ID, solucortef started  - Lines: Right IJ central line, A line left radial, escalera, midline  - management per SICU team  - gyn to follow    Dr. Orozco aware, will discuss with Dr. Gonzalez A/P: 46 y/o no Pmhx s/p PAULIE/BS on 8/19 for fibroid uterus POD#30, s/p diagnostic laparoscopy converted to exploratory laparotomy and small bowel reanastomosis, with breakdown of staple line of proximal limb of anastomosis, s/p repeat exploratory laparotomy with small bowel resection, reanastomosis and appendectomy, POD#4, tachycardic and hypotensive postop requiring pressors, likely septic shock, transferred to SICU for close hemodynamic monitoring.   - Neuro: AAOX3, dilaudid PCA pump for pain control  - CVS: tachycardic, metoprolol PRN, phenylephrine discontinued. On vasopressin only  - Pulm: tachypneic, 99% on 2LNC, CT negative for PE, f/u AM CXR  - GI: s/p NGT, sips and chips, TPN started, yesterday dressing changed by gen surg  - : IV fluids @110 cc/hr, monitor UO, trend Cr  - heme/onc: s/p 1U pRBCs, post transfusion Hgb 7.5, plts 50s, HIT ab neg  - Inf: likely septic shock, lactate downtrending, antibiotics changed to zosyn, flagyl and caspofungin per ID, solucortef started  - Lines: Right IJ central line, A line left radial, escalera, midline  - management per SICU team  - gyn to follow    Dr. Orozco aware, will discuss with Dr. Gonzalez A/P: 46 y/o no Pmhx s/p PAULIE/BS on 8/19 for fibroid uterus POD#30, s/p diagnostic laparoscopy converted to exploratory laparotomy and small bowel reanastomosis, with breakdown of staple line of proximal limb of anastomosis, s/p repeat exploratory laparotomy with small bowel resection, reanastomosis and appendectomy, POD#4, tachycardic and hypotensive postop requiring pressors, likely septic shock, transferred to SICU for close hemodynamic monitoring.   - Neuro: AAOX3, dilaudid PCA pump for pain control  - CVS: tachycardic, metoprolol PRN, phenylephrine discontinued. On vasopressin only  - Pulm: tachypneic, 99% on 2LNC, CT negative for PE, f/u AM CXR  - GI: s/p NGT, sips and chips, TPN started, yesterday dressing changed by gen surg  - : IV fluids @110 cc/hr, monitor UO, trend Cr  - heme/onc: s/p 1U pRBCs, post transfusion Hgb 7.5, plts 50s, HIT ab neg  - Inf: likely septic shock, lactate downtrending, antibiotics changed to zosyn, flagyl and caspofungin per ID, solucortef started  - Lines: Right IJ central line, A line left radial, escalera, midline  - management per SICU team  - gyn to follow    Discussed with Dr. Orozco and Dr. Molina

## 2020-09-19 NOTE — PROGRESS NOTE ADULT - SUBJECTIVE AND OBJECTIVE BOX
Progress Note: Surgery  Patient: GLORIA SCHAFER , 45y (1975)Female   MRN: [id number]  [facility]  Visit: 09-14-20 Inpatient  Date: 09-19-20 @ 03:54    Procedure/Diagnosis: POD 4 s/p SBR and NARCISO  Events over 24h: Hb 6.7 x 2, given 1 uPRBC with repeat Hb 7.5. Tachycardic to 130s, has been controlled with lopressors PRN.     Vitals: T(F): 98.7 (09-18-20 @ 23:50), Max: 100 (09-18-20 @ 04:00)  HR: 104 (09-19-20 @ 00:00)  BP: 113/72 (09-18-20 @ 14:51) (102/67 - 124/83)  RR: 14 (09-19-20 @ 00:00)  SpO2: 99% (09-19-20 @ 00:00)      Diet: Diet, NPO:   Except Medications (09-18-20 @ 18:33)    IV Fluid: Parenteral Nutrition - Adult 1 Each (85 mL/Hr) TPN Continuous <Continuous>  Parenteral Nutrition - Adult 1 Each (85 mL/Hr) TPN Continuous <Continuous>      In:   09-17-20 @ 07:01  -  09-18-20 @ 07:00  --------------------------------------------------------  IN: 6040.9 mL    09-18-20 @ 07:01  -  09-19-20 @ 03:54  --------------------------------------------------------  IN: 4080.3 mL      Out:   09-17-20 @ 07:01  -  09-18-20 @ 07:00  --------------------------------------------------------  OUT:    Drain (mL): 0 mL    Indwelling Catheter - Urethral (mL): 2605 mL  Total OUT: 2605 mL      09-18-20 @ 07:01  -  09-19-20 @ 03:54  --------------------------------------------------------  OUT:    Drain (mL): 25 mL    Indwelling Catheter - Urethral (mL): 4195 mL  Total OUT: 4220 mL        Net:   09-17-20 @ 07:01  -  09-18-20 @ 07:00  --------------------------------------------------------  NET: 3435.9 mL    09-18-20 @ 07:01  -  09-19-20 @ 03:54  --------------------------------------------------------  NET: -139.7 mL        Physical Examination:  General Appearance: NAD, alert and cooperative  Heart: S1 and S2. No murmurs. Rhythm is regular.  Lungs: Clear to auscultation BL without rales, rhonchi, wheezing, crackles or diminished breath sounds.  Abdomen: Soft, nondistended, yanique-incisional tenderness. No rigidity, guarding, or rebound tenderness.       Medications: [Standing]  caspofungin IVPB      caspofungin IVPB 50 milliGRAM(s) IV Intermittent every 24 hours  chlorhexidine 4% Liquid 1 Application(s) Topical daily  fondaparinux Injectable 5 milliGRAM(s) SubCutaneous daily  hydrocortisone sodium succinate Injectable 50 milliGRAM(s) IV Push every 6 hours  HYDROmorphone PCA (1 mG/mL) 30 milliLiter(s) PCA Continuous PCA Continuous  insulin regular  human corrective regimen sliding scale   IV Push every 4 hours  metoprolol tartrate Injectable 5 milliGRAM(s) IV Push every 6 hours  metroNIDAZOLE  IVPB 500 milliGRAM(s) IV Intermittent every 6 hours  pantoprazole  Injectable 40 milliGRAM(s) IV Push daily  Parenteral Nutrition - Adult 1 Each (85 mL/Hr) TPN Continuous <Continuous>  Parenteral Nutrition - Adult 1 Each (85 mL/Hr) TPN Continuous <Continuous>  piperacillin/tazobactam IVPB.. 4.5 Gram(s) IV Intermittent every 6 hours  vasopressin Infusion 0.04 Unit(s)/Min (2.4 mL/Hr) IV Continuous <Continuous>    Medications:[PRN]  benzocaine 20% Spray 1 Spray(s) Topical every 4 hours PRN  bisacodyl Suppository 10 milliGRAM(s) Rectal daily PRN  metoprolol tartrate Injectable 2.5 milliGRAM(s) IV Push every 6 hours PRN  naloxone Injectable 0.1 milliGRAM(s) IV Push every 3 minutes PRN    Labs:                        7.5    9.77  )-----------( 51       ( 18 Sep 2020 21:52 )             22.3   09-18    139  |  103  |  17  ----------------------------<  125<H>  3.7   |  29  |  <0.5<L>    Ca    6.7<L>      18 Sep 2020 23:50  Phos  2.4     09-18  Mg     2.2     09-18    TPro  3.5<L>  /  Alb  1.8<L>  /  TBili  0.4  /  DBili  0.2  /  AST  18  /  ALT  11  /  AlkPhos  71  09-18  LIVER FUNCTIONS - ( 18 Sep 2020 17:02 )  Alb: 1.8 g/dL / Pro: 3.5 g/dL / ALK PHOS: 71 U/L / ALT: 11 U/L / AST: 18 U/L / GGT: x         PT/INR - ( 18 Sep 2020 04:00 )   PT: 15.30 sec;   INR: 1.33 ratio         PTT - ( 18 Sep 2020 04:00 )  PTT:45.6 secABG - ( 17 Sep 2020 04:48 )  pH: 7.41  /  pCO2: 37    /  pO2: 137   / HCO3: 24    / Base Excess: -0.7  /  SaO2: 100       Micro/Urine:    Imaging:  None/24h

## 2020-09-19 NOTE — PROGRESS NOTE ADULT - ATTENDING COMMENTS
less tachycardic   tolerating clears   will continue to diurese ,gained 10 Kg since Surgery   clears   OOBTC   continue SICU Care . I

## 2020-09-19 NOTE — PROGRESS NOTE ADULT - SUBJECTIVE AND OBJECTIVE BOX
GILMAR, GLORIA  45y, Female    All available historical data reviewed    OVERNIGHT EVENTS:  no fevers  weak  no pressors ( on vaso )  no diarrhea    ROS:  General: Denies rigors, nightsweats  HEENT: Denies headache, rhinorrhea, sore throat, eye pain  CV: Denies CP, palpitations  PULM: Denies wheezing, hemoptysis  GI: Denies hematemesis, hematochezia, melena  : Denies discharge, hematuria  MSK: Denies arthralgias, myalgias  SKIN: Denies rash, lesions  NEURO: Denies paresthesias, weakness  PSYCH: Denies depression, anxiety    VITALS:  T(F): 98, Max: 99.2 (09-18-20 @ 12:00)  HR: 113  BP: 113/72  RR: 18Vital Signs Last 24 Hrs  T(C): 36.7 (19 Sep 2020 08:00), Max: 37.3 (18 Sep 2020 12:00)  T(F): 98 (19 Sep 2020 08:00), Max: 99.2 (18 Sep 2020 12:00)  HR: 113 (19 Sep 2020 09:00) (104 - 124)  BP: 113/72 (18 Sep 2020 14:51) (102/67 - 124/83)  BP(mean): 88 (18 Sep 2020 14:51) (80 - 90)  RR: 18 (19 Sep 2020 09:00) (11 - 31)  SpO2: 98% (19 Sep 2020 09:00) (94% - 99%)    TESTS & MEASUREMENTS:                        7.2    10.02 )-----------( 53       ( 19 Sep 2020 05:00 )             21.9     09-19    137  |  103  |  21<H>  ----------------------------<  174<H>  4.2   |  27  |  <0.5<L>    Ca    7.1<L>      19 Sep 2020 05:00  Phos  2.8     09-19  Mg     2.2     09-19    TPro  3.5<L>  /  Alb  1.8<L>  /  TBili  0.4  /  DBili  0.2  /  AST  18  /  ALT  11  /  AlkPhos  71  09-18    LIVER FUNCTIONS - ( 18 Sep 2020 17:02 )  Alb: 1.8 g/dL / Pro: 3.5 g/dL / ALK PHOS: 71 U/L / ALT: 11 U/L / AST: 18 U/L / GGT: x             Culture - Blood (collected 09-17-20 @ 20:40)  Source: .Blood Blood-Peripheral  Preliminary Report (09-19-20 @ 04:01):    No growth to date.            RADIOLOGY & ADDITIONAL TESTS:  Personal review of radiological diagnostics performed  Echo and EKG results noted when applicable.     MEDICATIONS:  acetaZOLAMIDE  IVPB 500 milliGRAM(s) IV Intermittent once  benzocaine 20% Spray 1 Spray(s) Topical every 4 hours PRN  bisacodyl Suppository 10 milliGRAM(s) Rectal daily PRN  caspofungin IVPB 50 milliGRAM(s) IV Intermittent every 24 hours  caspofungin IVPB      chlorhexidine 4% Liquid 1 Application(s) Topical daily  heparin   Injectable 5000 Unit(s) SubCutaneous every 8 hours  hydrocortisone sodium succinate Injectable 50 milliGRAM(s) IV Push every 6 hours  HYDROmorphone PCA (1 mG/mL) 30 milliLiter(s) PCA Continuous PCA Continuous  insulin regular  human corrective regimen sliding scale   IV Push every 4 hours  metoprolol tartrate Injectable 5 milliGRAM(s) IV Push every 6 hours  metoprolol tartrate Injectable 2.5 milliGRAM(s) IV Push every 6 hours PRN  metroNIDAZOLE  IVPB 500 milliGRAM(s) IV Intermittent every 6 hours  naloxone Injectable 0.1 milliGRAM(s) IV Push every 3 minutes PRN  pantoprazole  Injectable 40 milliGRAM(s) IV Push daily  Parenteral Nutrition - Adult 1 Each TPN Continuous <Continuous>  Parenteral Nutrition - Adult 1 Each TPN Continuous <Continuous>  piperacillin/tazobactam IVPB.. 4.5 Gram(s) IV Intermittent every 6 hours      ANTIBIOTICS:  caspofungin IVPB 50 milliGRAM(s) IV Intermittent every 24 hours  caspofungin IVPB      metroNIDAZOLE  IVPB 500 milliGRAM(s) IV Intermittent every 6 hours  piperacillin/tazobactam IVPB.. 4.5 Gram(s) IV Intermittent every 6 hours

## 2020-09-20 LAB
ANION GAP SERPL CALC-SCNC: 10 MMOL/L — SIGNIFICANT CHANGE UP (ref 7–14)
ANION GAP SERPL CALC-SCNC: 8 MMOL/L — SIGNIFICANT CHANGE UP (ref 7–14)
ANION GAP SERPL CALC-SCNC: 9 MMOL/L — SIGNIFICANT CHANGE UP (ref 7–14)
ANION GAP SERPL CALC-SCNC: 9 MMOL/L — SIGNIFICANT CHANGE UP (ref 7–14)
APTT BLD: 34 SEC — SIGNIFICANT CHANGE UP (ref 27–39.2)
BLD GP AB SCN SERPL QL: SIGNIFICANT CHANGE UP
BUN SERPL-MCNC: 12 MG/DL — SIGNIFICANT CHANGE UP (ref 10–20)
BUN SERPL-MCNC: 13 MG/DL — SIGNIFICANT CHANGE UP (ref 10–20)
BUN SERPL-MCNC: 16 MG/DL — SIGNIFICANT CHANGE UP (ref 10–20)
BUN SERPL-MCNC: 19 MG/DL — SIGNIFICANT CHANGE UP (ref 10–20)
CALCIUM SERPL-MCNC: 6.9 MG/DL — LOW (ref 8.5–10.1)
CALCIUM SERPL-MCNC: 7.2 MG/DL — LOW (ref 8.5–10.1)
CALCIUM SERPL-MCNC: 7.3 MG/DL — LOW (ref 8.5–10.1)
CALCIUM SERPL-MCNC: 7.4 MG/DL — LOW (ref 8.5–10.1)
CHLORIDE SERPL-SCNC: 105 MMOL/L — SIGNIFICANT CHANGE UP (ref 98–110)
CHLORIDE SERPL-SCNC: 107 MMOL/L — SIGNIFICANT CHANGE UP (ref 98–110)
CHLORIDE SERPL-SCNC: 111 MMOL/L — HIGH (ref 98–110)
CHLORIDE SERPL-SCNC: 112 MMOL/L — HIGH (ref 98–110)
CO2 SERPL-SCNC: 24 MMOL/L — SIGNIFICANT CHANGE UP (ref 17–32)
CO2 SERPL-SCNC: 25 MMOL/L — SIGNIFICANT CHANGE UP (ref 17–32)
CREAT SERPL-MCNC: 0.5 MG/DL — LOW (ref 0.7–1.5)
CREAT SERPL-MCNC: <0.5 MG/DL — LOW (ref 0.7–1.5)
GLUCOSE BLDC GLUCOMTR-MCNC: 149 MG/DL — HIGH (ref 70–99)
GLUCOSE BLDC GLUCOMTR-MCNC: 156 MG/DL — HIGH (ref 70–99)
GLUCOSE BLDC GLUCOMTR-MCNC: 157 MG/DL — HIGH (ref 70–99)
GLUCOSE BLDC GLUCOMTR-MCNC: 166 MG/DL — HIGH (ref 70–99)
GLUCOSE BLDC GLUCOMTR-MCNC: 175 MG/DL — HIGH (ref 70–99)
GLUCOSE BLDC GLUCOMTR-MCNC: 206 MG/DL — HIGH (ref 70–99)
GLUCOSE SERPL-MCNC: 120 MG/DL — HIGH (ref 70–99)
GLUCOSE SERPL-MCNC: 169 MG/DL — HIGH (ref 70–99)
GLUCOSE SERPL-MCNC: 189 MG/DL — HIGH (ref 70–99)
GLUCOSE SERPL-MCNC: 191 MG/DL — HIGH (ref 70–99)
HCT VFR BLD CALC: 25 % — LOW (ref 37–47)
HGB BLD-MCNC: 8.2 G/DL — LOW (ref 12–16)
INR BLD: 1.5 RATIO — HIGH (ref 0.65–1.3)
MAGNESIUM SERPL-MCNC: 1.9 MG/DL — SIGNIFICANT CHANGE UP (ref 1.8–2.4)
MAGNESIUM SERPL-MCNC: 2 MG/DL — SIGNIFICANT CHANGE UP (ref 1.8–2.4)
MCHC RBC-ENTMCNC: 27.9 PG — SIGNIFICANT CHANGE UP (ref 27–31)
MCHC RBC-ENTMCNC: 32.8 G/DL — SIGNIFICANT CHANGE UP (ref 32–37)
MCV RBC AUTO: 85 FL — SIGNIFICANT CHANGE UP (ref 81–99)
NRBC # BLD: 0 /100 WBCS — SIGNIFICANT CHANGE UP (ref 0–0)
NT-PROBNP SERPL-SCNC: 2013 PG/ML — HIGH (ref 0–300)
PHOSPHATE SERPL-MCNC: 1.8 MG/DL — LOW (ref 2.1–4.9)
PHOSPHATE SERPL-MCNC: 2.1 MG/DL — SIGNIFICANT CHANGE UP (ref 2.1–4.9)
PHOSPHATE SERPL-MCNC: 2.5 MG/DL — SIGNIFICANT CHANGE UP (ref 2.1–4.9)
PHOSPHATE SERPL-MCNC: 2.8 MG/DL — SIGNIFICANT CHANGE UP (ref 2.1–4.9)
PLATELET # BLD AUTO: 74 K/UL — LOW (ref 130–400)
POTASSIUM SERPL-MCNC: 2.8 MMOL/L — LOW (ref 3.5–5)
POTASSIUM SERPL-MCNC: 3.3 MMOL/L — LOW (ref 3.5–5)
POTASSIUM SERPL-MCNC: 3.4 MMOL/L — LOW (ref 3.5–5)
POTASSIUM SERPL-MCNC: 3.5 MMOL/L — SIGNIFICANT CHANGE UP (ref 3.5–5)
POTASSIUM SERPL-SCNC: 2.8 MMOL/L — LOW (ref 3.5–5)
POTASSIUM SERPL-SCNC: 3.3 MMOL/L — LOW (ref 3.5–5)
POTASSIUM SERPL-SCNC: 3.4 MMOL/L — LOW (ref 3.5–5)
POTASSIUM SERPL-SCNC: 3.5 MMOL/L — SIGNIFICANT CHANGE UP (ref 3.5–5)
PROTHROM AB SERPL-ACNC: 17.3 SEC — HIGH (ref 9.95–12.87)
RBC # BLD: 2.94 M/UL — LOW (ref 4.2–5.4)
RBC # FLD: 15.1 % — HIGH (ref 11.5–14.5)
SODIUM SERPL-SCNC: 138 MMOL/L — SIGNIFICANT CHANGE UP (ref 135–146)
SODIUM SERPL-SCNC: 141 MMOL/L — SIGNIFICANT CHANGE UP (ref 135–146)
SODIUM SERPL-SCNC: 145 MMOL/L — SIGNIFICANT CHANGE UP (ref 135–146)
SODIUM SERPL-SCNC: 146 MMOL/L — SIGNIFICANT CHANGE UP (ref 135–146)
WBC # BLD: 9.13 K/UL — SIGNIFICANT CHANGE UP (ref 4.8–10.8)
WBC # FLD AUTO: 9.13 K/UL — SIGNIFICANT CHANGE UP (ref 4.8–10.8)

## 2020-09-20 PROCEDURE — 93010 ELECTROCARDIOGRAM REPORT: CPT

## 2020-09-20 PROCEDURE — 99024 POSTOP FOLLOW-UP VISIT: CPT

## 2020-09-20 PROCEDURE — 71045 X-RAY EXAM CHEST 1 VIEW: CPT | Mod: 26

## 2020-09-20 RX ORDER — POTASSIUM CHLORIDE 20 MEQ
20 PACKET (EA) ORAL ONCE
Refills: 0 | Status: COMPLETED | OUTPATIENT
Start: 2020-09-20 | End: 2020-09-20

## 2020-09-20 RX ORDER — MAGNESIUM SULFATE 500 MG/ML
1 VIAL (ML) INJECTION ONCE
Refills: 0 | Status: COMPLETED | OUTPATIENT
Start: 2020-09-20 | End: 2020-09-20

## 2020-09-20 RX ORDER — POTASSIUM PHOSPHATE, MONOBASIC POTASSIUM PHOSPHATE, DIBASIC 236; 224 MG/ML; MG/ML
30 INJECTION, SOLUTION INTRAVENOUS ONCE
Refills: 0 | Status: COMPLETED | OUTPATIENT
Start: 2020-09-20 | End: 2020-09-20

## 2020-09-20 RX ORDER — POTASSIUM PHOSPHATE, MONOBASIC POTASSIUM PHOSPHATE, DIBASIC 236; 224 MG/ML; MG/ML
15 INJECTION, SOLUTION INTRAVENOUS ONCE
Refills: 0 | Status: DISCONTINUED | OUTPATIENT
Start: 2020-09-20 | End: 2020-09-20

## 2020-09-20 RX ORDER — POTASSIUM PHOSPHATE, MONOBASIC POTASSIUM PHOSPHATE, DIBASIC 236; 224 MG/ML; MG/ML
15 INJECTION, SOLUTION INTRAVENOUS ONCE
Refills: 0 | Status: COMPLETED | OUTPATIENT
Start: 2020-09-20 | End: 2020-09-20

## 2020-09-20 RX ORDER — METOPROLOL TARTRATE 50 MG
12.5 TABLET ORAL EVERY 8 HOURS
Refills: 0 | Status: DISCONTINUED | OUTPATIENT
Start: 2020-09-20 | End: 2020-09-21

## 2020-09-20 RX ORDER — LABETALOL HCL 100 MG
10 TABLET ORAL ONCE
Refills: 0 | Status: COMPLETED | OUTPATIENT
Start: 2020-09-20 | End: 2020-09-20

## 2020-09-20 RX ORDER — FUROSEMIDE 40 MG
20 TABLET ORAL DAILY
Refills: 0 | Status: DISCONTINUED | OUTPATIENT
Start: 2020-09-20 | End: 2020-09-23

## 2020-09-20 RX ORDER — MAGNESIUM SULFATE 500 MG/ML
2 VIAL (ML) INJECTION ONCE
Refills: 0 | Status: COMPLETED | OUTPATIENT
Start: 2020-09-20 | End: 2020-09-20

## 2020-09-20 RX ORDER — POTASSIUM CHLORIDE 20 MEQ
20 PACKET (EA) ORAL
Refills: 0 | Status: COMPLETED | OUTPATIENT
Start: 2020-09-20 | End: 2020-09-20

## 2020-09-20 RX ADMIN — INSULIN HUMAN 6: 100 INJECTION, SOLUTION SUBCUTANEOUS at 17:03

## 2020-09-20 RX ADMIN — Medication 10 MILLIGRAM(S): at 02:02

## 2020-09-20 RX ADMIN — Medication 50 MILLIEQUIVALENT(S): at 13:11

## 2020-09-20 RX ADMIN — Medication 12.5 MILLIGRAM(S): at 06:34

## 2020-09-20 RX ADMIN — HEPARIN SODIUM 5000 UNIT(S): 5000 INJECTION INTRAVENOUS; SUBCUTANEOUS at 05:51

## 2020-09-20 RX ADMIN — POTASSIUM PHOSPHATE, MONOBASIC POTASSIUM PHOSPHATE, DIBASIC 83.33 MILLIMOLE(S): 236; 224 INJECTION, SOLUTION INTRAVENOUS at 01:35

## 2020-09-20 RX ADMIN — INSULIN HUMAN 12: 100 INJECTION, SOLUTION SUBCUTANEOUS at 11:04

## 2020-09-20 RX ADMIN — Medication 50 MILLIEQUIVALENT(S): at 01:34

## 2020-09-20 RX ADMIN — Medication 50 GRAM(S): at 07:53

## 2020-09-20 RX ADMIN — HEPARIN SODIUM 5000 UNIT(S): 5000 INJECTION INTRAVENOUS; SUBCUTANEOUS at 13:12

## 2020-09-20 RX ADMIN — Medication 12.5 MILLIGRAM(S): at 21:54

## 2020-09-20 RX ADMIN — Medication 100 MILLIEQUIVALENT(S): at 20:32

## 2020-09-20 RX ADMIN — Medication 12.5 MILLIGRAM(S): at 13:13

## 2020-09-20 RX ADMIN — Medication 100 MILLIEQUIVALENT(S): at 19:18

## 2020-09-20 RX ADMIN — INSULIN HUMAN 3: 100 INJECTION, SOLUTION SUBCUTANEOUS at 13:15

## 2020-09-20 RX ADMIN — Medication 20 MILLIGRAM(S): at 11:04

## 2020-09-20 RX ADMIN — INSULIN HUMAN 3: 100 INJECTION, SOLUTION SUBCUTANEOUS at 05:48

## 2020-09-20 RX ADMIN — CHLORHEXIDINE GLUCONATE 1 APPLICATION(S): 213 SOLUTION TOPICAL at 11:10

## 2020-09-20 RX ADMIN — INSULIN HUMAN 3: 100 INJECTION, SOLUTION SUBCUTANEOUS at 02:17

## 2020-09-20 RX ADMIN — Medication 100 GRAM(S): at 19:18

## 2020-09-20 RX ADMIN — CASPOFUNGIN ACETATE 50 MILLIGRAM(S): 7 INJECTION, POWDER, LYOPHILIZED, FOR SOLUTION INTRAVENOUS at 17:04

## 2020-09-20 RX ADMIN — I.V. FAT EMULSION 31.3 GM/KG/DAY: 20 EMULSION INTRAVENOUS at 21:53

## 2020-09-20 RX ADMIN — Medication 50 MILLIEQUIVALENT(S): at 13:17

## 2020-09-20 RX ADMIN — INSULIN HUMAN 6: 100 INJECTION, SOLUTION SUBCUTANEOUS at 22:10

## 2020-09-20 RX ADMIN — POTASSIUM PHOSPHATE, MONOBASIC POTASSIUM PHOSPHATE, DIBASIC 63.75 MILLIMOLE(S): 236; 224 INJECTION, SOLUTION INTRAVENOUS at 22:11

## 2020-09-20 RX ADMIN — PANTOPRAZOLE SODIUM 40 MILLIGRAM(S): 20 TABLET, DELAYED RELEASE ORAL at 11:05

## 2020-09-20 RX ADMIN — HEPARIN SODIUM 5000 UNIT(S): 5000 INJECTION INTRAVENOUS; SUBCUTANEOUS at 21:54

## 2020-09-20 RX ADMIN — Medication 1 EACH: at 21:53

## 2020-09-20 RX ADMIN — Medication 100 MILLIEQUIVALENT(S): at 21:15

## 2020-09-20 NOTE — PROGRESS NOTE ADULT - ATTENDING COMMENTS
44yo female with PSHx PAULIE/BSO 8/19/2020 for fibroids admitted for persistent small bowel obstruction   s/p laparoscopic NARCISO, laparotomy, extensive NARCISO, small bowel resection with primary anastomosis 9/15   complicated by small bowel leak s/p re-exploration, wash out, small bowel resection and primary anastomosis 9/15.   Under ICU care at this time. T  achycardia improving to 110's, off vasopressin.   Extubated and breathing independently.   Was complaining of shortness of breath yesterday, but feels better today.   On nasal cannula O2.   Complains of abdominal pain but improving, NG tube removed.  Passed flatus and No bowel function.   may start sips of clears  Pain control. Monitor bowel function, trend labs, continue antibiotics.   Encourage ambulation/OOB, incentive spirometer, DVT ppx.   Primary care as per ICU.

## 2020-09-20 NOTE — PROGRESS NOTE ADULT - SUBJECTIVE AND OBJECTIVE BOX
GENERAL SURGERY PROGRESS NOTE     GLORIA SCHAFER  45y  Female  Hospital day :6d  POD:  Procedure: Insertion, arterial line, percutaneous    Washing out stomach contents    Small bowel resection with anastomosis    Lysis of intestinal adhesions    Diagnostic laparoscopy      OVERNIGHT EVENTS: no acute overnight events     T(F): 99.3 (09-20-20 @ 00:00), Max: 99.3 (09-19-20 @ 15:27)  HR: 112 (09-20-20 @ 02:00) (106 - 118)  BP: --  ABP: 160/77 (09-20-20 @ 02:00) (123/75 - 160/77)  ABP(mean): 106 (09-20-20 @ 02:00) (96 - 110)  RR: 15 (09-20-20 @ 02:00) (13 - 23)  SpO2: 96% (09-20-20 @ 02:00) (94% - 99%)    DIET/FLUIDS: fat emulsion (Fish Oil and Plant Based) 20% Infusion 1.407 Gm/kG/Day IV Continuous <Continuous>  Parenteral Nutrition - Adult 1 Each TPN Continuous <Continuous>  Parenteral Nutrition - Adult 1 Each TPN Continuous <Continuous>    NG:                                                                                DRAINS:   09-18-20 @ 07:01  -  09-19-20 @ 07:00  --------------------------------------------------------  OUT: 25 mL    URINE:   09-18-20 @ 07:01  -  09-19-20 @ 07:00  --------------------------------------------------------  OUT: 4375 mL       GI proph:  pantoprazole  Injectable 40 milliGRAM(s) IV Push daily    AC/ proph: heparin   Injectable 5000 Unit(s) SubCutaneous every 8 hours    ABx: caspofungin IVPB 50 milliGRAM(s) IV Intermittent every 24 hours  caspofungin IVPB      metroNIDAZOLE  IVPB 500 milliGRAM(s) IV Intermittent every 6 hours  piperacillin/tazobactam IVPB.. 4.5 Gram(s) IV Intermittent every 6 hours      PHYSICAL EXAM:  GENERAL: NAD, well-appearing  CHEST/LUNG: Clear to auscultation bilaterally  HEART: Regular rate and rhythm  ABDOMEN: Soft, Nontender, Nondistended;   EXTREMITIES:  No clubbing, cyanosis, or edema      LABS  Labs:  CAPILLARY BLOOD GLUCOSE      POCT Blood Glucose.: 149 mg/dL (20 Sep 2020 02:06)  POCT Blood Glucose.: 165 mg/dL (19 Sep 2020 22:02)  POCT Blood Glucose.: 135 mg/dL (19 Sep 2020 18:00)  POCT Blood Glucose.: 173 mg/dL (19 Sep 2020 14:07)  POCT Blood Glucose.: 192 mg/dL (19 Sep 2020 10:11)  POCT Blood Glucose.: 174 mg/dL (19 Sep 2020 05:53)                          8.3    13.08 )-----------( 74       ( 19 Sep 2020 17:19 )             25.4         09-19    145  |  111<H>  |  19  ----------------------------<  120<H>  3.5   |  25  |  0.5<L>      Calcium, Total Serum: 7.4 mg/dL (09-19-20 @ 23:18)      LFTs:             3.5  | 0.4  | 18       ------------------[71      ( 18 Sep 2020 17:02 )  1.8  | 0.2  | 11          Lipase:x      Amylase:x         Lactate, Blood: 1.3 mmol/L (09-18-20 @ 23:50)  Lactate, Blood: 2.3 mmol/L (09-18-20 @ 04:30)  Lactate, Blood: 3.0 mmol/L (09-17-20 @ 16:30)  Lactate, Blood: 2.3 mmol/L (09-17-20 @ 10:20)  Blood Gas Arterial, Lactate: 2.3 mmoL/L (09-17-20 @ 04:48)  Lactate, Blood: 3.0 mmol/L (09-17-20 @ 04:20)    ABG - ( 17 Sep 2020 04:48 )  pH: 7.41  /  pCO2: 37    /  pO2: 137   / HCO3: 24    / Base Excess: -0.7  /  SaO2: 100             ABG - ( 16 Sep 2020 13:35 )  pH: 7.43  /  pCO2: 30    /  pO2: 139   / HCO3: 20    / Base Excess: -3.4  /  SaO2: 99              ABG - ( 16 Sep 2020 10:10 )  pH: 7.44  /  pCO2: 31    /  pO2: 122   / HCO3: 21    / Base Excess: -2.9  /  SaO2: 99                Coags:     15.30  ----< 1.33    ( 18 Sep 2020 04:00 )     45.6            Serum Pro-Brain Natriuretic Peptide: 1595 pg/mL (09-18-20 @ 23:50)  Serum Pro-Brain Natriuretic Peptide: 1557 pg/mL (09-18-20 @ 00:00)  Serum Pro-Brain Natriuretic Peptide: 4550 pg/mL (09-16-20 @ 23:30)          Culture - Blood (collected 17 Sep 2020 20:40)  Source: .Blood Blood-Peripheral  Preliminary Report (19 Sep 2020 04:01):    No growth to date.          RADIOLOGY & ADDITIONAL TESTS:    no new images

## 2020-09-20 NOTE — PROGRESS NOTE ADULT - ASSESSMENT
Assessment:  45y Female patient admitted POD 3 s/p SB resection with NARCISO , with the above physical exam, labs, and imaging findings.    Plan:  - monitor for bowel function  - trend H&H  - daily dressing changes  - GYN following, appreciate recommendations  - ID following, appreciate recommendations  -Pain control as needed  -Hemodynamic monitoring as per routine  -Encourage ambulation and incentive spirometer use (10x/hr when awake)  -GI and DVT prophylaxis  -Check and replete CBC and BMP q daily  -Strict input and output monitoring  -Continue current management

## 2020-09-20 NOTE — PROGRESS NOTE ADULT - SUBJECTIVE AND OBJECTIVE BOX
GLORIA SCHAFER  [id number]  45y Female    Indication for ICU admission: intraabdominal sepsis, s/p diagnostic lap, NARCISO, SBR (43cm ileum) w/ primary anastamosis (side to side)  Admit Date:09-14-20  ICU Date: 9/15/2020  OR Date: 9/15/2020 x2    No Known Allergies    PAST MEDICAL & SURGICAL HISTORY:  Uterine fibroid    Status post bilateral salpingectomy    S/P PAULIE (total abdominal hysterectomy)      Home Medications:  ibuprofen 600 mg oral tablet: 1 tab(s) orally every 6 hours, As needed, Mild Pain (1 - 3) (01 Sep 2020 06:26)        24HRS EVENT:    Overnight: 30 mmoles k-phos, 1 K-rider    NEURO:    AAOx3      Acute pain - controlled with Dilaudid PCA     RESP:     On NC @ 2L    ABG PRN    AM CXR    CT Chest (PE protocol) 9/17- negative for PE; small-moderate b/l pleural effusions with compressive atelectasis      CARDS:     Hypotension Resolved- Off all pressors    Acute HTN & tachycardia - switched from IV to PO Metoprolol 12.5 q12h    CE neg, 0.04, 0.16, 0.07 - no longer trending    ECHO: EF 50-55%, G1DD, mild MR, mild TR    Pending 23:30 BNP      GI/NUTR:     CT A/P (9/17) - negative for leak; postsurgical changes with post-op ileus    Diet: NPO w sips and chips, TPN  @ 50cc/hr (no free water seen on order)    Hepatic panel 9/18: WNL    IVL    GI ppx: PPI    Dulcolax suppository       /RENAL:     Silva - -250 cc/hr    (9/19) Lasix 40 IV & Diamox 500 --> good response, 5.2L+    BUN/Cr-  19/0.5 (at baseline)     Elytes: Lytes-Na 145 // K 3.5 // Phos 2.1 (repleted) //  Mag 2.0    Lactate 1.3 (max 6.5) (no longer trending)    HEME/ONC:     Hgb ***    Transfused 1 uPRBC (split 1/2 and 1/2 on 9/18)    Plt ***    INR 2.5 > 1.7 >1.3    HIT antibody: negative    DVT Prophylaxis: HSQ, SCDs    ID:    Afebrile    WBC ***    Abx: Caspfungin, Zosyn, Flagyl    -ID following    BCx 9/17- (NGTD)     ENDO:    Hyperglycemic - on ISS    Solucortef 50 q6 to be d/c'd 9/20      LINES/DRAINS:  PIV , Jessica, central line, Silva         DVT Prophylaxis: heparin   Injectable 5000 Unit(s) SubCutaneous every 8 hours  GI Prophylaxis:pantoprazole  Injectable 40 milliGRAM(s) IV Push daily      ***Tubes/Lines/Drains  ***  Peripheral IV  NG Tube  Urinary Catheter		Indication: Strict I&O      R. Basilic Midline                      Date: 9/17      [X] A ten-point review of systems was otherwise negative except as noted above.  [  ] Due to altered mental status/intubation, subjective information was not attained from the patient. History was obtained, to the extent possible, from review of the chart and collateral sources of information.     GLORIA SCHAFER  [id number]  45y Female    Indication for ICU admission: intraabdominal sepsis, s/p diagnostic lap, NARCISO, SBR (43cm ileum) w/ primary anastamosis (side to side)  Admit Date:09-14-20  ICU Date: 9/15/2020  OR Date: 9/15/2020 x2    No Known Allergies    PAST MEDICAL & SURGICAL HISTORY:  Uterine fibroid    Status post bilateral salpingectomy    S/P PAULIE (total abdominal hysterectomy)      Home Medications:  ibuprofen 600 mg oral tablet: 1 tab(s) orally every 6 hours, As needed, Mild Pain (1 - 3) (01 Sep 2020 06:26)        24HRS EVENT:    Overnight: 30 mmoles k-phos, 1 K-rider    NEURO:    AAOx3      Acute pain - controlled with Dilaudid PCA     RESP:     On NC @ 2L    ABG PRN    AM CXR    CT Chest (PE protocol) 9/17- negative for PE; small-moderate b/l pleural effusions with compressive atelectasis      CARDS:     Hypotension Resolved- Off all pressors    Acute HTN & tachycardia - switched from IV to PO Metoprolol 12.5 q12h    CE neg, 0.04, 0.16, 0.07 - no longer trending    ECHO: EF 50-55%, G1DD, mild MR, mild TR    Pending 23:30 BNP      GI/NUTR:     CT A/P (9/17) - negative for leak; postsurgical changes with post-op ileus    Diet: NPO w sips and chips, TPN  @ 50cc/hr (no free water seen on order)    Hepatic panel 9/18: WNL    IVL    GI ppx: PPI    Dulcolax suppository       /RENAL:     Silva - -250 cc/hr    (9/19) Lasix 40 IV & Diamox 500 --> good response, 5.2L+    BUN/Cr-  19/0.5 (at baseline)     Elytes: Lytes-Na 145 // K 3.5 // Phos 2.1 (repleted) //  Mag 2.0    Lactate 1.3 (max 6.5) (no longer trending)    HEME/ONC:     Hgb 8.3 (9/19 17:00)    Transfused 1 uPRBC (split 1/2 and 1/2 on 9/18)    Plt 74 (9/19 17:00)    INR 2.5 > 1.7 >1.3    HIT antibody: negative    DVT Prophylaxis: HSQ, SCDs    ID:    Afebrile    WBC 13.08 (9/19 17:00)    Abx: Caspfungin, Zosyn, Flagyl    -ID following    BCx 9/17- (NGTD)     ENDO:    Hyperglycemic - on ISS    Solucortef 50 q6 to be d/c'd 9/20      LINES/DRAINS:  PIV , Jessica, central line, Silva         DVT Prophylaxis: heparin   Injectable 5000 Unit(s) SubCutaneous every 8 hours  GI Prophylaxis:pantoprazole  Injectable 40 milliGRAM(s) IV Push daily      ***Tubes/Lines/Drains  ***  Peripheral IV  NG Tube  Urinary Catheter		Indication: Strict I&O      R. Basilic Midline                      Date: 9/17      [X] A ten-point review of systems was otherwise negative except as noted above.  [  ] Due to altered mental status/intubation, subjective information was not attained from the patient. History was obtained, to the extent possible, from review of the chart and collateral sources of information.     GLORIA SCHAFER  [id number]  45y Female    Indication for ICU admission: intraabdominal sepsis, s/p diagnostic lap, NARCISO, SBR (43cm ileum) w/ primary anastamosis (side to side)  Admit Date:09-14-20  ICU Date: 9/15/2020  OR Date: 9/15/2020 x2    No Known Allergies    PAST MEDICAL & SURGICAL HISTORY:  Uterine fibroid    Status post bilateral salpingectomy    S/P PAULIE (total abdominal hysterectomy)      Home Medications:  ibuprofen 600 mg oral tablet: 1 tab(s) orally every 6 hours, As needed, Mild Pain (1 - 3) (01 Sep 2020 06:26)        24HRS EVENT:    Overnight: 30 mmoles k-phos, 1 K-rider. On 4:30 labs, K still 3.3 --> 3 more K-riders ordered.     NEURO:    AAOx3      Acute pain - controlled with Dilaudid PCA     RESP:     On NC @ 2L    ABG PRN    AM CXR    CT Chest (PE protocol) 9/17- negative for PE; small-moderate b/l pleural effusions with compressive atelectasis      CARDS:     Hypotension Resolved- Off all pressors    Acute HTN & tachycardia - switched from IV to PO Metoprolol 12.5 q12h    CE neg, 0.04, 0.16, 0.07 - no longer trending    ECHO: EF 50-55%, G1DD, mild MR, mild TR    Pending 23:30 BNP      GI/NUTR:     CT A/P (9/17) - negative for leak; postsurgical changes with post-op ileus    Diet: NPO w sips and chips, TPN  @ 50cc/hr (no free water seen on order)    Hepatic panel 9/18: WNL    IVL    GI ppx: PPI    Dulcolax suppository       /RENAL:     Silva - -250 cc/hr    (9/19) Lasix 40 IV & Diamox 500 --> good response, 5.2L+    BUN/Cr-  16/<0.5 (at baseline)     Elytes: Lytes-Na 146 // K 3.3 // Phos 1.9 //  Mag 2.8    Lactate 1.3 (max 6.5) (no longer trending)    HEME/ONC:     Hgb 8.2    Transfused 1 uPRBC (split 1/2 and 1/2 on 9/18)    Plt 74    INR 2.5 > 1.7 >1.3    HIT antibody: negative    DVT Prophylaxis: HSQ, SCDs    ID:    Afebrile    WBC 9.13     Abx: Caspfungin, Zosyn, Flagyl    -ID following    BCx 9/17- (NGTD)     ENDO:    Hyperglycemic - on ISS    Solucortef 50 q6 to be d/c'd 9/20      LINES/DRAINS:  PIV , Jessica, central line, Silva         DVT Prophylaxis: heparin   Injectable 5000 Unit(s) SubCutaneous every 8 hours  GI Prophylaxis:pantoprazole  Injectable 40 milliGRAM(s) IV Push daily      ***Tubes/Lines/Drains  ***  Peripheral IV  NG Tube  Urinary Catheter		Indication: Strict I&O      R. Basilic Midline                      Date: 9/17      [X] A ten-point review of systems was otherwise negative except as noted above.  [  ] Due to altered mental status/intubation, subjective information was not attained from the patient. History was obtained, to the extent possible, from review of the chart and collateral sources of information.

## 2020-09-20 NOTE — PROGRESS NOTE ADULT - ASSESSMENT
24HRS EVENT:    Overnight: 30 mmoles k-phos, 1 K-rider    NEURO:    AAOx3      Acute pain - controlled with Dilaudid PCA     RESP:     On NC @ 2L    ABG PRN    AM CXR    CT Chest (PE protocol) 9/17- negative for PE; small-moderate b/l pleural effusions with compressive atelectasis      CARDS:     Hypotension Resolved- Off all pressors    Acute HTN & tachycardia - switched from IV to PO Metoprolol 12.5 q12h    CE neg, 0.04, 0.16, 0.07 - no longer trending    ECHO: EF 50-55%, G1DD, mild MR, mild TR    Pending 23:30 BNP      GI/NUTR:     CT A/P (9/17) - negative for leak; postsurgical changes with post-op ileus    Diet: NPO w sips and chips, TPN  @ 50cc/hr (no free water seen on order)    Hepatic panel 9/18: WNL    IVL    GI ppx: PPI    Dulcolax suppository       /RENAL:     Silva - -250 cc/hr    (9/19) Lasix 40 IV & Diamox 500 --> good response, 5.2L+    BUN/Cr-  19/0.5 (at baseline)     Elytes: Lytes-Na 145 // K 3.5 // Phos 2.1 (repleted) //  Mag 2.0    Lactate 1.3 (max 6.5) (no longer trending)    HEME/ONC:     Hgb ***    Transfused 1 uPRBC (split 1/2 and 1/2 on 9/18)    Plt ***    INR 2.5 > 1.7 >1.3    HIT antibody: negative    DVT Prophylaxis: HSQ, SCDs    ID:    Afebrile    WBC ***    Abx: Caspfungin, Zosyn, Flagyl    -ID following    BCx 9/17- (NGTD)     ENDO:    Hyperglycemic - on ISS    Solucortef 50 q6 to be d/c'd 9/20      LINES/DRAINS:  PIV , [place], central line, Silva         DVT Prophylaxis: heparin   Injectable 5000 Unit(s) SubCutaneous every 8 hours  GI Prophylaxis:pantoprazole  Injectable 40 milliGRAM(s) IV Push daily      ***Tubes/Lines/Drains  ***  Peripheral IV  NG Tube  Urinary Catheter		Indication: Strict I&O      R. Basilic Midline                      Date: 9/17   NEURO:    AAOx3      Acute pain - controlled with Dilaudid PCA     RESP:     On NC @ 2L    ABG PRN    AM CXR    CT Chest (PE protocol) 9/17- negative for PE; small-moderate b/l pleural effusions with compressive atelectasis      CARDS:     Hypotension Resolved- Off all pressors    Acute HTN & tachycardia - switched from IV to PO Metoprolol 12.5 q12h    CE neg, 0.04, 0.16, 0.07 - no longer trending    ECHO: EF 50-55%, G1DD, mild MR, mild TR    Pending 23:30 BNP      GI/NUTR:     CT A/P (9/17) - negative for leak; postsurgical changes with post-op ileus    Diet: NPO w sips and chips, TPN  @ 50cc/hr (no free water seen on order)    Hepatic panel 9/18: WNL    IVL    GI ppx: PPI    Dulcolax suppository       /RENAL:     Silva - -250 cc/hr    (9/19) Lasix 40 IV & Diamox 500 --> good response, 5.2L+    BUN/Cr-  19/0.5 (at baseline)     Elytes: Lytes-Na 145 // K 3.5 // Phos 2.1 (repleted) //  Mag 2.0    Lactate 1.3 (max 6.5) (no longer trending)    HEME/ONC:     Hgb 8.3 (9/19 17:00)    Transfused 1 uPRBC (split 1/2 and 1/2 on 9/18)    Plt 74 (9/19 17:00)    INR 2.5 > 1.7 >1.3    HIT antibody: negative    DVT Prophylaxis: HSQ, SCDs    ID:    Afebrile    WBC 13.08 (9/19 17:00)    Abx: Caspfungin, Zosyn, Flagyl    -ID following    BCx 9/17- (NGTD)     ENDO:    Hyperglycemic - on ISS    Solucortef 50 q6 to be d/c'd 9/20      LINES/DRAINS:  PIV , [place], central line, Silva         DVT Prophylaxis: heparin   Injectable 5000 Unit(s) SubCutaneous every 8 hours  GI Prophylaxis:pantoprazole  Injectable 40 milliGRAM(s) IV Push daily      ***Tubes/Lines/Drains  ***  Peripheral IV  NG Tube  Urinary Catheter		Indication: Strict I&O      R. Basilic Midline                      Date: 9/17   NEURO:    AAOx3      Acute pain - controlled with Dilaudid PCA     RESP:     On NC @ 2L    ABG PRN    AM CXR    CT Chest (PE protocol) 9/17- negative for PE; small-moderate b/l pleural effusions with compressive atelectasis      CARDS:     Hypotension Resolved- Off all pressors    Acute HTN & tachycardia - switched from IV to PO Metoprolol 12.5 q12h    CE neg, 0.04, 0.16, 0.07 - no longer trending    ECHO: EF 50-55%, G1DD, mild MR, mild TR    Pending 23:30 BNP      GI/NUTR:     CT A/P (9/17) - negative for leak; postsurgical changes with post-op ileus    Diet: NPO w sips and chips, TPN  @ 50cc/hr (no free water seen on order)    Hepatic panel 9/18: WNL    IVL    GI ppx: PPI    Dulcolax suppository       /RENAL:     Silva - -250 cc/hr    (9/19) Lasix 40 IV & Diamox 500 --> good response, 5.2L+    BUN/Cr-  16/<0.5 (at baseline)     Elytes: Lytes-Na 146 // K 3.3 // Phos 1.9 //  Mag 2.8    Lactate 1.3 (max 6.5) (no longer trending)    HEME/ONC:     Hgb 8.2    Transfused 1 uPRBC (split 1/2 and 1/2 on 9/18)    Plt 74    INR 2.5 > 1.7 >1.3    HIT antibody: negative    DVT Prophylaxis: HSQ, SCDs    ID:    Afebrile    WBC 9.13     Abx: Caspfungin, Zosyn, Flagyl    -ID following    BCx 9/17- (NGTD)     ENDO:    Hyperglycemic - on ISS    Solucortef 50 q6 to be d/c'd 9/20      LINES/DRAINS:  PIV , [place], central line, Silva         DVT Prophylaxis: heparin   Injectable 5000 Unit(s) SubCutaneous every 8 hours  GI Prophylaxis:pantoprazole  Injectable 40 milliGRAM(s) IV Push daily      ***Tubes/Lines/Drains  ***  Peripheral IV  NG Tube  Urinary Catheter		Indication: Strict I&O      R. Basilic Midline                      Date: 9/17

## 2020-09-21 LAB
ANION GAP SERPL CALC-SCNC: 10 MMOL/L — SIGNIFICANT CHANGE UP (ref 7–14)
ANION GAP SERPL CALC-SCNC: 9 MMOL/L — SIGNIFICANT CHANGE UP (ref 7–14)
BUN SERPL-MCNC: 11 MG/DL — SIGNIFICANT CHANGE UP (ref 10–20)
BUN SERPL-MCNC: 11 MG/DL — SIGNIFICANT CHANGE UP (ref 10–20)
CALCIUM SERPL-MCNC: 7 MG/DL — LOW (ref 8.5–10.1)
CALCIUM SERPL-MCNC: 7.1 MG/DL — LOW (ref 8.5–10.1)
CHLORIDE SERPL-SCNC: 106 MMOL/L — SIGNIFICANT CHANGE UP (ref 98–110)
CHLORIDE SERPL-SCNC: 107 MMOL/L — SIGNIFICANT CHANGE UP (ref 98–110)
CO2 SERPL-SCNC: 23 MMOL/L — SIGNIFICANT CHANGE UP (ref 17–32)
CO2 SERPL-SCNC: 25 MMOL/L — SIGNIFICANT CHANGE UP (ref 17–32)
CREAT SERPL-MCNC: <0.5 MG/DL — LOW (ref 0.7–1.5)
CREAT SERPL-MCNC: <0.5 MG/DL — LOW (ref 0.7–1.5)
GLUCOSE BLDC GLUCOMTR-MCNC: 135 MG/DL — HIGH (ref 70–99)
GLUCOSE BLDC GLUCOMTR-MCNC: 144 MG/DL — HIGH (ref 70–99)
GLUCOSE BLDC GLUCOMTR-MCNC: 152 MG/DL — HIGH (ref 70–99)
GLUCOSE BLDC GLUCOMTR-MCNC: 160 MG/DL — HIGH (ref 70–99)
GLUCOSE BLDC GLUCOMTR-MCNC: 191 MG/DL — HIGH (ref 70–99)
GLUCOSE BLDC GLUCOMTR-MCNC: 194 MG/DL — HIGH (ref 70–99)
GLUCOSE BLDC GLUCOMTR-MCNC: 200 MG/DL — HIGH (ref 70–99)
GLUCOSE SERPL-MCNC: 155 MG/DL — HIGH (ref 70–99)
GLUCOSE SERPL-MCNC: 157 MG/DL — HIGH (ref 70–99)
HCT VFR BLD CALC: 27.8 % — LOW (ref 37–47)
HGB BLD-MCNC: 9.1 G/DL — LOW (ref 12–16)
MAGNESIUM SERPL-MCNC: 1.7 MG/DL — LOW (ref 1.8–2.4)
MAGNESIUM SERPL-MCNC: 1.8 MG/DL — SIGNIFICANT CHANGE UP (ref 1.8–2.4)
MCHC RBC-ENTMCNC: 27.9 PG — SIGNIFICANT CHANGE UP (ref 27–31)
MCHC RBC-ENTMCNC: 32.7 G/DL — SIGNIFICANT CHANGE UP (ref 32–37)
MCV RBC AUTO: 85.3 FL — SIGNIFICANT CHANGE UP (ref 81–99)
NRBC # BLD: 0 /100 WBCS — SIGNIFICANT CHANGE UP (ref 0–0)
NT-PROBNP SERPL-SCNC: 1902 PG/ML — HIGH (ref 0–300)
PHOSPHATE SERPL-MCNC: 2.1 MG/DL — SIGNIFICANT CHANGE UP (ref 2.1–4.9)
PHOSPHATE SERPL-MCNC: 2.6 MG/DL — SIGNIFICANT CHANGE UP (ref 2.1–4.9)
PLATELET # BLD AUTO: 105 K/UL — LOW (ref 130–400)
POTASSIUM SERPL-MCNC: 3.2 MMOL/L — LOW (ref 3.5–5)
POTASSIUM SERPL-MCNC: 3.9 MMOL/L — SIGNIFICANT CHANGE UP (ref 3.5–5)
POTASSIUM SERPL-SCNC: 3.2 MMOL/L — LOW (ref 3.5–5)
POTASSIUM SERPL-SCNC: 3.9 MMOL/L — SIGNIFICANT CHANGE UP (ref 3.5–5)
RBC # BLD: 3.26 M/UL — LOW (ref 4.2–5.4)
RBC # FLD: 15.6 % — HIGH (ref 11.5–14.5)
SODIUM SERPL-SCNC: 140 MMOL/L — SIGNIFICANT CHANGE UP (ref 135–146)
SODIUM SERPL-SCNC: 140 MMOL/L — SIGNIFICANT CHANGE UP (ref 135–146)
SURGICAL PATHOLOGY STUDY: SIGNIFICANT CHANGE UP
WBC # BLD: 13.76 K/UL — HIGH (ref 4.8–10.8)
WBC # FLD AUTO: 13.76 K/UL — HIGH (ref 4.8–10.8)

## 2020-09-21 PROCEDURE — 93970 EXTREMITY STUDY: CPT | Mod: 26

## 2020-09-21 PROCEDURE — 71045 X-RAY EXAM CHEST 1 VIEW: CPT | Mod: 26

## 2020-09-21 PROCEDURE — 99024 POSTOP FOLLOW-UP VISIT: CPT

## 2020-09-21 PROCEDURE — 99291 CRITICAL CARE FIRST HOUR: CPT | Mod: 24

## 2020-09-21 RX ORDER — OXYCODONE HYDROCHLORIDE 5 MG/1
10 TABLET ORAL EVERY 6 HOURS
Refills: 0 | Status: DISCONTINUED | OUTPATIENT
Start: 2020-09-21 | End: 2020-09-22

## 2020-09-21 RX ORDER — POTASSIUM CHLORIDE 20 MEQ
20 PACKET (EA) ORAL
Refills: 0 | Status: COMPLETED | OUTPATIENT
Start: 2020-09-21 | End: 2020-09-21

## 2020-09-21 RX ORDER — MAGNESIUM SULFATE 500 MG/ML
2 VIAL (ML) INJECTION ONCE
Refills: 0 | Status: COMPLETED | OUTPATIENT
Start: 2020-09-21 | End: 2020-09-21

## 2020-09-21 RX ORDER — POTASSIUM PHOSPHATE, MONOBASIC POTASSIUM PHOSPHATE, DIBASIC 236; 224 MG/ML; MG/ML
15 INJECTION, SOLUTION INTRAVENOUS ONCE
Refills: 0 | Status: DISCONTINUED | OUTPATIENT
Start: 2020-09-21 | End: 2020-09-21

## 2020-09-21 RX ORDER — HYDROMORPHONE HYDROCHLORIDE 2 MG/ML
0.25 INJECTION INTRAMUSCULAR; INTRAVENOUS; SUBCUTANEOUS ONCE
Refills: 0 | Status: DISCONTINUED | OUTPATIENT
Start: 2020-09-21 | End: 2020-09-21

## 2020-09-21 RX ORDER — POTASSIUM PHOSPHATE, MONOBASIC POTASSIUM PHOSPHATE, DIBASIC 236; 224 MG/ML; MG/ML
15 INJECTION, SOLUTION INTRAVENOUS ONCE
Refills: 0 | Status: COMPLETED | OUTPATIENT
Start: 2020-09-21 | End: 2020-09-21

## 2020-09-21 RX ORDER — METOPROLOL TARTRATE 50 MG
5 TABLET ORAL ONCE
Refills: 0 | Status: COMPLETED | OUTPATIENT
Start: 2020-09-21 | End: 2020-09-21

## 2020-09-21 RX ORDER — OXYCODONE HYDROCHLORIDE 5 MG/1
5 TABLET ORAL EVERY 4 HOURS
Refills: 0 | Status: DISCONTINUED | OUTPATIENT
Start: 2020-09-21 | End: 2020-09-22

## 2020-09-21 RX ORDER — ACETAMINOPHEN 500 MG
650 TABLET ORAL EVERY 6 HOURS
Refills: 0 | Status: DISCONTINUED | OUTPATIENT
Start: 2020-09-21 | End: 2020-09-23

## 2020-09-21 RX ORDER — ELECTROLYTE SOLUTION,INJ
1 VIAL (ML) INTRAVENOUS
Refills: 0 | Status: DISCONTINUED | OUTPATIENT
Start: 2020-09-21 | End: 2020-09-22

## 2020-09-21 RX ORDER — METOPROLOL TARTRATE 50 MG
2.5 TABLET ORAL ONCE
Refills: 0 | Status: COMPLETED | OUTPATIENT
Start: 2020-09-21 | End: 2020-09-21

## 2020-09-21 RX ORDER — INSULIN HUMAN 100 [IU]/ML
INJECTION, SOLUTION SUBCUTANEOUS
Refills: 0 | Status: DISCONTINUED | OUTPATIENT
Start: 2020-09-21 | End: 2020-09-21

## 2020-09-21 RX ORDER — METOPROLOL TARTRATE 50 MG
12.5 TABLET ORAL ONCE
Refills: 0 | Status: COMPLETED | OUTPATIENT
Start: 2020-09-21 | End: 2020-09-21

## 2020-09-21 RX ORDER — METOPROLOL TARTRATE 50 MG
25 TABLET ORAL EVERY 12 HOURS
Refills: 0 | Status: DISCONTINUED | OUTPATIENT
Start: 2020-09-21 | End: 2020-09-23

## 2020-09-21 RX ORDER — PANTOPRAZOLE SODIUM 20 MG/1
40 TABLET, DELAYED RELEASE ORAL
Refills: 0 | Status: DISCONTINUED | OUTPATIENT
Start: 2020-09-21 | End: 2020-09-23

## 2020-09-21 RX ORDER — MAGNESIUM SULFATE 500 MG/ML
1 VIAL (ML) INJECTION ONCE
Refills: 0 | Status: DISCONTINUED | OUTPATIENT
Start: 2020-09-21 | End: 2020-09-21

## 2020-09-21 RX ORDER — INSULIN HUMAN 100 [IU]/ML
1 INJECTION, SOLUTION SUBCUTANEOUS
Qty: 100 | Refills: 0 | Status: DISCONTINUED | OUTPATIENT
Start: 2020-09-21 | End: 2020-09-22

## 2020-09-21 RX ADMIN — Medication 25 GRAM(S): at 09:03

## 2020-09-21 RX ADMIN — HEPARIN SODIUM 5000 UNIT(S): 5000 INJECTION INTRAVENOUS; SUBCUTANEOUS at 13:55

## 2020-09-21 RX ADMIN — CHLORHEXIDINE GLUCONATE 1 APPLICATION(S): 213 SOLUTION TOPICAL at 12:19

## 2020-09-21 RX ADMIN — Medication 100 MILLIEQUIVALENT(S): at 04:03

## 2020-09-21 RX ADMIN — Medication 100 MILLIEQUIVALENT(S): at 03:08

## 2020-09-21 RX ADMIN — INSULIN HUMAN 3: 100 INJECTION, SOLUTION SUBCUTANEOUS at 05:29

## 2020-09-21 RX ADMIN — HEPARIN SODIUM 5000 UNIT(S): 5000 INJECTION INTRAVENOUS; SUBCUTANEOUS at 21:23

## 2020-09-21 RX ADMIN — Medication 12.5 MILLIGRAM(S): at 05:10

## 2020-09-21 RX ADMIN — OXYCODONE HYDROCHLORIDE 10 MILLIGRAM(S): 5 TABLET ORAL at 14:00

## 2020-09-21 RX ADMIN — INSULIN HUMAN 3: 100 INJECTION, SOLUTION SUBCUTANEOUS at 02:56

## 2020-09-21 RX ADMIN — Medication 12.5 MILLIGRAM(S): at 12:17

## 2020-09-21 RX ADMIN — SENNA PLUS 2 TABLET(S): 8.6 TABLET ORAL at 21:08

## 2020-09-21 RX ADMIN — INSULIN HUMAN 9: 100 INJECTION, SOLUTION SUBCUTANEOUS at 11:12

## 2020-09-21 RX ADMIN — INSULIN HUMAN 9: 100 INJECTION, SOLUTION SUBCUTANEOUS at 16:54

## 2020-09-21 RX ADMIN — Medication 650 MILLIGRAM(S): at 13:00

## 2020-09-21 RX ADMIN — Medication 20 MILLIGRAM(S): at 05:10

## 2020-09-21 RX ADMIN — OXYCODONE HYDROCHLORIDE 10 MILLIGRAM(S): 5 TABLET ORAL at 13:54

## 2020-09-21 RX ADMIN — HYDROMORPHONE HYDROCHLORIDE 0.25 MILLIGRAM(S): 2 INJECTION INTRAMUSCULAR; INTRAVENOUS; SUBCUTANEOUS at 21:54

## 2020-09-21 RX ADMIN — Medication 1 EACH: at 20:21

## 2020-09-21 RX ADMIN — Medication 650 MILLIGRAM(S): at 12:17

## 2020-09-21 RX ADMIN — HYDROMORPHONE HYDROCHLORIDE 0.25 MILLIGRAM(S): 2 INJECTION INTRAMUSCULAR; INTRAVENOUS; SUBCUTANEOUS at 22:26

## 2020-09-21 RX ADMIN — Medication 5 MILLIGRAM(S): at 06:57

## 2020-09-21 RX ADMIN — Medication 650 MILLIGRAM(S): at 17:45

## 2020-09-21 RX ADMIN — POTASSIUM PHOSPHATE, MONOBASIC POTASSIUM PHOSPHATE, DIBASIC 83.33 MILLIMOLE(S): 236; 224 INJECTION, SOLUTION INTRAVENOUS at 00:39

## 2020-09-21 RX ADMIN — OXYCODONE HYDROCHLORIDE 10 MILLIGRAM(S): 5 TABLET ORAL at 20:44

## 2020-09-21 RX ADMIN — Medication 650 MILLIGRAM(S): at 17:01

## 2020-09-21 RX ADMIN — Medication 650 MILLIGRAM(S): at 23:15

## 2020-09-21 RX ADMIN — INSULIN HUMAN 1 UNIT(S)/HR: 100 INJECTION, SOLUTION SUBCUTANEOUS at 22:30

## 2020-09-21 RX ADMIN — INSULIN HUMAN 9: 100 INJECTION, SOLUTION SUBCUTANEOUS at 21:22

## 2020-09-21 RX ADMIN — HEPARIN SODIUM 5000 UNIT(S): 5000 INJECTION INTRAVENOUS; SUBCUTANEOUS at 05:10

## 2020-09-21 RX ADMIN — PANTOPRAZOLE SODIUM 40 MILLIGRAM(S): 20 TABLET, DELAYED RELEASE ORAL at 06:19

## 2020-09-21 RX ADMIN — POTASSIUM PHOSPHATE, MONOBASIC POTASSIUM PHOSPHATE, DIBASIC 63.75 MILLIMOLE(S): 236; 224 INJECTION, SOLUTION INTRAVENOUS at 13:55

## 2020-09-21 RX ADMIN — Medication 2.5 MILLIGRAM(S): at 04:10

## 2020-09-21 RX ADMIN — OXYCODONE HYDROCHLORIDE 10 MILLIGRAM(S): 5 TABLET ORAL at 19:44

## 2020-09-21 RX ADMIN — Medication 25 MILLIGRAM(S): at 17:01

## 2020-09-21 NOTE — PROGRESS NOTE ADULT - ATTENDING COMMENTS
46yo female with PSHx PAULIE/BSO 8/19/2020 for fibroids admitted for persistent small bowel obstruction s/p laparoscopic NARCISO, laparotomy, extensive NARCISO, small bowel resection with primary anastomosis 9/15 complicated by small bowel leak s/p re-exploration, wash out, small bowel resection and primary anastomosis 9/15. Under ICU care at this time. Tachycardia improving to 110's, no longer on pressors. Extubated and breathing independently. Was complaining of shortness of breath but states she feels better. Complains of lower abdominal pain at incision and near blister of skin but improving, NG tube in removed, +bowel movements, +flatus. Tolerating CLD. Advance as tolerated. Dressing change today. WBC 13, continue antibiotics. Pain control. Trend labs, continue antibiotics. Encourage ambulation/OOB, incentive spirometer, DVT ppx. Primary care as per ICU.

## 2020-09-21 NOTE — PHYSICAL THERAPY INITIAL EVALUATION ADULT - TRANSFER TRAINING, PT EVAL
Pt will transfer from bed to chair and reverse using RW with stable vital signs with Min A in 5-7 days to decrease level of assistance requires from caregivers

## 2020-09-21 NOTE — CHART NOTE - NSCHARTNOTEFT_GEN_A_CORE
POD#33 Ex lap PAULIE/BS, ureteral stent placement  POD#6 ex lap, resection of 43 cm of ileum secondary to serosal tears, side-to-side ileoileal anastomosis, enterotomy stapled  POD#6 Ex lap small bowel resection with anastomosis appendectomy    Awake, alert. c/o abd pain  NGT out. Diet advanced to clears  On lasix, TPN volume decreased  Tachycardia persists  TPN infusing    T(F): 98.6 (20 @ 08:07), Max: 99.2 (20 @ 04:00)  HR: 126 (20 @ 13:00) (98 - 129)  BP: 118/81 (20 @ 13:00) (116/67 - 136/86)  RR: 18 (20 @ 13:00) (13 - 23)  SpO2: 100% (20 @ 13:00) (90% - 100%)    I&O's Detail    20 Sep 2020 07:01  -  21 Sep 2020 07:00  --------------------------------------------------------  IN:    Fat Emulsion (Fish Oil &amp; Plant Based) 20% Infusion: 281.7 mL    IV PiggyBack: 416.5 mL    IV PiggyBack: 1694.1 mL    IV PiggyBack: 500 mL    Oral Fluid: 1760 mL    TPN (Total Parenteral Nutrition): 1200 mL  Total IN: 5852.3 mL    OUT:    Indwelling Catheter - Urethral (mL): 6800 mL  Total OUT: 6800 mL    Total NET: -947.7 mL    21 Sep 2020 07:01  -  21 Sep 2020 14:32  --------------------------------------------------------  IN:    Fat Emulsion (Fish Oil &amp; Plant Based) 20% Infusion: 187.8 mL    TPN (Total Parenteral Nutrition): 350 mL  Total IN: 537.8 mL    OUT:    Indwelling Catheter - Urethral (mL): 3150 mL  Total OUT: 3150 mL    Total NET: -2612.2 mL        140  |  107  |  11  ----------------------------<  155<H>  3.9   |  23  |  <0.5<L>    Ca    7.1<L>      21 Sep 2020 06:00  Phos  2.6       Mg     1.7                             9.1    13.76 )-----------( 105      ( 21 Sep 2020 06:00 )             27.8     CAPILLARY BLOOD GLUCOSE  POCT Blood Glucose.: 200 mg/dL (21 Sep 2020 10:50)  POCT Blood Glucose.: 152 mg/dL (21 Sep 2020 05:23)  POCT Blood Glucose.: 144 mg/dL (21 Sep 2020 02:52)  POCT Blood Glucose.: 175 mg/dL (20 Sep 2020 22:04)  POCT Blood Glucose.: 166 mg/dL (20 Sep 2020 16:10)    Daily Weight in k.8 (21 Sep 2020 05:00)    Diet, Full Liquid:   Consistent Carbohydrate {Evening Snack} (20 @ 11:35)    ASSESSMENT  45y Female s/p small bowel resection with primary anastomosis 9/15/20, taken back to the OR for washing out stomach contents, small bowel resection with anastomosis, lysis of intestinal adhesions (staple line of proximal limb of anastomosis break down with gross spillage    - feculent peritonitis/sepsis  - severe protein calorie malnutrition   - hyperglycemia  - hypophosphatemia  - hypomagnesemia    PLAN  - cont TPN tonight, begin to taper CHO/volume  - monitor gluc closely, minimal insulin in TPN  - PO diet as tolerated per surgery, advanced to full liquids today  - monitor BMP, In phos, Mg  - check vitamin D, zinc levels POD#33 Ex lap PAULIE/BS, ureteral stent placement  POD#6 ex lap, resection of 43 cm of ileum secondary to serosal tears, side-to-side ileoileal anastomosis, enterotomy stapled  POD#6 Ex lap small bowel resection with anastomosis appendectomy    Awake, alert. c/o abd pain, c/o feeling need to have BM  NGT out. Diet advanced to clears but very little ingested yet  On lasix, TPN volume decreased  Tachycardia persists, even when pt appears to be sleeping  TPN infusing    T(F): 98.6 (20 @ 08:07), Max: 99.2 (20 @ 04:00)  HR: 126 (20 @ 13:00) (98 - 129)  BP: 118/81 (20 @ 13:00) (116/67 - 136/86)  RR: 18 (20 @ 13:00) (13 - 23)  SpO2: 100% (20 @ 13:00) (90% - 100%)    I&O's Detail    20 Sep 2020 07:01  -  21 Sep 2020 07:00  --------------------------------------------------------  IN:    Fat Emulsion (Fish Oil &amp; Plant Based) 20% Infusion: 281.7 mL    IV PiggyBack: 416.5 mL    IV PiggyBack: 1694.1 mL    IV PiggyBack: 500 mL    Oral Fluid: 1760 mL    TPN (Total Parenteral Nutrition): 1200 mL  Total IN: 5852.3 mL    OUT:    Indwelling Catheter - Urethral (mL): 6800 mL  Total OUT: 6800 mL    Total NET: -947.7 mL    21 Sep 2020 07:  -  21 Sep 2020 14:32  --------------------------------------------------------  IN:    Fat Emulsion (Fish Oil &amp; Plant Based) 20% Infusion: 187.8 mL    TPN (Total Parenteral Nutrition): 350 mL  Total IN: 537.8 mL    OUT:    Indwelling Catheter - Urethral (mL): 3150 mL  Total OUT: 3150 mL    Total NET: -2612.2 mL        140  |  107  |  11  ----------------------------<  155<H>  3.9   |  23  |  <0.5<L>    Ca    7.1<L>      21 Sep 2020 06:00  Phos  2.6       Mg     1.7                             9.1    13.76 )-----------( 105      ( 21 Sep 2020 06:00 )             27.8     CAPILLARY BLOOD GLUCOSE  POCT Blood Glucose.: 200 mg/dL (21 Sep 2020 10:50)  POCT Blood Glucose.: 152 mg/dL (21 Sep 2020 05:23)  POCT Blood Glucose.: 144 mg/dL (21 Sep 2020 02:52)  POCT Blood Glucose.: 175 mg/dL (20 Sep 2020 22:04)  POCT Blood Glucose.: 166 mg/dL (20 Sep 2020 16:10)    Daily Weight in k.8 (21 Sep 2020 05:00)    Diet, Full Liquid:   Consistent Carbohydrate {Evening Snack} (20 @ 11:35)    ASSESSMENT  45y Female s/p small bowel resection with primary anastomosis 9/15/20, taken back to the OR for washing out stomach contents, small bowel resection with anastomosis, lysis of intestinal adhesions (staple line of proximal limb of anastomosis break down with gross spillage    - feculent peritonitis/sepsis  - severe protein calorie malnutrition   - hyperglycemia  - hypophosphatemia  - hypomagnesemia    PLAN  - cont TPN tonight, begin to taper CHO/volume  - monitor gluc closely, minimal insulin in TPN  - PO diet as tolerated per surgery, advanced to full liquids today - attn to protein intake  - monitor BMP, In phos, Mg  - check vitamin D, zinc levels  - d/w ICU surgical team on am rounds - pt received about 2/3 of prescribed TPN x past 2 days

## 2020-09-21 NOTE — PHYSICAL THERAPY INITIAL EVALUATION ADULT - BED MOBILITY TRAINING, PT EVAL
Pt will participate in bed mobility using side rails with Min A in 5-7 days to decrease level of assistance requires from caregivers

## 2020-09-21 NOTE — PROGRESS NOTE ADULT - SUBJECTIVE AND OBJECTIVE BOX
GENERAL SURGERY PROGRESS NOTE     GLORIA SCHAFER  [address] [place] [address] day :7d    8/19/20: Ex lap PAULIE/BS, ureteral stent placement for 15cm bleeding fibroid  9/15: ex lap, resection of 43 cm of ileum secondary to serosal tears, side-to-side ileoileal anastomosis, enterotomy stapled  9/15: Ex lap small bowel resection with anastomosis appendectomy      Surgical Attending: Mariann Rubio      24H events/hpi:  Patient seen & examined at bedside. In NAD   breathing on RA  remains tachycardic but improving overall  no acute events overnight  off presssors   dressing changed daily   pre-medicate with PCA pump push      T(F): 97.8 (09-20-20 @ 20:00), Max: 98.7 (09-20-20 @ 04:00)  HR: 106 (09-20-20 @ 22:00) (98 - 125)  BP: 129/76 (09-21-20 @ 00:00) (124/72 - 137/97)  ABP: 105/99 (09-20-20 @ 19:00) (62/52 - 160/87)  ABP(mean): 101 (09-20-20 @ 19:00) (56 - 122)  RR: 18 (09-20-20 @ 22:00) (13 - 23)  SpO2: 99% (09-20-20 @ 22:00) (96% - 100%)      09-19-20 @ 07:01  -  09-20-20 @ 07:00  --------------------------------------------------------  IN:    IV PiggyBack: 666.4 mL    IV PiggyBack: 300 mL    Oral Fluid: 150 mL    TPN (Total Parenteral Nutrition): 1480 mL    Vasopressin: 4.8 mL  Total IN: 2601.2 mL    OUT:    Indwelling Catheter - Urethral (mL): 9885 mL  Total OUT: 9885 mL    Total NET: -7283.8 mL      09-20-20 @ 07:01  -  09-21-20 @ 02:35  --------------------------------------------------------  IN:    Fat Emulsion (Fish Oil &amp; Plant Based) 20% Infusion: 93.9 mL    IV PiggyBack: 416.5 mL    IV PiggyBack: 1125 mL    Oral Fluid: 1660 mL    TPN (Total Parenteral Nutrition): 900 mL  Total IN: 4195.4 mL    OUT:    Indwelling Catheter - Urethral (mL): 4350 mL  Total OUT: 4350 mL    Total NET: -154.6 mL        DIET/FLUIDS: fat emulsion (Fish Oil and Plant Based) 20% Infusion 1.407 Gm/kG/Day IV Continuous <Continuous>  Parenteral Nutrition - Adult 1 Each TPN Continuous <Continuous>  potassium chloride  20 mEq/100 mL IVPB 20 milliEquivalent(s) IV Intermittent every 1 hour      I&O    09-19-20 @ 07:01  -  09-20-20 @ 07:00  --------------------------------------------------------  IN: 2601.2 mL / OUT: 9885 mL / NET: -7283.8 mL    09-20-20 @ 07:01  -  09-21-20 @ 02:35  --------------------------------------------------------  IN: 4195.4 mL / OUT: 4350 mL / NET: -154.6 mL        URINE:   09-19-20 @ 07:01  -  09-20-20 @ 07:00  --------------------------------------------------------  OUT: 9885 mL     Indwelling Urethral Catheter:     Connect To:  Straight Drainage/Gravity    Indication:  Urine Output Monitoring in Critically Ill (09-20-20 @ 13:00)    GI proph:  pantoprazole  Injectable 40 milliGRAM(s) IV Push daily    AC/ proph: heparin   Injectable 5000 Unit(s) SubCutaneous every 8 hours    ABx: caspofungin IVPB 50 milliGRAM(s) IV Intermittent every 24 hours  caspofungin IVPB      metroNIDAZOLE  IVPB 500 milliGRAM(s) IV Intermittent every 6 hours  piperacillin/tazobactam IVPB.. 4.5 Gram(s) IV Intermittent every 6 hours        Physical Examination:  General Appearance: NAD, alert and cooperative  Heart: S1 and S2. No murmurs. Rhythm is regular.  Lungs: Clear to auscultation BL without rales, rhonchi, wheezing, crackles or diminished breath sounds.  Abdomen: Soft, nondistended, yanique-incisional tenderness. No rigidity, guarding, or rebound tenderness.       LABS  Labs:  CAPILLARY BLOOD GLUCOSE  POCT Blood Glucose.: 175 mg/dL (20 Sep 2020 22:04)  POCT Blood Glucose.: 166 mg/dL (20 Sep 2020 16:10)  POCT Blood Glucose.: 157 mg/dL (20 Sep 2020 12:57)  POCT Blood Glucose.: 206 mg/dL (20 Sep 2020 10:31)  POCT Blood Glucose.: 156 mg/dL (20 Sep 2020 05:41)                          8.2    9.13  )-----------( 74       ( 20 Sep 2020 04:30 )             25.0         09-21    140  |  106  |  11  ----------------------------<  157<H>  3.2<L>   |  25  |  <0.5<L>      Calcium, Total Serum: 7.0 mg/dL (09-21-20 @ 00:00)      LFTs:     Lactate, Blood: 1.3 mmol/L (09-18-20 @ 23:50)  Lactate, Blood: 2.3 mmol/L (09-18-20 @ 04:30)    ABG - ( 17 Sep 2020 04:48 )  pH: 7.41  /  pCO2: 37    /  pO2: 137   / HCO3: 24    / Base Excess: -0.7  /  SaO2: 100       ABG - ( 16 Sep 2020 13:35 )  pH: 7.43  /  pCO2: 30    /  pO2: 139   / HCO3: 20    / Base Excess: -3.4  /  SaO2: 99        ABG - ( 16 Sep 2020 10:10 )  pH: 7.44  /  pCO2: 31    /  pO2: 122   / HCO3: 21    / Base Excess: -2.9  /  SaO2: 99                Coags:     17.30  ----< 1.50    ( 20 Sep 2020 04:30 )     34.0            Serum Pro-Brain Natriuretic Peptide: 1902 pg/mL (09-21-20 @ 00:00)  Serum Pro-Brain Natriuretic Peptide: 2013 pg/mL (09-20-20 @ 08:19)  Serum Pro-Brain Natriuretic Peptide: 1595 pg/mL (09-18-20 @ 23:50)  Serum Pro-Brain Natriuretic Peptide: 1557 pg/mL (09-18-20 @ 00:00)  Serum Pro-Brain Natriuretic Peptide: 4550 pg/mL (09-16-20 @ 23:30)

## 2020-09-21 NOTE — PHYSICAL THERAPY INITIAL EVALUATION ADULT - MD ORDER
8/19/20: Ex lap PAULIE/BS, ureteral stent placement for 15cm bleeding fibroid  9/15: ex lap, resection of 43 cm of ileum secondary to serosal tears, side-to-side ileoileal anastomosis, enterotomy stapled  9/15: Ex lap small bowel resection with anastomosis appendectomy

## 2020-09-21 NOTE — PROGRESS NOTE ADULT - SUBJECTIVE AND OBJECTIVE BOX
GLORIA SCHAFER  45y, Female  Allergy: No Known Allergies      LOS  7d    CHIEF COMPLAINT: SBO (21 Sep 2020 06:20)      INTERVAL EVENTS/HPI  - No acute events overnight  - T(F): , Max: 99.2 (09-21-20 @ 04:00)  - Tolerating medication  - WBC Count: 9.13 (09-20-20 @ 04:30) downtrending   WBC Count: 13.08 (09-19-20 @ 17:19)  - Creatinine, Serum: <0.5 (09-21-20 @ 06:00)  Creatinine, Serum: <0.5 (09-21-20 @ 00:00)       ROS  ***    VITALS:  T(F): 99.2, Max: 99.2 (09-21-20 @ 04:00)  HR: 118  BP: 123/80  RR: 18Vital Signs Last 24 Hrs  T(C): 37.3 (21 Sep 2020 04:00), Max: 37.3 (21 Sep 2020 04:00)  T(F): 99.2 (21 Sep 2020 04:00), Max: 99.2 (21 Sep 2020 04:00)  HR: 118 (21 Sep 2020 04:35) (98 - 129)  BP: 123/80 (21 Sep 2020 07:00) (123/80 - 137/97)  BP(mean): 97 (21 Sep 2020 07:00) (90 - 109)  RR: 18 (21 Sep 2020 04:00) (13 - 23)  SpO2: 97% (21 Sep 2020 04:00) (96% - 100%)    PHYSICAL EXAM:  ***    FH: Non-contributory  Social Hx: Non-contributory    TESTS & MEASUREMENTS:                        8.2    9.13  )-----------( 74       ( 20 Sep 2020 04:30 )             25.0     09-21    140  |  107  |  11  ----------------------------<  155<H>  3.9   |  23  |  <0.5<L>    Ca    7.1<L>      21 Sep 2020 06:00  Phos  2.6     09-21  Mg     1.7     09-21      eGFR if : 146 mL/min/1.73M2 (09-21-20 @ 06:00)  eGFR if Non African American: 126 mL/min/1.73M2 (09-21-20 @ 06:00)  eGFR if Non African American: 138 mL/min/1.73M2 (09-21-20 @ 00:00)  eGFR if : 160 mL/min/1.73M2 (09-21-20 @ 00:00)  eGFR if Non African American: 126 mL/min/1.73M2 (09-20-20 @ 22:08)  eGFR if : 146 mL/min/1.73M2 (09-20-20 @ 22:08)  eGFR if Non African American: 126 mL/min/1.73M2 (09-20-20 @ 16:18)  eGFR if : 146 mL/min/1.73M2 (09-20-20 @ 16:18)          Culture - Blood (collected 09-17-20 @ 20:40)  Source: .Blood Blood-Peripheral  Preliminary Report (09-19-20 @ 04:01):    No growth to date.    Culture - Urine (collected 09-05-20 @ 14:34)  Source: .Urine Clean Catch (Midstream)  Final Report (09-06-20 @ 21:46):    <10,000 CFU/mL Normal Urogenital Louise    Culture - Stool (collected 08-31-20 @ 19:36)  Source: .Stool Feces  Final Report (09-03-20 @ 08:34):    No enteric pathogens isolated.    (Stool culture examined for Salmonella,    Shigella, Campylobacter, Aeromonas, Plesiomonas,    Vibrio, E.coli O157 and Yersinia)        Lactate, Blood: 1.3 mmol/L (09-18-20 @ 23:50)  Lactate, Blood: 2.3 mmol/L (09-18-20 @ 04:30)  Lactate, Blood: 3.0 mmol/L (09-17-20 @ 16:30)  Lactate, Blood: 2.3 mmol/L (09-17-20 @ 10:20)  Lactate, Blood: 3.0 mmol/L (09-17-20 @ 04:20)  Lactate, Blood: 3.9 mmol/L (09-16-20 @ 23:30)  Lactate, Blood: 3.4 mmol/L (09-16-20 @ 21:20)  Lactate, Blood: 5.2 mmol/L (09-16-20 @ 16:03)  Lactate, Blood: 5.0 mmol/L (09-16-20 @ 14:00)  Lactate, Blood: 4.4 mmol/L (09-16-20 @ 10:00)      INFECTIOUS DISEASES TESTING  Fungitell: <31 (09-17-20 @ 10:40)  Rapid RVP Result: NotDetec (09-14-20 @ 19:57)  COVID-19 PCR: NotDetec (09-05-20 @ 00:54)  COVID-19 PCR: NotDetec (08-30-20 @ 00:44)  COVID-19 PCR: NotDetec (08-19-20 @ 04:45)      INFLAMMATORY MARKERS  C-Reactive Protein, Serum: 1.71 mg/dL (09-15-20 @ 11:40)      RADIOLOGY & ADDITIONAL TESTS:  I have personally reviewed the last available Chest xray  CXR      CT      CARDIOLOGY TESTING  12 Lead ECG:   Ventricular Rate 141 BPM    Atrial Rate 141 BPM    P-R Interval 144 ms    QRS Duration 66 ms    Q-T Interval 266 ms    QTC Calculation(Bazett) 407 ms    P Axis 143 degrees    R Axis 139 degrees    T Axis 119 degrees    Diagnosis Line *** Suspect arm lead reversal, interpretation assumes no reversal  Sinus tachycardia  Low voltage QRS  Lateral infarct , age undetermined  Abnormal ECG    Confirmed by Hever He ([id number]) on 9/16/2020 7:56:53 PM (09-16-20 @ 12:04)  12 Lead ECG:   Systolic  mmHg    Diastolic BP 58 mmHg    Ventricular Rate 138 BPM    Atrial Rate 138 BPM    P-R Interval 152 ms    QRS Duration 74 ms    Q-T Interval 276 ms    QTC Calculation(Bazett) 418 ms    P Axis 64 degrees    R Axis 64 degrees    T Axis 41 degrees    Diagnosis Line Sinus tachycardia  Low voltage QRS  Nonspecific T wave abnormality  Abnormal ECG    Confirmed by Hever He [id number]) on 9/15/2020 8:55:31 PM (09-15-20 @ 20:37)      MEDICATIONS  caspofungin IVPB 50 IV Intermittent every 24 hours  caspofungin IVPB     chlorhexidine 4% Liquid 1 Topical daily  fat emulsion (Fish Oil and Plant Based) 20% Infusion 1.407 IV Continuous <Continuous>  furosemide    Tablet 20 Oral daily  heparin   Injectable 5000 SubCutaneous every 8 hours  HYDROmorphone PCA (1 mG/mL) 30 PCA Continuous PCA Continuous  insulin regular  human corrective regimen sliding scale  IV Push every 4 hours  metoprolol tartrate 12.5 Oral every 8 hours  metroNIDAZOLE  IVPB 500 IV Intermittent every 6 hours  pantoprazole    Tablet 40 Oral before breakfast  Parenteral Nutrition - Adult 1 TPN Continuous <Continuous>  piperacillin/tazobactam IVPB.. 4.5 IV Intermittent every 6 hours  senna 2 Oral at bedtime      WEIGHT  Weight (kg): 71.1 (09-15-20 @ 21:30)  Creatinine, Serum: <0.5 mg/dL (09-21-20 @ 06:00)  Creatinine, Serum: <0.5 mg/dL (09-21-20 @ 00:00)  Creatinine, Serum: <0.5 mg/dL (09-20-20 @ 22:08)  Creatinine, Serum: <0.5 mg/dL (09-20-20 @ 16:18)      ANTIBIOTICS:  caspofungin IVPB 50 milliGRAM(s) IV Intermittent every 24 hours  caspofungin IVPB      metroNIDAZOLE  IVPB 500 milliGRAM(s) IV Intermittent every 6 hours  piperacillin/tazobactam IVPB.. 4.5 Gram(s) IV Intermittent every 6 hours      All available historical records have been reviewed       GLORIA SCHAFER  45y, Female  Allergy: No Known Allergies      LOS  7d    CHIEF COMPLAINT: SBO (21 Sep 2020 06:20)      INTERVAL EVENTS/HPI  - No acute events overnight  - T(F): , Max: 99.2 (09-21-20 @ 04:00)  - Tolerating medication  - WBC Count: 9.13 (09-20-20 @ 04:30) downtrending   WBC Count: 13.08 (09-19-20 @ 17:19)  - Creatinine, Serum: <0.5 (09-21-20 @ 06:00)  Creatinine, Serum: <0.5 (09-21-20 @ 00:00)       ROS  General: Denies rigors, nightsweats  HEENT: Denies headache, rhinorrhea, sore throat, eye pain  CV: Denies CP, palpitations  PULM: Denies wheezing, hemoptysis  GI: Denies hematemesis, hematochezia, melena  : Denies discharge, hematuria  MSK: Denies arthralgias, myalgias  SKIN: Denies rash, lesions  NEURO: Denies paresthesias, weakness  PSYCH: Denies depression, anxiety     VITALS:  T(F): 99.2, Max: 99.2 (09-21-20 @ 04:00)  HR: 118  BP: 123/80  RR: 18Vital Signs Last 24 Hrs  T(C): 37.3 (21 Sep 2020 04:00), Max: 37.3 (21 Sep 2020 04:00)  T(F): 99.2 (21 Sep 2020 04:00), Max: 99.2 (21 Sep 2020 04:00)  HR: 118 (21 Sep 2020 04:35) (98 - 129)  BP: 123/80 (21 Sep 2020 07:00) (123/80 - 137/97)  BP(mean): 97 (21 Sep 2020 07:00) (90 - 109)  RR: 18 (21 Sep 2020 04:00) (13 - 23)  SpO2: 97% (21 Sep 2020 04:00) (96% - 100%)    PHYSICAL EXAM:  Gen: NAD, resting in bed  HEENT: Normocephalic, atraumatic  Neck: supple, no lymphadenopathy  CV: Regular rate & regular rhythm  Lungs: decreased BS at bases, no fremitus  Abdomen: Soft, BS present, dressings  Ext: Warm, well perfused  Neuro: non focal, awake  Skin: no rash, no erythema  Lines: no phlebitis     FH: Non-contributory  Social Hx: Non-contributory    TESTS & MEASUREMENTS:                        8.2    9.13  )-----------( 74       ( 20 Sep 2020 04:30 )             25.0     09-21    140  |  107  |  11  ----------------------------<  155<H>  3.9   |  23  |  <0.5<L>    Ca    7.1<L>      21 Sep 2020 06:00  Phos  2.6     09-21  Mg     1.7     09-21      eGFR if : 146 mL/min/1.73M2 (09-21-20 @ 06:00)  eGFR if Non African American: 126 mL/min/1.73M2 (09-21-20 @ 06:00)  eGFR if Non African American: 138 mL/min/1.73M2 (09-21-20 @ 00:00)  eGFR if : 160 mL/min/1.73M2 (09-21-20 @ 00:00)  eGFR if Non African American: 126 mL/min/1.73M2 (09-20-20 @ 22:08)  eGFR if : 146 mL/min/1.73M2 (09-20-20 @ 22:08)  eGFR if Non African American: 126 mL/min/1.73M2 (09-20-20 @ 16:18)  eGFR if : 146 mL/min/1.73M2 (09-20-20 @ 16:18)          Culture - Blood (collected 09-17-20 @ 20:40)  Source: .Blood Blood-Peripheral  Preliminary Report (09-19-20 @ 04:01):    No growth to date.    Culture - Urine (collected 09-05-20 @ 14:34)  Source: .Urine Clean Catch (Midstream)  Final Report (09-06-20 @ 21:46):    <10,000 CFU/mL Normal Urogenital Louise    Culture - Stool (collected 08-31-20 @ 19:36)  Source: .Stool Feces  Final Report (09-03-20 @ 08:34):    No enteric pathogens isolated.    (Stool culture examined for Salmonella,    Shigella, Campylobacter, Aeromonas, Plesiomonas,    Vibrio, E.coli O157 and Yersinia)        Lactate, Blood: 1.3 mmol/L (09-18-20 @ 23:50)  Lactate, Blood: 2.3 mmol/L (09-18-20 @ 04:30)  Lactate, Blood: 3.0 mmol/L (09-17-20 @ 16:30)  Lactate, Blood: 2.3 mmol/L (09-17-20 @ 10:20)  Lactate, Blood: 3.0 mmol/L (09-17-20 @ 04:20)  Lactate, Blood: 3.9 mmol/L (09-16-20 @ 23:30)  Lactate, Blood: 3.4 mmol/L (09-16-20 @ 21:20)  Lactate, Blood: 5.2 mmol/L (09-16-20 @ 16:03)  Lactate, Blood: 5.0 mmol/L (09-16-20 @ 14:00)  Lactate, Blood: 4.4 mmol/L (09-16-20 @ 10:00)      INFECTIOUS DISEASES TESTING  Fungitell: <31 (09-17-20 @ 10:40)  Rapid RVP Result: NotDetec (09-14-20 @ 19:57)  COVID-19 PCR: NotDetec (09-05-20 @ 00:54)  COVID-19 PCR: NotDetec (08-30-20 @ 00:44)  COVID-19 PCR: NotDetec (08-19-20 @ 04:45)      INFLAMMATORY MARKERS  C-Reactive Protein, Serum: 1.71 mg/dL (09-15-20 @ 11:40)      RADIOLOGY & ADDITIONAL TESTS:  I have personally reviewed the last available Chest xray  CXR      CT      CARDIOLOGY TESTING  12 Lead ECG:   Ventricular Rate 141 BPM    Atrial Rate 141 BPM    P-R Interval 144 ms    QRS Duration 66 ms    Q-T Interval 266 ms    QTC Calculation(Bazett) 407 ms    P Axis 143 degrees    R Axis 139 degrees    T Axis 119 degrees    Diagnosis Line *** Suspect arm lead reversal, interpretation assumes no reversal  Sinus tachycardia  Low voltage QRS  Lateral infarct , age undetermined  Abnormal ECG    Confirmed by Hever He ([id number]) on 9/16/2020 7:56:53 PM (09-16-20 @ 12:04)  12 Lead ECG:   Systolic  mmHg    Diastolic BP 58 mmHg    Ventricular Rate 138 BPM    Atrial Rate 138 BPM    P-R Interval 152 ms    QRS Duration 74 ms    Q-T Interval 276 ms    QTC Calculation(Bazett) 418 ms    P Axis 64 degrees    R Axis 64 degrees    T Axis 41 degrees    Diagnosis Line Sinus tachycardia  Low voltage QRS  Nonspecific T wave abnormality  Abnormal ECG    Confirmed by Hever He ([id number]) on 9/15/2020 8:55:31 PM (09-15-20 @ 20:37)      MEDICATIONS  caspofungin IVPB 50 IV Intermittent every 24 hours  caspofungin IVPB     chlorhexidine 4% Liquid 1 Topical daily  fat emulsion (Fish Oil and Plant Based) 20% Infusion 1.407 IV Continuous <Continuous>  furosemide    Tablet 20 Oral daily  heparin   Injectable 5000 SubCutaneous every 8 hours  HYDROmorphone PCA (1 mG/mL) 30 PCA Continuous PCA Continuous  insulin regular  human corrective regimen sliding scale  IV Push every 4 hours  metoprolol tartrate 12.5 Oral every 8 hours  metroNIDAZOLE  IVPB 500 IV Intermittent every 6 hours  pantoprazole    Tablet 40 Oral before breakfast  Parenteral Nutrition - Adult 1 TPN Continuous <Continuous>  piperacillin/tazobactam IVPB.. 4.5 IV Intermittent every 6 hours  senna 2 Oral at bedtime      WEIGHT  Weight (kg): 71.1 (09-15-20 @ 21:30)  Creatinine, Serum: <0.5 mg/dL (09-21-20 @ 06:00)  Creatinine, Serum: <0.5 mg/dL (09-21-20 @ 00:00)  Creatinine, Serum: <0.5 mg/dL (09-20-20 @ 22:08)  Creatinine, Serum: <0.5 mg/dL (09-20-20 @ 16:18)      ANTIBIOTICS:  caspofungin IVPB 50 milliGRAM(s) IV Intermittent every 24 hours  caspofungin IVPB      metroNIDAZOLE  IVPB 500 milliGRAM(s) IV Intermittent every 6 hours  piperacillin/tazobactam IVPB.. 4.5 Gram(s) IV Intermittent every 6 hours      All available historical records have been reviewed

## 2020-09-21 NOTE — PROGRESS NOTE ADULT - SUBJECTIVE AND OBJECTIVE BOX
PGY4 progress note    Patient seen and examined at bedside, resting comfortably. Off pressors. Tolerating sips and chips. Reports abdominal pain improved.     PE:  Vital Signs Last 24 Hrs  T(C): 37.3 (21 Sep 2020 04:00), Max: 37.3 (21 Sep 2020 04:00)  T(F): 99.2 (21 Sep 2020 04:00), Max: 99.2 (21 Sep 2020 04:00)  HR: 118 (21 Sep 2020 04:35) (98 - 129)  BP: 128/85 (21 Sep 2020 04:00) (124/72 - 137/97)  BP(mean): 101 (21 Sep 2020 04:00) (90 - 109)  RR: 18 (21 Sep 2020 04:00) (13 - 23)  SpO2: 97% (21 Sep 2020 04:00) (96% - 100%)    UO [id number] 425cc    GEN: AAOX3  CVS: tachycardic, normal rhythm   Lungs: CTAB  Abd: vertical incision packed with kerlex, foam dressing over. Abdomen soft, appropriately tender diffusely, no r/g/r  Ext: no calf tenderness or edema, SCDs in place    labs:                        8.2    9.13  )-----------( 74       ( 20 Sep 2020 04:30 )             25.0     09-21    140  |  106  |  11  ----------------------------<  157<H>  3.2<L>   |  25  |  <0.5<L>    Ca    7.0<L>      21 Sep 2020 00:00  Phos  2.1     09-21  Mg     1.8     09-21      MEDICATIONS  (STANDING):  caspofungin IVPB 50 milliGRAM(s) IV Intermittent every 24 hours  caspofungin IVPB      chlorhexidine 4% Liquid 1 Application(s) Topical daily  fat emulsion (Fish Oil and Plant Based) 20% Infusion 1.407 Gm/kG/Day (31.3 mL/Hr) IV Continuous <Continuous>  furosemide    Tablet 20 milliGRAM(s) Oral daily  heparin   Injectable 5000 Unit(s) SubCutaneous every 8 hours  HYDROmorphone PCA (1 mG/mL) 30 milliLiter(s) PCA Continuous PCA Continuous  insulin regular  human corrective regimen sliding scale   IV Push every 4 hours  metoprolol tartrate 12.5 milliGRAM(s) Oral every 8 hours  metroNIDAZOLE  IVPB 500 milliGRAM(s) IV Intermittent every 6 hours  pantoprazole    Tablet 40 milliGRAM(s) Oral before breakfast  Parenteral Nutrition - Adult 1 Each (85 mL/Hr) TPN Continuous <Continuous>  piperacillin/tazobactam IVPB.. 4.5 Gram(s) IV Intermittent every 6 hours  senna 2 Tablet(s) Oral at bedtime    MEDICATIONS  (PRN):  benzocaine 20% Spray 1 Spray(s) Topical every 4 hours PRN sore throat  bisacodyl Suppository 10 milliGRAM(s) Rectal daily PRN Constipation  naloxone Injectable 0.1 milliGRAM(s) IV Push every 3 minutes PRN For ANY of the following changes in patient status:  A. RR LESS THAN 10 breaths per minute, B. Oxygen saturation LESS THAN 90%, C. Sedation score of 6

## 2020-09-21 NOTE — PHYSICAL THERAPY INITIAL EVALUATION ADULT - GAIT TRAINING, PT EVAL
Pt will ambulate for 40 ft using RW with stable vitals with Min A in 5-7 days to facilitate return to PLOF.

## 2020-09-21 NOTE — PHYSICAL THERAPY INITIAL EVALUATION ADULT - GENERAL OBSERVATIONS, REHAB EVAL
14:00-14:30. chart reviewed. Pt received semi-anderson at B/S, alert, oriented, able to follow multi-step instructions and agreeable to PT evaluation.  + IV (TPN), + O2 2 L via NC, + foly cath, + monitoring, c/o abdominal pain 8-9/10, s/p pain medication. tachycardia 120s, Nurse/surgery team made aware.

## 2020-09-21 NOTE — PHYSICAL THERAPY INITIAL EVALUATION ADULT - PERTINENT HX OF CURRENT PROBLEM, REHAB EVAL
44 y/o no Pmhx s/p PAULIE/BS on 8/19 for fibroid uterus, s/p diagnostic laparoscopy converted to exploratory laparotomy and small bowel reanastomosis, with breakdown of staple line of proximal limb of anastomosis, s/p repeat exploratory laparotomy with small bowel resection, reanastomosis and appendectomy, POD#6 tachycardic and hypotensive postop, now off pressors, likely septic shock, admitted to SICU for close hemodynamic monitoring.

## 2020-09-21 NOTE — PROGRESS NOTE ADULT - ASSESSMENT
46 y/o no Pmhx s/p PAULIE/BS on 8/19 for fibroid uterus POD#33, s/p diagnostic laparoscopy converted to exploratory laparotomy and small bowel reanastomosis, with breakdown of staple line of proximal limb of anastomosis, s/p repeat exploratory laparotomy with small bowel resection, reanastomosis and appendectomy, POD#6 tachycardic and hypotensive postop, now off pressors, likely septic shock, admitted to SICU for close hemodynamic monitoring.   - Neuro: AAOX3, dilaudid PCA pump for pain control  - CVS: tachycardic, metoprolol PRN, off pressors  - Pulm: no longer tachypneic, 99% on 2LNC, CT negative for PE, f/u AM CXR  - GI: s/p NGT, sips and chips, yesterday dressing changes daily by gen surg  - : IV fluids @110 cc/hr, monitor UO, trend Cr, lasix 20 mg daily started, potassium being repleted  - heme/onc: s/p 1U pRBCs, post transfusion Hgb 7.5, plts increasing, last 74, HIT ab neg, transitioned back to heparin  - Inf: likely septic shock, lactate downtrending, antibiotics changed to zosyn, flagyl and caspofungin per ID, solucortef started  - Lines: Right IJ central line, A line left radial, escalera, midline  - management per SICU team  - gyn to follow    Will inform Dr. Molina.

## 2020-09-21 NOTE — PROGRESS NOTE ADULT - ATTENDING COMMENTS
46 y/o female with SBO.  S/P Ex. Lap.  Peritonitis. Abdominal sepsis.  Acute respiratory failure.  Acute postoperative pain.  Abdominal wall cellulitis.  Moderate malnutrition.    PLAN:  - pain control  - incentive spirometer; OOB  - keep MAP>65  - advance to clear liquid diet.  - Continue TPN today.  - ID followup  - GI and DVT prophylaxis

## 2020-09-21 NOTE — PROGRESS NOTE ADULT - ASSESSMENT
ASSESSMENT  46 y/o no Pmhx s/p PAULIE/BS on 8/19 for fibroid uterus, s/p diagnostic laparoscopy converted to exploratory laparotomy and small bowel reanastomosis, with breakdown of staple line of proximal limb of anastomosis, s/p repeat exploratory laparotomy with small bowel resection, reanastomosis and appendectomy, POD#6 tachycardic and hypotensive postop, now off pressors, likely septic shock, admitted to SICU for close hemodynamic monitoring.    IMPRESSION;  Feculent peritonitis secondary to perforated viscus  9/15 : s/p diagnostic lap, NARCISO, SBR (43cm ileum) w/ primary anastamosis (side to side)  RVP NGTD  WBC 10.0  CXR no infiltrates    RECOMMENDATIONS;  - Today is D7 of ABX, OK to D/C  Zosyn 4.5 gm iv q6h over 4h  Flagyl 500 mg iv q6h  Caspofungin 50 mg iv q24h  This is a preliminary incomplete pended note, all final recommendations to follow after interview and examination of the patient.      ASSESSMENT  44 y/o no Pmhx s/p PAULIE/BS on 8/19 for fibroid uterus, s/p diagnostic laparoscopy converted to exploratory laparotomy and small bowel reanastomosis, with breakdown of staple line of proximal limb of anastomosis, s/p repeat exploratory laparotomy with small bowel resection, reanastomosis and appendectomy, POD#6 tachycardic and hypotensive postop, now off pressors, likely septic shock, admitted to SICU for close hemodynamic monitoring.    IMPRESSION;  Feculent peritonitis secondary to perforated viscus  9/15 : s/p diagnostic lap, NARCISO, SBR (43cm ileum) w/ primary anastamosis (side to side)  RVP NGTD  WBC 10.0  CXR no infiltrates    RECOMMENDATIONS;  - Today is D7 of ABX, OK to D/C  Zosyn 4.5 gm iv q6h over 4h  Flagyl 500 mg iv q6h  Caspofungin 50 mg iv q24h

## 2020-09-21 NOTE — PROGRESS NOTE ADULT - ASSESSMENT
A/P:  GLORIA SCHAFER is a 45yFemale HD8  8/19/20: Ex lap PAULIE/BS, ureteral stent placement for 15cm bleeding fibroid  9/15: ex lap, resection of 43 cm of ileum secondary to serosal tears, side-to-side ileoileal anastomosis, enterotomy stapled  9/15: Ex lap small bowel resection with anastomosis appendectomy     She is currently improving clinically, more responsive and interactive. pain improving.     Plan:   - IS  - f/u vitals  - f/u labs & replete if necessary  - DVT PPX  - GI PPX  - daily dressing changes with packing  - Under ICU care at this time  - Passed flatus and BM small  - may start sips of clears  - Primary care as per ICU.      09-21-20 @ 02:35

## 2020-09-21 NOTE — PROGRESS NOTE ADULT - SUBJECTIVE AND OBJECTIVE BOX
GLORIA SCHAFER  [id number]  45y Female    Indication for ICU admission: intraabdominal sepsis, s/p diagnostic lap, NARCISO, SBR (43cm ileum) w/ primary anastamosis (side to side)  Admit Date:09-14-20  ICU Date: 9/15/2020  OR Date: 9/15/2020 x2    No Known Allergies    PAST MEDICAL & SURGICAL HISTORY:  Uterine fibroid    Status post bilateral salpingectomy    S/P PAULIE (total abdominal hysterectomy)      Home Medications:  ibuprofen 600 mg oral tablet: 1 tab(s) orally every 6 hours, As needed, Mild Pain (1 - 3) (01 Sep 2020 06:26)        24HRS EVENT:      Overnight: Tachy 130 Lopressor 2.5 x1 given. NGt in place. Abdomen soft. UOP 400cc/hr   Hemodynamically stable. TPN at 50cc/hr     NEURO:    AAOx3      Acute pain - controlled with Dilaudid PCA     RESP:     On NC @ 2L    ABG PRN    AM CXR    CT Chest (PE protocol) 9/17- negative for PE; small-moderate b/l pleural effusions with compressive atelectasis      CARDS:     Hypotension Resolved- Off all pressors    Acute HTN & tachycardia - switched from IV to PO Metoprolol 12.5 q8h    CE neg, 0.04, 0.16, 0.07 - no longer trending    ECHO: EF 50-55%, G1DD, mild MR, mild TR    BNP-2013     On Lasix 20 PO QD      GI/NUTR:     CT A/P (9/17) - negative for leak; postsurgical changes with post-op ileus    Diet: NPO w sips and chips, TPN  @ 50cc/hr (no free water seen on order)    Hepatic panel 9/18: WNL    IVL    GI ppx: PPI    Dulcolax suppository       /RENAL:     Silva - -250 cc/hr    (9/19) Lasix 40 IV & Diamox 500 --> good response, 5.2L+    BUN/Cr-  11/0.5     Lactate cleared     Repleting Lytes     HEME/ONC:     Hgb 8.2    Transfused 1 uPRBC (split 1/2 and 1/2 on 9/18)    Plt 74    INR 2.5 > 1.7 >1.3    HIT antibody: negative    DVT Prophylaxis: HSQ, SCDs    ID:    Afebrile    WBC 9.13     Abx: Caspfungin, Zosyn, Flagyl    -ID following    BCx 9/17- (NGTD)     ENDO:    Hyperglycemic - on ISS    Solucortef 50 q6 to be d/c'd 9/20      LINES/DRAINS:  PIV , [place], central line, Silva         DVT Prophylaxis: heparin   Injectable 5000 Unit(s) SubCutaneous every 8 hours  GI Prophylaxis:pantoprazole  Injectable 40 milliGRAM(s) IV Push daily      ***Tubes/Lines/Drains  ***  Peripheral IV  NG Tube  Urinary Catheter		Indication: Strict I&O      R. Basilic Midline                      Date: 9/17    [X] A ten-point review of systems was otherwise negative except as noted above.  [  ] Due to altered mental status/intubation, subjective information was not attained from the patient. History was obtained, to the extent possible, from review of the chart and collateral sources of information.     GLORIA SCHAFER  [id number]  45y Female    Indication for ICU admission: intraabdominal sepsis, s/p diagnostic lap, NARCISO, SBR (43cm ileum) w/ primary anastamosis (side to side)  Admit Date:20  ICU Date: 9/15/2020  OR Date: 9/15/2020 x2    No Known Allergies    PAST MEDICAL & SURGICAL HISTORY:  Uterine fibroid    Status post bilateral salpingectomy    S/P PAULIE (total abdominal hysterectomy)      Home Medications:  ibuprofen 600 mg oral tablet: 1 tab(s) orally every 6 hours, As needed, Mild Pain (1 - 3) (01 Sep 2020 06:26)        24HRS EVENT:      Overnight: Tachy 130 Lopressor 2.5 x1, 5mg x1 given. NGT d/c'd. Abdomen soft. UOP 400cc/hr   Hemodynamically stable. TPN at 50cc/hr     NEURO:    AAOx3      Acute pain - controlled with Dilaudid PCA     RESP:     On NC @ 2L    ABG PRN    AM CXR    CT Chest (PE protocol) - negative for PE; small-moderate b/l pleural effusions with compressive atelectasis      CARDS:     Hypotension Resolved- Off all pressors    Acute HTN & tachycardia - switched from IV to PO Metoprolol 12.5 q8h    CE neg, 0.04, 0.16, 0.07 - no longer trending    ECHO: EF 50-55%, G1DD, mild MR, mild TR    BNP-     On Lasix 20 PO QD      GI/NUTR:     CT A/P () - negative for leak; postsurgical changes with post-op ileus    Diet: NPO w sips and chips, TPN  @ 50cc/hr (no free water seen on order)    Hepatic panel : WNL    IVL    GI ppx: PPI    Dulcolax suppository       /RENAL:     Escalera - -250 cc/hr    () Lasix 40 IV & Diamox 500 --> good response, 5.2L+    BUN/Cr-  11/0.5     Lactate cleared     Repleting Lytes     HEME/ONC:     Hgb 8.2    Transfused 1 uPRBC (split 1/2 and 1/2 on )    Plt 74    INR 2.5 > 1.7 >1.3    HIT antibody: negative    DVT Prophylaxis: HSQ, SCDs    ID:    Afebrile    WBC 9.13     Abx: Caspfungin, Zosyn, Flagyl    -ID following    BCx - (NGTD)     ENDO:    Hyperglycemic - on ISS    Solucortef 50 q6 to be d/c'd       LINES/DRAINS:  PIV , [place], central line, Escalera         DVT Prophylaxis: heparin   Injectable 5000 Unit(s) SubCutaneous every 8 hours  GI Prophylaxis:pantoprazole  Injectable 40 milliGRAM(s) IV Push daily      ***Tubes/Lines/Drains  ***  Peripheral IV  NG Tube  Urinary Catheter		Indication: Strict I&O      R. Basilic Midline                      Date:     [X] A ten-point review of systems was otherwise negative except as noted above.  [  ] Due to altered mental status/intubation, subjective information was not attained from the patient. History was obtained, to the extent possible, from review of the chart and collateral sources of information.    Daily     Daily Weight in k.8 (21 Sep 2020 05:00)    Diet, Full Liquid:   Consistent Carbohydrate Evening Snack (20 @ 11:35)      CURRENT MEDS:  Neurologic Medications  acetaminophen   Tablet .. 650 milliGRAM(s) Oral every 6 hours  oxyCODONE    IR 10 milliGRAM(s) Oral every 6 hours PRN Severe Pain (7 - 10)  oxyCODONE    IR 5 milliGRAM(s) Oral every 4 hours PRN Moderate Pain (4 - 6)    Respiratory Medications    Cardiovascular Medications  furosemide    Tablet 20 milliGRAM(s) Oral daily  metoprolol tartrate 25 milliGRAM(s) Oral every 12 hours    Gastrointestinal Medications  bisacodyl Suppository 10 milliGRAM(s) Rectal daily PRN Constipation  fat emulsion (Fish Oil and Plant Based) 20% Infusion 1.407 Gm/kG/Day IV Continuous <Continuous>  pantoprazole    Tablet 40 milliGRAM(s) Oral before breakfast  Parenteral Nutrition - Adult 1 Each TPN Continuous <Continuous>  Parenteral Nutrition - Adult 1 Each TPN Continuous <Continuous>  senna 2 Tablet(s) Oral at bedtime    Genitourinary Medications    Hematologic/Oncologic Medications  heparin   Injectable 5000 Unit(s) SubCutaneous every 8 hours    Antimicrobial/Immunologic Medications  caspofungin IVPB 50 milliGRAM(s) IV Intermittent every 24 hours  caspofungin IVPB      metroNIDAZOLE  IVPB 500 milliGRAM(s) IV Intermittent every 6 hours  piperacillin/tazobactam IVPB.. 4.5 Gram(s) IV Intermittent every 6 hours    Endocrine/Metabolic Medications  insulin regular  human corrective regimen sliding scale   IV Push Before meals and at bedtime    Topical/Other Medications  chlorhexidine 4% Liquid 1 Application(s) Topical daily      ICU Vital Signs Last 24 Hrs  T(C): 37 (21 Sep 2020 08:07), Max: 37.3 (21 Sep 2020 04:00)  T(F): 98.6 (21 Sep 2020 08:07), Max: 99.2 (21 Sep 2020 04:00)  HR: 126 (21 Sep 2020 13:00) (98 - 129)  BP: 118/81 (21 Sep 2020 13:00) (116/67 - 136/86)  BP(mean): 93 (21 Sep 2020 13:00) (81 - 106)  ABP: 105/99 (20 Sep 2020 19:00) (105/99 - 115/104)  ABP(mean): 101 (20 Sep 2020 19:00) (101 - 108)  RR: 18 (21 Sep 2020 13:00) (13 - 23)  SpO2: 100% (21 Sep 2020 13:00) (90% - 100%)      Adult Advanced Hemodynamics Last 24 Hrs  CVP(mm Hg): --  CVP(cm H2O): --  CO: --  CI: --  PA: --  PA(mean): --  PCWP: --  SVR: --  SVRI: --  PVR: --  PVRI: --          I&O's Summary    20 Sep 2020 07:  -  21 Sep 2020 07:00  --------------------------------------------------------  IN: 5852.3 mL / OUT: 6800 mL / NET: -947.7 mL    21 Sep 2020 07:  -  21 Sep 2020 13:58  --------------------------------------------------------  IN: 243.9 mL / OUT: 1900 mL / NET: -1656.1 mL      I&O's Detail    20 Sep 2020 07:  -  21 Sep 2020 07:00  --------------------------------------------------------  IN:    Fat Emulsion (Fish Oil &amp; Plant Based) 20% Infusion: 281.7 mL    IV PiggyBack: 416.5 mL    IV PiggyBack: 1694.1 mL    IV PiggyBack: 500 mL    Oral Fluid: 1760 mL    TPN (Total Parenteral Nutrition): 1200 mL  Total IN: 5852.3 mL    OUT:    Indwelling Catheter - Urethral (mL): 6800 mL  Total OUT: 6800 mL    Total NET: -947.7 mL      21 Sep 2020 07:  -  21 Sep 2020 13:58  --------------------------------------------------------  IN:    Fat Emulsion (Fish Oil &amp; Plant Based) 20% Infusion: 93.9 mL    TPN (Total Parenteral Nutrition): 150 mL  Total IN: 243.9 mL    OUT:    Indwelling Catheter - Urethral (mL): 1900 mL  Total OUT: 1900 mL    Total NET: -1656.1 mL          PHYSICAL EXAM:  NEURO: NAD, alert, oriented x 3, no focal deficits    RESP: lungs clear to auscultation, normal expansion/effort      CV: S1, S2, regular rate and rhythm  Cardiac rhythm: normal sinus rhythm    GI: abdomen soft, nontender, nondistended,    Current diet:  NPO***    EXTREMITIES: extremities warm, pink, well perfused    DERM: good skin turgor, no skin breakdown      : escalera catheter in place***    LINES/TUBES/DRAINS:  - peripheral IV  - arterial line ***  - central line ***  - urinary escalera catheter ***    CXR:     LABS:  CAPILLARY BLOOD GLUCOSE      POCT Blood Glucose.: 200 mg/dL (21 Sep 2020 10:50)  POCT Blood Glucose.: 152 mg/dL (21 Sep 2020 05:23)  POCT Blood Glucose.: 144 mg/dL (21 Sep 2020 02:52)  POCT Blood Glucose.: 175 mg/dL (20 Sep 2020 22:04)  POCT Blood Glucose.: 166 mg/dL (20 Sep 2020 16:10)                          9.1    13.76 )-----------( 105      ( 21 Sep 2020 06:00 )             27.8       09-21    140  |  107  |  11  ----------------------------<  155<H>  3.9   |  23  |  <0.5<L>    Ca    7.1<L>      21 Sep 2020 06:00  Phos  2.6       Mg     1.7     -21        PT/INR - ( 20 Sep 2020 04:30 )   PT: 17.30 sec;   INR: 1.50 ratio         PTT - ( 20 Sep 2020 04:30 )  PTT:34.0 sec             GLORIA SCHAFER  [id number]  45y Female    Indication for ICU admission: intraabdominal sepsis, s/p diagnostic lap, NARCISO, SBR (43cm ileum) w/ primary anastamosis (side to side)  Admit Date:20  ICU Date: 9/15/2020  OR Date: 9/15/2020 x2    No Known Allergies    PAST MEDICAL & SURGICAL HISTORY:  Uterine fibroid    Status post bilateral salpingectomy    S/P PAULIE (total abdominal hysterectomy)      Home Medications:  ibuprofen 600 mg oral tablet: 1 tab(s) orally every 6 hours, As needed, Mild Pain (1 - 3) (01 Sep 2020 06:26)        24HRS EVENT:      Overnight: Tachy 130 Lopressor 2.5 x1, 5mg x1 given. NGT d/c'd. Abdomen soft. UOP 400cc/hr   Hemodynamically stable. TPN at 50cc/hr     NEURO:    AAOx3      Acute pain - controlled with Dilaudid PCA     RESP:     On NC @ 2L    ABG PRN    AM CXR    CT Chest (PE protocol) - negative for PE; small-moderate b/l pleural effusions with compressive atelectasis      CARDS:     Hypotension Resolved- Off all pressors    Acute HTN & tachycardia - switched from IV to PO Metoprolol 12.5 q8h    CE neg, 0.04, 0.16, 0.07 - no longer trending    ECHO: EF 50-55%, G1DD, mild MR, mild TR    BNP-     On Lasix 20 PO QD      GI/NUTR:     CT A/P () - negative for leak; postsurgical changes with post-op ileus    Diet: NPO w sips and chips, TPN  @ 50cc/hr (no free water seen on order)    Hepatic panel : WNL    IVL    GI ppx: PPI    Dulcolax suppository       /RENAL:     Escalera - -250 cc/hr    () Lasix 40 IV & Diamox 500 --> good response, 5.2L+    BUN/Cr-  11/0.5     Lactate cleared     Repleting Lytes     HEME/ONC:     Hgb 8.2    Transfused 1 uPRBC (split 1/2 and 1/2 on )    Plt 74    INR 2.5 > 1.7 >1.3    HIT antibody: negative    DVT Prophylaxis: HSQ, SCDs    ID:    Afebrile    WBC 9.13     Abx: Caspfungin, Zosyn, Flagyl    -ID following    BCx - (NGTD)     ENDO:    Hyperglycemic - on ISS    Solucortef 50 q6 to be d/c'd       LINES/DRAINS:  PIV , [place], central line, Escalera         DVT Prophylaxis: heparin   Injectable 5000 Unit(s) SubCutaneous every 8 hours  GI Prophylaxis:pantoprazole  Injectable 40 milliGRAM(s) IV Push daily      ***Tubes/Lines/Drains  ***  Peripheral IV  NG Tube  Urinary Catheter		Indication: Strict I&O      R. Basilic Midline                      Date:     [X] A ten-point review of systems was otherwise negative except as noted above.  [  ] Due to altered mental status/intubation, subjective information was not attained from the patient. History was obtained, to the extent possible, from review of the chart and collateral sources of information.    Daily     Daily Weight in k.8 (21 Sep 2020 05:00)    Diet, Full Liquid:   Consistent Carbohydrate Evening Snack (20 @ 11:35)      CURRENT MEDS:  Neurologic Medications  acetaminophen   Tablet .. 650 milliGRAM(s) Oral every 6 hours  oxyCODONE    IR 10 milliGRAM(s) Oral every 6 hours PRN Severe Pain (7 - 10)  oxyCODONE    IR 5 milliGRAM(s) Oral every 4 hours PRN Moderate Pain (4 - 6)    Respiratory Medications    Cardiovascular Medications  furosemide    Tablet 20 milliGRAM(s) Oral daily  metoprolol tartrate 25 milliGRAM(s) Oral every 12 hours    Gastrointestinal Medications  bisacodyl Suppository 10 milliGRAM(s) Rectal daily PRN Constipation  fat emulsion (Fish Oil and Plant Based) 20% Infusion 1.407 Gm/kG/Day IV Continuous <Continuous>  pantoprazole    Tablet 40 milliGRAM(s) Oral before breakfast  Parenteral Nutrition - Adult 1 Each TPN Continuous <Continuous>  Parenteral Nutrition - Adult 1 Each TPN Continuous <Continuous>  senna 2 Tablet(s) Oral at bedtime    Genitourinary Medications    Hematologic/Oncologic Medications  heparin   Injectable 5000 Unit(s) SubCutaneous every 8 hours    Antimicrobial/Immunologic Medications  caspofungin IVPB 50 milliGRAM(s) IV Intermittent every 24 hours  caspofungin IVPB      metroNIDAZOLE  IVPB 500 milliGRAM(s) IV Intermittent every 6 hours  piperacillin/tazobactam IVPB.. 4.5 Gram(s) IV Intermittent every 6 hours    Endocrine/Metabolic Medications  insulin regular  human corrective regimen sliding scale   IV Push Before meals and at bedtime    Topical/Other Medications  chlorhexidine 4% Liquid 1 Application(s) Topical daily      ICU Vital Signs Last 24 Hrs  T(C): 37 (21 Sep 2020 08:07), Max: 37.3 (21 Sep 2020 04:00)  T(F): 98.6 (21 Sep 2020 08:07), Max: 99.2 (21 Sep 2020 04:00)  HR: 126 (21 Sep 2020 13:00) (98 - 129)  BP: 118/81 (21 Sep 2020 13:00) (116/67 - 136/86)  BP(mean): 93 (21 Sep 2020 13:) (81 - 106)  ABP: 105/99 (20 Sep 2020 19:00) (105/99 - 115/104)  ABP(mean): 101 (20 Sep 2020 19:00) (101 - 108)  RR: 18 (21 Sep 2020 13:00) (13 - 23)  SpO2: 100% (21 Sep 2020 13:) (90% - 100%)      I&O's Summary    20 Sep 2020 07:01  -  21 Sep 2020 07:00  --------------------------------------------------------  IN: 5852.3 mL / OUT: 6800 mL / NET: -947.7 mL    21 Sep 2020 07:  -  21 Sep 2020 13:58  --------------------------------------------------------  IN: 243.9 mL / OUT: 1900 mL / NET: -1656.1 mL      I&O's Detail    20 Sep 2020 07:01  -  21 Sep 2020 07:00  --------------------------------------------------------  IN:    Fat Emulsion (Fish Oil &amp; Plant Based) 20% Infusion: 281.7 mL    IV PiggyBack: 416.5 mL    IV PiggyBack: 1694.1 mL    IV PiggyBack: 500 mL    Oral Fluid: 1760 mL    TPN (Total Parenteral Nutrition): 1200 mL  Total IN: 5852.3 mL    OUT:    Indwelling Catheter - Urethral (mL): 6800 mL  Total OUT: 6800 mL    Total NET: -947.7 mL      21 Sep 2020 07:01  -  21 Sep 2020 13:58  --------------------------------------------------------  IN:    Fat Emulsion (Fish Oil &amp; Plant Based) 20% Infusion: 93.9 mL    TPN (Total Parenteral Nutrition): 150 mL  Total IN: 243.9 mL    OUT:    Indwelling Catheter - Urethral (mL): 1900 mL  Total OUT: 1900 mL    Total NET: -1656.1 mL      PHYSICAL EXAM:  NEURO: NAD, alert, oriented x 3, no focal deficits    RESP: lungs clear to auscultation, normal expansion/effort      CV: S1, S2, regular rate and rhythm  Cardiac rhythm: normal sinus rhythm    GI: abdomen soft, nontender, nondistended,    Current diet:  clear liquid diet    EXTREMITIES: extremities warm, pink, well perfused    DERM: good skin turgor, no skin breakdown      : escalera catheter in place    LINES/TUBES/DRAINS:  - peripheral IV  - arterial line  - central line  - urinary escalera catheter    CXR:     LABS:  CAPILLARY BLOOD GLUCOSE      POCT Blood Glucose.: 200 mg/dL (21 Sep 2020 10:50)  POCT Blood Glucose.: 152 mg/dL (21 Sep 2020 05:23)  POCT Blood Glucose.: 144 mg/dL (21 Sep 2020 02:52)  POCT Blood Glucose.: 175 mg/dL (20 Sep 2020 22:04)  POCT Blood Glucose.: 166 mg/dL (20 Sep 2020 16:10)                          9.1    13.76 )-----------( 105      ( 21 Sep 2020 06:00 )             27.8       09-21    140  |  107  |  11  ----------------------------<  155<H>  3.9   |  23  |  <0.5<L>    Ca    7.1<L>      21 Sep 2020 06:00  Phos  2.6       Mg     1.7     09        PT/INR - ( 20 Sep 2020 04:30 )   PT: 17.30 sec;   INR: 1.50 ratio         PTT - ( 20 Sep 2020 04:30 )  PTT:34.0 sec             GLORIA SCHAFER  [id number]  45y Female    Indication for ICU admission: intraabdominal sepsis, s/p diagnostic lap, NARCISO, SBR (43cm ileum) w/ primary anastamosis (side to side)  Admit Date:20  ICU Date: 9/15/2020  OR Date: 9/15/2020 x2    No Known Allergies    PAST MEDICAL & SURGICAL HISTORY:  Uterine fibroid    Status post bilateral salpingectomy    S/P PAULIE (total abdominal hysterectomy)      Home Medications:  ibuprofen 600 mg oral tablet: 1 tab(s) orally every 6 hours, As needed, Mild Pain (1 - 3) (01 Sep 2020 06:26)    24HRS EVENT:      Overnight: Tachy 130 Lopressor 2.5 x1, 5mg x1 given. NGT d/c'd. Abdomen soft. UOP 400cc/hr   Hemodynamically stable. TPN at 50cc/hr     NEURO:    AAOx3      Acute pain - controlled with Dilaudid PCA     RESP:     On NC @ 2L    ABG PRN    AM CXR    CT Chest (PE protocol) - negative for PE; small-moderate b/l pleural effusions with compressive atelectasis      CARDS:     Hypotension Resolved- Off all pressors    Acute HTN & tachycardia - switched from IV to PO Metoprolol 12.5 q8h    CE neg, 0.04, 0.16, 0.07 - no longer trending    ECHO: EF 50-55%, G1DD, mild MR, mild TR    BNP-     On Lasix 20 PO QD      GI/NUTR:     CT A/P () - negative for leak; postsurgical changes with post-op ileus    Diet: NPO w sips and chips, TPN  @ 50cc/hr (no free water seen on order)    Hepatic panel : WNL    IVL    GI ppx: PPI    Dulcolax suppository       /RENAL:     Escalera - -250 cc/hr    () Lasix 40 IV & Diamox 500 --> good response, 5.2L+    BUN/Cr-  11/0.5     Lactate cleared     Repleting Lytes     HEME/ONC:     Hgb 8.2    Transfused 1 uPRBC (split 1/2 and 1/2 on )    Plt 74    INR 2.5 > 1.7 >1.3    HIT antibody: negative    DVT Prophylaxis: HSQ, SCDs    ID:    Afebrile    WBC 9.13     Abx: Caspfungin, Zosyn, Flagyl    -ID following    BCx - (NGTD)     ENDO:    Hyperglycemic - on ISS    Solucortef 50 q6 to be d/c'd       LINES/DRAINS:  PIV , [place], central line, Escalera         DVT Prophylaxis: heparin   Injectable 5000 Unit(s) SubCutaneous every 8 hours  GI Prophylaxis:pantoprazole  Injectable 40 milliGRAM(s) IV Push daily      ***Tubes/Lines/Drains  ***  Peripheral IV  NG Tube  Urinary Catheter		Indication: Strict I&O      R. Basilic Midline                      Date:     [X] A ten-point review of systems was otherwise negative except as noted above.  [  ] Due to altered mental status/intubation, subjective information was not attained from the patient. History was obtained, to the extent possible, from review of the chart and collateral sources of information.    Daily     Daily Weight in k.8 (21 Sep 2020 05:00)    Diet, Full Liquid:   Consistent Carbohydrate Evening Snack (20 @ 11:35)      CURRENT MEDS:  Neurologic Medications  acetaminophen   Tablet .. 650 milliGRAM(s) Oral every 6 hours  oxyCODONE    IR 10 milliGRAM(s) Oral every 6 hours PRN Severe Pain (7 - 10)  oxyCODONE    IR 5 milliGRAM(s) Oral every 4 hours PRN Moderate Pain (4 - 6)    Respiratory Medications    Cardiovascular Medications  furosemide    Tablet 20 milliGRAM(s) Oral daily  metoprolol tartrate 25 milliGRAM(s) Oral every 12 hours    Gastrointestinal Medications  bisacodyl Suppository 10 milliGRAM(s) Rectal daily PRN Constipation  fat emulsion (Fish Oil and Plant Based) 20% Infusion 1.407 Gm/kG/Day IV Continuous <Continuous>  pantoprazole    Tablet 40 milliGRAM(s) Oral before breakfast  Parenteral Nutrition - Adult 1 Each TPN Continuous <Continuous>  Parenteral Nutrition - Adult 1 Each TPN Continuous <Continuous>  senna 2 Tablet(s) Oral at bedtime    Genitourinary Medications    Hematologic/Oncologic Medications  heparin   Injectable 5000 Unit(s) SubCutaneous every 8 hours    Antimicrobial/Immunologic Medications  caspofungin IVPB 50 milliGRAM(s) IV Intermittent every 24 hours  caspofungin IVPB      metroNIDAZOLE  IVPB 500 milliGRAM(s) IV Intermittent every 6 hours  piperacillin/tazobactam IVPB.. 4.5 Gram(s) IV Intermittent every 6 hours    Endocrine/Metabolic Medications  insulin regular  human corrective regimen sliding scale   IV Push Before meals and at bedtime    Topical/Other Medications  chlorhexidine 4% Liquid 1 Application(s) Topical daily      ICU Vital Signs Last 24 Hrs  T(C): 37 (21 Sep 2020 08:07), Max: 37.3 (21 Sep 2020 04:00)  T(F): 98.6 (21 Sep 2020 08:07), Max: 99.2 (21 Sep 2020 04:00)  HR: 126 (21 Sep 2020 13:00) (98 - 129)  BP: 118/81 (21 Sep 2020 13:00) (116/67 - 136/86)  BP(mean): 93 (21 Sep 2020 13:) (81 - 106)  ABP: 105/99 (20 Sep 2020 19:00) (105/99 - 115/104)  ABP(mean): 101 (20 Sep 2020 19:00) (101 - 108)  RR: 18 (21 Sep 2020 13:00) (13 - 23)  SpO2: 100% (21 Sep 2020 13:) (90% - 100%)      I&O's Summary    20 Sep 2020 07:01  -  21 Sep 2020 07:00  --------------------------------------------------------  IN: 5852.3 mL / OUT: 6800 mL / NET: -947.7 mL    21 Sep 2020 07:  -  21 Sep 2020 13:58  --------------------------------------------------------  IN: 243.9 mL / OUT: 1900 mL / NET: -1656.1 mL      I&O's Detail    20 Sep 2020 07:01  -  21 Sep 2020 07:00  --------------------------------------------------------  IN:    Fat Emulsion (Fish Oil &amp; Plant Based) 20% Infusion: 281.7 mL    IV PiggyBack: 416.5 mL    IV PiggyBack: 1694.1 mL    IV PiggyBack: 500 mL    Oral Fluid: 1760 mL    TPN (Total Parenteral Nutrition): 1200 mL  Total IN: 5852.3 mL    OUT:    Indwelling Catheter - Urethral (mL): 6800 mL  Total OUT: 6800 mL    Total NET: -947.7 mL      21 Sep 2020 07:  -  21 Sep 2020 13:58  --------------------------------------------------------  IN:    Fat Emulsion (Fish Oil &amp; Plant Based) 20% Infusion: 93.9 mL    TPN (Total Parenteral Nutrition): 150 mL  Total IN: 243.9 mL    OUT:    Indwelling Catheter - Urethral (mL): 1900 mL  Total OUT: 1900 mL    Total NET: -1656.1 mL      PHYSICAL EXAM:  NEURO: NAD, alert, oriented x 3, no focal deficits    RESP: lungs clear to auscultation, normal expansion/effort      CV: S1, S2, regular rate and rhythm  Cardiac rhythm: normal sinus rhythm    GI: abdomen soft, [place] tender, nondistended, midline vertical incision packed and dressed. Incision c/d/i  Current diet:  clear liquid diet    EXTREMITIES: extremities warm, pink, well perfused    DERM: good skin turgor, no skin breakdown      : escalera catheter in place    LINES/TUBES/DRAINS:  - peripheral IV  - arterial line  - central line  - urinary escalera catheter    CXR: < from: Xray Chest 1 View- PORTABLE-Routine (20 @ 06:23) >  EXAM:  XR CHEST PORTABLE ROUTINE 1V            PROCEDURE DATE:  2020            INTERPRETATION:  Clinical History / Reason for exam: ICU    Comparison : Chest radiograph 2020.    Technique/Positioning: Single frontal chest radiograph.    Findings:    Support devices: Right-sided central venous catheter with tip overlying the right atrium.    Cardiac/mediastinum/hilum: Unchanged    Lung parenchyma/Pleura: Bibasilar opacities/effusions similar to prior. No pneumothorax.    Skeleton/soft tissues: Unchanged    Impression:    Bibasilar opacities/effusions similar to prior.          LABS:  CAPILLARY BLOOD GLUCOSE      POCT Blood Glucose.: 200 mg/dL (21 Sep 2020 10:50)  POCT Blood Glucose.: 152 mg/dL (21 Sep 2020 05:23)  POCT Blood Glucose.: 144 mg/dL (21 Sep 2020 02:52)  POCT Blood Glucose.: 175 mg/dL (20 Sep 2020 22:04)  POCT Blood Glucose.: 166 mg/dL (20 Sep 2020 16:10)                          9.1    13.76 )-----------( 105      ( 21 Sep 2020 06:00 )             27.8       09-21    140  |  107  |  11  ----------------------------<  155<H>  3.9   |  23  |  <0.5<L>    Ca    7.1<L>      21 Sep 2020 06:00  Phos  2.6     -  Mg     1.7     -21        PT/INR - ( 20 Sep 2020 04:30 )   PT: 17.30 sec;   INR: 1.50 ratio         PTT - ( 20 Sep 2020 04:30 )  PTT:34.0 sec

## 2020-09-22 ENCOUNTER — RESULT REVIEW (OUTPATIENT)
Age: 45
End: 2020-09-22

## 2020-09-22 LAB
ANION GAP SERPL CALC-SCNC: 4 MMOL/L — LOW (ref 7–14)
ANION GAP SERPL CALC-SCNC: 8 MMOL/L — SIGNIFICANT CHANGE UP (ref 7–14)
BUN SERPL-MCNC: 10 MG/DL — SIGNIFICANT CHANGE UP (ref 10–20)
BUN SERPL-MCNC: 11 MG/DL — SIGNIFICANT CHANGE UP (ref 10–20)
CALCIUM SERPL-MCNC: 6.9 MG/DL — LOW (ref 8.5–10.1)
CALCIUM SERPL-MCNC: 6.9 MG/DL — LOW (ref 8.5–10.1)
CHLORIDE SERPL-SCNC: 100 MMOL/L — SIGNIFICANT CHANGE UP (ref 98–110)
CHLORIDE SERPL-SCNC: 99 MMOL/L — SIGNIFICANT CHANGE UP (ref 98–110)
CO2 SERPL-SCNC: 26 MMOL/L — SIGNIFICANT CHANGE UP (ref 17–32)
CO2 SERPL-SCNC: 26 MMOL/L — SIGNIFICANT CHANGE UP (ref 17–32)
CREAT SERPL-MCNC: <0.5 MG/DL — LOW (ref 0.7–1.5)
CREAT SERPL-MCNC: <0.5 MG/DL — LOW (ref 0.7–1.5)
GLUCOSE BLDC GLUCOMTR-MCNC: 109 MG/DL — HIGH (ref 70–99)
GLUCOSE BLDC GLUCOMTR-MCNC: 134 MG/DL — HIGH (ref 70–99)
GLUCOSE BLDC GLUCOMTR-MCNC: 143 MG/DL — HIGH (ref 70–99)
GLUCOSE BLDC GLUCOMTR-MCNC: 146 MG/DL — HIGH (ref 70–99)
GLUCOSE BLDC GLUCOMTR-MCNC: 150 MG/DL — HIGH (ref 70–99)
GLUCOSE BLDC GLUCOMTR-MCNC: 158 MG/DL — HIGH (ref 70–99)
GLUCOSE BLDC GLUCOMTR-MCNC: 160 MG/DL — HIGH (ref 70–99)
GLUCOSE BLDC GLUCOMTR-MCNC: 166 MG/DL — HIGH (ref 70–99)
GLUCOSE BLDC GLUCOMTR-MCNC: 176 MG/DL — HIGH (ref 70–99)
GLUCOSE BLDC GLUCOMTR-MCNC: 179 MG/DL — HIGH (ref 70–99)
GLUCOSE BLDC GLUCOMTR-MCNC: 180 MG/DL — HIGH (ref 70–99)
GLUCOSE BLDC GLUCOMTR-MCNC: 197 MG/DL — HIGH (ref 70–99)
GLUCOSE BLDC GLUCOMTR-MCNC: 198 MG/DL — HIGH (ref 70–99)
GLUCOSE BLDC GLUCOMTR-MCNC: 346 MG/DL — HIGH (ref 70–99)
GLUCOSE BLDC GLUCOMTR-MCNC: 45 MG/DL — CRITICAL LOW (ref 70–99)
GLUCOSE BLDC GLUCOMTR-MCNC: 52 MG/DL — LOW (ref 70–99)
GLUCOSE SERPL-MCNC: 185 MG/DL — HIGH (ref 70–99)
GLUCOSE SERPL-MCNC: 195 MG/DL — HIGH (ref 70–99)
HCT VFR BLD CALC: 25.9 % — LOW (ref 37–47)
HGB BLD-MCNC: 8.7 G/DL — LOW (ref 12–16)
MAGNESIUM SERPL-MCNC: 1.7 MG/DL — LOW (ref 1.8–2.4)
MAGNESIUM SERPL-MCNC: 2 MG/DL — SIGNIFICANT CHANGE UP (ref 1.8–2.4)
MCHC RBC-ENTMCNC: 28 PG — SIGNIFICANT CHANGE UP (ref 27–31)
MCHC RBC-ENTMCNC: 33.6 G/DL — SIGNIFICANT CHANGE UP (ref 32–37)
MCV RBC AUTO: 83.3 FL — SIGNIFICANT CHANGE UP (ref 81–99)
NRBC # BLD: 0 /100 WBCS — SIGNIFICANT CHANGE UP (ref 0–0)
PHOSPHATE SERPL-MCNC: 2.2 MG/DL — SIGNIFICANT CHANGE UP (ref 2.1–4.9)
PHOSPHATE SERPL-MCNC: 2.4 MG/DL — SIGNIFICANT CHANGE UP (ref 2.1–4.9)
PLATELET # BLD AUTO: 120 K/UL — LOW (ref 130–400)
POTASSIUM SERPL-MCNC: 3 MMOL/L — LOW (ref 3.5–5)
POTASSIUM SERPL-MCNC: 3.7 MMOL/L — SIGNIFICANT CHANGE UP (ref 3.5–5)
POTASSIUM SERPL-SCNC: 3 MMOL/L — LOW (ref 3.5–5)
POTASSIUM SERPL-SCNC: 3.7 MMOL/L — SIGNIFICANT CHANGE UP (ref 3.5–5)
RBC # BLD: 3.11 M/UL — LOW (ref 4.2–5.4)
RBC # FLD: 15.5 % — HIGH (ref 11.5–14.5)
SODIUM SERPL-SCNC: 129 MMOL/L — LOW (ref 135–146)
SODIUM SERPL-SCNC: 134 MMOL/L — LOW (ref 135–146)
WBC # BLD: 16.63 K/UL — HIGH (ref 4.8–10.8)
WBC # FLD AUTO: 16.63 K/UL — HIGH (ref 4.8–10.8)

## 2020-09-22 PROCEDURE — 74177 CT ABD & PELVIS W/CONTRAST: CPT | Mod: 26

## 2020-09-22 PROCEDURE — 71045 X-RAY EXAM CHEST 1 VIEW: CPT | Mod: 26

## 2020-09-22 PROCEDURE — 11005 DBRDMT SKIN ABDOMINAL WALL: CPT | Mod: 58

## 2020-09-22 PROCEDURE — 99024 POSTOP FOLLOW-UP VISIT: CPT

## 2020-09-22 PROCEDURE — 99291 CRITICAL CARE FIRST HOUR: CPT | Mod: 24

## 2020-09-22 RX ORDER — INSULIN HUMAN 100 [IU]/ML
INJECTION, SOLUTION SUBCUTANEOUS
Refills: 0 | Status: DISCONTINUED | OUTPATIENT
Start: 2020-09-22 | End: 2020-09-22

## 2020-09-22 RX ORDER — OXYCODONE HYDROCHLORIDE 5 MG/1
10 TABLET ORAL EVERY 6 HOURS
Refills: 0 | Status: DISCONTINUED | OUTPATIENT
Start: 2020-09-22 | End: 2020-09-23

## 2020-09-22 RX ORDER — HYDROMORPHONE HYDROCHLORIDE 2 MG/ML
1 INJECTION INTRAMUSCULAR; INTRAVENOUS; SUBCUTANEOUS ONCE
Refills: 0 | Status: DISCONTINUED | OUTPATIENT
Start: 2020-09-22 | End: 2020-09-22

## 2020-09-22 RX ORDER — IOHEXOL 300 MG/ML
30 INJECTION, SOLUTION INTRAVENOUS ONCE
Refills: 0 | Status: COMPLETED | OUTPATIENT
Start: 2020-09-22 | End: 2020-09-22

## 2020-09-22 RX ORDER — HYDROMORPHONE HYDROCHLORIDE 2 MG/ML
0.5 INJECTION INTRAMUSCULAR; INTRAVENOUS; SUBCUTANEOUS ONCE
Refills: 0 | Status: DISCONTINUED | OUTPATIENT
Start: 2020-09-22 | End: 2020-09-22

## 2020-09-22 RX ORDER — PIPERACILLIN AND TAZOBACTAM 4; .5 G/20ML; G/20ML
4.5 INJECTION, POWDER, LYOPHILIZED, FOR SOLUTION INTRAVENOUS ONCE
Refills: 0 | Status: COMPLETED | OUTPATIENT
Start: 2020-09-22 | End: 2020-09-22

## 2020-09-22 RX ORDER — POTASSIUM CHLORIDE 20 MEQ
20 PACKET (EA) ORAL
Refills: 0 | Status: COMPLETED | OUTPATIENT
Start: 2020-09-22 | End: 2020-09-22

## 2020-09-22 RX ORDER — IOHEXOL 300 MG/ML
30 INJECTION, SOLUTION INTRAVENOUS ONCE
Refills: 0 | Status: DISCONTINUED | OUTPATIENT
Start: 2020-09-22 | End: 2020-09-22

## 2020-09-22 RX ORDER — PIPERACILLIN AND TAZOBACTAM 4; .5 G/20ML; G/20ML
4.5 INJECTION, POWDER, LYOPHILIZED, FOR SOLUTION INTRAVENOUS EVERY 8 HOURS
Refills: 0 | Status: DISCONTINUED | OUTPATIENT
Start: 2020-09-22 | End: 2020-09-23

## 2020-09-22 RX ORDER — LINEZOLID 600 MG/300ML
600 INJECTION, SOLUTION INTRAVENOUS ONCE
Refills: 0 | Status: COMPLETED | OUTPATIENT
Start: 2020-09-22 | End: 2020-09-22

## 2020-09-22 RX ORDER — MAGNESIUM SULFATE 500 MG/ML
2 VIAL (ML) INJECTION ONCE
Refills: 0 | Status: COMPLETED | OUTPATIENT
Start: 2020-09-22 | End: 2020-09-22

## 2020-09-22 RX ORDER — LINEZOLID 600 MG/300ML
600 INJECTION, SOLUTION INTRAVENOUS EVERY 12 HOURS
Refills: 0 | Status: DISCONTINUED | OUTPATIENT
Start: 2020-09-22 | End: 2020-09-22

## 2020-09-22 RX ORDER — DEXTROSE 50 % IN WATER 50 %
50 SYRINGE (ML) INTRAVENOUS ONCE
Refills: 0 | Status: DISCONTINUED | OUTPATIENT
Start: 2020-09-22 | End: 2020-09-23

## 2020-09-22 RX ORDER — DEXTROSE 50 % IN WATER 50 %
50 SYRINGE (ML) INTRAVENOUS ONCE
Refills: 0 | Status: COMPLETED | OUTPATIENT
Start: 2020-09-22 | End: 2020-09-22

## 2020-09-22 RX ORDER — LINEZOLID 600 MG/300ML
INJECTION, SOLUTION INTRAVENOUS
Refills: 0 | Status: DISCONTINUED | OUTPATIENT
Start: 2020-09-22 | End: 2020-09-22

## 2020-09-22 RX ORDER — LINEZOLID 600 MG/300ML
600 INJECTION, SOLUTION INTRAVENOUS
Refills: 0 | Status: DISCONTINUED | OUTPATIENT
Start: 2020-09-22 | End: 2020-09-23

## 2020-09-22 RX ORDER — OXYCODONE HYDROCHLORIDE 5 MG/1
5 TABLET ORAL EVERY 4 HOURS
Refills: 0 | Status: DISCONTINUED | OUTPATIENT
Start: 2020-09-22 | End: 2020-09-23

## 2020-09-22 RX ORDER — INSULIN HUMAN 100 [IU]/ML
4 INJECTION, SOLUTION SUBCUTANEOUS
Qty: 100 | Refills: 0 | Status: DISCONTINUED | OUTPATIENT
Start: 2020-09-22 | End: 2020-09-23

## 2020-09-22 RX ORDER — HYDROMORPHONE HYDROCHLORIDE 2 MG/ML
0.25 INJECTION INTRAMUSCULAR; INTRAVENOUS; SUBCUTANEOUS EVERY 4 HOURS
Refills: 0 | Status: DISCONTINUED | OUTPATIENT
Start: 2020-09-22 | End: 2020-09-23

## 2020-09-22 RX ORDER — HYDROMORPHONE HYDROCHLORIDE 2 MG/ML
0.25 INJECTION INTRAMUSCULAR; INTRAVENOUS; SUBCUTANEOUS ONCE
Refills: 0 | Status: DISCONTINUED | OUTPATIENT
Start: 2020-09-22 | End: 2020-09-22

## 2020-09-22 RX ADMIN — HYDROMORPHONE HYDROCHLORIDE 0.25 MILLIGRAM(S): 2 INJECTION INTRAMUSCULAR; INTRAVENOUS; SUBCUTANEOUS at 20:00

## 2020-09-22 RX ADMIN — Medication 25 MILLIGRAM(S): at 16:49

## 2020-09-22 RX ADMIN — OXYCODONE HYDROCHLORIDE 10 MILLIGRAM(S): 5 TABLET ORAL at 03:00

## 2020-09-22 RX ADMIN — SENNA PLUS 2 TABLET(S): 8.6 TABLET ORAL at 21:44

## 2020-09-22 RX ADMIN — HYDROMORPHONE HYDROCHLORIDE 0.25 MILLIGRAM(S): 2 INJECTION INTRAMUSCULAR; INTRAVENOUS; SUBCUTANEOUS at 08:50

## 2020-09-22 RX ADMIN — Medication 20 MILLIGRAM(S): at 05:27

## 2020-09-22 RX ADMIN — INSULIN HUMAN 4 UNIT(S)/HR: 100 INJECTION, SOLUTION SUBCUTANEOUS at 16:37

## 2020-09-22 RX ADMIN — HYDROMORPHONE HYDROCHLORIDE 0.5 MILLIGRAM(S): 2 INJECTION INTRAMUSCULAR; INTRAVENOUS; SUBCUTANEOUS at 11:27

## 2020-09-22 RX ADMIN — Medication 50 MILLIEQUIVALENT(S): at 05:11

## 2020-09-22 RX ADMIN — PIPERACILLIN AND TAZOBACTAM 25 GRAM(S): 4; .5 INJECTION, POWDER, LYOPHILIZED, FOR SOLUTION INTRAVENOUS at 21:43

## 2020-09-22 RX ADMIN — OXYCODONE HYDROCHLORIDE 5 MILLIGRAM(S): 5 TABLET ORAL at 08:15

## 2020-09-22 RX ADMIN — OXYCODONE HYDROCHLORIDE 5 MILLIGRAM(S): 5 TABLET ORAL at 01:00

## 2020-09-22 RX ADMIN — OXYCODONE HYDROCHLORIDE 10 MILLIGRAM(S): 5 TABLET ORAL at 16:45

## 2020-09-22 RX ADMIN — OXYCODONE HYDROCHLORIDE 5 MILLIGRAM(S): 5 TABLET ORAL at 07:44

## 2020-09-22 RX ADMIN — Medication 50 MILLILITER(S): at 18:00

## 2020-09-22 RX ADMIN — HEPARIN SODIUM 5000 UNIT(S): 5000 INJECTION INTRAVENOUS; SUBCUTANEOUS at 13:44

## 2020-09-22 RX ADMIN — Medication 50 MILLIEQUIVALENT(S): at 13:43

## 2020-09-22 RX ADMIN — PANTOPRAZOLE SODIUM 40 MILLIGRAM(S): 20 TABLET, DELAYED RELEASE ORAL at 06:19

## 2020-09-22 RX ADMIN — HYDROMORPHONE HYDROCHLORIDE 0.25 MILLIGRAM(S): 2 INJECTION INTRAMUSCULAR; INTRAVENOUS; SUBCUTANEOUS at 05:00

## 2020-09-22 RX ADMIN — Medication 62.5 MILLIMOLE(S): at 03:54

## 2020-09-22 RX ADMIN — PIPERACILLIN AND TAZOBACTAM 200 GRAM(S): 4; .5 INJECTION, POWDER, LYOPHILIZED, FOR SOLUTION INTRAVENOUS at 12:08

## 2020-09-22 RX ADMIN — HYDROMORPHONE HYDROCHLORIDE 1 MILLIGRAM(S): 2 INJECTION INTRAMUSCULAR; INTRAVENOUS; SUBCUTANEOUS at 11:15

## 2020-09-22 RX ADMIN — HYDROMORPHONE HYDROCHLORIDE 0.5 MILLIGRAM(S): 2 INJECTION INTRAMUSCULAR; INTRAVENOUS; SUBCUTANEOUS at 11:40

## 2020-09-22 RX ADMIN — OXYCODONE HYDROCHLORIDE 10 MILLIGRAM(S): 5 TABLET ORAL at 16:15

## 2020-09-22 RX ADMIN — Medication 50 GRAM(S): at 03:54

## 2020-09-22 RX ADMIN — Medication 85 MILLIMOLE(S): at 16:28

## 2020-09-22 RX ADMIN — Medication 25 MILLIGRAM(S): at 05:29

## 2020-09-22 RX ADMIN — Medication 50 MILLIEQUIVALENT(S): at 16:14

## 2020-09-22 RX ADMIN — INSULIN HUMAN 6: 100 INJECTION, SOLUTION SUBCUTANEOUS at 12:05

## 2020-09-22 RX ADMIN — HEPARIN SODIUM 5000 UNIT(S): 5000 INJECTION INTRAVENOUS; SUBCUTANEOUS at 05:27

## 2020-09-22 RX ADMIN — HYDROMORPHONE HYDROCHLORIDE 0.25 MILLIGRAM(S): 2 INJECTION INTRAMUSCULAR; INTRAVENOUS; SUBCUTANEOUS at 04:11

## 2020-09-22 RX ADMIN — HYDROMORPHONE HYDROCHLORIDE 0.25 MILLIGRAM(S): 2 INJECTION INTRAMUSCULAR; INTRAVENOUS; SUBCUTANEOUS at 19:57

## 2020-09-22 RX ADMIN — IOHEXOL 30 MILLILITER(S): 300 INJECTION, SOLUTION INTRAVENOUS at 11:27

## 2020-09-22 RX ADMIN — HYDROMORPHONE HYDROCHLORIDE 0.25 MILLIGRAM(S): 2 INJECTION INTRAMUSCULAR; INTRAVENOUS; SUBCUTANEOUS at 08:38

## 2020-09-22 RX ADMIN — Medication 50 MILLIEQUIVALENT(S): at 03:54

## 2020-09-22 RX ADMIN — Medication 650 MILLIGRAM(S): at 00:00

## 2020-09-22 RX ADMIN — Medication 50 MILLIEQUIVALENT(S): at 07:00

## 2020-09-22 RX ADMIN — HYDROMORPHONE HYDROCHLORIDE 1 MILLIGRAM(S): 2 INJECTION INTRAMUSCULAR; INTRAVENOUS; SUBCUTANEOUS at 11:01

## 2020-09-22 RX ADMIN — HEPARIN SODIUM 5000 UNIT(S): 5000 INJECTION INTRAVENOUS; SUBCUTANEOUS at 21:44

## 2020-09-22 RX ADMIN — LINEZOLID 300 MILLIGRAM(S): 600 INJECTION, SOLUTION INTRAVENOUS at 12:08

## 2020-09-22 RX ADMIN — OXYCODONE HYDROCHLORIDE 5 MILLIGRAM(S): 5 TABLET ORAL at 21:45

## 2020-09-22 NOTE — PROGRESS NOTE ADULT - ASSESSMENT
44 y/o no Pmhx s/p PAULIE/BS on 8/19 for fibroid uterus POD#34, s/p diagnostic laparoscopy converted to exploratory laparotomy and small bowel reanastomosis, with breakdown of staple line of proximal limb of anastomosis, s/p repeat exploratory laparotomy with small bowel resection, reanastomosis and appendectomy, POD#7 tachycardic and hypotensive postop, now off pressors, admitted to SICU for close hemodynamic monitoring, now with leukocytosis r/o infection.   - Neuro: AAOX3, PO pain meds  - CVS: tachycardic, metoprolol increased to 25 mg BID, off pressors  - Pulm: no longer tachypneic, 98% on RA, CT negative for PE, AM CXR stable bilateral opacities/effusions  - GI: s/p NGT, advanced to full liquid diet, dressing changes daily by gen surg  - : IV fluids @110 cc/hr, monitor UO, trend Cr, lasix 20 mg daily, potassium being repleted  - heme/onc: s/p 1U pRBCs, post transfusion Hgb 7.5 -> stable 8.7, plts increasing, last 120, HIT ab neg, transitioned back to heparin  - Inf: antibiotics discontinued yesterday, now leukocytosis 16K, RLQ abdomen erythematous possibly source of infection  - Lines: peripheral IV, right midline  - management per SICU team  - gyn to follow    Will inform Dr. Molina.  Will inform Dr. Molina.

## 2020-09-22 NOTE — CHART NOTE - NSCHARTNOTEFT_GEN_A_CORE
c/o abdominal pain  Full liquids given for breakfast but pt did not eat  Now NPO for CT abd  TPN d/c'd by surgical team this am    T(F): 98.8 (20 @ 08:07), Max: 100.2 (20 @ 22:00)  HR: 118 (20 @ 11:00) (101 - 126)  BP: 116/77 (20 @ 11:00) (102/62 - 128/71)  RR: 24 (20 @ 11:00) (18 - 28)  SpO2: 98% (20 @ 11:00) (95% - 100%)    I&O's Detail    21 Sep 2020 07:  -  22 Sep 2020 07:00  --------------------------------------------------------  IN:    Fat Emulsion (Fish Oil &amp; Plant Based) 20% Infusion: 187.8 mL    Oral Fluid: 250 mL    TPN (Total Parenteral Nutrition): 1100 mL  Total IN: 1537.8 mL    OUT:    Indwelling Catheter - Urethral (mL): 5880 mL  Total OUT: 5880 mL    Total NET: -4342.2 mL      22 Sep 2020 07:  -  22 Sep 2020 14:26  --------------------------------------------------------  IN:    TPN (Total Parenteral Nutrition): 100 mL  Total IN: 100 mL    OUT:    Indwelling Catheter - Urethral (mL): 400 mL  Total OUT: 400 mL    Total NET: -300 mL        129<L>  |  99  |  11  ----------------------------<  195<H>  3.7   |  26  |  <0.5<L>    Ca    6.9<L>      22 Sep 2020 11:00  Phos  2.4       Mg     2.0                             8.7    16.63 )-----------( 120      ( 22 Sep 2020 00:05 )             25.9     CAPILLARY BLOOD GLUCOSE  179 (22 Sep 2020 12:00)  134 (22 Sep 2020 09:00)  109 (22 Sep 2020 08:00)  183 (22 Sep 2020 06:00)  196 (22 Sep 2020 05:00)  180 (22 Sep 2020 04:00)  150 (22 Sep 2020 03:00)  195 (22 Sep 2020 02:00)  158 (22 Sep 2020 01:00)  166 (22 Sep 2020 00:00)  135 (21 Sep 2020 23:00)  160 (21 Sep 2020 22:00)  POCT Blood Glucose.: 179 mg/dL (22 Sep 2020 11:53)  POCT Blood Glucose.: 134 mg/dL (22 Sep 2020 09:23)  POCT Blood Glucose.: 109 mg/dL (22 Sep 2020 08:05)  POCT Blood Glucose.: 176 mg/dL (22 Sep 2020 07:01)  POCT Blood Glucose.: 198 mg/dL (22 Sep 2020 05:03)  POCT Blood Glucose.: 180 mg/dL (22 Sep 2020 04:18)  POCT Blood Glucose.: 150 mg/dL (22 Sep 2020 03:07)  POCT Blood Glucose.: 197 mg/dL (22 Sep 2020 02:15)  POCT Blood Glucose.: 158 mg/dL (22 Sep 2020 01:11)  POCT Blood Glucose.: 166 mg/dL (22 Sep 2020 00:22)  POCT Blood Glucose.: 135 mg/dL (21 Sep 2020 23:04)  POCT Blood Glucose.: 160 mg/dL (21 Sep 2020 22:13)  POCT Blood Glucose.: 194 mg/dL (21 Sep 2020 21:11)  POCT Blood Glucose.: 191 mg/dL (21 Sep 2020 16:52)    Daily Weight in k.6 (22 Sep 2020 04:00)    Diet, NPO:   NPO for Procedure/Test     NPO Start Date: 22-Sep-2020,   NPO Start Time: 10:36 (20 @ 10:36)  Diet, Full Liquid:   Consistent Carbohydrate {Evening Snack} (20 @ 11:35)      ASSESSMENT  45y Female s/p small bowel resection with primary anastomosis 9/15/20, taken back to the OR for washing out stomach contents, small bowel resection with anastomosis, lysis of intestinal adhesions (staple line of proximal limb of anastomosis break down with gross spillage    - feculent peritonitis/sepsis  - severe protein calorie malnutrition   - hyperglycemia  - hypophosphatemia  - hypomagnesemia    ******Please be aware that pt has had a very poor PO intake over the past month and is severely malnourished with high risk for delayed wound healing    PLAN  - TPN d/c'd today by surgical team  - f/u CT abd results and ? advance diet- attn to protein intake. if pt to remain NPO then would restart TPN.   - monitor BMP, In phos, Mg  - check vitamin D, zinc levels

## 2020-09-22 NOTE — CHART NOTE - NSCHARTNOTEFT_GEN_A_CORE
I was asked to see the patient after ct scan performed for worsening wbc and abdominal pain  the patient has exquisite tenderness to light touch on the right flank with erythema and edema.  this could represent cellulitis but in the setting of rising wbc and hyponatremia I am concerned for a soft tissue infection and will take the patient to the operating room for incision, finger test and possible drainage and debridement.  The patient also has subcutaneous air distributed over the left gillian-abdomen and flank, but this is explicable from the three laparoscopic ports.  However, considering that we will be there I will plan to evaluate that side as well for infection. I explained the plan to the patient and after family discussion she agreed.

## 2020-09-22 NOTE — PROGRESS NOTE ADULT - SUBJECTIVE AND OBJECTIVE BOX
PGY4 progress note    Patient seen and examined at bedside, resting comfortably. Reports moderate pain, especially in right side of abdomen, only partially controlled with PO meds. Passed flatus but no bowel movements. No vomiting.     PE:  Vital Signs Last 24 Hrs  T(C): 37.2 (22 Sep 2020 04:00), Max: 37.9 (21 Sep 2020 22:00)  T(F): 99 (22 Sep 2020 04:00), Max: 100.2 (21 Sep 2020 22:00)  HR: 124 (22 Sep 2020 06:00) (101 - 129)  BP: 116/71 (22 Sep 2020 06:00) (102/62 - 131/82)  BP(mean): 88 (22 Sep 2020 06:00) (76 - 102)  RR: 28 (22 Sep 2020 06:00) (18 - 28)  SpO2: 98% (22 Sep 2020 06:00) (95% - 100%)    UO [id number] 140cc    GEN: AAOX3  CVS: tachycardic, normal rhythm   Lungs: CTAB  Abd: vertical incision packed with kerlex, foam dressing over. Abdomen erythematous in RLQ, soft, appropriately tender diffusely, no r/g/r  Ext: no calf tenderness or edema, SCDs in place    labs:                        8.7    16.63 )-----------( 120      ( 22 Sep 2020 00:05 )             25.9     09-22    134<L>  |  100  |  10  ----------------------------<  185<H>  3.0<L>   |  26  |  <0.5<L>    Ca    6.9<L>      22 Sep 2020 00:05  Phos  2.2     09-22  Mg     1.7     09-22    MEDICATIONS  (STANDING):  acetaminophen   Tablet .. 650 milliGRAM(s) Oral every 6 hours  chlorhexidine 4% Liquid 1 Application(s) Topical daily  furosemide    Tablet 20 milliGRAM(s) Oral daily  heparin   Injectable 5000 Unit(s) SubCutaneous every 8 hours  insulin regular Infusion 1 Unit(s)/Hr (1 mL/Hr) IV Continuous <Continuous>  metoprolol tartrate 25 milliGRAM(s) Oral every 12 hours  pantoprazole    Tablet 40 milliGRAM(s) Oral before breakfast  Parenteral Nutrition - Adult 1 Each (50 mL/Hr) TPN Continuous <Continuous>  potassium chloride  20 mEq/100 mL IVPB 20 milliEquivalent(s) IV Intermittent every 2 hours  senna 2 Tablet(s) Oral at bedtime    MEDICATIONS  (PRN):  bisacodyl Suppository 10 milliGRAM(s) Rectal daily PRN Constipation  oxyCODONE    IR 10 milliGRAM(s) Oral every 6 hours PRN Severe Pain (7 - 10)  oxyCODONE    IR 5 milliGRAM(s) Oral every 4 hours PRN Moderate Pain (4 - 6)

## 2020-09-22 NOTE — PROGRESS NOTE ADULT - ASSESSMENT
IMPRESSION;  Presented with Feculent peritonitis secondary to perforated viscus  9/15 : s/p diagnostic lap, NARCISO, SBR (43cm ileum) w/ primary anastamosis (side to side)  RVP NGTD  WBC 16.6  CXR no infiltrates  Right flank with diffuse erythema . Has cellulitis but I am concerned about an intraabdominal process with involvement of the right flank. Abdominal incision with no evidence of an infection  BCx 9/17 NGTD    RECOMMENDATIONS;  Repeat BCx  CT abdomen  Restart Zosyn 4.5 gm iv q6h over 4h  Zyvox 600 mg iv q12h

## 2020-09-22 NOTE — PROGRESS NOTE ADULT - SUBJECTIVE AND OBJECTIVE BOX
GENERAL SURGERY PROGRESS NOTE     GLORIA SCHAFER  45y  Female  Hospital day :8d  POD:  Procedure: Insertion, arterial line, percutaneous    Washing out stomach contents    Small bowel resection with anastomosis    Lysis of intestinal adhesions    Diagnostic laparoscopy    OVERNIGHT EVENTS: persistent tachycardia, right flank erythema and induration, started on insulin gtt    T(F): 98.2 (09-22-20 @ 00:00), Max: 100.2 (09-21-20 @ 22:00)  HR: 117 (09-22-20 @ 03:00) (101 - 129)  BP: 110/73 (09-22-20 @ 03:00) (102/62 - 131/82)  RR: 26 (09-22-20 @ 03:00) (18 - 26)  SpO2: 97% (09-22-20 @ 03:00) (90% - 100%)    DIET/FLUIDS: magnesium sulfate  IVPB 2 Gram(s) IV Intermittent once  Parenteral Nutrition - Adult 1 Each TPN Continuous <Continuous>  potassium chloride  20 mEq/100 mL IVPB 20 milliEquivalent(s) IV Intermittent every 2 hours  sodium phosphate IVPB 15 milliMole(s) IV Intermittent once       GI proph:  pantoprazole    Tablet 40 milliGRAM(s) Oral before breakfast    AC/ proph: heparin   Injectable 5000 Unit(s) SubCutaneous every 8 hours    ABx:     PHYSICAL EXAM:  GENERAL: NAD  ABDOMEN: Soft, tender at incision site and right flank, erythema and induration right flank, dressing c/d/i otherwise  EXTREMITIES:  No clubbing, cyanosis, or edema      LABS  Labs:  CAPILLARY BLOOD GLUCOSE  158 (22 Sep 2020 01:00)  166 (22 Sep 2020 00:00)  135 (21 Sep 2020 23:00)  160 (21 Sep 2020 22:00)      POCT Blood Glucose.: 150 mg/dL (22 Sep 2020 03:07)  POCT Blood Glucose.: 197 mg/dL (22 Sep 2020 02:15)  POCT Blood Glucose.: 158 mg/dL (22 Sep 2020 01:11)  POCT Blood Glucose.: 166 mg/dL (22 Sep 2020 00:22)  POCT Blood Glucose.: 135 mg/dL (21 Sep 2020 23:04)  POCT Blood Glucose.: 160 mg/dL (21 Sep 2020 22:13)  POCT Blood Glucose.: 194 mg/dL (21 Sep 2020 21:11)  POCT Blood Glucose.: 191 mg/dL (21 Sep 2020 16:52)  POCT Blood Glucose.: 200 mg/dL (21 Sep 2020 10:50)  POCT Blood Glucose.: 152 mg/dL (21 Sep 2020 05:23)                          8.7    16.63 )-----------( 120      ( 22 Sep 2020 00:05 )             25.9         09-22    134<L>  |  100  |  10  ----------------------------<  185<H>  3.0<L>   |  26  |  <0.5<L>      Calcium, Total Serum: 6.9 mg/dL (09-22-20 @ 00:05)      LFTs:       ABG - ( 17 Sep 2020 04:48 )  pH: 7.41  /  pCO2: 37    /  pO2: 137   / HCO3: 24    / Base Excess: -0.7  /  SaO2: 100             ABG - ( 16 Sep 2020 13:35 )  pH: 7.43  /  pCO2: 30    /  pO2: 139   / HCO3: 20    / Base Excess: -3.4  /  SaO2: 99              ABG - ( 16 Sep 2020 10:10 )  pH: 7.44  /  pCO2: 31    /  pO2: 122   / HCO3: 21    / Base Excess: -2.9  /  SaO2: 99                Coags:     17.30  ----< 1.50    ( 20 Sep 2020 04:30 )     34.0            Serum Pro-Brain Natriuretic Peptide: 1902 pg/mL (09-21-20 @ 00:00)  Serum Pro-Brain Natriuretic Peptide: 2013 pg/mL (09-20-20 @ 08:19)  Serum Pro-Brain Natriuretic Peptide: 1595 pg/mL (09-18-20 @ 23:50)  Serum Pro-Brain Natriuretic Peptide: 1557 pg/mL (09-18-20 @ 00:00)  Serum Pro-Brain Natriuretic Peptide: 4550 pg/mL (09-16-20 @ 23:30)              RADIOLOGY & ADDITIONAL TESTS:      A/P

## 2020-09-22 NOTE — CONSULT NOTE ADULT - SUBJECTIVE AND OBJECTIVE BOX
INTERVENTIONAL RADIOLOGY CONSULT:     Procedure Requested: image guided abdominal collection drainage    HPI:  Patient is a 45F w/recent history of PAULIE/BSO on 8/19/20 for 15cm bleeding fibroid now about POD 30. Patient was re-admitted for abdominal pain and inability to tolerate PO on 8/30, found to have partial obstruction vs. ileus, resolved without operative intervention, patient was discharged. She represented on 9/4and failed non-operative management. Went to OR 9/15 with Dr. Rubio on for NARCISO, small bowel resection. On POD 0 from small bowel resection, patient became tachycardic and had feculent material draining from incision. Went back to OR; small bowel anastomotic leak found.    Patient has been in SICU, underwent CT abdomen and pelvis which shows multiple small collections. IR consulted for possible drainage.    PAST MEDICAL & SURGICAL HISTORY:  Uterine fibroid  Status post bilateral salpingectomy  S/P PAULIE (total abdominal hysterectomy)    MEDICATIONS  (STANDING):  acetaminophen   Tablet .. 650 milliGRAM(s) Oral every 6 hours  chlorhexidine 4% Liquid 1 Application(s) Topical daily  dextrose 50% Injectable 50 milliLiter(s) IV Push once  furosemide    Tablet 20 milliGRAM(s) Oral daily  heparin   Injectable 5000 Unit(s) SubCutaneous every 8 hours  insulin regular Infusion 4 Unit(s)/Hr (4 mL/Hr) IV Continuous <Continuous>  linezolid  IVPB 600 milliGRAM(s) IV Intermittent <User Schedule>  metoprolol tartrate 25 milliGRAM(s) Oral every 12 hours  pantoprazole    Tablet 40 milliGRAM(s) Oral before breakfast  piperacillin/tazobactam IVPB.. 4.5 Gram(s) IV Intermittent every 8 hours  senna 2 Tablet(s) Oral at bedtime    Allergies  No Known Allergies  Intolerances    Social History:   Smoking: Yes [ ]  No [ ]   ______pk yrs  ETOH  Yes [ ]  No [ ]  Social [ ]  DRUGS:  Yes [ ]  No [ ]  if so what______________    FAMILY HISTORY:  No pertinent family history in first degree relatives    Physical Exam:   Vital Signs Last 24 Hrs  T(C): 37.1 (22 Sep 2020 22:00), Max: 37.2 (22 Sep 2020 04:00)  T(F): 98.7 (22 Sep 2020 22:00), Max: 99 (22 Sep 2020 04:00)  HR: 108 (22 Sep 2020 22:00) (101 - 129)  BP: 118/71 (22 Sep 2020 22:00) (102/62 - 127/67)  BP(mean): 89 (22 Sep 2020 22:00) (76 - 101)  RR: 22 (22 Sep 2020 22:00) (20 - 28)  SpO2: 99% (22 Sep 2020 22:00) (95% - 99%)      Labs:                         8.7    16.63 )-----------( 120      ( 22 Sep 2020 00:05 )             25.9     09-22    129<L>  |  99  |  11  ----------------------------<  195<H>  3.7   |  26  |  <0.5<L>    Ca    6.9<L>      22 Sep 2020 11:00  Phos  2.4     09-22  Mg     2.0     09-22    Radiology & Additional Studies:     9/21 CT abdomen pelvis - collection continguous with R colon and small bowel    A/P - 44 y/o F s/p NARCISO, small bowel resction complicated by anastomotic leak; s/p repeat small bowel resection with primary anastomosis with intra-abdominal collection. IR consulted for possible drainage.    1. Abdominal collection - Collections are closely associated with colon and small bowel. Collection could be approached posteriorly, but would more difficult to perform. Recommend observing for now and repeat CT in 48-72 hours to re-evaluate collections.    Thank you for the courtesy of this consult, please call [phone] with any further questions. INTERVENTIONAL RADIOLOGY CONSULT:     Procedure Requested: image guided abdominal collection drainage    HPI:  Patient is a 45F w/recent history of PAULIE/BSO on 8/19/20 for 15cm bleeding fibroid now about POD 30. Patient was re-admitted for abdominal pain and inability to tolerate PO on 8/30, found to have partial obstruction vs. ileus, resolved without operative intervention, patient was discharged. She represented on 9/4and failed non-operative management. Went to OR 9/15 with Dr. Rubio on for NARCISO, small bowel resection. On POD 0 from small bowel resection, patient became tachycardic and had feculent material draining from incision. Went back to OR; small bowel anastomotic leak found.    Patient has been in SICU, underwent CT abdomen and pelvis which shows multiple small collections. IR consulted for possible drainage.    PAST MEDICAL & SURGICAL HISTORY:  Uterine fibroid  Status post bilateral salpingectomy  S/P PAULIE (total abdominal hysterectomy)    MEDICATIONS  (STANDING):  acetaminophen   Tablet .. 650 milliGRAM(s) Oral every 6 hours  chlorhexidine 4% Liquid 1 Application(s) Topical daily  dextrose 50% Injectable 50 milliLiter(s) IV Push once  furosemide    Tablet 20 milliGRAM(s) Oral daily  heparin   Injectable 5000 Unit(s) SubCutaneous every 8 hours  insulin regular Infusion 4 Unit(s)/Hr (4 mL/Hr) IV Continuous <Continuous>  linezolid  IVPB 600 milliGRAM(s) IV Intermittent <User Schedule>  metoprolol tartrate 25 milliGRAM(s) Oral every 12 hours  pantoprazole    Tablet 40 milliGRAM(s) Oral before breakfast  piperacillin/tazobactam IVPB.. 4.5 Gram(s) IV Intermittent every 8 hours  senna 2 Tablet(s) Oral at bedtime    Allergies  No Known Allergies  Intolerances    Social History:   Smoking: Yes [ ]  No [ ]   ______pk yrs  ETOH  Yes [ ]  No [ ]  Social [ ]  DRUGS:  Yes [ ]  No [ ]  if so what______________    FAMILY HISTORY:  No pertinent family history in first degree relatives    Physical Exam:   Vital Signs Last 24 Hrs  T(C): 37.1 (22 Sep 2020 22:00), Max: 37.2 (22 Sep 2020 04:00)  T(F): 98.7 (22 Sep 2020 22:00), Max: 99 (22 Sep 2020 04:00)  HR: 108 (22 Sep 2020 22:00) (101 - 129)  BP: 118/71 (22 Sep 2020 22:00) (102/62 - 127/67)  BP(mean): 89 (22 Sep 2020 22:00) (76 - 101)  RR: 22 (22 Sep 2020 22:00) (20 - 28)  SpO2: 99% (22 Sep 2020 22:00) (95% - 99%)      Labs:                         8.7    16.63 )-----------( 120      ( 22 Sep 2020 00:05 )             25.9     09-22    129<L>  |  99  |  11  ----------------------------<  195<H>  3.7   |  26  |  <0.5<L>    Ca    6.9<L>      22 Sep 2020 11:00  Phos  2.4     09-22  Mg     2.0     09-22    Radiology & Additional Studies:     9/21 CT abdomen pelvis - collection continguous with R colon and small bowel    A/P - 44 y/o F s/p NARCISO, small bowel resction complicated by anastomotic leak; s/p repeat small bowel resection with primary anastomosis with intra-abdominal collection. IR consulted for possible drainage.    1. Abdominal collection - Collections are closely associated with colon and small bowel. Collection could be approached posteriorly, but would be higher risk to perform. Recommend observing for now and repeat CT in 48-72 hours to re-evaluate collections.    Thank you for the courtesy of this consult, please call [phone] with any further questions.

## 2020-09-22 NOTE — PROGRESS NOTE ADULT - ATTENDING COMMENTS
44yo female with PSHx PAULIE/BSO 8/19/2020 for fibroids admitted for persistent small bowel obstruction s/p laparoscopic NARCISO, laparotomy, extensive NARCISO, small bowel resection with primary anastomosis 9/15 complicated by small bowel leak s/p re-exploration, wash out, small bowel resection and primary anastomosis 9/15. Under ICU care at this time. Tachycardia improving to 110's, no longer on pressors. Extubated and breathing independently. Was complaining of shortness of breath but states she feels better. Complains of lower abdominal pain RLQ and at incision that was improving previously, but worse today than before. Will clean/change dressing today. Lower abdominal soft tissue with erythema and induration. +blanching. WBC 16 from 13, ID discontinued antibiotics. Discussed with ICU regarding re-starting antibiotics, planning for CT A/P. NG tube in removed, +bowel movements, +flatus. Tolerating CLD. Advance as tolerated. Pain control. Trend labs, continue antibiotics. Encourage ambulation/OOB, incentive spirometer, DVT ppx. Primary care as per ICU.

## 2020-09-22 NOTE — PROGRESS NOTE ADULT - ATTENDING COMMENTS
Patient tolerates liquid diet.  Complains of pain in right flank.    PE:  AAO x3  Chest: clear.  CV : sinus tachycardia  Abdomen: soft; tender in the right flank with local erythema.    ASSESSMENT:  44 y/o female with SBO.  S/P Ex. Lap.  Perforated viscus.  Peritonitis. Abdominal sepsis.  Acute respiratory failure.  Acute postoperative pain.  Abdominal wall cellulitis.  Moderate malnutrition.    PLAN:  - pain control  - incentive spirometer  - well perfused and euvolemic; keep MAP>65  - advance diet as tolerated  - wean TPN off  - follow serum electrolytes and UOP.  - ID: restart Zosyn and Zyvox as per ID  - get CT Abdomen/Pelvis  - GI and DVT prophylaxis

## 2020-09-22 NOTE — PROGRESS NOTE ADULT - SUBJECTIVE AND OBJECTIVE BOX
GLORIA SCHAFER  [id number]  45y Female    Indication for ICU admission: intraabdominal sepsis, s/p diagnostic lap, NARCISO, SBR (43cm ileum) w/ primary anastamosis (side to side)  Admit Date:20  ICU Date: 9/15/2020  OR Date: 9/15/2020 x2    No Known Allergies    PAST MEDICAL & SURGICAL HISTORY:  Uterine fibroid  Status post bilateral salpingectomy  S/P PAULIE (total abdominal hysterectomy)    Home Medications:  ibuprofen 600 mg oral tablet: 1 tab(s) orally every 6 hours, As needed, Mild Pain (1 - 3) (01 Sep 2020 06:26)    24HRS EVENT:    Noted to have right sided abdominal and flank erythema/induration; tender to touch   Elevated FS to 200>191>194 w/tight sliding scale; started on insulin gtt   FS >160>135>166  +g/+BM     NEURO:    AAOx3      Acute pain - PO tylenol, oxycodone prn    RESP:     On NC @ 2L    ABG PRN    AM CXR    CT Chest (PE protocol) - negative for PE; small-moderate b/l pleural effusions with compressive atelectasis      CARDS:     Hypotension Resolved- Off all pressors    Acute HTN & tachycardia - increased metoprolol to 25mg BID    CE neg, 0.04, 0.16, 0.07 - no longer trending    ECHO: EF 50-55%, G1DD, mild MR, mild TR    BNP-     On Lasix 20 PO QD      GI/NUTR:     CT A/P () - negative for leak; postsurgical changes with post-op ileus    Diet: full liquids, TPN @ 50cc/hr (no free water seen on order), TPN to be weaned    +gas/+BM     Hepatic panel : WNL    IVL    GI ppx: PPI    Dulcolax suppository       /RENAL:     Escalera - -250 cc/hr, d/c escalera    () Lasix 40 IV & Diamox 500 --> good response, 5.2L+    BUN/Cr-  11/0.5     Lactate cleared     Repleting Lytes     HEME/ONC:     Hgb 8.2    Transfused 1 uPRBC (split 1/2 and 1/2 on )    Plt 74    INR 2.5 > 1.7 >1.3    HIT antibody: negative    DVT Prophylaxis: HSQ, SCDs    ID:    Afebrile    WBC 9.13     Abx: Caspfungin, Zosyn, Flagyl, d/c per ID today    -ID following    BCx - (NGTD)     ENDO:    Hyperglycemic - on ISS    Solucortef 50 q6 to be d/c'd     LINES/DRAINS:  PIV , [place], central line, Escalera     DVT Prophylaxis: heparin   Injectable 5000 Unit(s) SubCutaneous every 8 hours  GI Prophylaxis:pantoprazole  Injectable 40 milliGRAM(s) IV Push daily    ***Tubes/Lines/Drains  ***  Peripheral IV  NG Tube  Urinary Catheter		Indication: Strict I&O      R. Basilic Midline                      Date:     [X] A ten-point review of systems was otherwise negative except as noted above.  [  ] Due to altered mental status/intubation, subjective information was not attained from the patient. History was obtained, to the extent possible, from review of the chart and collateral sources of information.    Daily     Daily Weight in k.8 (21 Sep 2020 05:00)    Diet, Full Liquid:   Consistent Carbohydrate Evening Snack (20 @ 11:35)    CURRENT MEDS:  Neurologic Medications  acetaminophen   Tablet .. 650 milliGRAM(s) Oral every 6 hours  oxyCODONE    IR 10 milliGRAM(s) Oral every 6 hours PRN Severe Pain (7 - 10)  oxyCODONE    IR 5 milliGRAM(s) Oral every 4 hours PRN Moderate Pain (4 - 6)    Respiratory Medications    Cardiovascular Medications  furosemide    Tablet 20 milliGRAM(s) Oral daily  metoprolol tartrate 25 milliGRAM(s) Oral every 12 hours    Gastrointestinal Medications  bisacodyl Suppository 10 milliGRAM(s) Rectal daily PRN Constipation  pantoprazole    Tablet 40 milliGRAM(s) Oral before breakfast  Parenteral Nutrition - Adult 1 Each TPN Continuous <Continuous>  Parenteral Nutrition - Adult 1 Each TPN Continuous <Continuous>  senna 2 Tablet(s) Oral at bedtime    Genitourinary Medications    Hematologic/Oncologic Medications  heparin   Injectable 5000 Unit(s) SubCutaneous every 8 hours    Antimicrobial/Immunologic Medications    Endocrine/Metabolic Medications  insulin regular Infusion 1 Unit(s)/Hr IV Continuous <Continuous>    Topical/Other Medications  chlorhexidine 4% Liquid 1 Application(s) Topical daily    ICU Vital Signs Last 24 Hrs  T(C): 36.7 (21 Sep 2020 23:00), Max: 37.9 (21 Sep 2020 22:00)  T(F): 98 (21 Sep 2020 23:00), Max: 100.2 (21 Sep 2020 22:00)  HR: 104 (21 Sep 2020 23:) (101 - 129)  BP: 102/62 (21 Sep 2020 23:00) (102/62 - 136/86)  BP(mean): 76 (21 Sep 2020 23:) (76 - 106)  RR: 21 (21 Sep 2020 23:) (16 - 22)  SpO2: 95% (21 Sep 2020 23:) (90% - 100%)    I&O's Summary    20 Sep 2020 07:01  -  21 Sep 2020 07:00  --------------------------------------------------------  IN: 5852.3 mL / OUT: 6800 mL / NET: -947.7 mL    21 Sep 2020 07:  -  22 Sep 2020 00:53  --------------------------------------------------------  IN: 1087.8 mL / OUT: 4900 mL / NET: -3812.2 mL    I&O's Detail    20 Sep 2020 07:01  -  21 Sep 2020 07:00  --------------------------------------------------------  IN:    Fat Emulsion (Fish Oil &amp; Plant Based) 20% Infusion: 281.7 mL    IV PiggyBack: 416.5 mL    IV PiggyBack: 1694.1 mL    IV PiggyBack: 500 mL    Oral Fluid: 1760 mL    TPN (Total Parenteral Nutrition): 1200 mL  Total IN: 5852.3 mL    OUT:    Indwelling Catheter - Urethral (mL): 6800 mL  Total OUT: 6800 mL    Total NET: -947.7 mL    21 Sep 2020 07:  -  22 Sep 2020 00:53  --------------------------------------------------------  IN:    Fat Emulsion (Fish Oil &amp; Plant Based) 20% Infusion: 187.8 mL    Oral Fluid: 150 mL    TPN (Total Parenteral Nutrition): 750 mL  Total IN: 1087.8 mL    OUT:    Indwelling Catheter - Urethral (mL): 4900 mL  Total OUT: 4900 mL    Total NET: -3812.2 mL    PHYSICAL EXAM:    General/Neuro  RASS:             GCS:     = E   / V   / M      Deficits:                             alert & oriented x 3, no focal deficits  Pupils:    Lungs:      clear to auscultation, Normal expansion/effort.     Cardiovascular : S1, S2.  Regular rate and rhythm.  Peripheral edema   Cardiac Rhythm: Normal Sinus Rhythm    GI: Abdomen soft, Non-tender, Non-distended.    Gastrostomy / Jejunostomy tube in place.  Nasogastric tube in place.  Colostomy / Ileostomy.    Wound:    Extremities: Extremities warm, pink, well-perfused. Pulses:Rt     Lt    Derm: Good skin turgor, no skin breakdown.      : Escalera catheter in place.    CXR:   **F/U AM CXR**    LABS:  CAPILLARY BLOOD GLUCOSE  135 (21 Sep 2020 23:00)  160 (21 Sep 2020 22:00)    POCT Blood Glucose.: 166 mg/dL (22 Sep 2020 00:22)  POCT Blood Glucose.: 135 mg/dL (21 Sep 2020 23:04)  POCT Blood Glucose.: 160 mg/dL (21 Sep 2020 22:13)  POCT Blood Glucose.: 194 mg/dL (21 Sep 2020 21:11)  POCT Blood Glucose.: 191 mg/dL (21 Sep 2020 16:52)  POCT Blood Glucose.: 200 mg/dL (21 Sep 2020 10:50)  POCT Blood Glucose.: 152 mg/dL (21 Sep 2020 05:23)  POCT Blood Glucose.: 144 mg/dL (21 Sep 2020 02:52)                        8.7    16.63 )-----------( x        ( 22 Sep 2020 00:05 )             25.9       09-22    134<L>  |  100  |  10  ----------------------------<  185<H>  3.0<L>   |  26  |  <0.5<L>    Ca    6.9<L>      22 Sep 2020 00:05  Phos  2.2       Mg     1.7           PT/INR - ( 20 Sep 2020 04:30 )   PT: 17.30 sec;   INR: 1.50 ratio      PTT - ( 20 Sep 2020 04:30 )  PTT:34.0 sec   GLORIA SCHAFER  [id number]  45y Female    Indication for ICU admission: intraabdominal sepsis, s/p diagnostic lap, NARCISO, SBR (43cm ileum) w/ primary anastamosis (side to side)  Admit Date:20  ICU Date: 9/15/2020  OR Date: 9/15/2020 x2    No Known Allergies    PAST MEDICAL & SURGICAL HISTORY:  Uterine fibroid  Status post bilateral salpingectomy  S/P PAULIE (total abdominal hysterectomy)    Home Medications:  ibuprofen 600 mg oral tablet: 1 tab(s) orally every 6 hours, As needed, Mild Pain (1 - 3) (01 Sep 2020 06:26)    24HRS EVENT:    Noted to have right sided abdominal and flank erythema/induration; tender to touch   Elevated FS to 200>191>194 w/tight sliding scale; started on insulin gtt   CXR overnight for pain with inhalation, stable  FS >160>135>166  +flatus/+BM     NEURO:    AAOx3      Acute pain - PO tylenol, oxycodone prn    RESP:     On NC @ 2L    ABG PRN    AM CXR    CT Chest (PE protocol) - negative for PE; small-moderate b/l pleural effusions with compressive atelectasis      CARDS:     Hypotension Resolved- Off all pressors    Acute HTN & tachycardia - increased metoprolol to 25mg BID    CE neg, 0.04, 0.16, 0.07 - no longer trending    ECHO: EF 50-55%, G1DD, mild MR, mild TR    BNP-     On Lasix 20 PO QD      GI/NUTR:     CT A/P () - negative for leak; postsurgical changes with post-op ileus    Diet: full liquids, TPN @ 50cc/hr (no free water seen on order), TPN to be weaned    +gas/+BM     Hepatic panel : WNL    IVL    GI ppx: PPI    Dulcolax suppository       /RENAL:     Escalera - -250 cc/hr, d/c escalera    () Lasix 40 IV & Diamox 500 --> good response, 5.2L+    BUN/Cr-  11/0.5     Lactate cleared     Repleting Lytes     HEME/ONC:     Hgb 8.2    Transfused 1 uPRBC (split 1/2 and 1/2 on )    Plt 74    INR 2.5 > 1.7 >1.3    HIT antibody: negative    DVT Prophylaxis: HSQ, SCDs    ID:    Afebrile    WBC 9.13     Abx: Caspfungin, Zosyn, Flagyl, d/c per ID today    -ID following    BCx - (NGTD)     ENDO:    Hyperglycemic - on ISS    Solucortef 50 q6 to be d/c'd     LINES/DRAINS:  PIV , [place], central line, Escalera     DVT Prophylaxis: heparin   Injectable 5000 Unit(s) SubCutaneous every 8 hours  GI Prophylaxis:pantoprazole  Injectable 40 milliGRAM(s) IV Push daily    ***Tubes/Lines/Drains  ***  Peripheral IV  NG Tube  Urinary Catheter		Indication: Strict I&O      R. Basilic Midline                      Date:     [X] A ten-point review of systems was otherwise negative except as noted above.  [  ] Due to altered mental status/intubation, subjective information was not attained from the patient. History was obtained, to the extent possible, from review of the chart and collateral sources of information.    Daily     Daily Weight in k.8 (21 Sep 2020 05:00)    Diet, Full Liquid:   Consistent Carbohydrate Evening Snack (20 @ 11:35)    CURRENT MEDS:  Neurologic Medications  acetaminophen   Tablet .. 650 milliGRAM(s) Oral every 6 hours  oxyCODONE    IR 10 milliGRAM(s) Oral every 6 hours PRN Severe Pain (7 - 10)  oxyCODONE    IR 5 milliGRAM(s) Oral every 4 hours PRN Moderate Pain (4 - 6)    Respiratory Medications    Cardiovascular Medications  furosemide    Tablet 20 milliGRAM(s) Oral daily  metoprolol tartrate 25 milliGRAM(s) Oral every 12 hours    Gastrointestinal Medications  bisacodyl Suppository 10 milliGRAM(s) Rectal daily PRN Constipation  pantoprazole    Tablet 40 milliGRAM(s) Oral before breakfast  Parenteral Nutrition - Adult 1 Each TPN Continuous <Continuous>  Parenteral Nutrition - Adult 1 Each TPN Continuous <Continuous>  senna 2 Tablet(s) Oral at bedtime    Genitourinary Medications    Hematologic/Oncologic Medications  heparin   Injectable 5000 Unit(s) SubCutaneous every 8 hours    Antimicrobial/Immunologic Medications    Endocrine/Metabolic Medications  insulin regular Infusion 1 Unit(s)/Hr IV Continuous <Continuous>    Topical/Other Medications  chlorhexidine 4% Liquid 1 Application(s) Topical daily       Daily     Daily Weight in k.6 (22 Sep 2020 04:00)    Diet, NPO:   NPO for Procedure/Test     NPO Start Date: 22-Sep-2020,   NPO Start Time: 10:36 (20 @ 10:36)  Diet, Full Liquid:   Consistent Carbohydrate Evening Snack (20 @ 11:35)      CURRENT MEDS:  Neurologic Medications  acetaminophen   Tablet .. 650 milliGRAM(s) Oral every 6 hours  HYDROmorphone  Injectable 0.25 milliGRAM(s) IV Push every 4 hours PRN Severe Pain (7 - 10)  oxyCODONE    IR 10 milliGRAM(s) Oral every 6 hours PRN Severe Pain (7 - 10)  oxyCODONE    IR 5 milliGRAM(s) Oral every 4 hours PRN Moderate Pain (4 - 6)    Respiratory Medications    Cardiovascular Medications  furosemide    Tablet 20 milliGRAM(s) Oral daily  metoprolol tartrate 25 milliGRAM(s) Oral every 12 hours    Gastrointestinal Medications  bisacodyl Suppository 10 milliGRAM(s) Rectal daily PRN Constipation  pantoprazole    Tablet 40 milliGRAM(s) Oral before breakfast  senna 2 Tablet(s) Oral at bedtime    Genitourinary Medications    Hematologic/Oncologic Medications  heparin   Injectable 5000 Unit(s) SubCutaneous every 8 hours    Antimicrobial/Immunologic Medications  linezolid  IVPB 600 milliGRAM(s) IV Intermittent once  linezolid  IVPB 600 milliGRAM(s) IV Intermittent every 12 hours  linezolid  IVPB      piperacillin/tazobactam IVPB. 4.5 Gram(s) IV Intermittent once  piperacillin/tazobactam IVPB.. 4.5 Gram(s) IV Intermittent every 8 hours    Endocrine/Metabolic Medications  insulin regular  human corrective regimen sliding scale   IV Push Before meals and at bedtime    Topical/Other Medications  chlorhexidine 4% Liquid 1 Application(s) Topical daily      ICU Vital Signs Last 24 Hrs  T(C): 37.1 (22 Sep 2020 08:07), Max: 37.9 (21 Sep 2020 22:00)  T(F): 98.8 (22 Sep 2020 08:07), Max: 100.2 (21 Sep 2020 22:00)  HR: 118 (22 Sep 2020 09:00) (101 - 129)  BP: 114/76 (22 Sep 2020 09:00) (102/62 - 128/71)  BP(mean): 89 (22 Sep 2020 09:00) (76 - 94)  RR: 28 (22 Sep 2020 09:00) (18 - 28)  SpO2: 97% (22 Sep 2020 09:) (95% - 100%)          I&O's Summary    21 Sep 2020 07:01  -  22 Sep 2020 07:00  --------------------------------------------------------  IN: 1537.8 mL / OUT: 5880 mL / NET: -4342.2 mL    22 Sep 2020 07:01  -  22 Sep 2020 11:28  --------------------------------------------------------  IN: 100 mL / OUT: 250 mL / NET: -150 mL      I&O's Detail    21 Sep 2020 07:01  -  22 Sep 2020 07:00  --------------------------------------------------------  IN:    Fat Emulsion (Fish Oil &amp; Plant Based) 20% Infusion: 187.8 mL    Oral Fluid: 250 mL    TPN (Total Parenteral Nutrition): 1100 mL  Total IN: 1537.8 mL    OUT:    Indwelling Catheter - Urethral (mL): 5880 mL  Total OUT: 5880 mL    Total NET: -4342.2 mL      22 Sep 2020 07:  -  22 Sep 2020 11:28  --------------------------------------------------------  IN:    TPN (Total Parenteral Nutrition): 100 mL  Total IN: 100 mL    OUT:    Indwelling Catheter - Urethral (mL): 250 mL  Total OUT: 250 mL    Total NET: -150 mL          PHYSICAL EXAM:  NEURO: NAD, alert, oriented x 3, no focal deficits    RESP: lungs clear to auscultation, normal expansion/effort      CV: S1, S2, regular rate and rhythm  Cardiac rhythm: normal sinus rhythm    GI: abdomen soft, mildly tender, nondistended,  erythema and induration from right mid-thigh to RUQ, erythema without induration in LLQ radiating to left flank  Current diet:  full liquid diet    EXTREMITIES: extremities warm, pink, well perfused    DERM: good skin turgor, no skin breakdown      : escalera catheter in place    LINES/TUBES/DRAINS:  - peripheral IV  - central line  - urinary escalera catheter    CXR:   < from: Xray Chest 1 View-PORTABLE IMMEDIATE (Xray Chest 1 View-PORTABLE IMMEDIATE .) (20 @ 06:13) >  XAM:  XR CHEST PORTABLE ROUTINE 1V        EXAM:  XR CHEST PORTABLE IMMED 1V            PROCEDURE DATE:  2020        INTERPRETATION:  Clinical History/Reason For Exam: SOB    Comparison : Chest radiograph 2020 at 2:11 AM.    Technique: Frontal chest radiographs 2020 at 2:11 AM, 5:29 AM.    Findings:    Support devices: 2020 5:29 AM: Right IJ catheter tip in right atrium.    Cardiac/mediastinum/hilum: Stable.    Lung parenchyma/Pleura: Stable bilateral prominent interstitial markings and left basilar opacity. Progressive decrease in bilateral effusions.    Skeleton/soft tissues: Unremarkable.    Impression:    Progressive decrease in bilateral effusions. Stable prominent interstitial markings and left basilar opacity.    Right IJ catheter tip in right atrium.        LABS:  CAPILLARY BLOOD GLUCOSE  134 (22 Sep 2020 09:00)  109 (22 Sep 2020 08:00)  183 (22 Sep 2020 06:00)  196 (22 Sep 2020 05:00)  180 (22 Sep 2020 04:00)  150 (22 Sep 2020 03:00)  195 (22 Sep 2020 02:00)  158 (22 Sep 2020 01:00)  166 (22 Sep 2020 00:00)  135 (21 Sep 2020 23:00)  160 (21 Sep 2020 22:00)      POCT Blood Glucose.: 109 mg/dL (22 Sep 2020 08:05)  POCT Blood Glucose.: 176 mg/dL (22 Sep 2020 07:01)  POCT Blood Glucose.: 198 mg/dL (22 Sep 2020 05:03)  POCT Blood Glucose.: 180 mg/dL (22 Sep 2020 04:18)  POCT Blood Glucose.: 150 mg/dL (22 Sep 2020 03:07)  POCT Blood Glucose.: 197 mg/dL (22 Sep 2020 02:15)  POCT Blood Glucose.: 158 mg/dL (22 Sep 2020 01:11)  POCT Blood Glucose.: 166 mg/dL (22 Sep 2020 00:22)  POCT Blood Glucose.: 135 mg/dL (21 Sep 2020 23:04)  POCT Blood Glucose.: 160 mg/dL (21 Sep 2020 22:13)  POCT Blood Glucose.: 194 mg/dL (21 Sep 2020 21:11)  POCT Blood Glucose.: 191 mg/dL (21 Sep 2020 16:52)                          8.7    16.63 )-----------( 120      ( 22 Sep 2020 00:05 )             25.9       22    134<L>  |  100  |  10  ----------------------------<  185<H>  3.0<L>   |  26  |  <0.5<L>    Ca    6.9<L>      22 Sep 2020 00:05  Phos  2.2       Mg     1.7

## 2020-09-22 NOTE — PROGRESS NOTE ADULT - SUBJECTIVE AND OBJECTIVE BOX
GLORIA SCHAFER  45y, Female    All available historical data reviewed    OVERNIGHT EVENTS:  low grade fevers  has right sided flank pain    ROS:  General: Denies rigors, nightsweats  HEENT: Denies headache, rhinorrhea, sore throat, eye pain  CV: Denies CP, palpitations  PULM: Denies wheezing, hemoptysis  GI: Denies hematemesis, hematochezia, melena  : Denies discharge, hematuria  MSK: Denies arthralgias, myalgias  SKIN:rash right flank  NEURO:  weakness  PSYCH: Denies depression, anxiety    VITALS:  T(F): 98.8, Max: 100.2 (09-21-20 @ 22:00)  HR: 118  BP: 114/76  RR: 28Vital Signs Last 24 Hrs  T(C): 37.1 (22 Sep 2020 08:07), Max: 37.9 (21 Sep 2020 22:00)  T(F): 98.8 (22 Sep 2020 08:07), Max: 100.2 (21 Sep 2020 22:00)  HR: 118 (22 Sep 2020 09:00) (101 - 129)  BP: 114/76 (22 Sep 2020 09:00) (102/62 - 128/71)  BP(mean): 89 (22 Sep 2020 09:00) (76 - 100)  RR: 28 (22 Sep 2020 09:00) (18 - 28)  SpO2: 97% (22 Sep 2020 09:00) (95% - 100%)    TESTS & MEASUREMENTS:                        8.7    16.63 )-----------( 120      ( 22 Sep 2020 00:05 )             25.9     09-22    134<L>  |  100  |  10  ----------------------------<  185<H>  3.0<L>   |  26  |  <0.5<L>    Ca    6.9<L>      22 Sep 2020 00:05  Phos  2.2     09-22  Mg     1.7     09-22          Culture - Blood (collected 09-17-20 @ 20:40)  Source: .Blood Blood-Peripheral  Preliminary Report (09-19-20 @ 04:01):    No growth to date.            RADIOLOGY & ADDITIONAL TESTS:  Personal review of radiological diagnostics performed  Echo and EKG results noted when applicable.     MEDICATIONS:  acetaminophen   Tablet .. 650 milliGRAM(s) Oral every 6 hours  bisacodyl Suppository 10 milliGRAM(s) Rectal daily PRN  chlorhexidine 4% Liquid 1 Application(s) Topical daily  furosemide    Tablet 20 milliGRAM(s) Oral daily  heparin   Injectable 5000 Unit(s) SubCutaneous every 8 hours  HYDROmorphone  Injectable 0.25 milliGRAM(s) IV Push every 4 hours PRN  insulin regular Infusion 1 Unit(s)/Hr IV Continuous <Continuous>  metoprolol tartrate 25 milliGRAM(s) Oral every 12 hours  oxyCODONE    IR 10 milliGRAM(s) Oral every 6 hours PRN  oxyCODONE    IR 5 milliGRAM(s) Oral every 4 hours PRN  pantoprazole    Tablet 40 milliGRAM(s) Oral before breakfast  Parenteral Nutrition - Adult 1 Each TPN Continuous <Continuous>  senna 2 Tablet(s) Oral at bedtime      ANTIBIOTICS:

## 2020-09-22 NOTE — PROGRESS NOTE ADULT - ASSESSMENT
NEURO:    AAOx3      PO tylenol, oxycodone prn     RESP:     On NC @ 2L, wean as tolerated    ABG PRN    AM CXR    CT Chest (PE protocol) 9/17- negative for PE; small-moderate b/l pleural effusions with compressive atelectasis      CARDS:     Hypotension Resolved- Off all pressors    Acute tachycardia - switched from IV to PO Metoprolol 12.5 q12h --> will increase to 25mg BID, overnight received metoprolol 2.5mg IV push x1 and 5mg IV push x1    CE neg, 0.04, 0.16, 0.07 - no longer trending    ECHO: EF 50-55%, G1DD, mild MR, mild TR    GI/NUTR:     CT A/P (9/17) - negative for leak; postsurgical changes with post-op ileus    Diet: advance to full liquid, TPN currently  @ 50cc/hr, wean TPN    Hepatic panel 9/18: WNL    IVL    GI ppx: PPI    Dulcolax suppository       /RENAL:     Escalera - UOP 400cc/hr, d/c escalera    BUN/Cr trend     Trend lytes, replete as needed     Lactate 1.3 (max 6.5) (no longer trending)    HEME/ONC:     Hgb 8.2    Transfused 1 uPRBC (split 1/2 and 1/2 on 9/18)    Plt 74    INR 2.5 > 1.7 >1.3    HIT antibody: negative    DVT Prophylaxis: HSQ, SCDs    ID:    Afebrile    WBC 9.13     Abx: Caspfungin, Zosyn, Flagyl - will d/c today per ID    -ID following    BCx 9/17- (NGTD)     ENDO:    Hyperglycemic - on ISS    Solucortef 50 q6 to be d/c'd 9/20    LINES/DRAINS:  PIV , Echo, central line    DVT Prophylaxis: heparin   Injectable 5000 Unit(s) SubCutaneous every 8 hours  GI Prophylaxis:pantoprazole  Injectable 40 milliGRAM(s) IV Push daily    ***Tubes/Lines/Drains  ***  Peripheral IV  R. Basilic Midline                      Date: 9/17   44 y/o no Pmhx s/p PAULIE/BS on 8/19 for fibroid uterus POD#34, s/p diagnostic laparoscopy converted to exploratory laparotomy and small bowel reanastomosis, with breakdown of staple line of proximal limb of anastomosis, s/p repeat exploratory laparotomy with small bowel resection, reanastomosis and appendectomy, POD#7 tachycardic and hypotensive postop, now off pressors, admitted to SICU for close hemodynamic monitoring, now with leukocytosis r/o infection.     NEURO:    AAOx3      PO tylenol, oxycodone prn, dilaudid prn     RESP:     On NC @ 2L, wean as tolerated    ABG PRN    AM CXR    CT Chest (PE protocol) 9/17- negative for PE; small-moderate b/l pleural effusions with compressive atelectasis      CARDS:     Hypotension Resolved- Off all pressors    Acute tachycardia - switched from IV to PO Metoprolol 12.5 q12h --> will increase to 25mg BID, overnight received metoprolol 2.5mg IV push x1 and 5mg IV push x1    CE neg, 0.04, 0.16, 0.07 - no longer trending    ECHO: EF 50-55%, G1DD, mild MR, mild TR    GI/NUTR:     CT A/P (9/17) - negative for leak; postsurgical changes with post-op ileus    Diet: advance to full liquid, TPN currently  @ 50cc/hr, wean TPN    Hepatic panel 9/18: WNL    IVL    GI ppx: PPI    Dulcolax suppository       /RENAL:     Escalera - UOP 400cc/hr, d/c escalera    BUN/Cr trend     Trend lytes, replete as needed     Lactate 1.3 (max 6.5) (no longer trending)    HEME/ONC:     Hgb 8.2    Transfused 1 uPRBC (split 1/2 and 1/2 on 9/18)    Plt 74    INR 2.5 > 1.7 >1.3    HIT antibody: negative    DVT Prophylaxis: HSQ, SCDs    ID:    Afebrile    WBC 9.13     Abx: Caspfungin, Zosyn, Flagyl - will d/c today per ID    -ID following    BCx 9/17- (NGTD)     ENDO:    Hyperglycemic - on ISS    Solucortef 50 q6 to be d/c'd 9/20    LINES/DRAINS:  PIV , Jessica, central line    DVT Prophylaxis: heparin   Injectable 5000 Unit(s) SubCutaneous every 8 hours  GI Prophylaxis:pantoprazole  Injectable 40 milliGRAM(s) IV Push daily    ***Tubes/Lines/Drains  ***  Peripheral IV  R. Basilic Midline                      Date: 9/17   44 y/o no Pmhx s/p PAULIE/BS on 8/19 for fibroid uterus POD#34, s/p diagnostic laparoscopy converted to exploratory laparotomy and small bowel reanastomosis, with breakdown of staple line of proximal limb of anastomosis, s/p repeat exploratory laparotomy with small bowel resection, reanastomosis and appendectomy, POD#7 tachycardic and hypotensive postop, now off pressors, admitted to SICU for close hemodynamic monitoring, now with leukocytosis r/o infection.     NEURO:    AAOx3      PO tylenol, oxycodone prn, dilaudid prn     RESP:     O2 sat >95% on RA    ABG PRN    AM CXR stable, f/u final read    CT Chest (PE protocol) 9/17- negative for PE; small-moderate b/l pleural effusions with compressive atelectasis      CARDS:     Hypotension Resolved- Off all pressors    Acute tachycardia - now in the 110s, cont metoprolol 25mg BID    CE neg, 0.04, 0.16, 0.07 - no longer trending    ECHO: EF 50-55%, G1DD, mild MR, mild TR    GI/NUTR:     ordered CT Abd/pelvis with PO and IV contrast to r/o abscess    CT A/P (9/17) - negative for leak; postsurgical changes with post-op ileus    Diet: NPO for CT, advance to soft mechanical after CT, d/c TPN today    Hepatic panel 9/18: WNL    IVL    GI ppx: PPI    Bowel regimen: Dulcolax suppository       /RENAL:     Escalera - UOP 125cc/hr, d/c escalera today    BUN/Cr trend     Trend lytes (ordered 1600), K 3.0 --> given K rider x2, 15mmol K phos, 2g mag    Lactate 1.3 (max 6.5) (no longer trending)    HEME/ONC:     Hgb 8.7    Transfused 1 uPRBC (split 1/2 and 1/2 on 9/18)    Plt 120    INR 2.5 > 1.7 >1.3    HIT antibody: negative    DVT Prophylaxis: HSQ, SCDs    ID:    Afebrile    Concern for possible cellulitis vs. intraabdominal infection --> CT abd/pelvis with IV/oral contrast    WBC 16.63 <-- 13.76 <--9.13     -ID following: recommend imaging, start Zosyn 4.5 gm iv q6h over 4hr, Zyvox 600 mg iv q12h    9/22 repeat bcx ordered    BCx 9/17- (NGTD)     ENDO:    Insulin drip d/c'd, last     FS ACqHS, ISS    Solucortef 50 q6 to be d/c'd 9/20    LINES/DRAINS:  PIV , [place], central line    DVT Prophylaxis: heparin   Injectable 5000 Unit(s) SubCutaneous every 8 hours  GI Prophylaxis:pantoprazole  Injectable 40 milliGRAM(s) IV Push daily    ***Tubes/Lines/Drains  ***  Peripheral IV  R. Basilic Midline                      Date: 9/17    d/w Dr. Patel.

## 2020-09-22 NOTE — PROGRESS NOTE ADULT - PROBLEM SELECTOR PLAN 1
Change HYDROmorphone PCA (1 mG/mL) 30 milliLiter(s) PCA 0.3mg, bolus only, 10 min interval.
Con't HYDROmorphone PCA (1 mG/mL) 30 milliLiter(s) PCA Continuous PCA Continuous
Con't acetaminophen   Tablet .. 650 milliGRAM(s) Oral every 6 hours  DC HYDROmorphone  Injectable 0.25 milliGRAM(s) IV Push every 4 hours PRN  Change oxyCODONE    IR 10 milliGRAM(s) Oral every 4 hours PRN  DC oxyCODONE    IR 5 milliGRAM(s) Oral every 4 hours PRN  Give stat dose of Morphine 0.5mg IVP Now prior to going to CT

## 2020-09-22 NOTE — PROGRESS NOTE ADULT - SUBJECTIVE AND OBJECTIVE BOX
Pain Management Progress Note -     45 Female w/recent history of PAULIE/BSO on 8/19/20 for 15cm bleeding fibroid with readmission for abdominal pain and inability to tolerate PO on 8/30, found to have partial obstruction vs. ileus, resolved without operative intervention, patient was discharged. She represented on 9/4 with recurrent abdominal pain, consistent with partial SBO, NGT was placed and patient's symptoms improved with nonoperative management. Patient going now for CT secondary to right side flank pain. Patient states the abd pain is 7/10 now. PCA DC'd by the team. Discussed with the team.       No Known Allergies          Uterine fibroid    No pertinent past medical history    Perforated viscus    Small bowel obstruction    Intermittent small bowel obstruction due to adhesions    Perforated viscus    Intestinal anastomotic leak    Small bowel obstruction    Bowel obstruction    Pain, abdominal, generalized    Insertion, arterial line, percutaneous    Washing out stomach contents    Small bowel resection with anastomosis    Lysis of intestinal adhesions    Diagnostic laparoscopy    Status post bilateral salpingectomy    S/P PAULIE (total abdominal hysterectomy)    No significant past surgical history    POST OP PROBLEM    6    [email or web address]        oxyCODONE    IR  oxyCODONE    IR  sodium phosphate IVPB  linezolid  IVPB  insulin regular  human corrective regimen sliding scale  linezolid  IVPB  piperacillin/tazobactam IVPB..  HYDROmorphone  Injectable  metoprolol tartrate  acetaminophen   Tablet ..  pantoprazole    Tablet  furosemide    Tablet  senna  heparin   Injectable  bisacodyl Suppository  chlorhexidine 4% Liquid      acetaminophen   Tablet .. 650 milliGRAM(s) Oral every 6 hours  bisacodyl Suppository 10 milliGRAM(s) Rectal daily PRN  chlorhexidine 4% Liquid 1 Application(s) Topical daily  furosemide    Tablet 20 milliGRAM(s) Oral daily  heparin   Injectable 5000 Unit(s) SubCutaneous every 8 hours  HYDROmorphone  Injectable 0.25 milliGRAM(s) IV Push every 4 hours PRN  insulin regular  human corrective regimen sliding scale   IV Push Before meals and at bedtime  linezolid  IVPB      linezolid  IVPB 600 milliGRAM(s) IV Intermittent every 12 hours  metoprolol tartrate 25 milliGRAM(s) Oral every 12 hours  oxyCODONE    IR 10 milliGRAM(s) Oral every 6 hours PRN  oxyCODONE    IR 5 milliGRAM(s) Oral every 4 hours PRN  pantoprazole    Tablet 40 milliGRAM(s) Oral before breakfast  piperacillin/tazobactam IVPB.. 4.5 Gram(s) IV Intermittent every 8 hours  senna 2 Tablet(s) Oral at bedtime  sodium phosphate IVPB 30 milliMole(s) IV Intermittent once      09-22 @ 11:38662 mL/min/1.73M2      09-22 @ 00:22666 mL/min/1.73M2          Hemoglobin: 8.7 g/dL (09-22 @ 00:05)  Hemoglobin: 9.1 g/dL (09-21 @ 06:00)        T(C): 37.1 (09-22-20 @ 08:07), Max: 37.9 (09-21-20 @ 22:00)  HR: 118 (09-22-20 @ 11:00) (101 - 126)  BP: 116/77 (09-22-20 @ 11:00) (102/62 - 128/71)  RR: 24 (09-22-20 @ 11:00) (18 - 28)  SpO2: 98% (09-22-20 @ 11:00) (95% - 100%)  Wt(kg): --     REVIEW OF SYSTEMS    General:	NAD   Skin/Breast:	Neg  Ophthalmologic:	Neg  ENMT: Neg	  Respiratory and Thorax: Neg	  Cardiovascular:	Neg  Gastrointestinal:	Neg  Genitourinary:	Neg  Musculoskeletal:	Neg  Neurological:	Neg  Psychiatric:	Neg  Hematology/Lymphatics:	Neg  Endocrine:	Neg        PHYSICAL EXAM:    GENERAL: NAD, well-groomed, well-developed  HEAD:  Atraumatic, Normocephalic  EYES: EOMI, PERRLA, conjunctiva and sclera clear  ENMT: No tonsillar erythema, exudates, or enlargement; Moist mucous membranes, Good dentition, No lesions  NECK: Supple, No JVD, Normal thyroid  NERVOUS SYSTEM:  Alert & Oriented X3, Good concentration  CHEST/LUNG: Bilat chest expansion noted. NAD  HEART: S1, S2 present  ABDOMEN: Soft, tender, Nondistended; Bowel sounds present  EXTREMITIES:  No clubbing, cyanosis  LYMPH: No lymphadenopathy noted  SKIN: No rashes or lesions

## 2020-09-22 NOTE — PROGRESS NOTE ADULT - ASSESSMENT
Assessment:  45y Female s/p ex lap NARCISO, SBR anastamosis c/b leak and re-exploration wash out and re-resection and anastomosis. Stable overnight, hyperglycemic started on insulin gtt overnight.    Plan:  - primary care per SICU  - wean off insulin gtt  - dressing changes daily

## 2020-09-23 LAB
ANION GAP SERPL CALC-SCNC: 6 MMOL/L — LOW (ref 7–14)
ANION GAP SERPL CALC-SCNC: 7 MMOL/L — SIGNIFICANT CHANGE UP (ref 7–14)
ANION GAP SERPL CALC-SCNC: 8 MMOL/L — SIGNIFICANT CHANGE UP (ref 7–14)
APPEARANCE UR: CLEAR — SIGNIFICANT CHANGE UP
APTT BLD: 32.9 SEC — SIGNIFICANT CHANGE UP (ref 27–39.2)
APTT BLD: 33.8 SEC — SIGNIFICANT CHANGE UP (ref 27–39.2)
BASOPHILS # BLD AUTO: 0 K/UL — SIGNIFICANT CHANGE UP (ref 0–0.2)
BASOPHILS NFR BLD AUTO: 0 % — SIGNIFICANT CHANGE UP (ref 0–1)
BILIRUB UR-MCNC: NEGATIVE — SIGNIFICANT CHANGE UP
BUN SERPL-MCNC: 8 MG/DL — LOW (ref 10–20)
BUN SERPL-MCNC: 8 MG/DL — LOW (ref 10–20)
BUN SERPL-MCNC: 9 MG/DL — LOW (ref 10–20)
CALCIUM SERPL-MCNC: 6.8 MG/DL — LOW (ref 8.5–10.1)
CALCIUM SERPL-MCNC: 7.3 MG/DL — LOW (ref 8.5–10.1)
CALCIUM SERPL-MCNC: 7.3 MG/DL — LOW (ref 8.5–10.1)
CHLORIDE SERPL-SCNC: 101 MMOL/L — SIGNIFICANT CHANGE UP (ref 98–110)
CHLORIDE SERPL-SCNC: 97 MMOL/L — LOW (ref 98–110)
CHLORIDE SERPL-SCNC: 98 MMOL/L — SIGNIFICANT CHANGE UP (ref 98–110)
CO2 SERPL-SCNC: 26 MMOL/L — SIGNIFICANT CHANGE UP (ref 17–32)
CO2 SERPL-SCNC: 26 MMOL/L — SIGNIFICANT CHANGE UP (ref 17–32)
CO2 SERPL-SCNC: 27 MMOL/L — SIGNIFICANT CHANGE UP (ref 17–32)
COLOR SPEC: COLORLESS — SIGNIFICANT CHANGE UP
CREAT SERPL-MCNC: <0.5 MG/DL — LOW (ref 0.7–1.5)
CULTURE RESULTS: SIGNIFICANT CHANGE UP
DIFF PNL FLD: NEGATIVE — SIGNIFICANT CHANGE UP
EOSINOPHIL # BLD AUTO: 0 K/UL — SIGNIFICANT CHANGE UP (ref 0–0.7)
EOSINOPHIL NFR BLD AUTO: 0 % — SIGNIFICANT CHANGE UP (ref 0–8)
GLUCOSE BLDC GLUCOMTR-MCNC: 122 MG/DL — HIGH (ref 70–99)
GLUCOSE BLDC GLUCOMTR-MCNC: 131 MG/DL — HIGH (ref 70–99)
GLUCOSE BLDC GLUCOMTR-MCNC: 133 MG/DL — HIGH (ref 70–99)
GLUCOSE BLDC GLUCOMTR-MCNC: 142 MG/DL — HIGH (ref 70–99)
GLUCOSE BLDC GLUCOMTR-MCNC: 162 MG/DL — HIGH (ref 70–99)
GLUCOSE BLDC GLUCOMTR-MCNC: 172 MG/DL — HIGH (ref 70–99)
GLUCOSE BLDC GLUCOMTR-MCNC: 176 MG/DL — HIGH (ref 70–99)
GLUCOSE BLDC GLUCOMTR-MCNC: 178 MG/DL — HIGH (ref 70–99)
GLUCOSE BLDC GLUCOMTR-MCNC: 183 MG/DL — HIGH (ref 70–99)
GLUCOSE BLDC GLUCOMTR-MCNC: 183 MG/DL — HIGH (ref 70–99)
GLUCOSE BLDC GLUCOMTR-MCNC: 193 MG/DL — HIGH (ref 70–99)
GLUCOSE BLDC GLUCOMTR-MCNC: 198 MG/DL — HIGH (ref 70–99)
GLUCOSE BLDC GLUCOMTR-MCNC: 200 MG/DL — HIGH (ref 70–99)
GLUCOSE SERPL-MCNC: 135 MG/DL — HIGH (ref 70–99)
GLUCOSE SERPL-MCNC: 145 MG/DL — HIGH (ref 70–99)
GLUCOSE SERPL-MCNC: 185 MG/DL — HIGH (ref 70–99)
GLUCOSE UR QL: NEGATIVE — SIGNIFICANT CHANGE UP
HCT VFR BLD CALC: 23.4 % — LOW (ref 37–47)
HCT VFR BLD CALC: 25.2 % — LOW (ref 37–47)
HGB BLD-MCNC: 7.6 G/DL — LOW (ref 12–16)
HGB BLD-MCNC: 8.4 G/DL — LOW (ref 12–16)
INR BLD: 1.28 RATIO — SIGNIFICANT CHANGE UP (ref 0.65–1.3)
INR BLD: 1.42 RATIO — HIGH (ref 0.65–1.3)
KETONES UR-MCNC: NEGATIVE — SIGNIFICANT CHANGE UP
LACTATE SERPL-SCNC: 1 MMOL/L — SIGNIFICANT CHANGE UP (ref 0.7–2)
LEUKOCYTE ESTERASE UR-ACNC: NEGATIVE — SIGNIFICANT CHANGE UP
LYMPHOCYTES # BLD AUTO: 1.11 K/UL — LOW (ref 1.2–3.4)
LYMPHOCYTES # BLD AUTO: 7 % — LOW (ref 20.5–51.1)
MAGNESIUM SERPL-MCNC: 1.7 MG/DL — LOW (ref 1.8–2.4)
MAGNESIUM SERPL-MCNC: 1.8 MG/DL — SIGNIFICANT CHANGE UP (ref 1.8–2.4)
MAGNESIUM SERPL-MCNC: 2 MG/DL — SIGNIFICANT CHANGE UP (ref 1.8–2.4)
MCHC RBC-ENTMCNC: 27.3 PG — SIGNIFICANT CHANGE UP (ref 27–31)
MCHC RBC-ENTMCNC: 27.8 PG — SIGNIFICANT CHANGE UP (ref 27–31)
MCHC RBC-ENTMCNC: 32.5 G/DL — SIGNIFICANT CHANGE UP (ref 32–37)
MCHC RBC-ENTMCNC: 33.3 G/DL — SIGNIFICANT CHANGE UP (ref 32–37)
MCV RBC AUTO: 83.4 FL — SIGNIFICANT CHANGE UP (ref 81–99)
MCV RBC AUTO: 84.2 FL — SIGNIFICANT CHANGE UP (ref 81–99)
MONOCYTES # BLD AUTO: 0.14 K/UL — SIGNIFICANT CHANGE UP (ref 0.1–0.6)
MONOCYTES NFR BLD AUTO: 0.9 % — LOW (ref 1.7–9.3)
NEUTROPHILS # BLD AUTO: 14.62 K/UL — HIGH (ref 1.4–6.5)
NEUTROPHILS NFR BLD AUTO: 90.4 % — HIGH (ref 42.2–75.2)
NITRITE UR-MCNC: NEGATIVE — SIGNIFICANT CHANGE UP
NRBC # BLD: 0 /100 WBCS — SIGNIFICANT CHANGE UP (ref 0–0)
PH UR: 7 — SIGNIFICANT CHANGE UP (ref 5–8)
PHOSPHATE SERPL-MCNC: 2.9 MG/DL — SIGNIFICANT CHANGE UP (ref 2.1–4.9)
PHOSPHATE SERPL-MCNC: 3.1 MG/DL — SIGNIFICANT CHANGE UP (ref 2.1–4.9)
PHOSPHATE SERPL-MCNC: 3.2 MG/DL — SIGNIFICANT CHANGE UP (ref 2.1–4.9)
PLATELET # BLD AUTO: 170 K/UL — SIGNIFICANT CHANGE UP (ref 130–400)
PLATELET # BLD AUTO: 230 K/UL — SIGNIFICANT CHANGE UP (ref 130–400)
POTASSIUM SERPL-MCNC: 3.5 MMOL/L — SIGNIFICANT CHANGE UP (ref 3.5–5)
POTASSIUM SERPL-MCNC: 3.8 MMOL/L — SIGNIFICANT CHANGE UP (ref 3.5–5)
POTASSIUM SERPL-MCNC: 3.8 MMOL/L — SIGNIFICANT CHANGE UP (ref 3.5–5)
POTASSIUM SERPL-SCNC: 3.5 MMOL/L — SIGNIFICANT CHANGE UP (ref 3.5–5)
POTASSIUM SERPL-SCNC: 3.8 MMOL/L — SIGNIFICANT CHANGE UP (ref 3.5–5)
POTASSIUM SERPL-SCNC: 3.8 MMOL/L — SIGNIFICANT CHANGE UP (ref 3.5–5)
PROT UR-MCNC: NEGATIVE — SIGNIFICANT CHANGE UP
PROTHROM AB SERPL-ACNC: 14.7 SEC — HIGH (ref 9.95–12.87)
PROTHROM AB SERPL-ACNC: 16.3 SEC — HIGH (ref 9.95–12.87)
RBC # BLD: 2.78 M/UL — LOW (ref 4.2–5.4)
RBC # BLD: 3.02 M/UL — LOW (ref 4.2–5.4)
RBC # FLD: 15.8 % — HIGH (ref 11.5–14.5)
RBC # FLD: 15.9 % — HIGH (ref 11.5–14.5)
SODIUM SERPL-SCNC: 131 MMOL/L — LOW (ref 135–146)
SODIUM SERPL-SCNC: 132 MMOL/L — LOW (ref 135–146)
SODIUM SERPL-SCNC: 133 MMOL/L — LOW (ref 135–146)
SP GR SPEC: 1.01 — SIGNIFICANT CHANGE UP (ref 1.01–1.03)
SPECIMEN SOURCE: SIGNIFICANT CHANGE UP
UROBILINOGEN FLD QL: SIGNIFICANT CHANGE UP
WBC # BLD: 14.64 K/UL — HIGH (ref 4.8–10.8)
WBC # BLD: 15.87 K/UL — HIGH (ref 4.8–10.8)
WBC # FLD AUTO: 14.64 K/UL — HIGH (ref 4.8–10.8)
WBC # FLD AUTO: 15.87 K/UL — HIGH (ref 4.8–10.8)

## 2020-09-23 PROCEDURE — 71045 X-RAY EXAM CHEST 1 VIEW: CPT | Mod: 26

## 2020-09-23 PROCEDURE — 99024 POSTOP FOLLOW-UP VISIT: CPT

## 2020-09-23 PROCEDURE — 99291 CRITICAL CARE FIRST HOUR: CPT | Mod: 24

## 2020-09-23 RX ORDER — MULTIVIT-MIN/FERROUS GLUCONATE 9 MG/15 ML
1 LIQUID (ML) ORAL DAILY
Refills: 0 | Status: DISCONTINUED | OUTPATIENT
Start: 2020-09-23 | End: 2020-10-09

## 2020-09-23 RX ORDER — FUROSEMIDE 40 MG
20 TABLET ORAL DAILY
Refills: 0 | Status: DISCONTINUED | OUTPATIENT
Start: 2020-09-23 | End: 2020-09-29

## 2020-09-23 RX ORDER — OXYCODONE HYDROCHLORIDE 5 MG/1
10 TABLET ORAL EVERY 6 HOURS
Refills: 0 | Status: DISCONTINUED | OUTPATIENT
Start: 2020-09-23 | End: 2020-09-28

## 2020-09-23 RX ORDER — HYDROMORPHONE HYDROCHLORIDE 2 MG/ML
0.25 INJECTION INTRAMUSCULAR; INTRAVENOUS; SUBCUTANEOUS EVERY 4 HOURS
Refills: 0 | Status: DISCONTINUED | OUTPATIENT
Start: 2020-09-23 | End: 2020-09-23

## 2020-09-23 RX ORDER — PIPERACILLIN AND TAZOBACTAM 4; .5 G/20ML; G/20ML
3.38 INJECTION, POWDER, LYOPHILIZED, FOR SOLUTION INTRAVENOUS EVERY 8 HOURS
Refills: 0 | Status: DISCONTINUED | OUTPATIENT
Start: 2020-09-23 | End: 2020-09-23

## 2020-09-23 RX ORDER — DEXTROSE 50 % IN WATER 50 %
50 SYRINGE (ML) INTRAVENOUS ONCE
Refills: 0 | Status: DISCONTINUED | OUTPATIENT
Start: 2020-09-23 | End: 2020-09-24

## 2020-09-23 RX ORDER — INSULIN HUMAN 100 [IU]/ML
4 INJECTION, SOLUTION SUBCUTANEOUS
Qty: 100 | Refills: 0 | Status: DISCONTINUED | OUTPATIENT
Start: 2020-09-23 | End: 2020-09-24

## 2020-09-23 RX ORDER — HEPARIN SODIUM 5000 [USP'U]/ML
5000 INJECTION INTRAVENOUS; SUBCUTANEOUS EVERY 8 HOURS
Refills: 0 | Status: DISCONTINUED | OUTPATIENT
Start: 2020-09-23 | End: 2020-10-09

## 2020-09-23 RX ORDER — HYDROMORPHONE HYDROCHLORIDE 2 MG/ML
0.5 INJECTION INTRAMUSCULAR; INTRAVENOUS; SUBCUTANEOUS
Refills: 0 | Status: DISCONTINUED | OUTPATIENT
Start: 2020-09-23 | End: 2020-09-23

## 2020-09-23 RX ORDER — PANTOPRAZOLE SODIUM 20 MG/1
40 TABLET, DELAYED RELEASE ORAL
Refills: 0 | Status: DISCONTINUED | OUTPATIENT
Start: 2020-09-23 | End: 2020-10-09

## 2020-09-23 RX ORDER — ACETAMINOPHEN 500 MG
650 TABLET ORAL EVERY 6 HOURS
Refills: 0 | Status: COMPLETED | OUTPATIENT
Start: 2020-09-23 | End: 2020-09-25

## 2020-09-23 RX ORDER — LINEZOLID 600 MG/300ML
600 INJECTION, SOLUTION INTRAVENOUS
Refills: 0 | Status: DISCONTINUED | OUTPATIENT
Start: 2020-09-23 | End: 2020-09-23

## 2020-09-23 RX ORDER — CHLORHEXIDINE GLUCONATE 213 G/1000ML
1 SOLUTION TOPICAL DAILY
Refills: 0 | Status: DISCONTINUED | OUTPATIENT
Start: 2020-09-23 | End: 2020-10-09

## 2020-09-23 RX ORDER — POTASSIUM PHOSPHATE, MONOBASIC POTASSIUM PHOSPHATE, DIBASIC 236; 224 MG/ML; MG/ML
15 INJECTION, SOLUTION INTRAVENOUS ONCE
Refills: 0 | Status: COMPLETED | OUTPATIENT
Start: 2020-09-23 | End: 2020-09-23

## 2020-09-23 RX ORDER — HYDROMORPHONE HYDROCHLORIDE 2 MG/ML
1 INJECTION INTRAMUSCULAR; INTRAVENOUS; SUBCUTANEOUS
Refills: 0 | Status: DISCONTINUED | OUTPATIENT
Start: 2020-09-23 | End: 2020-09-23

## 2020-09-23 RX ORDER — ONDANSETRON 8 MG/1
4 TABLET, FILM COATED ORAL ONCE
Refills: 0 | Status: DISCONTINUED | OUTPATIENT
Start: 2020-09-23 | End: 2020-09-23

## 2020-09-23 RX ORDER — METOPROLOL TARTRATE 50 MG
25 TABLET ORAL EVERY 12 HOURS
Refills: 0 | Status: DISCONTINUED | OUTPATIENT
Start: 2020-09-23 | End: 2020-10-09

## 2020-09-23 RX ORDER — MAGNESIUM SULFATE 500 MG/ML
2 VIAL (ML) INJECTION ONCE
Refills: 0 | Status: COMPLETED | OUTPATIENT
Start: 2020-09-23 | End: 2020-09-23

## 2020-09-23 RX ORDER — SODIUM CHLORIDE 9 MG/ML
1000 INJECTION, SOLUTION INTRAVENOUS
Refills: 0 | Status: DISCONTINUED | OUTPATIENT
Start: 2020-09-23 | End: 2020-09-23

## 2020-09-23 RX ORDER — ASCORBIC ACID 60 MG
500 TABLET,CHEWABLE ORAL
Refills: 0 | Status: DISCONTINUED | OUTPATIENT
Start: 2020-09-23 | End: 2020-10-09

## 2020-09-23 RX ORDER — LINEZOLID 600 MG/300ML
600 INJECTION, SOLUTION INTRAVENOUS
Refills: 0 | Status: DISCONTINUED | OUTPATIENT
Start: 2020-09-23 | End: 2020-09-28

## 2020-09-23 RX ORDER — HEPARIN SODIUM 5000 [USP'U]/ML
5000 INJECTION INTRAVENOUS; SUBCUTANEOUS EVERY 8 HOURS
Refills: 0 | Status: DISCONTINUED | OUTPATIENT
Start: 2020-09-23 | End: 2020-09-23

## 2020-09-23 RX ORDER — POTASSIUM CHLORIDE 20 MEQ
20 PACKET (EA) ORAL ONCE
Refills: 0 | Status: COMPLETED | OUTPATIENT
Start: 2020-09-23 | End: 2020-09-23

## 2020-09-23 RX ORDER — OXYCODONE HYDROCHLORIDE 5 MG/1
5 TABLET ORAL EVERY 4 HOURS
Refills: 0 | Status: DISCONTINUED | OUTPATIENT
Start: 2020-09-23 | End: 2020-09-28

## 2020-09-23 RX ORDER — PIPERACILLIN AND TAZOBACTAM 4; .5 G/20ML; G/20ML
4.5 INJECTION, POWDER, LYOPHILIZED, FOR SOLUTION INTRAVENOUS EVERY 6 HOURS
Refills: 0 | Status: DISCONTINUED | OUTPATIENT
Start: 2020-09-23 | End: 2020-09-28

## 2020-09-23 RX ORDER — SENNA PLUS 8.6 MG/1
2 TABLET ORAL AT BEDTIME
Refills: 0 | Status: DISCONTINUED | OUTPATIENT
Start: 2020-09-23 | End: 2020-09-26

## 2020-09-23 RX ADMIN — OXYCODONE HYDROCHLORIDE 10 MILLIGRAM(S): 5 TABLET ORAL at 06:58

## 2020-09-23 RX ADMIN — OXYCODONE HYDROCHLORIDE 10 MILLIGRAM(S): 5 TABLET ORAL at 17:45

## 2020-09-23 RX ADMIN — PIPERACILLIN AND TAZOBACTAM 25 GRAM(S): 4; .5 INJECTION, POWDER, LYOPHILIZED, FOR SOLUTION INTRAVENOUS at 23:33

## 2020-09-23 RX ADMIN — OXYCODONE HYDROCHLORIDE 10 MILLIGRAM(S): 5 TABLET ORAL at 12:21

## 2020-09-23 RX ADMIN — Medication 25 MILLIGRAM(S): at 05:04

## 2020-09-23 RX ADMIN — OXYCODONE HYDROCHLORIDE 10 MILLIGRAM(S): 5 TABLET ORAL at 23:25

## 2020-09-23 RX ADMIN — HYDROMORPHONE HYDROCHLORIDE 0.5 MILLIGRAM(S): 2 INJECTION INTRAMUSCULAR; INTRAVENOUS; SUBCUTANEOUS at 01:14

## 2020-09-23 RX ADMIN — Medication 650 MILLIGRAM(S): at 18:09

## 2020-09-23 RX ADMIN — OXYCODONE HYDROCHLORIDE 5 MILLIGRAM(S): 5 TABLET ORAL at 20:15

## 2020-09-23 RX ADMIN — Medication 50 GRAM(S): at 06:33

## 2020-09-23 RX ADMIN — SODIUM CHLORIDE 125 MILLILITER(S): 9 INJECTION, SOLUTION INTRAVENOUS at 00:14

## 2020-09-23 RX ADMIN — HYDROMORPHONE HYDROCHLORIDE 0.5 MILLIGRAM(S): 2 INJECTION INTRAMUSCULAR; INTRAVENOUS; SUBCUTANEOUS at 01:45

## 2020-09-23 RX ADMIN — PIPERACILLIN AND TAZOBACTAM 25 GRAM(S): 4; .5 INJECTION, POWDER, LYOPHILIZED, FOR SOLUTION INTRAVENOUS at 06:32

## 2020-09-23 RX ADMIN — OXYCODONE HYDROCHLORIDE 10 MILLIGRAM(S): 5 TABLET ORAL at 11:16

## 2020-09-23 RX ADMIN — PANTOPRAZOLE SODIUM 40 MILLIGRAM(S): 20 TABLET, DELAYED RELEASE ORAL at 11:18

## 2020-09-23 RX ADMIN — PIPERACILLIN AND TAZOBACTAM 25 GRAM(S): 4; .5 INJECTION, POWDER, LYOPHILIZED, FOR SOLUTION INTRAVENOUS at 11:54

## 2020-09-23 RX ADMIN — LINEZOLID 300 MILLIGRAM(S): 600 INJECTION, SOLUTION INTRAVENOUS at 23:33

## 2020-09-23 RX ADMIN — PIPERACILLIN AND TAZOBACTAM 25 GRAM(S): 4; .5 INJECTION, POWDER, LYOPHILIZED, FOR SOLUTION INTRAVENOUS at 18:09

## 2020-09-23 RX ADMIN — Medication 650 MILLIGRAM(S): at 12:20

## 2020-09-23 RX ADMIN — OXYCODONE HYDROCHLORIDE 10 MILLIGRAM(S): 5 TABLET ORAL at 05:04

## 2020-09-23 RX ADMIN — OXYCODONE HYDROCHLORIDE 10 MILLIGRAM(S): 5 TABLET ORAL at 23:55

## 2020-09-23 RX ADMIN — HEPARIN SODIUM 5000 UNIT(S): 5000 INJECTION INTRAVENOUS; SUBCUTANEOUS at 21:56

## 2020-09-23 RX ADMIN — SENNA PLUS 2 TABLET(S): 8.6 TABLET ORAL at 21:56

## 2020-09-23 RX ADMIN — CHLORHEXIDINE GLUCONATE 1 APPLICATION(S): 213 SOLUTION TOPICAL at 11:17

## 2020-09-23 RX ADMIN — Medication 25 MILLIGRAM(S): at 17:12

## 2020-09-23 RX ADMIN — POTASSIUM PHOSPHATE, MONOBASIC POTASSIUM PHOSPHATE, DIBASIC 63.75 MILLIMOLE(S): 236; 224 INJECTION, SOLUTION INTRAVENOUS at 16:58

## 2020-09-23 RX ADMIN — Medication 500 MILLIGRAM(S): at 18:10

## 2020-09-23 RX ADMIN — LINEZOLID 300 MILLIGRAM(S): 600 INJECTION, SOLUTION INTRAVENOUS at 11:54

## 2020-09-23 RX ADMIN — OXYCODONE HYDROCHLORIDE 5 MILLIGRAM(S): 5 TABLET ORAL at 20:45

## 2020-09-23 RX ADMIN — HYDROMORPHONE HYDROCHLORIDE 0.5 MILLIGRAM(S): 2 INJECTION INTRAMUSCULAR; INTRAVENOUS; SUBCUTANEOUS at 01:30

## 2020-09-23 RX ADMIN — HYDROMORPHONE HYDROCHLORIDE 0.5 MILLIGRAM(S): 2 INJECTION INTRAMUSCULAR; INTRAVENOUS; SUBCUTANEOUS at 00:30

## 2020-09-23 RX ADMIN — Medication 100 MILLIEQUIVALENT(S): at 06:34

## 2020-09-23 RX ADMIN — OXYCODONE HYDROCHLORIDE 5 MILLIGRAM(S): 5 TABLET ORAL at 00:30

## 2020-09-23 RX ADMIN — Medication 20 MILLIGRAM(S): at 05:04

## 2020-09-23 RX ADMIN — OXYCODONE HYDROCHLORIDE 10 MILLIGRAM(S): 5 TABLET ORAL at 17:13

## 2020-09-23 RX ADMIN — HEPARIN SODIUM 5000 UNIT(S): 5000 INJECTION INTRAVENOUS; SUBCUTANEOUS at 05:04

## 2020-09-23 RX ADMIN — Medication 650 MILLIGRAM(S): at 23:33

## 2020-09-23 RX ADMIN — Medication 650 MILLIGRAM(S): at 11:16

## 2020-09-23 RX ADMIN — HYDROMORPHONE HYDROCHLORIDE 0.5 MILLIGRAM(S): 2 INJECTION INTRAMUSCULAR; INTRAVENOUS; SUBCUTANEOUS at 01:29

## 2020-09-23 RX ADMIN — HEPARIN SODIUM 5000 UNIT(S): 5000 INJECTION INTRAVENOUS; SUBCUTANEOUS at 13:40

## 2020-09-23 NOTE — BRIEF OPERATIVE NOTE - NSICDXBRIEFPROCEDURE_GEN_ALL_CORE_FT
PROCEDURES:  Incision and drainage, wound, torso 23-Sep-2020 00:03:27  Eron Corona  
PROCEDURES:  Small bowel resection with anastomosis 15-Sep-2020 05:35:25  Jun Hugo  Lysis of intestinal adhesions 15-Sep-2020 05:35:17  Jun Hugo  Diagnostic laparoscopy 15-Sep-2020 05:35:05  Jun Hugo  
PROCEDURES:  Washing out stomach contents 15-Sep-2020 15:53:03  Mariann Rubio  Small bowel resection with anastomosis 15-Sep-2020 05:35:25  Jun Hugo  Lysis of intestinal adhesions 15-Sep-2020 05:35:17  Jun Hugo

## 2020-09-23 NOTE — PROGRESS NOTE ADULT - SUBJECTIVE AND OBJECTIVE BOX
GENERAL SURGERY PROGRESS NOTE     GLORIA SCHAFER  [address] [place] [address] day :9d  POD:  Procedure: Incision and drainage, wound, torso    Insertion, arterial line, percutaneous    Washing out stomach contents    Small bowel resection with anastomosis    Lysis of intestinal adhesions    Diagnostic laparoscopy      Surgical Attending: Mariann Rubio  Overnight events: CT on  showed possible forming abscesses in lower abd/pelvis that are currently not amenable for drainage by IR. Patient taken to OR overnight for ID, found no drainable collection, tissue necrosis or fascial breakdown. HR improving, now in high 90s. Cellulitis on R flank decreasing.    T(F): 99 (20 @ 06:00), Max: 99 (20 @ 06:00)  HR: 99 (20 @ 06:00) (96 - 129)  BP: 108/66 (20 @ 07:00) (95/60 - 127/67)  ABP: --  ABP(mean): --  RR: 20 (20 @ 07:00) (17 - 28)  SpO2: 98% (20 @ 07:00) (96% - 100%)      20 @ 07:01  -  20 @ 07:00  --------------------------------------------------------  IN:    IV PiggyBack: 100 mL    Lactated Ringers: 345.8 mL    TPN (Total Parenteral Nutrition): 100 mL  Total IN: 545.8 mL    OUT:    Indwelling Catheter - Urethral (mL): 900 mL    Voided (mL): 2345 mL  Total OUT: 3245 mL    Total NET: -2699.2 mL        DIET/FLUIDS: lactated ringers. 1000 milliLiter(s) IV Continuous <Continuous>      URINE:   20 @ 07:01  -  20 @ 07:00  --------------------------------------------------------  OUT: 900 mL       GI proph:  pantoprazole    Tablet 40 milliGRAM(s) Oral before breakfast    AC/ proph: heparin   Injectable 5000 Unit(s) SubCutaneous every 8 hours    ABx: linezolid  IVPB 600 milliGRAM(s) IV Intermittent <User Schedule>  piperacillin/tazobactam IVPB.. 4.5 Gram(s) IV Intermittent every 6 hours      GEN: NAD,   HEENT: NC/AT  CHEST: Symmetric chest wall expansion. currently no longer tachycardic  ABD: dressing clean and dry. mildly tender throughout    LABS  Labs:  CAPILLARY BLOOD GLUCOSE  145 (22 Sep 2020 20:00)  52 (22 Sep 2020 18:00)  346 (22 Sep 2020 16:30)  179 (22 Sep 2020 12:00)  134 (22 Sep 2020 09:00)  109 (22 Sep 2020 08:00)      POCT Blood Glucose.: 200 mg/dL (23 Sep 2020 06:40)  POCT Blood Glucose.: 183 mg/dL (23 Sep 2020 03:04)  POCT Blood Glucose.: 146 mg/dL (22 Sep 2020 22:31)  POCT Blood Glucose.: 143 mg/dL (22 Sep 2020 20:03)  POCT Blood Glucose.: 45 mg/dL (22 Sep 2020 20:01)  POCT Blood Glucose.: 160 mg/dL (22 Sep 2020 18:49)  POCT Blood Glucose.: 52 mg/dL (22 Sep 2020 17:58)  POCT Blood Glucose.: 346 mg/dL (22 Sep 2020 16:13)  POCT Blood Glucose.: 179 mg/dL (22 Sep 2020 11:53)  POCT Blood Glucose.: 134 mg/dL (22 Sep 2020 09:23)  POCT Blood Glucose.: 109 mg/dL (22 Sep 2020 08:05)                          8.4    15.87 )-----------( 170      ( 23 Sep 2020 03:15 )             25.2       Auto Neutrophil %: 90.4 % (20 @ 03:15)  Band Neutrophils %: 1.7 % (20 @ 03:15)        131<L>  |  97<L>  |  8<L>  ----------------------------<  185<H>  3.8   |  26  |  <0.5<L>      Calcium, Total Serum: 6.8 mg/dL (20 @ 03:15)      LFTs:     Lactate, Blood: 1.0 mmol/L (20 @ 03:30)    ABG - ( 17 Sep 2020 04:48 )  pH: 7.41  /  pCO2: 37    /  pO2: 137   / HCO3: 24    / Base Excess: -0.7  /  SaO2: 100             ABG - ( 16 Sep 2020 13:35 )  pH: 7.43  /  pCO2: 30    /  pO2: 139   / HCO3: 20    / Base Excess: -3.4  /  SaO2: 99              ABG - ( 16 Sep 2020 10:10 )  pH: 7.44  /  pCO2: 31    /  pO2: 122   / HCO3: 21    / Base Excess: -2.9  /  SaO2: 99                Coags:     16.30  ----< 1.42    ( 23 Sep 2020 03:15 )     32.9            Serum Pro-Brain Natriuretic Peptide: 1902 pg/mL (20 @ 00:00)  Serum Pro-Brain Natriuretic Peptide: 2013 pg/mL (20 @ 08:19)  Serum Pro-Brain Natriuretic Peptide: 1595 pg/mL (20 @ 23:50)  Serum Pro-Brain Natriuretic Peptide: 1557 pg/mL (20 @ 00:00)  Serum Pro-Brain Natriuretic Peptide: 4550 pg/mL (20 @ 23:30)      Urinalysis Basic - ( 23 Sep 2020 03:15 )    Color: Colorless / Appearance: Clear / S.011 / pH: x  Gluc: x / Ketone: Negative  / Bili: Negative / Urobili: <2 mg/dL   Blood: x / Protein: Negative / Nitrite: Negative   Leuk Esterase: Negative / RBC: x / WBC x   Sq Epi: x / Non Sq Epi: x / Bacteria: x            RADIOLOGY & ADDITIONAL TESTS:  < from: CT Abdomen and Pelvis w/ Oral Cont and w/ IV Cont (20 @ 16:15) >  1. Partially loculated rim-enhancing collections in the lower abdomen/pelvis consistent with developing abscesses.  2. No bowel obstruction. Mild thickening of the small bowel wall consistent with enteritis, likely reactive.  3. Unchanged heterogeneous right renal enhancement, likely sequela of pyelonephritis.  4. Small to moderatebilateral pleural effusions with compressive atelectasis.    < end of copied text >

## 2020-09-23 NOTE — PROGRESS NOTE ADULT - SUBJECTIVE AND OBJECTIVE BOX
GLORIA SCHAFER  45y, Female    All available historical data reviewed    OVERNIGHT EVENTS:  no fevers  s/p local debridement of abdominal wound by Sx   pain left flank significantly reduces  no pressors  R IJ catheter with no erythema    ROS:  General: Denies rigors, nightsweats  HEENT: Denies headache, rhinorrhea, sore throat, eye pain  CV: Denies CP, palpitations  PULM: Denies wheezing, hemoptysis  GI: Denies hematemesis, hematochezia, melena  : Denies discharge, hematuria  MSK: Denies arthralgias, myalgias  SKIN: Denies rash, lesions  NEURO: Denies paresthesias, weakness  PSYCH: Denies depression, anxiety    VITALS:  T(F): 96.3, Max: 99 (20 @ 06:00)  HR: 91  BP: 110/74  RR: 20Vital Signs Last 24 Hrs  T(C): 35.7 (23 Sep 2020 07:58), Max: 37.2 (23 Sep 2020 06:00)  T(F): 96.3 (23 Sep 2020 07:58), Max: 99 (23 Sep 2020 06:00)  HR: 91 (23 Sep 2020 09:00) (89 - 129)  BP: 110/74 (23 Sep 2020 09:00) (95/60 - 142/77)  BP(mean): 86 (23 Sep 2020 09:00) (72 - 101)  RR: 20 (23 Sep 2020 09:00) (17 - 28)  SpO2: 98% (23 Sep 2020 09:00) (96% - 100%)    TESTS & MEASUREMENTS:                        8.4    15.87 )-----------( 170      ( 23 Sep 2020 03:15 )             25.2         131<L>  |  97<L>  |  8<L>  ----------------------------<  185<H>  3.8   |  26  |  <0.5<L>    Ca    6.8<L>      23 Sep 2020 03:15  Phos  3.1       Mg     1.7               Culture - Blood (collected 20 @ 20:40)  Source: .Blood Blood-Peripheral  Final Report (09-23-20 @ 04:00):    No Growth Final      Urinalysis Basic - ( 23 Sep 2020 03:15 )    Color: Colorless / Appearance: Clear / S.011 / pH: x  Gluc: x / Ketone: Negative  / Bili: Negative / Urobili: <2 mg/dL   Blood: x / Protein: Negative / Nitrite: Negative   Leuk Esterase: Negative / RBC: x / WBC x   Sq Epi: x / Non Sq Epi: x / Bacteria: x          RADIOLOGY & ADDITIONAL TESTS:  Personal review of radiological diagnostics performed  Echo and EKG results noted when applicable.     MEDICATIONS:  acetaminophen   Tablet .. 650 milliGRAM(s) Oral every 6 hours  bisacodyl Suppository 10 milliGRAM(s) Rectal daily PRN  chlorhexidine 4% Liquid 1 Application(s) Topical daily  chlorhexidine 4% Liquid 1 Application(s) Topical daily  dextrose 50% Injectable 50 milliLiter(s) IV Push once  furosemide    Tablet 20 milliGRAM(s) Oral daily  heparin   Injectable 5000 Unit(s) SubCutaneous every 8 hours  insulin regular Infusion 4 Unit(s)/Hr IV Continuous <Continuous>  lactated ringers. 1000 milliLiter(s) IV Continuous <Continuous>  linezolid  IVPB 600 milliGRAM(s) IV Intermittent <User Schedule>  metoprolol tartrate 25 milliGRAM(s) Oral every 12 hours  oxyCODONE    IR 10 milliGRAM(s) Oral every 6 hours PRN  oxyCODONE    IR 5 milliGRAM(s) Oral every 4 hours PRN  pantoprazole    Tablet 40 milliGRAM(s) Oral before breakfast  piperacillin/tazobactam IVPB.. 4.5 Gram(s) IV Intermittent every 6 hours  senna 2 Tablet(s) Oral at bedtime      ANTIBIOTICS:  linezolid  IVPB 600 milliGRAM(s) IV Intermittent <User Schedule>  piperacillin/tazobactam IVPB.. 4.5 Gram(s) IV Intermittent every 6 hours

## 2020-09-23 NOTE — CHART NOTE - NSCHARTNOTEFT_GEN_A_CORE
s/p CT abd pelvis yesterday showing possible forming abscesses in lower abd/pelvis that were not amenable for drainage by IR. Patient taken to OR overnight for I&D, found no drainable collection, tissue necrosis or fascial breakdown    POD#35 Ex lap PAULIE/BS, ureteral stent placement  POD#8 ex lap, resection of 43 cm of ileum secondary to serosal tears, side-to-side ileoileal anastomosis, enterotomy stapled  POD#8 Ex lap small bowel resection with anastomosis appendectomy    NPO earlier today, PO diet now resumed. Previously when on PO diet intake minimal at best. Pt with increased nutrient requirements for wound healing 2nd to frequent visits to OR and poor intake since initial surgery on 8/19/20    T(F): 98 (09-23-20 @ 12:00), Max: 99 (09-23-20 @ 06:00)  HR: 91 (09-23-20 @ 14:00) (89 - 129)  BP: 117/83 (09-23-20 @ 14:00) (95/60 - 142/77)  RR: 22 (09-23-20 @ 14:00) (17 - 26)  SpO2: 98% (09-23-20 @ 14:00) (96% - 100%)    I&O's Detail    22 Sep 2020 07:01  -  23 Sep 2020 07:00  --------------------------------------------------------  IN:    IV PiggyBack: 100 mL    Lactated Ringers: 345.8 mL    TPN (Total Parenteral Nutrition): 100 mL  Total IN: 545.8 mL    OUT:    Indwelling Catheter - Urethral (mL): 900 mL    Voided (mL): 2345 mL  Total OUT: 3245 mL    Total NET: -2699.2 mL    23 Sep 2020 07:01  -  23 Sep 2020 14:36  --------------------------------------------------------  IN:    Insulin: 15 mL    IV PiggyBack: 50 mL    IV PiggyBack: 300 mL    Lactated Ringers: 600 mL  Total IN: 965 mL    OUT:    Voided (mL): 1800 mL  Total OUT: 1800 mL    Total NET: -835 mL    09-23    132<L>  |  98  |  8<L>  ----------------------------<  135<H>  3.8   |  27  |  <0.5<L>    Ca    7.3<L>      23 Sep 2020 11:25  Phos  2.9     09-23  Mg     2.0     09-23                        8.4    15.87 )-----------( 170      ( 23 Sep 2020 03:15 )             25.2     CAPILLARY BLOOD GLUCOSE  145 (22 Sep 2020 20:00)  52 (22 Sep 2020 18:00)  346 (22 Sep 2020 16:30)  POCT Blood Glucose.: 172 mg/dL (23 Sep 2020 14:16)  POCT Blood Glucose.: 176 mg/dL (23 Sep 2020 13:13)  POCT Blood Glucose.: 162 mg/dL (23 Sep 2020 12:18)  POCT Blood Glucose.: 133 mg/dL (23 Sep 2020 11:09)  POCT Blood Glucose.: 193 mg/dL (23 Sep 2020 10:10)  POCT Blood Glucose.: 198 mg/dL (23 Sep 2020 09:06)  POCT Blood Glucose.: 178 mg/dL (23 Sep 2020 07:58)  POCT Blood Glucose.: 200 mg/dL (23 Sep 2020 06:40)  POCT Blood Glucose.: 183 mg/dL (23 Sep 2020 03:04)  POCT Blood Glucose.: 146 mg/dL (22 Sep 2020 22:31)  POCT Blood Glucose.: 143 mg/dL (22 Sep 2020 20:03)  POCT Blood Glucose.: 45 mg/dL (22 Sep 2020 20:01)  POCT Blood Glucose.: 160 mg/dL (22 Sep 2020 18:49)  POCT Blood Glucose.: 52 mg/dL (22 Sep 2020 17:58)  POCT Blood Glucose.: 346 mg/dL (22 Sep 2020 16:13)    Diet, Mechanical Soft:   Consistent Carbohydrate {Evening Snack} (09-23-20 @ 14:05)    ASSESSMENT  45y Female s/p small bowel resection with primary anastomosis 9/15/20, taken back to the OR for washing out stomach contents, small bowel resection with anastomosis, lysis of intestinal adhesions (staple line of proximal limb of anastomosis break down with gross spillage    - feculent peritonitis/sepsis  - severe protein calorie malnutrition   - hyperglycemia  - hypophosphatemia  - hypomagnesemia    ******Please be aware that pt has had a very poor PO intake over the past month and is severely malnourished with high risk for delayed wound healing    PLAN  - diet resumed today  - add Ensure Enlive 240ml PO q12hrs  - add MVI with minerals 1 tab PO q24hrs, 500mg vitamin C PO q12hrs  - check kashif cts X 2 days  - monitor BMP, In phos, Mg  - check vitamin D, zinc levels

## 2020-09-23 NOTE — CDI QUERY NOTE - NSCDIOTHERTXTBX_GEN_ALL_CORE_HH
9/14 Clinical Indicators: 45yF s/p ex lap NARCISO, SBR anastamosis c/b leak and re-exploration wash out and re-resection and anastomosis.     9/22 RD consulted with the following assessment: - severe protein calorie malnutrition    >Please be aware that pt has had a very poor PO intake over the past month and is severely malnourished with high risk for delayed wound healing    Plan: TPN, monitor BMP, In phos, Mg, check vitamin D, zinc levels.    Based on above clinical findings and your professional judgment if you agree to above RD assessment  please document in your progress notes    ?	 Severe protein malnutrition ( Please include RD recommendations)  ?	Other please specify  ?	Clinically unable to determine

## 2020-09-23 NOTE — PROGRESS NOTE ADULT - ATTENDING COMMENTS
Patient had incision in bilateral flanks yesterday. No nec. fasciitis was found.  Pain is better today.  Tolerates liquid diet.  Off TPN.  Awaiting IR for drainage of abdominal abscess.    ASSESSMENT:  44 y/o female with SBO.  S/P Ex. Lap.  Perforated viscus.  Peritonitis. Abdominal sepsis.  Acute respiratory failure.  Acute postoperative pain.  Abdominal wall cellulitis.  Moderate malnutrition.    PLAN:  - pain control  - incentive spirometer; OOB to chair  - soft mechanical diet after IR  - continue Zosyn and Zyvox; ID f/u  - GI and DVT prophylaxis

## 2020-09-23 NOTE — PHARMACOTHERAPY INTERVENTION NOTE - COMMENTS
s/w md [id number]- alerted md of drug intx between pain medications & linezolid- md will need to evaluate pt profile

## 2020-09-23 NOTE — PROGRESS NOTE ADULT - ASSESSMENT
46 y/o no Pmhx s/p PAULIE/BS on 8/19 for fibroid uterus POD#34, s/p diagnostic laparoscopy converted to exploratory laparotomy and small bowel reanastomosis, with breakdown of staple line of proximal limb of anastomosis, s/p repeat exploratory laparotomy with small bowel resection, reanastomosis and appendectomy, POD#7 tachycardic and hypotensive postop, now off pressors, admitted to SICU for close hemodynamic monitoring, now with leukocytosis r/o infection.     NEURO:    AAOx3      PO tylenol, oxycodone prn, dilaudid prn     RESP:     O2 sat >95% on RA    ABG PRN    AM CXR stable, f/u final read    CT Chest (PE protocol) 9/17- negative for PE; small-moderate b/l pleural effusions with compressive atelectasis      CARDS:     Hypotension Resolved- Off all pressors    Acute tachycardia - now in the 110s, cont metoprolol 25mg BID    CE neg, 0.04, 0.16, 0.07 - no longer trending    ECHO: EF 50-55%, G1DD, mild MR, mild TR    GI/NUTR:     ordered CT Abd/pelvis with PO and IV contrast to r/o abscess    CT A/P (9/17) - negative for leak; postsurgical changes with post-op ileus    Diet: NPO for CT, advance to soft mechanical after CT, d/c TPN today    Hepatic panel 9/18: WNL    IVL    GI ppx: PPI    Bowel regimen: Dulcolax suppository       /RENAL:     Escalera - UOP 125cc/hr, d/c escalera today    BUN/Cr trend     Trend lytes (ordered 1600), K 3.0 --> given K rider x2, 15mmol K phos, 2g mag    Lactate 1.3 (max 6.5) (no longer trending)    HEME/ONC:     Hgb 8.7    Transfused 1 uPRBC (split 1/2 and 1/2 on 9/18)    Plt 120    INR 2.5 > 1.7 >1.3    HIT antibody: negative    DVT Prophylaxis: HSQ, SCDs    ID:    Afebrile    Concern for possible cellulitis vs. intraabdominal infection --> CT abd/pelvis with IV/oral contrast    WBC 16.63 <-- 13.76 <--9.13     -ID following: recommend imaging, start Zosyn 4.5 gm iv q6h over 4hr, Zyvox 600 mg iv q12h    9/22 repeat bcx ordered    BCx 9/17- (NGTD)     ENDO:    Insulin drip d/c'd, last     FS ACqHS, ISS    Solucortef 50 q6 to be d/c'd 9/20    LINES/DRAINS:  PIV , Jessica, central line    DVT Prophylaxis: heparin   Injectable 5000 Unit(s) SubCutaneous every 8 hours  GI Prophylaxis:pantoprazole  Injectable 40 milliGRAM(s) IV Push daily    ***Tubes/Lines/Drains  ***  Peripheral IV  R. Basilic Midline                      Date: 9/17

## 2020-09-23 NOTE — PROGRESS NOTE ADULT - ATTENDING COMMENTS
46yo female with PSHx PAULIE/BSO 8/19/2020 for fibroids admitted for persistent small bowel obstruction s/p laparoscopic NARCISO, laparotomy, extensive NARCISO, small bowel resection with primary anastomosis 9/15 complicated by small bowel leak s/p re-exploration, wash out, small bowel resection and primary anastomosis 9/15. Tachycardia improving to 110's, no longer on pressors. Extubated and breathing independently. Was complaining of shortness of breath but states she feels better. Complains of lower abdominal pain RLQ and at incision that was improving previously. Daily wound care. Lower abdominal soft tissue with erythema and induration. WBC 15 from 16. ICU restarted antibiotics IV, ID following. ID discontinued antibiotics. CT A/P 9/22 shows [id number] pelvic abscess, abdominal wall edema, small specks of subcutaneous air near port site. s/p bilateral flank incision and exploration to r/o necrotizing soft tissue infection of abdominal wall B/L 9/22.  +bowel movements, +flatus. Was tolerating CLD, but NPO for now in anticipation of IR drainage. Advance as tolerated. Pain control. Trend labs, continue antibiotics. Encourage ambulation/OOB, incentive spirometer, DVT ppx. Primary care as per ICU.

## 2020-09-23 NOTE — PROGRESS NOTE ADULT - ASSESSMENT
44 y/o no Pmhx s/p PAULIE/BS on 8/19 for fibroid uterus POD#35, s/p diagnostic laparoscopy converted to exploratory laparotomy and small bowel reanastomosis, with breakdown of staple line of proximal limb of anastomosis, s/p repeat exploratory laparotomy with small bowel resection, reanastomosis and appendectomy, POD#8 tachycardic and hypotensive postop, now off pressors, admitted to SICU for close hemodynamic monitoring, s/p I&D of anterior abdomen for suspected soft tissue infection, wound cellulitis only, POD0, clinically improving.   - Neuro: AAOX3, PO pain meds  - CVS: tachycardia improving, metoprolol increased to 25 mg BID, off pressors  - Pulm: no longer tachypneic, 98% on RA, CT negative for PE, AM CXR decreased bialteral effusions  - GI: s/p NGT, advanced to full liquid diet, dressing changes daily by gen surg  - : IV fluids @110 cc/hr, monitor UO, trend Cr, lasix 20 mg daily, potassium being repleted  - heme/onc: s/p 1U pRBCs, heparin for VTE prophylaxis, H/H stable  - Inf: s/p I&D in anterior abdominal wall for suspected soft tissue infection, wound cellulitis only, started on zosyn and zyvox per ID. Leukocytosis trending down. CT 8 cm pelvic abscess, not safe to drain per ID.   - Lines: peripheral IV, right midline  - management per SICU team  - gyn to follow    Will inform Dr. Molina. 44 y/o no Pmhx s/p PAULIE/BS on 8/19 for fibroid uterus POD#35, s/p diagnostic laparoscopy converted to exploratory laparotomy and small bowel reanastomosis, with breakdown of staple line of proximal limb of anastomosis, s/p repeat exploratory laparotomy with small bowel resection, reanastomosis and appendectomy, POD#8 tachycardic and hypotensive postop, now off pressors, admitted to SICU for close hemodynamic monitoring, s/p I&D of anterior abdomen for suspected soft tissue infection, wound cellulitis only, POD0, clinically improving.   - Neuro: AAOX3, PO pain meds  - CVS: tachycardia improving, metoprolol increased to 25 mg BID, off pressors  - Pulm: no longer tachypneic, 98% on RA, CT negative for PE, AM CXR decreased bialteral effusions  - GI: s/p NGT, advanced to full liquid diet, dressing changes daily by gen surg  - : IV fluids @110 cc/hr, monitor UO, trend Cr, lasix 20 mg daily, potassium being repleted  - heme/onc: s/p 1U pRBCs, heparin for VTE prophylaxis, H/H stable  - Inf: s/p I&D in anterior abdominal wall for suspected soft tissue infection, wound cellulitis only, started on zosyn and zyvox per IR. Leukocytosis trending down. CT 8 cm pelvic abscess, not safe to drain per ID.   - Lines: peripheral IV, right midline  - management per SICU team  - gyn to follow    Will inform Dr. Molina.

## 2020-09-23 NOTE — PROGRESS NOTE ADULT - SUBJECTIVE AND OBJECTIVE BOX
GLORIA SCHAFER  [id number]  45y Female    Indication for ICU admission: intraabdominal sepsis, s/p diagnostic lap, NARCISO, SBR (43cm ileum) w/ primary anastamosis (side to side)  Admit Date:20  ICU Date: 9/15/2020  OR Date: 9/15/2020 x2    No Known Allergies    PAST MEDICAL & SURGICAL HISTORY:  Uterine fibroid    Status post bilateral salpingectomy    S/P PAULIE (total abdominal hysterectomy)    Home Medications:  ibuprofen 600 mg oral tablet: 1 tab(s) orally every 6 hours, As needed, Mild Pain (1 - 3) (01 Sep 2020 06:26)      24HRS EVENT:    Taken to the OR for evaluation of increasing bilateral anterior abdominal wall cellutlis and erythema; two anterior abdominal all incisions made, no notable signs of infection     NEURO:    AAOx3      Acute pain - PO tylenol, oxycodone prn     RESP:     on RA    ABG PRN    AM CXR    CT Chest (PE protocol) - negative for PE; small-moderate b/l pleural effusions with compressive atelectasis      CARDS:     Hypotension Resolved- Off all pressors    Acute HTN & tachycardia - increased metoprolol to 25mg BID    CE neg, 0.04, 0.16, 0.07 - no longer trending    ECHO: EF 50-55%, G1DD, mild MR, mild TR    BNP-     On Lasix 20 PO QD      GI/NUTR:     CT A/P () - negative for leak; postsurgical changes with post-op ileus    Diet: full liquids, TPN d/c'd    +gas/+BM     Hepatic panel : WNL    IVL    GI ppx: PPI    Dulcolax suppository       /RENAL:     voiding    () Lasix 40 IV & Diamox 500 --> good response, 5.2L+    BUN/Cr-  11/0.5     Lactate cleared     Repleting Lytes     HEME/ONC:     Hgb 8.7    Transfused 1 uPRBC (split / and 1/2 on )    Plt 74    INR 2.5 > 1.7 >1.3    HIT antibody: negative    DVT Prophylaxis: HSQ, SCDs    ID:    Tmax 100.2    WBC 9.13 > 13.7 > 16.6    Abx: Caspfungin, Zosyn, Flagyl, d/c per ID 9/21    -ID following    BCx - (NGTD)     ENDO:    Hyperglycemic - on Insulin gtt (started )     Solucortef d/c'd     LINES/DRAINS:  PIV , [place], central line, Silva     DVT Prophylaxis: heparin   Injectable 5000 Unit(s) SubCutaneous every 8 hours  GI Prophylaxis:pantoprazole  Injectable 40 milliGRAM(s) IV Push daily    ***Tubes/Lines/Drains  ***  Peripheral IV  NG Tube  Urinary Catheter		Indication: Strict I&O      Daily Height in cm: 165.1 (22 Sep 2020 23:08)    Daily     Diet, NPO:   Except Medications (20 @ 01:54)      CURRENT MEDS:  Neurologic Medications  acetaminophen   Tablet .. 650 milliGRAM(s) Oral every 6 hours  oxyCODONE    IR 10 milliGRAM(s) Oral every 6 hours PRN Moderate Pain (4 - 6)  oxyCODONE    IR 5 milliGRAM(s) Oral every 4 hours PRN Mild Pain (1 - 3)    Respiratory Medications    Cardiovascular Medications  furosemide    Tablet 20 milliGRAM(s) Oral daily  metoprolol tartrate 25 milliGRAM(s) Oral every 12 hours    Gastrointestinal Medications  bisacodyl Suppository 10 milliGRAM(s) Rectal daily PRN Constipation  pantoprazole    Tablet 40 milliGRAM(s) Oral before breakfast  senna 2 Tablet(s) Oral at bedtime    Genitourinary Medications    Hematologic/Oncologic Medications  heparin   Injectable 5000 Unit(s) SubCutaneous every 8 hours    Antimicrobial/Immunologic Medications  linezolid  IVPB 600 milliGRAM(s) IV Intermittent <User Schedule>  piperacillin/tazobactam IVPB.. 4.5 Gram(s) IV Intermittent every 6 hours    Endocrine/Metabolic Medications  dextrose 50% Injectable 50 milliLiter(s) IV Push once  insulin regular Infusion 4 Unit(s)/Hr IV Continuous <Continuous>    Topical/Other Medications  chlorhexidine 4% Liquid 1 Application(s) Topical daily  chlorhexidine 4% Liquid 1 Application(s) Topical daily    ICU Vital Signs Last 24 Hrs  T(C): 37.1 (23 Sep 2020 04:00), Max: 37.1 (22 Sep 2020 08:07)  T(F): 98.7 (23 Sep 2020 04:00), Max: 98.8 (22 Sep 2020 08:07)  HR: 102 (23 Sep 2020 04:00) (101 - 129)  BP: 105/72 (23 Sep 2020 05:00) (103/64 - 127/67)  BP(mean): 84 (23 Sep 2020 05:00) (79 - 101)  RR: 18 (23 Sep 2020 04:00) (17 - 28)  SpO2: 97% (23 Sep 2020 04:00) (96% - 100%)    I&O's Summary    21 Sep 2020 07:01  -  22 Sep 2020 07:  --------------------------------------------------------  IN: 1537.8 mL / OUT: 5880 mL / NET: -4342.2 mL    22 Sep 2020 07:01  -  23 Sep 2020 05:46  --------------------------------------------------------  IN: 545.8 mL / OUT: 3245 mL / NET: -2699.2 mL      I&O's Detail    21 Sep 2020 07:01  -  22 Sep 2020 07:00  --------------------------------------------------------  IN:    Fat Emulsion (Fish Oil &amp; Plant Based) 20% Infusion: 187.8 mL    Oral Fluid: 250 mL    TPN (Total Parenteral Nutrition): 1100 mL  Total IN: 1537.8 mL    OUT:    Indwelling Catheter - Urethral (mL): 5880 mL  Total OUT: 5880 mL    Total NET: -4342.2 mL      22 Sep 2020 07:01  -  23 Sep 2020 05:46  --------------------------------------------------------  IN:    IV PiggyBack: 100 mL    Lactated Ringers: 345.8 mL    TPN (Total Parenteral Nutrition): 100 mL  Total IN: 545.8 mL    OUT:    Indwelling Catheter - Urethral (mL): 900 mL    Voided (mL): 2345 mL  Total OUT: 3245 mL    Total NET: -2699.2 mL    PHYSICAL EXAM:    General/Neuro  RASS:             GCS:     = E   / V   / M      Deficits:                             alert & oriented x 3, no focal deficits  Pupils:    Lungs:      clear to auscultation, Normal expansion/effort.     Cardiovascular : S1, S2.  Regular rate and rhythm.  Peripheral edema   Cardiac Rhythm: Normal Sinus Rhythm    GI: Abdomen soft, Non-tender, Non-distended.    Gastrostomy / Jejunostomy tube in place.  Nasogastric tube in place.  Colostomy / Ileostomy.    Wound:    Extremities: Extremities warm, pink, well-perfused. Pulses:Rt     Lt    Derm: Good skin turgor, no skin breakdown.      :  Silva catheter in place.    CXR:   **F/U AM CXR**    LABS:  CAPILLARY BLOOD GLUCOSE  145 (22 Sep 2020 20:00)  52 (22 Sep 2020 18:00)  346 (22 Sep 2020 16:30)  179 (22 Sep 2020 12:00)  134 (22 Sep 2020 09:00)  109 (22 Sep 2020 08:00)  183 (22 Sep 2020 06:00)      POCT Blood Glucose.: 183 mg/dL (23 Sep 2020 03:04)  POCT Blood Glucose.: 146 mg/dL (22 Sep 2020 22:31)  POCT Blood Glucose.: 143 mg/dL (22 Sep 2020 20:03)  POCT Blood Glucose.: 45 mg/dL (22 Sep 2020 20:01)  POCT Blood Glucose.: 160 mg/dL (22 Sep 2020 18:49)  POCT Blood Glucose.: 52 mg/dL (22 Sep 2020 17:58)  POCT Blood Glucose.: 346 mg/dL (22 Sep 2020 16:13)  POCT Blood Glucose.: 179 mg/dL (22 Sep 2020 11:53)  POCT Blood Glucose.: 134 mg/dL (22 Sep 2020 09:23)  POCT Blood Glucose.: 109 mg/dL (22 Sep 2020 08:05)  POCT Blood Glucose.: 176 mg/dL (22 Sep 2020 07:01)                          8.4    15.87 )-----------( 170      ( 23 Sep 2020 03:15 )             25.2       09-23    131<L>  |  97<L>  |  8<L>  ----------------------------<  185<H>  3.8   |  26  |  <0.5<L>    Ca    6.8<L>      23 Sep 2020 03:15  Phos  3.1       Mg     1.7             PT/INR - ( 23 Sep 2020 03:15 )   PT: 16.30 sec;   INR: 1.42 ratio         PTT - ( 23 Sep 2020 03:15 )  PTT:32.9 sec      Urinalysis Basic - ( 23 Sep 2020 03:15 )    Color: Colorless / Appearance: Clear / S.011 / pH: x  Gluc: x / Ketone: Negative  / Bili: Negative / Urobili: <2 mg/dL   Blood: x / Protein: Negative / Nitrite: Negative   Leuk Esterase: Negative / RBC: x / WBC x   Sq Epi: x / Non Sq Epi: x / Bacteria: x        R. Basilic Midline                      Date:     [X] A ten-point review of systems was otherwise negative except as noted above.  [  ] Due to altered mental status/intubation, subjective information was not attained from the patient. History was obtained, to the extent possible, from review of the chart and collateral sources of information.

## 2020-09-23 NOTE — CHART NOTE - NSCHARTNOTEFT_GEN_A_CORE
PACU ANESTHESIA ADMISSION NOTE      Procedure: Incision and drainage, wound, torso    Insertion, arterial line, percutaneous    Washing out stomach contents    Small bowel resection with anastomosis    Lysis of intestinal adhesions    Diagnostic laparoscopy      Post op diagnosis:  Wound cellulitis    Perforated viscus    Small bowel obstruction    Intermittent small bowel obstruction due to adhesions        ____  Intubated  TV:______       Rate: ______      FiO2: ______    _x___  Patent Airway    _x___  Full return of protective reflexes    _x___  Full recovery from anesthesia / back to baseline status    Vitals:            T:    98.2            BP :   109/67             R:      20        Sat:100               P:107      Mental Status:  _x___ Awake   _____ Alert   _____ Drowsy   _____ Sedated    Nausea/Vomiting:  _x___  NO       ______Yes,   See Post - Op Orders         Pain Scale (0-10):  __0___    Treatment: _x___ None    ____ See Post - Op/PCA Orders    Post - Operative Fluids:   __x__ Oral   ____ See Post - Op Orders    Plan: Discharge:   ____Home       _____Floor     ___x__Critical Care    _____  Other:_________________    Comments:  No anesthesia issues or complications noted. report given to ICU burn  Discharge when criteria met.

## 2020-09-23 NOTE — PROGRESS NOTE ADULT - ASSESSMENT
IMPRESSION;  Presented with Feculent peritonitis secondary to perforated viscus  9/15 : s/p diagnostic lap, NARCISO, SBR (43cm ileum) w/ primary anastamosis (side to side)  Post op pelvic abscesses  CT A/P 9/22 : pelvic abscesses, enteritis, likely reactive, b/l pleural effusions/atalectasis.  No right flank soft tissue infection  Right flank cellulitis with no abscess/fascitis. Seems to have originated from the abdominal wound which was locally debrided 9/22 and presently with no ongoing infectious process  Right flank appears significantly improved with no pain and reduced erythema  BCx 9/17 NGTD    RECOMMENDATIONS;  BCx  Zosyn 4.5 gm iv q6h over 4h  Zyvox 600 mg iv q12h

## 2020-09-23 NOTE — BRIEF OPERATIVE NOTE - NSICDXBRIEFPOSTOP_GEN_ALL_CORE_FT
POST-OP DIAGNOSIS:  Wound cellulitis 23-Sep-2020 00:03:56  Eron Corona  
POST-OP DIAGNOSIS:  Small bowel obstruction 15-Sep-2020 05:36:34  Jun Hugo  
POST-OP DIAGNOSIS:  Perforated viscus 15-Sep-2020 15:54:01  Mariann Rubio

## 2020-09-23 NOTE — BRIEF OPERATIVE NOTE - OPERATION/FINDINGS
Two incision made and anterior abdomen brought down to fascia. No pus, clear fluid expressed. No signs of any soft tissue infection. All wounds packed with betadine curlex
staple line of proximal limb of anastomosis break down with gross spillage; anastomosis was intact
multiple, dense adhesions of small and large bowel to anterior abdominal wall at the site of her prior laparotomy incision  43cm of ileum resected secondary to serosal tears, enterotomy  stapled, primary side to side ileoileal anastomosis

## 2020-09-23 NOTE — CHART NOTE - NSCHARTNOTEFT_GEN_A_CORE
44y/o female found with cellulitis on anterior abdomen, possible  necrotizing soft tissue infection., taken to OR for incision and drainage. B/L incisions made at the mid axillary line adjacent to patients abdominal region. Clear fluid expressed, no signs of pus or soft tissue infection, wounds packed with betadine/curlex. Patient extubated and transferred to PACU.    OR time : 22min  EBL: 20  IVF: 800    Patient examined at bedside, abdominal dressing in place, c/d/i, complaining of mild diffuse TTP. Exam otherwise benign for acute findings. Patient HDS, 02 sat 99 on RA.     Plan:  - F/u labs  - Continue abx   - Continue to monitor

## 2020-09-23 NOTE — PROGRESS NOTE ADULT - SUBJECTIVE AND OBJECTIVE BOX
PGY4 progress note    Patient seen and examined at bedside. She was taken to the OR overnight for incision and drainage in anterior abdomen for suspected soft tissue infection. Was found to have wound cellulitis only with no evidence of soft tissue infection.    Patient reports her pain is moderately controlled. Positive flatus. No nausea or vomiting. Denies chest pain, SOB, palpitations.     PE:  Vital Signs Last 24 Hrs  T(C): 37.1 (23 Sep 2020 04:00), Max: 37.1 (22 Sep 2020 08:07)  T(F): 98.7 (23 Sep 2020 04:00), Max: 98.8 (22 Sep 2020 08:07)  HR: 102 (23 Sep 2020 04:00) (101 - 129)  BP: 105/72 (23 Sep 2020 05:00) (103/64 - 127/67)  BP(mean): 84 (23 Sep 2020 05:00) (79 - 101)  RR: 18 (23 Sep 2020 04:00) (17 - 28)  SpO2: 97% (23 Sep 2020 04:00) (96% - 100%)    UO [id number] 400cc    GEN: NAD, AAOX3  CVS: tachycardic, normal rhythm   Lungs: CTAB  Abd: vertical incision packed with kerlex, foam dressing over. Incisions in bilateral sides of anterior abdomen, packed with kerlex, foam dressing over. Abdomen soft, appropriately tender diffusely, no r/g/r.  Ext: no calf tenderness or edema, SCDs in place    labs:                        8.4    15.87 )-----------( 170      ( 23 Sep 2020 03:15 )             25.2       09-23    131<L>  |  97<L>  |  8<L>  ----------------------------<  185<H>  3.8   |  26  |  <0.5<L>    Ca    6.8<L>      23 Sep 2020 03:15  Phos  3.1     09-23  Mg     1.7     09-23    MEDICATIONS  (STANDING):  acetaminophen   Tablet .. 650 milliGRAM(s) Oral every 6 hours  chlorhexidine 4% Liquid 1 Application(s) Topical daily  chlorhexidine 4% Liquid 1 Application(s) Topical daily  dextrose 50% Injectable 50 milliLiter(s) IV Push once  furosemide    Tablet 20 milliGRAM(s) Oral daily  heparin   Injectable 5000 Unit(s) SubCutaneous every 8 hours  insulin regular Infusion 4 Unit(s)/Hr (4 mL/Hr) IV Continuous <Continuous>  linezolid  IVPB 600 milliGRAM(s) IV Intermittent <User Schedule>  metoprolol tartrate 25 milliGRAM(s) Oral every 12 hours  pantoprazole    Tablet 40 milliGRAM(s) Oral before breakfast  piperacillin/tazobactam IVPB.. 4.5 Gram(s) IV Intermittent every 6 hours  senna 2 Tablet(s) Oral at bedtime    MEDICATIONS  (PRN):  bisacodyl Suppository 10 milliGRAM(s) Rectal daily PRN Constipation  oxyCODONE    IR 10 milliGRAM(s) Oral every 6 hours PRN Moderate Pain (4 - 6)  oxyCODONE    IR 5 milliGRAM(s) Oral every 4 hours PRN Mild Pain (1 - 3)    Imagin/22 CXR:  Progressive decrease in bilateral effusions. Stable prominent interstitial markings and left basilar opacity. Right IJ catheter tip in right atrium.     CT abd/pelvis:  LOWER CHEST: Small to moderate bilateral pleural effusions with associated compressive atelectasis, not significantly changed.    HEPATOBILIARY: Unchanged.    SPLEEN: Unremarkable.    PANCREAS: Unremarkable.    ADRENAL GLANDS: Unremarkable.    KIDNEYS: Striated right renal enhancement, unchanged. Low attenuating lesion in the left kidney too small to characterize. Punctate nonobstructive calculus in the lower pole of the left kidney. No hydronephrosis or ureteral calculi.    ABDOMINOPELVIC NODES: Unchanged.    PELVIC ORGANS: Post hysterectomy. Decreased left adnexal cyst. Silva catheter within a decompressed bladder.    PERITONEUM/MESENTERY/BOWEL: Partially loculated collections in the lower abdomen/pelvis; for example, focal developing collection measures 4.4 x 3.8 x 8.1 cm (series 3/). Post partial small bowel resection with anastomosis in the pelvis. Contrast seen to the level of the descending colon. No bowel obstruction. Mild thickening of the small bowel wall consistent with enteritis. No free air. Small ascites.    BONES/SOFT TISSUES: Not significantly changed.        IMPRESSION:    1. Partially loculated rim-enhancing collections in the lower abdomen/pelvis consistent with developing abscesses.  2. No bowel obstruction. Mild thickening of the small bowel wall consistent with enteritis, likely reactive.  3. Unchanged heterogeneous right renal enhancement, likely sequela of pyelonephritis.  4. Small to moderate bilateral pleural effusions with compressive atelectasis.

## 2020-09-23 NOTE — PROGRESS NOTE ADULT - ASSESSMENT
A/P:  GLORIA SCHAFER is a 45yFemale HD# POD#1 for Incision and drainage, wound, torso    Insertion, arterial line, percutaneous    Washing out stomach contents    Small bowel resection with anastomosis    Lysis of intestinal adhesions    Diagnostic laparoscopy    She tolerated procedure well, responding to ABx, tachycardia resolving    Plan:   - continue current SICU management  - f/u HR  - can be started on diet  - f/u labs & replete as necessary   A/P:  GLORIA SCHAFER is a 45yFemale HD# POD#1 for Incision and drainage, wound, torso  Insertion, arterial line, percutaneous  Washing out stomach contents  Small bowel resection with anastomosis  Lysis of intestinal adhesions  Diagnostic laparoscopy  She tolerated procedure well, responding to ABx, tachycardia resolving    Plan:   - continue current SICU management  - f/u HR  - f/u fever curve.  - rec cultures  - continue following ID recs  - can be started on CLD  - f/u labs & replete as necessary

## 2020-09-23 NOTE — BRIEF OPERATIVE NOTE - NSICDXBRIEFPREOP_GEN_ALL_CORE_FT
PRE-OP DIAGNOSIS:  Necrotizing soft tissue infection 23-Sep-2020 00:03:42  Eron Corona  
PRE-OP DIAGNOSIS:  Small bowel obstruction 15-Sep-2020 05:35:38  Jun Hugo  
PRE-OP DIAGNOSIS:  Perforated viscus 15-Sep-2020 15:53:48  Mariann Rubio

## 2020-09-24 LAB
ANION GAP SERPL CALC-SCNC: 6 MMOL/L — LOW (ref 7–14)
BUN SERPL-MCNC: 8 MG/DL — LOW (ref 10–20)
CALCIUM SERPL-MCNC: 6.7 MG/DL — LOW (ref 8.5–10.1)
CHLORIDE SERPL-SCNC: 98 MMOL/L — SIGNIFICANT CHANGE UP (ref 98–110)
CO2 SERPL-SCNC: 26 MMOL/L — SIGNIFICANT CHANGE UP (ref 17–32)
CREAT SERPL-MCNC: <0.5 MG/DL — LOW (ref 0.7–1.5)
GLUCOSE BLDC GLUCOMTR-MCNC: 122 MG/DL — HIGH (ref 70–99)
GLUCOSE BLDC GLUCOMTR-MCNC: 146 MG/DL — HIGH (ref 70–99)
GLUCOSE BLDC GLUCOMTR-MCNC: 151 MG/DL — HIGH (ref 70–99)
GLUCOSE BLDC GLUCOMTR-MCNC: 160 MG/DL — HIGH (ref 70–99)
GLUCOSE BLDC GLUCOMTR-MCNC: 160 MG/DL — HIGH (ref 70–99)
GLUCOSE BLDC GLUCOMTR-MCNC: 162 MG/DL — HIGH (ref 70–99)
GLUCOSE SERPL-MCNC: 210 MG/DL — HIGH (ref 70–99)
MAGNESIUM SERPL-MCNC: 1.8 MG/DL — SIGNIFICANT CHANGE UP (ref 1.8–2.4)
PHOSPHATE SERPL-MCNC: 2.4 MG/DL — SIGNIFICANT CHANGE UP (ref 2.1–4.9)
POTASSIUM SERPL-MCNC: 3.7 MMOL/L — SIGNIFICANT CHANGE UP (ref 3.5–5)
POTASSIUM SERPL-SCNC: 3.7 MMOL/L — SIGNIFICANT CHANGE UP (ref 3.5–5)
SODIUM SERPL-SCNC: 130 MMOL/L — LOW (ref 135–146)

## 2020-09-24 PROCEDURE — 99291 CRITICAL CARE FIRST HOUR: CPT | Mod: 24

## 2020-09-24 PROCEDURE — 71045 X-RAY EXAM CHEST 1 VIEW: CPT | Mod: 26

## 2020-09-24 PROCEDURE — 99024 POSTOP FOLLOW-UP VISIT: CPT

## 2020-09-24 RX ORDER — INSULIN HUMAN 100 [IU]/ML
INJECTION, SOLUTION SUBCUTANEOUS
Refills: 0 | Status: DISCONTINUED | OUTPATIENT
Start: 2020-09-24 | End: 2020-09-25

## 2020-09-24 RX ORDER — HYDROMORPHONE HYDROCHLORIDE 2 MG/ML
1 INJECTION INTRAMUSCULAR; INTRAVENOUS; SUBCUTANEOUS ONCE
Refills: 0 | Status: DISCONTINUED | OUTPATIENT
Start: 2020-09-24 | End: 2020-09-24

## 2020-09-24 RX ORDER — MAGNESIUM SULFATE 500 MG/ML
1 VIAL (ML) INJECTION ONCE
Refills: 0 | Status: COMPLETED | OUTPATIENT
Start: 2020-09-24 | End: 2020-09-24

## 2020-09-24 RX ORDER — HYDROMORPHONE HYDROCHLORIDE 2 MG/ML
0.5 INJECTION INTRAMUSCULAR; INTRAVENOUS; SUBCUTANEOUS ONCE
Refills: 0 | Status: DISCONTINUED | OUTPATIENT
Start: 2020-09-24 | End: 2020-09-24

## 2020-09-24 RX ORDER — POTASSIUM CHLORIDE 20 MEQ
20 PACKET (EA) ORAL
Refills: 0 | Status: COMPLETED | OUTPATIENT
Start: 2020-09-24 | End: 2020-09-24

## 2020-09-24 RX ORDER — SODIUM HYPOCHLORITE 0.125 %
1 SOLUTION, NON-ORAL MISCELLANEOUS EVERY 12 HOURS
Refills: 0 | Status: DISCONTINUED | OUTPATIENT
Start: 2020-09-24 | End: 2020-09-24

## 2020-09-24 RX ORDER — KETOROLAC TROMETHAMINE 30 MG/ML
30 SYRINGE (ML) INJECTION ONCE
Refills: 0 | Status: DISCONTINUED | OUTPATIENT
Start: 2020-09-24 | End: 2020-09-24

## 2020-09-24 RX ORDER — KETOROLAC TROMETHAMINE 30 MG/ML
15 SYRINGE (ML) INJECTION ONCE
Refills: 0 | Status: DISCONTINUED | OUTPATIENT
Start: 2020-09-24 | End: 2020-09-24

## 2020-09-24 RX ADMIN — Medication 650 MILLIGRAM(S): at 06:50

## 2020-09-24 RX ADMIN — OXYCODONE HYDROCHLORIDE 5 MILLIGRAM(S): 5 TABLET ORAL at 02:25

## 2020-09-24 RX ADMIN — CHLORHEXIDINE GLUCONATE 1 APPLICATION(S): 213 SOLUTION TOPICAL at 12:30

## 2020-09-24 RX ADMIN — Medication 650 MILLIGRAM(S): at 11:33

## 2020-09-24 RX ADMIN — PIPERACILLIN AND TAZOBACTAM 25 GRAM(S): 4; .5 INJECTION, POWDER, LYOPHILIZED, FOR SOLUTION INTRAVENOUS at 12:30

## 2020-09-24 RX ADMIN — HEPARIN SODIUM 5000 UNIT(S): 5000 INJECTION INTRAVENOUS; SUBCUTANEOUS at 13:13

## 2020-09-24 RX ADMIN — Medication 650 MILLIGRAM(S): at 00:03

## 2020-09-24 RX ADMIN — Medication 50 MILLIEQUIVALENT(S): at 06:45

## 2020-09-24 RX ADMIN — INSULIN HUMAN 2: 100 INJECTION, SOLUTION SUBCUTANEOUS at 21:23

## 2020-09-24 RX ADMIN — PIPERACILLIN AND TAZOBACTAM 25 GRAM(S): 4; .5 INJECTION, POWDER, LYOPHILIZED, FOR SOLUTION INTRAVENOUS at 06:45

## 2020-09-24 RX ADMIN — Medication 50 MILLIEQUIVALENT(S): at 02:52

## 2020-09-24 RX ADMIN — INSULIN HUMAN 2: 100 INJECTION, SOLUTION SUBCUTANEOUS at 17:45

## 2020-09-24 RX ADMIN — Medication 650 MILLIGRAM(S): at 17:31

## 2020-09-24 RX ADMIN — Medication 500 MILLIGRAM(S): at 17:31

## 2020-09-24 RX ADMIN — OXYCODONE HYDROCHLORIDE 10 MILLIGRAM(S): 5 TABLET ORAL at 06:53

## 2020-09-24 RX ADMIN — HYDROMORPHONE HYDROCHLORIDE 0.5 MILLIGRAM(S): 2 INJECTION INTRAMUSCULAR; INTRAVENOUS; SUBCUTANEOUS at 11:00

## 2020-09-24 RX ADMIN — Medication 650 MILLIGRAM(S): at 23:49

## 2020-09-24 RX ADMIN — Medication 30 MILLIGRAM(S): at 12:31

## 2020-09-24 RX ADMIN — Medication 50 MILLIEQUIVALENT(S): at 17:31

## 2020-09-24 RX ADMIN — PIPERACILLIN AND TAZOBACTAM 25 GRAM(S): 4; .5 INJECTION, POWDER, LYOPHILIZED, FOR SOLUTION INTRAVENOUS at 17:30

## 2020-09-24 RX ADMIN — Medication 50 MILLIEQUIVALENT(S): at 16:03

## 2020-09-24 RX ADMIN — HYDROMORPHONE HYDROCHLORIDE 0.5 MILLIGRAM(S): 2 INJECTION INTRAMUSCULAR; INTRAVENOUS; SUBCUTANEOUS at 07:20

## 2020-09-24 RX ADMIN — HYDROMORPHONE HYDROCHLORIDE 1 MILLIGRAM(S): 2 INJECTION INTRAMUSCULAR; INTRAVENOUS; SUBCUTANEOUS at 13:13

## 2020-09-24 RX ADMIN — HYDROMORPHONE HYDROCHLORIDE 1 MILLIGRAM(S): 2 INJECTION INTRAMUSCULAR; INTRAVENOUS; SUBCUTANEOUS at 09:16

## 2020-09-24 RX ADMIN — Medication 650 MILLIGRAM(S): at 06:20

## 2020-09-24 RX ADMIN — OXYCODONE HYDROCHLORIDE 5 MILLIGRAM(S): 5 TABLET ORAL at 01:55

## 2020-09-24 RX ADMIN — Medication 650 MILLIGRAM(S): at 11:42

## 2020-09-24 RX ADMIN — LINEZOLID 300 MILLIGRAM(S): 600 INJECTION, SOLUTION INTRAVENOUS at 11:32

## 2020-09-24 RX ADMIN — Medication 50 GRAM(S): at 14:54

## 2020-09-24 RX ADMIN — Medication 25 MILLIGRAM(S): at 06:19

## 2020-09-24 RX ADMIN — HEPARIN SODIUM 5000 UNIT(S): 5000 INJECTION INTRAVENOUS; SUBCUTANEOUS at 21:04

## 2020-09-24 RX ADMIN — PANTOPRAZOLE SODIUM 40 MILLIGRAM(S): 20 TABLET, DELAYED RELEASE ORAL at 06:20

## 2020-09-24 RX ADMIN — HYDROMORPHONE HYDROCHLORIDE 0.5 MILLIGRAM(S): 2 INJECTION INTRAMUSCULAR; INTRAVENOUS; SUBCUTANEOUS at 16:35

## 2020-09-24 RX ADMIN — Medication 500 MILLIGRAM(S): at 06:20

## 2020-09-24 RX ADMIN — HEPARIN SODIUM 5000 UNIT(S): 5000 INJECTION INTRAVENOUS; SUBCUTANEOUS at 06:19

## 2020-09-24 RX ADMIN — OXYCODONE HYDROCHLORIDE 10 MILLIGRAM(S): 5 TABLET ORAL at 14:55

## 2020-09-24 RX ADMIN — Medication 30 MILLIGRAM(S): at 21:23

## 2020-09-24 RX ADMIN — OXYCODONE HYDROCHLORIDE 10 MILLIGRAM(S): 5 TABLET ORAL at 06:23

## 2020-09-24 RX ADMIN — Medication 1 TABLET(S): at 11:33

## 2020-09-24 RX ADMIN — Medication 100 GRAM(S): at 06:19

## 2020-09-24 RX ADMIN — Medication 62.5 MILLIMOLE(S): at 15:59

## 2020-09-24 RX ADMIN — Medication 30 MILLIGRAM(S): at 22:00

## 2020-09-24 RX ADMIN — PIPERACILLIN AND TAZOBACTAM 25 GRAM(S): 4; .5 INJECTION, POWDER, LYOPHILIZED, FOR SOLUTION INTRAVENOUS at 23:49

## 2020-09-24 RX ADMIN — HYDROMORPHONE HYDROCHLORIDE 1 MILLIGRAM(S): 2 INJECTION INTRAMUSCULAR; INTRAVENOUS; SUBCUTANEOUS at 11:00

## 2020-09-24 RX ADMIN — LINEZOLID 300 MILLIGRAM(S): 600 INJECTION, SOLUTION INTRAVENOUS at 23:49

## 2020-09-24 RX ADMIN — HYDROMORPHONE HYDROCHLORIDE 0.5 MILLIGRAM(S): 2 INJECTION INTRAMUSCULAR; INTRAVENOUS; SUBCUTANEOUS at 13:30

## 2020-09-24 RX ADMIN — Medication 20 MILLIGRAM(S): at 06:20

## 2020-09-24 NOTE — PROGRESS NOTE ADULT - ATTENDING COMMENTS
46yo female with PSHx PAULIE/BSO 8/19/2020 for fibroids admitted for persistent small bowel obstruction s/p laparoscopic NARCISO, laparotomy, extensive NARCISO, small bowel resection with primary anastomosis 9/15 complicated by small bowel leak s/p re-exploration, wash out, small bowel resection and primary anastomosis 9/15. Tachycardia improving to 110's, no longer on pressors. Extubated and breathing independently. Was complaining of shortness of breath but states she feels better. Complains of lower abdominal pain RLQ and at incision that was improving previously. Daily wound care. Lower abdominal soft tissue with erythema and induration - improving. WBC 14 from 15 from 16. ICU restarted antibiotics IV, ID following. ID discontinued antibiotics. CT A/P 9/22 shows [id number] pelvic abscess, abdominal wall edema, small specks of subcutaneous air near port site. s/p bilateral flank incision and exploration to r/o necrotizing soft tissue infection of abdominal wall B/L 9/22. +bowel movements, +flatus. Advance as tolerated. Pain control - patient states she is having a lot of pain. Trend labs, continue antibiotics. Encourage ambulation/OOB, incentive spirometer, DVT ppx. Primary care as per ICU.

## 2020-09-24 NOTE — CONSULT NOTE ADULT - SUBJECTIVE AND OBJECTIVE BOX
HPI:  Patient is a 45F w/recent history of PAULIE/BSO on 20 for 15cm bleeding fibroid with readmission for abdominal pain and inability to tolerate PO on , found to have partial obstruction vs. ileus, resolved without operative intervention, patient was discharged. She represented on  with recurrent abdominal pain, consistent with partial SBO, NGT was placed and patient's symptoms improved with nonoperative management. She presents today with two days of abdominal pain with associated nausea and vomiting since the onset of pain. She reports having her last BM two days ago, passing flatus. In the ED she is afebrile, tachycardic to 115. On exam her abdomen is soft, nondistended, tender in the epigastrium. Labs significant for WBC of 11.6. Lactate 1.1.  CT scan demonstrated persistent findings of small bowel obstruction with a transition point in the lower anterior mid abdomen with mild wall thickening and hyperenhancement of the dilated small bowel loops, suspicious for infectious/inflammatory enteritis. (14 Sep 2020 19:00). s/p small bowel resection on 9/15. post op complicated by wound infection requiring I&D on .      PAST MEDICAL & SURGICAL HISTORY:  Uterine fibroid    Status post bilateral salpingectomy    S/P PAULIE (total abdominal hysterectomy)        Hospital Course:    TODAY'S SUBJECTIVE & REVIEW OF SYMPTOMS:     Constitutional WNL   Cardio WNL   Resp WNL   GI WNL  Heme WNL  Endo WNL  Skin WNL  MSK Weakness  Neuro WNL  Cognitive WNL  Psych WNL      MEDICATIONS  (STANDING):  acetaminophen   Tablet .. 650 milliGRAM(s) Oral every 6 hours  ascorbic acid 500 milliGRAM(s) Oral two times a day  chlorhexidine 4% Liquid 1 Application(s) Topical daily  chlorhexidine 4% Liquid 1 Application(s) Topical daily  furosemide    Tablet 20 milliGRAM(s) Oral daily  heparin   Injectable 5000 Unit(s) SubCutaneous every 8 hours  insulin regular  human corrective regimen sliding scale   IV Push four times a day with meals  linezolid  IVPB 600 milliGRAM(s) IV Intermittent <User Schedule>  metoprolol tartrate 25 milliGRAM(s) Oral every 12 hours  multivitamin/minerals 1 Tablet(s) Oral daily  pantoprazole    Tablet 40 milliGRAM(s) Oral before breakfast  piperacillin/tazobactam IVPB.. 4.5 Gram(s) IV Intermittent every 6 hours  senna 2 Tablet(s) Oral at bedtime    MEDICATIONS  (PRN):  oxyCODONE    IR 10 milliGRAM(s) Oral every 6 hours PRN Moderate Pain (4 - 6)  oxyCODONE    IR 5 milliGRAM(s) Oral every 4 hours PRN Mild Pain (1 - 3)      FAMILY HISTORY:  No pertinent family history in first degree relatives        Allergies    No Known Allergies    Intolerances        SOCIAL HISTORY:    [  ] Etoh  [  ] Smoking  [  ] Substance abuse     Home Environment:  [  ] Home Alone  [x  ] Lives with Family  [  ] Home Health Aid    Dwelling:  [  ] Apartment  [x  ] Private House  [  ] Adult Home  [  ] Skilled Nursing Facility      [  ] Short Term  [  ] Long Term  [x  ] Stairs       Elevator [  ]    FUNCTIONAL STATUS PTA: (Check all that apply)  Ambulation: [ x  ]Independent    [  ] Dependent     [  ] Non-Ambulatory  Assistive Device: [  ] SA Cane  [  ]  Q Cane  [  ] Walker  [  ]  Wheelchair  ADL : [x  ] Independent  [  ]  Dependent       Vital Signs Last 24 Hrs  T(C): 36.3 (24 Sep 2020 08:00), Max: 36.7 (23 Sep 2020 20:00)  T(F): 97.4 (24 Sep 2020 08:00), Max: 98.1 (23 Sep 2020 20:00)  HR: 96 (24 Sep 2020 12:00) (78 - 98)  BP: 125/65 (24 Sep 2020 12:00) (88/56 - 127/71)  BP(mean): 85 (24 Sep 2020 12:00) (65 - 93)  RR: 20 (24 Sep 2020 12:00) (15 - 32)  SpO2: 100% (24 Sep 2020 12:00) (94% - 100%)      PHYSICAL EXAM: Awake & Alert  GENERAL: NAD  HEAD:  Normocephalic  CHEST/LUNG: Clear   HEART: S1S2+  ABDOMEN: Soft  EXTREMITIES:  no calf tenderness    NERVOUS SYSTEM:  Cranial Nerves 2-12 intact [  ] Abnormal  [  ]  ROM: WFL all extremities [ x ]  Abnormal [  ]  Motor Strength: WFL all extremities  [ x ]  Abnormal [  ]  Sensation: intact to light touch [x  ] Abnormal [  ]    FUNCTIONAL STATUS:  Bed Mobility: Independent [  ]  Supervision [  ]  Needs Assistance [ x ]  N/A [  ]  Transfers: Independent [  ]  Supervision [  ]  Needs Assistance [ x ]  N/A [  ]   Ambulation: Independent [  ]  Supervision [  ]  Needs Assistance [  ]  N/A [  ]  ADL: Independent [  ] Requires Assistance [  ] N/A [  ]      LABS:                        7.6    14.64 )-----------( 230      ( 23 Sep 2020 23:21 )             23.4         133<L>  |  101  |  9<L>  ----------------------------<  145<H>  3.5   |  26  |  <0.5<L>    Ca    7.3<L>      23 Sep 2020 23:21  Phos  3.2       Mg     1.8     23      PT/INR - ( 23 Sep 2020 23:21 )   PT: 14.70 sec;   INR: 1.28 ratio         PTT - ( 23 Sep 2020 23:21 )  PTT:33.8 sec  Urinalysis Basic - ( 23 Sep 2020 03:15 )    Color: Colorless / Appearance: Clear / S.011 / pH: x  Gluc: x / Ketone: Negative  / Bili: Negative / Urobili: <2 mg/dL   Blood: x / Protein: Negative / Nitrite: Negative   Leuk Esterase: Negative / RBC: x / WBC x   Sq Epi: x / Non Sq Epi: x / Bacteria: x        RADIOLOGY & ADDITIONAL STUDIES:

## 2020-09-24 NOTE — PROGRESS NOTE ADULT - ASSESSMENT
IMPRESSION;  Presented with Feculent peritonitis secondary to perforated viscus  9/15 : s/p diagnostic lap, NARCISO, SBR (43cm ileum) w/ primary anastamosis (side to side)  Post op pelvic abscesses  CT A/P 9/22 : pelvic abscesses, enteritis, likely reactive, b/l pleural effusions/atalectasis.  No right flank soft tissue infection  Right flank cellulitis with no abscess/fascitis : resolving. Seems to have originated from the abdominal wound which was locally debrided 9/22 and presently with no ongoing infectious process  Right flank appears significantly improved with no pain and reduced erythema  BCx 9/17 NGTD  WCx 9/22 rare PMNs, no org, cultures pending    RECOMMENDATIONS;  final WCx  Zosyn 4.5 gm iv q6h over 4h  Zyvox 600 mg iv q12h

## 2020-09-24 NOTE — CONSULT NOTE ADULT - CONSULT REASON
Abdominal collection; IR/image guided drainage
Abdominal Pain
Team requesting PCA for pain control.
feculent peritonitis
hemodynamic monitoring
debilitation

## 2020-09-24 NOTE — PROGRESS NOTE ADULT - SUBJECTIVE AND OBJECTIVE BOX
GLORIA SCHAFER  45y, Female    All available historical data reviewed    OVERNIGHT EVENTS:  no fevers  has abdominal pain    ROS:  not reliable    VITALS:  T(F): 97.4, Max: 98.1 (20 @ 20:00)  HR: 96  BP: 125/65  RR: 20Vital Signs Last 24 Hrs  T(C): 36.3 (24 Sep 2020 08:00), Max: 36.7 (23 Sep 2020 20:00)  T(F): 97.4 (24 Sep 2020 08:00), Max: 98.1 (23 Sep 2020 20:00)  HR: 96 (24 Sep 2020 12:00) (78 - 98)  BP: 125/65 (24 Sep 2020 12:00) (88/56 - 127/71)  BP(mean): 85 (24 Sep 2020 12:00) (65 - 93)  RR: 20 (24 Sep 2020 12:00) (15 - 32)  SpO2: 100% (24 Sep 2020 12:00) (94% - 100%)    TESTS & MEASUREMENTS:                        7.6    14.64 )-----------( 230      ( 23 Sep 2020 23:21 )             23.4     09-24    130<L>  |  98  |  8<L>  ----------------------------<  210<H>  3.7   |  26  |  <0.5<L>    Ca    6.7<L>      24 Sep 2020 12:51  Phos  2.4       Mg     1.8               Culture - Tissue with Gram Stain (collected 20 @ 23:40)  Source: .Tissue None  Gram Stain (20 @ 04:12):    Few polymorphonuclear leukocytes seen per low power field    No organisms seen per oil power field    Culture - Tissue with Gram Stain (collected 20 @ 23:40)  Source: .Tissue None  Gram Stain (20 @ 02:09):    Rare polymorphonuclear leukocytes seen per low power field    No organisms seen per oil power field    Culture - Blood (collected 20 @ 20:40)  Source: .Blood Blood-Peripheral  Final Report (20 @ 04:00):    No Growth Final      Urinalysis Basic - ( 23 Sep 2020 03:15 )    Color: Colorless / Appearance: Clear / S.011 / pH: x  Gluc: x / Ketone: Negative  / Bili: Negative / Urobili: <2 mg/dL   Blood: x / Protein: Negative / Nitrite: Negative   Leuk Esterase: Negative / RBC: x / WBC x   Sq Epi: x / Non Sq Epi: x / Bacteria: x          RADIOLOGY & ADDITIONAL TESTS:  Personal review of radiological diagnostics performed  Echo and EKG results noted when applicable.     MEDICATIONS:  acetaminophen   Tablet .. 650 milliGRAM(s) Oral every 6 hours  ascorbic acid 500 milliGRAM(s) Oral two times a day  chlorhexidine 4% Liquid 1 Application(s) Topical daily  chlorhexidine 4% Liquid 1 Application(s) Topical daily  furosemide    Tablet 20 milliGRAM(s) Oral daily  heparin   Injectable 5000 Unit(s) SubCutaneous every 8 hours  insulin regular  human corrective regimen sliding scale   IV Push four times a day with meals  linezolid  IVPB 600 milliGRAM(s) IV Intermittent <User Schedule>  metoprolol tartrate 25 milliGRAM(s) Oral every 12 hours  multivitamin/minerals 1 Tablet(s) Oral daily  oxyCODONE    IR 10 milliGRAM(s) Oral every 6 hours PRN  oxyCODONE    IR 5 milliGRAM(s) Oral every 4 hours PRN  pantoprazole    Tablet 40 milliGRAM(s) Oral before breakfast  piperacillin/tazobactam IVPB.. 4.5 Gram(s) IV Intermittent every 6 hours  potassium chloride  20 mEq/100 mL IVPB 20 milliEquivalent(s) IV Intermittent every 2 hours  senna 2 Tablet(s) Oral at bedtime  sodium phosphate IVPB 15 milliMole(s) IV Intermittent once      ANTIBIOTICS:  linezolid  IVPB 600 milliGRAM(s) IV Intermittent <User Schedule>  piperacillin/tazobactam IVPB.. 4.5 Gram(s) IV Intermittent every 6 hours

## 2020-09-24 NOTE — PROGRESS NOTE ADULT - ASSESSMENT
46 y/o no Pmhx s/p PAULIE/BS on 8/19 for fibroid uterus POD#36, s/p diagnostic laparoscopy converted to exploratory laparotomy and small bowel reanastomosis, with breakdown of staple line of proximal limb of anastomosis, s/p repeat exploratory laparotomy with small bowel resection, reanastomosis and appendectomy, POD#9, tachycardic and hypotensive postop, now off pressors, admitted to SICU for close hemodynamic monitoring, s/p I&D of anterior abdomen for suspected soft tissue infection, wound cellulitis only, POD1, clinically improving.   - Neuro: AAOX3, PO pain meds  - CVS: tachycardia improving, metoprolol increased to 25 mg BID, off pressors  - Pulm: no longer tachypneic, % on RA, CT negative for PE, AM CXR decreased bialteral effusions  - GI: s/p NGT, advanced to mechanical soft diet, dressing changes daily by gen surg  - : monitor UO, trend Cr, lasix 20 mg daily, potassium/Mg being repleted  - heme/onc: s/p 1U pRBCs, heparin for VTE prophylaxis, H/H stable  - Inf: s/p I&D in anterior abdominal wall for suspected soft tissue infection, wound cellulitis only, started on zosyn and zyvox per ID. Leukocytosis trending down. CT 8 cm pelvic abscess, not safe to drain per ID.   - Lines: peripheral IV, right midline  - management per SICU team  - gyn to follow    Will inform Dr. Molina. 46 y/o no Pmhx s/p PAULIE/BS on 8/19 for fibroid uterus POD#36, s/p diagnostic laparoscopy converted to exploratory laparotomy and small bowel reanastomosis, with breakdown of staple line of proximal limb of anastomosis, s/p repeat exploratory laparotomy with small bowel resection, reanastomosis and appendectomy, POD#9, tachycardic and hypotensive postop, now off pressors, admitted to SICU for close hemodynamic monitoring, s/p I&D of anterior abdomen for suspected soft tissue infection, wound cellulitis only, POD1, clinically improving.   - Neuro: AAOX3, PO pain meds  - CVS: tachycardia improving, metoprolol increased to 25 mg BID, off pressors  - Pulm: no longer tachypneic, % on RA, CT negative for PE, AM CXR decreased bialteral effusions  - GI: s/p NGT, advanced to mechanical soft diet, dressing changes daily by gen surg  - : monitor UO, trend Cr, lasix 20 mg daily, potassium/Mg being repleted  - heme/onc: s/p 1U pRBCs, heparin for VTE prophylaxis, H/H stable  - Inf: s/p I&D in anterior abdominal wall for suspected soft tissue infection, wound cellulitis only, started on zosyn and zyvox per ID. Leukocytosis trending down. CT 8 cm pelvic abscess, not safe to drain per IR.   - Lines: peripheral IV, right midline  - management per SICU team  - gyn to follow    Will inform Dr. Molina.

## 2020-09-24 NOTE — PROGRESS NOTE ADULT - SUBJECTIVE AND OBJECTIVE BOX
PGY 3 Progress Note    Subjective: Pt seen and examined at bedside. Pt in severe pain as she is due for additional dose of narcotics at this time. Reports pain typically controlled with meds. Tolerating soft diet, voiding. Denies fever, chills, CP, SOB, N/V, LE pain.     Physical exam:    Vital Signs Last 24 Hrs  T(F): 97.6 (24 Sep 2020 00:00), Max: 98.1 (23 Sep 2020 20:00)  HR: 80 (24 Sep 2020 02:00) (78 - 95)  BP: 107/61 (24 Sep 2020 02:00) (89/53 - 142/77)  RR: 16 (24 Sep 2020 02:00) (15 - 32)  SpO2: 100% (24 Sep 2020 02:00) (94% - 100%)    GEN: NAD, AAOX3  CVS: rrr  Lungs: CTAB  Abd: vertical incision packed with kerlex, foam dressing over. Incisions in bilateral sides of anterior abdomen, packed with kerlex, foam dressing over. Abdomen soft, appropriately tender diffusely, no r/g/r  Ext: no calf tenderness or edema, SCDs in place    Diet: mechanical soft  meds:   acetaminophen   Tablet ..   650 milliGRAM(s) Oral (09-23-20 @ 23:33)   650 milliGRAM(s) Oral (09-23-20 @ 18:09)   650 milliGRAM(s) Oral (09-23-20 @ 11:16)    ascorbic acid   500 milliGRAM(s) Oral (09-23-20 @ 18:10)    chlorhexidine 4% Liquid   1 Application(s) Topical (09-23-20 @ 11:17)    heparin   Injectable   5000 Unit(s) SubCutaneous (09-23-20 @ 21:56)   5000 Unit(s) SubCutaneous (09-23-20 @ 13:40)    linezolid  IVPB   300 mL/Hr IV Intermittent (09-23-20 @ 23:33)   300 mL/Hr IV Intermittent (09-23-20 @ 11:54)    magnesium sulfate  IVPB   50 mL/Hr IV Intermittent (09-23-20 @ 06:33)    metoprolol tartrate   25 milliGRAM(s) Oral (09-23-20 @ 17:12)    oxyCODONE    IR   10 milliGRAM(s) Oral (09-23-20 @ 23:25)   10 milliGRAM(s) Oral (09-23-20 @ 17:13)   10 milliGRAM(s) Oral (09-23-20 @ 11:16)    oxyCODONE    IR   5 milliGRAM(s) Oral (09-24-20 @ 01:55)   5 milliGRAM(s) Oral (09-23-20 @ 20:15)    pantoprazole    Tablet   40 milliGRAM(s) Oral (09-23-20 @ 11:18)    piperacillin/tazobactam IVPB..   25 mL/Hr IV Intermittent (09-23-20 @ 23:33)   25 mL/Hr IV Intermittent (09-23-20 @ 18:09)   25 mL/Hr IV Intermittent (09-23-20 @ 11:54)   25 mL/Hr IV Intermittent (09-23-20 @ 06:32)    potassium chloride  20 mEq/100 mL IVPB   100 mL/Hr IV Intermittent (09-23-20 @ 06:34)    potassium chloride  20 mEq/100 mL IVPB   50 mL/Hr IV Intermittent (09-24-20 @ 02:52)    potassium phosphate IVPB   63.75 mL/Hr IV Intermittent (09-23-20 @ 16:58)    senna   2 Tablet(s) Oral (09-23-20 @ 21:56)        LABS:                        7.6    14.64 )-----------( 230      ( 23 Sep 2020 23:21 )             23.4                         8.4    15.87 )-----------( 170      ( 23 Sep 2020 03:15 )             25.2     Magnesium, Serum: 1.8 mg/dL (09-23 @ 23:21)  Magnesium, Serum: 2.0 mg/dL (09-23 @ 11:25)    09-23-20 @ 23:21      133<L>  |  101  |  9<L>  ----------------------------<  145<H>  3.5   |  26  |  <0.5<L>    09-23-20 @ 11:25      132<L>  |  98  |  8<L>  ----------------------------<  135<H>  3.8   |  27  |  <0.5<L>    09-23-20 @ 03:15      131<L>  |  97<L>  |  8<L>  ----------------------------<  185<H>  3.8   |  26  |  <0.5<L>    09-22-20 @ 11:00      129<L>  |  99  |  11  ----------------------------<  195<H>  3.7   |  26  |  <0.5<L>    09-22-20 @ 00:05      134<L>  |  100  |  10  ----------------------------<  185<H>  3.0<L>   |  26  |  <0.5<L>        Ca    7.3<L>      23 Sep 2020 23:21  Ca    7.3<L>      23 Sep 2020 11:25  Ca    6.8<L>      23 Sep 2020 03:15  Ca    6.9<L>      22 Sep 2020 11:00  Ca    6.9<L>      22 Sep 2020 00:05  Phos  3.2     09-23  Phos  2.9     09-23  Phos  3.1     09-23  Phos  2.4     09-22  Phos  2.2     09-22  Mg     1.8     09-23  Mg     2.0     09-23  Mg     1.7<L>     09-23  Mg     2.0     09-22  Mg     1.7<L>     09-22            Culture - Tissue with Gram Stain (collected 09-22-20 @ 23:40)  Source: .Tissue None  Gram Stain (09-24-20 @ 04:12):    Few polymorphonuclear leukocytes seen per low power field    No organisms seen per oil power field    Culture - Tissue with Gram Stain (collected 09-22-20 @ 23:40)  Source: .Tissue None  Gram Stain (09-24-20 @ 02:09):    Rare polymorphonuclear leukocytes seen per low power field    No organisms seen per oil power field

## 2020-09-24 NOTE — PROGRESS NOTE ADULT - ASSESSMENT
Assessment:  45y Female with SBO PSH of recent PAULIE/BSO, requiring small bowel resection, c/b leak adjacent to anastamosis , taken back to OR for resection and redo anastomosis. Hospital course then complicated by cellulitis of abdomen s/p incision and drainage. Patient required continued ICU care for persistent tachycardia, hyperglycemia was on insulin gtt.    Plan:  - reassess wound today, possible wound VAC  - care per SICU  - monitor for tachycardia  - ID recs appreciated

## 2020-09-24 NOTE — PROGRESS NOTE ADULT - SUBJECTIVE AND OBJECTIVE BOX
GENERAL SURGERY PROGRESS NOTE     GLORIA SCHAFER  45y  Female  Hospital day :10d  POD:  Procedure: Incision and drainage, wound, torso    Insertion, arterial line, percutaneous    Washing out stomach contents    Small bowel resection with anastomosis    Lysis of intestinal adhesions    Diagnostic laparoscopy    OVERNIGHT EVENTS: seen this AM, off insulin drip, remains tachycardic    T(F): 97.4 (20 @ 08:00), Max: 98.1 (20 @ 20:00)  HR: 98 (20 @ 07:00) (78 - 98)  BP: 116/73 (20 @ 07:00) (89/53 - 127/71)  RR: 21 (20 @ 07:00) (15 - 32)  SpO2: 100% (20 @ 07:00) (94% - 100%)    DIET/FLUIDS: ascorbic acid 500 milliGRAM(s) Oral two times a day  multivitamin/minerals 1 Tablet(s) Oral daily    GI proph:  pantoprazole    Tablet 40 milliGRAM(s) Oral before breakfast    AC/ proph: heparin   Injectable 5000 Unit(s) SubCutaneous every 8 hours    ABx: linezolid  IVPB 600 milliGRAM(s) IV Intermittent <User Schedule>  piperacillin/tazobactam IVPB.. 4.5 Gram(s) IV Intermittent every 6 hours      PHYSICAL EXAM:  GENERAL: NAD, well-appearing  CHEST/LUNG: Clear to auscultation bilaterally  HEART: Regular rate and rhythm  ABDOMEN: Soft, Nontender, Nondistended;   EXTREMITIES:  No clubbing, cyanosis, or edema    LABS  CAPILLARY BLOOD GLUCOSE  POCT Blood Glucose.: 160 mg/dL (24 Sep 2020 08:06)  POCT Blood Glucose.: 122 mg/dL (24 Sep 2020 06:30)  POCT Blood Glucose.: 151 mg/dL (24 Sep 2020 00:34)  POCT Blood Glucose.: 131 mg/dL (23 Sep 2020 20:18)  POCT Blood Glucose.: 142 mg/dL (23 Sep 2020 19:21)  POCT Blood Glucose.: 122 mg/dL (23 Sep 2020 18:16)  POCT Blood Glucose.: 172 mg/dL (23 Sep 2020 14:16)  POCT Blood Glucose.: 176 mg/dL (23 Sep 2020 13:13)  POCT Blood Glucose.: 162 mg/dL (23 Sep 2020 12:18)  POCT Blood Glucose.: 133 mg/dL (23 Sep 2020 11:09)  POCT Blood Glucose.: 193 mg/dL (23 Sep 2020 10:10)                          7.6    14.64 )-----------( 230      ( 23 Sep 2020 23:21 )             23.4             133<L>  |  101  |  9<L>  ----------------------------<  145<H>  3.5   |  26  |  <0.5<L>      Calcium, Total Serum: 7.3 mg/dL (20 @ 23:21)    LFTs:     Lactate, Blood: 1.0 mmol/L (20 @ 03:30)      Coags:     14.70  ----< 1.28    ( 23 Sep 2020 23:21 )     33.8      Serum Pro-Brain Natriuretic Peptide: 1902 pg/mL (20 @ 00:00)  Serum Pro-Brain Natriuretic Peptide: 2013 pg/mL (20 @ 08:19)  Serum Pro-Brain Natriuretic Peptide: 1595 pg/mL (20 @ 23:50)  Serum Pro-Brain Natriuretic Peptide: 1557 pg/mL (20 @ 00:00)  Serum Pro-Brain Natriuretic Peptide: 4550 pg/mL (20 @ 23:30)    Urinalysis Basic - ( 23 Sep 2020 03:15 )    Color: Colorless / Appearance: Clear / S.011 / pH: x  Gluc: x / Ketone: Negative  / Bili: Negative / Urobili: <2 mg/dL   Blood: x / Protein: Negative / Nitrite: Negative   Leuk Esterase: Negative / RBC: x / WBC x   Sq Epi: x / Non Sq Epi: x / Bacteria: x    Culture - Tissue with Gram Stain (collected 22 Sep 2020 23:40)  Source: .Tissue None  Gram Stain (24 Sep 2020 04:12):    Few polymorphonuclear leukocytes seen per low power field    No organisms seen per oil power field    Culture - Tissue with Gram Stain (collected 22 Sep 2020 23:40)  Source: .Tissue None  Gram Stain (24 Sep 2020 02:09):    Rare polymorphonuclear leukocytes seen per low power field    No organisms seen per oil power field

## 2020-09-24 NOTE — PROGRESS NOTE ADULT - ATTENDING COMMENTS
Patient is sitting in bed. Complains of pain diffusely  Afebrile.  IR was not able to drain intraabdominal collection as no safe access.    ASSESSMENT:  46 y/o female with SBO.  S/P Ex. Lap.  Perforated viscus.  Peritonitis. Abdominal sepsis.  Acute respiratory failure.  Acute postoperative pain.  Abdominal wall cellulitis.  Moderate malnutrition.    PLAN:  - Neuro: pain control; use Toradol  - Respiratory : incentive spirometer; O2 with NC as needed  - Cardio: currently well perfused and euvolemic; keep MAP>65  - GI: soft diet  - Renal: follow UOP and serum electrolytes  - ID: continue Zosyn and Zyvox as per ID  - GI and DVT prophylaxis

## 2020-09-24 NOTE — CONSULT NOTE ADULT - CONSULT REQUESTED DATE/TIME
14-Sep-2020 19:10
15-Sep-2020 00:15
16-Sep-2020 12:37
17-Sep-2020 09:45
24-Sep-2020 13:08
22-Sep-2020 22:38

## 2020-09-24 NOTE — CONSULT NOTE ADULT - ASSESSMENT
IMPRESSION: Rehab of debilitation    PRECAUTIONS: [  ] Cardiac  [  ] Respiratory  [  ] Seizures [  ] Contact Isolation  [  ] Droplet Isolation  [  ] Other    Weight Bearing Status:     RECOMMENDATION:    Out of Bed to Chair     DVT/Decubiti Prophylaxis    REHAB PLAN:     [ x  ] Bedside P/T 3-5 times a week   [   ]   Bedside O/T  2-3 times a week             [   ] No Rehab Therapy Indicated                   [   ]  Speech Therapy   Conditioning/ROM                                    ADL  Bed Mobility                                               Conditioning/ROM  Transfers                                                     Bed Mobility  Sitting /Standing Balance                         Transfers                                        Gait Training                                               Sitting/Standing Balance  Stair Training [   ]Applicable                    Home equipment Eval                                                                        Splinting  [   ] Only      GOALS:   ADL   [   ]   Independent                    Transfers  [  x ] Independent                          Ambulation  [ x  ] Independent     [  x  ] With device                            [   ]  CG                                                         [   ]  CG                                                                  [   ] CG                            [    ] Min A                                                   [   ] Min A                                                              [   ] Min  A          DISCHARGE PLAN:   [   ]  Good candidate for Intensive Rehabilitation/Hospital based                                             Will tolerate 3hrs Intensive Rehab Daily                                       [  x  ]  Short Term Rehab in Skilled Nursing Facility                             vs          [  x  ]  Home with Outpatient or VN services                                         [    ]  Possible Candidate for Intensive Hospital based Rehab

## 2020-09-24 NOTE — CHART NOTE - NSCHARTNOTEFT_GEN_A_CORE
Severe protein malnutrition was present upon admission as patient was suffering from persistent small bowel obstruction with poor PO intake for several weeks. Nutritionist consulted and recommended TPN, serial labs, nutritional labs

## 2020-09-24 NOTE — PROGRESS NOTE ADULT - ASSESSMENT
· Assessment 	  46 y/o no Pmhx s/p PAULIE/BS on 8/19 for fibroid uterus POD#34, s/p diagnostic laparoscopy converted to exploratory laparotomy and small bowel reanastomosis, with breakdown of staple line of proximal limb of anastomosis, s/p repeat exploratory laparotomy with small bowel resection, reanastomosis and appendectomy, POD#7 tachycardic and hypotensive postop, now off pressors, admitted to SICU for close hemodynamic monitoring, now with leukocytosis r/o infection.     NEURO:    AAOx3      PO tylenol, oxycodone prn, Dilaudid prn     RESP:     O2 sat >95% on RA    ABG PRN    AM CXR stable, f/u final read    CT Chest (PE protocol) 9/17- negative for PE; small-moderate b/l pleural effusions with compressive atelectasis      CARDS:     Hypotension Resolved- Off all pressors    Acute tachycardia - now in the 110s, cont metoprolol 25mg BID.  HR 78-83 overnight     CE neg, 0.04, 0.16, 0.07 - no longer  trending     ECHO: EF 50-55%, G1DD, mild MR, mild TR.    GI/NUTR:     ordered CT Abd/pelvis with PO and IV - abscess in lower abd/pelvis- IR not draining 2/2 location     CT A/P (9/17) - negative for leak; postsurgical changes with post-op ileus    Diet: NPO for CT, advance to soft mechanical after CT, tpn d/c, soft diet    Hepatic panel 9/18: WNL    IVL    GI ppx: PPI    Bowel regimen: Dulcolax suppository         /RENAL:     Silva  out voiding     BUN/Cr trend     Trend lytes (ordered 1600), K 3.0 -->     Lactate 1.3 (max 6.5) (no longer trending)    HEME/ONC:     Hgb 8.7    Transfused 1 uPRBC (split 1/2 and 1/2 on 9/18)    Plt 120    INR 2.5 > 1.7 >1.3    HIT antibody: negative    DVT Prophylaxis: HSQ, SCDs    ID:    Afebrile    Concern for possible cellulitis vs. intra abdominal infection --> CT abd/pelvis with IV/oral contrast    WBC 16.63 <-- 13.76 <--9.13     -ID following: recommend imaging, start Zosyn 4.5 gm iv q6h over 4hr, Zyvox 600 mg iv q12h    9/22 repeat bcx ordered    BCx 9/17- (NGTD)     ENDO:    Insulin drip held at 2am     FS ACqHS, ISS    Solucortef 50 q6 to be d/c'd 9/20    LINES/DRAINS:  PIV , Jessica, central line    DVT Prophylaxis: heparin   Injectable 5000 Unit(s) SubCutaneous every 8 hours  GI Prophylaxis:pantoprazole  Injectable 40 milliGRAM(s) IV Push daily    ***Tubes/Lines/Drains  ***  Peripheral IV  R. Basilic Midline                      Date: 9/17     · Assessment 	  44 y/o no Pmhx s/p PAULIE/BS on 8/19 for fibroid uterus POD#34, s/p diagnostic laparoscopy converted to exploratory laparotomy and small bowel reanastomosis, with breakdown of staple line of proximal limb of anastomosis, s/p repeat exploratory laparotomy with small bowel resection, reanastomosis and appendectomy, POD#7 tachycardic and hypotensive postop, now off pressors, admitted to SICU for close hemodynamic monitoring, now with leukocytosis r/o infection.     NEURO:    AAOx3      PO tylenol, oxycodone prn, Dilaudid prn, toradol prn     RESP:     O2 sat >95% on RA    ABG PRN    AM CXR stable, f/u final read    CT Chest (PE protocol) 9/17- negative for PE; small-moderate b/l pleural effusions with compressive atelectasis      CARDS:     Hypotension Resolved- Off all pressors    Acute tachycardia - now in the 110s, cont metoprolol 25mg BID.  HR 78-83 overnight     CE neg, 0.04, 0.16, 0.07 - no longer  trending     ECHO: EF 50-55%, G1DD, mild MR, mild TR.    GI/NUTR:     9/20 CT Abd/pelvis with PO and IV - abscess in lower abd/pelvis- IR not draining 2/2 location     CT A/P (9/17) - negative for leak; postsurgical changes with post-op ileus    Diet: soft mechanical     Hepatic panel 9/18: WNL    IVL    GI ppx: PP      /RENAL:     Silva  out, voiding     BUN/Cr trend 9/<0.5    Replete lytes prn, K 3.3, mag 1.8, phos 3.2     Lactate 1.3 (max 6.5) (no longer trending)    HEME/ONC:     Hgb 7.6    Transfused 1 uPRBC (split 1/2 and 1/2 on 9/18)    Plt 230    INR 1.28    HIT antibody: negative    DVT Prophylaxis: HSQ, SCDs    ID:    Afebrile    WBC 14.64 <-- 15.87 <--16.63 <-- 13.76 <--9.13     -ID following: recommend imaging, start Zosyn 4.5 gm iv q6h over 4hr, Zyvox 600 mg iv q12h    9/22 repeat bcx ordered    BCx 9/17- (NGTD)     ENDO:    Insulin drip held at 2am     FS ACqHS, ISS    Solucortef 50 q6, d/c'd 9/20    LINES/DRAINS:  PIV , Jessica, central line    DVT Prophylaxis: heparin   Injectable 5000 Unit(s) SubCutaneous every 8 hours  GI Prophylaxis:pantoprazole  Injectable 40 milliGRAM(s) IV Push daily    ***Tubes/Lines/Drains  ***  Peripheral IV  R. Basilic Midline                      Date: 9/17  R central IJ     d/w Dr. Patel.

## 2020-09-24 NOTE — PROGRESS NOTE ADULT - SUBJECTIVE AND OBJECTIVE BOX
GLORIA SCHAFER  [id number]  45y Female    Indication for ICU admission: intraabdominal sepsis, s/p diagnostic lap, NARCISO, SBR (43cm ileum) w/ primary anastamosis (side to side)  Admit Date:09-14-20  ICU Date: 9/15/2020  OR Date: 9/15/2020 x2    No Known Allergies    PAST MEDICAL & SURGICAL HISTORY:  Uterine fibroid    Status post bilateral salpingectomy    S/P PAULIE (total abdominal hysterectomy)      Home Medications:  ibuprofen 600 mg oral tablet: 1 tab(s) orally every 6 hours, As needed, Mild Pain (1 - 3) (01 Sep 2020 06:26)      24HRS EVENT:  pain controlled, afebrile, abdominal incision w/ mildly saturated dressings abd otherwise soft , BM 9/23 , passing gas, insulin gtt held since 2am, voiding, K , mag repleted, hg decreased to 7.6 from 8.4    NEURO:    AAOx3      Acute pain - PO tylenol, oxycodone prn     RESP:     on RA    ABG PRN    AM CXR    CT Chest (PE protocol) 9/17- negative for PE; small-moderate b/l pleural effusions with compressive atelectasis      CARDS:     Hypotension Resolved- Off all pressors    Acute HTN & tachycardia - increased metoprolol to 25mg BID    CE neg, 0.04, 0.16, 0.07 - no longer trending    ECHO: EF 50-55%, G1DD, mild MR, mild TR    BNP-2013     On Lasix 20 PO QD      GI/NUTR:     CT A/P (9/17) - negative for leak; postsurgical changes with post-op ileus  -OR 9/22 for evaluation of increasing bilateral anterior abdominal wall cellutlis and erythema; two anterior abdominal all incisions made, no notable signs of infection   -no drainable collections as per IR    Diet: TPN off, advanced diet to mechanical soft  -added MVI/minerals and vit C per nutrition, and started 2 days calorie count    +gas/+BM     Hepatic panel 9/18: WNL    IVL    GI ppx: PPI    Dulcolax suppository       /RENAL:     voiding    (9/19) Lasix 40 IV & Diamox 500 --> good response, 5.2L+    BUN/Cr-  8/0.5     Lactate cleared     Repleting Lytes     HEME/ONC:     Hgb 8.4    Transfused 1 uPRBC (split 1/2 and 1/2 on 9/18)    Plt 170    INR 2.5 > 1.7 >1.3    HIT antibody: negative    DVT Prophylaxis: HSQ, SCDs    ID:    Afebrile    WBC 9.13 > 13.7 > 16.6--> 15.9    Abx: Caspfungin, Zosyn, Flagyl, d/c per ID 9/21    -ID following - cont Zyvox and Zosyn    BCx 9/17- (NGTD)     ENDO:    Hyperglycemic - on Insulin gtt (started 9/21), possibly will need Lantus    Solucortef d/c'd 9/20    LINES/DRAINS:  PIV, central line    DVT Prophylaxis: heparin   Injectable 5000 Unit(s) SubCutaneous every 8 hours  GI Prophylaxis:pantoprazole  Injectable 40 milliGRAM(s) IV Push daily    ***Tubes/Lines/Drains  ***  Peripheral IV  R. Basilic Midline                      Date: 9/17    [X] A ten-point review of systems was otherwise negative except as noted above.  [  ] Due to altered mental status/intubation, subjective information was not attained from the patient. History was obtained, to the extent possible, from review of the chart and collateral sources of information.     GLORIA SCHAFER  [id number]  45y Female    Indication for ICU admission: intraabdominal sepsis, s/p diagnostic lap, NARCISO, SBR (43cm ileum) w/ primary anastamosis (side to side)  Admit Date:20  ICU Date: 9/15/2020  OR Date: 9/15/2020 x2    No Known Allergies    PAST MEDICAL & SURGICAL HISTORY:  Uterine fibroid    Status post bilateral salpingectomy    S/P PAULIE (total abdominal hysterectomy)      Home Medications:  ibuprofen 600 mg oral tablet: 1 tab(s) orally every 6 hours, As needed, Mild Pain (1 - 3) (01 Sep 2020 06:26)      24HRS EVENT:  pain controlled, afebrile, abdominal incision w/ mildly saturated dressings abd otherwise soft , BM  , passing gas, insulin gtt held since 2am, voiding, K , mag repleted, hg decreased to 7.6 from 8.4    NEURO:    AAOx3      Acute pain - PO tylenol, oxycodone prn     RESP:     on RA    ABG PRN    AM CXR    CT Chest (PE protocol) - negative for PE; small-moderate b/l pleural effusions with compressive atelectasis      CARDS:     Hypotension Resolved- Off all pressors    Acute HTN & tachycardia - increased metoprolol to 25mg BID    CE neg, 0.04, 0.16, 0.07 - no longer trending    ECHO: EF 50-55%, G1DD, mild MR, mild TR    BNP-     On Lasix 20 PO QD      GI/NUTR:     CT A/P () - negative for leak; postsurgical changes with post-op ileus  -OR  for evaluation of increasing bilateral anterior abdominal wall cellutlis and erythema; two anterior abdominal all incisions made, no notable signs of infection   -no drainable collections as per IR    Diet: TPN off, advanced diet to mechanical soft  -added MVI/minerals and vit C per nutrition, and started 2 days calorie count    +gas/+BM     Hepatic panel : WNL    IVL    GI ppx: PPI    Dulcolax suppository       /RENAL:     voiding    () Lasix 40 IV & Diamox 500 --> good response, 5.2L+    BUN/Cr-  8/0.5     Lactate cleared     Repleting Lytes     HEME/ONC:     Hgb 8.4    Transfused 1 uPRBC (split /2 and 1/2 on )    Plt 170    INR 2.5 > 1.7 >1.3    HIT antibody: negative    DVT Prophylaxis: HSQ, SCDs    ID:    Afebrile    WBC 9.13 > 13.7 > 16.6--> 15.9    Abx: Caspfungin, Zosyn, Flagyl, d/c per ID     -ID following - cont Zyvox and Zosyn    BCx - (NGTD)     ENDO:    Hyperglycemic - on Insulin gtt (started ), possibly will need Lantus    Solucortef d/c'd     LINES/DRAINS:  PIV, central line    DVT Prophylaxis: heparin   Injectable 5000 Unit(s) SubCutaneous every 8 hours  GI Prophylaxis:pantoprazole  Injectable 40 milliGRAM(s) IV Push daily    ***Tubes/Lines/Drains  ***  Peripheral IV  R. Basilic Midline                      Date:     [X] A ten-point review of systems was otherwise negative except as noted above.  [  ] Due to altered mental status/intubation, subjective information was not attained from the patient. History was obtained, to the extent possible, from review of the chart and collateral sources of information.    Daily     Daily     Diet, Mechanical Soft:   Consistent Carbohydrate Evening Snack  Supplement Feeding Modality:  Oral  Ensure Enlive Cans or Servings Per Day:  1       Frequency:  Two Times a day (20 @ 17:56)      CURRENT MEDS:  Neurologic Medications  acetaminophen   Tablet .. 650 milliGRAM(s) Oral every 6 hours  oxyCODONE    IR 10 milliGRAM(s) Oral every 6 hours PRN Moderate Pain (4 - 6)  oxyCODONE    IR 5 milliGRAM(s) Oral every 4 hours PRN Mild Pain (1 - 3)    Respiratory Medications    Cardiovascular Medications  furosemide    Tablet 20 milliGRAM(s) Oral daily  metoprolol tartrate 25 milliGRAM(s) Oral every 12 hours    Gastrointestinal Medications  ascorbic acid 500 milliGRAM(s) Oral two times a day  multivitamin/minerals 1 Tablet(s) Oral daily  pantoprazole    Tablet 40 milliGRAM(s) Oral before breakfast  senna 2 Tablet(s) Oral at bedtime    Genitourinary Medications    Hematologic/Oncologic Medications  heparin   Injectable 5000 Unit(s) SubCutaneous every 8 hours    Antimicrobial/Immunologic Medications  linezolid  IVPB 600 milliGRAM(s) IV Intermittent <User Schedule>  piperacillin/tazobactam IVPB.. 4.5 Gram(s) IV Intermittent every 6 hours    Endocrine/Metabolic Medications  insulin regular  human corrective regimen sliding scale   IV Push four times a day with meals    Topical/Other Medications  chlorhexidine 4% Liquid 1 Application(s) Topical daily  chlorhexidine 4% Liquid 1 Application(s) Topical daily      ICU Vital Signs Last 24 Hrs  T(C): 36.3 (24 Sep 2020 08:00), Max: 36.7 (23 Sep 2020 20:00)  T(F): 97.4 (24 Sep 2020 08:00), Max: 98.1 (23 Sep 2020 20:00)  HR: 96 (24 Sep 2020 12:00) (78 - 98)  BP: 125/65 (24 Sep 2020 12:00) (88/56 - 127/71)  BP(mean): 85 (24 Sep 2020 12:) (65 - 93)  RR: 20 (24 Sep 2020 12:) (15 - 32)  SpO2: 100% (24 Sep 2020 12:) (94% - 100%)        I&O's Summary    23 Sep 2020 07:  -  24 Sep 2020 07:00  --------------------------------------------------------  IN: 1619 mL / OUT: 2550 mL / NET: -931 mL    24 Sep 2020 07:  -  24 Sep 2020 12:19  --------------------------------------------------------  IN: 0 mL / OUT: 300 mL / NET: -300 mL      I&O's Detail    23 Sep 2020 07:  -  24 Sep 2020 07:00  --------------------------------------------------------  IN:    Insulin: 19 mL    IV PiggyBack: 150 mL    IV PiggyBack: 550 mL    Lactated Ringers: 600 mL    Oral Fluid: 300 mL  Total IN: 1619 mL    OUT:    Voided (mL): 2550 mL  Total OUT: 2550 mL    Total NET: -931 mL      24 Sep 2020 07:01  -  24 Sep 2020 12:19  --------------------------------------------------------  IN:  Total IN: 0 mL    OUT:    Voided (mL): 300 mL  Total OUT: 300 mL    Total NET: -300 mL        PHYSICAL EXAM:  NEURO: NAD, alert, oriented x 3, no focal deficits    RESP: lungs clear to auscultation, normal expansion/effort      CV: S1, S2, regular rate and rhythm  Cardiac rhythm: normal sinus rhythm    GI: abdomen soft, mildly tender, nondistended, midline incision with dressing that is clean and intact with minimal serosanguinous drainage at inferior portion  Current diet:  soft mechanical diet    EXTREMITIES: extremities warm, pink, well perfused    DERM: good skin turgor, no skin breakdown      : voiding    LINES/TUBES/DRAINS:  - peripheral IV  - arterial line  - central line    CXR:     LABS:  CAPILLARY BLOOD GLUCOSE      POCT Blood Glucose.: 146 mg/dL (24 Sep 2020 11:31)  POCT Blood Glucose.: 160 mg/dL (24 Sep 2020 08:06)  POCT Blood Glucose.: 122 mg/dL (24 Sep 2020 06:30)  POCT Blood Glucose.: 151 mg/dL (24 Sep 2020 00:34)  POCT Blood Glucose.: 131 mg/dL (23 Sep 2020 20:18)  POCT Blood Glucose.: 142 mg/dL (23 Sep 2020 19:21)  POCT Blood Glucose.: 122 mg/dL (23 Sep 2020 18:16)  POCT Blood Glucose.: 172 mg/dL (23 Sep 2020 14:16)  POCT Blood Glucose.: 176 mg/dL (23 Sep 2020 13:13)                          7.6    14.64 )-----------( 230      ( 23 Sep 2020 23:21 )             23.4       09-23    133<L>  |  101  |  9<L>  ----------------------------<  145<H>  3.5   |  26  |  <0.5<L>    Ca    7.3<L>      23 Sep 2020 23:21  Phos  3.2       Mg     1.8             PT/INR - ( 23 Sep 2020 23:21 )   PT: 14.70 sec;   INR: 1.28 ratio         PTT - ( 23 Sep 2020 23:21 )  PTT:33.8 sec      Urinalysis Basic - ( 23 Sep 2020 03:15 )    Color: Colorless / Appearance: Clear / S.011 / pH: x  Gluc: x / Ketone: Negative  / Bili: Negative / Urobili: <2 mg/dL   Blood: x / Protein: Negative / Nitrite: Negative   Leuk Esterase: Negative / RBC: x / WBC x   Sq Epi: x / Non Sq Epi: x / Bacteria: x        Culture - Tissue with Gram Stain (collected 22 Sep 2020 23:40)  Source: .Tissue None  Gram Stain (24 Sep 2020 04:12):    Few polymorphonuclear leukocytes seen per low power field    No organisms seen per oil power field    Culture - Tissue with Gram Stain (collected 22 Sep 2020 23:40)  Source: .Tissue None  Gram Stain (24 Sep 2020 02:09):    Rare polymorphonuclear leukocytes seen per low power field    No organisms seen per oil power field

## 2020-09-25 LAB
ANION GAP SERPL CALC-SCNC: 11 MMOL/L — SIGNIFICANT CHANGE UP (ref 7–14)
ANION GAP SERPL CALC-SCNC: 7 MMOL/L — SIGNIFICANT CHANGE UP (ref 7–14)
BUN SERPL-MCNC: 4 MG/DL — LOW (ref 10–20)
BUN SERPL-MCNC: 7 MG/DL — LOW (ref 10–20)
CALCIUM SERPL-MCNC: 6.7 MG/DL — LOW (ref 8.5–10.1)
CALCIUM SERPL-MCNC: 6.9 MG/DL — LOW (ref 8.5–10.1)
CHLORIDE SERPL-SCNC: 94 MMOL/L — LOW (ref 98–110)
CHLORIDE SERPL-SCNC: 96 MMOL/L — LOW (ref 98–110)
CO2 SERPL-SCNC: 24 MMOL/L — SIGNIFICANT CHANGE UP (ref 17–32)
CO2 SERPL-SCNC: 26 MMOL/L — SIGNIFICANT CHANGE UP (ref 17–32)
CREAT SERPL-MCNC: <0.5 MG/DL — LOW (ref 0.7–1.5)
CREAT SERPL-MCNC: <0.5 MG/DL — LOW (ref 0.7–1.5)
GLUCOSE BLDC GLUCOMTR-MCNC: 117 MG/DL — HIGH (ref 70–99)
GLUCOSE BLDC GLUCOMTR-MCNC: 117 MG/DL — HIGH (ref 70–99)
GLUCOSE BLDC GLUCOMTR-MCNC: 216 MG/DL — HIGH (ref 70–99)
GLUCOSE SERPL-MCNC: 135 MG/DL — HIGH (ref 70–99)
GLUCOSE SERPL-MCNC: 221 MG/DL — HIGH (ref 70–99)
HCT VFR BLD CALC: 26.7 % — LOW (ref 37–47)
HGB BLD-MCNC: 8.6 G/DL — LOW (ref 12–16)
MAGNESIUM SERPL-MCNC: 1.8 MG/DL — SIGNIFICANT CHANGE UP (ref 1.8–2.4)
MAGNESIUM SERPL-MCNC: 1.8 MG/DL — SIGNIFICANT CHANGE UP (ref 1.8–2.4)
MCHC RBC-ENTMCNC: 27.2 PG — SIGNIFICANT CHANGE UP (ref 27–31)
MCHC RBC-ENTMCNC: 32.2 G/DL — SIGNIFICANT CHANGE UP (ref 32–37)
MCV RBC AUTO: 84.5 FL — SIGNIFICANT CHANGE UP (ref 81–99)
NRBC # BLD: 0 /100 WBCS — SIGNIFICANT CHANGE UP (ref 0–0)
PHOSPHATE SERPL-MCNC: 2.6 MG/DL — SIGNIFICANT CHANGE UP (ref 2.1–4.9)
PHOSPHATE SERPL-MCNC: 3 MG/DL — SIGNIFICANT CHANGE UP (ref 2.1–4.9)
PLATELET # BLD AUTO: 348 K/UL — SIGNIFICANT CHANGE UP (ref 130–400)
POTASSIUM SERPL-MCNC: 3.6 MMOL/L — SIGNIFICANT CHANGE UP (ref 3.5–5)
POTASSIUM SERPL-MCNC: 3.9 MMOL/L — SIGNIFICANT CHANGE UP (ref 3.5–5)
POTASSIUM SERPL-SCNC: 3.6 MMOL/L — SIGNIFICANT CHANGE UP (ref 3.5–5)
POTASSIUM SERPL-SCNC: 3.9 MMOL/L — SIGNIFICANT CHANGE UP (ref 3.5–5)
RBC # BLD: 3.16 M/UL — LOW (ref 4.2–5.4)
RBC # FLD: 15.9 % — HIGH (ref 11.5–14.5)
SODIUM SERPL-SCNC: 129 MMOL/L — LOW (ref 135–146)
SODIUM SERPL-SCNC: 129 MMOL/L — LOW (ref 135–146)
SURGICAL PATHOLOGY STUDY: SIGNIFICANT CHANGE UP
WBC # BLD: 17.45 K/UL — HIGH (ref 4.8–10.8)
WBC # FLD AUTO: 17.45 K/UL — HIGH (ref 4.8–10.8)

## 2020-09-25 PROCEDURE — 71045 X-RAY EXAM CHEST 1 VIEW: CPT | Mod: 26

## 2020-09-25 PROCEDURE — 99024 POSTOP FOLLOW-UP VISIT: CPT

## 2020-09-25 PROCEDURE — 99291 CRITICAL CARE FIRST HOUR: CPT | Mod: 24

## 2020-09-25 RX ORDER — POTASSIUM CHLORIDE 20 MEQ
20 PACKET (EA) ORAL
Refills: 0 | Status: COMPLETED | OUTPATIENT
Start: 2020-09-25 | End: 2020-09-25

## 2020-09-25 RX ORDER — ONDANSETRON 8 MG/1
4 TABLET, FILM COATED ORAL ONCE
Refills: 0 | Status: COMPLETED | OUTPATIENT
Start: 2020-09-25 | End: 2020-09-25

## 2020-09-25 RX ORDER — HYDROMORPHONE HYDROCHLORIDE 2 MG/ML
1 INJECTION INTRAMUSCULAR; INTRAVENOUS; SUBCUTANEOUS ONCE
Refills: 0 | Status: DISCONTINUED | OUTPATIENT
Start: 2020-09-25 | End: 2020-09-25

## 2020-09-25 RX ORDER — KETOROLAC TROMETHAMINE 30 MG/ML
30 SYRINGE (ML) INJECTION ONCE
Refills: 0 | Status: DISCONTINUED | OUTPATIENT
Start: 2020-09-25 | End: 2020-09-25

## 2020-09-25 RX ORDER — MAGNESIUM SULFATE 500 MG/ML
1 VIAL (ML) INJECTION ONCE
Refills: 0 | Status: COMPLETED | OUTPATIENT
Start: 2020-09-25 | End: 2020-09-25

## 2020-09-25 RX ORDER — INSULIN HUMAN 100 [IU]/ML
INJECTION, SOLUTION SUBCUTANEOUS
Refills: 0 | Status: DISCONTINUED | OUTPATIENT
Start: 2020-09-25 | End: 2020-09-28

## 2020-09-25 RX ADMIN — HEPARIN SODIUM 5000 UNIT(S): 5000 INJECTION INTRAVENOUS; SUBCUTANEOUS at 05:33

## 2020-09-25 RX ADMIN — PIPERACILLIN AND TAZOBACTAM 25 GRAM(S): 4; .5 INJECTION, POWDER, LYOPHILIZED, FOR SOLUTION INTRAVENOUS at 12:16

## 2020-09-25 RX ADMIN — Medication 500 MILLIGRAM(S): at 05:33

## 2020-09-25 RX ADMIN — HEPARIN SODIUM 5000 UNIT(S): 5000 INJECTION INTRAVENOUS; SUBCUTANEOUS at 21:30

## 2020-09-25 RX ADMIN — HYDROMORPHONE HYDROCHLORIDE 1 MILLIGRAM(S): 2 INJECTION INTRAMUSCULAR; INTRAVENOUS; SUBCUTANEOUS at 11:30

## 2020-09-25 RX ADMIN — PIPERACILLIN AND TAZOBACTAM 25 GRAM(S): 4; .5 INJECTION, POWDER, LYOPHILIZED, FOR SOLUTION INTRAVENOUS at 23:52

## 2020-09-25 RX ADMIN — Medication 30 MILLIGRAM(S): at 05:34

## 2020-09-25 RX ADMIN — HYDROMORPHONE HYDROCHLORIDE 1 MILLIGRAM(S): 2 INJECTION INTRAMUSCULAR; INTRAVENOUS; SUBCUTANEOUS at 11:18

## 2020-09-25 RX ADMIN — Medication 500 MILLIGRAM(S): at 18:45

## 2020-09-25 RX ADMIN — Medication 30 MILLIGRAM(S): at 20:18

## 2020-09-25 RX ADMIN — ONDANSETRON 4 MILLIGRAM(S): 8 TABLET, FILM COATED ORAL at 10:44

## 2020-09-25 RX ADMIN — PIPERACILLIN AND TAZOBACTAM 25 GRAM(S): 4; .5 INJECTION, POWDER, LYOPHILIZED, FOR SOLUTION INTRAVENOUS at 18:45

## 2020-09-25 RX ADMIN — Medication 650 MILLIGRAM(S): at 12:15

## 2020-09-25 RX ADMIN — LINEZOLID 300 MILLIGRAM(S): 600 INJECTION, SOLUTION INTRAVENOUS at 12:16

## 2020-09-25 RX ADMIN — Medication 650 MILLIGRAM(S): at 23:53

## 2020-09-25 RX ADMIN — PANTOPRAZOLE SODIUM 40 MILLIGRAM(S): 20 TABLET, DELAYED RELEASE ORAL at 05:33

## 2020-09-25 RX ADMIN — Medication 85 MILLIMOLE(S): at 01:47

## 2020-09-25 RX ADMIN — Medication 25 MILLIGRAM(S): at 18:45

## 2020-09-25 RX ADMIN — Medication 650 MILLIGRAM(S): at 05:33

## 2020-09-25 RX ADMIN — PIPERACILLIN AND TAZOBACTAM 25 GRAM(S): 4; .5 INJECTION, POWDER, LYOPHILIZED, FOR SOLUTION INTRAVENOUS at 05:35

## 2020-09-25 RX ADMIN — HEPARIN SODIUM 5000 UNIT(S): 5000 INJECTION INTRAVENOUS; SUBCUTANEOUS at 14:36

## 2020-09-25 RX ADMIN — Medication 650 MILLIGRAM(S): at 18:45

## 2020-09-25 RX ADMIN — Medication 30 MILLIGRAM(S): at 21:45

## 2020-09-25 RX ADMIN — CHLORHEXIDINE GLUCONATE 1 APPLICATION(S): 213 SOLUTION TOPICAL at 12:16

## 2020-09-25 RX ADMIN — Medication 650 MILLIGRAM(S): at 05:34

## 2020-09-25 RX ADMIN — OXYCODONE HYDROCHLORIDE 10 MILLIGRAM(S): 5 TABLET ORAL at 16:17

## 2020-09-25 RX ADMIN — Medication 100 GRAM(S): at 01:36

## 2020-09-25 RX ADMIN — Medication 1 TABLET(S): at 12:16

## 2020-09-25 RX ADMIN — OXYCODONE HYDROCHLORIDE 10 MILLIGRAM(S): 5 TABLET ORAL at 01:36

## 2020-09-25 RX ADMIN — Medication 50 MILLIEQUIVALENT(S): at 17:00

## 2020-09-25 RX ADMIN — OXYCODONE HYDROCHLORIDE 10 MILLIGRAM(S): 5 TABLET ORAL at 02:30

## 2020-09-25 RX ADMIN — LINEZOLID 300 MILLIGRAM(S): 600 INJECTION, SOLUTION INTRAVENOUS at 23:52

## 2020-09-25 RX ADMIN — Medication 650 MILLIGRAM(S): at 12:30

## 2020-09-25 RX ADMIN — Medication 25 MILLIGRAM(S): at 05:33

## 2020-09-25 RX ADMIN — Medication 50 GRAM(S): at 17:01

## 2020-09-25 RX ADMIN — OXYCODONE HYDROCHLORIDE 5 MILLIGRAM(S): 5 TABLET ORAL at 03:53

## 2020-09-25 RX ADMIN — Medication 20 MILLIGRAM(S): at 05:33

## 2020-09-25 RX ADMIN — Medication 50 MILLIEQUIVALENT(S): at 18:44

## 2020-09-25 NOTE — PROGRESS NOTE ADULT - SUBJECTIVE AND OBJECTIVE BOX
GLORIA SCHAFER  [id number]  45y Female    Indication for ICU admission: intraabdominal sepsis, s/p diagnostic lap, NARCISO, SBR (43cm ileum) w/ primary anastamosis (side to side)  Admit Date:20  ICU Date: 9/15/2020  OR Date: 9/15/2020 x2    No Known Allergies    PAST MEDICAL & SURGICAL HISTORY:  Uterine fibroid  Status post bilateral salpingectomy  S/P PAULIE (total abdominal hysterectomy)    Home Medications:  ibuprofen 600 mg oral tablet: 1 tab(s) orally every 6 hours, As needed, Mild Pain (1 - 3) (01 Sep 2020 06:26)    24 HOUR EVENTS: No acute events overnight, WBC uptrending (14.6>17.4), improvement in bilateral abdominal wall/flank cellulitis     NEURO:    AAOx3      PO tylenol, oxycodone prn, Dilaudid prn, toradol prn     RESP:     O2 sat >95% on RA    ABG PRN    AM CXR stable    CT Chest (PE protocol) - negative for PE; small-moderate b/l pleural effusions with     compressive atelectasis      CARDS:     Hypotension Resolved- Off all pressors    Acute tachycardia - now in the 110s, cont metoprolol 25mg BID     CE neg, 0.04, 0.16, 0.07 - no longer  trending     ECHO: EF 50-55%, G1DD, mild MR, mild TR.    GI/NUTR:     -s/p I&D of wound cellulitis ---no signs of STI, packed with betadine and Kerlex    - CT Abd/pelvis with PO and IV - abscess in lower abd/pelvis- IR not draining 2/2      location     -CT A/P () - negative for leak; postsurgical changes with post-op ileus    -Diet: soft mechanical     -Hepatic panel : WNL    -GI ppx: PP    -On Senna      /RENAL:     IVL    Silva out, voiding     BUN/Cr trend 9/<0.5    Repleted 1g Mg sulfate, 2 K ryders, 15mmol Na phos during the day    Lactatemia resolved to 1.3 (max 6.5)    HEME/ONC:     Hgb downtrending 8.4 to 7.6    Transfused 1 uPRBC (split 1/2 and 1/2 on 9/18)    Plt 230     HIT antibody: negative    DVT Prophylaxis: HSQ, SCDs    ID:    Afebrile    WBC 14.64 <-- 15.87 <--16.63 <-- 13.76 <--9.13     -ID following: recommend imaging, start Zosyn 4.5 gm iv q6h over 4hr, Zyvox 600 mg iv      q12h     repeat bcx ordered    BCx - (NGTD)     ENDO:    Off insulin gtt, on ISS    FS ACqHS    Off Solucortef 50 q6, d/c'd     LINES/DRAINS:  PIV , Jessica, central line    DVT Prophylaxis: heparin   Injectable 5000 Unit(s) SubCutaneous every 8 hours  GI Prophylaxis:pantoprazole  Injectable 40 milliGRAM(s) IV Push daily    ***Tubes/Lines/Drains  ***  Peripheral IV  R. Basilic Midline                      Date:   R central IJ     [X] A ten-point review of systems was otherwise negative except as noted above.  [  ] Due to altered mental status/intubation, subjective information was not attained from the patient. History was obtained, to the extent possible, from review of the chart and collateral sources of information.    Diet, Mechanical Soft:   Consistent Carbohydrate Evening Snack  Supplement Feeding Modality:  Oral  Ensure Enlive Cans or Servings Per Day:  1       Frequency:  Two Times a day (20 @ 17:56)    CURRENT MEDS:  Neurologic Medications  acetaminophen   Tablet .. 650 milliGRAM(s) Oral every 6 hours  oxyCODONE    IR 10 milliGRAM(s) Oral every 6 hours PRN Moderate Pain (4 - 6)  oxyCODONE    IR 5 milliGRAM(s) Oral every 4 hours PRN Mild Pain (1 - 3)    Respiratory Medications    Cardiovascular Medications  furosemide    Tablet 20 milliGRAM(s) Oral daily  metoprolol tartrate 25 milliGRAM(s) Oral every 12 hours    Gastrointestinal Medications  ascorbic acid 500 milliGRAM(s) Oral two times a day  multivitamin/minerals 1 Tablet(s) Oral daily  pantoprazole    Tablet 40 milliGRAM(s) Oral before breakfast  senna 2 Tablet(s) Oral at bedtime    Genitourinary Medications    Hematologic/Oncologic Medications  heparin   Injectable 5000 Unit(s) SubCutaneous every 8 hours    Antimicrobial/Immunologic Medications  linezolid  IVPB 600 milliGRAM(s) IV Intermittent <User Schedule>  piperacillin/tazobactam IVPB.. 4.5 Gram(s) IV Intermittent every 6 hours    Endocrine/Metabolic Medications  insulin regular  human corrective regimen sliding scale   IV Push four times a day with meals    Topical/Other Medications  chlorhexidine 4% Liquid 1 Application(s) Topical daily  chlorhexidine 4% Liquid 1 Application(s) Topical daily    ICU Vital Signs Last 24 Hrs  T(C): 36.7 (25 Sep 2020 00:00), Max: 36.8 (24 Sep 2020 20:00)  T(F): 98.1 (25 Sep 2020 00:00), Max: 98.3 (24 Sep 2020 20:00)  HR: 100 (25 Sep 2020 00:00) (80 - 101)  BP: 105/68 (25 Sep 2020 00:00) (88/51 - 127/71)  BP(mean): 81 (25 Sep 2020 00:00) (64 - 93)  RR: 19 (25 Sep 2020 00:00) (16 - 24)  SpO2: 100% (25 Sep 2020 00:00) (100% - 100%)    I&O's Summary    23 Sep 2020 07:  -  24 Sep 2020 07:00  --------------------------------------------------------  IN: 1619 mL / OUT: 2550 mL / NET: -931 mL    24 Sep 2020 07:  -  25 Sep 2020 01:51  --------------------------------------------------------  IN: 600 mL / OUT: 1550 mL / NET: -950 mL    I&O's Detail    23 Sep 2020 07:  -  24 Sep 2020 07:00  --------------------------------------------------------  IN:    Insulin: 19 mL    IV PiggyBack: 150 mL    IV PiggyBack: 550 mL    Lactated Ringers: 600 mL    Oral Fluid: 300 mL  Total IN: 1619 mL    OUT:    Voided (mL): 2550 mL  Total OUT: 2550 mL    Total NET: -931 mL    24 Sep 2020 07:01  -  25 Sep 2020 01:51  --------------------------------------------------------  IN:    IV PiggyBack: 400 mL    Oral Fluid: 200 mL  Total IN: 600 mL    OUT:    Voided (mL): 1550 mL  Total OUT: 1550 mL    Total NET: -950 mL    PHYSICAL EXAM:    General/Neuro: A&Ox3, no neurologic deficits     Lungs:  clear to auscultation, Normal expansion/effort.     Cardiovascular : S1, S2.  Regular rate and rhythm.  Peripheral edema   Cardiac Rhythm: Normal Sinus Rhythm    GI: Abdomen soft, nondistended, tender around the midline abdominal wound     Extremities: Extremities warm, pink, well-perfused.    Derm: Decreased erythema/induration of the bilateral abdominal wall/flanks, s/p incision and drainage of right and left abdominal wall, packed with betadine gauze       LABS:  CAPILLARY BLOOD GLUCOSE    POCT Blood Glucose.: 160 mg/dL (24 Sep 2020 21:09)  POCT Blood Glucose.: 162 mg/dL (24 Sep 2020 17:35)  POCT Blood Glucose.: 146 mg/dL (24 Sep 2020 11:31)  POCT Blood Glucose.: 160 mg/dL (24 Sep 2020 08:06)  POCT Blood Glucose.: 122 mg/dL (24 Sep 2020 06:30)                        8.6    17.45 )-----------( 348      ( 25 Sep 2020 00:10 )             26.7       09-25    129<L>  |  96<L>  |  7<L>  ----------------------------<  135<H>  3.9   |  26  |  <0.5<L>    Ca    6.9<L>      25 Sep 2020 00:10  Phos  2.6     09-25  Mg     1.8     09-25    PT/INR - ( 23 Sep 2020 23:21 )   PT: 14.70 sec;   INR: 1.28 ratio      PTT - ( 23 Sep 2020 23:21 )  PTT:33.8 sec    Urinalysis Basic - ( 23 Sep 2020 03:15 )    Color: Colorless / Appearance: Clear / S.011 / pH: x  Gluc: x / Ketone: Negative  / Bili: Negative / Urobili: <2 mg/dL   Blood: x / Protein: Negative / Nitrite: Negative   Leuk Esterase: Negative / RBC: x / WBC x   Sq Epi: x / Non Sq Epi: x / Bacteria: x    Culture - Tissue with Gram Stain (collected 22 Sep 2020 23:40)  Source: .Tissue None  Gram Stain (24 Sep 2020 04:12):    Few polymorphonuclear leukocytes seen per low power field    No organisms seen per oil power field  Preliminary Report (24 Sep 2020 22:52):    Rare Klebsiella pneumoniae    Culture - Tissue with Gram Stain (collected 22 Sep 2020 23:40)  Source: .Tissue None  Gram Stain (24 Sep 2020 02:09):    Rare polymorphonuclear leukocytes seen per low power field    No organisms seen per oil power field  Preliminary Report (24 Sep 2020 21:45):    No growth         GLORIA SCHAFER  [id number]  45y Female    Indication for ICU admission: intraabdominal sepsis, s/p diagnostic lap, NARCISO, SBR (43cm ileum) w/ primary anastamosis (side to side)  Admit Date:20  ICU Date: 9/15/2020  OR Date: 9/15/2020 x2    No Known Allergies    PAST MEDICAL & SURGICAL HISTORY:  Uterine fibroid  Status post bilateral salpingectomy  S/P PAULIE (total abdominal hysterectomy)    Home Medications:  ibuprofen 600 mg oral tablet: 1 tab(s) orally every 6 hours, As needed, Mild Pain (1 - 3) (01 Sep 2020 06:26)    24 HOUR EVENTS: No acute events overnight, WBC uptrending (14.6>17.4), improvement in bilateral abdominal wall/flank cellulitis     NEURO:    AAOx3      PO tylenol, oxycodone prn, Dilaudid prn, toradol prn     RESP:     O2 sat >95% on RA    ABG PRN    AM CXR stable    CT Chest (PE protocol) - negative for PE; small-moderate b/l pleural effusions with     compressive atelectasis      CARDS:     Hypotension Resolved- Off all pressors    Acute tachycardia - now in the 110s, cont metoprolol 25mg BID     CE neg, 0.04, 0.16, 0.07 - no longer  trending     ECHO: EF 50-55%, G1DD, mild MR, mild TR.    GI/NUTR:     -s/p I&D of wound cellulitis ---no signs of STI, packed with betadine and Kerlex    - CT Abd/pelvis with PO and IV - abscess in lower abd/pelvis- IR not draining 2/2      location     -CT A/P () - negative for leak; postsurgical changes with post-op ileus    -Diet: soft mechanical     -Hepatic panel : WNL    -GI ppx: PP    -On Senna      /RENAL:     IVL    Silva out, voiding     BUN/Cr trend 9/<0.5    Repleted 1g Mg sulfate, 2 K ryders, 15mmol Na phos during the day    Lactatemia resolved to 1.3 (max 6.5)    HEME/ONC:     Hgb downtrending 8.4 to 7.6    Transfused 1 uPRBC (split 1/2 and 1/2 on 9/18)    Plt 230     HIT antibody: negative    DVT Prophylaxis: HSQ, SCDs    ID:    Afebrile    WBC 14.64 <-- 15.87 <--16.63 <-- 13.76 <--9.13     -ID following: recommend imaging, start Zosyn 4.5 gm iv q6h over 4hr, Zyvox 600 mg iv      q12h     repeat bcx ordered    BCx - (NGTD)     ENDO:    Off insulin gtt, on ISS    FS ACqHS    Off Solucortef 50 q6, d/c'd     LINES/DRAINS:  PIV , Jessica, central line    DVT Prophylaxis: heparin   Injectable 5000 Unit(s) SubCutaneous every 8 hours  GI Prophylaxis:pantoprazole  Injectable 40 milliGRAM(s) IV Push daily    ***Tubes/Lines/Drains  ***  Peripheral IV  R. Basilic Midline                      Date:   R central IJ     [X] A ten-point review of systems was otherwise negative except as noted above.  [  ] Due to altered mental status/intubation, subjective information was not attained from the patient. History was obtained, to the extent possible, from review of the chart and collateral sources of information.    Diet, Mechanical Soft:   Consistent Carbohydrate Evening Snack  Supplement Feeding Modality:  Oral  Ensure Enlive Cans or Servings Per Day:  1       Frequency:  Two Times a day (20 @ 17:56)    CURRENT MEDS:  Neurologic Medications  acetaminophen   Tablet .. 650 milliGRAM(s) Oral every 6 hours  oxyCODONE    IR 10 milliGRAM(s) Oral every 6 hours PRN Moderate Pain (4 - 6)  oxyCODONE    IR 5 milliGRAM(s) Oral every 4 hours PRN Mild Pain (1 - 3)    Respiratory Medications    Cardiovascular Medications  furosemide    Tablet 20 milliGRAM(s) Oral daily  metoprolol tartrate 25 milliGRAM(s) Oral every 12 hours    Gastrointestinal Medications  ascorbic acid 500 milliGRAM(s) Oral two times a day  multivitamin/minerals 1 Tablet(s) Oral daily  pantoprazole    Tablet 40 milliGRAM(s) Oral before breakfast  senna 2 Tablet(s) Oral at bedtime    Genitourinary Medications    Hematologic/Oncologic Medications  heparin   Injectable 5000 Unit(s) SubCutaneous every 8 hours    Antimicrobial/Immunologic Medications  linezolid  IVPB 600 milliGRAM(s) IV Intermittent <User Schedule>  piperacillin/tazobactam IVPB.. 4.5 Gram(s) IV Intermittent every 6 hours    Endocrine/Metabolic Medications  insulin regular  human corrective regimen sliding scale   IV Push four times a day with meals    Topical/Other Medications  chlorhexidine 4% Liquid 1 Application(s) Topical daily  chlorhexidine 4% Liquid 1 Application(s) Topical daily    ICU Vital Signs Last 24 Hrs  T(C): 36.7 (25 Sep 2020 00:00), Max: 36.8 (24 Sep 2020 20:00)  T(F): 98.1 (25 Sep 2020 00:00), Max: 98.3 (24 Sep 2020 20:00)  HR: 100 (25 Sep 2020 00:00) (80 - 101)  BP: 105/68 (25 Sep 2020 00:00) (88/51 - 127/71)  BP(mean): 81 (25 Sep 2020 00:00) (64 - 93)  RR: 19 (25 Sep 2020 00:00) (16 - 24)  SpO2: 100% (25 Sep 2020 00:00) (100% - 100%)    I&O's Summary    23 Sep 2020 07:  -  24 Sep 2020 07:00  --------------------------------------------------------  IN: 1619 mL / OUT: 2550 mL / NET: -931 mL    24 Sep 2020 07:  -  25 Sep 2020 01:51  --------------------------------------------------------  IN: 600 mL / OUT: 1550 mL / NET: -950 mL    I&O's Detail    23 Sep 2020 07:  -  24 Sep 2020 07:00  --------------------------------------------------------  IN:    Insulin: 19 mL    IV PiggyBack: 150 mL    IV PiggyBack: 550 mL    Lactated Ringers: 600 mL    Oral Fluid: 300 mL  Total IN: 1619 mL    OUT:    Voided (mL): 2550 mL  Total OUT: 2550 mL    Total NET: -931 mL    24 Sep 2020 07:01  -  25 Sep 2020 01:51  --------------------------------------------------------  IN:    IV PiggyBack: 400 mL    Oral Fluid: 200 mL  Total IN: 600 mL    OUT:    Voided (mL): 1550 mL  Total OUT: 1550 mL    Total NET: -950 mL    PHYSICAL EXAM:    General/Neuro: A&Ox3, no neurologic deficits     Lungs:  clear to auscultation, Normal expansion/effort.     Cardiovascular : S1, S2.  Regular rate and rhythm.  Peripheral edema   Cardiac Rhythm: Normal Sinus Rhythm    GI: Abdomen soft, nondistended, tender around the midline abdominal wound     Extremities: Extremities warm, pink, well-perfused.    Derm: Decreased erythema/induration of the bilateral abdominal wall/flanks, s/p incision and drainage of right and left abdominal wall, packed with betadine gauze       LABS:  CAPILLARY BLOOD GLUCOSE    POCT Blood Glucose.: 160 mg/dL (24 Sep 2020 21:09)  POCT Blood Glucose.: 162 mg/dL (24 Sep 2020 17:35)  POCT Blood Glucose.: 146 mg/dL (24 Sep 2020 11:31)  POCT Blood Glucose.: 160 mg/dL (24 Sep 2020 08:06)  POCT Blood Glucose.: 122 mg/dL (24 Sep 2020 06:30)                        8.6    17.45 )-----------( 348      ( 25 Sep 2020 00:10 )             26.7           129<L>  |  96<L>  |  7<L>  ----------------------------<  135<H>  3.9   |  26  |  <0.5<L>    Daily     Daily Weight in k.4 (25 Sep 2020 06:00)    Diet, Mechanical Soft:   Consistent Carbohydrate Evening Snack  Supplement Feeding Modality:  Oral  Ensure Enlive Cans or Servings Per Day:  1       Frequency:  Two Times a day (20 @ 17:56)      CURRENT MEDS:  Neurologic Medications  acetaminophen   Tablet .. 650 milliGRAM(s) Oral every 6 hours  oxyCODONE    IR 10 milliGRAM(s) Oral every 6 hours PRN Moderate Pain (4 - 6)  oxyCODONE    IR 5 milliGRAM(s) Oral every 4 hours PRN Mild Pain (1 - 3)    Respiratory Medications    Cardiovascular Medications  furosemide    Tablet 20 milliGRAM(s) Oral daily  metoprolol tartrate 25 milliGRAM(s) Oral every 12 hours    Gastrointestinal Medications  ascorbic acid 500 milliGRAM(s) Oral two times a day  multivitamin/minerals 1 Tablet(s) Oral daily  pantoprazole    Tablet 40 milliGRAM(s) Oral before breakfast  senna 2 Tablet(s) Oral at bedtime    Genitourinary Medications    Hematologic/Oncologic Medications  heparin   Injectable 5000 Unit(s) SubCutaneous every 8 hours    Antimicrobial/Immunologic Medications  linezolid  IVPB 600 milliGRAM(s) IV Intermittent <User Schedule>  piperacillin/tazobactam IVPB.. 4.5 Gram(s) IV Intermittent every 6 hours    Endocrine/Metabolic Medications  insulin regular  human corrective regimen sliding scale   IV Push Before meals and at bedtime    Topical/Other Medications  chlorhexidine 4% Liquid 1 Application(s) Topical daily  chlorhexidine 4% Liquid 1 Application(s) Topical daily      ICU Vital Signs Last 24 Hrs  T(C): 36.8 (25 Sep 2020 08:39), Max: 36.8 (24 Sep 2020 20:00)  T(F): 98.3 (25 Sep 2020 08:39), Max: 98.3 (24 Sep 2020 20:00)  HR: 96 (25 Sep 2020 10:00) (80 - 110)  BP: 109/76 (25 Sep 2020 10:00) (88/51 - 140/65)  BP(mean): 85 (25 Sep 2020 10:00) (64 - 93)  ABP: --  ABP(mean): --  RR: 17 (25 Sep 2020 10:00) (16 - 20)  SpO2: 100% (25 Sep 2020 10:00) (98% - 100%)      Adult Advanced Hemodynamics Last 24 Hrs  CVP(mm Hg): --  CVP(cm H2O): --  CO: --  CI: --  PA: --  PA(mean): --  PCWP: --  SVR: --  SVRI: --  PVR: --  PVRI: --          I&O's Summary    24 Sep 2020 07:  -  25 Sep 2020 07:00  --------------------------------------------------------  IN: 700 mL / OUT: 2400 mL / NET: -1700 mL      I&O's Detail    24 Sep 2020 07:  -  25 Sep 2020 07:00  --------------------------------------------------------  IN:    IV PiggyBack: 500 mL    Oral Fluid: 200 mL  Total IN: 700 mL    OUT:    Voided (mL): 2400 mL  Total OUT: 2400 mL    Total NET: -1700 mL          PHYSICAL EXAM:  NEURO: NAD, alert, oriented x 3, no focal deficits    RESP: lungs clear to auscultation, normal expansion/effort      CV: S1, S2, regular rate and rhythm  Cardiac rhythm: normal sinus rhythm    GI: abdomen soft, nontender, nondistended, area of cellulitis on R and L flank reduced in size, not indurated, midline vertical laparotomy with wet to dry dressing c/d/i  Current diet:  soft mechanical    EXTREMITIES: extremities warm, pink, well perfused    DERM: good skin turgor, no skin breakdown      : voiding    LINES/TUBES/DRAINS:  - peripheral IV  - arterial line  - central line     CXR:   < from: Xray Chest 1 View- PORTABLE-Routine (20 @ 05:40) >  EXAM:  XR CHEST PORTABLE ROUTINE 1V            PROCEDURE DATE:  2020            INTERPRETATION:  Clinical History / Reason for exam: ICU follow-up.    Comparison : 2020.    Technique/Positioning: Frontal view of the chest.    Findings:    Support devices: Stable right IJ catheter. Overlying telemetry leads.    Cardiac/mediastinum/hilum: Stable appearance of the cardiomediastinal silhouette.    Lung parenchyma/Pleura: Stable perihilar opacities. Stable to slightly increased left pleural effusion. No pneumothorax. Low lung volumes.    Skeleton/soft tissues: Unchanged.    Impression:    Stable perihilar opacities. Stable to slightly increased left pleural effusion. No pneumothorax. Low lung volumes    < end of copied text >      LABS:  CAPILLARY BLOOD GLUCOSE      POCT Blood Glucose.: 216 mg/dL (25 Sep 2020 08:05)  POCT Blood Glucose.: 160 mg/dL (24 Sep 2020 21:09)  POCT Blood Glucose.: 162 mg/dL (24 Sep 2020 17:35)  POCT Blood Glucose.: 146 mg/dL (24 Sep 2020 11:31)                          8.6    17.45 )-----------( 348      ( 25 Sep 2020 00:10 )             26.7       09-25    129<L>  |  96<L>  |  7<L>  ----------------------------<  135<H>  3.9   |  26  |  <0.5<L>    Ca    6.9<L>      25 Sep 2020 00:10  Phos  2.6     09-25  Mg     1.8     09-25        PT/INR - ( 23 Sep 2020 23:21 )   PT: 14.70 sec;   INR: 1.28 ratio         PTT - ( 23 Sep 2020 23:21 )  PTT:33.8 sec          Culture - Tissue with Gram Stain (collected 22 Sep 2020 23:40)  Source: .Tissue None  Gram Stain (24 Sep 2020 04:12):    Few polymorphonuclear leukocytes seen per low power field    No organisms seen per oil power field  Preliminary Report (24 Sep 2020 22:52):    Rare Klebsiella pneumoniae    Culture - Tissue with Gram Stain (collected 22 Sep 2020 23:40)  Source: .Tissue None  Gram Stain (24 Sep 2020 02:09):    Rare polymorphonuclear leukocytes seen per low power field    No organisms seen per oil power field  Preliminary Report (24 Sep 2020 21:45):    No growth      Ca    6.9<L>      25 Sep 2020 00:10  Phos  2.6     09-25  Mg     1.8     09-25    PT/INR - ( 23 Sep 2020 23:21 )   PT: 14.70 sec;   INR: 1.28 ratio      PTT - ( 23 Sep 2020 23:21 )  PTT:33.8 sec    Urinalysis Basic - ( 23 Sep 2020 03:15 )    Color: Colorless / Appearance: Clear / S.011 / pH: x  Gluc: x / Ketone: Negative  / Bili: Negative / Urobili: <2 mg/dL   Blood: x / Protein: Negative / Nitrite: Negative   Leuk Esterase: Negative / RBC: x / WBC x   Sq Epi: x / Non Sq Epi: x / Bacteria: x    Culture - Tissue with Gram Stain (collected 22 Sep 2020 23:40)  Source: .Tissue None  Gram Stain (24 Sep 2020 04:12):    Few polymorphonuclear leukocytes seen per low power field    No organisms seen per oil power field  Preliminary Report (24 Sep 2020 22:52):    Rare Klebsiella pneumoniae

## 2020-09-25 NOTE — PROGRESS NOTE ADULT - ASSESSMENT
A/P:  GLORIA SCHAFER is a 45yFemale s/p for Incision and drainage, wound, torso    Insertion, arterial line, percutaneous    Washing out stomach contents    Small bowel resection with anastomosis    Lysis of intestinal adhesions    Diagnostic laparoscopy    . She    Plan:   - continue sicu mgmt  - zosyn/zyvox as per ID rec  - HR control  - pain control

## 2020-09-25 NOTE — PROGRESS NOTE ADULT - ATTENDING COMMENTS
Pain is better today.  Tolerates solid diet.  WBC 17 today    ASSESSMENT:  44 y/o female with SBO.  S/P Ex. Lap.  Perforated viscus.  Peritonitis. Abdominal sepsis.  Acute respiratory failure.  Acute postoperative pain.  Abdominal wall cellulitis.  Moderate malnutrition.  Hyponatremia.    PLAN:  - Neuro: continue current regiment for pain control  - Respiratory: use O2 with NC as needed; incentive spirometer  - Cardio: keep MAP>65; currently well perfused and euvolemic  - GI: encourage po intake; ad protein shakes  - Renal: follow serum electrolytes and UOP.  - ID : on Zosyn and Zyvox  - GI and DVT prophylaxis

## 2020-09-25 NOTE — PROGRESS NOTE ADULT - ASSESSMENT
IMPRESSION;  Presented with Feculent peritonitis secondary to perforated viscus  9/15 : s/p diagnostic lap, NARCISO, SBR (43cm ileum) w/ primary anastamosis (side to side)  Post op pelvic abscesses  CT A/P 9/22 : pelvic abscesses, enteritis, likely reactive, b/l pleural effusions/atalectasis.  No right flank soft tissue infection  Right flank cellulitis with no abscess/fascitis : resolved. Seems to have originated from the abdominal wound which was locally debrided 9/22 and presently with no ongoing infectious process. WCx Klebsiella  Right flank appears significantly improved with no pain and reduced erythema  BCx 9/17 NGTD  WCx 9/22 rare PMNs, no org, Klebsiella    RECOMMENDATIONS;  final WCx  Zosyn 4.5 gm iv q6h over 4h  Zyvox 600 mg iv q12h

## 2020-09-25 NOTE — PROGRESS NOTE ADULT - SUBJECTIVE AND OBJECTIVE BOX
GENERAL SURGERY PROGRESS NOTE     GLROIA SCHAFER  [address] [place] [address] day :11d  POD:  Procedure: Incision and drainage, wound, torso    Insertion, arterial line, percutaneous    Washing out stomach contents    Small bowel resection with anastomosis    Lysis of intestinal adhesions    Diagnostic laparoscopy      Surgical Attending: Mariann Rubio  Overnight events: Low grade tachycardic to 100-102. Afebrile. Improving clinically. Dressing changed today.     T(F): 98.1 (20 @ 00:00), Max: 98.3 (20 @ 20:00)  HR: 100 (20 @ 00:00) (80 - 101)  BP: 105/68 (20 @ 00:00) (88/51 - 127/71)  ABP: --  ABP(mean): --  RR: 19 (20 @ 00:00) (16 - 24)  SpO2: 100% (20 @ 00:00) (100% - 100%)      20 @ 07:01  -  20 @ 07:00  --------------------------------------------------------  IN:    Insulin: 19 mL    IV PiggyBack: 150 mL    IV PiggyBack: 550 mL    Lactated Ringers: 600 mL    Oral Fluid: 300 mL  Total IN: 1619 mL    OUT:    Voided (mL): 2550 mL  Total OUT: 2550 mL    Total NET: -931 mL      20 @ 07:01  -  20 @ 01:25  --------------------------------------------------------  IN:    IV PiggyBack: 400 mL    Oral Fluid: 200 mL  Total IN: 600 mL    OUT:    Voided (mL): 1550 mL  Total OUT: 1550 mL    Total NET: -950 mL        DIET/FLUIDS: ascorbic acid 500 milliGRAM(s) Oral two times a day  magnesium sulfate  IVPB 1 Gram(s) IV Intermittent once  multivitamin/minerals 1 Tablet(s) Oral daily  sodium phosphate IVPB 30 milliMole(s) IV Intermittent once       GI proph:  pantoprazole    Tablet 40 milliGRAM(s) Oral before breakfast    AC/ proph: heparin   Injectable 5000 Unit(s) SubCutaneous every 8 hours    ABx: linezolid  IVPB 600 milliGRAM(s) IV Intermittent <User Schedule>  piperacillin/tazobactam IVPB.. 4.5 Gram(s) IV Intermittent every 6 hours      GEN: NAD,   HEENT: NC/AT  CHEST: Symmetric chest wall expansion. low grade tachycardic  ABD: S/D, mildly tender. Erythema improving on R flank, induration decreasing.    LABS  Labs:  CAPILLARY BLOOD GLUCOSE      POCT Blood Glucose.: 160 mg/dL (24 Sep 2020 21:09)  POCT Blood Glucose.: 162 mg/dL (24 Sep 2020 17:35)  POCT Blood Glucose.: 146 mg/dL (24 Sep 2020 11:31)  POCT Blood Glucose.: 160 mg/dL (24 Sep 2020 08:06)  POCT Blood Glucose.: 122 mg/dL (24 Sep 2020 06:30)                          8.6    17.45 )-----------( 348      ( 25 Sep 2020 00:10 )             26.7             129<L>  |  96<L>  |  7<L>  ----------------------------<  135<H>  3.9   |  26  |  <0.5<L>      Calcium, Total Serum: 6.9 mg/dL (20 @ 00:10)      LFTs:     Lactate, Blood: 1.0 mmol/L (20 @ 03:30)      Coags:     14.70  ----< 1.28    ( 23 Sep 2020 23:21 )     33.8            Serum Pro-Brain Natriuretic Peptide: 1902 pg/mL (20 @ 00:00)  Serum Pro-Brain Natriuretic Peptide: 2013 pg/mL (20 @ 08:19)  Serum Pro-Brain Natriuretic Peptide: 1595 pg/mL (20 @ 23:50)  Serum Pro-Brain Natriuretic Peptide: 1557 pg/mL (20 @ 00:00)  Serum Pro-Brain Natriuretic Peptide: 4550 pg/mL (20 @ 23:30)      Urinalysis Basic - ( 23 Sep 2020 03:15 )    Color: Colorless / Appearance: Clear / S.011 / pH: x  Gluc: x / Ketone: Negative  / Bili: Negative / Urobili: <2 mg/dL   Blood: x / Protein: Negative / Nitrite: Negative   Leuk Esterase: Negative / RBC: x / WBC x   Sq Epi: x / Non Sq Epi: x / Bacteria: x        Culture - Tissue with Gram Stain (collected 22 Sep 2020 23:40)  Source: .Tissue None  Gram Stain (24 Sep 2020 04:12):    Few polymorphonuclear leukocytes seen per low power field    No organisms seen per oil power field  Preliminary Report (24 Sep 2020 22:52):    Rare Klebsiella pneumoniae    Culture - Tissue with Gram Stain (collected 22 Sep 2020 23:40)  Source: .Tissue None  Gram Stain (24 Sep 2020 02:09):    Rare polymorphonuclear leukocytes seen per low power field    No organisms seen per oil power field  Preliminary Report (24 Sep 2020 21:45):    No growth          RADIOLOGY & ADDITIONAL TESTS:           GENERAL SURGERY PROGRESS NOTE     GLORIA SCHAFER  [address] [place] [address] day :11d  POD:  Procedure: Incision and drainage, wound, torso    Insertion, arterial line, percutaneous    Washing out stomach contents    Small bowel resection with anastomosis    Lysis of intestinal adhesions    Diagnostic laparoscopy      Surgical Attending: Mariann Rubio  Overnight events: Low grade tachycardic to 100-102. Afebrile. Improving clinically. Dressing changed today.  WBC increased to 17.4. Mg and Phos repleted.    T(F): 98.1 (20 @ 00:00), Max: 98.3 (20 @ 20:00)  HR: 100 (20 @ 00:00) (80 - 101)  BP: 105/68 (20 @ 00:00) (88/51 - 127/71)  ABP: --  ABP(mean): --  RR: 19 (20 @ 00:00) (16 - 24)  SpO2: 100% (20 @ 00:00) (100% - 100%)      20 @ 07:01  -  20 @ 07:00  --------------------------------------------------------  IN:    Insulin: 19 mL    IV PiggyBack: 150 mL    IV PiggyBack: 550 mL    Lactated Ringers: 600 mL    Oral Fluid: 300 mL  Total IN: 1619 mL    OUT:    Voided (mL): 2550 mL  Total OUT: 2550 mL    Total NET: -931 mL      20 @ 07:01  -  20 @ 01:25  --------------------------------------------------------  IN:    IV PiggyBack: 400 mL    Oral Fluid: 200 mL  Total IN: 600 mL    OUT:    Voided (mL): 1550 mL  Total OUT: 1550 mL    Total NET: -950 mL        DIET/FLUIDS: ascorbic acid 500 milliGRAM(s) Oral two times a day  magnesium sulfate  IVPB 1 Gram(s) IV Intermittent once  multivitamin/minerals 1 Tablet(s) Oral daily  sodium phosphate IVPB 30 milliMole(s) IV Intermittent once       GI proph:  pantoprazole    Tablet 40 milliGRAM(s) Oral before breakfast    AC/ proph: heparin   Injectable 5000 Unit(s) SubCutaneous every 8 hours    ABx: linezolid  IVPB 600 milliGRAM(s) IV Intermittent <User Schedule>  piperacillin/tazobactam IVPB.. 4.5 Gram(s) IV Intermittent every 6 hours      GEN: NAD,   HEENT: NC/AT  CHEST: Symmetric chest wall expansion. low grade tachycardic  ABD: S/D, mildly tender. Erythema improving on R flank, induration decreasing.    LABS  Labs:  CAPILLARY BLOOD GLUCOSE      POCT Blood Glucose.: 160 mg/dL (24 Sep 2020 21:09)  POCT Blood Glucose.: 162 mg/dL (24 Sep 2020 17:35)  POCT Blood Glucose.: 146 mg/dL (24 Sep 2020 11:31)  POCT Blood Glucose.: 160 mg/dL (24 Sep 2020 08:06)  POCT Blood Glucose.: 122 mg/dL (24 Sep 2020 06:30)                          8.6    17.45 )-----------( 348      ( 25 Sep 2020 00:10 )             26.7             129<L>  |  96<L>  |  7<L>  ----------------------------<  135<H>  3.9   |  26  |  <0.5<L>      Calcium, Total Serum: 6.9 mg/dL (20 @ 00:10)      LFTs:     Lactate, Blood: 1.0 mmol/L (20 @ 03:30)      Coags:     14.70  ----< 1.28    ( 23 Sep 2020 23:21 )     33.8            Serum Pro-Brain Natriuretic Peptide: 1902 pg/mL (20 @ 00:00)  Serum Pro-Brain Natriuretic Peptide: 2013 pg/mL (20 @ 08:19)  Serum Pro-Brain Natriuretic Peptide: 1595 pg/mL (20 @ 23:50)  Serum Pro-Brain Natriuretic Peptide: 1557 pg/mL (20 @ 00:00)  Serum Pro-Brain Natriuretic Peptide: 4550 pg/mL (20 @ 23:30)      Urinalysis Basic - ( 23 Sep 2020 03:15 )    Color: Colorless / Appearance: Clear / S.011 / pH: x  Gluc: x / Ketone: Negative  / Bili: Negative / Urobili: <2 mg/dL   Blood: x / Protein: Negative / Nitrite: Negative   Leuk Esterase: Negative / RBC: x / WBC x   Sq Epi: x / Non Sq Epi: x / Bacteria: x        Culture - Tissue with Gram Stain (collected 22 Sep 2020 23:40)  Source: .Tissue None  Gram Stain (24 Sep 2020 04:12):    Few polymorphonuclear leukocytes seen per low power field    No organisms seen per oil power field  Preliminary Report (24 Sep 2020 22:52):    Rare Klebsiella pneumoniae    Culture - Tissue with Gram Stain (collected 22 Sep 2020 23:40)  Source: .Tissue None  Gram Stain (24 Sep 2020 02:09):    Rare polymorphonuclear leukocytes seen per low power field    No organisms seen per oil power field  Preliminary Report (24 Sep 2020 21:45):    No growth          RADIOLOGY & ADDITIONAL TESTS:

## 2020-09-25 NOTE — PROGRESS NOTE ADULT - ASSESSMENT
46 y/o no Pmhx s/p PAULIE/BS on 8/19 for fibroid uterus POD#34, s/p diagnostic laparoscopy converted to exploratory laparotomy and small bowel reanastomosis, with breakdown of staple line of proximal limb of anastomosis, s/p repeat exploratory laparotomy with small bowel resection, reanastomosis and appendectomy, POD#7 tachycardic and hypotensive postop, now off pressors, admitted to SICU for close hemodynamic monitoring, now with leukocytosis r/o infection.     NEURO:    AAOx3      PO tylenol, oxycodone prn, Dilaudid prn, toradol prn     RESP:     O2 sat >95% on RA    ABG PRN    AM CXR stable, f/u final read    CT Chest (PE protocol) 9/17- negative for PE; small-moderate b/l pleural effusions with compressive atelectasis      CARDS:     Hypotension Resolved- Off all pressors    Acute tachycardia - now in the 110s, cont metoprolol 25mg BID.  HR 78-83 overnight     CE neg, 0.04, 0.16, 0.07 - no longer  trending     ECHO: EF 50-55%, G1DD, mild MR, mild TR.    GI/NUTR:     9/20 CT Abd/pelvis with PO and IV - abscess in lower abd/pelvis- IR not draining 2/2 location     CT A/P (9/17) - negative for leak; postsurgical changes with post-op ileus    Diet: soft mechanical     Hepatic panel 9/18: WNL    IVL    GI ppx: PP      /RENAL:     Silva  out, voiding     BUN/Cr trend 9/<0.5    Replete lytes prn, K 3.3, mag 1.8, phos 3.2     Lactate 1.3 (max 6.5) (no longer trending)    HEME/ONC:     Hgb 7.6    Transfused 1 uPRBC (split 1/2 and 1/2 on 9/18)    Plt 230    INR 1.28    HIT antibody: negative    DVT Prophylaxis: HSQ, SCDs    ID:    Afebrile    WBC 14.64 <-- 15.87 <--16.63 <-- 13.76 <--9.13     -ID following: recommend imaging, start Zosyn 4.5 gm iv q6h over 4hr, Zyvox 600 mg iv q12h    9/22 repeat bcx ordered    BCx 9/17- (NGTD)     ENDO:    Insulin drip held at 2am     FS ACqHS, ISS    Solucortef 50 q6, d/c'd 9/20    LINES/DRAINS:  PIV , [place], central line    DVT Prophylaxis: heparin   Injectable 5000 Unit(s) SubCutaneous every 8 hours  GI Prophylaxis:pantoprazole  Injectable 40 milliGRAM(s) IV Push daily    ***Tubes/Lines/Drains  ***  Peripheral IV  R. Basilic Midline                      Date: 9/17  R central IJ      46 y/o no Pmhx s/p PAULIE/BS on 8/19 for fibroid uterus POD#37, s/p diagnostic laparoscopy converted to exploratory laparotomy and small bowel reanastomosis, with breakdown of staple line of proximal limb of anastomosis, s/p repeat exploratory laparotomy with small bowel resection, reanastomosis and appendectomy, POD#10 tachycardic and hypotensive postop, now off pressors, admitted to SICU for close hemodynamic monitoring, s/p I&D for cellulitis, now POD#3.       NEURO:    AAOx3      PO tylenol, oxycodone prn, Dilaudid prn, toradol prn - pain better controlled today compared to yesterday    RESP:     O2 sat >95% on RA    ABG PRN    f/u AM CXR    CT Chest (PE protocol) 9/17- negative for PE; small-moderate b/l pleural effusions with compressive atelectasis      CARDS:     Hypotension Resolved- Off all pressors    Acute tachycardia - resolved, currently in the 90s, cont metoprolol 25mg BID.     CE neg, 0.04, 0.16, 0.07 - no longer  trending     ECHO: EF 50-55%, G1DD, mild MR, mild TR.    GI/NUTR:     9/20 CT Abd/pelvis with PO and IV - abscess in lower abd/pelvis- IR not draining 2/2 location     CT A/P (9/17) - negative for leak; postsurgical changes with post-op ileus    Diet: soft mechanical, tolerating, +BM/+flatus    Hepatic panel 9/18: WNL    IVL    GI ppx: PPI      /RENAL:     Silva  out, voiding     BUN/Cr trend 9/<0.5    Replete lytes prn, K 3.3, mag 1.8, phos 3.2     Lactate 1.3 (max 6.5) (no longer trending)    HEME/ONC:     Hgb 7.6    Transfused 1 uPRBC (split 1/2 and 1/2 on 9/18)    Plt 230    INR 1.28    HIT antibody: negative    DVT Prophylaxis: HSQ, SCDs    ID:    Afebrile    WBC 14.64 <-- 15.87 <--16.63 <-- 13.76 <--9.13     -ID following: recommend imaging, start Zosyn 4.5 gm iv q6h over 4hr, Zyvox 600 mg iv q12h    9/22 repeat bcx ordered    BCx 9/17- (NGTD)     ENDO:    Insulin drip held at 2am     FS ACqHS, ISS    Solucortef 50 q6, d/c'd 9/20    LINES/DRAINS:  PIV , [place], central line    DVT Prophylaxis: heparin   Injectable 5000 Unit(s) SubCutaneous every 8 hours  GI Prophylaxis:pantoprazole  Injectable 40 milliGRAM(s) IV Push daily    ***Tubes/Lines/Drains  ***  Peripheral IV  R. Basilic Midline                      Date: 9/17  R central IJ      44 y/o no Pmhx s/p PAULIE/BS on 8/19 for fibroid uterus POD#37, s/p diagnostic laparoscopy converted to exploratory laparotomy and small bowel reanastomosis, with breakdown of staple line of proximal limb of anastomosis, s/p repeat exploratory laparotomy with small bowel resection, reanastomosis and appendectomy, POD#10 tachycardic and hypotensive postop, now off pressors, admitted to SICU for close hemodynamic monitoring, s/p I&D for cellulitis, now POD#3.       NEURO:    AAOx3      PO tylenol, oxycodone prn, Dilaudid prn, toradol prn - pain better controlled today compared to yesterday    RESP:     O2 sat >95% on RA    ABG PRN    f/u AM CXR    CT Chest (PE protocol) 9/17- negative for PE; small-moderate b/l pleural effusions with compressive atelectasis      CARDS:     Hypotension Resolved- Off all pressors    Acute tachycardia - resolved, currently in the 90s, cont metoprolol 25mg BID.     CE neg, 0.04, 0.16, 0.07 - no longer  trending     ECHO: EF 50-55%, G1DD, mild MR, mild TR.    GI/NUTR:     9/20 CT Abd/pelvis with PO and IV - abscess in lower abd/pelvis- IR not draining 2/2 location     CT A/P (9/17) - negative for leak; postsurgical changes with post-op ileus    Diet: soft mechanical, tolerating, +BM/+flatus    Hepatic panel 9/18: WNL    IVL    GI ppx: PPI      /RENAL:     Silva  out, voiding     BUN/Cr trend 9/<0.5    Replete lytes prn, K 3.3, mag 1.8, phos 3.2     Lactate 1.3 (max 6.5) (no longer trending)    HEME/ONC:     Hgb 8.6 <-- 7.6    Transfused 1 uPRBC (split 1/2 and 1/2 on 9/18)    Plt 230    INR 1.28    HIT antibody: negative    DVT Prophylaxis: HSQ, SCDs    ID:    Afebrile    WBC 17.45 <-- 14.64 <-- 15.87 <--16.63 <-- 13.76 <--9.13     -ID following: start Zosyn 4.5 gm iv q6h over 4hr, Zyvox 600 mg iv q12h    9/22 repeat bcx ordered    BCx 9/17- (NGTD)     ENDO:    FS ACqHS, tightened ISS    Solucortef 50 q6, d/c'd 9/20    LINES/DRAINS:  PIV , [place], central line    DVT Prophylaxis: heparin   Injectable 5000 Unit(s) SubCutaneous every 8 hours  GI Prophylaxis:pantoprazole  Injectable 40 milliGRAM(s) IV Push daily    ***Tubes/Lines/Drains  ***  Peripheral IV  R. Basilic Midline                      Date: 9/17  R central IJ   d/w Dr. Patel.

## 2020-09-25 NOTE — CHART NOTE - NSCHARTNOTEFT_GEN_A_CORE
Afebrile  Pain improved  On PO diet and tolerating with improved intake    T(F): 98.3 (20 @ 08:39), Max: 98.3 (20 @ 20:00)  HR: 95 (20 @ 11:00) (80 - 110)  BP: 94/58 (20 @ 11:00) (88/51 - 140/65)  RR: 16 (20 @ 11:00) (16 - 19)  SpO2: 98% (20 @ 11:00) (98% - 100%)    I&O's Detail    24 Sep 2020 07:  -  25 Sep 2020 07:00  --------------------------------------------------------  IN:    IV PiggyBack: 500 mL    Oral Fluid: 200 mL  Total IN: 700 mL    OUT:    Voided (mL): 2400 mL  Total OUT: 2400 mL    Total NET: -1700 mL    25 Sep 2020 07:01  -  25 Sep 2020 14:58  --------------------------------------------------------  IN:  Total IN: 0 mL    OUT:    Voided (mL): 600 mL  Total OUT: 600 mL    Total NET: -600 mL        129<L>  |  94<L>  |  x   ----------------------------<  x   3.6   |  x   |  x     Ca    6.9<L>      25 Sep 2020 00:10  Phos  3.0       Mg     1.8                             8.6    17.45 )-----------( 348      ( 25 Sep 2020 00:10 )             26.7     CAPILLARY BLOOD GLUCOSE  POCT Blood Glucose.: 117 mg/dL (25 Sep 2020 12:04)  POCT Blood Glucose.: 216 mg/dL (25 Sep 2020 08:05)  POCT Blood Glucose.: 160 mg/dL (24 Sep 2020 21:09)  POCT Blood Glucose.: 162 mg/dL (24 Sep 2020 17:35)    Daily Weight in k.4 (25 Sep 2020 06:00)  Diet, Mechanical Soft:   Consistent Carbohydrate {Evening Snack}  Supplement Feeding Modality:  Oral  Ensure Enlive Cans or Servings Per Day:  1       Frequency:  Two Times a day (20 @ 17:56)    ASSESSMENT  45y Female s/p small bowel resection with primary anastomosis 9/15/20, taken back to the OR for washing out stomach contents, small bowel resection with anastomosis, lysis of intestinal adhesions (staple line of proximal limb of anastomosis break down with gross spillage    - feculent peritonitis/sepsis  - severe protein calorie malnutrition   - hyperglycemia  - hypophosphatemia  - hypomagnesemia    ******Please be aware that pt has had a very poor PO intake over the past month and is severely malnourished with high risk for delayed wound healing    PLAN  - cont PO diet as tolerated  - cont Ensure Enlive 240ml PO q12hrs  - add MVI with minerals 1 tab PO q24hrs, 500mg vitamin C PO q12hrs  - check kashif cts X 2 days  - monitor BMP, In phos, Mg  - check vitamin D, zinc levels Afebrile, alert, OOB in chair this morning  Pain improved  On PO diet and tolerating with improved intake    T(F): 98.3 (20 @ 08:39), Max: 98.3 (20 @ 20:00)  HR: 95 (20 @ 11:00) (80 - 110)  BP: 94/58 (20 @ 11:00) (88/51 - 140/65)  RR: 16 (20 @ 11:00) (16 - 19)  SpO2: 98% (20 @ 11:00) (98% - 100%)    I&O's Detail    24 Sep 2020 07:01  -  25 Sep 2020 07:00  --------------------------------------------------------  IN:    IV PiggyBack: 500 mL    Oral Fluid: 200 mL  Total IN: 700 mL    OUT:    Voided (mL): 2400 mL  Total OUT: 2400 mL    Total NET: -1700 mL    25 Sep 2020 07:01  -  25 Sep 2020 14:58  --------------------------------------------------------  IN:  Total IN: 0 mL - incomplete documentation    OUT:    Voided (mL): 600 mL  Total OUT: 600 mL    Total NET: -600 mL        129<L>  |  94<L>  |  x   ----------------------------<  x   3.6   |  x   |  x     Ca    6.9<L>      25 Sep 2020 00:10  Phos  3.0       Mg     1.8                             8.6    17.45 )-----------( 348      ( 25 Sep 2020 00:10 )             26.7     CAPILLARY BLOOD GLUCOSE  POCT Blood Glucose.: 117 mg/dL (25 Sep 2020 12:04)  POCT Blood Glucose.: 216 mg/dL (25 Sep 2020 08:05)  POCT Blood Glucose.: 160 mg/dL (24 Sep 2020 21:09)  POCT Blood Glucose.: 162 mg/dL (24 Sep 2020 17:35)    Daily Weight in k.4 (25 Sep 2020 06:00)  Diet, Mechanical Soft:   Consistent Carbohydrate {Evening Snack}  Supplement Feeding Modality:  Oral  Ensure Enlive Cans or Servings Per Day:  1       Frequency:  Two Times a day (20 @ 17:56)    ASSESSMENT  45y Female s/p small bowel resection with primary anastomosis 9/15/20, taken back to the OR for washing out stomach contents, small bowel resection with anastomosis, lysis of intestinal adhesions (staple line of proximal limb of anastomosis break down with gross spillage    - feculent peritonitis/sepsis  - severe protein calorie malnutrition   - hyperglycemia  - hypophosphatemia  - hypomagnesemia    ******Please be aware that pt has had a very poor PO intake over the past month and is severely malnourished with high risk for delayed wound healing    PLAN  - cont PO diet as tolerated - calorie counts to assess adequacy?  - cont Ensure Enlive 240ml PO q12hrs  - add MVI with minerals 1 tab PO q24hrs, 500mg vitamin C PO q12hrs  - check kashif cts X 2 days  - monitor BMP, In phos, Mg  - check vitamin D, zinc levels  d/w surgical ICU team

## 2020-09-25 NOTE — PROGRESS NOTE ADULT - SUBJECTIVE AND OBJECTIVE BOX
GLORIA SCHAFER  45y, Female    All available historical data reviewed    OVERNIGHT EVENTS:  no fevers  pain right flank minimal at best    ROS:  General: Denies rigors, nightsweats  HEENT: Denies headache, rhinorrhea, sore throat, eye pain  CV: Denies CP, palpitations  PULM: Denies wheezing, hemoptysis  GI: Denies hematemesis, hematochezia, melena  : Denies discharge, hematuria  MSK: Denies arthralgias, myalgias  SKIN: Denies rash, lesions  NEURO: + weakness  PSYCH: + anxiety    VITALS:  T(F): 98.3, Max: 98.3 (09-24-20 @ 20:00)  HR: 95  BP: 94/58  RR: 16Vital Signs Last 24 Hrs  T(C): 36.8 (25 Sep 2020 08:39), Max: 36.8 (24 Sep 2020 20:00)  T(F): 98.3 (25 Sep 2020 08:39), Max: 98.3 (24 Sep 2020 20:00)  HR: 95 (25 Sep 2020 11:00) (80 - 110)  BP: 94/58 (25 Sep 2020 11:00) (88/51 - 140/65)  BP(mean): 71 (25 Sep 2020 11:00) (64 - 93)  RR: 16 (25 Sep 2020 11:00) (16 - 19)  SpO2: 98% (25 Sep 2020 11:00) (98% - 100%)    TESTS & MEASUREMENTS:                        8.6    17.45 )-----------( 348      ( 25 Sep 2020 00:10 )             26.7     09-25    129<L>  |  96<L>  |  7<L>  ----------------------------<  135<H>  3.9   |  26  |  <0.5<L>    Ca    6.9<L>      25 Sep 2020 00:10  Phos  2.6     09-25  Mg     1.8     09-25          Culture - Tissue with Gram Stain (collected 09-22-20 @ 23:40)  Source: .Tissue None  Gram Stain (09-24-20 @ 04:12):    Few polymorphonuclear leukocytes seen per low power field    No organisms seen per oil power field  Preliminary Report (09-24-20 @ 22:52):    Rare Klebsiella pneumoniae    Culture - Tissue with Gram Stain (collected 09-22-20 @ 23:40)  Source: .Tissue None  Gram Stain (09-24-20 @ 02:09):    Rare polymorphonuclear leukocytes seen per low power field    No organisms seen per oil power field  Preliminary Report (09-24-20 @ 21:45):    No growth            RADIOLOGY & ADDITIONAL TESTS:  Personal review of radiological diagnostics performed  Echo and EKG results noted when applicable.     MEDICATIONS:  acetaminophen   Tablet .. 650 milliGRAM(s) Oral every 6 hours  ascorbic acid 500 milliGRAM(s) Oral two times a day  chlorhexidine 4% Liquid 1 Application(s) Topical daily  chlorhexidine 4% Liquid 1 Application(s) Topical daily  furosemide    Tablet 20 milliGRAM(s) Oral daily  heparin   Injectable 5000 Unit(s) SubCutaneous every 8 hours  insulin regular  human corrective regimen sliding scale   IV Push Before meals and at bedtime  linezolid  IVPB 600 milliGRAM(s) IV Intermittent <User Schedule>  metoprolol tartrate 25 milliGRAM(s) Oral every 12 hours  multivitamin/minerals 1 Tablet(s) Oral daily  oxyCODONE    IR 10 milliGRAM(s) Oral every 6 hours PRN  oxyCODONE    IR 5 milliGRAM(s) Oral every 4 hours PRN  pantoprazole    Tablet 40 milliGRAM(s) Oral before breakfast  piperacillin/tazobactam IVPB.. 4.5 Gram(s) IV Intermittent every 6 hours  senna 2 Tablet(s) Oral at bedtime      ANTIBIOTICS:  linezolid  IVPB 600 milliGRAM(s) IV Intermittent <User Schedule>  piperacillin/tazobactam IVPB.. 4.5 Gram(s) IV Intermittent every 6 hours

## 2020-09-26 LAB
ANION GAP SERPL CALC-SCNC: 2 MMOL/L — LOW (ref 7–14)
APTT BLD: 36.9 SEC — SIGNIFICANT CHANGE UP (ref 27–39.2)
BUN SERPL-MCNC: 4 MG/DL — LOW (ref 10–20)
CALCIUM SERPL-MCNC: 7.3 MG/DL — LOW (ref 8.5–10.1)
CHLORIDE SERPL-SCNC: 98 MMOL/L — SIGNIFICANT CHANGE UP (ref 98–110)
CO2 SERPL-SCNC: 28 MMOL/L — SIGNIFICANT CHANGE UP (ref 17–32)
CREAT SERPL-MCNC: <0.5 MG/DL — LOW (ref 0.7–1.5)
GLUCOSE BLDC GLUCOMTR-MCNC: 119 MG/DL — HIGH (ref 70–99)
GLUCOSE BLDC GLUCOMTR-MCNC: 139 MG/DL — HIGH (ref 70–99)
GLUCOSE BLDC GLUCOMTR-MCNC: 144 MG/DL — HIGH (ref 70–99)
GLUCOSE BLDC GLUCOMTR-MCNC: 203 MG/DL — HIGH (ref 70–99)
GLUCOSE BLDC GLUCOMTR-MCNC: 95 MG/DL — SIGNIFICANT CHANGE UP (ref 70–99)
GLUCOSE SERPL-MCNC: 100 MG/DL — HIGH (ref 70–99)
HCT VFR BLD CALC: 24.6 % — LOW (ref 37–47)
HCT VFR BLD CALC: 24.9 % — LOW (ref 37–47)
HGB BLD-MCNC: 7.8 G/DL — LOW (ref 12–16)
HGB BLD-MCNC: 8 G/DL — LOW (ref 12–16)
INR BLD: 1.19 RATIO — SIGNIFICANT CHANGE UP (ref 0.65–1.3)
MAGNESIUM SERPL-MCNC: 2.2 MG/DL — SIGNIFICANT CHANGE UP (ref 1.8–2.4)
MCHC RBC-ENTMCNC: 27 PG — SIGNIFICANT CHANGE UP (ref 27–31)
MCHC RBC-ENTMCNC: 27 PG — SIGNIFICANT CHANGE UP (ref 27–31)
MCHC RBC-ENTMCNC: 31.7 G/DL — LOW (ref 32–37)
MCHC RBC-ENTMCNC: 32.1 G/DL — SIGNIFICANT CHANGE UP (ref 32–37)
MCV RBC AUTO: 84.1 FL — SIGNIFICANT CHANGE UP (ref 81–99)
MCV RBC AUTO: 85.1 FL — SIGNIFICANT CHANGE UP (ref 81–99)
NRBC # BLD: 0 /100 WBCS — SIGNIFICANT CHANGE UP (ref 0–0)
NRBC # BLD: 0 /100 WBCS — SIGNIFICANT CHANGE UP (ref 0–0)
PHOSPHATE SERPL-MCNC: 2.9 MG/DL — SIGNIFICANT CHANGE UP (ref 2.1–4.9)
PLATELET # BLD AUTO: 464 K/UL — HIGH (ref 130–400)
PLATELET # BLD AUTO: 557 K/UL — HIGH (ref 130–400)
POTASSIUM SERPL-MCNC: 4.6 MMOL/L — SIGNIFICANT CHANGE UP (ref 3.5–5)
POTASSIUM SERPL-SCNC: 4.6 MMOL/L — SIGNIFICANT CHANGE UP (ref 3.5–5)
PROTHROM AB SERPL-ACNC: 13.7 SEC — HIGH (ref 9.95–12.87)
RBC # BLD: 2.89 M/UL — LOW (ref 4.2–5.4)
RBC # BLD: 2.96 M/UL — LOW (ref 4.2–5.4)
RBC # FLD: 15.9 % — HIGH (ref 11.5–14.5)
RBC # FLD: 15.9 % — HIGH (ref 11.5–14.5)
SODIUM SERPL-SCNC: 128 MMOL/L — LOW (ref 135–146)
SODIUM UR-SCNC: 115 MMOL/L — SIGNIFICANT CHANGE UP
TRIGL SERPL-MCNC: 149 MG/DL — SIGNIFICANT CHANGE UP (ref 10–149)
WBC # BLD: 16.33 K/UL — HIGH (ref 4.8–10.8)
WBC # BLD: 19.67 K/UL — HIGH (ref 4.8–10.8)
WBC # FLD AUTO: 16.33 K/UL — HIGH (ref 4.8–10.8)
WBC # FLD AUTO: 19.67 K/UL — HIGH (ref 4.8–10.8)

## 2020-09-26 PROCEDURE — 71260 CT THORAX DX C+: CPT | Mod: 26

## 2020-09-26 PROCEDURE — 99291 CRITICAL CARE FIRST HOUR: CPT | Mod: 24

## 2020-09-26 PROCEDURE — 71045 X-RAY EXAM CHEST 1 VIEW: CPT | Mod: 26

## 2020-09-26 PROCEDURE — 99024 POSTOP FOLLOW-UP VISIT: CPT

## 2020-09-26 PROCEDURE — 74177 CT ABD & PELVIS W/CONTRAST: CPT | Mod: 26

## 2020-09-26 RX ORDER — ACETAMINOPHEN 500 MG
650 TABLET ORAL EVERY 6 HOURS
Refills: 0 | Status: DISCONTINUED | OUTPATIENT
Start: 2020-09-26 | End: 2020-09-28

## 2020-09-26 RX ORDER — ONDANSETRON 8 MG/1
4 TABLET, FILM COATED ORAL ONCE
Refills: 0 | Status: COMPLETED | OUTPATIENT
Start: 2020-09-26 | End: 2020-09-26

## 2020-09-26 RX ORDER — IOHEXOL 300 MG/ML
30 INJECTION, SOLUTION INTRAVENOUS ONCE
Refills: 0 | Status: COMPLETED | OUTPATIENT
Start: 2020-09-26 | End: 2020-09-26

## 2020-09-26 RX ORDER — KETOROLAC TROMETHAMINE 30 MG/ML
30 SYRINGE (ML) INJECTION EVERY 8 HOURS
Refills: 0 | Status: DISCONTINUED | OUTPATIENT
Start: 2020-09-26 | End: 2020-09-30

## 2020-09-26 RX ORDER — SODIUM CHLORIDE 9 MG/ML
1000 INJECTION INTRAMUSCULAR; INTRAVENOUS; SUBCUTANEOUS
Refills: 0 | Status: DISCONTINUED | OUTPATIENT
Start: 2020-09-26 | End: 2020-10-09

## 2020-09-26 RX ADMIN — OXYCODONE HYDROCHLORIDE 10 MILLIGRAM(S): 5 TABLET ORAL at 11:50

## 2020-09-26 RX ADMIN — PANTOPRAZOLE SODIUM 40 MILLIGRAM(S): 20 TABLET, DELAYED RELEASE ORAL at 05:23

## 2020-09-26 RX ADMIN — IOHEXOL 30 MILLILITER(S): 300 INJECTION, SOLUTION INTRAVENOUS at 12:00

## 2020-09-26 RX ADMIN — Medication 25 MILLIGRAM(S): at 05:23

## 2020-09-26 RX ADMIN — ONDANSETRON 4 MILLIGRAM(S): 8 TABLET, FILM COATED ORAL at 10:10

## 2020-09-26 RX ADMIN — PIPERACILLIN AND TAZOBACTAM 25 GRAM(S): 4; .5 INJECTION, POWDER, LYOPHILIZED, FOR SOLUTION INTRAVENOUS at 18:32

## 2020-09-26 RX ADMIN — Medication 30 MILLIGRAM(S): at 07:44

## 2020-09-26 RX ADMIN — Medication 1 TABLET(S): at 11:50

## 2020-09-26 RX ADMIN — LINEZOLID 300 MILLIGRAM(S): 600 INJECTION, SOLUTION INTRAVENOUS at 23:42

## 2020-09-26 RX ADMIN — Medication 30 MILLIGRAM(S): at 08:44

## 2020-09-26 RX ADMIN — PIPERACILLIN AND TAZOBACTAM 25 GRAM(S): 4; .5 INJECTION, POWDER, LYOPHILIZED, FOR SOLUTION INTRAVENOUS at 23:41

## 2020-09-26 RX ADMIN — Medication 500 MILLIGRAM(S): at 18:32

## 2020-09-26 RX ADMIN — HEPARIN SODIUM 5000 UNIT(S): 5000 INJECTION INTRAVENOUS; SUBCUTANEOUS at 14:45

## 2020-09-26 RX ADMIN — ONDANSETRON 4 MILLIGRAM(S): 8 TABLET, FILM COATED ORAL at 06:26

## 2020-09-26 RX ADMIN — LINEZOLID 300 MILLIGRAM(S): 600 INJECTION, SOLUTION INTRAVENOUS at 12:00

## 2020-09-26 RX ADMIN — OXYCODONE HYDROCHLORIDE 10 MILLIGRAM(S): 5 TABLET ORAL at 00:30

## 2020-09-26 RX ADMIN — PIPERACILLIN AND TAZOBACTAM 25 GRAM(S): 4; .5 INJECTION, POWDER, LYOPHILIZED, FOR SOLUTION INTRAVENOUS at 05:23

## 2020-09-26 RX ADMIN — Medication 25 MILLIGRAM(S): at 18:32

## 2020-09-26 RX ADMIN — CHLORHEXIDINE GLUCONATE 1 APPLICATION(S): 213 SOLUTION TOPICAL at 13:00

## 2020-09-26 RX ADMIN — HEPARIN SODIUM 5000 UNIT(S): 5000 INJECTION INTRAVENOUS; SUBCUTANEOUS at 05:23

## 2020-09-26 RX ADMIN — Medication 30 MILLIGRAM(S): at 15:37

## 2020-09-26 RX ADMIN — Medication 85 MILLIMOLE(S): at 04:03

## 2020-09-26 RX ADMIN — ONDANSETRON 4 MILLIGRAM(S): 8 TABLET, FILM COATED ORAL at 18:57

## 2020-09-26 RX ADMIN — OXYCODONE HYDROCHLORIDE 10 MILLIGRAM(S): 5 TABLET ORAL at 21:29

## 2020-09-26 RX ADMIN — PIPERACILLIN AND TAZOBACTAM 25 GRAM(S): 4; .5 INJECTION, POWDER, LYOPHILIZED, FOR SOLUTION INTRAVENOUS at 11:50

## 2020-09-26 RX ADMIN — OXYCODONE HYDROCHLORIDE 10 MILLIGRAM(S): 5 TABLET ORAL at 20:59

## 2020-09-26 RX ADMIN — Medication 20 MILLIGRAM(S): at 05:23

## 2020-09-26 RX ADMIN — HEPARIN SODIUM 5000 UNIT(S): 5000 INJECTION INTRAVENOUS; SUBCUTANEOUS at 21:44

## 2020-09-26 RX ADMIN — Medication 500 MILLIGRAM(S): at 05:23

## 2020-09-26 NOTE — PROGRESS NOTE ADULT - ATTENDING COMMENTS
Pain control better today.  Afebrile.  WBC 19 today.  Tolerates po diet.    ASSESSMENT:  46 y/o female with SBO.  S/P Ex. Lap.  Perforated viscus.  Peritonitis. Abdominal sepsis.  Acute respiratory failure.  Acute postoperative pain.  Abdominal wall cellulitis.  Moderate malnutrition.  Hyponatremia.    PLAN:  - continue current pain management  - incentive spirometer  - well perfused and euvolemic  - Leukocytosis: get CT A/P with po and iv contrast  - continue antibiotics  - ID f/u  - remove CVC  - DVT prophylaxis

## 2020-09-26 NOTE — PROGRESS NOTE ADULT - SUBJECTIVE AND OBJECTIVE BOX
GLORIA SCHAFER  [id number]  45y Female    Indication for ICU admission: intraabdominal sepsis, s/p diagnostic lap, NARCISO, SBR (43cm ileum) w/ primary anastamosis (side to side)  Admit Date:20  ICU Date: 9/15/2020  OR Date: 9/15/2020 x2    No Known Allergies    PAST MEDICAL & SURGICAL HISTORY:  Uterine fibroid    Status post bilateral salpingectomy    S/P PAULIE (total abdominal hysterectomy)      Home Medications:  ibuprofen 600 mg oral tablet: 1 tab(s) orally every 6 hours, As needed, Mild Pain (1 - 3) (01 Sep 2020 06:26)    24 HOUR EVENTS: No acute events overnight, WBC uptrending 17.4>19.6), Hyponatremia 128, started on 50cc/hr NS. 30NaPhos.    NEURO:    AAOx3      PO tylenol, oxycodone prn, Dilaudid prn, toradol prn     RESP:     O2 sat >95% on RA    ABG PRN    AM CXR stable    CT Chest (PE protocol) - negative for PE; small-moderate b/l pleural effusions with     compressive atelectasis      CARDS:     Hypotension Resolved- Off all pressors    Acute tachycardia - now in the 110s, cont metoprolol 25mg BID     CE neg, 0.04, 0.16, 0.07 - no longer  trending     ECHO: EF 50-55%, G1DD, mild MR, mild TR.    GI/NUTR:     -s/p I&D of wound cellulitis ---no signs of STI, packed with betadine and Kerlex    - CT Abd/pelvis with PO and IV - abscess in lower abd/pelvis- IR not draining 2/2      location     -CT A/P () - negative for leak; postsurgical changes with post-op ileus    -Diet: soft mechanical     -Hepatic panel : WNL    -GI ppx: PP    -On Senna      /RENAL:     IVL    Silva out, voiding     BUN/Cr trend 9/<0.5    Repleted 1g Mg sulfate, and 2 k ryders during day    Hyponatremia, correct Na is 131    Lactatemia resolved to 1.3 (max 6.5)    HEME/ONC:     Hgb lateral 7.6 > 8.6    Transfused 1 uPRBC (split 1/2 and 1/2 on )    Plt 230     HIT antibody: negative    DVT Prophylaxis: HSQ, SCDs    ID:    Afebrile    WBC uptrending 17 < -- 14.64 <-- 15.87 <--16.63 <-- 13.76 <--9.13     -ID following: recommend imaging, start Zosyn 4.5 gm iv q6h over 4hr, Zyvox 600 mg iv      q12h     repeat bcx ordered    BCx - (NGTD)     Tissue gram stain rare Klebsiella pneumoniae prelim      ENDO:    Off insulin gtt, on ISS    FS ACqHS    Off Solucortef 50 q6, d/c'd     LINES/DRAINS:  PIV , Jessica, central line    DVT Prophylaxis: heparin   Injectable 5000 Unit(s) SubCutaneous every 8 hours  GI Prophylaxis:pantoprazole  Injectable 40 milliGRAM(s) IV Push daily    ***Tubes/Lines/Drains  ***  Peripheral IV  R. Basilic Midline                      Date:   R central IJ     [X] A ten-point review of systems was otherwise negative except as noted above.  [  ] Due to altered mental status/intubation, subjective information was not attained from the patient. History was obtained, to the extent possible, from review of the chart and collateral sources of information.    Daily     Daily Weight in k.4 (25 Sep 2020 06:00)    Diet, Mechanical Soft:   Consistent Carbohydrate Evening Snack  Supplement Feeding Modality:  Oral  Ensure Enlive Cans or Servings Per Day:  1       Frequency:  Two Times a day (20 @ 17:56)      CURRENT MEDS:  Neurologic Medications  oxyCODONE    IR 10 milliGRAM(s) Oral every 6 hours PRN Moderate Pain (4 - 6)  oxyCODONE    IR 5 milliGRAM(s) Oral every 4 hours PRN Mild Pain (1 - 3)    Respiratory Medications    Cardiovascular Medications  furosemide    Tablet 20 milliGRAM(s) Oral daily  metoprolol tartrate 25 milliGRAM(s) Oral every 12 hours    Gastrointestinal Medications  ascorbic acid 500 milliGRAM(s) Oral two times a day  multivitamin/minerals 1 Tablet(s) Oral daily  pantoprazole    Tablet 40 milliGRAM(s) Oral before breakfast  senna 2 Tablet(s) Oral at bedtime  sodium chloride 0.9%. 1000 milliLiter(s) IV Continuous <Continuous>  sodium phosphate IVPB 30 milliMole(s) IV Intermittent once    Genitourinary Medications    Hematologic/Oncologic Medications  heparin   Injectable 5000 Unit(s) SubCutaneous every 8 hours    Antimicrobial/Immunologic Medications  linezolid  IVPB 600 milliGRAM(s) IV Intermittent <User Schedule>  piperacillin/tazobactam IVPB.. 4.5 Gram(s) IV Intermittent every 6 hours    Endocrine/Metabolic Medications  insulin regular  human corrective regimen sliding scale   IV Push Before meals and at bedtime    Topical/Other Medications  chlorhexidine 4% Liquid 1 Application(s) Topical daily  chlorhexidine 4% Liquid 1 Application(s) Topical daily      ICU Vital Signs Last 24 Hrs  T(C): 36.8 (26 Sep 2020 00:00), Max: 37 (25 Sep 2020 10:00)  T(F): 98.3 (26 Sep 2020 00:00), Max: 98.6 (25 Sep 2020 10:00)  HR: 90 (26 Sep 2020 01:) (80 - 110)  BP: 104/58 (26 Sep 2020 01:00) (92/58 - 140/65)  BP(mean): 75 (26 Sep 2020 01:) (64 - 93)  ABP: --  ABP(mean): --  RR: 18 (26 Sep 2020 01:00) (16 - 24)  SpO2: 100% (26 Sep 2020 01:) (96% - 100%)      Adult Advanced Hemodynamics Last 24 Hrs  CVP(mm Hg): --  CVP(cm H2O): --  CO: --  CI: --  PA: --  PA(mean): --  PCWP: --  SVR: --  SVRI: --  PVR: --  PVRI: --          I&O's Summary    24 Sep 2020 07:  -  25 Sep 2020 07:00  --------------------------------------------------------  IN: 700 mL / OUT: 2400 mL / NET: -1700 mL    25 Sep 2020 07:  -  26 Sep 2020 03:06  --------------------------------------------------------  IN: 800 mL / OUT: 2900 mL / NET: -2100 mL      I&O's Detail    24 Sep 2020 07:01  -  25 Sep 2020 07:00  --------------------------------------------------------  IN:    IV PiggyBack: 500 mL    Oral Fluid: 200 mL  Total IN: 700 mL    OUT:    Voided (mL): 2400 mL  Total OUT: 2400 mL    Total NET: -1700 mL      25 Sep 2020 07:01  -  26 Sep 2020 03:06  --------------------------------------------------------  IN:    Oral Fluid: 800 mL  Total IN: 800 mL    OUT:    Voided (mL): 2900 mL  Total OUT: 2900 mL    Total NET: -2100 mL          PHYSICAL EXAM:    General/Neuro  RASS:             GCS:     = E   / V   / M      Deficits:                             alert & oriented x 3, no focal deficits  Pupils:    Lungs:      clear to auscultation, Normal expansion/effort.     Cardiovascular : S1, S2.  Regular rate and rhythm.  Peripheral edema   Cardiac Rhythm: Normal Sinus Rhythm    GI: Abdomen soft, Non-tender, Non-distended.    Gastrostomy / Jejunostomy tube in place.  Nasogastric tube in place.  Colostomy / Ileostomy.    Wound:    Extremities: Extremities warm, pink, well-perfused. Pulses:Rt     Lt    Derm: Good skin turgor, no skin breakdown.      :       Silva catheter in place.      CXR:     LABS:  CAPILLARY BLOOD GLUCOSE      POCT Blood Glucose.: 117 mg/dL (25 Sep 2020 21:36)  POCT Blood Glucose.: 117 mg/dL (25 Sep 2020 12:04)  POCT Blood Glucose.: 216 mg/dL (25 Sep 2020 08:05)                          8.0    19.67 )-----------( 464      ( 25 Sep 2020 23:30 )             24.9       09-25    128<L>  |  98  |  4<L>  ----------------------------<  100<H>  4.6   |  28  |  <0.5<L>    Ca    7.3<L>      25 Sep 2020 23:30  Phos  2.9       Mg     2.2

## 2020-09-26 NOTE — PROGRESS NOTE ADULT - ASSESSMENT
Assessment:  45y Female PSH of PAULIE/BSO c/b adhesive SBO s/p dx lap to ex lap with small bowel resection c/b leak proximal to anastomosis, s/p re exploration, washout and resection and redo anastomosis. Stable overnight, afebrile, WCC inceased to 19 from 17.     Plan:  - continue abx  - daily packing and dressing changes  - may need repeat CT scan today, known pelvic fluid collection

## 2020-09-26 NOTE — PROGRESS NOTE ADULT - ATTENDING COMMENTS
46yo female with PSHx PAULIE/BSO 8/19/2020 for fibroids admitted for persistent small bowel obstruction s/p laparoscopic NARCISO, laparotomy, extensive NARCISO, small bowel resection with primary anastomosis 9/15 complicated by small bowel leak s/p re-exploration, wash out, small bowel resection and primary anastomosis 9/15.  Complained of lower abdominal pain with cellulitis and induration.     At this time, patient is hemodynamically stable with overall improving tachycarida 90's. No longer on pressors. Breathing comfortably. Complains of abdominal pain "inside" but overall improving. Tolerated regular diet - ate pancakes this morning. +bowel function - gas and BM. Receiveing TPN. WBC 19 from 17 from 14. Continue antibiotics. Klebsiella from wound culture. Central line site looks clean, daily dressing changes. ID following. Persistent hyponatremia Na 129. Discussed CT A/P with PO and IV contrast with patient and ICU. Pain control, trend labs, continue antibiotics. Encourage ambulation/OOB, incentive spirometer, DVT ppx. Primary care as per ICU.

## 2020-09-26 NOTE — PROGRESS NOTE ADULT - ASSESSMENT
46 y/o no Pmhx s/p PAULIE/BS on 8/19 for fibroid uterus POD#37, s/p diagnostic laparoscopy converted to exploratory laparotomy and small bowel reanastomosis, with breakdown of staple line of proximal limb of anastomosis, s/p repeat exploratory laparotomy with small bowel resection, reanastomosis and appendectomy, POD#10 tachycardic and hypotensive postop, now off pressors, admitted to SICU for close hemodynamic monitoring, s/p I&D for cellulitis, now POD#3.       NEURO:    AAOx3      PO tylenol, oxycodone prn, Dilaudid prn, toradol prn - pain better controlled today compared to yesterday    RESP:     O2 sat >95% on RA    ABG PRN    f/u AM CXR    CT Chest (PE protocol) 9/17- negative for PE; small-moderate b/l pleural effusions with compressive atelectasis      CARDS:     Hypotension Resolved- Off all pressors    Acute tachycardia - resolved, currently in the 90s, cont metoprolol 25mg BID.     CE neg, 0.04, 0.16, 0.07 - no longer  trending     ECHO: EF 50-55%, G1DD, mild MR, mild TR.    GI/NUTR:     9/20 CT Abd/pelvis with PO and IV - abscess in lower abd/pelvis- IR not draining 2/2 location     CT A/P (9/17) - negative for leak; postsurgical changes with post-op ileus    Diet: soft mechanical, tolerating, +BM/+flatus    Hepatic panel 9/18: WNL    IVL    GI ppx: PPI      /RENAL:     Silva  out, voiding     BUN/Cr trend 9/<0.5    Replete lytes prn, K 3.3, mag 1.8, phos 3.2     Lactate 1.3 (max 6.5) (no longer trending)    HEME/ONC:     Hgb 8.6 <-- 7.6    Transfused 1 uPRBC (split 1/2 and 1/2 on 9/18)    Plt 230    INR 1.28    HIT antibody: negative    DVT Prophylaxis: HSQ, SCDs    ID:    Afebrile    WBC 17.45 <-- 14.64 <-- 15.87 <--16.63 <-- 13.76 <--9.13     -ID following: start Zosyn 4.5 gm iv q6h over 4hr, Zyvox 600 mg iv q12h    9/22 repeat bcx ordered    BCx 9/17- (NGTD)     ENDO:    FS ACqHS, tightened ISS    Solucortef 50 q6, d/c'd 9/20    LINES/DRAINS:  PIV , Pennock, central line    DVT Prophylaxis: heparin   Injectable 5000 Unit(s) SubCutaneous every 8 hours  GI Prophylaxis:pantoprazole  Injectable 40 milliGRAM(s) IV Push daily    ***Tubes/Lines/Drains  ***  Peripheral IV  R. Basilic Midline                      Date: 9/17  R central IJ   d/w Dr. Patel.

## 2020-09-26 NOTE — PROGRESS NOTE ADULT - SUBJECTIVE AND OBJECTIVE BOX
GENERAL SURGERY PROGRESS NOTE     GLORIA SCHAFER  45y  Female  Hospital day :12d  POD:  Procedure: Incision and drainage, wound, torso    Insertion, arterial line, percutaneous    Washing out stomach contents    Small bowel resection with anastomosis    Lysis of intestinal adhesions    Diagnostic laparoscopy      OVERNIGHT EVENTS: hyponatremic, WCC further increase    T(F): 98.3 (09-26-20 @ 00:00), Max: 98.6 (09-25-20 @ 10:00)  HR: 90 (09-26-20 @ 01:00) (80 - 110)  BP: 104/58 (09-26-20 @ 01:00) (92/58 - 140/65)  RR: 18 (09-26-20 @ 01:00) (16 - 24)  SpO2: 100% (09-26-20 @ 01:00) (96% - 100%)    DIET/FLUIDS: ascorbic acid 500 milliGRAM(s) Oral two times a day  multivitamin/minerals 1 Tablet(s) Oral daily  sodium chloride 0.9%. 1000 milliLiter(s) IV Continuous <Continuous>    GI proph:  pantoprazole    Tablet 40 milliGRAM(s) Oral before breakfast    AC/ proph: heparin   Injectable 5000 Unit(s) SubCutaneous every 8 hours    ABx: linezolid  IVPB 600 milliGRAM(s) IV Intermittent <User Schedule>  piperacillin/tazobactam IVPB.. 4.5 Gram(s) IV Intermittent every 6 hours      PHYSICAL EXAM:  GENERAL: NAD, well-appearing  ABDOMEN: Soft, Nontender, Nondistended;   EXTREMITIES:  No clubbing, cyanosis, or edema    LABS  CAPILLARY BLOOD GLUCOSE      POCT Blood Glucose.: 117 mg/dL (25 Sep 2020 21:36)  POCT Blood Glucose.: 117 mg/dL (25 Sep 2020 12:04)  POCT Blood Glucose.: 216 mg/dL (25 Sep 2020 08:05)                          8.0    19.67 )-----------( 464      ( 25 Sep 2020 23:30 )             24.9         09-25    128<L>  |  98  |  4<L>  ----------------------------<  100<H>  4.6   |  28  |  <0.5<L>      Calcium, Total Serum: 7.3 mg/dL (09-25-20 @ 23:30)      LFTs:     Lactate, Blood: 1.0 mmol/L (09-23-20 @ 03:30)      Serum Pro-Brain Natriuretic Peptide: 1902 pg/mL (09-21-20 @ 00:00)  Serum Pro-Brain Natriuretic Peptide: 2013 pg/mL (09-20-20 @ 08:19)  Serum Pro-Brain Natriuretic Peptide: 1595 pg/mL (09-18-20 @ 23:50)  Serum Pro-Brain Natriuretic Peptide: 1557 pg/mL (09-18-20 @ 00:00)  Serum Pro-Brain Natriuretic Peptide: 4550 pg/mL (09-16-20 @ 23:30)

## 2020-09-27 LAB
ANION GAP SERPL CALC-SCNC: 6 MMOL/L — LOW (ref 7–14)
BUN SERPL-MCNC: 4 MG/DL — LOW (ref 10–20)
CALCIUM SERPL-MCNC: 7.4 MG/DL — LOW (ref 8.5–10.1)
CHLORIDE SERPL-SCNC: 99 MMOL/L — SIGNIFICANT CHANGE UP (ref 98–110)
CO2 SERPL-SCNC: 25 MMOL/L — SIGNIFICANT CHANGE UP (ref 17–32)
CREAT SERPL-MCNC: <0.5 MG/DL — LOW (ref 0.7–1.5)
GLUCOSE BLDC GLUCOMTR-MCNC: 104 MG/DL — HIGH (ref 70–99)
GLUCOSE BLDC GLUCOMTR-MCNC: 109 MG/DL — HIGH (ref 70–99)
GLUCOSE BLDC GLUCOMTR-MCNC: 161 MG/DL — HIGH (ref 70–99)
GLUCOSE BLDC GLUCOMTR-MCNC: 164 MG/DL — HIGH (ref 70–99)
GLUCOSE SERPL-MCNC: 116 MG/DL — HIGH (ref 70–99)
MAGNESIUM SERPL-MCNC: 2 MG/DL — SIGNIFICANT CHANGE UP (ref 1.8–2.4)
PHOSPHATE SERPL-MCNC: 2.8 MG/DL — SIGNIFICANT CHANGE UP (ref 2.1–4.9)
POTASSIUM SERPL-MCNC: 4.2 MMOL/L — SIGNIFICANT CHANGE UP (ref 3.5–5)
POTASSIUM SERPL-SCNC: 4.2 MMOL/L — SIGNIFICANT CHANGE UP (ref 3.5–5)
SODIUM SERPL-SCNC: 130 MMOL/L — LOW (ref 135–146)

## 2020-09-27 PROCEDURE — 71045 X-RAY EXAM CHEST 1 VIEW: CPT | Mod: 26

## 2020-09-27 PROCEDURE — 99024 POSTOP FOLLOW-UP VISIT: CPT

## 2020-09-27 RX ORDER — MORPHINE SULFATE 50 MG/1
2 CAPSULE, EXTENDED RELEASE ORAL ONCE
Refills: 0 | Status: DISCONTINUED | OUTPATIENT
Start: 2020-09-27 | End: 2020-09-27

## 2020-09-27 RX ADMIN — HEPARIN SODIUM 5000 UNIT(S): 5000 INJECTION INTRAVENOUS; SUBCUTANEOUS at 06:19

## 2020-09-27 RX ADMIN — LINEZOLID 300 MILLIGRAM(S): 600 INJECTION, SOLUTION INTRAVENOUS at 23:39

## 2020-09-27 RX ADMIN — CHLORHEXIDINE GLUCONATE 1 APPLICATION(S): 213 SOLUTION TOPICAL at 11:02

## 2020-09-27 RX ADMIN — Medication 1 TABLET(S): at 11:03

## 2020-09-27 RX ADMIN — OXYCODONE HYDROCHLORIDE 10 MILLIGRAM(S): 5 TABLET ORAL at 17:45

## 2020-09-27 RX ADMIN — OXYCODONE HYDROCHLORIDE 10 MILLIGRAM(S): 5 TABLET ORAL at 11:45

## 2020-09-27 RX ADMIN — Medication 30 MILLIGRAM(S): at 01:19

## 2020-09-27 RX ADMIN — Medication 30 MILLIGRAM(S): at 01:34

## 2020-09-27 RX ADMIN — HEPARIN SODIUM 5000 UNIT(S): 5000 INJECTION INTRAVENOUS; SUBCUTANEOUS at 21:20

## 2020-09-27 RX ADMIN — PIPERACILLIN AND TAZOBACTAM 25 GRAM(S): 4; .5 INJECTION, POWDER, LYOPHILIZED, FOR SOLUTION INTRAVENOUS at 11:02

## 2020-09-27 RX ADMIN — Medication 62.5 MILLIMOLE(S): at 04:00

## 2020-09-27 RX ADMIN — MORPHINE SULFATE 2 MILLIGRAM(S): 50 CAPSULE, EXTENDED RELEASE ORAL at 17:00

## 2020-09-27 RX ADMIN — MORPHINE SULFATE 2 MILLIGRAM(S): 50 CAPSULE, EXTENDED RELEASE ORAL at 17:15

## 2020-09-27 RX ADMIN — Medication 20 MILLIGRAM(S): at 06:19

## 2020-09-27 RX ADMIN — PIPERACILLIN AND TAZOBACTAM 25 GRAM(S): 4; .5 INJECTION, POWDER, LYOPHILIZED, FOR SOLUTION INTRAVENOUS at 05:06

## 2020-09-27 RX ADMIN — OXYCODONE HYDROCHLORIDE 10 MILLIGRAM(S): 5 TABLET ORAL at 11:00

## 2020-09-27 RX ADMIN — Medication 25 MILLIGRAM(S): at 06:19

## 2020-09-27 RX ADMIN — INSULIN HUMAN 3: 100 INJECTION, SOLUTION SUBCUTANEOUS at 11:14

## 2020-09-27 RX ADMIN — SODIUM CHLORIDE 50 MILLILITER(S): 9 INJECTION INTRAMUSCULAR; INTRAVENOUS; SUBCUTANEOUS at 21:32

## 2020-09-27 RX ADMIN — HEPARIN SODIUM 5000 UNIT(S): 5000 INJECTION INTRAVENOUS; SUBCUTANEOUS at 13:24

## 2020-09-27 RX ADMIN — PIPERACILLIN AND TAZOBACTAM 25 GRAM(S): 4; .5 INJECTION, POWDER, LYOPHILIZED, FOR SOLUTION INTRAVENOUS at 23:39

## 2020-09-27 RX ADMIN — Medication 30 MILLIGRAM(S): at 21:50

## 2020-09-27 RX ADMIN — PIPERACILLIN AND TAZOBACTAM 25 GRAM(S): 4; .5 INJECTION, POWDER, LYOPHILIZED, FOR SOLUTION INTRAVENOUS at 17:02

## 2020-09-27 RX ADMIN — Medication 500 MILLIGRAM(S): at 06:19

## 2020-09-27 RX ADMIN — LINEZOLID 300 MILLIGRAM(S): 600 INJECTION, SOLUTION INTRAVENOUS at 11:02

## 2020-09-27 RX ADMIN — Medication 30 MILLIGRAM(S): at 21:23

## 2020-09-27 RX ADMIN — CHLORHEXIDINE GLUCONATE 1 APPLICATION(S): 213 SOLUTION TOPICAL at 11:03

## 2020-09-27 RX ADMIN — Medication 500 MILLIGRAM(S): at 16:56

## 2020-09-27 RX ADMIN — OXYCODONE HYDROCHLORIDE 10 MILLIGRAM(S): 5 TABLET ORAL at 17:00

## 2020-09-27 RX ADMIN — Medication 30 MILLIGRAM(S): at 11:15

## 2020-09-27 RX ADMIN — PANTOPRAZOLE SODIUM 40 MILLIGRAM(S): 20 TABLET, DELAYED RELEASE ORAL at 06:19

## 2020-09-27 RX ADMIN — Medication 30 MILLIGRAM(S): at 11:00

## 2020-09-27 NOTE — PROGRESS NOTE ADULT - SUBJECTIVE AND OBJECTIVE BOX
Progress Note: Surgery  Patient: GLORIA SCHAFER , 45y (1975)Female   MRN: [id number]  [facility]  Visit: 09-14-20 Inpatient  Date: 09-27-20 @ 05:54    Procedure/Diagnosis: POD 3 s/p I&D torso wound POD 12 s/p SBR, NARCISO  Events over 24h: No acute event overnight. No new complaint. Pt is hemodynamically stable.     Vitals: T(F): 98.4 (09-27-20 @ 05:45), Max: 99.7 (09-26-20 @ 08:00)  HR: 96 (09-27-20 @ 04:00)  BP: 97/74 (09-27-20 @ 04:00) (92/55 - 137/77)  RR: 17 (09-27-20 @ 04:00)  SpO2: 99% (09-27-20 @ 04:00)      Diet: Diet, Mechanical Soft:   Consistent Carbohydrate Evening Snack  Supplement Feeding Modality:  Oral  Ensure Enlive Cans or Servings Per Day:  1       Frequency:  Two Times a day (09-23-20 @ 17:56)    IV Fluid: ascorbic acid 500 milliGRAM(s) Oral two times a day  multivitamin/minerals 1 Tablet(s) Oral daily  sodium chloride 0.9%. 1000 milliLiter(s) (50 mL/Hr) IV Continuous <Continuous>      In:   09-25-20 @ 07:01  -  09-26-20 @ 07:00  --------------------------------------------------------  IN: 850 mL    09-26-20 @ 07:01  -  09-27-20 @ 05:54  --------------------------------------------------------  IN: 2450 mL      Out:   09-25-20 @ 07:01  -  09-26-20 @ 07:00  --------------------------------------------------------  OUT:    Voided (mL): 3200 mL  Total OUT: 3200 mL      09-26-20 @ 07:01  -  09-27-20 @ 05:54  --------------------------------------------------------  OUT:    Voided (mL): 3350 mL  Total OUT: 3350 mL        Net:   09-25-20 @ 07:01  -  09-26-20 @ 07:00  --------------------------------------------------------  NET: -2350 mL    09-26-20 @ 07:01  -  09-27-20 @ 05:54  --------------------------------------------------------  NET: -900 mL        Physical Examination:  General Appearance: NAD, alert and cooperative  Heart: S1 and S2. No murmurs. Rhythm is regular.  Lungs: Clear to auscultation BL without rales, rhonchi, wheezing, crackles or diminished breath sounds.  Abdomen:  Soft, nondistended, nontender. No rigidity, guarding, or rebound tenderness. Dressing in place.    Medications: [Standing]  ascorbic acid 500 milliGRAM(s) Oral two times a day  chlorhexidine 4% Liquid 1 Application(s) Topical daily  chlorhexidine 4% Liquid 1 Application(s) Topical daily  furosemide    Tablet 20 milliGRAM(s) Oral daily  heparin   Injectable 5000 Unit(s) SubCutaneous every 8 hours  insulin regular  human corrective regimen sliding scale   IV Push Before meals and at bedtime  linezolid  IVPB 600 milliGRAM(s) IV Intermittent <User Schedule>  metoprolol tartrate 25 milliGRAM(s) Oral every 12 hours  multivitamin/minerals 1 Tablet(s) Oral daily  pantoprazole    Tablet 40 milliGRAM(s) Oral before breakfast  piperacillin/tazobactam IVPB.. 4.5 Gram(s) IV Intermittent every 6 hours  sodium chloride 0.9%. 1000 milliLiter(s) (50 mL/Hr) IV Continuous <Continuous>    Medications:[PRN]  acetaminophen   Tablet .. 650 milliGRAM(s) Oral every 6 hours PRN  ketorolac   Injectable 30 milliGRAM(s) IV Push every 8 hours PRN  oxyCODONE    IR 10 milliGRAM(s) Oral every 6 hours PRN  oxyCODONE    IR 5 milliGRAM(s) Oral every 4 hours PRN    Labs:                        7.8    16.33 )-----------( 557      ( 26 Sep 2020 23:13 )             24.6   09-26    130<L>  |  99  |  4<L>  ----------------------------<  116<H>  4.2   |  25  |  <0.5<L>    Ca    7.4<L>      26 Sep 2020 23:13  Phos  2.8     09-26  Mg     2.0     09-26    PT/INR - ( 26 Sep 2020 23:13 )   PT: 13.70 sec;   INR: 1.19 ratio         PTT - ( 26 Sep 2020 23:13 )  PTT:36.9 sec    Micro/Urine:    Imaging:  None/24h

## 2020-09-27 NOTE — PROGRESS NOTE ADULT - ASSESSMENT
Assessment:  45y Female patient admitted POD 3 s/p I&D torso wound POD 12 s/p SBR, NARCISO with the above physical exam, labs, and imaging findings.    Plan:  - dressing change qday  - c/w ABx  - f/u WBC, monitor fever  - c/w NS for hyponatremia  -Pain control as needed  -Hemodynamic monitoring as per routine  -Encourage ambulation and incentive spirometer use (10x/hr when awake)  -GI and DVT prophylaxis  -Check and replete CBC and BMP q daily  -Strict input and output monitoring  -Continue current management    Date/Time: 09-27-20 @ 05:54

## 2020-09-27 NOTE — PROGRESS NOTE ADULT - ATTENDING COMMENTS
46yo female with PSHx PAULIE/BSO 8/19/2020 for fibroids admitted for persistent small bowel obstruction s/p laparoscopic NARCISO, laparotomy, extensive NARCISO, small bowel resection with primary anastomosis 9/15 complicated by small bowel leak s/p re-exploration, wash out, small bowel resection and primary anastomosis 9/15.  Complained of lower abdominal pain with cellulitis and induration.     At this time, patient is hemodynamically stable with overall improving tachycarida 90's. No longer on pressors. Breathing comfortably. Complains of abdominal pain "inside" but overall improving. Tolerated regular diet, +bowel function - gas and BM. Receiveing TPN. WBC 16 from 19 from 17 from 14. Continue antibiotics. Klebsiella from wound culture. Central line site looks clean, daily dressing changes. ID following. Persistent hyponatremia Na 130. CT chest/A/P with PO and IV contrast 9/26 - improving pleural effusions, improving pelvic fluid, no collections, no leak, +enteritis. Pain control, trend labs, continue antibiotics. Encourage ambulation/OOB, incentive spirometer, DVT ppx. Primary care as per ICU.

## 2020-09-28 LAB
ALDOST SERPL-MCNC: <3 NG/DL — SIGNIFICANT CHANGE UP
ANION GAP SERPL CALC-SCNC: 8 MMOL/L — SIGNIFICANT CHANGE UP (ref 7–14)
APTT BLD: 35.8 SEC — SIGNIFICANT CHANGE UP (ref 27–39.2)
BUN SERPL-MCNC: 4 MG/DL — LOW (ref 10–20)
CALCIUM SERPL-MCNC: 7.4 MG/DL — LOW (ref 8.5–10.1)
CHLORIDE SERPL-SCNC: 99 MMOL/L — SIGNIFICANT CHANGE UP (ref 98–110)
CO2 SERPL-SCNC: 26 MMOL/L — SIGNIFICANT CHANGE UP (ref 17–32)
CREAT SERPL-MCNC: <0.5 MG/DL — LOW (ref 0.7–1.5)
GLUCOSE BLDC GLUCOMTR-MCNC: 123 MG/DL — HIGH (ref 70–99)
GLUCOSE BLDC GLUCOMTR-MCNC: 129 MG/DL — HIGH (ref 70–99)
GLUCOSE BLDC GLUCOMTR-MCNC: 129 MG/DL — HIGH (ref 70–99)
GLUCOSE BLDC GLUCOMTR-MCNC: 146 MG/DL — HIGH (ref 70–99)
GLUCOSE BLDC GLUCOMTR-MCNC: 158 MG/DL — HIGH (ref 70–99)
GLUCOSE BLDC GLUCOMTR-MCNC: 182 MG/DL — HIGH (ref 70–99)
GLUCOSE SERPL-MCNC: 107 MG/DL — HIGH (ref 70–99)
HCT VFR BLD CALC: 24.4 % — LOW (ref 37–47)
HGB BLD-MCNC: 7.8 G/DL — LOW (ref 12–16)
INR BLD: 1.13 RATIO — SIGNIFICANT CHANGE UP (ref 0.65–1.3)
MAGNESIUM SERPL-MCNC: 1.9 MG/DL — SIGNIFICANT CHANGE UP (ref 1.8–2.4)
MCHC RBC-ENTMCNC: 27.2 PG — SIGNIFICANT CHANGE UP (ref 27–31)
MCHC RBC-ENTMCNC: 32 G/DL — SIGNIFICANT CHANGE UP (ref 32–37)
MCV RBC AUTO: 85 FL — SIGNIFICANT CHANGE UP (ref 81–99)
NRBC # BLD: 0 /100 WBCS — SIGNIFICANT CHANGE UP (ref 0–0)
PHOSPHATE SERPL-MCNC: 2.7 MG/DL — SIGNIFICANT CHANGE UP (ref 2.1–4.9)
PLATELET # BLD AUTO: 562 K/UL — HIGH (ref 130–400)
POTASSIUM SERPL-MCNC: 3.7 MMOL/L — SIGNIFICANT CHANGE UP (ref 3.5–5)
POTASSIUM SERPL-SCNC: 3.7 MMOL/L — SIGNIFICANT CHANGE UP (ref 3.5–5)
PROTHROM AB SERPL-ACNC: 13 SEC — HIGH (ref 9.95–12.87)
RBC # BLD: 2.87 M/UL — LOW (ref 4.2–5.4)
RBC # FLD: 15.9 % — HIGH (ref 11.5–14.5)
SODIUM SERPL-SCNC: 133 MMOL/L — LOW (ref 135–146)
WBC # BLD: 12.92 K/UL — HIGH (ref 4.8–10.8)
WBC # FLD AUTO: 12.92 K/UL — HIGH (ref 4.8–10.8)

## 2020-09-28 PROCEDURE — 71045 X-RAY EXAM CHEST 1 VIEW: CPT | Mod: 26

## 2020-09-28 PROCEDURE — 99024 POSTOP FOLLOW-UP VISIT: CPT

## 2020-09-28 PROCEDURE — 99233 SBSQ HOSP IP/OBS HIGH 50: CPT | Mod: 24

## 2020-09-28 RX ORDER — DEXTROSE 50 % IN WATER 50 %
25 SYRINGE (ML) INTRAVENOUS ONCE
Refills: 0 | Status: DISCONTINUED | OUTPATIENT
Start: 2020-09-28 | End: 2020-09-30

## 2020-09-28 RX ORDER — METRONIDAZOLE 500 MG
TABLET ORAL
Refills: 0 | Status: DISCONTINUED | OUTPATIENT
Start: 2020-09-28 | End: 2020-10-09

## 2020-09-28 RX ORDER — OXYCODONE HYDROCHLORIDE 5 MG/1
5 TABLET ORAL EVERY 6 HOURS
Refills: 0 | Status: DISCONTINUED | OUTPATIENT
Start: 2020-09-28 | End: 2020-09-30

## 2020-09-28 RX ORDER — METRONIDAZOLE 500 MG
500 TABLET ORAL ONCE
Refills: 0 | Status: COMPLETED | OUTPATIENT
Start: 2020-09-28 | End: 2020-09-28

## 2020-09-28 RX ORDER — INSULIN HUMAN 100 [IU]/ML
INJECTION, SOLUTION SUBCUTANEOUS EVERY 4 HOURS
Refills: 0 | Status: DISCONTINUED | OUTPATIENT
Start: 2020-09-28 | End: 2020-09-28

## 2020-09-28 RX ORDER — HYDROMORPHONE HYDROCHLORIDE 2 MG/ML
0.5 INJECTION INTRAMUSCULAR; INTRAVENOUS; SUBCUTANEOUS ONCE
Refills: 0 | Status: DISCONTINUED | OUTPATIENT
Start: 2020-09-28 | End: 2020-09-28

## 2020-09-28 RX ORDER — CEFTRIAXONE 500 MG/1
2000 INJECTION, POWDER, FOR SOLUTION INTRAMUSCULAR; INTRAVENOUS ONCE
Refills: 0 | Status: COMPLETED | OUTPATIENT
Start: 2020-09-28 | End: 2020-09-28

## 2020-09-28 RX ORDER — CEFTRIAXONE 500 MG/1
2000 INJECTION, POWDER, FOR SOLUTION INTRAMUSCULAR; INTRAVENOUS EVERY 24 HOURS
Refills: 0 | Status: COMPLETED | OUTPATIENT
Start: 2020-09-29 | End: 2020-10-05

## 2020-09-28 RX ORDER — DEXTROSE 50 % IN WATER 50 %
12.5 SYRINGE (ML) INTRAVENOUS ONCE
Refills: 0 | Status: DISCONTINUED | OUTPATIENT
Start: 2020-09-28 | End: 2020-09-30

## 2020-09-28 RX ORDER — POTASSIUM CHLORIDE 20 MEQ
20 PACKET (EA) ORAL ONCE
Refills: 0 | Status: COMPLETED | OUTPATIENT
Start: 2020-09-28 | End: 2020-09-28

## 2020-09-28 RX ORDER — HYDROMORPHONE HYDROCHLORIDE 2 MG/ML
1.5 INJECTION INTRAMUSCULAR; INTRAVENOUS; SUBCUTANEOUS ONCE
Refills: 0 | Status: DISCONTINUED | OUTPATIENT
Start: 2020-09-28 | End: 2020-09-28

## 2020-09-28 RX ORDER — ACETAMINOPHEN 500 MG
650 TABLET ORAL EVERY 6 HOURS
Refills: 0 | Status: DISCONTINUED | OUTPATIENT
Start: 2020-09-28 | End: 2020-10-03

## 2020-09-28 RX ORDER — METRONIDAZOLE 500 MG
500 TABLET ORAL EVERY 8 HOURS
Refills: 0 | Status: DISCONTINUED | OUTPATIENT
Start: 2020-09-28 | End: 2020-10-09

## 2020-09-28 RX ORDER — SODIUM CHLORIDE 9 MG/ML
500 INJECTION, SOLUTION INTRAVENOUS ONCE
Refills: 0 | Status: COMPLETED | OUTPATIENT
Start: 2020-09-28 | End: 2020-09-28

## 2020-09-28 RX ORDER — HYDROMORPHONE HYDROCHLORIDE 2 MG/ML
1 INJECTION INTRAMUSCULAR; INTRAVENOUS; SUBCUTANEOUS ONCE
Refills: 0 | Status: DISCONTINUED | OUTPATIENT
Start: 2020-09-28 | End: 2020-09-28

## 2020-09-28 RX ORDER — CEFTRIAXONE 500 MG/1
INJECTION, POWDER, FOR SOLUTION INTRAMUSCULAR; INTRAVENOUS
Refills: 0 | Status: COMPLETED | OUTPATIENT
Start: 2020-09-28 | End: 2020-10-06

## 2020-09-28 RX ORDER — INSULIN HUMAN 100 [IU]/ML
INJECTION, SOLUTION SUBCUTANEOUS
Refills: 0 | Status: DISCONTINUED | OUTPATIENT
Start: 2020-09-28 | End: 2020-10-05

## 2020-09-28 RX ADMIN — HYDROMORPHONE HYDROCHLORIDE 0.5 MILLIGRAM(S): 2 INJECTION INTRAMUSCULAR; INTRAVENOUS; SUBCUTANEOUS at 04:24

## 2020-09-28 RX ADMIN — Medication 500 MILLIGRAM(S): at 06:33

## 2020-09-28 RX ADMIN — OXYCODONE HYDROCHLORIDE 5 MILLIGRAM(S): 5 TABLET ORAL at 21:25

## 2020-09-28 RX ADMIN — Medication 1 TABLET(S): at 12:24

## 2020-09-28 RX ADMIN — OXYCODONE HYDROCHLORIDE 10 MILLIGRAM(S): 5 TABLET ORAL at 03:30

## 2020-09-28 RX ADMIN — OXYCODONE HYDROCHLORIDE 5 MILLIGRAM(S): 5 TABLET ORAL at 22:03

## 2020-09-28 RX ADMIN — HYDROMORPHONE HYDROCHLORIDE 1.5 MILLIGRAM(S): 2 INJECTION INTRAMUSCULAR; INTRAVENOUS; SUBCUTANEOUS at 11:30

## 2020-09-28 RX ADMIN — Medication 30 MILLIGRAM(S): at 08:21

## 2020-09-28 RX ADMIN — Medication 100 MILLIGRAM(S): at 12:16

## 2020-09-28 RX ADMIN — HYDROMORPHONE HYDROCHLORIDE 1.5 MILLIGRAM(S): 2 INJECTION INTRAMUSCULAR; INTRAVENOUS; SUBCUTANEOUS at 11:17

## 2020-09-28 RX ADMIN — OXYCODONE HYDROCHLORIDE 5 MILLIGRAM(S): 5 TABLET ORAL at 13:30

## 2020-09-28 RX ADMIN — Medication 30 MILLIGRAM(S): at 08:36

## 2020-09-28 RX ADMIN — PIPERACILLIN AND TAZOBACTAM 25 GRAM(S): 4; .5 INJECTION, POWDER, LYOPHILIZED, FOR SOLUTION INTRAVENOUS at 06:35

## 2020-09-28 RX ADMIN — HEPARIN SODIUM 5000 UNIT(S): 5000 INJECTION INTRAVENOUS; SUBCUTANEOUS at 06:35

## 2020-09-28 RX ADMIN — PANTOPRAZOLE SODIUM 40 MILLIGRAM(S): 20 TABLET, DELAYED RELEASE ORAL at 06:33

## 2020-09-28 RX ADMIN — Medication 500 MILLIGRAM(S): at 17:17

## 2020-09-28 RX ADMIN — CEFTRIAXONE 2000 MILLIGRAM(S): 500 INJECTION, POWDER, FOR SOLUTION INTRAMUSCULAR; INTRAVENOUS at 12:16

## 2020-09-28 RX ADMIN — Medication 62.5 MILLIMOLE(S): at 02:52

## 2020-09-28 RX ADMIN — Medication 50 MILLIEQUIVALENT(S): at 02:46

## 2020-09-28 RX ADMIN — SODIUM CHLORIDE 500 MILLILITER(S): 9 INJECTION, SOLUTION INTRAVENOUS at 21:21

## 2020-09-28 RX ADMIN — Medication 100 MILLIGRAM(S): at 21:16

## 2020-09-28 RX ADMIN — Medication 25 MILLIGRAM(S): at 06:33

## 2020-09-28 RX ADMIN — CHLORHEXIDINE GLUCONATE 1 APPLICATION(S): 213 SOLUTION TOPICAL at 13:20

## 2020-09-28 RX ADMIN — HEPARIN SODIUM 5000 UNIT(S): 5000 INJECTION INTRAVENOUS; SUBCUTANEOUS at 21:17

## 2020-09-28 RX ADMIN — OXYCODONE HYDROCHLORIDE 10 MILLIGRAM(S): 5 TABLET ORAL at 02:45

## 2020-09-28 RX ADMIN — Medication 20 MILLIGRAM(S): at 06:33

## 2020-09-28 RX ADMIN — HEPARIN SODIUM 5000 UNIT(S): 5000 INJECTION INTRAVENOUS; SUBCUTANEOUS at 13:30

## 2020-09-28 RX ADMIN — OXYCODONE HYDROCHLORIDE 5 MILLIGRAM(S): 5 TABLET ORAL at 14:00

## 2020-09-28 RX ADMIN — Medication 30 MILLIGRAM(S): at 18:36

## 2020-09-28 RX ADMIN — HYDROMORPHONE HYDROCHLORIDE 0.5 MILLIGRAM(S): 2 INJECTION INTRAMUSCULAR; INTRAVENOUS; SUBCUTANEOUS at 04:50

## 2020-09-28 NOTE — PROGRESS NOTE ADULT - ATTENDING COMMENTS
I personally provided over 30 minutes of direct critical care to this patient.  I examined the patient with the PA and resident and discussed my plan with them.    pain controlled  room air, no pe  intermittent borderline hypotension, baseline is 90s-110s  tachy related to pain  soft diet  intermittent hyponatremia resolving  mukesh resolving, making urine  hb stable, anemia of critical illness  cont zosyn and zyvox for peritoneal abscess undrainable, most recent ct with decrease in size  id following, will downgrade to ceftriaxone flagyl  downgrade to ceu

## 2020-09-28 NOTE — PROGRESS NOTE ADULT - SUBJECTIVE AND OBJECTIVE BOX
GENERAL SURGERY PROGRESS NOTE     GLORIA SCHAFER  [address] [place] [address] day :14d  POD:  Procedure: Incision and drainage, wound, torso  Insertion, arterial line, percutaneous  Washing out stomach contents  Small bowel resection with anastomosis  Lysis of intestinal adhesions  Diagnostic laparoscopy      Surgical Attending: Mariann Rubio  Overnight events: No acute events overnight. Patient found in NAD. Pain better controlled. Continues with low grade tachycardia.    T(F): 99.3 (09-28-20 @ 04:00), Max: 99.9 (09-27-20 @ 23:00)  HR: 118 (09-28-20 @ 04:00) (91 - 121)  BP: 103/64 (09-28-20 @ 05:00) (95/57 - 124/72)  ABP: --  ABP(mean): --  RR: 18 (09-28-20 @ 04:00) (14 - 22)  SpO2: 100% (09-28-20 @ 04:00) (97% - 100%)      09-27-20 @ 07:01  -  09-28-20 @ 07:00  --------------------------------------------------------  IN:    IV PiggyBack: 275 mL    IV PiggyBack: 700 mL    IV PiggyBack: 187.5 mL    Oral Fluid: 600 mL    sodium chloride 0.9%: 1050 mL  Total IN: 2812.5 mL    OUT:    Voided (mL): 1925 mL  Total OUT: 1925 mL    Total NET: 887.5 mL        DIET/FLUIDS: ascorbic acid 500 milliGRAM(s) Oral two times a day  multivitamin/minerals 1 Tablet(s) Oral daily  sodium chloride 0.9%. 1000 milliLiter(s) IV Continuous <Continuous>                                                                             GI proph:  pantoprazole    Tablet 40 milliGRAM(s) Oral before breakfast    AC/ proph: heparin   Injectable 5000 Unit(s) SubCutaneous every 8 hours    ABx: linezolid  IVPB 600 milliGRAM(s) IV Intermittent <User Schedule>  piperacillin/tazobactam IVPB.. 4.5 Gram(s) IV Intermittent every 6 hours      GEN: NAD, well appearing  HEENT: NC/AT  CHEST: Symmetric chest wall expansion. Regular heart rate  ABD: S/D, non-tender,    LABS  Labs:  CAPILLARY BLOOD GLUCOSE      POCT Blood Glucose.: 158 mg/dL (28 Sep 2020 06:27)  POCT Blood Glucose.: 109 mg/dL (27 Sep 2020 21:19)  POCT Blood Glucose.: 104 mg/dL (27 Sep 2020 16:49)  POCT Blood Glucose.: 161 mg/dL (27 Sep 2020 11:10)  POCT Blood Glucose.: 164 mg/dL (27 Sep 2020 07:57)                          7.8    12.92 )-----------( 562      ( 28 Sep 2020 00:06 )             24.4         09-28    133<L>  |  99  |  4<L>  ----------------------------<  107<H>  3.7   |  26  |  <0.5<L>      Calcium, Total Serum: 7.4 mg/dL (09-28-20 @ 00:06)      LFTs:         Coags:     13.00  ----< 1.13    ( 28 Sep 2020 00:06 )     35.8            Serum Pro-Brain Natriuretic Peptide: 1902 pg/mL (09-21-20 @ 00:00)  Serum Pro-Brain Natriuretic Peptide: 2013 pg/mL (09-20-20 @ 08:19)  Serum Pro-Brain Natriuretic Peptide: 1595 pg/mL (09-18-20 @ 23:50)              RADIOLOGY & ADDITIONAL TESTS:  < from: CT Chest w/ IV Cont (09.26.20 @ 14:22) >  Since the prior CT abdomen and pelvis dated 9/22/2020,    1.  Decreased size of partially loculated collections/abscess in the lower abdomen/pelvis measuring 3.8 x 3.1 x 6.1 cm (previously measured 4.4 x 3.8 x 8.1 cm).    2.  Unchanged mild thickening of the small bowel wall consistent with enteritis, likely reactive.    3.  Decreased small to moderate bilateral pleural effusions with adjacent compressive atelectasis.    < end of copied text >    A/P:  GLORIA SCHAFER is a 45yFemale s/p for Incision and drainage, wound, torso  Insertion, arterial line, percutaneous  Washing out stomach contents  Small bowel resection with anastomosis  Lysis of intestinal adhesions  Diagnostic laparoscopy    Patient is having BM, pain better controlled. CT over weekend showed improvement from previous CT.    Plan:   - tend WBCs  - f/u labs and replete as necessary  - dressing changes daily  - continue SICU management

## 2020-09-28 NOTE — PROGRESS NOTE ADULT - SUBJECTIVE AND OBJECTIVE BOX
GLORIA SHCAFER  [id number]  45y Female    Indication for ICU admission: intraabdominal sepsis, s/p diagnostic lap, NARCISO, SBR (43cm ileum) w/ primary anastamosis (side to side)  Admit Date:20  ICU Date: 9/15/2020  OR Date: 9/15/2020 x2    No Known Allergies    PAST MEDICAL & SURGICAL HISTORY:  Uterine fibroid  Status post bilateral salpingectomy  S/P PAULIE (total abdominal hysterectomy)    Home Medications:  ibuprofen 600 mg oral tablet: 1 tab(s) orally every 6 hours, As needed, Mild Pain (1 - 3) (01 Sep 2020 06:26)    24 HOUR EVENTS: WBC 16.3>12.9    NEURO:    AAOx3      PO tylenol, oxycodone prn, toradol prn     RESP:     O2 sat >95% on RA    ABG PRN    AM CXR stable    CT Chest (PE protocol) - negative for PE; small-moderate b/l pleural effusions with     compressive atelectasis    -CT chest : decreased small to moderate b/l pleural effusions    CARDS:     Hypotension Resolved- Off all pressors    Acute tachycardia - now in the 90s, cont metoprolol 25mg BID   -On Lasix 20mg po daily    CE neg, 0.04, 0.16, 0.07 - no longer  trending     ECHO: EF 50-55%, G1DD, mild MR, mild TR.    GI/NUTR:     -s/p I&D of wound cellulitis ---no signs of STI, packed with betadine and Kerlex  -CT a/p : Decreased size of partially loculated collections/abscess in the lower abdomen/pelvis measuring 3.8 x 3.1 x 6.1 cm (previously measured 4.4 x 3.8 x 8.1 cm).    - CT Abd/pelvis with PO and IV - abscess in lower abd/pelvis- IR not draining 2/2      location     -CT A/P () - negative for leak; postsurgical changes with post-op ileus    -CT chest A/P ()- Decreased size of partially loculated collections/abscess in the lower abdomen/pelvis measuring 3.8 x 3.1 x 6.1 cm (previously measured 4.4 x 3.8 x 8.1 cm).    -Diet: soft mechanical     -Hepatic panel : WNL    -GI ppx: PPI    -holding senna due to diarrhea      /RENAL:     -hyponatremia (Na 128 > 130): cont NS @ 50    Silva out, voiding     BUN/Cr trend 5/<0.5    Lactatemia resolved to 1.3 (max 6.5)    HEME/ONC:     Hgb lateral 7.6 > 8.6>8.0 > 7.8    Transfused 1 uPRBC (split /2 and 1/2 on )    Plt 230 > 557    HIT antibody: negative    DVT Prophylaxis: HSQ, SCDs    ID:    Afebrile,  WBC  16.3 > 12.9     -ID following: recommend imaging (done), continue Zosyn 4.5 gm iv q6h over 4hr, Zyvox   600 mg iv q12h      BCx - (NGTD)     Tissue gram stain rare Klebsiella pneumoniae --> sensitive to everything except Ampicillin     ENDO:    Off insulin gtt, on ISS    FS ACqHS, range 119-164    Off Solucortef 50 q6, d/c'd       LINES/DRAINS:  midline,  RIJ central line      IR consult placed for PICC line placement       DVT Prophylaxis: heparin   Injectable 5000 Unit(s) SubCutaneous every 8 hours  GI Prophylaxis: pantoprazole  Injectable 40 milliGRAM(s) oral    Activity- OOB/ambulation    ***Tubes/Lines/Drains  ***  Peripheral IV  R. Basilic Midline                      Date:   R central IJ     [X] A ten-point review of systems was otherwise negative except as noted above.  [  ] Due to altered mental status/intubation, subjective information was not attained from the patient. History was obtained, to the extent possible, from review of the chart and collateral sources of information.    Daily     Daily Weight in k (27 Sep 2020 06:00)    Diet, Mechanical Soft:   Consistent Carbohydrate Evening Snack  Supplement Feeding Modality:  Oral  Ensure Enlive Cans or Servings Per Day:  1       Frequency:  Two Times a day (20 @ 17:56)    CURRENT MEDS:  Neurologic Medications  acetaminophen   Tablet .. 650 milliGRAM(s) Oral every 6 hours PRN Temp greater or equal to 38C (100.4F), Mild Pain (1 - 3), Moderate Pain (4 - 6)  ketorolac   Injectable 30 milliGRAM(s) IV Push every 8 hours PRN Moderate Pain (4 - 6)  oxyCODONE    IR 10 milliGRAM(s) Oral every 6 hours PRN Moderate Pain (4 - 6)  oxyCODONE    IR 5 milliGRAM(s) Oral every 4 hours PRN Mild Pain (1 - 3)    Respiratory Medications    Cardiovascular Medications  furosemide    Tablet 20 milliGRAM(s) Oral daily  metoprolol tartrate 25 milliGRAM(s) Oral every 12 hours    Gastrointestinal Medications  ascorbic acid 500 milliGRAM(s) Oral two times a day  multivitamin/minerals 1 Tablet(s) Oral daily  pantoprazole    Tablet 40 milliGRAM(s) Oral before breakfast  sodium chloride 0.9%. 1000 milliLiter(s) IV Continuous <Continuous>    Genitourinary Medications    Hematologic/Oncologic Medications  heparin   Injectable 5000 Unit(s) SubCutaneous every 8 hours    Antimicrobial/Immunologic Medications  linezolid  IVPB 600 milliGRAM(s) IV Intermittent <User Schedule>  piperacillin/tazobactam IVPB.. 4.5 Gram(s) IV Intermittent every 6 hours    Endocrine/Metabolic Medications  insulin regular  human corrective regimen sliding scale   IV Push Before meals and at bedtime    Topical/Other Medications  chlorhexidine 4% Liquid 1 Application(s) Topical daily  chlorhexidine 4% Liquid 1 Application(s) Topical daily      ICU Vital Signs Last 24 Hrs  T(C): 37.7 (27 Sep 2020 23:00), Max: 37.7 (27 Sep 2020 23:00)  T(F): 99.9 (27 Sep 2020 23:00), Max: 99.9 (27 Sep 2020 23:00)  HR: 102 (28 Sep 2020 00:00) (91 - 118)  BP: 102/58 (28 Sep 2020 00:00) (94/57 - 124/72)  BP(mean): 75 (28 Sep 2020 00:00) (70 - 95)  RR: 16 (28 Sep 2020 00:00) (14 - 19)  SpO2: 100% (28 Sep 2020 00:00) (97% - 100%)    I&O's Summary    26 Sep 2020 07:01  -  27 Sep 2020 07:00  --------------------------------------------------------  IN: 2975 mL / OUT: 3350 mL / NET: -505 mL    27 Sep 2020 07:  -  28 Sep 2020 01:40  --------------------------------------------------------  IN: 2200 mL / OUT: 1600 mL / NET: 600 mL      I&O's Detail    26 Sep 2020 07:  -  27 Sep 2020 07:00  --------------------------------------------------------  IN:    IV PiggyBack: 1105 mL    IV PiggyBack: 400 mL    Oral Fluid: 240 mL    sodium chloride 0.9%: 1100 mL  Total IN: 2845 mL    OUT:    Voided (mL): 3350 mL  Total OUT: 3350 mL    Total NET: -505 mL      27 Sep 2020 07:  -  28 Sep 2020 01:40  --------------------------------------------------------  IN:    IV PiggyBack: 200 mL    IV PiggyBack: 600 mL    Oral Fluid: 600 mL    sodium chloride 0.9%: 800 mL  Total IN: 2200 mL    OUT:    Voided (mL): 1600 mL  Total OUT: 1600 mL    Total NET: 600 mL    PHYSICAL EXAM:    General/Neuro  A&Ox3, no neurologic deficits     Lungs:  clear to auscultation, Normal expansion/effort.     Cardiovascular : S1, S2.  Regular rate and rhythm.    Cardiac Rhythm: Normal Sinus Rhythm    GI: Abdomen soft, nondistended, Non-tender. Midline, left, and right abdominal incisions without evidence of bleeding or drainage, no surrounding erythema or induration, no tenderness, packed with wet to dry dressing     Extremities: Extremities warm, pink, well-perfused    Derm: Good skin turgor, no skin breakdown.      :  Spontaneously voiding     LABS:    POCT Blood Glucose.: 109 mg/dL (27 Sep 2020 21:19)  POCT Blood Glucose.: 104 mg/dL (27 Sep 2020 16:49)  POCT Blood Glucose.: 161 mg/dL (27 Sep 2020 11:10)  POCT Blood Glucose.: 164 mg/dL (27 Sep 2020 07:57)                          7.8    12.92 )-----------( 562      ( 28 Sep 2020 00:06 )             24.4       09-28    133<L>  |  99  |  4<L>  ----------------------------<  107<H>  3.7   |  26  |  <0.5<L>    Ca    7.4<L>      28 Sep 2020 00:06  Phos  2.7       Mg     1.9     -28        PT/INR - ( 28 Sep 2020 00:06 )   PT: 13.00 sec;   INR: 1.13 ratio       PTT - ( 28 Sep 2020 00:06 )  PTT:35.8 sec

## 2020-09-28 NOTE — PHARMACOTHERAPY INTERVENTION NOTE - COMMENTS
Order for dilaudid 1mg tablet.  Explained to MD smallest dose we have in house is Dilaudid 2mg tablet which are not scored.  MD to change order to IV

## 2020-09-28 NOTE — PROGRESS NOTE ADULT - ASSESSMENT
IMPRESSION;  Presented with Feculent peritonitis secondary to perforated viscus  9/15 : s/p diagnostic lap, NARCISO, SBR (43cm ileum) w/ primary anastamosis (side to side)  Post op pelvic abscesses  CT A/P 9/22 : pelvic abscesses, enteritis, likely reactive, b/l pleural effusions/atalectasis.  No right flank soft tissue infection  Right flank cellulitis with no abscess/fascitis : resolved. Seems to have originated from the abdominal wound which was locally debrided 9/22 and presently with no ongoing infectious process. WCx Klebsiella  Right flank appears significantly improved with no pain and resolved erythema  BCx 9/17 NGTD  WCx 9/22 rare PMNs, no org, Klebsiella    RECOMMENDATIONS;  Rocephin 2 gm iv q24h  Flagyl 500 mg iv q8h  D/c Zosyn 4.5 gm iv q6h over 4h  D/c Zyvox 600 mg iv q12h

## 2020-09-28 NOTE — PROGRESS NOTE ADULT - ATTENDING COMMENTS
44yo female with PSHx PAULIE/BSO 8/19/2020 for fibroids admitted for persistent small bowel obstruction s/p laparoscopic NARCISO, laparotomy, extensive NARCISO, small bowel resection with primary anastomosis 9/15 complicated by small bowel leak s/p re-exploration, wash out, small bowel resection and primary anastomosis 9/15. Complained of lower abdominal pain with cellulitis and induration. CT chest/A/P 9/18 showed speck of subcutaneous air and exam was concerning for crepitus. Patient underwent bilateral flank exploration of subcutaneous tissue - negative for necrotizing infection. CT chest/A/P with PO and IV contrast 9/26 showed improving fluid collection, improving bilateral pleural effusions, and enteritis.     At this time, patient is hemodynamically stable with overall improving tachycarida. No pressors. Breathing comfortably. Complains of abdominal pain "inside" but overall improving. Tolerated regular diet, +bowel function - gas and BM. Receiving TPN. WBC 12 from 16 from 19 from 17 from 14. Continue antibiotics. Klebsiella from wound culture. Central line site looks clean, daily dressing changes. ID following. Persistent hyponatremia Na 129.  with patient and ICU. Pain control, trend labs, continue antibiotics. Encourage ambulation/OOB, incentive spirometer, DVT ppx. Primary care as per ICU.

## 2020-09-28 NOTE — CHART NOTE - NSCHARTNOTEFT_GEN_A_CORE
Patient noted to have episode of desaturation to 81% on 6L NC, switched to ventimask 15L @50%, pending CXR, will continue to monitor and reassess.

## 2020-09-28 NOTE — PROGRESS NOTE ADULT - ASSESSMENT
46 y/o no Pmhx s/p PAULIE/BS on 8/19 for fibroid uterus POD#37, s/p diagnostic laparoscopy converted to exploratory laparotomy and small bowel reanastomosis, with breakdown of staple line of proximal limb of anastomosis, s/p repeat exploratory laparotomy with small bowel resection, reanastomosis and appendectomy, POD#10 tachycardic and hypotensive postop, now off pressors, admitted to SICU for close hemodynamic monitoring, s/p I&D for cellulitis, now POD#3.    NEURO:    AAOx3      PO tylenol, oxycodone prn, Dilaudid prn, toradol prn - pain better controlled today compared to yesterday    RESP:     O2 sat >95% on RA    ABG PRN    f/u AM CXR    CT Chest (PE protocol) 9/17- negative for PE; small-moderate b/l pleural effusions with compressive atelectasis      CARDS:     Hypotension Resolved- Off all pressors    Acute tachycardia - resolved, currently in the 90s, cont metoprolol 25mg BID.     CE neg, 0.04, 0.16, 0.07 - no longer  trending     ECHO: EF 50-55%, G1DD, mild MR, mild TR.    GI/NUTR:     9/20 CT Abd/pelvis with PO and IV - abscess in lower abd/pelvis- IR not draining 2/2 location     CT A/P (9/17) - negative for leak; postsurgical changes with post-op ileus    Diet: soft mechanical, tolerating, +BM/+flatus    Hepatic panel 9/18: WNL    IVL    GI ppx: PPI      /RENAL:     Silva  out, voiding     BUN/Cr trend 9/<0.5    Replete lytes prn, K 3.3, mag 1.8, phos 3.2     Lactate 1.3 (max 6.5) (no longer trending)    HEME/ONC:     Hgb 7.8 > 7.8    Transfused 1 uPRBC (split 1/2 and 1/2 on 9/18)    Plt 230    INR 1.28    HIT antibody: negative    DVT Prophylaxis: HSQ, SCDs    ID:    Afebrile    WBC 17.45 <-- 14.64 <-- 15.87 <--16.63 <-- 13.76 <--9.13     -ID following: start Zosyn 4.5 gm iv q6h over 4hr, Zyvox 600 mg iv q12h    9/22 repeat bcx ordered    BCx 9/17- (NGTD)     ENDO:    FS ACqHS, tightened ISS    Solucortef 50 q6, d/c'd 9/20    LINES/DRAINS:  PIV , [employer], right IJ central line    DVT Prophylaxis: heparin   Injectable 5000 Unit(s) SubCutaneous every 8 hours  GI Prophylaxis:pantoprazole  Injectable 40 milliGRAM(s) IV Push daily    ***Tubes/Lines/Drains  ***  Peripheral IV  R. Basilic Midline                      Date: 9/17  R central IJ

## 2020-09-28 NOTE — PROGRESS NOTE ADULT - SUBJECTIVE AND OBJECTIVE BOX
GLORIA SCHAFER  45y, Female    All available historical data reviewed    OVERNIGHT EVENTS:  no fevers  feels well and has no complaints  no overt abdominal pain    ROS:  General: Denies rigors, nightsweats  HEENT: Denies headache, rhinorrhea, sore throat, eye pain  CV: Denies CP, palpitations  PULM: Denies wheezing, hemoptysis  GI: Denies hematemesis, hematochezia, melena  : Denies discharge, hematuria  MSK: Denies arthralgias, myalgias  SKIN: Denies rash, lesions  NEURO: + weakness  PSYCH: Denies depression, anxiety    VITALS:  T(F): 99.1, Max: 99.9 (09-27-20 @ 23:00)  HR: 109  BP: 103/68  RR: 18Vital Signs Last 24 Hrs  T(C): 37.3 (28 Sep 2020 06:00), Max: 37.7 (27 Sep 2020 23:00)  T(F): 99.1 (28 Sep 2020 06:00), Max: 99.9 (27 Sep 2020 23:00)  HR: 109 (28 Sep 2020 07:00) (91 - 121)  BP: 103/68 (28 Sep 2020 08:00) (95/57 - 124/72)  BP(mean): 81 (28 Sep 2020 08:00) (70 - 95)  RR: 18 (28 Sep 2020 07:00) (14 - 22)  SpO2: 99% (28 Sep 2020 07:00) (97% - 100%)    TESTS & MEASUREMENTS:                        7.8    12.92 )-----------( 562      ( 28 Sep 2020 00:06 )             24.4     09-28    133<L>  |  99  |  4<L>  ----------------------------<  107<H>  3.7   |  26  |  <0.5<L>    Ca    7.4<L>      28 Sep 2020 00:06  Phos  2.7     09-28  Mg     1.9     09-28          Culture - Tissue with Gram Stain (collected 09-22-20 @ 23:40)  Source: .Tissue None  Gram Stain (09-24-20 @ 04:12):    Few polymorphonuclear leukocytes seen per low power field    No organisms seen per oil power field  Preliminary Report (09-24-20 @ 22:52):    Rare Klebsiella pneumoniae  Organism: Klebsiella pneumoniae (09-25-20 @ 18:36)  Organism: Klebsiella pneumoniae (09-25-20 @ 18:36)      -  Amikacin: S <=16      -  Amoxicillin/Clavulanic Acid: S <=8/4      -  Ampicillin: R >16 These ampicillin results predict results for amoxicillin      -  Ampicillin/Sulbactam: S <=4/2 Enterobacter, Citrobacter, and Serratia may develop resistance during prolonged therapy (3-4 days)      -  Aztreonam: S <=4      -  Cefazolin: S <=2 Enterobacter, Citrobacter, and Serratia may develop resistance during prolonged therapy (3-4 days)      -  Cefepime: S <=2      -  Cefoxitin: S <=8      -  Ceftriaxone: S <=1 Enterobacter, Citrobacter, and Serratia may develop resistance during prolonged therapy      -  Ciprofloxacin: S <=0.25      -  Ertapenem: S <=0.5      -  Gentamicin: S <=2      -  Imipenem: S <=1      -  Levofloxacin: S <=0.5      -  Meropenem: S <=1      -  Piperacillin/Tazobactam: S <=8      -  Tobramycin: S <=2      -  Trimethoprim/Sulfamethoxazole: S <=0.5/9.5      Method Type: ABEL    Culture - Tissue with Gram Stain (collected 09-22-20 @ 23:40)  Source: .Tissue None  Gram Stain (09-24-20 @ 02:09):    Rare polymorphonuclear leukocytes seen per low power field    No organisms seen per oil power field  Preliminary Report (09-24-20 @ 21:45):    No growth            RADIOLOGY & ADDITIONAL TESTS:  Personal review of radiological diagnostics performed  Echo and EKG results noted when applicable.     MEDICATIONS:  acetaminophen   Tablet .. 650 milliGRAM(s) Oral every 6 hours PRN  ascorbic acid 500 milliGRAM(s) Oral two times a day  chlorhexidine 4% Liquid 1 Application(s) Topical daily  chlorhexidine 4% Liquid 1 Application(s) Topical daily  furosemide    Tablet 20 milliGRAM(s) Oral daily  heparin   Injectable 5000 Unit(s) SubCutaneous every 8 hours  insulin regular  human corrective regimen sliding scale   IV Push Before meals and at bedtime  ketorolac   Injectable 30 milliGRAM(s) IV Push every 8 hours PRN  linezolid  IVPB 600 milliGRAM(s) IV Intermittent <User Schedule>  metoprolol tartrate 25 milliGRAM(s) Oral every 12 hours  multivitamin/minerals 1 Tablet(s) Oral daily  oxyCODONE    IR 10 milliGRAM(s) Oral every 6 hours PRN  oxyCODONE    IR 5 milliGRAM(s) Oral every 4 hours PRN  pantoprazole    Tablet 40 milliGRAM(s) Oral before breakfast  piperacillin/tazobactam IVPB.. 4.5 Gram(s) IV Intermittent every 6 hours  sodium chloride 0.9%. 1000 milliLiter(s) IV Continuous <Continuous>      ANTIBIOTICS:  linezolid  IVPB 600 milliGRAM(s) IV Intermittent <User Schedule>  piperacillin/tazobactam IVPB.. 4.5 Gram(s) IV Intermittent every 6 hours

## 2020-09-29 LAB
ANION GAP SERPL CALC-SCNC: 5 MMOL/L — LOW (ref 7–14)
BASOPHILS # BLD AUTO: 0.13 K/UL — SIGNIFICANT CHANGE UP (ref 0–0.2)
BASOPHILS NFR BLD AUTO: 0.9 % — SIGNIFICANT CHANGE UP (ref 0–1)
BUN SERPL-MCNC: 4 MG/DL — LOW (ref 10–20)
CALCIUM SERPL-MCNC: 7.7 MG/DL — LOW (ref 8.5–10.1)
CHLORIDE SERPL-SCNC: 99 MMOL/L — SIGNIFICANT CHANGE UP (ref 98–110)
CO2 SERPL-SCNC: 27 MMOL/L — SIGNIFICANT CHANGE UP (ref 17–32)
CREAT SERPL-MCNC: <0.5 MG/DL — LOW (ref 0.7–1.5)
EOSINOPHIL # BLD AUTO: 0.51 K/UL — SIGNIFICANT CHANGE UP (ref 0–0.7)
EOSINOPHIL NFR BLD AUTO: 3.5 % — SIGNIFICANT CHANGE UP (ref 0–8)
GIANT PLATELETS BLD QL SMEAR: PRESENT — SIGNIFICANT CHANGE UP
GLUCOSE BLDC GLUCOMTR-MCNC: 115 MG/DL — HIGH (ref 70–99)
GLUCOSE BLDC GLUCOMTR-MCNC: 118 MG/DL — HIGH (ref 70–99)
GLUCOSE BLDC GLUCOMTR-MCNC: 119 MG/DL — HIGH (ref 70–99)
GLUCOSE BLDC GLUCOMTR-MCNC: 142 MG/DL — HIGH (ref 70–99)
GLUCOSE SERPL-MCNC: 107 MG/DL — HIGH (ref 70–99)
HCT VFR BLD CALC: 26.3 % — LOW (ref 37–47)
HGB BLD-MCNC: 8.3 G/DL — LOW (ref 12–16)
LYMPHOCYTES # BLD AUTO: 0.51 K/UL — LOW (ref 1.2–3.4)
LYMPHOCYTES # BLD AUTO: 3.5 % — LOW (ref 20.5–51.1)
MAGNESIUM SERPL-MCNC: 1.9 MG/DL — SIGNIFICANT CHANGE UP (ref 1.8–2.4)
MANUAL SMEAR VERIFICATION: SIGNIFICANT CHANGE UP
MCHC RBC-ENTMCNC: 26.5 PG — LOW (ref 27–31)
MCHC RBC-ENTMCNC: 31.6 G/DL — LOW (ref 32–37)
MCV RBC AUTO: 84 FL — SIGNIFICANT CHANGE UP (ref 81–99)
MONOCYTES # BLD AUTO: 0.63 K/UL — HIGH (ref 0.1–0.6)
MONOCYTES NFR BLD AUTO: 4.4 % — SIGNIFICANT CHANGE UP (ref 1.7–9.3)
NEUTROPHILS # BLD AUTO: 12.66 K/UL — HIGH (ref 1.4–6.5)
NEUTROPHILS NFR BLD AUTO: 86.8 % — HIGH (ref 42.2–75.2)
NEUTS BAND # BLD: 0.9 % — SIGNIFICANT CHANGE UP (ref 0–6)
PHOSPHATE SERPL-MCNC: 2.4 MG/DL — SIGNIFICANT CHANGE UP (ref 2.1–4.9)
PLAT MORPH BLD: NORMAL — SIGNIFICANT CHANGE UP
PLATELET # BLD AUTO: 624 K/UL — HIGH (ref 130–400)
POTASSIUM SERPL-MCNC: 4.2 MMOL/L — SIGNIFICANT CHANGE UP (ref 3.5–5)
POTASSIUM SERPL-SCNC: 4.2 MMOL/L — SIGNIFICANT CHANGE UP (ref 3.5–5)
RBC # BLD: 3.13 M/UL — LOW (ref 4.2–5.4)
RBC # FLD: 15.9 % — HIGH (ref 11.5–14.5)
RBC BLD AUTO: NORMAL — SIGNIFICANT CHANGE UP
SODIUM SERPL-SCNC: 131 MMOL/L — LOW (ref 135–146)
WBC # BLD: 14.43 K/UL — HIGH (ref 4.8–10.8)
WBC # FLD AUTO: 14.43 K/UL — HIGH (ref 4.8–10.8)

## 2020-09-29 PROCEDURE — 71045 X-RAY EXAM CHEST 1 VIEW: CPT | Mod: 26

## 2020-09-29 PROCEDURE — 93010 ELECTROCARDIOGRAM REPORT: CPT

## 2020-09-29 PROCEDURE — 99024 POSTOP FOLLOW-UP VISIT: CPT

## 2020-09-29 PROCEDURE — 99233 SBSQ HOSP IP/OBS HIGH 50: CPT | Mod: 24

## 2020-09-29 RX ORDER — KETOROLAC TROMETHAMINE 30 MG/ML
30 SYRINGE (ML) INJECTION ONCE
Refills: 0 | Status: DISCONTINUED | OUTPATIENT
Start: 2020-09-29 | End: 2020-09-29

## 2020-09-29 RX ORDER — HYDROMORPHONE HYDROCHLORIDE 2 MG/ML
0.5 INJECTION INTRAMUSCULAR; INTRAVENOUS; SUBCUTANEOUS ONCE
Refills: 0 | Status: DISCONTINUED | OUTPATIENT
Start: 2020-09-29 | End: 2020-09-29

## 2020-09-29 RX ORDER — HYDROMORPHONE HYDROCHLORIDE 2 MG/ML
1 INJECTION INTRAMUSCULAR; INTRAVENOUS; SUBCUTANEOUS ONCE
Refills: 0 | Status: DISCONTINUED | OUTPATIENT
Start: 2020-09-29 | End: 2020-09-29

## 2020-09-29 RX ORDER — MAGNESIUM SULFATE 500 MG/ML
2 VIAL (ML) INJECTION ONCE
Refills: 0 | Status: COMPLETED | OUTPATIENT
Start: 2020-09-29 | End: 2020-09-29

## 2020-09-29 RX ADMIN — Medication 50 GRAM(S): at 05:06

## 2020-09-29 RX ADMIN — OXYCODONE HYDROCHLORIDE 5 MILLIGRAM(S): 5 TABLET ORAL at 12:06

## 2020-09-29 RX ADMIN — Medication 500 MILLIGRAM(S): at 18:20

## 2020-09-29 RX ADMIN — HYDROMORPHONE HYDROCHLORIDE 0.5 MILLIGRAM(S): 2 INJECTION INTRAMUSCULAR; INTRAVENOUS; SUBCUTANEOUS at 05:36

## 2020-09-29 RX ADMIN — Medication 30 MILLIGRAM(S): at 15:15

## 2020-09-29 RX ADMIN — CEFTRIAXONE 100 MILLIGRAM(S): 500 INJECTION, POWDER, FOR SOLUTION INTRAMUSCULAR; INTRAVENOUS at 10:00

## 2020-09-29 RX ADMIN — HYDROMORPHONE HYDROCHLORIDE 0.5 MILLIGRAM(S): 2 INJECTION INTRAMUSCULAR; INTRAVENOUS; SUBCUTANEOUS at 08:18

## 2020-09-29 RX ADMIN — Medication 62.5 MILLIMOLE(S): at 05:06

## 2020-09-29 RX ADMIN — HEPARIN SODIUM 5000 UNIT(S): 5000 INJECTION INTRAVENOUS; SUBCUTANEOUS at 21:03

## 2020-09-29 RX ADMIN — Medication 30 MILLIGRAM(S): at 20:54

## 2020-09-29 RX ADMIN — Medication 20 MILLIGRAM(S): at 05:06

## 2020-09-29 RX ADMIN — HYDROMORPHONE HYDROCHLORIDE 0.5 MILLIGRAM(S): 2 INJECTION INTRAMUSCULAR; INTRAVENOUS; SUBCUTANEOUS at 15:15

## 2020-09-29 RX ADMIN — CHLORHEXIDINE GLUCONATE 1 APPLICATION(S): 213 SOLUTION TOPICAL at 11:29

## 2020-09-29 RX ADMIN — HYDROMORPHONE HYDROCHLORIDE 0.5 MILLIGRAM(S): 2 INJECTION INTRAMUSCULAR; INTRAVENOUS; SUBCUTANEOUS at 05:06

## 2020-09-29 RX ADMIN — HYDROMORPHONE HYDROCHLORIDE 1 MILLIGRAM(S): 2 INJECTION INTRAMUSCULAR; INTRAVENOUS; SUBCUTANEOUS at 13:17

## 2020-09-29 RX ADMIN — HYDROMORPHONE HYDROCHLORIDE 0.5 MILLIGRAM(S): 2 INJECTION INTRAMUSCULAR; INTRAVENOUS; SUBCUTANEOUS at 08:58

## 2020-09-29 RX ADMIN — Medication 100 MILLIGRAM(S): at 21:02

## 2020-09-29 RX ADMIN — Medication 30 MILLIGRAM(S): at 14:29

## 2020-09-29 RX ADMIN — Medication 30 MILLIGRAM(S): at 11:39

## 2020-09-29 RX ADMIN — OXYCODONE HYDROCHLORIDE 5 MILLIGRAM(S): 5 TABLET ORAL at 10:28

## 2020-09-29 RX ADMIN — OXYCODONE HYDROCHLORIDE 5 MILLIGRAM(S): 5 TABLET ORAL at 03:42

## 2020-09-29 RX ADMIN — HEPARIN SODIUM 5000 UNIT(S): 5000 INJECTION INTRAVENOUS; SUBCUTANEOUS at 05:07

## 2020-09-29 RX ADMIN — Medication 1 TABLET(S): at 11:30

## 2020-09-29 RX ADMIN — Medication 100 MILLIGRAM(S): at 13:03

## 2020-09-29 RX ADMIN — OXYCODONE HYDROCHLORIDE 5 MILLIGRAM(S): 5 TABLET ORAL at 18:20

## 2020-09-29 RX ADMIN — HEPARIN SODIUM 5000 UNIT(S): 5000 INJECTION INTRAVENOUS; SUBCUTANEOUS at 13:16

## 2020-09-29 RX ADMIN — HYDROMORPHONE HYDROCHLORIDE 1 MILLIGRAM(S): 2 INJECTION INTRAMUSCULAR; INTRAVENOUS; SUBCUTANEOUS at 13:53

## 2020-09-29 RX ADMIN — Medication 100 MILLIGRAM(S): at 05:08

## 2020-09-29 RX ADMIN — Medication 30 MILLIGRAM(S): at 02:10

## 2020-09-29 RX ADMIN — OXYCODONE HYDROCHLORIDE 5 MILLIGRAM(S): 5 TABLET ORAL at 04:31

## 2020-09-29 RX ADMIN — Medication 30 MILLIGRAM(S): at 01:38

## 2020-09-29 RX ADMIN — Medication 30 MILLIGRAM(S): at 21:45

## 2020-09-29 RX ADMIN — HYDROMORPHONE HYDROCHLORIDE 0.5 MILLIGRAM(S): 2 INJECTION INTRAMUSCULAR; INTRAVENOUS; SUBCUTANEOUS at 22:22

## 2020-09-29 RX ADMIN — PANTOPRAZOLE SODIUM 40 MILLIGRAM(S): 20 TABLET, DELAYED RELEASE ORAL at 06:03

## 2020-09-29 RX ADMIN — HYDROMORPHONE HYDROCHLORIDE 0.5 MILLIGRAM(S): 2 INJECTION INTRAMUSCULAR; INTRAVENOUS; SUBCUTANEOUS at 14:32

## 2020-09-29 RX ADMIN — Medication 30 MILLIGRAM(S): at 12:53

## 2020-09-29 RX ADMIN — HYDROMORPHONE HYDROCHLORIDE 0.5 MILLIGRAM(S): 2 INJECTION INTRAMUSCULAR; INTRAVENOUS; SUBCUTANEOUS at 23:00

## 2020-09-29 RX ADMIN — Medication 500 MILLIGRAM(S): at 05:06

## 2020-09-29 NOTE — PROGRESS NOTE ADULT - GIT ABD PE PAL DETAILS PC
distended/tender/bowel sounds hypoactive
tender/distended/rebound tenderness/guarding/rigidity
tender
bowel sounds hypoactive/tender
tender/distended

## 2020-09-29 NOTE — CHART NOTE - NSCHARTNOTEFT_GEN_A_CORE
No new complaints  Hypotensive last night and required 500ml fluid bolus  Afebrile  Tolerating PO diet but ? adequacy of diet    T(F): 98.2 (20 @ 10:32), Max: 98.9 (20 @ 16:00)  HR: 131 (20 @ 10:32) (96 - 131)  BP: 106/57 (20 @ 10:32) (88/57 - 106/64)  RR: 18 (20 @ 10:32) (14 - 29)  SpO2: 100% (20 @ 04:00) (98% - 100%)    I&O's Detail    28 Sep 2020 07:  -  29 Sep 2020 07:00  --------------------------------------------------------  IN:    IV PiggyBack: 50 mL    IV PiggyBack: 250 mL    Lactated Ringers Bolus: 500 mL    Oral Fluid: 360 mL    sodium chloride 0.9%: 1200 mL  Total IN: 2360 mL    OUT:    Voided (mL): 1500 mL  Total OUT: 1500 mL    Total NET: 860 mL    29 Sep 2020 07:  -  29 Sep 2020 11:31  --------------------------------------------------------  IN:    IV PiggyBack: 50 mL    sodium chloride 0.9%: 125 mL  Total IN: 175 mL    OUT:  Total OUT: 0 mL    Total NET: 175 mL        131<L>  |  99  |  4<L>  ----------------------------<  107<H>  4.2   |  27  |  <0.5<L>    Ca    7.7<L>      29 Sep 2020 01:34  Phos  2.4       Mg     1.9                             8.3    14.43 )-----------( 624      ( 29 Sep 2020 01:34 )             26.3     CAPILLARY BLOOD GLUCOSE  POCT Blood Glucose.: 142 mg/dL (29 Sep 2020 11:20)  POCT Blood Glucose.: 118 mg/dL (29 Sep 2020 07:25)  POCT Blood Glucose.: 129 mg/dL (28 Sep 2020 21:48)  POCT Blood Glucose.: 182 mg/dL (28 Sep 2020 15:41)  POCT Blood Glucose.: 129 mg/dL (28 Sep 2020 12:14)    Daily Weight in k.6 (29 Sep 2020 00:00)    Diet, Mechanical Soft:   Consistent Carbohydrate {Evening Snack}  Supplement Feeding Modality:  Oral  Ensure Enlive Cans or Servings Per Day:  1       Frequency:  Two Times a day (20 @ 17:56)    ASSESSMENT  45y Female s/p small bowel resection with primary anastomosis 9/15/20, taken back to the OR for washing out stomach contents, small bowel resection with anastomosis, lysis of intestinal adhesions (staple line of proximal limb of anastomosis break down with gross spillage    - feculent peritonitis/sepsis  - severe protein calorie malnutrition   - hyperglycemia  - hypophosphatemia  - hypomagnesemia    ******Please be aware that pt has had a very poor PO intake over the past month and is severely malnourished with high risk for delayed wound healing    PLAN  - cont PO diet as tolerated    - add Pj 2 packs PO daily and document intake  - cont Ensure Enlive 240ml PO q12hrs and document intake  - cont MVI with minerals 1 tab PO q24hrs, 500mg vitamin C PO q12hrs  - check kashif cts X 2 days  - monitor BMP, In phos, Mg  - check vitamin D, zinc levels

## 2020-09-29 NOTE — PROGRESS NOTE ADULT - ASSESSMENT
IMPRESSION;  Presented with Feculent peritonitis secondary to perforated viscus  9/15 : s/p diagnostic lap, NARCISO, SBR (43cm ileum) w/ primary anastamosis (side to side)  Post op pelvic abscesses  CT A/P 9/22 : pelvic abscesses, enteritis, likely reactive, b/l pleural effusions/atalectasis.  No right flank soft tissue infection  Right flank cellulitis with no abscess/fascitis : resolved. Seems to have originated from the abdominal wound which was locally debrided 9/22 and presently with no ongoing infectious process. WCx Klebsiella  Right flank appears significantly improved with no pain and resolved erythema  BCx 9/17 NGTD  WCx 9/22 rare PMNs, no org, Klebsiella    RECOMMENDATIONS;  Rocephin 2 gm iv q24h  Flagyl 500 mg iv q8h

## 2020-09-29 NOTE — PROGRESS NOTE ADULT - ATTENDING COMMENTS
I examined the patient and discussed my plan with the resident  the patient has mild increased abdominal pain but a benign exam and no return of cellulitis  She continues to have diarrhea and will need cdiff today >24h after stopping senna  she is tachycardic, suspect hypovolemia from diuresis, will hold lasix and give fluid bolus  dispo ceu

## 2020-09-29 NOTE — PROGRESS NOTE ADULT - ASSESSMENT
46 y/o no Pmhx s/p PAULIE/BS on 8/19 for fibroid uterus POD#37, s/p diagnostic laparoscopy converted to exploratory laparotomy and small bowel reanastomosis, with breakdown of staple line of proximal limb of anastomosis, s/p repeat exploratory laparotomy with small bowel resection, reanastomosis and appendectomy, POD#10 tachycardic and hypotensive postop, now off pressors, admitted to SICU for close hemodynamic monitoring, s/p I&D for cellulitis    NEURO:    AAOx3      PO tylenol, oxycodone prn, Dilaudid prn, toradol prn - pain better controlled today compared to yesterday    RESP:     O2 sat >95% on RA    ABG PRN    f/u AM CXR    CT Chest (PE protocol) 9/17- negative for PE; small-moderate b/l pleural effusions with compressive atelectasis      CARDS:     Hypotension Resolved- Off all pressors    Acute tachycardia - resolved, currently in the 90s, cont metoprolol 25mg BID.     CE neg, 0.04, 0.16, 0.07 - no longer  trending     ECHO: EF 50-55%, G1DD, mild MR, mild TR.    GI/NUTR:     9/20 CT Abd/pelvis with PO and IV - abscess in lower abd/pelvis- IR not draining 2/2 location     CT A/P (9/17) - negative for leak; postsurgical changes with post-op ileus    Diet: soft mechanical, tolerating, +BM/+flatus    Hepatic panel 9/18: WNL    IVL    GI ppx: PPI      /RENAL:     Silva  out, voiding     BUN/Cr trend 9/<0.5    Replete lytes prn, K 3.3, mag 1.8, phos 3.2     Lactate 1.3 (max 6.5) (no longer trending)    HEME/ONC:     Hgb trend     Transfused 1 uPRBC (split 1/2 and 1/2 on 9/18)    HIT antibody: negative    DVT Prophylaxis: HSQ, SCDs    ID:    Afebrile    WBC lateral     -ID following: Rocephin and Flagyl     9/22 repeat bcx ordered    BCx 9/17- (NGTD)     ENDO:    FS ACqHS, tightened ISS    Solucortef 50 q6, d/c'd 9/20    DVT Prophylaxis: heparin   Injectable 5000 Unit(s) SubCutaneous every 8 hours  GI Prophylaxis:pantoprazole  Injectable 40 milliGRAM(s) IV Push daily    ***Tubes/Lines/Drains  ***  Peripheral IV  R. Basilic Midline                      Date: 9/17  L PIV

## 2020-09-29 NOTE — PROGRESS NOTE ADULT - SUBJECTIVE AND OBJECTIVE BOX
GLORIA SCHAFER  45y, Female    All available historical data reviewed    OVERNIGHT EVENTS:  no fevers  feels well and has no complaints      ROS:  General: Denies rigors, nightsweats  HEENT: Denies headache, rhinorrhea, sore throat, eye pain  CV: Denies CP, palpitations  PULM: Denies wheezing, hemoptysis  GI: Denies hematemesis, hematochezia, melena  : Denies discharge, hematuria  MSK: Denies arthralgias, myalgias  SKIN: Denies rash, lesions  NEURO: Denies paresthesias, weakness  PSYCH: Denies depression, anxiety    VITALS:  T(F): 98.2, Max: 98.9 (09-28-20 @ 16:00)  HR: 131  BP: 106/57  RR: 18Vital Signs Last 24 Hrs  T(C): 36.8 (29 Sep 2020 10:32), Max: 37.2 (28 Sep 2020 16:00)  T(F): 98.2 (29 Sep 2020 10:32), Max: 98.9 (28 Sep 2020 16:00)  HR: 131 (29 Sep 2020 10:32) (107 - 131)  BP: 106/57 (29 Sep 2020 10:32) (88/57 - 106/57)  BP(mean): 3 (29 Sep 2020 10:32) (3 - 83)  RR: 18 (29 Sep 2020 10:32) (18 - 29)  SpO2: 100% (29 Sep 2020 04:00) (98% - 100%)    TESTS & MEASUREMENTS:                        8.3    14.43 )-----------( 624      ( 29 Sep 2020 01:34 )             26.3     09-29    131<L>  |  99  |  4<L>  ----------------------------<  107<H>  4.2   |  27  |  <0.5<L>    Ca    7.7<L>      29 Sep 2020 01:34  Phos  2.4     09-29  Mg     1.9     09-29          Culture - Tissue with Gram Stain (collected 09-22-20 @ 23:40)  Source: .Tissue None  Gram Stain (09-24-20 @ 04:12):    Few polymorphonuclear leukocytes seen per low power field    No organisms seen per oil power field  Final Report (09-28-20 @ 16:43):    Rare Klebsiella pneumoniae  Organism: Klebsiella pneumoniae (09-28-20 @ 16:43)  Organism: Klebsiella pneumoniae (09-28-20 @ 16:43)      -  Amikacin: S <=16      -  Amoxicillin/Clavulanic Acid: S <=8/4      -  Ampicillin: R >16 These ampicillin results predict results for amoxicillin      -  Ampicillin/Sulbactam: S <=4/2 Enterobacter, Citrobacter, and Serratia may develop resistance during prolonged therapy (3-4 days)      -  Aztreonam: S <=4      -  Cefazolin: S <=2 Enterobacter, Citrobacter, and Serratia may develop resistance during prolonged therapy (3-4 days)      -  Cefepime: S <=2      -  Cefoxitin: S <=8      -  Ceftriaxone: S <=1 Enterobacter, Citrobacter, and Serratia may develop resistance during prolonged therapy      -  Ciprofloxacin: S <=0.25      -  Ertapenem: S <=0.5      -  Gentamicin: S <=2      -  Imipenem: S <=1      -  Levofloxacin: S <=0.5      -  Meropenem: S <=1      -  Piperacillin/Tazobactam: S <=8      -  Tobramycin: S <=2      -  Trimethoprim/Sulfamethoxazole: S <=0.5/9.5      Method Type: ABEL    Culture - Tissue with Gram Stain (collected 09-22-20 @ 23:40)  Source: .Tissue None  Gram Stain (09-24-20 @ 02:09):    Rare polymorphonuclear leukocytes seen per low power field    No organisms seen per oil power field  Preliminary Report (09-28-20 @ 18:49):    Growth in fluid media only Gram Negative Rods            RADIOLOGY & ADDITIONAL TESTS:  Personal review of radiological diagnostics performed  Echo and EKG results noted when applicable.     MEDICATIONS:  acetaminophen   Tablet .. 650 milliGRAM(s) Oral every 6 hours PRN  ascorbic acid 500 milliGRAM(s) Oral two times a day  cefTRIAXone   IVPB 2000 milliGRAM(s) IV Intermittent every 24 hours  cefTRIAXone   IVPB      chlorhexidine 4% Liquid 1 Application(s) Topical daily  chlorhexidine 4% Liquid 1 Application(s) Topical daily  dextrose 50% Injectable 12.5 Gram(s) IV Push once  dextrose 50% Injectable 25 Gram(s) IV Push once  dextrose 50% Injectable 25 Gram(s) IV Push once  heparin   Injectable 5000 Unit(s) SubCutaneous every 8 hours  insulin regular  human corrective regimen sliding scale   SubCutaneous Before meals and at bedtime  ketorolac   Injectable 30 milliGRAM(s) IV Push every 8 hours PRN  metoprolol tartrate 25 milliGRAM(s) Oral every 12 hours  metroNIDAZOLE  IVPB      metroNIDAZOLE  IVPB 500 milliGRAM(s) IV Intermittent every 8 hours  multivitamin/minerals 1 Tablet(s) Oral daily  oxyCODONE    IR 5 milliGRAM(s) Oral every 6 hours PRN  pantoprazole    Tablet 40 milliGRAM(s) Oral before breakfast  sodium chloride 0.9%. 1000 milliLiter(s) IV Continuous <Continuous>      ANTIBIOTICS:  cefTRIAXone   IVPB 2000 milliGRAM(s) IV Intermittent every 24 hours  cefTRIAXone   IVPB      metroNIDAZOLE  IVPB      metroNIDAZOLE  IVPB 500 milliGRAM(s) IV Intermittent every 8 hours

## 2020-09-29 NOTE — PROGRESS NOTE ADULT - SUBJECTIVE AND OBJECTIVE BOX
GENERAL SURGERY PROGRESS NOTE     GLORIA SCHAFER  [address] [place] [address] day :15d  POD:  Procedure: Incision and drainage, wound, torso  Insertion, arterial line, percutaneous  Washing out stomach contents  Small bowel resection with anastomosis  Lysis of intestinal adhesions  Diagnostic laparoscopy      Surgical Attending: Mariann Rubio  Overnight events: No acute events overnight. Patient found in NAD. Pain control improving. continues passing G & BMs. Low grade tachycardia persists.    T(F): 98.5 (09-28-20 @ 20:00), Max: 99.3 (09-28-20 @ 04:00)  HR: 109 (09-29-20 @ 00:00) (96 - 127)  BP: 101/59 (09-29-20 @ 00:00) (88/57 - 110/72)  ABP: --  ABP(mean): --  RR: 18 (09-29-20 @ 00:00) (14 - 29)  SpO2: 100% (09-29-20 @ 00:00) (98% - 100%)      09-27-20 @ 07:01  -  09-28-20 @ 07:00  --------------------------------------------------------  IN:    IV PiggyBack: 325 mL    IV PiggyBack: 700 mL    IV PiggyBack: 187.5 mL    Oral Fluid: 600 mL    sodium chloride 0.9%: 1150 mL  Total IN: 2962.5 mL    OUT:    Voided (mL): 2325 mL  Total OUT: 2325 mL    Total NET: 637.5 mL      09-28-20 @ 07:01  -  09-29-20 @ 02:01  --------------------------------------------------------  IN:    IV PiggyBack: 50 mL    IV PiggyBack: 100 mL    Oral Fluid: 360 mL    sodium chloride 0.9%: 900 mL  Total IN: 1410 mL    OUT:    Voided (mL): 850 mL  Total OUT: 850 mL    Total NET: 560 mL        DIET/FLUIDS: ascorbic acid 500 milliGRAM(s) Oral two times a day  lactated ringers Bolus 500 milliLiter(s) IV Bolus once  multivitamin/minerals 1 Tablet(s) Oral daily  sodium chloride 0.9%. 1000 milliLiter(s) IV Continuous <Continuous>       GI proph:  pantoprazole    Tablet 40 milliGRAM(s) Oral before breakfast    AC/ proph: heparin   Injectable 5000 Unit(s) SubCutaneous every 8 hours    ABx: cefTRIAXone   IVPB 2000 milliGRAM(s) IV Intermittent every 24 hours  cefTRIAXone   IVPB      metroNIDAZOLE  IVPB      metroNIDAZOLE  IVPB 500 milliGRAM(s) IV Intermittent every 8 hours      GEN: NAD,  HEENT: NC/AT  CHEST: Symmetric chest wall expansion. low grade tachycardia  ABD: S/D, mild tender at midline. dressing clean and dry.    LABS  Labs:  CAPILLARY BLOOD GLUCOSE      POCT Blood Glucose.: 129 mg/dL (28 Sep 2020 21:48)  POCT Blood Glucose.: 182 mg/dL (28 Sep 2020 15:41)  POCT Blood Glucose.: 129 mg/dL (28 Sep 2020 12:14)  POCT Blood Glucose.: 123 mg/dL (28 Sep 2020 08:10)  POCT Blood Glucose.: 158 mg/dL (28 Sep 2020 06:27)                          7.8    12.92 )-----------( 562      ( 28 Sep 2020 00:06 )             24.4         09-28    133<L>  |  99  |  4<L>  ----------------------------<  107<H>  3.7   |  26  |  <0.5<L>          LFTs:         Coags:     13.00  ----< 1.13    ( 28 Sep 2020 00:06 )     35.8            Serum Pro-Brain Natriuretic Peptide: 1902 pg/mL (09-21-20 @ 00:00)  Serum Pro-Brain Natriuretic Peptide: 2013 pg/mL (09-20-20 @ 08:19)      RADIOLOGY & ADDITIONAL TESTS:    < from: CT Chest w/ IV Cont (09.26.20 @ 14:22) >  Since the prior CT abdomen and pelvis dated 9/22/2020,    1.  Decreased size of partially loculated collections/abscess in the lower abdomen/pelvis measuring 3.8 x 3.1 x 6.1 cm (previously measured 4.4 x 3.8 x 8.1 cm).    2.  Unchanged mild thickening of the small bowel wall consistent with enteritis, likely reactive.    3.  Decreased small to moderate bilateral pleural effusions with adjacent compressive atelectasis.        < end of copied text >    A/P:  GLORIA SCHAFER is a 45yFemale s/p for Incision and drainage, wound, torso  Insertion, arterial line, percutaneous  Washing out stomach contents  Small bowel resection with anastomosis  Lysis of intestinal adhesions  Diagnostic laparoscopy    No issues overnight. Patient was downgraded from Burn ICU. Will continue with daily dressing changes and tachycardia management    Plan:   - management as per SICU  - pain management  - daily dressing changes  - HR trend and treat

## 2020-09-29 NOTE — PROGRESS NOTE ADULT - ATTENDING COMMENTS
44yo female with PSHx PAULIE/BSO 8/19/2020 for fibroids admitted for persistent small bowel obstruction s/p laparoscopic NARCISO, laparotomy, extensive NARCISO, small bowel resection with primary anastomosis 9/15 complicated by small bowel leak s/p re-exploration, wash out, small bowel resection and primary anastomosis 9/15. Complained of lower abdominal pain with cellulitis and induration. CT chest/A/P 9/18 showed speck of subcutaneous air and exam was concerning for crepitus. Patient underwent bilateral flank exploration of subcutaneous tissue - negative for necrotizing infection. CT chest/A/P with PO and IV contrast 9/26 showed improving fluid collection, improving bilateral pleural effusions, and enteritis.     At this time, patient is hemodynamically stable with overall improving tachycarida. No pressors. Breathing comfortably. Complains of abdominal pain "inside" but overall improving. Tolerated regular diet, +bowel function - gas and BM. Receiving TPN. WBC 14 from 12 from 16 from 19 from 17 from 14. Continue antibiotics. Klebsiella from wound culture. Central line site looks clean, daily dressing changes. ID following. Persistent hyponatremia Na 129.  with patient and ICU. Pain control, trend labs, continue antibiotics. Encourage ambulation/OOB, incentive spirometer, DVT ppx. Primary care as per ICU.

## 2020-09-29 NOTE — PROGRESS NOTE ADULT - SUBJECTIVE AND OBJECTIVE BOX
GLORIA SCHAFER  [id number]  45y Female    Indication for ICU admission: intraabdominal sepsis, s/p diagnostic lap, NARCISO, SBR (43cm ileum) w/ primary anastamosis (side to side)  Admit Date:09-14-20  ICU Date: 9/15/2020  OR Date: 9/15/2020 x2    No Known Allergies    PAST MEDICAL & SURGICAL HISTORY:  Uterine fibroid    Status post bilateral salpingectomy    S/P PAULIE (total abdominal hysterectomy)      Home Medications:  ibuprofen 600 mg oral tablet: 1 tab(s) orally every 6 hours, As needed, Mild Pain (1 - 3) (01 Sep 2020 06:26)      24 HOUR EVENTS:    Overnight: Hypotensive 500cc bolus LR. Repleted Sodium Phos and Mg. Voiding. Afebrile.     NEURO:    AAOx3      PO tylenol, oxycodone prn, toradol prn     RESP:     O2 sat >95% on RA    ABG PRN    AM CXR stable    CT Chest (PE protocol) 9/17- negative for PE; small-moderate b/l pleural effusions with     compressive atelectasis    -CT chest 9/26: decreased small to moderate b/l pleural effusions    CARDS:     Hypotension Resolved- Off all pressors    Acute tachycardia - now in the 90s, cont metoprolol 25mg BID   -On Lasix 20mg po daily    CE neg, 0.04, 0.16, 0.07 - no longer  trending     ECHO: EF 50-55%, G1DD, mild MR, mild TR.    GI/NUTR:     -s/p I&D of wound cellulitis 9/23---no signs of STI, packed with betadine and Kerlex  -CT a/p 9/26: Decreased size of partially loculated collections/abscess in the lower abdomen/pelvis measuring 3.8 x 3.1 x 6.1 cm (previously measured 4.4 x 3.8 x 8.1 cm).    -9/20 CT Abd/pelvis with PO and IV - abscess in lower abd/pelvis- IR not draining 2/2      location     -CT A/P (9/17) - negative for leak; postsurgical changes with post-op ileus    -Diet: soft mechanical     -Hepatic panel 9/18: WNL    -GI ppx: PPI    -holding senna due to diarrhea      /RENAL:     -hyponatremia (Na 128 > 130 > 133): cont NS @ 50    Silva out, voiding     BUN/Cr trend 5/<0.5    Lactatemia resolved to 1.3 (max 6.5)    HEME/ONC:     Hgb lateral 7.6 > 8.6>8.0 > 7.8    Transfused 1 uPRBC (split 1/2 and 1/2 on 9/18)    Plt 230 > 557    HIT antibody: negative    DVT Prophylaxis: HSQ, SCDs    ID:    Afebrile,  WBC  19.7 > 16.3 >12.92     -ID following: recommend imaging (done), start rocephin 2g q24, flagyl 500 q8, d/c Zosyn/Zyvox       BCx 9/17- (NGTD)    9/22 Tissue gram stain rare Klebsiella pneumoniae --> sensitive to everything except Ampicillin     ENDO:    Off insulin gtt, on ISS    FS ACqHS, range 119-164    Off Solucortef 50 q6, d/c'd 9/20    LINES/DRAINS:   Right midline and Left peripheral IV  RIJ central line removed 9/28/20  PICC line placement canceled    DVT Prophylaxis: heparin   Injectable 5000 Unit(s) SubCutaneous every 8 hours  GI Prophylaxis:pantoprazole  Injectable 40 milliGRAM(s) oral    Activity- OOB/ambulation    ***Tubes/Lines/Drains  ***  Peripheral IV  R. Basilic Midline                      Date: 9/17  R central IJ     [X] A ten-point review of systems was otherwise negative except as noted above.  [  ] Due to altered mental status/intubation, subjective information was not attained from the patient. History was obtained, to the extent possible, from review of the chart and collateral sources of information.

## 2020-09-30 LAB
ANION GAP SERPL CALC-SCNC: 9 MMOL/L — SIGNIFICANT CHANGE UP (ref 7–14)
BUN SERPL-MCNC: 7 MG/DL — LOW (ref 10–20)
C DIFF BY PCR RESULT: NEGATIVE — SIGNIFICANT CHANGE UP
C DIFF TOX GENS STL QL NAA+PROBE: SIGNIFICANT CHANGE UP
CALCIUM SERPL-MCNC: 7.9 MG/DL — LOW (ref 8.5–10.1)
CHLORIDE SERPL-SCNC: 101 MMOL/L — SIGNIFICANT CHANGE UP (ref 98–110)
CO2 SERPL-SCNC: 24 MMOL/L — SIGNIFICANT CHANGE UP (ref 17–32)
CREAT SERPL-MCNC: <0.5 MG/DL — LOW (ref 0.7–1.5)
GLUCOSE BLDC GLUCOMTR-MCNC: 115 MG/DL — HIGH (ref 70–99)
GLUCOSE BLDC GLUCOMTR-MCNC: 116 MG/DL — HIGH (ref 70–99)
GLUCOSE BLDC GLUCOMTR-MCNC: 128 MG/DL — HIGH (ref 70–99)
GLUCOSE BLDC GLUCOMTR-MCNC: 208 MG/DL — HIGH (ref 70–99)
GLUCOSE BLDC GLUCOMTR-MCNC: 73 MG/DL — SIGNIFICANT CHANGE UP (ref 70–99)
GLUCOSE BLDC GLUCOMTR-MCNC: 98 MG/DL — SIGNIFICANT CHANGE UP (ref 70–99)
GLUCOSE SERPL-MCNC: 106 MG/DL — HIGH (ref 70–99)
HCT VFR BLD CALC: 25.1 % — LOW (ref 37–47)
HGB BLD-MCNC: 8 G/DL — LOW (ref 12–16)
LACTATE SERPL-SCNC: 0.7 MMOL/L — SIGNIFICANT CHANGE UP (ref 0.7–2)
MAGNESIUM SERPL-MCNC: 2.1 MG/DL — SIGNIFICANT CHANGE UP (ref 1.8–2.4)
MCHC RBC-ENTMCNC: 27.1 PG — SIGNIFICANT CHANGE UP (ref 27–31)
MCHC RBC-ENTMCNC: 31.9 G/DL — LOW (ref 32–37)
MCV RBC AUTO: 85.1 FL — SIGNIFICANT CHANGE UP (ref 81–99)
NRBC # BLD: 0 /100 WBCS — SIGNIFICANT CHANGE UP (ref 0–0)
PHOSPHATE SERPL-MCNC: 2.7 MG/DL — SIGNIFICANT CHANGE UP (ref 2.1–4.9)
PLATELET # BLD AUTO: 606 K/UL — HIGH (ref 130–400)
POTASSIUM SERPL-MCNC: 4.1 MMOL/L — SIGNIFICANT CHANGE UP (ref 3.5–5)
POTASSIUM SERPL-SCNC: 4.1 MMOL/L — SIGNIFICANT CHANGE UP (ref 3.5–5)
RBC # BLD: 2.95 M/UL — LOW (ref 4.2–5.4)
RBC # FLD: 16.1 % — HIGH (ref 11.5–14.5)
SODIUM SERPL-SCNC: 134 MMOL/L — LOW (ref 135–146)
WBC # BLD: 13.69 K/UL — HIGH (ref 4.8–10.8)
WBC # FLD AUTO: 13.69 K/UL — HIGH (ref 4.8–10.8)

## 2020-09-30 PROCEDURE — 99233 SBSQ HOSP IP/OBS HIGH 50: CPT | Mod: 24

## 2020-09-30 PROCEDURE — 71045 X-RAY EXAM CHEST 1 VIEW: CPT | Mod: 26

## 2020-09-30 PROCEDURE — 99024 POSTOP FOLLOW-UP VISIT: CPT

## 2020-09-30 RX ORDER — OXYCODONE HYDROCHLORIDE 5 MG/1
10 TABLET ORAL EVERY 6 HOURS
Refills: 0 | Status: DISCONTINUED | OUTPATIENT
Start: 2020-09-30 | End: 2020-10-03

## 2020-09-30 RX ORDER — OXYCODONE HYDROCHLORIDE 5 MG/1
5 TABLET ORAL EVERY 6 HOURS
Refills: 0 | Status: DISCONTINUED | OUTPATIENT
Start: 2020-09-30 | End: 2020-10-03

## 2020-09-30 RX ORDER — GABAPENTIN 400 MG/1
100 CAPSULE ORAL EVERY 8 HOURS
Refills: 0 | Status: DISCONTINUED | OUTPATIENT
Start: 2020-09-30 | End: 2020-10-03

## 2020-09-30 RX ORDER — HYDROMORPHONE HYDROCHLORIDE 2 MG/ML
1 INJECTION INTRAMUSCULAR; INTRAVENOUS; SUBCUTANEOUS ONCE
Refills: 0 | Status: DISCONTINUED | OUTPATIENT
Start: 2020-09-30 | End: 2020-09-30

## 2020-09-30 RX ORDER — IBUPROFEN 200 MG
600 TABLET ORAL EVERY 6 HOURS
Refills: 0 | Status: COMPLETED | OUTPATIENT
Start: 2020-09-30 | End: 2020-10-03

## 2020-09-30 RX ORDER — SODIUM CHLORIDE 9 MG/ML
500 INJECTION INTRAMUSCULAR; INTRAVENOUS; SUBCUTANEOUS ONCE
Refills: 0 | Status: COMPLETED | OUTPATIENT
Start: 2020-09-30 | End: 2020-09-30

## 2020-09-30 RX ADMIN — CEFTRIAXONE 100 MILLIGRAM(S): 500 INJECTION, POWDER, FOR SOLUTION INTRAMUSCULAR; INTRAVENOUS at 10:34

## 2020-09-30 RX ADMIN — HEPARIN SODIUM 5000 UNIT(S): 5000 INJECTION INTRAVENOUS; SUBCUTANEOUS at 05:29

## 2020-09-30 RX ADMIN — OXYCODONE HYDROCHLORIDE 10 MILLIGRAM(S): 5 TABLET ORAL at 18:00

## 2020-09-30 RX ADMIN — Medication 25 MILLIGRAM(S): at 17:31

## 2020-09-30 RX ADMIN — OXYCODONE HYDROCHLORIDE 10 MILLIGRAM(S): 5 TABLET ORAL at 17:31

## 2020-09-30 RX ADMIN — OXYCODONE HYDROCHLORIDE 5 MILLIGRAM(S): 5 TABLET ORAL at 22:23

## 2020-09-30 RX ADMIN — PANTOPRAZOLE SODIUM 40 MILLIGRAM(S): 20 TABLET, DELAYED RELEASE ORAL at 05:44

## 2020-09-30 RX ADMIN — OXYCODONE HYDROCHLORIDE 5 MILLIGRAM(S): 5 TABLET ORAL at 08:39

## 2020-09-30 RX ADMIN — Medication 100 MILLIGRAM(S): at 05:28

## 2020-09-30 RX ADMIN — Medication 600 MILLIGRAM(S): at 11:38

## 2020-09-30 RX ADMIN — Medication 1 TABLET(S): at 11:06

## 2020-09-30 RX ADMIN — HYDROMORPHONE HYDROCHLORIDE 1 MILLIGRAM(S): 2 INJECTION INTRAMUSCULAR; INTRAVENOUS; SUBCUTANEOUS at 17:05

## 2020-09-30 RX ADMIN — GABAPENTIN 100 MILLIGRAM(S): 400 CAPSULE ORAL at 14:44

## 2020-09-30 RX ADMIN — Medication 600 MILLIGRAM(S): at 11:08

## 2020-09-30 RX ADMIN — SODIUM CHLORIDE 50 MILLILITER(S): 9 INJECTION INTRAMUSCULAR; INTRAVENOUS; SUBCUTANEOUS at 11:08

## 2020-09-30 RX ADMIN — Medication 100 MILLIGRAM(S): at 14:44

## 2020-09-30 RX ADMIN — Medication 30 MILLIGRAM(S): at 05:30

## 2020-09-30 RX ADMIN — HEPARIN SODIUM 5000 UNIT(S): 5000 INJECTION INTRAVENOUS; SUBCUTANEOUS at 14:44

## 2020-09-30 RX ADMIN — OXYCODONE HYDROCHLORIDE 5 MILLIGRAM(S): 5 TABLET ORAL at 08:09

## 2020-09-30 RX ADMIN — OXYCODONE HYDROCHLORIDE 5 MILLIGRAM(S): 5 TABLET ORAL at 21:53

## 2020-09-30 RX ADMIN — Medication 100 MILLIGRAM(S): at 21:45

## 2020-09-30 RX ADMIN — INSULIN HUMAN 7: 100 INJECTION, SOLUTION SUBCUTANEOUS at 12:24

## 2020-09-30 RX ADMIN — GABAPENTIN 100 MILLIGRAM(S): 400 CAPSULE ORAL at 21:45

## 2020-09-30 RX ADMIN — Medication 85 MILLIMOLE(S): at 06:29

## 2020-09-30 RX ADMIN — OXYCODONE HYDROCHLORIDE 5 MILLIGRAM(S): 5 TABLET ORAL at 01:38

## 2020-09-30 RX ADMIN — Medication 25 MILLIGRAM(S): at 05:29

## 2020-09-30 RX ADMIN — Medication 500 MILLIGRAM(S): at 17:31

## 2020-09-30 RX ADMIN — Medication 30 MILLIGRAM(S): at 04:53

## 2020-09-30 RX ADMIN — HEPARIN SODIUM 5000 UNIT(S): 5000 INJECTION INTRAVENOUS; SUBCUTANEOUS at 21:45

## 2020-09-30 RX ADMIN — SODIUM CHLORIDE 500 MILLILITER(S): 9 INJECTION INTRAMUSCULAR; INTRAVENOUS; SUBCUTANEOUS at 04:08

## 2020-09-30 RX ADMIN — Medication 500 MILLIGRAM(S): at 05:29

## 2020-09-30 RX ADMIN — HYDROMORPHONE HYDROCHLORIDE 1 MILLIGRAM(S): 2 INJECTION INTRAMUSCULAR; INTRAVENOUS; SUBCUTANEOUS at 16:53

## 2020-09-30 RX ADMIN — OXYCODONE HYDROCHLORIDE 5 MILLIGRAM(S): 5 TABLET ORAL at 02:20

## 2020-09-30 NOTE — PROGRESS NOTE ADULT - ASSESSMENT
44 y/o no Pmhx s/p PAULIE/BS on 8/19 for fibroid uterus POD#37, s/p diagnostic laparoscopy converted to exploratory laparotomy and small bowel reanastomosis, with breakdown of staple line of proximal limb of anastomosis, s/p repeat exploratory laparotomy with small bowel resection, reanastomosis and appendectomy, POD#10 tachycardic and hypotensive postop, now off pressors, admitted to SICU for close hemodynamic monitoring, s/p I&D for cellulitis    NEURO:    AAOx3      PO tylenol, oxycodone prn, Dilaudid prn, toradol prn - pain better controlled today compared to yesterday    RESP:     O2 sat >95% on RA    ABG PRN    f/u AM CXR    CT Chest (PE protocol) 9/17- negative for PE; small-moderate b/l pleural effusions with compressive atelectasis      CARDS:     Hypotension Resolved- Off all pressors    Acute tachycardia - resolved, currently in the 90s, cont metoprolol 25mg BID.     CE neg, 0.04, 0.16, 0.07 - no longer  trending     ECHO: EF 50-55%, G1DD, mild MR, mild TR.    GI/NUTR:     9/20 CT Abd/pelvis with PO and IV - abscess in lower abd/pelvis- IR not draining 2/2 location     CT A/P (9/17) - negative for leak; postsurgical changes with post-op ileus    Diet: soft mechanical, tolerating, +BM/+flatus    Hepatic panel 9/18: WNL    IVL    GI ppx: PPI      /RENAL:     Silva  out, voiding     BUN/Cr trend 9/<0.5    Lactate 0.7    HEME/ONC:     Hgb trend, stable    Transfused 1 uPRBC (split 1/2 and 1/2 on 9/18)    HIT antibody: negative    DVT Prophylaxis: HSQ, SCDs    ID:    Afebrile    WBC lateral     -ID following: Rocephin and Flagyl     9/22 repeat bcx ordered    BCx 9/17- (NGTD)     ENDO:    FS ACqHS, tightened ISS    Solucortef 50 q6, d/c'd 9/20    DVT Prophylaxis: heparin   Injectable 5000 Unit(s) SubCutaneous every 8 hours  GI Prophylaxis:pantoprazole  Injectable 40 milliGRAM(s) IV Push daily    ***Tubes/Lines/Drains  ***  Peripheral IV  R. Basilic Midline                      Date: 9/17  L PIV 46 y/o no Pmhx s/p PAULIE/BS on 8/19 for fibroid uterus POD#37, s/p diagnostic laparoscopy converted to exploratory laparotomy and small bowel reanastomosis, with breakdown of staple line of proximal limb of anastomosis, s/p repeat exploratory laparotomy with small bowel resection, reanastomosis and appendectomy, POD#10 tachycardic and hypotensive postop, now off pressors, admitted to SICU for close hemodynamic monitoring, s/p I&D for cellulitis    NEURO:    AAOx3      PO tylenol, oxycodone prn, Dilaudid prn, toradol prn - pain better controlled today compared to yesterday    RESP:     O2 sat >95% on RA    ABG PRN    f/u AM CXR    CT Chest (PE protocol) 9/17- negative for PE; small-moderate b/l pleural effusions with compressive atelectasis      CARDS:     Hypotension Resolved- Off all pressors    Acute tachycardia - resolved, currently in the 90s-100s, cont metoprolol 25mg BID.     CE neg, 0.04, 0.16, 0.07 - no longer  trending     ECHO: EF 50-55%, G1DD, mild MR, mild TR.    GI/NUTR:     9/20 CT Abd/pelvis with PO and IV - abscess in lower abd/pelvis- IR not draining 2/2 location     CT A/P (9/17) - negative for leak; postsurgical changes with post-op ileus    Diet: soft mechanical, tolerating, +BM/+flatus    Hepatic panel 9/18: WNL    IVL    GI ppx: PPI      /RENAL:     Silva  out, voiding     BUN/Cr trend 7/<0.5    Lactate 0.7    HEME/ONC:     Hgb trend, stable    Transfused 1 uPRBC (split 1/2 and 1/2 on 9/18)    HIT antibody: negative    DVT Prophylaxis: HSQ, SCDs    ID:    Afebrile    WBC lateral     -ID following: Rocephin and Flagyl     9/22 repeat bcx neg    BCx 9/17- (NGTD)  C-diff 9/29 neg     ENDO:    FS ACqHS, tightened ISS    Solucortef 50 q6, d/c'd 9/20    DVT Prophylaxis: heparin   Injectable 5000 Unit(s) SubCutaneous every 8 hours  GI Prophylaxis:pantoprazole  Injectable 40 milliGRAM(s) IV Push daily    ***Tubes/Lines/Drains  ***  Peripheral IV  R. Basilic Midline                      Date: 9/17  L PIV     Dispo: [facility]

## 2020-09-30 NOTE — PROGRESS NOTE ADULT - SUBJECTIVE AND OBJECTIVE BOX
GLORIA SCHAFER  45y, Female    All available historical data reviewed    OVERNIGHT EVENTS:  none  feels well and has no complaints     ROS:  General: Denies rigors, nightsweats  HEENT: Denies headache, rhinorrhea, sore throat, eye pain  CV: Denies CP, palpitations  PULM: Denies wheezing, hemoptysis  GI: Denies hematemesis, hematochezia, melena  : Denies discharge, hematuria  MSK: Denies arthralgias, myalgias  SKIN: Denies rash, lesions  NEURO: Denies paresthesias, weakness  PSYCH: Denies depression, anxiety    VITALS:  T(F): 98.1, Max: 98.9 (09-30-20 @ 04:00)  HR: 112  BP: 103/68  RR: 18Vital Signs Last 24 Hrs  T(C): 36.7 (30 Sep 2020 11:54), Max: 37.2 (30 Sep 2020 04:00)  T(F): 98.1 (30 Sep 2020 11:54), Max: 98.9 (30 Sep 2020 04:00)  HR: 112 (30 Sep 2020 11:54) (100 - 119)  BP: 103/68 (30 Sep 2020 11:54) (93/52 - 113/76)  BP(mean): 81 (30 Sep 2020 11:54) (81 - 81)  RR: 18 (30 Sep 2020 11:54) (18 - 19)  SpO2: 99% (30 Sep 2020 08:43) (98% - 100%)    TESTS & MEASUREMENTS:                        8.0    13.69 )-----------( 606      ( 30 Sep 2020 00:45 )             25.1     09-30    134<L>  |  101  |  7<L>  ----------------------------<  106<H>  4.1   |  24  |  <0.5<L>    Ca    7.9<L>      30 Sep 2020 00:45  Phos  2.7     09-30  Mg     2.1     09-30                RADIOLOGY & ADDITIONAL TESTS:  Personal review of radiological diagnostics performed  Echo and EKG results noted when applicable.     MEDICATIONS:  acetaminophen   Tablet .. 650 milliGRAM(s) Oral every 6 hours PRN  ascorbic acid 500 milliGRAM(s) Oral two times a day  cefTRIAXone   IVPB 2000 milliGRAM(s) IV Intermittent every 24 hours  cefTRIAXone   IVPB      chlorhexidine 4% Liquid 1 Application(s) Topical daily  chlorhexidine 4% Liquid 1 Application(s) Topical daily  gabapentin 100 milliGRAM(s) Oral every 8 hours  heparin   Injectable 5000 Unit(s) SubCutaneous every 8 hours  ibuprofen  Tablet. 600 milliGRAM(s) Oral every 6 hours  insulin regular  human corrective regimen sliding scale   SubCutaneous Before meals and at bedtime  metoprolol tartrate 25 milliGRAM(s) Oral every 12 hours  metroNIDAZOLE  IVPB      metroNIDAZOLE  IVPB 500 milliGRAM(s) IV Intermittent every 8 hours  multivitamin/minerals 1 Tablet(s) Oral daily  oxyCODONE    IR 5 milliGRAM(s) Oral every 6 hours PRN  oxyCODONE    IR 10 milliGRAM(s) Oral every 6 hours PRN  pantoprazole    Tablet 40 milliGRAM(s) Oral before breakfast  sodium chloride 0.9%. 1000 milliLiter(s) IV Continuous <Continuous>      ANTIBIOTICS:  cefTRIAXone   IVPB 2000 milliGRAM(s) IV Intermittent every 24 hours  cefTRIAXone   IVPB      metroNIDAZOLE  IVPB      metroNIDAZOLE  IVPB 500 milliGRAM(s) IV Intermittent every 8 hours

## 2020-09-30 NOTE — PROGRESS NOTE ADULT - ATTENDING COMMENTS
44yo female with PSHx PAULIE/BSO 8/19/2020 for fibroids admitted for persistent small bowel obstruction s/p laparoscopic NARCISO, laparotomy, extensive NARCISO, small bowel resection with primary anastomosis 9/15 complicated by small bowel leak s/p re-exploration, wash out, small bowel resection and primary anastomosis 9/15. Complained of lower abdominal pain with cellulitis and induration. CT chest/A/P 9/18 showed speck of subcutaneous air and exam was concerning for crepitus. Patient underwent bilateral flank exploration of subcutaneous tissue - negative for necrotizing infection. CT chest/A/P with PO and IV contrast 9/26 showed improving fluid collection, improving bilateral pleural effusions, and enteritis.     At this time, patient is hemodynamically stable with overall improving tachycarida. No pressors. Breathing comfortably. Complains of abdominal pain "inside" but overall improving. Tolerated regular diet, +bowel function - gas and BM. Receiving TPN. WBC 13 from 14 from 12. Continue antibiotics. Klebsiella from wound culture. Central line site looks clean, daily dressing changes. ID following. Persistent hyponatremia Na 129.  with patient and ICU. Pain control, trend labs, continue antibiotics. Encourage ambulation/OOB, incentive spirometer, DVT ppx. Primary care as per ICU.

## 2020-09-30 NOTE — PROGRESS NOTE ADULT - SUBJECTIVE AND OBJECTIVE BOX
GENERAL SURGERY PROGRESS NOTE     GLORIA SCHAFER  45y  Female  Hospital day :16d  POD:  Procedure: Incision and drainage, wound, torso    Insertion, arterial line, percutaneous    Washing out stomach contents    Small bowel resection with anastomosis    Lysis of intestinal adhesions    Diagnostic laparoscopy    OVERNIGHT EVENTS: persistently tachycardic    T(F): 97.9 (09-29-20 @ 20:00), Max: 98.8 (09-29-20 @ 12:29)  HR: 114 (09-30-20 @ 00:00) (100 - 131)  BP: 99/57 (09-30-20 @ 00:00) (93/52 - 106/57)  RR: 18 (09-30-20 @ 00:00) (18 - 20)  SpO2: 98% (09-30-20 @ 00:00) (98% - 100%)    DIET/FLUIDS: ascorbic acid 500 milliGRAM(s) Oral two times a day  multivitamin/minerals 1 Tablet(s) Oral daily  sodium chloride 0.9%. 1000 milliLiter(s) IV Continuous <Continuous>     GI proph:  pantoprazole    Tablet 40 milliGRAM(s) Oral before breakfast    AC/ proph: heparin   Injectable 5000 Unit(s) SubCutaneous every 8 hours    ABx: cefTRIAXone   IVPB 2000 milliGRAM(s) IV Intermittent every 24 hours  cefTRIAXone   IVPB      metroNIDAZOLE  IVPB      metroNIDAZOLE  IVPB 500 milliGRAM(s) IV Intermittent every 8 hours      PHYSICAL EXAM:  GENERAL: NAD, well-appearing  CHEST/LUNG: Clear to auscultation bilaterally  HEART: Regular rate and rhythm  ABDOMEN: Soft, dressing in place, c/d/i, tender around incision site  EXTREMITIES:  No clubbing, cyanosis, or edema      LABS  Labs:  CAPILLARY BLOOD GLUCOSE      POCT Blood Glucose.: 119 mg/dL (29 Sep 2020 20:57)  POCT Blood Glucose.: 115 mg/dL (29 Sep 2020 16:45)  POCT Blood Glucose.: 142 mg/dL (29 Sep 2020 11:20)  POCT Blood Glucose.: 118 mg/dL (29 Sep 2020 07:25)                          8.3    14.43 )-----------( 624      ( 29 Sep 2020 01:34 )             26.3       Auto Neutrophil %: 86.8 % (09-29-20 @ 01:34)  Band Neutrophils %: 0.9 % (09-29-20 @ 01:34)    09-29    131<L>  |  99  |  4<L>  ----------------------------<  107<H>  4.2   |  27  |  <0.5<L>      Calcium, Total Serum: 7.7 mg/dL (09-29-20 @ 01:34)      Serum Pro-Brain Natriuretic Peptide: 1902 pg/mL (09-21-20 @ 00:00)  Serum Pro-Brain Natriuretic Peptide: 2013 pg/mL (09-20-20 @ 08:19)

## 2020-09-30 NOTE — PROGRESS NOTE ADULT - ATTENDING COMMENTS
I personally examined this patient with the PA and resident and discussed my plan with them.    the patient is mildly tachycardic today to the 110s, improved from yesterday.  Some improvement with fluid admin, pain control.  suspect sirs from persistent but resolving pelvic abscess.  low suspicion att for cardiac dx, pe, new infection.  OOB today, pain control, pt for rehab

## 2020-09-30 NOTE — PROGRESS NOTE ADULT - ASSESSMENT
Assessment:  45y Female PSH of PAULIE/BSO c/b adhesive SBO s/p dx lap to ex lap with small bowel resection c/b leak proximal to anastomosis, s/p re exploration, washout and resection and redo anastomosis. Persistently tachycardic overnight.    Plan:  - continue abx  - daily packing and dressing changes  - continue SICU care

## 2020-09-30 NOTE — PROGRESS NOTE ADULT - SUBJECTIVE AND OBJECTIVE BOX
GLORIA SCHAFER  [id number]  45y Female    Indication for ICU admission: intraabdominal sepsis, s/p diagnostic lap, NARCISO, SBR (43cm ileum) w/ primary anastamosis (side to side)  Admit Date:09-14-20  ICU Date: 9/15/2020  OR Date: 9/15/2020 x2    No Known Allergies    PAST MEDICAL & SURGICAL HISTORY:  Uterine fibroid    Status post bilateral salpingectomy    S/P PAULIE (total abdominal hysterectomy)      Home Medications:  ibuprofen 600 mg oral tablet: 1 tab(s) orally every 6 hours, As needed, Mild Pain (1 - 3) (01 Sep 2020 06:26)      24 HOUR EVENTS:  Overnight: , EKG shows sinus tach with non specific ST changes. Dilaudid 0.5mg x1 for pain. 500 bolus x1. 30NaPhos.    NEURO:    AAOx3      PO tylenol, oxycodone prn, toradol prn     RESP:     O2 sat >95% on RA    ABG PRN    AM CXR stable    CT Chest (PE protocol) 9/17- negative for PE; small-moderate b/l pleural effusions with     compressive atelectasis    -CT chest 9/26: decreased small to moderate b/l pleural effusions    CARDS:     Hypotension Resolved- Off all pressors    Acute tachycardia - now in the 90s, cont metoprolol 25mg BID   -off lasix    CE neg, 0.04, 0.16, 0.07 - no longer  trending     ECHO: EF 50-55%, G1DD, mild MR, mild TR.    GI/NUTR:     -s/p I&D of wound cellulitis 9/23---no signs of STI, packed with betadine and Kerlex  -CT a/p 9/26: Decreased size of partially loculated collections/abscess in the lower abdomen/pelvis measuring 3.8 x 3.1 x 6.1 cm (previously measured 4.4 x 3.8 x 8.1 cm).    -9/20 CT Abd/pelvis with PO and IV - abscess in lower abd/pelvis- IR not draining 2/2      location     -CT A/P (9/17) - negative for leak; postsurgical changes with post-op ileus    -Diet: soft mechanical     -Hepatic panel 9/18: WNL    -GI ppx: PPI    -holding senna due to diarrhea      /RENAL:     -hyponatremia (Na 128 > 130 > 133> 131): cont NS @ 50    Silva out, voiding     BUN/Cr trend 5/<0.5    Lactatemia resolved to 1.3 (max 6.5)    HEME/ONC:     Hgb lateral 8.3>8.0    Transfused 1 uPRBC (split 1/2 and 1/2 on 9/18)    Plt 624> 606    HIT antibody: negative    DVT Prophylaxis: HSQ, SCDs    ID:    Afebrile,  WBC  19.7 > 16.3 >12.92 >14.43 >13.69   - C-Diff cx negative  -lactate 0.7     -ID following: recommend imaging (done), start rocephin 2g q24, flagyl 500 q8, d/c Zosyn/Zyvox       BCx 9/17- (NGTD)    9/22 Tissue gram stain rare Klebsiella pneumoniae --> sensitive to everything except Ampicillin     ENDO:    Off insulin gtt, on ISS    FS ACqHS, range 119-164    Off Solucortef 50 q6, d/c'd 9/20    LINES/DRAINS:   Right midline and Left peripheral IV  RIJ central line removed 9/28/20  PICC line placement canceled    DVT Prophylaxis: heparin   Injectable 5000 Unit(s) SubCutaneous every 8 hours  GI Prophylaxis:pantoprazole  Injectable 40 milliGRAM(s) oral    Activity- OOB/ambulation    ***Tubes/Lines/Drains  ***  Peripheral IV  R. Basilic Midline                      Date: 9/17  R central IJ     [X] A ten-point review of systems was otherwise negative except as noted above.  [  ] Due to altered mental status/intubation, subjective information was not attained from the patient. History was obtained, to the extent possible, from review of the chart and collateral sources of information.      Daily     Daily     Diet, Mechanical Soft:   Consistent Carbohydrate Evening Snack  Supplement Feeding Modality:  Oral  Ensure Enlive Cans or Servings Per Day:  1       Frequency:  Two Times a day (09-23-20 @ 17:56)      CURRENT MEDS:  Neurologic Medications  acetaminophen   Tablet .. 650 milliGRAM(s) Oral every 6 hours PRN Temp greater or equal to 38.5C (101.3F), Mild Pain (1 - 3)  ketorolac   Injectable 30 milliGRAM(s) IV Push every 8 hours PRN Moderate Pain (4 - 6)  oxyCODONE    IR 5 milliGRAM(s) Oral every 6 hours PRN Severe Pain (7 - 10)    Respiratory Medications    Cardiovascular Medications  metoprolol tartrate 25 milliGRAM(s) Oral every 12 hours    Gastrointestinal Medications  ascorbic acid 500 milliGRAM(s) Oral two times a day  multivitamin/minerals 1 Tablet(s) Oral daily  pantoprazole    Tablet 40 milliGRAM(s) Oral before breakfast  sodium chloride 0.9% Bolus 500 milliLiter(s) IV Bolus once  sodium chloride 0.9%. 1000 milliLiter(s) IV Continuous <Continuous>  sodium phosphate IVPB 30 milliMole(s) IV Intermittent once    Genitourinary Medications    Hematologic/Oncologic Medications  heparin   Injectable 5000 Unit(s) SubCutaneous every 8 hours    Antimicrobial/Immunologic Medications  cefTRIAXone   IVPB 2000 milliGRAM(s) IV Intermittent every 24 hours  cefTRIAXone   IVPB      metroNIDAZOLE  IVPB      metroNIDAZOLE  IVPB 500 milliGRAM(s) IV Intermittent every 8 hours    Endocrine/Metabolic Medications  dextrose 50% Injectable 25 Gram(s) IV Push once  insulin regular  human corrective regimen sliding scale   SubCutaneous Before meals and at bedtime    Topical/Other Medications  chlorhexidine 4% Liquid 1 Application(s) Topical daily  chlorhexidine 4% Liquid 1 Application(s) Topical daily      ICU Vital Signs Last 24 Hrs  T(C): 36.6 (29 Sep 2020 20:00), Max: 37.1 (29 Sep 2020 12:29)  T(F): 97.9 (29 Sep 2020 20:00), Max: 98.8 (29 Sep 2020 12:29)  HR: 114 (30 Sep 2020 00:00) (100 - 131)  BP: 99/57 (30 Sep 2020 00:00) (93/52 - 106/57)  BP(mean): 3 (29 Sep 2020 10:32) (3 - 76)  ABP: --  ABP(mean): --  RR: 18 (30 Sep 2020 00:00) (18 - 20)  SpO2: 98% (30 Sep 2020 00:00) (98% - 100%)      Adult Advanced Hemodynamics Last 24 Hrs  CVP(mm Hg): --  CVP(cm H2O): --  CO: --  CI: --  PA: --  PA(mean): --  PCWP: --  SVR: --  SVRI: --  PVR: --  PVRI: --          I&O's Summary    28 Sep 2020 07:01  -  29 Sep 2020 07:00  --------------------------------------------------------  IN: 2360 mL / OUT: 1500 mL / NET: 860 mL    29 Sep 2020 07:01  -  30 Sep 2020 02:54  --------------------------------------------------------  IN: 625 mL / OUT: 0 mL / NET: 625 mL      I&O's Detail    28 Sep 2020 07:01  -  29 Sep 2020 07:00  --------------------------------------------------------  IN:    IV PiggyBack: 50 mL    IV PiggyBack: 250 mL    Lactated Ringers Bolus: 500 mL    Oral Fluid: 360 mL    sodium chloride 0.9%: 1200 mL  Total IN: 2360 mL    OUT:    Voided (mL): 1500 mL  Total OUT: 1500 mL    Total NET: 860 mL      29 Sep 2020 07:01  -  30 Sep 2020 02:54  --------------------------------------------------------  IN:    IV PiggyBack: 150 mL    sodium chloride 0.9%: 475 mL  Total IN: 625 mL    OUT:  Total OUT: 0 mL    Total NET: 625 mL          PHYSICAL EXAM:    General/Neuro  RASS:             GCS:     = E   / V   / M      Deficits:                             alert & oriented x 3, no focal deficits  Pupils:    Lungs:      clear to auscultation, Normal expansion/effort.     Cardiovascular : S1, S2.  Regular rate and rhythm.  Peripheral edema   Cardiac Rhythm: Normal Sinus Rhythm    GI: Abdomen soft, Non-tender, Non-distended.    Gastrostomy / Jejunostomy tube in place.  Nasogastric tube in place.  Colostomy / Ileostomy.    Wound:    Extremities: Extremities warm, pink, well-perfused. Pulses:Rt     Lt    Derm: Good skin turgor, no skin breakdown.      :       Silva catheter in place.      CXR:     LABS:  CAPILLARY BLOOD GLUCOSE      POCT Blood Glucose.: 119 mg/dL (29 Sep 2020 20:57)  POCT Blood Glucose.: 115 mg/dL (29 Sep 2020 16:45)  POCT Blood Glucose.: 142 mg/dL (29 Sep 2020 11:20)  POCT Blood Glucose.: 118 mg/dL (29 Sep 2020 07:25)                          8.0    13.69 )-----------( 606      ( 30 Sep 2020 00:45 )             25.1       09-30    134<L>  |  101  |  7<L>  ----------------------------<  106<H>  4.1   |  24  |  <0.5<L>    Ca    7.9<L>      30 Sep 2020 00:45  Phos  2.7     09-30  Mg     2.1     09-30                   GLORIA SCHAFER  [id number]  45y Female    Indication for ICU admission: intraabdominal sepsis, s/p diagnostic lap, NARCISO, SBR (43cm ileum) w/ primary anastamosis (side to side)  Admit Date:09-14-20  ICU Date: 9/15/2020  OR Date: 9/15/2020 x2    No Known Allergies    PAST MEDICAL & SURGICAL HISTORY:  Uterine fibroid    Status post bilateral salpingectomy    S/P PAULIE (total abdominal hysterectomy)      Home Medications:  ibuprofen 600 mg oral tablet: 1 tab(s) orally every 6 hours, As needed, Mild Pain (1 - 3) (01 Sep 2020 06:26)      24 HOUR EVENTS:  Overnight: , EKG shows sinus tach with non specific ST changes. Dilaudid 0.5mg x1 for pain. 500 bolus x1. 30NaPhos.    NEURO:    AAOx3      PO tylenol, oxycodone prn, toradol prn     RESP:     O2 sat >95% on RA    ABG PRN    AM CXR stable    CT Chest (PE protocol) 9/17- negative for PE; small-moderate b/l pleural effusions with     compressive atelectasis    -CT chest 9/26: decreased small to moderate b/l pleural effusions    CARDS:     Hypotension Resolved- Off all pressors    Acute tachycardia - now in the 90s, cont metoprolol 25mg BID   -off lasix    CE neg, 0.04, 0.16, 0.07 - no longer  trending     ECHO: EF 50-55%, G1DD, mild MR, mild TR.    GI/NUTR:     -s/p I&D of wound cellulitis 9/23---no signs of STI, packed with betadine and Kerlex  -CT a/p 9/26: Decreased size of partially loculated collections/abscess in the lower abdomen/pelvis measuring 3.8 x 3.1 x 6.1 cm (previously measured 4.4 x 3.8 x 8.1 cm).    -9/20 CT Abd/pelvis with PO and IV - abscess in lower abd/pelvis- IR not draining 2/2      location     -CT A/P (9/17) - negative for leak; postsurgical changes with post-op ileus    -Diet: soft mechanical     -Hepatic panel 9/18: WNL    -GI ppx: PPI    -holding senna due to diarrhea      /RENAL:     -hyponatremia (Na 128 > 130 > 133> 131): cont NS @ 50    Silva out, voiding     BUN/Cr trend 5/<0.5    Lactatemia resolved to 1.3 (max 6.5)    HEME/ONC:     Hgb lateral 8.3>8.0    Transfused 1 uPRBC (split 1/2 and 1/2 on 9/18)    Plt 624> 606    HIT antibody: negative    DVT Prophylaxis: HSQ, SCDs    ID:    Afebrile,  WBC  19.7 > 16.3 >12.92 >14.43 >13.69   - C-Diff cx negative  -lactate 0.7     -ID following: recommend imaging (done), start rocephin 2g q24, flagyl 500 q8, d/c Zosyn/Zyvox       BCx 9/17- (NGTD)    9/22 Tissue gram stain rare Klebsiella pneumoniae --> sensitive to everything except Ampicillin     ENDO:    Off insulin gtt, on ISS    FS ACqHS, range 119-164    Off Solucortef 50 q6, d/c'd 9/20    LINES/DRAINS:   Right midline and Left peripheral IV  RIJ central line removed 9/28/20  PICC line placement canceled    DVT Prophylaxis: heparin   Injectable 5000 Unit(s) SubCutaneous every 8 hours  GI Prophylaxis:pantoprazole  Injectable 40 milliGRAM(s) oral    Activity- OOB/ambulation    ***Tubes/Lines/Drains  ***  Peripheral IV  R. Basilic Midline                      Date: 9/17  R central IJ     [X] A ten-point review of systems was otherwise negative except as noted above.  [  ] Due to altered mental status/intubation, subjective information was not attained from the patient. History was obtained, to the extent possible, from review of the chart and collateral sources of information.        CURRENT MEDS:  Neurologic Medications  acetaminophen   Tablet .. 650 milliGRAM(s) Oral every 6 hours PRN Temp greater or equal to 38.5C (101.3F), Mild Pain (1 - 3)  ketorolac   Injectable 30 milliGRAM(s) IV Push every 8 hours PRN Moderate Pain (4 - 6)  oxyCODONE    IR 5 milliGRAM(s) Oral every 6 hours PRN Severe Pain (7 - 10)    Respiratory Medications    Cardiovascular Medications  metoprolol tartrate 25 milliGRAM(s) Oral every 12 hours    Gastrointestinal Medications  ascorbic acid 500 milliGRAM(s) Oral two times a day  multivitamin/minerals 1 Tablet(s) Oral daily  pantoprazole    Tablet 40 milliGRAM(s) Oral before breakfast  sodium chloride 0.9% Bolus 500 milliLiter(s) IV Bolus once  sodium chloride 0.9%. 1000 milliLiter(s) IV Continuous <Continuous>  sodium phosphate IVPB 30 milliMole(s) IV Intermittent once    Genitourinary Medications    Hematologic/Oncologic Medications  heparin   Injectable 5000 Unit(s) SubCutaneous every 8 hours    Antimicrobial/Immunologic Medications  cefTRIAXone   IVPB 2000 milliGRAM(s) IV Intermittent every 24 hours  cefTRIAXone   IVPB      metroNIDAZOLE  IVPB      metroNIDAZOLE  IVPB 500 milliGRAM(s) IV Intermittent every 8 hours    Endocrine/Metabolic Medications  dextrose 50% Injectable 25 Gram(s) IV Push once  insulin regular  human corrective regimen sliding scale   SubCutaneous Before meals and at bedtime    Topical/Other Medications  chlorhexidine 4% Liquid 1 Application(s) Topical daily  chlorhexidine 4% Liquid 1 Application(s) Topical daily      ICU Vital Signs Last 24 Hrs  T(C): 36.6 (29 Sep 2020 20:00), Max: 37.1 (29 Sep 2020 12:29)  T(F): 97.9 (29 Sep 2020 20:00), Max: 98.8 (29 Sep 2020 12:29)  HR: 114 (30 Sep 2020 00:00) (100 - 131)  BP: 99/57 (30 Sep 2020 00:00) (93/52 - 106/57)  BP(mean): 3 (29 Sep 2020 10:32) (3 - 76)  ABP: --  ABP(mean): --  RR: 18 (30 Sep 2020 00:00) (18 - 20)  SpO2: 98% (30 Sep 2020 00:00) (98% - 100%)        I&O's Summary    28 Sep 2020 07:01  -  29 Sep 2020 07:00  --------------------------------------------------------  IN: 2360 mL / OUT: 1500 mL / NET: 860 mL    29 Sep 2020 07:01  -  30 Sep 2020 02:54  --------------------------------------------------------  IN: 625 mL / OUT: 0 mL / NET: 625 mL      I&O's Detail    28 Sep 2020 07:01  -  29 Sep 2020 07:00  --------------------------------------------------------  IN:    IV PiggyBack: 50 mL    IV PiggyBack: 250 mL    Lactated Ringers Bolus: 500 mL    Oral Fluid: 360 mL    sodium chloride 0.9%: 1200 mL  Total IN: 2360 mL    OUT:    Voided (mL): 1500 mL  Total OUT: 1500 mL    Total NET: 860 mL      29 Sep 2020 07:01  -  30 Sep 2020 02:54  --------------------------------------------------------  IN:    IV PiggyBack: 150 mL    sodium chloride 0.9%: 475 mL  Total IN: 625 mL    OUT:  Total OUT: 0 mL    Total NET: 625 mL          PHYSICAL EXAM:    General/Neuro            GCS:   15      Deficits:                             alert & oriented x 3, no focal deficits    Lungs:      clear to auscultation, Normal expansion/effort.     Cardiovascular : S1, S2.  tachy   Cardiac Rhythm: Sinus tachycardia    GI: Abdomen soft, mildly tender, Non-distended.    Wound: abdominal dsg c.d/i    Extremities: Extremities warm, well-perfused    Derm: Good skin turgor, no skin breakdown.      :       no Silva catheter       LABS:  CAPILLARY BLOOD GLUCOSE      POCT Blood Glucose.: 119 mg/dL (29 Sep 2020 20:57)  POCT Blood Glucose.: 115 mg/dL (29 Sep 2020 16:45)  POCT Blood Glucose.: 142 mg/dL (29 Sep 2020 11:20)  POCT Blood Glucose.: 118 mg/dL (29 Sep 2020 07:25)                          8.0    13.69 )-----------( 606      ( 30 Sep 2020 00:45 )             25.1       09-30    134<L>  |  101  |  7<L>  ----------------------------<  106<H>  4.1   |  24  |  <0.5<L>    Ca    7.9<L>      30 Sep 2020 00:45  Phos  2.7     09-30  Mg     2.1     09-30

## 2020-10-01 LAB
GLUCOSE BLDC GLUCOMTR-MCNC: 101 MG/DL — HIGH (ref 70–99)
GLUCOSE BLDC GLUCOMTR-MCNC: 102 MG/DL — HIGH (ref 70–99)
GLUCOSE BLDC GLUCOMTR-MCNC: 125 MG/DL — HIGH (ref 70–99)
GLUCOSE BLDC GLUCOMTR-MCNC: 175 MG/DL — HIGH (ref 70–99)

## 2020-10-01 PROCEDURE — 99233 SBSQ HOSP IP/OBS HIGH 50: CPT | Mod: 24

## 2020-10-01 PROCEDURE — 99024 POSTOP FOLLOW-UP VISIT: CPT

## 2020-10-01 PROCEDURE — 93010 ELECTROCARDIOGRAM REPORT: CPT

## 2020-10-01 PROCEDURE — 71045 X-RAY EXAM CHEST 1 VIEW: CPT | Mod: 26

## 2020-10-01 RX ORDER — HYDROMORPHONE HYDROCHLORIDE 2 MG/ML
1 INJECTION INTRAMUSCULAR; INTRAVENOUS; SUBCUTANEOUS ONCE
Refills: 0 | Status: DISCONTINUED | OUTPATIENT
Start: 2020-10-01 | End: 2020-10-01

## 2020-10-01 RX ORDER — MAGNESIUM SULFATE 500 MG/ML
1 VIAL (ML) INJECTION ONCE
Refills: 0 | Status: COMPLETED | OUTPATIENT
Start: 2020-10-01 | End: 2020-10-01

## 2020-10-01 RX ADMIN — INSULIN HUMAN 5: 100 INJECTION, SOLUTION SUBCUTANEOUS at 12:13

## 2020-10-01 RX ADMIN — OXYCODONE HYDROCHLORIDE 10 MILLIGRAM(S): 5 TABLET ORAL at 01:05

## 2020-10-01 RX ADMIN — OXYCODONE HYDROCHLORIDE 10 MILLIGRAM(S): 5 TABLET ORAL at 21:49

## 2020-10-01 RX ADMIN — PANTOPRAZOLE SODIUM 40 MILLIGRAM(S): 20 TABLET, DELAYED RELEASE ORAL at 06:14

## 2020-10-01 RX ADMIN — SODIUM CHLORIDE 50 MILLILITER(S): 9 INJECTION INTRAMUSCULAR; INTRAVENOUS; SUBCUTANEOUS at 10:28

## 2020-10-01 RX ADMIN — Medication 600 MILLIGRAM(S): at 00:15

## 2020-10-01 RX ADMIN — Medication 100 MILLIGRAM(S): at 14:57

## 2020-10-01 RX ADMIN — Medication 600 MILLIGRAM(S): at 12:43

## 2020-10-01 RX ADMIN — Medication 100 GRAM(S): at 21:50

## 2020-10-01 RX ADMIN — Medication 100 MILLIGRAM(S): at 21:50

## 2020-10-01 RX ADMIN — Medication 600 MILLIGRAM(S): at 18:27

## 2020-10-01 RX ADMIN — HEPARIN SODIUM 5000 UNIT(S): 5000 INJECTION INTRAVENOUS; SUBCUTANEOUS at 06:14

## 2020-10-01 RX ADMIN — HEPARIN SODIUM 5000 UNIT(S): 5000 INJECTION INTRAVENOUS; SUBCUTANEOUS at 14:58

## 2020-10-01 RX ADMIN — Medication 600 MILLIGRAM(S): at 00:45

## 2020-10-01 RX ADMIN — Medication 100 MILLIGRAM(S): at 06:14

## 2020-10-01 RX ADMIN — OXYCODONE HYDROCHLORIDE 10 MILLIGRAM(S): 5 TABLET ORAL at 01:35

## 2020-10-01 RX ADMIN — Medication 600 MILLIGRAM(S): at 17:57

## 2020-10-01 RX ADMIN — GABAPENTIN 100 MILLIGRAM(S): 400 CAPSULE ORAL at 14:57

## 2020-10-01 RX ADMIN — Medication 600 MILLIGRAM(S): at 06:14

## 2020-10-01 RX ADMIN — HEPARIN SODIUM 5000 UNIT(S): 5000 INJECTION INTRAVENOUS; SUBCUTANEOUS at 21:50

## 2020-10-01 RX ADMIN — HYDROMORPHONE HYDROCHLORIDE 1 MILLIGRAM(S): 2 INJECTION INTRAMUSCULAR; INTRAVENOUS; SUBCUTANEOUS at 18:40

## 2020-10-01 RX ADMIN — Medication 1 TABLET(S): at 12:13

## 2020-10-01 RX ADMIN — Medication 600 MILLIGRAM(S): at 12:13

## 2020-10-01 RX ADMIN — GABAPENTIN 100 MILLIGRAM(S): 400 CAPSULE ORAL at 06:14

## 2020-10-01 RX ADMIN — OXYCODONE HYDROCHLORIDE 10 MILLIGRAM(S): 5 TABLET ORAL at 01:01

## 2020-10-01 RX ADMIN — Medication 500 MILLIGRAM(S): at 06:14

## 2020-10-01 RX ADMIN — GABAPENTIN 100 MILLIGRAM(S): 400 CAPSULE ORAL at 21:49

## 2020-10-01 RX ADMIN — OXYCODONE HYDROCHLORIDE 10 MILLIGRAM(S): 5 TABLET ORAL at 09:37

## 2020-10-01 RX ADMIN — Medication 25 MILLIGRAM(S): at 06:14

## 2020-10-01 RX ADMIN — CEFTRIAXONE 100 MILLIGRAM(S): 500 INJECTION, POWDER, FOR SOLUTION INTRAMUSCULAR; INTRAVENOUS at 10:28

## 2020-10-01 RX ADMIN — Medication 500 MILLIGRAM(S): at 17:57

## 2020-10-01 RX ADMIN — OXYCODONE HYDROCHLORIDE 10 MILLIGRAM(S): 5 TABLET ORAL at 22:19

## 2020-10-01 NOTE — PROGRESS NOTE ADULT - ASSESSMENT
46 y/o no Pmhx s/p PAULIE/BS on 8/19 for fibroid uterus POD#37, s/p diagnostic laparoscopy converted to exploratory laparotomy and small bowel reanastomosis, with breakdown of staple line of proximal limb of anastomosis, s/p repeat exploratory laparotomy with small bowel resection, reanastomosis and appendectomy, POD#10 tachycardic and hypotensive postop, now off pressors, admitted to SICU for close hemodynamic monitoring, s/p I&D for cellulitis    NEURO:    AAOx3      PO tylenol, oxycodone prn, Dilaudid prn, toradol prn - pain better controlled today compared to yesterday    RESP:     O2 sat >95% on RA    ABG PRN    f/u AM CXR    CT Chest (PE protocol) 9/17- negative for PE; small-moderate b/l pleural effusions with compressive atelectasis      CARDS:     Hypotension Resolved- Off all pressors    Acute tachycardia - resolved, currently in the 90s-100s, cont metoprolol 25mg BID.     CE neg, 0.04, 0.16, 0.07 - no longer  trending     ECHO: EF 50-55%, G1DD, mild MR, mild TR.    GI/NUTR:     9/20 CT Abd/pelvis with PO and IV - abscess in lower abd/pelvis- IR not draining 2/2 location     CT A/P (9/17) - negative for leak; postsurgical changes with post-op ileus    Diet: soft mechanical, tolerating, +BM/+flatus    Hepatic panel 9/18: WNL    IVL    GI ppx: PPI      /RENAL:     Silva  out, voiding     BUN/Cr trend 7/<0.5    Lactate 0.7    HEME/ONC:     Hgb trend, stable    Transfused 1 uPRBC (split 1/2 and 1/2 on 9/18)    HIT antibody: negative    DVT Prophylaxis: HSQ, SCDs    ID:    Afebrile    WBC lateral     -ID following: Rocephin and Flagyl     9/22 repeat bcx neg    BCx 9/17- (NGTD)  C-diff 9/29 neg     ENDO:    FS ACqHS, tightened ISS    Solucortef 50 q6, d/c'd 9/20    DVT Prophylaxis: heparin   Injectable 5000 Unit(s) SubCutaneous every 8 hours  GI Prophylaxis:pantoprazole  Injectable 40 milliGRAM(s) IV Push daily    ***Tubes/Lines/Drains  ***  Peripheral IV  R. Basilic Midline                      Date: 9/17  L PIV     Dispo: [facility]

## 2020-10-01 NOTE — PROGRESS NOTE ADULT - SUBJECTIVE AND OBJECTIVE BOX
GLORIA SCHAFER  45y, Female    All available historical data reviewed    OVERNIGHT EVENTS:  fevers this am  feels well and has no complaints      ROS:  General: Denies rigors, nightsweats  HEENT: Denies headache, rhinorrhea, sore throat, eye pain  CV: Denies CP, palpitations  PULM: Denies wheezing, hemoptysis  GI: Denies hematemesis, hematochezia, melena  : Denies discharge, hematuria  MSK: Denies arthralgias, myalgias  SKIN: Denies rash, lesions  NEURO: Denies paresthesias, weakness  PSYCH: Denies depression, anxiety    VITALS:  T(F): 98.5, Max: 101.7 (10-01-20 @ 00:05)  HR: 101  BP: 93/61  RR: 18Vital Signs Last 24 Hrs  T(C): 36.9 (01 Oct 2020 10:00), Max: 38.7 (01 Oct 2020 00:05)  T(F): 98.5 (01 Oct 2020 10:00), Max: 101.7 (01 Oct 2020 00:05)  HR: 101 (01 Oct 2020 10:00) (73 - 123)  BP: 93/61 (01 Oct 2020 08:00) (93/61 - 148/75)  BP(mean): 81 (30 Sep 2020 11:54) (81 - 81)  RR: 18 (01 Oct 2020 10:00) (18 - 20)  SpO2: 97% (01 Oct 2020 10:00) (97% - 100%)    TESTS & MEASUREMENTS:                        8.0    13.69 )-----------( 606      ( 30 Sep 2020 00:45 )             25.1     09-30    134<L>  |  101  |  7<L>  ----------------------------<  106<H>  4.1   |  24  |  <0.5<L>    Ca    7.9<L>      30 Sep 2020 00:45  Phos  2.7     09-30  Mg     2.1     09-30                RADIOLOGY & ADDITIONAL TESTS:  Personal review of radiological diagnostics performed  Echo and EKG results noted when applicable.     MEDICATIONS:  acetaminophen   Tablet .. 650 milliGRAM(s) Oral every 6 hours PRN  ascorbic acid 500 milliGRAM(s) Oral two times a day  cefTRIAXone   IVPB 2000 milliGRAM(s) IV Intermittent every 24 hours  cefTRIAXone   IVPB      chlorhexidine 4% Liquid 1 Application(s) Topical daily  chlorhexidine 4% Liquid 1 Application(s) Topical daily  gabapentin 100 milliGRAM(s) Oral every 8 hours  heparin   Injectable 5000 Unit(s) SubCutaneous every 8 hours  ibuprofen  Tablet. 600 milliGRAM(s) Oral every 6 hours  insulin regular  human corrective regimen sliding scale   SubCutaneous Before meals and at bedtime  metoprolol tartrate 25 milliGRAM(s) Oral every 12 hours  metroNIDAZOLE  IVPB      metroNIDAZOLE  IVPB 500 milliGRAM(s) IV Intermittent every 8 hours  multivitamin/minerals 1 Tablet(s) Oral daily  oxyCODONE    IR 5 milliGRAM(s) Oral every 6 hours PRN  oxyCODONE    IR 10 milliGRAM(s) Oral every 6 hours PRN  pantoprazole    Tablet 40 milliGRAM(s) Oral before breakfast  sodium chloride 0.9%. 1000 milliLiter(s) IV Continuous <Continuous>      ANTIBIOTICS:  cefTRIAXone   IVPB 2000 milliGRAM(s) IV Intermittent every 24 hours  cefTRIAXone   IVPB      metroNIDAZOLE  IVPB      metroNIDAZOLE  IVPB 500 milliGRAM(s) IV Intermittent every 8 hours

## 2020-10-01 NOTE — PROGRESS NOTE ADULT - ASSESSMENT
A/P:  GLORIA SCHAFER is a 45yFemale POD#8 for Incision and drainage, wound, torso  Insertion, arterial line, percutaneous  Washing out stomach contents  Small bowel resection with anastomosis  Lysis of intestinal adhesions  Diagnostic laparoscopy      Plan:   wound may be amenable to wVac placement  otherwise will continue daily dressing changes

## 2020-10-01 NOTE — PROGRESS NOTE ADULT - RS GEN PE MLT RESP DETAILS PC
no rhonchi
no rhonchi
diminished breath sounds, R/diminished breath sounds, L
no rales/no rhonchi
no rhonchi

## 2020-10-01 NOTE — PROGRESS NOTE ADULT - ATTENDING COMMENTS
I examined the patient with the pa and discussed my plan with the resident  the patient is subjectively much better  she is tolerating diet and having bowel function  she spiked overnight, if she spikes again she needs to be cultured and restarted on broad spectrum abx  dressing changes per primary  tachycardia improving

## 2020-10-01 NOTE — PROGRESS NOTE ADULT - SUBJECTIVE AND OBJECTIVE BOX
GLORIA SCHAFER  [id number]  45y Female    Indication for ICU admission: intraabdominal sepsis, s/p diagnostic lap, NARCISO, SBR (43cm ileum) w/ primary anastamosis (side to side)  Admit Date:09-14-20  ICU Date: 9/15/2020  OR Date: 9/15/2020 x2    No Known Allergies    PAST MEDICAL & SURGICAL HISTORY:  Uterine fibroid  Status post bilateral salpingectomy  S/P PAULIE (total abdominal hysterectomy)    Home Medications:  ibuprofen 600 mg oral tablet: 1 tab(s) orally every 6 hours, As needed, Mild Pain (1 - 3) (01 Sep 2020 06:26)    24 HOUR EVENTS: Febrile to 101.7 overnight, tachycardic to 123. Pain well controlled on oxycodone     NEURO:    AAOx3      PO tylenol, oxycodone 5/10mg prn  -d/c'd Toradol, started Motrin x 3 days  -started gabapentin 100mg q8    RESP:     O2 sat >95% on RA    ABG PRN    AM CXR stable    CT Chest (PE protocol) 9/17- negative for PE; small-moderate b/l pleural effusions with     compressive atelectasis    -CT chest 9/26: decreased small to moderate b/l pleural effusions    CARDS:     Hypotension Resolved- Off all pressors    Acute tachycardia - slightly improved, low 100s, cont metoprolol 25mg BID   -off lasix    CE neg, 0.04, 0.16, 0.07 - no longer  trending     ECHO: EF 50-55%, G1DD, mild MR, mild TR.    GI/NUTR:     -s/p I&D of wound cellulitis 9/23---no signs of STI, packed with betadine and Kerlex  -CT a/p 9/26: Decreased size of partially loculated collections/abscess in the lower abdomen/pelvis measuring 3.8 x 3.1 x 6.1 cm (previously measured 4.4 x 3.8 x 8.1 cm).    -9/20 CT Abd/pelvis with PO and IV - abscess in lower abd/pelvis- IR not draining 2/2      location     -CT A/P (9/17) - negative for leak; postsurgical changes with post-op ileus    -Diet: soft mechanical     -Hepatic panel 9/18: WNL    -GI ppx: PPI    -holding senna due to diarrhea      /RENAL:     -hyponatremia (Na 128 > 130 > 133> 131->134): cont NS @ 50    Silva out, voiding     BUN/Cr trend 7/<0.5    Lactatemia resolved to 1.3 (max 6.5)    HEME/ONC:     Hgb lateral 8.3>8.0    Transfused 1 uPRBC (split 1/2 and 1/2 on 9/18)    Plt 624> 606    HIT antibody: negative    DVT Prophylaxis: HSQ, SCDs    ID:    Afebrile,  WBC  19.7 > 16.3 >12.92 >14.43 >13.69   - C-Diff cx negative  -lactate 0.7     -ID following: recommend imaging (done), started rocephin 2g q24, flagyl 500 q8, d/c'd Zosyn/Zyvox       BCx 9/17- (NGTD)    9/22 Tissue gram stain rare Klebsiella pneumoniae --> sensitive to everything except Ampicillin     ENDO:    Off insulin gtt, on ISS    FS ACqHS, range 119-164    Off Solucortef 50 q6, d/c'd 9/20      LINES/DRAINS:   Right midline and Left peripheral IV  RIJ central line removed 9/28/20  PICC line placement canceled    DVT Prophylaxis: heparin   Injectable 5000 Unit(s) SubCutaneous every 8 hours  GI Prophylaxis:pantoprazole  Injectable 40 milliGRAM(s) oral    Activity- OOB/ambulation    ***Tubes/Lines/Drains  ***  Peripheral IV  R. Basilic Midline                      Date: 9/17      [X] A ten-point review of systems was otherwise negative except as noted above.  [  ] Due to altered mental status/intubation, subjective information was not attained from the patient. History was obtained, to the extent possible, from review of the chart and collateral sources of information.    Daily       Diet, Mechanical Soft:   Consistent Carbohydrate Evening Snack  Supplement Feeding Modality:  Oral  Ensure Enlive Cans or Servings Per Day:  1       Frequency:  Two Times a day (09-23-20 @ 17:56)    CURRENT MEDS:  Neurologic Medications  acetaminophen   Tablet .. 650 milliGRAM(s) Oral every 6 hours PRN Temp greater or equal to 38.5C (101.3F), Mild Pain (1 - 3)  gabapentin 100 milliGRAM(s) Oral every 8 hours  ibuprofen  Tablet. 600 milliGRAM(s) Oral every 6 hours  oxyCODONE    IR 5 milliGRAM(s) Oral every 6 hours PRN Moderate Pain (4 - 6)  oxyCODONE    IR 10 milliGRAM(s) Oral every 6 hours PRN Severe Pain (7 - 10)    Respiratory Medications    Cardiovascular Medications  metoprolol tartrate 25 milliGRAM(s) Oral every 12 hours    Gastrointestinal Medications  ascorbic acid 500 milliGRAM(s) Oral two times a day  multivitamin/minerals 1 Tablet(s) Oral daily  pantoprazole    Tablet 40 milliGRAM(s) Oral before breakfast  sodium chloride 0.9%. 1000 milliLiter(s) IV Continuous <Continuous>    Genitourinary Medications    Hematologic/Oncologic Medications  heparin   Injectable 5000 Unit(s) SubCutaneous every 8 hours    Antimicrobial/Immunologic Medications  cefTRIAXone   IVPB 2000 milliGRAM(s) IV Intermittent every 24 hours  cefTRIAXone   IVPB      metroNIDAZOLE  IVPB      metroNIDAZOLE  IVPB 500 milliGRAM(s) IV Intermittent every 8 hours    Endocrine/Metabolic Medications  insulin regular  human corrective regimen sliding scale   SubCutaneous Before meals and at bedtime    Topical/Other Medications  chlorhexidine 4% Liquid 1 Application(s) Topical daily  chlorhexidine 4% Liquid 1 Application(s) Topical daily    ICU Vital Signs Last 24 Hrs  T(C): 38.7 (01 Oct 2020 00:05), Max: 38.7 (01 Oct 2020 00:05)  T(F): 101.7 (01 Oct 2020 00:05), Max: 101.7 (01 Oct 2020 00:05)  HR: 123 (01 Oct 2020 00:05) (101 - 123)  BP: 108/58 (01 Oct 2020 00:05) (103/68 - 148/75)  BP(mean): 81 (30 Sep 2020 11:54) (81 - 81)  RR: 18 (01 Oct 2020 00:05) (18 - 20)  SpO2: 99% (01 Oct 2020 00:05) (99% - 100%)    I&O's Summary    29 Sep 2020 07:01  -  30 Sep 2020 07:00  --------------------------------------------------------  IN: 2475 mL / OUT: 350 mL / NET: 2125 mL    30 Sep 2020 07:01  -  01 Oct 2020 03:18  --------------------------------------------------------  IN: 600 mL / OUT: 1400 mL / NET: -800 mL    I&O's Detail    29 Sep 2020 07:01  -  30 Sep 2020 07:00  --------------------------------------------------------  IN:    IV PiggyBack: 500 mL    IV PiggyBack: 250 mL    sodium chloride 0.9%: 1125 mL    Sodium Chloride 0.9% Bolus: 500 mL    TPN (Total Parenteral Nutrition): 100 mL  Total IN: 2475 mL    OUT:    Voided (mL): 350 mL  Total OUT: 350 mL  Total NET: 2125 mL    30 Sep 2020 07:01  -  01 Oct 2020 03:18  --------------------------------------------------------  IN:    IV PiggyBack: 50 mL    sodium chloride 0.9%: 550 mL  Total IN: 600 mL    OUT:    Voided (mL): 1400 mL  Total OUT: 1400 mL    Total NET: -800 mL    PHYSICAL EXAM:    General/Neuro  A&Ox3, no focal neurologic deficits     Lungs:      clear to auscultation, Normal expansion/effort.     Cardiovascular : S1, S2.  Regular rate and rhythm.  Peripheral edema   Cardiac Rhythm: Normal Sinus Rhythm    GI: Abdomen soft, Non-tender, Non-distended.    Gastrostomy / Jejunostomy tube in place.  Nasogastric tube in place.  Colostomy / Ileostomy.    Wound:    Extremities: Extremities warm, pink, well-perfused. Pulses:Rt     Lt    Derm: Good skin turgor, no skin breakdown.      :       Silva catheter in place.    LABS:  CAPILLARY BLOOD GLUCOSE    POCT Blood Glucose.: 98 mg/dL (30 Sep 2020 21:18)  POCT Blood Glucose.: 116 mg/dL (30 Sep 2020 17:37)  POCT Blood Glucose.: 73 mg/dL (30 Sep 2020 16:18)  POCT Blood Glucose.: 208 mg/dL (30 Sep 2020 11:45)  POCT Blood Glucose.: 115 mg/dL (30 Sep 2020 08:05)  POCT Blood Glucose.: 128 mg/dL (30 Sep 2020 07:00)                          8.0    13.69 )-----------( 606      ( 30 Sep 2020 00:45 )             25.1       09-30    134<L>  |  101  |  7<L>  ----------------------------<  106<H>  4.1   |  24  |  <0.5<L>    Ca    7.9<L>      30 Sep 2020 00:45  Phos  2.7     09-30  Mg     2.1     09-30

## 2020-10-01 NOTE — PROGRESS NOTE ADULT - ATTENDING COMMENTS
44yo female with PSHx PAULIE/BSO 8/19/2020 for fibroids admitted for persistent small bowel obstruction s/p laparoscopic NARCISO, laparotomy, extensive NARCISO, small bowel resection with primary anastomosis 9/15 complicated by small bowel leak s/p re-exploration, wash out, small bowel resection and primary anastomosis 9/15. Complained of lower abdominal pain with cellulitis and induration. CT chest/A/P 9/18 showed speck of subcutaneous air and exam was concerning for crepitus. Patient underwent bilateral flank exploration of subcutaneous tissue - negative for necrotizing infection. CT chest/A/P with PO and IV contrast 9/26 showed improving fluid collection, improving bilateral pleural effusions, and enteritis.     At this time, patient is hemodynamically stable with overall improving tachycarida. No pressors. Breathing comfortably. Complains of abdominal pain "inside" but overall improving. Tolerated regular diet, +bowel function - gas and BM. Off TPN. Continue antibiotics. Klebsiella from wound culture. Central line site looks clean, CL removed, daily dressing changes, will apply wound vac today. ID following. +febrile over night, will culture if continues to have fever. Persistent hyponatremia - resolving. Pain control, trend labs, continue antibiotics. Encourage ambulation/OOB, incentive spirometer, DVT ppx. Primary care as per ICU.

## 2020-10-01 NOTE — PROGRESS NOTE ADULT - ASSESSMENT
IMPRESSION;  Presented with Feculent peritonitis secondary to perforated viscus  9/15 : s/p diagnostic lap, NARCISO, SBR (43cm ileum) w/ primary anastamosis (side to side)  Post op pelvic abscesses  CT A/P 9/22 : pelvic abscesses, enteritis, likely reactive, b/l pleural effusions/atalectasis.  No right flank soft tissue infection  Right flank cellulitis with no abscess/fascitis : resolved. Seems to have originated from the abdominal wound which was locally debrided 9/22 and presently with no ongoing infectious process. WCx Klebsiella  Right flank appears significantly improved with no pain and resolved erythema  BCx 9/17 NGTD  WCx 9/22 rare PMNs, no org, Klebsiella  New fevers : see no new focus of infection. CXR no consolidation. PICC not infected. Abdominal wound appears unremarkable. Right flank infection has resolved    RECOMMENDATIONS;  Repeat BCx  Rocephin 2 gm iv q24h  Flagyl 500 mg iv q8h

## 2020-10-01 NOTE — PROGRESS NOTE ADULT - SUBJECTIVE AND OBJECTIVE BOX
GENERAL SURGERY PROGRESS NOTE     GLORIA SCHAFER  [address] [place] [address] day :17d  POD:  Procedure: Incision and drainage, wound, torso  Insertion, arterial line, percutaneous  Washing out stomach contents  Small bowel resection with anastomosis  Lysis of intestinal adhesions  Diagnostic laparoscopy      Surgical Attending: Mariann Rubio  Overnight events: Had a fever of 101.7 that resolved with ibuprofen. Otherwise found tachycardic, in NAD    T(F): 98.1 (10-01-20 @ 08:00), Max: 101.7 (10-01-20 @ 00:05)  HR: 73 (10-01-20 @ 08:00) (73 - 123)  BP: 93/61 (10-01-20 @ 08:00) (93/61 - 148/75)  ABP: --  ABP(mean): --  RR: 18 (10-01-20 @ 08:00) (18 - 20)  SpO2: 99% (10-01-20 @ 04:00) (99% - 100%)      09-30-20 @ 07:01  -  10-01-20 @ 07:00  --------------------------------------------------------  IN:    IV PiggyBack: 50 mL    sodium chloride 0.9%: 550 mL  Total IN: 600 mL    OUT:    Voided (mL): 1800 mL  Total OUT: 1800 mL    Total NET: -1200 mL        DIET/FLUIDS: ascorbic acid 500 milliGRAM(s) Oral two times a day  multivitamin/minerals 1 Tablet(s) Oral daily  sodium chloride 0.9%. 1000 milliLiter(s) IV Continuous <Continuous>    NG:                                                                                   GI proph:  pantoprazole    Tablet 40 milliGRAM(s) Oral before breakfast    AC/ proph: heparin   Injectable 5000 Unit(s) SubCutaneous every 8 hours    ABx: cefTRIAXone   IVPB 2000 milliGRAM(s) IV Intermittent every 24 hours  cefTRIAXone   IVPB      metroNIDAZOLE  IVPB      metroNIDAZOLE  IVPB 500 milliGRAM(s) IV Intermittent every 8 hours      GEN: NAD, well appearing  HEENT: NC/AT  CHEST: Symmetric chest wall expansion. tachycardic  ABD: S/D, dressing clean and dry. mild midline tenderness    LABS  Labs:  CAPILLARY BLOOD GLUCOSE      POCT Blood Glucose.: 102 mg/dL (01 Oct 2020 07:29)  POCT Blood Glucose.: 98 mg/dL (30 Sep 2020 21:18)  POCT Blood Glucose.: 116 mg/dL (30 Sep 2020 17:37)  POCT Blood Glucose.: 73 mg/dL (30 Sep 2020 16:18)  POCT Blood Glucose.: 208 mg/dL (30 Sep 2020 11:45)                          8.0    13.69 )-----------( 606      ( 30 Sep 2020 00:45 )             25.1         09-30    134<L>  |  101  |  7<L>  ----------------------------<  106<H>  4.1   |  24  |  <0.5<L>          LFTs:     Lactate, Blood: 0.7 mmol/L (09-30-20 @ 00:45)        RADIOLOGY & ADDITIONAL TESTS:

## 2020-10-02 LAB
ANION GAP SERPL CALC-SCNC: 8 MMOL/L — SIGNIFICANT CHANGE UP (ref 7–14)
ANION GAP SERPL CALC-SCNC: 9 MMOL/L — SIGNIFICANT CHANGE UP (ref 7–14)
BASOPHILS # BLD AUTO: 0.04 K/UL — SIGNIFICANT CHANGE UP (ref 0–0.2)
BASOPHILS NFR BLD AUTO: 0.3 % — SIGNIFICANT CHANGE UP (ref 0–1)
BUN SERPL-MCNC: 4 MG/DL — LOW (ref 10–20)
BUN SERPL-MCNC: 4 MG/DL — LOW (ref 10–20)
CALCIUM SERPL-MCNC: 7.5 MG/DL — LOW (ref 8.5–10.1)
CALCIUM SERPL-MCNC: 7.9 MG/DL — LOW (ref 8.5–10.1)
CHLORIDE SERPL-SCNC: 101 MMOL/L — SIGNIFICANT CHANGE UP (ref 98–110)
CHLORIDE SERPL-SCNC: 102 MMOL/L — SIGNIFICANT CHANGE UP (ref 98–110)
CO2 SERPL-SCNC: 25 MMOL/L — SIGNIFICANT CHANGE UP (ref 17–32)
CO2 SERPL-SCNC: 26 MMOL/L — SIGNIFICANT CHANGE UP (ref 17–32)
CREAT SERPL-MCNC: <0.5 MG/DL — LOW (ref 0.7–1.5)
CREAT SERPL-MCNC: <0.5 MG/DL — LOW (ref 0.7–1.5)
EOSINOPHIL # BLD AUTO: 0.43 K/UL — SIGNIFICANT CHANGE UP (ref 0–0.7)
EOSINOPHIL NFR BLD AUTO: 3.5 % — SIGNIFICANT CHANGE UP (ref 0–8)
GLUCOSE BLDC GLUCOMTR-MCNC: 123 MG/DL — HIGH (ref 70–99)
GLUCOSE BLDC GLUCOMTR-MCNC: 144 MG/DL — HIGH (ref 70–99)
GLUCOSE BLDC GLUCOMTR-MCNC: 189 MG/DL — HIGH (ref 70–99)
GLUCOSE BLDC GLUCOMTR-MCNC: 86 MG/DL — SIGNIFICANT CHANGE UP (ref 70–99)
GLUCOSE SERPL-MCNC: 111 MG/DL — HIGH (ref 70–99)
GLUCOSE SERPL-MCNC: 131 MG/DL — HIGH (ref 70–99)
HCT VFR BLD CALC: 25.1 % — LOW (ref 37–47)
HCT VFR BLD CALC: 26.6 % — LOW (ref 37–47)
HGB BLD-MCNC: 7.9 G/DL — LOW (ref 12–16)
HGB BLD-MCNC: 8.2 G/DL — LOW (ref 12–16)
IMM GRANULOCYTES NFR BLD AUTO: 1.7 % — HIGH (ref 0.1–0.3)
LYMPHOCYTES # BLD AUTO: 1.32 K/UL — SIGNIFICANT CHANGE UP (ref 1.2–3.4)
LYMPHOCYTES # BLD AUTO: 10.6 % — LOW (ref 20.5–51.1)
MAGNESIUM SERPL-MCNC: 1.8 MG/DL — SIGNIFICANT CHANGE UP (ref 1.8–2.4)
MAGNESIUM SERPL-MCNC: 2 MG/DL — SIGNIFICANT CHANGE UP (ref 1.8–2.4)
MCHC RBC-ENTMCNC: 26.6 PG — LOW (ref 27–31)
MCHC RBC-ENTMCNC: 26.8 PG — LOW (ref 27–31)
MCHC RBC-ENTMCNC: 30.8 G/DL — LOW (ref 32–37)
MCHC RBC-ENTMCNC: 31.5 G/DL — LOW (ref 32–37)
MCV RBC AUTO: 85.1 FL — SIGNIFICANT CHANGE UP (ref 81–99)
MCV RBC AUTO: 86.4 FL — SIGNIFICANT CHANGE UP (ref 81–99)
MONOCYTES # BLD AUTO: 0.88 K/UL — HIGH (ref 0.1–0.6)
MONOCYTES NFR BLD AUTO: 7.1 % — SIGNIFICANT CHANGE UP (ref 1.7–9.3)
NEUTROPHILS # BLD AUTO: 9.56 K/UL — HIGH (ref 1.4–6.5)
NEUTROPHILS NFR BLD AUTO: 76.8 % — HIGH (ref 42.2–75.2)
NRBC # BLD: 0 /100 WBCS — SIGNIFICANT CHANGE UP (ref 0–0)
NRBC # BLD: 0 /100 WBCS — SIGNIFICANT CHANGE UP (ref 0–0)
PHOSPHATE SERPL-MCNC: 2.6 MG/DL — SIGNIFICANT CHANGE UP (ref 2.1–4.9)
PHOSPHATE SERPL-MCNC: 3.3 MG/DL — SIGNIFICANT CHANGE UP (ref 2.1–4.9)
PLATELET # BLD AUTO: 535 K/UL — HIGH (ref 130–400)
PLATELET # BLD AUTO: 535 K/UL — HIGH (ref 130–400)
POTASSIUM SERPL-MCNC: 3.5 MMOL/L — SIGNIFICANT CHANGE UP (ref 3.5–5)
POTASSIUM SERPL-MCNC: 4.7 MMOL/L — SIGNIFICANT CHANGE UP (ref 3.5–5)
POTASSIUM SERPL-SCNC: 3.5 MMOL/L — SIGNIFICANT CHANGE UP (ref 3.5–5)
POTASSIUM SERPL-SCNC: 4.7 MMOL/L — SIGNIFICANT CHANGE UP (ref 3.5–5)
RBC # BLD: 2.95 M/UL — LOW (ref 4.2–5.4)
RBC # BLD: 3.08 M/UL — LOW (ref 4.2–5.4)
RBC # FLD: 16.5 % — HIGH (ref 11.5–14.5)
RBC # FLD: 16.7 % — HIGH (ref 11.5–14.5)
SODIUM SERPL-SCNC: 135 MMOL/L — SIGNIFICANT CHANGE UP (ref 135–146)
SODIUM SERPL-SCNC: 136 MMOL/L — SIGNIFICANT CHANGE UP (ref 135–146)
WBC # BLD: 12.44 K/UL — HIGH (ref 4.8–10.8)
WBC # BLD: 14.39 K/UL — HIGH (ref 4.8–10.8)
WBC # FLD AUTO: 12.44 K/UL — HIGH (ref 4.8–10.8)
WBC # FLD AUTO: 14.39 K/UL — HIGH (ref 4.8–10.8)

## 2020-10-02 PROCEDURE — 99231 SBSQ HOSP IP/OBS SF/LOW 25: CPT | Mod: 24

## 2020-10-02 PROCEDURE — 71045 X-RAY EXAM CHEST 1 VIEW: CPT | Mod: 26

## 2020-10-02 PROCEDURE — 99024 POSTOP FOLLOW-UP VISIT: CPT

## 2020-10-02 RX ORDER — HYDROMORPHONE HYDROCHLORIDE 2 MG/ML
1 INJECTION INTRAMUSCULAR; INTRAVENOUS; SUBCUTANEOUS ONCE
Refills: 0 | Status: DISCONTINUED | OUTPATIENT
Start: 2020-10-02 | End: 2020-10-02

## 2020-10-02 RX ORDER — POTASSIUM PHOSPHATE, MONOBASIC POTASSIUM PHOSPHATE, DIBASIC 236; 224 MG/ML; MG/ML
30 INJECTION, SOLUTION INTRAVENOUS ONCE
Refills: 0 | Status: COMPLETED | OUTPATIENT
Start: 2020-10-02 | End: 2020-10-02

## 2020-10-02 RX ADMIN — SODIUM CHLORIDE 50 MILLILITER(S): 9 INJECTION INTRAMUSCULAR; INTRAVENOUS; SUBCUTANEOUS at 17:44

## 2020-10-02 RX ADMIN — HEPARIN SODIUM 5000 UNIT(S): 5000 INJECTION INTRAVENOUS; SUBCUTANEOUS at 05:29

## 2020-10-02 RX ADMIN — POTASSIUM PHOSPHATE, MONOBASIC POTASSIUM PHOSPHATE, DIBASIC 83.33 MILLIMOLE(S): 236; 224 INJECTION, SOLUTION INTRAVENOUS at 11:19

## 2020-10-02 RX ADMIN — Medication 100 MILLIGRAM(S): at 22:55

## 2020-10-02 RX ADMIN — Medication 100 MILLIGRAM(S): at 17:56

## 2020-10-02 RX ADMIN — INSULIN HUMAN 3: 100 INJECTION, SOLUTION SUBCUTANEOUS at 22:20

## 2020-10-02 RX ADMIN — Medication 100 MILLIGRAM(S): at 05:29

## 2020-10-02 RX ADMIN — HEPARIN SODIUM 5000 UNIT(S): 5000 INJECTION INTRAVENOUS; SUBCUTANEOUS at 22:56

## 2020-10-02 RX ADMIN — CEFTRIAXONE 100 MILLIGRAM(S): 500 INJECTION, POWDER, FOR SOLUTION INTRAMUSCULAR; INTRAVENOUS at 09:49

## 2020-10-02 RX ADMIN — GABAPENTIN 100 MILLIGRAM(S): 400 CAPSULE ORAL at 22:56

## 2020-10-02 RX ADMIN — OXYCODONE HYDROCHLORIDE 5 MILLIGRAM(S): 5 TABLET ORAL at 21:30

## 2020-10-02 RX ADMIN — Medication 500 MILLIGRAM(S): at 05:29

## 2020-10-02 RX ADMIN — HYDROMORPHONE HYDROCHLORIDE 1 MILLIGRAM(S): 2 INJECTION INTRAMUSCULAR; INTRAVENOUS; SUBCUTANEOUS at 14:42

## 2020-10-02 RX ADMIN — INSULIN HUMAN 5: 100 INJECTION, SOLUTION SUBCUTANEOUS at 12:13

## 2020-10-02 RX ADMIN — Medication 1 TABLET(S): at 11:19

## 2020-10-02 RX ADMIN — Medication 600 MILLIGRAM(S): at 05:29

## 2020-10-02 RX ADMIN — Medication 600 MILLIGRAM(S): at 00:15

## 2020-10-02 RX ADMIN — Medication 600 MILLIGRAM(S): at 11:19

## 2020-10-02 RX ADMIN — Medication 600 MILLIGRAM(S): at 23:04

## 2020-10-02 RX ADMIN — Medication 600 MILLIGRAM(S): at 18:10

## 2020-10-02 RX ADMIN — GABAPENTIN 100 MILLIGRAM(S): 400 CAPSULE ORAL at 05:29

## 2020-10-02 RX ADMIN — Medication 600 MILLIGRAM(S): at 23:03

## 2020-10-02 RX ADMIN — OXYCODONE HYDROCHLORIDE 10 MILLIGRAM(S): 5 TABLET ORAL at 18:13

## 2020-10-02 RX ADMIN — OXYCODONE HYDROCHLORIDE 5 MILLIGRAM(S): 5 TABLET ORAL at 20:37

## 2020-10-02 RX ADMIN — HEPARIN SODIUM 5000 UNIT(S): 5000 INJECTION INTRAVENOUS; SUBCUTANEOUS at 17:45

## 2020-10-02 RX ADMIN — Medication 600 MILLIGRAM(S): at 17:39

## 2020-10-02 RX ADMIN — GABAPENTIN 100 MILLIGRAM(S): 400 CAPSULE ORAL at 17:39

## 2020-10-02 RX ADMIN — OXYCODONE HYDROCHLORIDE 10 MILLIGRAM(S): 5 TABLET ORAL at 17:40

## 2020-10-02 RX ADMIN — Medication 500 MILLIGRAM(S): at 17:56

## 2020-10-02 RX ADMIN — Medication 600 MILLIGRAM(S): at 12:12

## 2020-10-02 RX ADMIN — PANTOPRAZOLE SODIUM 40 MILLIGRAM(S): 20 TABLET, DELAYED RELEASE ORAL at 05:40

## 2020-10-02 RX ADMIN — OXYCODONE HYDROCHLORIDE 10 MILLIGRAM(S): 5 TABLET ORAL at 11:31

## 2020-10-02 RX ADMIN — OXYCODONE HYDROCHLORIDE 10 MILLIGRAM(S): 5 TABLET ORAL at 12:12

## 2020-10-02 RX ADMIN — OXYCODONE HYDROCHLORIDE 10 MILLIGRAM(S): 5 TABLET ORAL at 05:31

## 2020-10-02 NOTE — PROGRESS NOTE ADULT - ASSESSMENT
IMPRESSION;  Presented with Feculent peritonitis secondary to perforated viscus  9/15 : s/p diagnostic lap, NARCISO, SBR (43cm ileum) w/ primary anastamosis (side to side)  Post op pelvic abscesses  CT A/P 9/22 : pelvic abscesses, enteritis, likely reactive, b/l pleural effusions/atalectasis.  No right flank soft tissue infection  Right flank cellulitis with no abscess/fascitis : resolved. Seems to have originated from the abdominal wound which was locally debrided 9/22 and presently with no ongoing infectious process. WCx Klebsiella  Right flank : resolved infection  with no pain and resolved erythema  BCx 9/17 NGTD  WCx 9/22 rare PMNs, no org, Klebsiella  Low grade fevers : see no new focus of infection. CXR no consolidation. PICC not infected. Abdominal wound appears unremarkable. Right flank infection has resolved.  pt ambulating and in good spirits    RECOMMENDATIONS;  Repeat BCx if febrile  Rocephin 2 gm iv q24h  Flagyl 500 mg iv q8h

## 2020-10-02 NOTE — PROGRESS NOTE ADULT - SUBJECTIVE AND OBJECTIVE BOX
GENERAL SURGERY PROGRESS NOTE     GLORIA SCHAFER  [address] [place] [address] day :18d  POD:  Procedure: Incision and drainage, wound, torso  Insertion, arterial line, percutaneous  Washing out stomach contents  Small bowel resection with anastomosis  Lysis of intestinal adhesions  Diagnostic laparoscopy      Surgical Attending: Mariann Rubio  Overnight events: No acute events overnight. Patient found in NAD. Patient denied lab draw overnight, accepted lab draw in AM. tolerated dressing change yesterday evening. May be amenable to wVac placement today. No fevers in over 24 hrs.    T(F): 98.2 (10-02-20 @ 07:45), Max: 100.4 (10-01-20 @ 23:57)  HR: 107 (10-02-20 @ 07:45) (72 - 115)  BP: 165/86 (10-02-20 @ 07:45) (88/50 - 165/86)  ABP: --  ABP(mean): --  RR: 20 (10-02-20 @ 07:45) (18 - 20)  SpO2: 95% (10-02-20 @ 07:45) (95% - 100%)      10-01-20 @ 07:01  -  10-02-20 @ 07:00  --------------------------------------------------------  IN:    IV PiggyBack: 50 mL    IV PiggyBack: 50 mL    sodium chloride 0.9%: 250 mL  Total IN: 350 mL    OUT:    Voided (mL): 700 mL  Total OUT: 700 mL    Total NET: -350 mL        DIET/FLUIDS: ascorbic acid 500 milliGRAM(s) Oral two times a day  multivitamin/minerals 1 Tablet(s) Oral daily  sodium chloride 0.9%. 1000 milliLiter(s) IV Continuous <Continuous>         GI proph:  pantoprazole    Tablet 40 milliGRAM(s) Oral before breakfast    AC/ proph: heparin   Injectable 5000 Unit(s) SubCutaneous every 8 hours    ABx: cefTRIAXone   IVPB 2000 milliGRAM(s) IV Intermittent every 24 hours  cefTRIAXone   IVPB      metroNIDAZOLE  IVPB      metroNIDAZOLE  IVPB 500 milliGRAM(s) IV Intermittent every 8 hours      GEN: NAD,   HEENT: NC/AT  CHEST: Symmetric chest wall expansion. tachycardic  ABD: S/D, mild tenderness at midline    LABS  Labs:  CAPILLARY BLOOD GLUCOSE      POCT Blood Glucose.: 123 mg/dL (02 Oct 2020 08:12)  POCT Blood Glucose.: 101 mg/dL (01 Oct 2020 21:38)  POCT Blood Glucose.: 125 mg/dL (01 Oct 2020 16:35)  POCT Blood Glucose.: 175 mg/dL (01 Oct 2020 11:23)                          7.9    12.44 )-----------( 535      ( 02 Oct 2020 07:48 )             25.1       Auto Neutrophil %: 76.8 % (10-02-20 @ 07:48)  Auto Immature Granulocyte %: 1.7 % (10-02-20 @ 07:48)    10-02    135  |  101  |  4<L>  ----------------------------<  131<H>  3.5   |  25  |  <0.5<L>      Calcium, Total Serum: 7.5 mg/dL (10-02-20 @ 07:48)      LFTs:     Lactate, Blood: 0.7 mmol/L (09-30-20 @ 00:45)      Coags:                    RADIOLOGY & ADDITIONAL TESTS:

## 2020-10-02 NOTE — CHART NOTE - NSCHARTNOTEFT_GEN_A_CORE
Pain improved  Tm 100.4  Tolerating PO diet with improved intake    T(F): 99.3 (10-02-20 @ 13:50), Max: 100.4 (10-01-20 @ 23:57)  HR: 109 (10-02-20 @ 13:50) (101 - 115)  BP: 102/68 (10-02-20 @ 13:50) (88/50 - 165/86)  RR: 18 (10-02-20 @ 13:50) (18 - 20)  SpO2: 99% (10-02-20 @ 11:46) (95% - 100%)    I&O's Detail    01 Oct 2020 07:01  -  02 Oct 2020 07:00  --------------------------------------------------------  IN:    IV PiggyBack: 50 mL    IV PiggyBack: 50 mL    sodium chloride 0.9%: 250 mL  Total IN: 350 mL    OUT:    Voided (mL): 700 mL  Total OUT: 700 mL    Total NET: -350 mL    02 Oct 2020 07:01  -  02 Oct 2020 14:49  --------------------------------------------------------  IN:    IV PiggyBack: 550 mL    Oral Fluid: 118 mL    sodium chloride 0.9%: 300 mL  Total IN: 968 mL    OUT:  Total OUT: 0 mL    Total NET: 968 mL    10-02    135  |  101  |  4<L>  ----------------------------<  131<H>  3.5   |  25  |  <0.5<L>    Ca    7.5<L>      02 Oct 2020 07:48  Phos  2.6     10-02  Mg     2.0     10-02               7.9    12.44 )-----------( 535      ( 02 Oct 2020 07:48 )             25.1     CAPILLARY BLOOD GLUCOSE  POCT Blood Glucose.: 189 mg/dL (02 Oct 2020 11:50)  POCT Blood Glucose.: 123 mg/dL (02 Oct 2020 08:12)  POCT Blood Glucose.: 101 mg/dL (01 Oct 2020 21:38)  POCT Blood Glucose.: 125 mg/dL (01 Oct 2020 16:35)    Daily Height in cm: 160.02 (02 Oct 2020 13:50)    Daily Weight in k.9 (02 Oct 2020 13:50)  Diet, Mechanical Soft:   Consistent Carbohydrate {Evening Snack}  Supplement Feeding Modality:  Oral  Ensure Enlive Cans or Servings Per Day:  1       Frequency:  Two Times a day (20 @ 17:56)    ASSESSMENT  45y Female s/p small bowel resection with primary anastomosis 9/15/20, taken back to the OR for washing out stomach contents, small bowel resection with anastomosis, lysis of intestinal adhesions (staple line of proximal limb of anastomosis break down with gross spillage    - feculent peritonitis/sepsis  - severe protein calorie malnutrition   - hyperglycemia  - hypophosphatemia  - hypomagnesemia    ******Please be aware that pt has had a very poor PO intake over the past month and is severely malnourished with high risk for delayed wound healing    PLAN  - cont PO diet as tolerated    - add Pj 2 packs PO daily and document intake  - cont Ensure Enlive 240ml PO q12hrs and document intake  - cont MVI with minerals 1 tab PO q24hrs, 500mg vitamin C PO q12hrs  - monitor BMP, In phos, Mg  - check vitamin D, zinc levels

## 2020-10-02 NOTE — CHART NOTE - NSCHARTNOTEFT_GEN_A_CORE
SICU Transfer Note    GLORIA SCHAFER  45y (1975)  [id number]      Transfer from: SICU  Transfer to: Surgery-    45F w/recent history of PAULIE/BSO on 8/19/20 (POD #27) for 15cm bleeding fibroid with readmission for abdominal pain and inability to tolerate PO on 8/30, found to have partial obstruction vs. ileus, resolved without operative intervention, patient was discharged. Pt returned on 9/4 with similar symptoms, CT unchanged, NG tube placed and pt improved, was discharged home. Pt returned again on 9/14 for the same symptoms, worsening abdominal pain, N/V. Last bowel movement was three days ago. While in the ED, pt was afebrile tachy to 115 as per note with documented HR in the 80s. Exam in the ED was abdomen soft, nondistended, and tender to the epigastrium.  CT scan demonstrated persistent findings of small bowel obstruction with a transition point in the lower anterior mid abdomen with mild wall thickening and hyperenhancement of the dilated small bowel loops, suspicious for infectious/inflammatory enteritis. Patient went for diagnostic lap converted to ex-lap, with many adhesions that were lysed, had 43 cm of small bowel resected with primary anastomosis with staples. Pt was hemodynamically stable throughout the case. Pt was extubated, was tachy to 110s in PACU. SICU was consulted for CEU to monitor pts hemodynamic status  SICU COURSE:    During SICU stay, patient has been chronically tachycardic d/t pain, has had daily dressing changes for her abdominal incision, CT abdomen on 9/22 demonstrated a  4.4 x 3.8 x 8.1 pelvic abscess that had interval shrinking on repeat CT scan on 9/26 to 3.8 x 3.1 x 6.1. Patient has had improvement in abdominal pain, and is now able to ambulate with assistance, HR now in low 100's. Patient had a few episodes of diarrhea, was negative for C.Diff. Plan is to downgrade to the floors.    45y Female HD# 18d    s/p Incision and drainage, wound, torso    Insertion, arterial line, percutaneous    Washing out stomach contents    Small bowel resection with anastomosis    Lysis of intestinal adhesions    Diagnostic laparoscopy        PAST MEDICAL & SURGICAL HISTORY:  Uterine fibroid    Status post bilateral salpingectomy    S/P PAULIE (total abdominal hysterectomy)      Allergies    No Known Allergies    Intolerances      MEDICATIONS  (STANDING):  ascorbic acid 500 milliGRAM(s) Oral two times a day  cefTRIAXone   IVPB 2000 milliGRAM(s) IV Intermittent every 24 hours  cefTRIAXone   IVPB      chlorhexidine 4% Liquid 1 Application(s) Topical daily  chlorhexidine 4% Liquid 1 Application(s) Topical daily  gabapentin 100 milliGRAM(s) Oral every 8 hours  heparin   Injectable 5000 Unit(s) SubCutaneous every 8 hours  ibuprofen  Tablet. 600 milliGRAM(s) Oral every 6 hours  insulin regular  human corrective regimen sliding scale   SubCutaneous Before meals and at bedtime  metoprolol tartrate 25 milliGRAM(s) Oral every 12 hours  metroNIDAZOLE  IVPB      metroNIDAZOLE  IVPB 500 milliGRAM(s) IV Intermittent every 8 hours  multivitamin/minerals 1 Tablet(s) Oral daily  pantoprazole    Tablet 40 milliGRAM(s) Oral before breakfast  sodium chloride 0.9%. 1000 milliLiter(s) (50 mL/Hr) IV Continuous <Continuous>    MEDICATIONS  (PRN):  acetaminophen   Tablet .. 650 milliGRAM(s) Oral every 6 hours PRN Temp greater or equal to 38.5C (101.3F), Mild Pain (1 - 3)  oxyCODONE    IR 5 milliGRAM(s) Oral every 6 hours PRN Moderate Pain (4 - 6)  oxyCODONE    IR 10 milliGRAM(s) Oral every 6 hours PRN Severe Pain (7 - 10)      Vital Signs Last 24 Hrs  T(C): 36.8 (02 Oct 2020 07:45), Max: 38 (01 Oct 2020 23:57)  T(F): 98.2 (02 Oct 2020 07:45), Max: 100.4 (01 Oct 2020 23:57)  HR: 107 (02 Oct 2020 07:45) (72 - 115)  BP: 165/86 (02 Oct 2020 07:45) (88/50 - 165/86)  BP(mean): --  RR: 20 (02 Oct 2020 07:45) (18 - 20)  SpO2: 95% (02 Oct 2020 07:45) (95% - 100%)  I&O's Summary    01 Oct 2020 07:01  -  02 Oct 2020 07:00  --------------------------------------------------------  IN: 350 mL / OUT: 700 mL / NET: -350 mL    02 Oct 2020 07:01  -  02 Oct 2020 11:48  --------------------------------------------------------  IN: 868 mL / OUT: 0 mL / NET: 868 mL        LABS  LABS:  CAPILLARY BLOOD GLUCOSE      POCT Blood Glucose.: 123 mg/dL (02 Oct 2020 08:12)  POCT Blood Glucose.: 101 mg/dL (01 Oct 2020 21:38)  POCT Blood Glucose.: 125 mg/dL (01 Oct 2020 16:35)                          7.9    12.44 )-----------( 535      ( 02 Oct 2020 07:48 )             25.1       10-02    135  |  101  |  4<L>  ----------------------------<  131<H>  3.5   |  25  |  <0.5<L>    Ca    7.5<L>      02 Oct 2020 07:48  Phos  2.6     10-02  Mg     2.0     10-02                        Assessment & Plan:    NEURO:    AAOx3      PO tylenol, oxycodone 5/10mg prn  -d/c'd Toradol, on Motrin x 3 days  -started gabapentin 100mg q8    RESP:     O2 sat >95% on RA    ABG PRN    AM CXR stable    CT Chest (PE protocol) 9/17- negative for PE; small-moderate b/l pleural effusions with     compressive atelectasis    -CT chest 9/26: decreased small to moderate b/l pleural effusions    CARDS:     Hypotension Resolved- Off all pressors    Acute tachycardia - slightly improved, low 100s, cont metoprolol 25mg BID   -off lasix    CE neg, 0.04, 0.16, 0.07 - no longer  trending     ECHO: EF 50-55%, G1DD, mild MR, mild TR.    GI/NUTR:     -s/p I&D of wound cellulitis 9/23---no signs of STI, packed with betadine and Kerlex  -CT a/p 9/26: Decreased size of partially loculated collections/abscess in the lower abdomen/pelvis measuring 3.8 x 3.1 x 6.1 cm (previously measured 4.4 x 3.8 x 8.1 cm).    -9/20 CT Abd/pelvis with PO and IV - abscess in lower abd/pelvis- IR not draining 2/2      location     -CT A/P (9/17) - negative for leak; postsurgical changes with post-op ileus    -Diet: soft mechanical     -Hepatic panel 9/18: WNL    -GI ppx: PPI    -holding senna due to diarrhea      /RENAL:     -hyponatremia (Na 128 > 130 > 133> 131->134): cont NS @ 50    Silva out, voiding     BUN/Cr trend 7/<0.5    Lactatemia resolved to 1.3 (max 6.5)    HEME/ONC:     Hgb lateral 8.3>8.0    Transfused 1 uPRBC (split 1/2 and 1/2 on 9/18)    Plt 624> 606    HIT antibody: negative    DVT Prophylaxis: HSQ, SCDs    ID:    WBC  19.7 > 16.3 >12.92 >14.43 >13.69  spiked 101.7 9/30 overnight, so far remains afebrile during the day   - C-Diff cx negative  -lactate 0.7     -ID following: recommend imaging (done), cont rocephin 2g q24, flagyl 500 q8, d/c'd Zosyn/Zyvox       BCx 9/17- (NGTD)    9/22 Tissue gram stain rare Klebsiella pneumoniae --> sensitive to everything except Ampicillin     ENDO:    Off insulin gtt, on ISS    FS ACqHS, range 119-164    Off Solucortef 50 q6, d/c'd 9/20      LINES/DRAINS:   Right midline and Left peripheral IV          Follow Up:  -2330 labs        Signed out to:  Date:  Time: SICU Transfer Note    GLORIA SCHAFER  45y (1975)  [id number]      Transfer from: SICU  Transfer to: Surgery-    45F w/recent history of PAULIE/BSO on 8/19/20 (POD #27) for 15cm bleeding fibroid with readmission for abdominal pain and inability to tolerate PO on 8/30, found to have partial obstruction vs. ileus, resolved without operative intervention, patient was discharged. Pt returned on 9/4 with similar symptoms, CT unchanged, NG tube placed and pt improved, was discharged home. Pt returned again on 9/14 for the same symptoms, worsening abdominal pain, N/V. Last bowel movement was three days ago. While in the ED, pt was afebrile tachy to 115 as per note with documented HR in the 80s. Exam in the ED was abdomen soft, nondistended, and tender to the epigastrium.  CT scan demonstrated persistent findings of small bowel obstruction with a transition point in the lower anterior mid abdomen with mild wall thickening and hyperenhancement of the dilated small bowel loops, suspicious for infectious/inflammatory enteritis. Patient went for diagnostic lap converted to ex-lap, with many adhesions that were lysed, had 43 cm of small bowel resected with primary anastomosis with staples. Pt was hemodynamically stable throughout the case. Pt was extubated, was tachy to 110s in PACU. SICU was consulted for CEU to monitor pts hemodynamic status  SICU COURSE:    During SICU stay, patient has been chronically tachycardic d/t pain, has had daily dressing changes for her abdominal incision, CT abdomen on 9/22 demonstrated a  4.4 x 3.8 x 8.1 pelvic abscess that had interval shrinking on repeat CT scan on 9/26 to 3.8 x 3.1 x 6.1. Patient has had improvement in abdominal pain, and is now able to ambulate with assistance, HR now in low 100's. Patient had a few episodes of diarrhea, was negative for C.Diff. Plan is to downgrade to the floors.    45y Female HD# 18d    s/p Incision and drainage, wound, torso    Insertion, arterial line, percutaneous    Washing out stomach contents    Small bowel resection with anastomosis    Lysis of intestinal adhesions    Diagnostic laparoscopy        PAST MEDICAL & SURGICAL HISTORY:  Uterine fibroid    Status post bilateral salpingectomy    S/P PAULIE (total abdominal hysterectomy)      Allergies    No Known Allergies    Intolerances      MEDICATIONS  (STANDING):  ascorbic acid 500 milliGRAM(s) Oral two times a day  cefTRIAXone   IVPB 2000 milliGRAM(s) IV Intermittent every 24 hours  cefTRIAXone   IVPB      chlorhexidine 4% Liquid 1 Application(s) Topical daily  chlorhexidine 4% Liquid 1 Application(s) Topical daily  gabapentin 100 milliGRAM(s) Oral every 8 hours  heparin   Injectable 5000 Unit(s) SubCutaneous every 8 hours  ibuprofen  Tablet. 600 milliGRAM(s) Oral every 6 hours  insulin regular  human corrective regimen sliding scale   SubCutaneous Before meals and at bedtime  metoprolol tartrate 25 milliGRAM(s) Oral every 12 hours  metroNIDAZOLE  IVPB      metroNIDAZOLE  IVPB 500 milliGRAM(s) IV Intermittent every 8 hours  multivitamin/minerals 1 Tablet(s) Oral daily  pantoprazole    Tablet 40 milliGRAM(s) Oral before breakfast  sodium chloride 0.9%. 1000 milliLiter(s) (50 mL/Hr) IV Continuous <Continuous>    MEDICATIONS  (PRN):  acetaminophen   Tablet .. 650 milliGRAM(s) Oral every 6 hours PRN Temp greater or equal to 38.5C (101.3F), Mild Pain (1 - 3)  oxyCODONE    IR 5 milliGRAM(s) Oral every 6 hours PRN Moderate Pain (4 - 6)  oxyCODONE    IR 10 milliGRAM(s) Oral every 6 hours PRN Severe Pain (7 - 10)      Vital Signs Last 24 Hrs  T(C): 36.8 (02 Oct 2020 07:45), Max: 38 (01 Oct 2020 23:57)  T(F): 98.2 (02 Oct 2020 07:45), Max: 100.4 (01 Oct 2020 23:57)  HR: 107 (02 Oct 2020 07:45) (72 - 115)  BP: 165/86 (02 Oct 2020 07:45) (88/50 - 165/86)  BP(mean): --  RR: 20 (02 Oct 2020 07:45) (18 - 20)  SpO2: 95% (02 Oct 2020 07:45) (95% - 100%)  I&O's Summary    01 Oct 2020 07:01  -  02 Oct 2020 07:00  --------------------------------------------------------  IN: 350 mL / OUT: 700 mL / NET: -350 mL    02 Oct 2020 07:01  -  02 Oct 2020 11:48  --------------------------------------------------------  IN: 868 mL / OUT: 0 mL / NET: 868 mL        LABS  LABS:  CAPILLARY BLOOD GLUCOSE      POCT Blood Glucose.: 123 mg/dL (02 Oct 2020 08:12)  POCT Blood Glucose.: 101 mg/dL (01 Oct 2020 21:38)  POCT Blood Glucose.: 125 mg/dL (01 Oct 2020 16:35)                          7.9    12.44 )-----------( 535      ( 02 Oct 2020 07:48 )             25.1       10-02    135  |  101  |  4<L>  ----------------------------<  131<H>  3.5   |  25  |  <0.5<L>    Ca    7.5<L>      02 Oct 2020 07:48  Phos  2.6     10-02  Mg     2.0     10-02                        Assessment & Plan:    NEURO:    AAOx3      PO tylenol, oxycodone 5/10mg prn  -d/c'd Toradol, on Motrin x 3 days  -started gabapentin 100mg q8    RESP:     O2 sat >95% on RA    ABG PRN    AM CXR stable    CT Chest (PE protocol) 9/17- negative for PE; small-moderate b/l pleural effusions with     compressive atelectasis    -CT chest 9/26: decreased small to moderate b/l pleural effusions    CARDS:     Hypotension Resolved- Off all pressors    Acute tachycardia - slightly improved, low 100s, cont metoprolol 25mg BID   -off lasix    CE neg, 0.04, 0.16, 0.07 - no longer  trending     ECHO: EF 50-55%, G1DD, mild MR, mild TR.    GI/NUTR:     -s/p I&D of wound cellulitis 9/23---no signs of STI, packed with betadine and Kerlex  -CT a/p 9/26: Decreased size of partially loculated collections/abscess in the lower abdomen/pelvis measuring 3.8 x 3.1 x 6.1 cm (previously measured 4.4 x 3.8 x 8.1 cm).    -9/20 CT Abd/pelvis with PO and IV - abscess in lower abd/pelvis- IR not draining 2/2      location     -CT A/P (9/17) - negative for leak; postsurgical changes with post-op ileus    -Diet: soft mechanical     -Hepatic panel 9/18: WNL    -GI ppx: PPI    -holding senna due to diarrhea      /RENAL:     -hyponatremia (Na 128 > 130 > 133> 131->134): cont NS @ 50    Silva out, voiding     BUN/Cr trend 7/<0.5    Lactatemia resolved to 1.3 (max 6.5)    HEME/ONC:     Hgb lateral 8.3>8.0    Transfused 1 uPRBC (split 1/2 and 1/2 on 9/18)    Plt 624> 606    HIT antibody: negative    DVT Prophylaxis: HSQ, SCDs    ID:    WBC  19.7 > 16.3 >12.92 >14.43 >13.69  spiked 101.7 9/30 overnight, so far remains afebrile during the day   - C-Diff cx negative  -lactate 0.7     -ID following: recommend imaging (done), cont rocephin 2g q24, flagyl 500 q8, d/c'd Zosyn/Zyvox       BCx 9/17- (NGTD)    9/22 Tissue gram stain rare Klebsiella pneumoniae --> sensitive to everything except Ampicillin     ENDO:    Off insulin gtt, on ISS    FS ACqHS, range 119-164    Off Solucortef 50 q6, d/c'd 9/20      LINES/DRAINS:   Right midline and Left peripheral IV          Follow Up:  -2330 labs  -pan culture if spikes another fever        Signed out to: Mary  Date: 10/2/2020  Time:

## 2020-10-02 NOTE — PROGRESS NOTE ADULT - SUBJECTIVE AND OBJECTIVE BOX
GLORIA SCHAFER  45y, Female    All available historical data reviewed    OVERNIGHT EVENTS:  no fevers  feels well and has no complaints  is eating and ambulating    ROS:  General: Denies rigors, nightsweats  HEENT: Denies headache, rhinorrhea, sore throat, eye pain  CV: Denies CP, palpitations  PULM: Denies wheezing, hemoptysis  GI: Denies hematemesis, hematochezia, melena  : Denies discharge, hematuria  MSK: Denies arthralgias, myalgias  SKIN: Denies rash, lesions  NEURO: Denies paresthesias, weakness  PSYCH: Denies depression, anxiety    VITALS:  T(F): 98.2, Max: 100.4 (10-01-20 @ 23:57)  HR: 107  BP: 165/86  RR: 20Vital Signs Last 24 Hrs  T(C): 36.8 (02 Oct 2020 07:45), Max: 38 (01 Oct 2020 23:57)  T(F): 98.2 (02 Oct 2020 07:45), Max: 100.4 (01 Oct 2020 23:57)  HR: 107 (02 Oct 2020 07:45) (72 - 115)  BP: 165/86 (02 Oct 2020 07:45) (88/50 - 165/86)  BP(mean): --  RR: 20 (02 Oct 2020 07:45) (18 - 20)  SpO2: 95% (02 Oct 2020 07:45) (95% - 100%)    TESTS & MEASUREMENTS:                        7.9    12.44 )-----------( 535      ( 02 Oct 2020 07:48 )             25.1     10-02    135  |  101  |  4<L>  ----------------------------<  131<H>  3.5   |  25  |  <0.5<L>    Ca    7.5<L>      02 Oct 2020 07:48  Phos  2.6     10-02  Mg     2.0     10-02                RADIOLOGY & ADDITIONAL TESTS:  Personal review of radiological diagnostics performed  Echo and EKG results noted when applicable.     MEDICATIONS:  acetaminophen   Tablet .. 650 milliGRAM(s) Oral every 6 hours PRN  ascorbic acid 500 milliGRAM(s) Oral two times a day  cefTRIAXone   IVPB 2000 milliGRAM(s) IV Intermittent every 24 hours  cefTRIAXone   IVPB      chlorhexidine 4% Liquid 1 Application(s) Topical daily  chlorhexidine 4% Liquid 1 Application(s) Topical daily  gabapentin 100 milliGRAM(s) Oral every 8 hours  heparin   Injectable 5000 Unit(s) SubCutaneous every 8 hours  ibuprofen  Tablet. 600 milliGRAM(s) Oral every 6 hours  insulin regular  human corrective regimen sliding scale   SubCutaneous Before meals and at bedtime  metoprolol tartrate 25 milliGRAM(s) Oral every 12 hours  metroNIDAZOLE  IVPB      metroNIDAZOLE  IVPB 500 milliGRAM(s) IV Intermittent every 8 hours  multivitamin/minerals 1 Tablet(s) Oral daily  oxyCODONE    IR 5 milliGRAM(s) Oral every 6 hours PRN  oxyCODONE    IR 10 milliGRAM(s) Oral every 6 hours PRN  pantoprazole    Tablet 40 milliGRAM(s) Oral before breakfast  potassium phosphate IVPB 30 milliMole(s) IV Intermittent once  sodium chloride 0.9%. 1000 milliLiter(s) IV Continuous <Continuous>      ANTIBIOTICS:  cefTRIAXone   IVPB 2000 milliGRAM(s) IV Intermittent every 24 hours  cefTRIAXone   IVPB      metroNIDAZOLE  IVPB      metroNIDAZOLE  IVPB 500 milliGRAM(s) IV Intermittent every 8 hours

## 2020-10-02 NOTE — PROGRESS NOTE ADULT - ATTENDING COMMENTS
44yo female with PSHx PAULIE/BSO 8/19/2020 for fibroids admitted for persistent small bowel obstruction s/p laparoscopic NARCISO, laparotomy, extensive NARCISO, small bowel resection with primary anastomosis 9/15 complicated by small bowel leak s/p re-exploration, wash out, small bowel resection and primary anastomosis 9/15. Complained of lower abdominal pain with cellulitis and induration. CT chest/A/P 9/18 showed speck of subcutaneous air and exam was concerning for crepitus. Patient underwent bilateral flank exploration of subcutaneous tissue - negative for necrotizing infection. CT chest/A/P with PO and IV contrast 9/26 showed improving fluid collection, improving bilateral pleural effusions, and enteritis.     At this time, patient is hemodynamically stable with overall improving tachycarida. No pressors. Breathing comfortably. Complains of abdominal pain "inside" but overall improving. Tolerated regular diet, +bowel function - gas and BM. Off TPN. Continue antibiotics. Klebsiella from wound culture. Central line site looks clean, CL removed, daily dressing changes, will apply wound vac today. ID following. Afebrile over night. Hyponatremia - resolving. Pain control, trend labs, continue antibiotics. Encourage ambulation/OOB, incentive spirometer, DVT ppx. ICU downgrading. Working with rehab.

## 2020-10-02 NOTE — PROGRESS NOTE ADULT - ASSESSMENT
A/P:  GLORIA SCHAFER is a 45yFemale POD#9 for Incision and drainage, wound, torso  Insertion, arterial line, percutaneous  Washing out stomach contents  Small bowel resection with anastomosis  Lysis of intestinal adhesions  Diagnostic laparoscopy    Plan:   - possible wVac placement today, if not change dressing as usual  - f/u labs  - f/u vitals  - pain control  - management as per ICU team

## 2020-10-03 LAB
GLUCOSE BLDC GLUCOMTR-MCNC: 100 MG/DL — HIGH (ref 70–99)
GLUCOSE BLDC GLUCOMTR-MCNC: 114 MG/DL — HIGH (ref 70–99)
GLUCOSE BLDC GLUCOMTR-MCNC: 130 MG/DL — HIGH (ref 70–99)
GLUCOSE BLDC GLUCOMTR-MCNC: 164 MG/DL — HIGH (ref 70–99)

## 2020-10-03 PROCEDURE — 99024 POSTOP FOLLOW-UP VISIT: CPT

## 2020-10-03 PROCEDURE — 71045 X-RAY EXAM CHEST 1 VIEW: CPT | Mod: 26

## 2020-10-03 RX ORDER — OXYCODONE HYDROCHLORIDE 5 MG/1
5 TABLET ORAL EVERY 6 HOURS
Refills: 0 | Status: DISCONTINUED | OUTPATIENT
Start: 2020-10-03 | End: 2020-10-03

## 2020-10-03 RX ORDER — OXYCODONE HYDROCHLORIDE 5 MG/1
20 TABLET ORAL EVERY 12 HOURS
Refills: 0 | Status: DISCONTINUED | OUTPATIENT
Start: 2020-10-03 | End: 2020-10-04

## 2020-10-03 RX ORDER — MAGNESIUM SULFATE 500 MG/ML
2 VIAL (ML) INJECTION ONCE
Refills: 0 | Status: COMPLETED | OUTPATIENT
Start: 2020-10-03 | End: 2020-10-03

## 2020-10-03 RX ORDER — GABAPENTIN 400 MG/1
300 CAPSULE ORAL EVERY 8 HOURS
Refills: 0 | Status: DISCONTINUED | OUTPATIENT
Start: 2020-10-03 | End: 2020-10-09

## 2020-10-03 RX ORDER — ACETAMINOPHEN 500 MG
650 TABLET ORAL EVERY 6 HOURS
Refills: 0 | Status: DISCONTINUED | OUTPATIENT
Start: 2020-10-03 | End: 2020-10-09

## 2020-10-03 RX ORDER — OXYCODONE HYDROCHLORIDE 5 MG/1
5 TABLET ORAL EVERY 4 HOURS
Refills: 0 | Status: DISCONTINUED | OUTPATIENT
Start: 2020-10-03 | End: 2020-10-09

## 2020-10-03 RX ORDER — SENNA PLUS 8.6 MG/1
2 TABLET ORAL AT BEDTIME
Refills: 0 | Status: DISCONTINUED | OUTPATIENT
Start: 2020-10-03 | End: 2020-10-05

## 2020-10-03 RX ORDER — OXYCODONE HYDROCHLORIDE 5 MG/1
20 TABLET ORAL ONCE
Refills: 0 | Status: DISCONTINUED | OUTPATIENT
Start: 2020-10-03 | End: 2020-10-03

## 2020-10-03 RX ORDER — POLYETHYLENE GLYCOL 3350 17 G/17G
17 POWDER, FOR SOLUTION ORAL DAILY
Refills: 0 | Status: DISCONTINUED | OUTPATIENT
Start: 2020-10-03 | End: 2020-10-09

## 2020-10-03 RX ORDER — METHOCARBAMOL 500 MG/1
500 TABLET, FILM COATED ORAL EVERY 6 HOURS
Refills: 0 | Status: DISCONTINUED | OUTPATIENT
Start: 2020-10-03 | End: 2020-10-09

## 2020-10-03 RX ADMIN — OXYCODONE HYDROCHLORIDE 20 MILLIGRAM(S): 5 TABLET ORAL at 11:19

## 2020-10-03 RX ADMIN — Medication 25 MILLIGRAM(S): at 05:16

## 2020-10-03 RX ADMIN — OXYCODONE HYDROCHLORIDE 10 MILLIGRAM(S): 5 TABLET ORAL at 04:37

## 2020-10-03 RX ADMIN — Medication 600 MILLIGRAM(S): at 05:16

## 2020-10-03 RX ADMIN — GABAPENTIN 300 MILLIGRAM(S): 400 CAPSULE ORAL at 13:04

## 2020-10-03 RX ADMIN — Medication 100 MILLIGRAM(S): at 13:04

## 2020-10-03 RX ADMIN — OXYCODONE HYDROCHLORIDE 5 MILLIGRAM(S): 5 TABLET ORAL at 21:48

## 2020-10-03 RX ADMIN — HEPARIN SODIUM 5000 UNIT(S): 5000 INJECTION INTRAVENOUS; SUBCUTANEOUS at 13:04

## 2020-10-03 RX ADMIN — PANTOPRAZOLE SODIUM 40 MILLIGRAM(S): 20 TABLET, DELAYED RELEASE ORAL at 06:00

## 2020-10-03 RX ADMIN — Medication 650 MILLIGRAM(S): at 12:00

## 2020-10-03 RX ADMIN — Medication 650 MILLIGRAM(S): at 23:02

## 2020-10-03 RX ADMIN — Medication 650 MILLIGRAM(S): at 17:16

## 2020-10-03 RX ADMIN — Medication 1 TABLET(S): at 13:04

## 2020-10-03 RX ADMIN — Medication 500 MILLIGRAM(S): at 17:17

## 2020-10-03 RX ADMIN — METHOCARBAMOL 500 MILLIGRAM(S): 500 TABLET, FILM COATED ORAL at 11:27

## 2020-10-03 RX ADMIN — SENNA PLUS 2 TABLET(S): 8.6 TABLET ORAL at 23:01

## 2020-10-03 RX ADMIN — Medication 100 MILLIGRAM(S): at 05:15

## 2020-10-03 RX ADMIN — METHOCARBAMOL 500 MILLIGRAM(S): 500 TABLET, FILM COATED ORAL at 17:17

## 2020-10-03 RX ADMIN — GABAPENTIN 100 MILLIGRAM(S): 400 CAPSULE ORAL at 05:16

## 2020-10-03 RX ADMIN — SODIUM CHLORIDE 50 MILLILITER(S): 9 INJECTION INTRAMUSCULAR; INTRAVENOUS; SUBCUTANEOUS at 11:32

## 2020-10-03 RX ADMIN — Medication 500 MILLIGRAM(S): at 05:16

## 2020-10-03 RX ADMIN — OXYCODONE HYDROCHLORIDE 5 MILLIGRAM(S): 5 TABLET ORAL at 06:08

## 2020-10-03 RX ADMIN — CEFTRIAXONE 100 MILLIGRAM(S): 500 INJECTION, POWDER, FOR SOLUTION INTRAMUSCULAR; INTRAVENOUS at 11:19

## 2020-10-03 RX ADMIN — Medication 100 MILLIGRAM(S): at 23:01

## 2020-10-03 RX ADMIN — Medication 600 MILLIGRAM(S): at 05:17

## 2020-10-03 RX ADMIN — GABAPENTIN 300 MILLIGRAM(S): 400 CAPSULE ORAL at 23:02

## 2020-10-03 RX ADMIN — OXYCODONE HYDROCHLORIDE 10 MILLIGRAM(S): 5 TABLET ORAL at 03:28

## 2020-10-03 RX ADMIN — OXYCODONE HYDROCHLORIDE 20 MILLIGRAM(S): 5 TABLET ORAL at 17:15

## 2020-10-03 RX ADMIN — METHOCARBAMOL 500 MILLIGRAM(S): 500 TABLET, FILM COATED ORAL at 23:01

## 2020-10-03 RX ADMIN — Medication 12.5 GRAM(S): at 01:41

## 2020-10-03 RX ADMIN — Medication 25 MILLIGRAM(S): at 17:17

## 2020-10-03 RX ADMIN — HEPARIN SODIUM 5000 UNIT(S): 5000 INJECTION INTRAVENOUS; SUBCUTANEOUS at 23:02

## 2020-10-03 RX ADMIN — OXYCODONE HYDROCHLORIDE 5 MILLIGRAM(S): 5 TABLET ORAL at 07:05

## 2020-10-03 RX ADMIN — Medication 325 MILLIGRAM(S): at 11:21

## 2020-10-03 RX ADMIN — HEPARIN SODIUM 5000 UNIT(S): 5000 INJECTION INTRAVENOUS; SUBCUTANEOUS at 05:17

## 2020-10-03 RX ADMIN — OXYCODONE HYDROCHLORIDE 5 MILLIGRAM(S): 5 TABLET ORAL at 22:46

## 2020-10-03 RX ADMIN — POLYETHYLENE GLYCOL 3350 17 GRAM(S): 17 POWDER, FOR SOLUTION ORAL at 11:27

## 2020-10-03 NOTE — PROGRESS NOTE ADULT - SUBJECTIVE AND OBJECTIVE BOX
GENERAL SURGERY PROGRESS NOTE     GLORIA SCHAFER  [address] [place] [address] day :19d  POD:  Procedure: Incision and drainage, wound, torso  Insertion, arterial line, percutaneous  Washing out stomach contents  Small bowel resection with anastomosis  Lysis of intestinal adhesions  Diagnostic laparoscopy      Surgical Attending: Mariann Rubio  Overnight events: No acute events overnight. Patient found in NAD.  WBC increased to 14 from 12. Hgb 8.2 (7.9)  Mg repleted.    T(F): 99 (10-02-20 @ 20:00), Max: 99.3 (10-02-20 @ 13:50)  HR: 116 (10-02-20 @ 20:00) (107 - 116)  BP: 116/67 (10-02-20 @ 20:00) (94/60 - 165/86)  ABP: --  ABP(mean): --  RR: 18 (10-02-20 @ 20:00) (18 - 20)  SpO2: 99% (10-02-20 @ 11:46) (95% - 99%)      10-01-20 @ 07:01  -  10-02-20 @ 07:00  --------------------------------------------------------  IN:    IV PiggyBack: 50 mL    IV PiggyBack: 50 mL    sodium chloride 0.9%: 250 mL  Total IN: 350 mL    OUT:    Voided (mL): 700 mL  Total OUT: 700 mL    Total NET: -350 mL      10-02-20 @ 07:01  -  10-03-20 @ 03:13  --------------------------------------------------------  IN:    IV PiggyBack: 600 mL    Oral Fluid: 238 mL    sodium chloride 0.9%: 550 mL  Total IN: 1388 mL    OUT:  Total OUT: 0 mL    Total NET: 1388 mL        DIET/FLUIDS: ascorbic acid 500 milliGRAM(s) Oral two times a day  multivitamin/minerals 1 Tablet(s) Oral daily  sodium chloride 0.9%. 1000 milliLiter(s) IV Continuous <Continuous>       GI proph:  pantoprazole    Tablet 40 milliGRAM(s) Oral before breakfast    AC/ proph: heparin   Injectable 5000 Unit(s) SubCutaneous every 8 hours    ABx: cefTRIAXone   IVPB 2000 milliGRAM(s) IV Intermittent every 24 hours  cefTRIAXone   IVPB      metroNIDAZOLE  IVPB      metroNIDAZOLE  IVPB 500 milliGRAM(s) IV Intermittent every 8 hours      GEN: NAD, well appearing  HEENT: NC/AT  CHEST: Symmetric chest wall expansion. Regular heart rate  ABD: S/D, mild tender. Wound vac in place.    LABS  Labs:  CAPILLARY BLOOD GLUCOSE      POCT Blood Glucose.: 144 mg/dL (02 Oct 2020 21:24)  POCT Blood Glucose.: 86 mg/dL (02 Oct 2020 16:43)  POCT Blood Glucose.: 189 mg/dL (02 Oct 2020 11:50)  POCT Blood Glucose.: 123 mg/dL (02 Oct 2020 08:12)                          8.2    14.39 )-----------( 535      ( 02 Oct 2020 20:27 )             26.6       Auto Neutrophil %: 76.8 % (10-02-20 @ 07:48)  Auto Immature Granulocyte %: 1.7 % (10-02-20 @ 07:48)    10-02    136  |  102  |  4<L>  ----------------------------<  111<H>  4.7   |  26  |  <0.5<L>      Calcium, Total Serum: 7.9 mg/dL (10-02-20 @ 20:27)      LFTs:         RADIOLOGY & ADDITIONAL TESTS:

## 2020-10-03 NOTE — PROGRESS NOTE ADULT - ASSESSMENT
A/P:  GLORIA SCHAFER is a 45yFemale POD#10 for Incision and drainage, wound, torso  Insertion, arterial line, percutaneous  Washing out stomach contents  Small bowel resection with anastomosis  Lysis of intestinal adhesions  Diagnostic laparoscopy    Plan:   - wVac changes 3x a week  pain control  f/u WBC  f/u vitals  f/u labs & replete as necessary

## 2020-10-03 NOTE — PROGRESS NOTE ADULT - ATTENDING COMMENTS
44yo female with PSHx PAULIE/BSO 8/19/2020 for fibroids admitted for persistent small bowel obstruction s/p laparoscopic NARCISO, laparotomy, extensive NARCISO, small bowel resection with primary anastomosis 9/15 complicated by small bowel leak s/p re-exploration, wash out, small bowel resection and primary anastomosis 9/15. Complained of lower abdominal pain with cellulitis and induration. CT chest/A/P 9/18 showed speck of subcutaneous air and exam was concerning for crepitus. Patient underwent bilateral flank exploration of subcutaneous tissue - negative for necrotizing infection. CT chest/A/P with PO and IV contrast 9/26 showed improving fluid collection, improving bilateral pleural effusions, and enteritis.     At this time, patient is hemodynamically stable with overall improving tachycarida. No pressors. Breathing comfortably. Complains of abdominal pain "inside" but overall improving. Tolerated regular diet, +bowel function - gas and BM. Off TPN. Continue antibiotics. Klebsiella from wound culture. Central line site looks clean, CL removed, daily dressing changes, wound vac applied yesterday. ID following. Afebrile over night. Hyponatremia - resolving. Pain control, trend labs, continue antibiotics. Encourage ambulation/OOB, incentive spirometer, DVT ppx. ICU downgrading. Working with rehab.

## 2020-10-03 NOTE — PROGRESS NOTE ADULT - SUBJECTIVE AND OBJECTIVE BOX
Patient is a 45y old  Female who presents with a chief complaint of SBO (03 Oct 2020 03:12)      HPI:  Patient is a 45F w/recent history of PAULIE/BSO on 8/19/20 for 15cm bleeding fibroid with readmission for abdominal pain and inability to tolerate PO on 8/30, found to have partial obstruction vs. ileus, resolved without operative intervention, patient was discharged. She represented on 9/4 with recurrent abdominal pain, consistent with partial SBO, NGT was placed and patient's symptoms improved with nonoperative management. She presents today with two days of abdominal pain with associated nausea and vomiting since the onset of pain. She reports having her last BM two days ago, passing flatus. In the ED she is afebrile, tachycardic to 115. On exam her abdomen is soft, nondistended, tender in the epigastrium. Labs significant for WBC of 11.6. Lactate 1.1.  CT scan demonstrated persistent findings of small bowel obstruction with a transition point in the lower anterior mid abdomen with mild wall thickening and hyperenhancement of the dilated small bowel loops, suspicious for infectious/inflammatory enteritis. s/p exp lap with small bowel resection then I&D          PHYSICAL EXAM    Vital Signs Last 24 Hrs  T(C): 36.3 (03 Oct 2020 04:18), Max: 37.4 (02 Oct 2020 13:50)  T(F): 97.3 (03 Oct 2020 04:18), Max: 99.3 (02 Oct 2020 13:50)  HR: 107 (03 Oct 2020 04:18) (107 - 116)  BP: 103/65 (03 Oct 2020 04:18) (94/60 - 116/67)  BP(mean): --  RR: 18 (03 Oct 2020 04:18) (18 - 20)  SpO2: 99% (02 Oct 2020 11:46) (99% - 99%)    Constitutional - NAD  Chest - CTA  Cardiovascular - S1S2+  Abdomen -  Soft  Extremities -  No calf tenderness   Function : transfer with min A                 ambulate 20'RW CG    acetaminophen   Tablet .. 650 milliGRAM(s) Oral every 6 hours PRN  ascorbic acid 500 milliGRAM(s) Oral two times a day  cefTRIAXone   IVPB 2000 milliGRAM(s) IV Intermittent every 24 hours  cefTRIAXone   IVPB      chlorhexidine 4% Liquid 1 Application(s) Topical daily  chlorhexidine 4% Liquid 1 Application(s) Topical daily  gabapentin 100 milliGRAM(s) Oral every 8 hours  heparin   Injectable 5000 Unit(s) SubCutaneous every 8 hours  insulin regular  human corrective regimen sliding scale   SubCutaneous Before meals and at bedtime  metoprolol tartrate 25 milliGRAM(s) Oral every 12 hours  metroNIDAZOLE  IVPB      metroNIDAZOLE  IVPB 500 milliGRAM(s) IV Intermittent every 8 hours  multivitamin/minerals 1 Tablet(s) Oral daily  oxyCODONE    IR 5 milliGRAM(s) Oral every 6 hours PRN  oxyCODONE    IR 10 milliGRAM(s) Oral every 6 hours PRN  pantoprazole    Tablet 40 milliGRAM(s) Oral before breakfast  sodium chloride 0.9%. 1000 milliLiter(s) IV Continuous <Continuous>      RECENT LABS/IMAGING                        8.2    14.39 )-----------( 535      ( 02 Oct 2020 20:27 )             26.6     10-02    136  |  102  |  4<L>  ----------------------------<  111<H>  4.7   |  26  |  <0.5<L>    Ca    7.9<L>      02 Oct 2020 20:27  Phos  3.3     10-02  Mg     1.8     10-02

## 2020-10-03 NOTE — PROGRESS NOTE ADULT - ASSESSMENT
Imp : debilitation  /  SBO / s/p bowel resection  Plan : continue bedside therapy           d/c home with services when medically stable

## 2020-10-04 LAB
ANION GAP SERPL CALC-SCNC: 14 MMOL/L — SIGNIFICANT CHANGE UP (ref 7–14)
BASOPHILS # BLD AUTO: 0.06 K/UL — SIGNIFICANT CHANGE UP (ref 0–0.2)
BASOPHILS NFR BLD AUTO: 0.3 % — SIGNIFICANT CHANGE UP (ref 0–1)
BUN SERPL-MCNC: 6 MG/DL — LOW (ref 10–20)
CALCIUM SERPL-MCNC: 7.9 MG/DL — LOW (ref 8.5–10.1)
CHLORIDE SERPL-SCNC: 101 MMOL/L — SIGNIFICANT CHANGE UP (ref 98–110)
CO2 SERPL-SCNC: 19 MMOL/L — SIGNIFICANT CHANGE UP (ref 17–32)
CREAT SERPL-MCNC: <0.5 MG/DL — LOW (ref 0.7–1.5)
EOSINOPHIL # BLD AUTO: 0.19 K/UL — SIGNIFICANT CHANGE UP (ref 0–0.7)
EOSINOPHIL NFR BLD AUTO: 0.9 % — SIGNIFICANT CHANGE UP (ref 0–8)
GLUCOSE BLDC GLUCOMTR-MCNC: 117 MG/DL — HIGH (ref 70–99)
GLUCOSE BLDC GLUCOMTR-MCNC: 117 MG/DL — HIGH (ref 70–99)
GLUCOSE BLDC GLUCOMTR-MCNC: 154 MG/DL — HIGH (ref 70–99)
GLUCOSE BLDC GLUCOMTR-MCNC: 90 MG/DL — SIGNIFICANT CHANGE UP (ref 70–99)
GLUCOSE SERPL-MCNC: 114 MG/DL — HIGH (ref 70–99)
HCT VFR BLD CALC: 28.8 % — LOW (ref 37–47)
HGB BLD-MCNC: 8.9 G/DL — LOW (ref 12–16)
IMM GRANULOCYTES NFR BLD AUTO: 0.8 % — HIGH (ref 0.1–0.3)
LYMPHOCYTES # BLD AUTO: 1.09 K/UL — LOW (ref 1.2–3.4)
LYMPHOCYTES # BLD AUTO: 5.3 % — LOW (ref 20.5–51.1)
MAGNESIUM SERPL-MCNC: 1.8 MG/DL — SIGNIFICANT CHANGE UP (ref 1.8–2.4)
MCHC RBC-ENTMCNC: 26.5 PG — LOW (ref 27–31)
MCHC RBC-ENTMCNC: 30.9 G/DL — LOW (ref 32–37)
MCV RBC AUTO: 85.7 FL — SIGNIFICANT CHANGE UP (ref 81–99)
MONOCYTES # BLD AUTO: 0.77 K/UL — HIGH (ref 0.1–0.6)
MONOCYTES NFR BLD AUTO: 3.8 % — SIGNIFICANT CHANGE UP (ref 1.7–9.3)
NEUTROPHILS # BLD AUTO: 18.25 K/UL — HIGH (ref 1.4–6.5)
NEUTROPHILS NFR BLD AUTO: 88.9 % — HIGH (ref 42.2–75.2)
NRBC # BLD: 0 /100 WBCS — SIGNIFICANT CHANGE UP (ref 0–0)
PHOSPHATE SERPL-MCNC: 3.2 MG/DL — SIGNIFICANT CHANGE UP (ref 2.1–4.9)
PLATELET # BLD AUTO: 479 K/UL — HIGH (ref 130–400)
POTASSIUM SERPL-MCNC: 4.3 MMOL/L — SIGNIFICANT CHANGE UP (ref 3.5–5)
POTASSIUM SERPL-SCNC: 4.3 MMOL/L — SIGNIFICANT CHANGE UP (ref 3.5–5)
RBC # BLD: 3.36 M/UL — LOW (ref 4.2–5.4)
RBC # FLD: 16.6 % — HIGH (ref 11.5–14.5)
SODIUM SERPL-SCNC: 134 MMOL/L — LOW (ref 135–146)
WBC # BLD: 20.52 K/UL — HIGH (ref 4.8–10.8)
WBC # FLD AUTO: 20.52 K/UL — HIGH (ref 4.8–10.8)

## 2020-10-04 PROCEDURE — 99232 SBSQ HOSP IP/OBS MODERATE 35: CPT | Mod: 24

## 2020-10-04 RX ORDER — OXYCODONE HYDROCHLORIDE 5 MG/1
30 TABLET ORAL EVERY 12 HOURS
Refills: 0 | Status: DISCONTINUED | OUTPATIENT
Start: 2020-10-04 | End: 2020-10-05

## 2020-10-04 RX ORDER — ONDANSETRON 8 MG/1
4 TABLET, FILM COATED ORAL ONCE
Refills: 0 | Status: COMPLETED | OUTPATIENT
Start: 2020-10-04 | End: 2020-10-04

## 2020-10-04 RX ORDER — KETOROLAC TROMETHAMINE 30 MG/ML
15 SYRINGE (ML) INJECTION ONCE
Refills: 0 | Status: DISCONTINUED | OUTPATIENT
Start: 2020-10-04 | End: 2020-10-04

## 2020-10-04 RX ADMIN — GABAPENTIN 300 MILLIGRAM(S): 400 CAPSULE ORAL at 05:40

## 2020-10-04 RX ADMIN — PANTOPRAZOLE SODIUM 40 MILLIGRAM(S): 20 TABLET, DELAYED RELEASE ORAL at 06:10

## 2020-10-04 RX ADMIN — METHOCARBAMOL 500 MILLIGRAM(S): 500 TABLET, FILM COATED ORAL at 23:54

## 2020-10-04 RX ADMIN — OXYCODONE HYDROCHLORIDE 5 MILLIGRAM(S): 5 TABLET ORAL at 12:11

## 2020-10-04 RX ADMIN — OXYCODONE HYDROCHLORIDE 20 MILLIGRAM(S): 5 TABLET ORAL at 05:40

## 2020-10-04 RX ADMIN — ONDANSETRON 4 MILLIGRAM(S): 8 TABLET, FILM COATED ORAL at 09:15

## 2020-10-04 RX ADMIN — METHOCARBAMOL 500 MILLIGRAM(S): 500 TABLET, FILM COATED ORAL at 17:46

## 2020-10-04 RX ADMIN — GABAPENTIN 300 MILLIGRAM(S): 400 CAPSULE ORAL at 21:23

## 2020-10-04 RX ADMIN — Medication 100 MILLIGRAM(S): at 05:42

## 2020-10-04 RX ADMIN — Medication 25 MILLIGRAM(S): at 05:40

## 2020-10-04 RX ADMIN — Medication 650 MILLIGRAM(S): at 05:40

## 2020-10-04 RX ADMIN — METHOCARBAMOL 500 MILLIGRAM(S): 500 TABLET, FILM COATED ORAL at 12:08

## 2020-10-04 RX ADMIN — OXYCODONE HYDROCHLORIDE 20 MILLIGRAM(S): 5 TABLET ORAL at 18:00

## 2020-10-04 RX ADMIN — SENNA PLUS 2 TABLET(S): 8.6 TABLET ORAL at 21:23

## 2020-10-04 RX ADMIN — Medication 650 MILLIGRAM(S): at 17:45

## 2020-10-04 RX ADMIN — OXYCODONE HYDROCHLORIDE 5 MILLIGRAM(S): 5 TABLET ORAL at 05:39

## 2020-10-04 RX ADMIN — Medication 100 MILLIGRAM(S): at 13:33

## 2020-10-04 RX ADMIN — GABAPENTIN 300 MILLIGRAM(S): 400 CAPSULE ORAL at 13:33

## 2020-10-04 RX ADMIN — HEPARIN SODIUM 5000 UNIT(S): 5000 INJECTION INTRAVENOUS; SUBCUTANEOUS at 21:24

## 2020-10-04 RX ADMIN — Medication 650 MILLIGRAM(S): at 05:41

## 2020-10-04 RX ADMIN — Medication 650 MILLIGRAM(S): at 23:54

## 2020-10-04 RX ADMIN — Medication 500 MILLIGRAM(S): at 05:41

## 2020-10-04 RX ADMIN — OXYCODONE HYDROCHLORIDE 5 MILLIGRAM(S): 5 TABLET ORAL at 13:15

## 2020-10-04 RX ADMIN — OXYCODONE HYDROCHLORIDE 20 MILLIGRAM(S): 5 TABLET ORAL at 05:41

## 2020-10-04 RX ADMIN — Medication 25 MILLIGRAM(S): at 17:58

## 2020-10-04 RX ADMIN — OXYCODONE HYDROCHLORIDE 5 MILLIGRAM(S): 5 TABLET ORAL at 03:44

## 2020-10-04 RX ADMIN — Medication 650 MILLIGRAM(S): at 17:55

## 2020-10-04 RX ADMIN — Medication 100 MILLIGRAM(S): at 21:24

## 2020-10-04 RX ADMIN — HEPARIN SODIUM 5000 UNIT(S): 5000 INJECTION INTRAVENOUS; SUBCUTANEOUS at 05:41

## 2020-10-04 RX ADMIN — Medication 500 MILLIGRAM(S): at 17:46

## 2020-10-04 RX ADMIN — CEFTRIAXONE 100 MILLIGRAM(S): 500 INJECTION, POWDER, FOR SOLUTION INTRAMUSCULAR; INTRAVENOUS at 12:07

## 2020-10-04 RX ADMIN — HEPARIN SODIUM 5000 UNIT(S): 5000 INJECTION INTRAVENOUS; SUBCUTANEOUS at 13:33

## 2020-10-04 RX ADMIN — METHOCARBAMOL 500 MILLIGRAM(S): 500 TABLET, FILM COATED ORAL at 05:40

## 2020-10-04 RX ADMIN — Medication 15 MILLIGRAM(S): at 09:16

## 2020-10-04 RX ADMIN — SODIUM CHLORIDE 50 MILLILITER(S): 9 INJECTION INTRAMUSCULAR; INTRAVENOUS; SUBCUTANEOUS at 06:27

## 2020-10-04 NOTE — PROGRESS NOTE ADULT - SUBJECTIVE AND OBJECTIVE BOX
GENERAL SURGERY PROGRESS NOTE     GLORIA SCHAFER  45y  Female  Hospital day :20d  POD:  Procedure: Incision and drainage, wound, torso  Insertion, arterial line, percutaneous  Washing out stomach contents  Small bowel resection with anastomosis  Lysis of intestinal adhesions  Diagnostic laparoscopy    OVERNIGHT EVENTS: no acute events overnight, refusing labs    T(F): 97.8 (10-03-20 @ 20:50), Max: 100.1 (10-03-20 @ 05:00)  HR: 95 (10-03-20 @ 20:50) (95 - 110)  BP: 102/60 (10-03-20 @ 20:50) (102/60 - 110/66)  RR: 18 (10-03-20 @ 20:50) (18 - 18)  SpO2: 98% (10-03-20 @ 16:33) (97% - 98%)    DIET/FLUIDS: ascorbic acid 500 milliGRAM(s) Oral two times a day  multivitamin/minerals 1 Tablet(s) Oral daily  sodium chloride 0.9%. 1000 milliLiter(s) IV Continuous <Continuous>     GI proph:  pantoprazole    Tablet 40 milliGRAM(s) Oral before breakfast    AC/ proph: heparin   Injectable 5000 Unit(s) SubCutaneous every 8 hours    ABx: cefTRIAXone   IVPB 2000 milliGRAM(s) IV Intermittent every 24 hours  cefTRIAXone   IVPB      metroNIDAZOLE  IVPB      metroNIDAZOLE  IVPB 500 milliGRAM(s) IV Intermittent every 8 hours    PHYSICAL EXAM:  GENERAL: NAD, well-appearing  ABDOMEN: Soft, wound vac in place and functioning    LABS  CAPILLARY BLOOD GLUCOSE  POCT Blood Glucose.: 100 mg/dL (03 Oct 2020 22:44)  POCT Blood Glucose.: 130 mg/dL (03 Oct 2020 16:40)  POCT Blood Glucose.: 164 mg/dL (03 Oct 2020 11:18)  POCT Blood Glucose.: 114 mg/dL (03 Oct 2020 07:12)                          8.2    14.39 )-----------( 535      ( 02 Oct 2020 20:27 )             26.6         10-02    136  |  102  |  4<L>  ----------------------------<  111<H>  4.7   |  26  |  <0.5<L>

## 2020-10-04 NOTE — PROGRESS NOTE ADULT - ASSESSMENT
Assessment:  46yo female with PSHx PAULIE/BSO 8/19/2020 for fibroids admitted for persistent small bowel obstruction s/p laparoscopic NARCISO, laparotomy, extensive NARCISO, small bowel resection with primary anastomosis 9/15 complicated by small bowel leak s/p re-exploration, wash out, small bowel resection and primary anastomosis 9/15. Complained of lower abdominal pain with cellulitis and induration. CT chest/A/P 9/18 showed speck of subcutaneous air and exam was concerning for crepitus. Patient underwent bilateral flank exploration of subcutaneous tissue - negative for necrotizing infection. CT chest/A/P with PO and IV contrast 9/26 showed improving fluid collection, improving bilateral pleural effusions, and enteritis.     Plan:  - continue current management  - ambulate  - IV abx  - wound vac change on monday

## 2020-10-04 NOTE — PROGRESS NOTE ADULT - ATTENDING COMMENTS
Patient seen and examined with surgery team on rounds and discussed management plans. New nausea and vomiting today, abd soft benign Vac in place. patient not getting OOB encouraged, will watch for abd pain.

## 2020-10-05 ENCOUNTER — APPOINTMENT (OUTPATIENT)
Dept: OBGYN | Facility: CLINIC | Age: 45
End: 2020-10-05

## 2020-10-05 LAB
ANION GAP SERPL CALC-SCNC: 9 MMOL/L — SIGNIFICANT CHANGE UP (ref 7–14)
APPEARANCE UR: ABNORMAL
BACTERIA # UR AUTO: NEGATIVE — SIGNIFICANT CHANGE UP
BASOPHILS # BLD AUTO: 0.05 K/UL — SIGNIFICANT CHANGE UP (ref 0–0.2)
BASOPHILS NFR BLD AUTO: 0.3 % — SIGNIFICANT CHANGE UP (ref 0–1)
BILIRUB UR-MCNC: NEGATIVE — SIGNIFICANT CHANGE UP
BUN SERPL-MCNC: 6 MG/DL — LOW (ref 10–20)
CALCIUM SERPL-MCNC: 7.7 MG/DL — LOW (ref 8.5–10.1)
CHLORIDE SERPL-SCNC: 101 MMOL/L — SIGNIFICANT CHANGE UP (ref 98–110)
CO2 SERPL-SCNC: 25 MMOL/L — SIGNIFICANT CHANGE UP (ref 17–32)
COLOR SPEC: YELLOW — SIGNIFICANT CHANGE UP
CREAT SERPL-MCNC: 0.5 MG/DL — LOW (ref 0.7–1.5)
DIFF PNL FLD: NEGATIVE — SIGNIFICANT CHANGE UP
EOSINOPHIL # BLD AUTO: 0.34 K/UL — SIGNIFICANT CHANGE UP (ref 0–0.7)
EOSINOPHIL NFR BLD AUTO: 2.1 % — SIGNIFICANT CHANGE UP (ref 0–8)
EPI CELLS # UR: >27 /HPF — HIGH (ref 0–5)
GLUCOSE BLDC GLUCOMTR-MCNC: 121 MG/DL — HIGH (ref 70–99)
GLUCOSE BLDC GLUCOMTR-MCNC: 126 MG/DL — HIGH (ref 70–99)
GLUCOSE SERPL-MCNC: 104 MG/DL — HIGH (ref 70–99)
GLUCOSE UR QL: NEGATIVE — SIGNIFICANT CHANGE UP
HCT VFR BLD CALC: 27.9 % — LOW (ref 37–47)
HGB BLD-MCNC: 8.5 G/DL — LOW (ref 12–16)
HYALINE CASTS # UR AUTO: >145 /LPF — HIGH (ref 0–7)
IMM GRANULOCYTES NFR BLD AUTO: 0.8 % — HIGH (ref 0.1–0.3)
KETONES UR-MCNC: ABNORMAL
LEUKOCYTE ESTERASE UR-ACNC: ABNORMAL
LYMPHOCYTES # BLD AUTO: 1.21 K/UL — SIGNIFICANT CHANGE UP (ref 1.2–3.4)
LYMPHOCYTES # BLD AUTO: 7.3 % — LOW (ref 20.5–51.1)
MAGNESIUM SERPL-MCNC: 1.5 MG/DL — LOW (ref 1.8–2.4)
MCHC RBC-ENTMCNC: 26.2 PG — LOW (ref 27–31)
MCHC RBC-ENTMCNC: 30.5 G/DL — LOW (ref 32–37)
MCV RBC AUTO: 86.1 FL — SIGNIFICANT CHANGE UP (ref 81–99)
MONOCYTES # BLD AUTO: 0.9 K/UL — HIGH (ref 0.1–0.6)
MONOCYTES NFR BLD AUTO: 5.5 % — SIGNIFICANT CHANGE UP (ref 1.7–9.3)
NEUTROPHILS # BLD AUTO: 13.85 K/UL — HIGH (ref 1.4–6.5)
NEUTROPHILS NFR BLD AUTO: 84 % — HIGH (ref 42.2–75.2)
NITRITE UR-MCNC: NEGATIVE — SIGNIFICANT CHANGE UP
NRBC # BLD: 0 /100 WBCS — SIGNIFICANT CHANGE UP (ref 0–0)
PH UR: 6.5 — SIGNIFICANT CHANGE UP (ref 5–8)
PHOSPHATE SERPL-MCNC: 2.2 MG/DL — SIGNIFICANT CHANGE UP (ref 2.1–4.9)
PLATELET # BLD AUTO: 481 K/UL — HIGH (ref 130–400)
POTASSIUM SERPL-MCNC: 3.9 MMOL/L — SIGNIFICANT CHANGE UP (ref 3.5–5)
POTASSIUM SERPL-SCNC: 3.9 MMOL/L — SIGNIFICANT CHANGE UP (ref 3.5–5)
PROT UR-MCNC: ABNORMAL
RBC # BLD: 3.24 M/UL — LOW (ref 4.2–5.4)
RBC # FLD: 16.9 % — HIGH (ref 11.5–14.5)
RBC CASTS # UR COMP ASSIST: 6 /HPF — HIGH (ref 0–4)
SODIUM SERPL-SCNC: 135 MMOL/L — SIGNIFICANT CHANGE UP (ref 135–146)
SP GR SPEC: 1.03 — SIGNIFICANT CHANGE UP (ref 1.01–1.03)
UROBILINOGEN FLD QL: SIGNIFICANT CHANGE UP
WBC # BLD: 16.48 K/UL — HIGH (ref 4.8–10.8)
WBC # FLD AUTO: 16.48 K/UL — HIGH (ref 4.8–10.8)
WBC UR QL: 55 /HPF — HIGH (ref 0–5)

## 2020-10-05 PROCEDURE — 71045 X-RAY EXAM CHEST 1 VIEW: CPT | Mod: 26

## 2020-10-05 PROCEDURE — 99024 POSTOP FOLLOW-UP VISIT: CPT

## 2020-10-05 RX ORDER — HYDROMORPHONE HYDROCHLORIDE 2 MG/ML
1 INJECTION INTRAMUSCULAR; INTRAVENOUS; SUBCUTANEOUS ONCE
Refills: 0 | Status: DISCONTINUED | OUTPATIENT
Start: 2020-10-05 | End: 2020-10-05

## 2020-10-05 RX ORDER — SENNA PLUS 8.6 MG/1
1 TABLET ORAL AT BEDTIME
Refills: 0 | Status: DISCONTINUED | OUTPATIENT
Start: 2020-10-05 | End: 2020-10-09

## 2020-10-05 RX ORDER — OXYCODONE HYDROCHLORIDE 5 MG/1
10 TABLET ORAL EVERY 4 HOURS
Refills: 0 | Status: DISCONTINUED | OUTPATIENT
Start: 2020-10-05 | End: 2020-10-09

## 2020-10-05 RX ORDER — CEFTRIAXONE 500 MG/1
2000 INJECTION, POWDER, FOR SOLUTION INTRAMUSCULAR; INTRAVENOUS EVERY 24 HOURS
Refills: 0 | Status: DISCONTINUED | OUTPATIENT
Start: 2020-10-06 | End: 2020-10-09

## 2020-10-05 RX ADMIN — PANTOPRAZOLE SODIUM 40 MILLIGRAM(S): 20 TABLET, DELAYED RELEASE ORAL at 05:31

## 2020-10-05 RX ADMIN — CEFTRIAXONE 100 MILLIGRAM(S): 500 INJECTION, POWDER, FOR SOLUTION INTRAMUSCULAR; INTRAVENOUS at 11:09

## 2020-10-05 RX ADMIN — HYDROMORPHONE HYDROCHLORIDE 1 MILLIGRAM(S): 2 INJECTION INTRAMUSCULAR; INTRAVENOUS; SUBCUTANEOUS at 08:39

## 2020-10-05 RX ADMIN — Medication 650 MILLIGRAM(S): at 17:48

## 2020-10-05 RX ADMIN — HYDROMORPHONE HYDROCHLORIDE 1 MILLIGRAM(S): 2 INJECTION INTRAMUSCULAR; INTRAVENOUS; SUBCUTANEOUS at 08:41

## 2020-10-05 RX ADMIN — Medication 650 MILLIGRAM(S): at 23:13

## 2020-10-05 RX ADMIN — GABAPENTIN 300 MILLIGRAM(S): 400 CAPSULE ORAL at 22:15

## 2020-10-05 RX ADMIN — Medication 500 MILLIGRAM(S): at 17:32

## 2020-10-05 RX ADMIN — Medication 25 MILLIGRAM(S): at 17:34

## 2020-10-05 RX ADMIN — METHOCARBAMOL 500 MILLIGRAM(S): 500 TABLET, FILM COATED ORAL at 23:13

## 2020-10-05 RX ADMIN — GABAPENTIN 300 MILLIGRAM(S): 400 CAPSULE ORAL at 13:35

## 2020-10-05 RX ADMIN — OXYCODONE HYDROCHLORIDE 30 MILLIGRAM(S): 5 TABLET ORAL at 05:32

## 2020-10-05 RX ADMIN — Medication 650 MILLIGRAM(S): at 17:31

## 2020-10-05 RX ADMIN — HEPARIN SODIUM 5000 UNIT(S): 5000 INJECTION INTRAVENOUS; SUBCUTANEOUS at 22:15

## 2020-10-05 RX ADMIN — HEPARIN SODIUM 5000 UNIT(S): 5000 INJECTION INTRAVENOUS; SUBCUTANEOUS at 13:36

## 2020-10-05 RX ADMIN — OXYCODONE HYDROCHLORIDE 30 MILLIGRAM(S): 5 TABLET ORAL at 17:36

## 2020-10-05 RX ADMIN — Medication 500 MILLIGRAM(S): at 05:31

## 2020-10-05 RX ADMIN — GABAPENTIN 300 MILLIGRAM(S): 400 CAPSULE ORAL at 05:31

## 2020-10-05 RX ADMIN — METHOCARBAMOL 500 MILLIGRAM(S): 500 TABLET, FILM COATED ORAL at 17:32

## 2020-10-05 RX ADMIN — OXYCODONE HYDROCHLORIDE 30 MILLIGRAM(S): 5 TABLET ORAL at 06:02

## 2020-10-05 RX ADMIN — Medication 650 MILLIGRAM(S): at 00:24

## 2020-10-05 RX ADMIN — HEPARIN SODIUM 5000 UNIT(S): 5000 INJECTION INTRAVENOUS; SUBCUTANEOUS at 05:30

## 2020-10-05 RX ADMIN — Medication 650 MILLIGRAM(S): at 05:31

## 2020-10-05 RX ADMIN — METHOCARBAMOL 500 MILLIGRAM(S): 500 TABLET, FILM COATED ORAL at 05:30

## 2020-10-05 RX ADMIN — Medication 1 TABLET(S): at 11:09

## 2020-10-05 RX ADMIN — Medication 650 MILLIGRAM(S): at 11:10

## 2020-10-05 RX ADMIN — Medication 100 MILLIGRAM(S): at 13:35

## 2020-10-05 RX ADMIN — Medication 650 MILLIGRAM(S): at 06:01

## 2020-10-05 RX ADMIN — Medication 650 MILLIGRAM(S): at 12:50

## 2020-10-05 RX ADMIN — Medication 100 MILLIGRAM(S): at 05:31

## 2020-10-05 RX ADMIN — Medication 25 MILLIGRAM(S): at 05:41

## 2020-10-05 RX ADMIN — METHOCARBAMOL 500 MILLIGRAM(S): 500 TABLET, FILM COATED ORAL at 11:10

## 2020-10-05 RX ADMIN — Medication 100 MILLIGRAM(S): at 22:13

## 2020-10-05 NOTE — PROGRESS NOTE ADULT - ATTENDING COMMENTS
44yo female with PSHx PAULIE/BSO 8/19/2020 for fibroids admitted for persistent small bowel obstruction s/p laparoscopic NARCISO, laparotomy, extensive NARCISO, small bowel resection with primary anastomosis 9/15 complicated by small bowel leak s/p re-exploration, wash out, small bowel resection and primary anastomosis 9/15. Complained of lower abdominal pain with cellulitis and induration. CT chest/A/P 9/18 showed speck of subcutaneous air and exam was concerning for crepitus. Patient underwent bilateral flank exploration of subcutaneous tissue - negative for necrotizing infection. CT chest/A/P with PO and IV contrast 9/26 showed improving fluid collection, improving bilateral pleural effusions, and enteritis.     At this time, patient is hemodynamically stable with overall improving tachycarida. No pressors. Breathing comfortably. Complains of abdominal pain "inside" but overall improving. Tolerated regular diet, +bowel function - gas and BM. Off TPN. Continue antibiotics. Klebsiella from wound culture. Central line site looks clean, CL removed, daily dressing changes, will apply wound vac today. ID following. Afebrile over night, but fever this morning. WBC 16 from 20. Hyponatremia - resolved. Pain control, trend labs, continue antibiotics. Encourage ambulation/OOB, incentive spirometer, DVT ppx. Working with rehab. Plan for possible CT C/A/P tomorrow if fever or leukocytosis worsen.

## 2020-10-05 NOTE — CHART NOTE - NSCHARTNOTEFT_GEN_A_CORE
patient refused lab work up on her blood although I explained her the importance of trending her WBC   she preferred to get am labs.

## 2020-10-05 NOTE — PROGRESS NOTE ADULT - SUBJECTIVE AND OBJECTIVE BOX
GILMAR GLORIA  45y, Female    All available historical data reviewed    OVERNIGHT EVENTS:  low grade fevers  minimal abdominal pain    ROS:  General: Denies rigors, nightsweats  HEENT: Denies headache, rhinorrhea, sore throat, eye pain  CV: Denies CP, palpitations  PULM: Denies wheezing, hemoptysis  GI: Denies hematemesis, hematochezia, melena  : Denies discharge, hematuria  MSK: Denies arthralgias, myalgias  SKIN: Denies rash, lesions  NEURO: Denies paresthesias, weakness  PSYCH: Denies depression, anxiety    VITALS:  T(F): 99.7, Max: 100.5 (10-05-20 @ 08:28)  HR: 124  BP: 101/65  RR: 18Vital Signs Last 24 Hrs  T(C): 37.6 (05 Oct 2020 12:00), Max: 38.1 (05 Oct 2020 08:28)  T(F): 99.7 (05 Oct 2020 12:00), Max: 100.5 (05 Oct 2020 08:28)  HR: 124 (05 Oct 2020 12:00) (101 - 124)  BP: 101/65 (05 Oct 2020 12:00) (93/62 - 112/75)  BP(mean): --  RR: 18 (05 Oct 2020 12:00) (18 - 18)  SpO2: 97% (04 Oct 2020 13:23) (97% - 97%)    TESTS & MEASUREMENTS:                        8.5    16.48 )-----------( 481      ( 05 Oct 2020 06:27 )             27.9     10-05    135  |  101  |  6<L>  ----------------------------<  104<H>  3.9   |  25  |  0.5<L>    Ca    7.7<L>      05 Oct 2020 06:27  Phos  2.2     10-05  Mg     1.5     10-05          Urinalysis Basic - ( 05 Oct 2020 06:50 )    Color: Yellow / Appearance: Slightly Turbid / S.030 / pH: x  Gluc: x / Ketone: Small  / Bili: Negative / Urobili: <2 mg/dL   Blood: x / Protein: 100 mg/dL / Nitrite: Negative   Leuk Esterase: Moderate / RBC: x / WBC x   Sq Epi: x / Non Sq Epi: x / Bacteria: x          RADIOLOGY & ADDITIONAL TESTS:  Personal review of radiological diagnostics performed  Echo and EKG results noted when applicable.     MEDICATIONS:  acetaminophen   Tablet .. 650 milliGRAM(s) Oral every 6 hours  ascorbic acid 500 milliGRAM(s) Oral two times a day  chlorhexidine 4% Liquid 1 Application(s) Topical daily  chlorhexidine 4% Liquid 1 Application(s) Topical daily  gabapentin 300 milliGRAM(s) Oral every 8 hours  heparin   Injectable 5000 Unit(s) SubCutaneous every 8 hours  methocarbamol 500 milliGRAM(s) Oral every 6 hours  metoprolol tartrate 25 milliGRAM(s) Oral every 12 hours  metroNIDAZOLE  IVPB      metroNIDAZOLE  IVPB 500 milliGRAM(s) IV Intermittent every 8 hours  multivitamin/minerals 1 Tablet(s) Oral daily  oxyCODONE    IR 5 milliGRAM(s) Oral every 4 hours PRN  oxyCODONE  ER Tablet 30 milliGRAM(s) Oral every 12 hours  pantoprazole    Tablet 40 milliGRAM(s) Oral before breakfast  polyethylene glycol 3350 17 Gram(s) Oral daily  senna 2 Tablet(s) Oral at bedtime  sodium chloride 0.9%. 1000 milliLiter(s) IV Continuous <Continuous>      ANTIBIOTICS:  metroNIDAZOLE  IVPB      metroNIDAZOLE  IVPB 500 milliGRAM(s) IV Intermittent every 8 hours

## 2020-10-05 NOTE — PROGRESS NOTE ADULT - ASSESSMENT
· Assessment	  IMPRESSION;  Presented with Feculent peritonitis secondary to perforated viscus  9/15 : s/p diagnostic lap, NARCISO, SBR (43cm ileum) w/ primary anastamosis (side to side)  Post op pelvic abscesses  CT A/P 9/22 : pelvic abscesses, enteritis, likely reactive, b/l pleural effusions/atalectasis.  No right flank soft tissue infection  Right flank cellulitis with no abscess/fascitis : resolved. Seems to have originated from the abdominal wound which was locally debrided 9/22 and presently with no ongoing infectious process. WCx Klebsiella  Right flank : resolved infection  with no pain and resolved erythema  BCx 9/17 NGTD  WCx 9/22 rare PMNs, no org, Klebsiella  Low grade fevers : see no new focus of infection. CXR no consolidation. PICC not infected. Abdominal wound appears unremarkable. Right flank infection has resolved.  pt ambulating and in good spirits    RECOMMENDATIONS;  repeat CT pending  Rocephin 2 gm iv q24h  Flagyl 500 mg iv q8h

## 2020-10-05 NOTE — PROVIDER CONTACT NOTE (OTHER) - SITUATION
pt is POD#20, pt continues to refuse to wear her SQS despite education and  because they make her itchy and uncomfortable. pt educated on importance of SQS as a preventative of further complication.

## 2020-10-05 NOTE — PROGRESS NOTE ADULT - ASSESSMENT
Assessment:  46yo female with PSHx PAULIE/BSO 8/19/2020 for fibroids admitted for persistent small bowel obstruction s/p laparoscopic NARCISO, laparotomy, extensive NARCISO, small bowel resection with primary anastomosis 9/15 complicated by small bowel leak s/p re-exploration, wash out, small bowel resection and primary anastomosis 9/15. Complained of lower abdominal pain with cellulitis and induration. CT chest/A/P 9/18 showed speck of subcutaneous air and exam was concerning for crepitus. Patient underwent bilateral flank exploration of subcutaneous tissue - negative for necrotizing infection. CT chest/A/P with PO and IV contrast 9/26 showed improving fluid collection, improving bilateral pleural effusions, and enteritis. WCC up to 20 overnight, tachy to 120's.    Plan:  - CXR/UA  - possible repeat CT  - ambulate  - IV abx  - wound vac change on monday

## 2020-10-05 NOTE — PROGRESS NOTE ADULT - GASTROINTESTINAL COMMENTS
R flank with resolved erythema
abd vacc in place
erythema/pain right flank resolved. Abdominal wound packed with serous drainage
R flank with faint blanching confluent erythema/macula changes, no pain, no crepitans. Abdominal wound with no necrosis/no induration, minimal non purulent drainage
erythema right flank has resolved. No pain, no crepitans. Minimal edema
wound packed. no induration/pus/necrosis
wound with no erythema/induration/pus/necrosis
Right flank with edema/erythema from upper chest to pelvic area, no crepitans. Wound with no purulence and no erythema

## 2020-10-05 NOTE — PROGRESS NOTE ADULT - SUBJECTIVE AND OBJECTIVE BOX
GENERAL SURGERY PROGRESS NOTE     GLORIA SCHAFER  45y  Female  Hospital day :21d  POD:  Procedure: Incision and drainage, wound, torso    Insertion, arterial line, percutaneous    Washing out stomach contents    Small bowel resection with anastomosis    Lysis of intestinal adhesions    Diagnostic laparoscopy      OVERNIGHT EVENTS:    T(F): 97.8 (10-05-20 @ 04:40), Max: 99.1 (10-04-20 @ 08:55)  HR: 124 (10-05-20 @ 05:42) (101 - 124)  BP: 109/70 (10-05-20 @ 05:42) (93/62 - 114/81)  ABP: --  ABP(mean): --  RR: 18 (10-05-20 @ 04:40) (18 - 18)  SpO2: 97% (10-04-20 @ 13:23) (97% - 97%)    DIET/FLUIDS: ascorbic acid 500 milliGRAM(s) Oral two times a day  multivitamin/minerals 1 Tablet(s) Oral daily  sodium chloride 0.9%. 1000 milliLiter(s) IV Continuous <Continuous>    NG:                                                                                DRAINS:     BM:     EMESIS:   10-04-20 @ 07:01  -  10-05-20 @ 06:51  --------------------------------------------------------  OUT: 0.03 mL/kg/d    URINE:      GI proph:  pantoprazole    Tablet 40 milliGRAM(s) Oral before breakfast    AC/ proph: heparin   Injectable 5000 Unit(s) SubCutaneous every 8 hours    ABx: cefTRIAXone   IVPB 2000 milliGRAM(s) IV Intermittent every 24 hours  cefTRIAXone   IVPB      metroNIDAZOLE  IVPB 500 milliGRAM(s) IV Intermittent every 8 hours  metroNIDAZOLE  IVPB          PHYSICAL EXAM:  GENERAL: NAD, well-appearing  ABDOMEN: Soft, Nondistended, wound vac in place  EXTREMITIES:  No clubbing, cyanosis, or edema    LABS  CAPILLARY BLOOD GLUCOSE  POCT Blood Glucose.: 117 mg/dL (04 Oct 2020 21:18)  POCT Blood Glucose.: 154 mg/dL (04 Oct 2020 16:14)  POCT Blood Glucose.: 117 mg/dL (04 Oct 2020 11:27)  POCT Blood Glucose.: 90 mg/dL (04 Oct 2020 07:19)                          8.9    20.52 )-----------( 479      ( 04 Oct 2020 11:26 )             28.8       Auto Neutrophil %: 88.9 % (10-04-20 @ 11:26)  Auto Immature Granulocyte %: 0.8 % (10-04-20 @ 11:26)    10-04    134<L>  |  101  |  6<L>  ----------------------------<  114<H>  4.3   |  19  |  <0.5<L>      Calcium, Total Serum: 7.9 mg/dL (10-04-20 @ 11:26)

## 2020-10-06 LAB
ANION GAP SERPL CALC-SCNC: 10 MMOL/L — SIGNIFICANT CHANGE UP (ref 7–14)
BASOPHILS # BLD AUTO: 0.04 K/UL — SIGNIFICANT CHANGE UP (ref 0–0.2)
BASOPHILS NFR BLD AUTO: 0.3 % — SIGNIFICANT CHANGE UP (ref 0–1)
BUN SERPL-MCNC: 5 MG/DL — LOW (ref 10–20)
CALCIUM SERPL-MCNC: 7.3 MG/DL — LOW (ref 8.5–10.1)
CHLORIDE SERPL-SCNC: 99 MMOL/L — SIGNIFICANT CHANGE UP (ref 98–110)
CO2 SERPL-SCNC: 25 MMOL/L — SIGNIFICANT CHANGE UP (ref 17–32)
CREAT SERPL-MCNC: <0.5 MG/DL — LOW (ref 0.7–1.5)
EOSINOPHIL # BLD AUTO: 0.4 K/UL — SIGNIFICANT CHANGE UP (ref 0–0.7)
EOSINOPHIL NFR BLD AUTO: 3 % — SIGNIFICANT CHANGE UP (ref 0–8)
GLUCOSE SERPL-MCNC: 115 MG/DL — HIGH (ref 70–99)
HCT VFR BLD CALC: 26.8 % — LOW (ref 37–47)
HGB BLD-MCNC: 8.1 G/DL — LOW (ref 12–16)
IMM GRANULOCYTES NFR BLD AUTO: 0.7 % — HIGH (ref 0.1–0.3)
LYMPHOCYTES # BLD AUTO: 1.08 K/UL — LOW (ref 1.2–3.4)
LYMPHOCYTES # BLD AUTO: 8 % — LOW (ref 20.5–51.1)
MAGNESIUM SERPL-MCNC: 1.6 MG/DL — LOW (ref 1.8–2.4)
MCHC RBC-ENTMCNC: 26.5 PG — LOW (ref 27–31)
MCHC RBC-ENTMCNC: 30.2 G/DL — LOW (ref 32–37)
MCV RBC AUTO: 87.6 FL — SIGNIFICANT CHANGE UP (ref 81–99)
MONOCYTES # BLD AUTO: 0.83 K/UL — HIGH (ref 0.1–0.6)
MONOCYTES NFR BLD AUTO: 6.2 % — SIGNIFICANT CHANGE UP (ref 1.7–9.3)
NEUTROPHILS # BLD AUTO: 11.04 K/UL — HIGH (ref 1.4–6.5)
NEUTROPHILS NFR BLD AUTO: 81.8 % — HIGH (ref 42.2–75.2)
NRBC # BLD: 0 /100 WBCS — SIGNIFICANT CHANGE UP (ref 0–0)
PHOSPHATE SERPL-MCNC: 1.8 MG/DL — LOW (ref 2.1–4.9)
PLATELET # BLD AUTO: 453 K/UL — HIGH (ref 130–400)
POTASSIUM SERPL-MCNC: 3.3 MMOL/L — LOW (ref 3.5–5)
POTASSIUM SERPL-SCNC: 3.3 MMOL/L — LOW (ref 3.5–5)
RBC # BLD: 3.06 M/UL — LOW (ref 4.2–5.4)
RBC # FLD: 16.8 % — HIGH (ref 11.5–14.5)
SODIUM SERPL-SCNC: 134 MMOL/L — LOW (ref 135–146)
WBC # BLD: 13.49 K/UL — HIGH (ref 4.8–10.8)
WBC # FLD AUTO: 13.49 K/UL — HIGH (ref 4.8–10.8)

## 2020-10-06 PROCEDURE — 99024 POSTOP FOLLOW-UP VISIT: CPT

## 2020-10-06 RX ORDER — POTASSIUM CHLORIDE 20 MEQ
40 PACKET (EA) ORAL EVERY 4 HOURS
Refills: 0 | Status: COMPLETED | OUTPATIENT
Start: 2020-10-06 | End: 2020-10-06

## 2020-10-06 RX ORDER — MAGNESIUM SULFATE 500 MG/ML
2 VIAL (ML) INJECTION ONCE
Refills: 0 | Status: COMPLETED | OUTPATIENT
Start: 2020-10-06 | End: 2020-10-06

## 2020-10-06 RX ORDER — OXYCODONE HYDROCHLORIDE 5 MG/1
20 TABLET ORAL EVERY 12 HOURS
Refills: 0 | Status: DISCONTINUED | OUTPATIENT
Start: 2020-10-06 | End: 2020-10-09

## 2020-10-06 RX ORDER — ONDANSETRON 8 MG/1
4 TABLET, FILM COATED ORAL EVERY 8 HOURS
Refills: 0 | Status: DISCONTINUED | OUTPATIENT
Start: 2020-10-06 | End: 2020-10-09

## 2020-10-06 RX ADMIN — OXYCODONE HYDROCHLORIDE 20 MILLIGRAM(S): 5 TABLET ORAL at 18:32

## 2020-10-06 RX ADMIN — Medication 50 GRAM(S): at 13:01

## 2020-10-06 RX ADMIN — Medication 40 MILLIEQUIVALENT(S): at 15:45

## 2020-10-06 RX ADMIN — Medication 40 MILLIEQUIVALENT(S): at 21:09

## 2020-10-06 RX ADMIN — HEPARIN SODIUM 5000 UNIT(S): 5000 INJECTION INTRAVENOUS; SUBCUTANEOUS at 21:12

## 2020-10-06 RX ADMIN — OXYCODONE HYDROCHLORIDE 10 MILLIGRAM(S): 5 TABLET ORAL at 08:38

## 2020-10-06 RX ADMIN — Medication 650 MILLIGRAM(S): at 11:35

## 2020-10-06 RX ADMIN — METHOCARBAMOL 500 MILLIGRAM(S): 500 TABLET, FILM COATED ORAL at 11:38

## 2020-10-06 RX ADMIN — Medication 650 MILLIGRAM(S): at 12:00

## 2020-10-06 RX ADMIN — GABAPENTIN 300 MILLIGRAM(S): 400 CAPSULE ORAL at 05:19

## 2020-10-06 RX ADMIN — HEPARIN SODIUM 5000 UNIT(S): 5000 INJECTION INTRAVENOUS; SUBCUTANEOUS at 13:06

## 2020-10-06 RX ADMIN — GABAPENTIN 300 MILLIGRAM(S): 400 CAPSULE ORAL at 13:05

## 2020-10-06 RX ADMIN — OXYCODONE HYDROCHLORIDE 20 MILLIGRAM(S): 5 TABLET ORAL at 17:50

## 2020-10-06 RX ADMIN — Medication 650 MILLIGRAM(S): at 17:46

## 2020-10-06 RX ADMIN — METHOCARBAMOL 500 MILLIGRAM(S): 500 TABLET, FILM COATED ORAL at 05:19

## 2020-10-06 RX ADMIN — OXYCODONE HYDROCHLORIDE 10 MILLIGRAM(S): 5 TABLET ORAL at 03:44

## 2020-10-06 RX ADMIN — SODIUM CHLORIDE 50 MILLILITER(S): 9 INJECTION INTRAMUSCULAR; INTRAVENOUS; SUBCUTANEOUS at 05:18

## 2020-10-06 RX ADMIN — POLYETHYLENE GLYCOL 3350 17 GRAM(S): 17 POWDER, FOR SOLUTION ORAL at 11:36

## 2020-10-06 RX ADMIN — Medication 650 MILLIGRAM(S): at 00:01

## 2020-10-06 RX ADMIN — Medication 650 MILLIGRAM(S): at 05:19

## 2020-10-06 RX ADMIN — HEPARIN SODIUM 5000 UNIT(S): 5000 INJECTION INTRAVENOUS; SUBCUTANEOUS at 05:19

## 2020-10-06 RX ADMIN — Medication 650 MILLIGRAM(S): at 18:32

## 2020-10-06 RX ADMIN — Medication 500 MILLIGRAM(S): at 11:34

## 2020-10-06 RX ADMIN — PANTOPRAZOLE SODIUM 40 MILLIGRAM(S): 20 TABLET, DELAYED RELEASE ORAL at 06:32

## 2020-10-06 RX ADMIN — Medication 650 MILLIGRAM(S): at 05:51

## 2020-10-06 RX ADMIN — METHOCARBAMOL 500 MILLIGRAM(S): 500 TABLET, FILM COATED ORAL at 23:53

## 2020-10-06 RX ADMIN — ONDANSETRON 4 MILLIGRAM(S): 8 TABLET, FILM COATED ORAL at 10:19

## 2020-10-06 RX ADMIN — Medication 100 MILLIGRAM(S): at 21:18

## 2020-10-06 RX ADMIN — Medication 1 TABLET(S): at 13:00

## 2020-10-06 RX ADMIN — Medication 62.5 MILLIMOLE(S): at 13:59

## 2020-10-06 RX ADMIN — OXYCODONE HYDROCHLORIDE 10 MILLIGRAM(S): 5 TABLET ORAL at 04:25

## 2020-10-06 RX ADMIN — CHLORHEXIDINE GLUCONATE 1 APPLICATION(S): 213 SOLUTION TOPICAL at 11:39

## 2020-10-06 RX ADMIN — Medication 25 MILLIGRAM(S): at 06:32

## 2020-10-06 RX ADMIN — METHOCARBAMOL 500 MILLIGRAM(S): 500 TABLET, FILM COATED ORAL at 17:46

## 2020-10-06 RX ADMIN — CEFTRIAXONE 100 MILLIGRAM(S): 500 INJECTION, POWDER, FOR SOLUTION INTRAMUSCULAR; INTRAVENOUS at 10:19

## 2020-10-06 RX ADMIN — Medication 100 MILLIGRAM(S): at 05:20

## 2020-10-06 RX ADMIN — OXYCODONE HYDROCHLORIDE 10 MILLIGRAM(S): 5 TABLET ORAL at 09:12

## 2020-10-06 RX ADMIN — Medication 25 MILLIGRAM(S): at 17:46

## 2020-10-06 RX ADMIN — Medication 100 MILLIGRAM(S): at 13:07

## 2020-10-06 RX ADMIN — GABAPENTIN 300 MILLIGRAM(S): 400 CAPSULE ORAL at 21:11

## 2020-10-06 RX ADMIN — Medication 650 MILLIGRAM(S): at 23:53

## 2020-10-06 RX ADMIN — Medication 500 MILLIGRAM(S): at 21:23

## 2020-10-06 NOTE — PROGRESS NOTE ADULT - MS EXT PE MLT D E PC
no clubbing/no cyanosis/no pedal edema
no cyanosis
pedal edema

## 2020-10-06 NOTE — PROGRESS NOTE ADULT - ASSESSMENT
Assessment:  46yo female with PSHx PAULIE/BSO 8/19/2020 for fibroids admitted for persistent small bowel obstruction s/p laparoscopic NARCISO, laparotomy, extensive NARCISO, small bowel resection with primary anastomosis 9/15 complicated by small bowel leak s/p re-exploration, wash out, small bowel resection and primary anastomosis 9/15. Complained of lower abdominal pain with cellulitis and induration. CT chest/A/P 9/18 showed speck of subcutaneous air and exam was concerning for crepitus. Patient underwent bilateral flank exploration of subcutaneous tissue - negative for necrotizing infection. CT chest/A/P with PO and IV contrast 9/26 showed improving fluid collection, improving bilateral pleural effusions, and enteritis. WCC back down to 14 from 16 from 20.      Plan:  - possible repeat CT  - ambulate  - IV abx  - wound vac change on wednesday

## 2020-10-06 NOTE — PROGRESS NOTE ADULT - ATTENDING COMMENTS
46yo female with PSHx PAULIE/BSO 8/19/2020 for fibroids admitted for persistent small bowel obstruction s/p laparoscopic NARCISO, laparotomy, extensive NARCISO, small bowel resection with primary anastomosis 9/15 complicated by small bowel leak s/p re-exploration, wash out, small bowel resection and primary anastomosis 9/15. Complained of lower abdominal pain with cellulitis and induration. CT chest/A/P 9/18 showed speck of subcutaneous air and exam was concerning for crepitus. Patient underwent bilateral flank exploration of subcutaneous tissue - negative for necrotizing infection. CT chest/A/P with PO and IV contrast 9/26 showed improving fluid collection, improving bilateral pleural effusions, and enteritis.     At this time, patient is hemodynamically stable with overall improving tachycarida. No pressors. Breathing comfortably. Complains of abdominal pain "inside" but overall improving. Tolerated regular diet, +bowel function - gas and BM. Off TPN. Continue antibiotics. Klebsiella from wound culture. Central line site looks clean, CL removed, daily dressing changes, changing wound vac every other day. ID following. +fever this morning. Leukocytosis 13 from 16 from 20. Consider CT A/P if uptrends again or fever persists. Hyponatremia - resolved. Pain control, trend labs, continue antibiotics. Encourage ambulation/OOB, incentive spirometer, DVT ppx. ICU downgrading. Working with rehab.

## 2020-10-06 NOTE — PROGRESS NOTE ADULT - ASSESSMENT
MPRESSION;  Presented with Feculent peritonitis secondary to perforated viscus  9/15 : s/p diagnostic lap, NARCISO, SBR (43cm ileum) w/ primary anastamosis (side to side)  Post op pelvic abscesses  CT A/P 9/22 : pelvic abscesses, enteritis, likely reactive, b/l pleural effusions/atalectasis.  No right flank soft tissue infection  Right flank cellulitis with no abscess/fascitis : resolved. Seems to have originated from the abdominal wound which was locally debrided 9/22 and presently with no ongoing infectious process. WCx Klebsiella  Right flank : resolved infection  with no pain and resolved erythema  BCx 9/17 NGTD  WCx 9/22 rare PMNs, no org, Klebsiella  Low grade fevers : see no new focus of infection. CXR no consolidation. PICC not infected. Abdominal wound appears unremarkable. Right flank infection has resolved.  pt ambulating and in good spirits    RECOMMENDATIONS;  repeat CT pending  Rocephin 2 gm iv q24h  Flagyl 500 mg iv q8h

## 2020-10-06 NOTE — PROGRESS NOTE ADULT - SUBJECTIVE AND OBJECTIVE BOX
GILMAR GLORIA  45y, Female    All available historical data reviewed    OVERNIGHT EVENTS:  low grade fevers  minimal abdominal discomfort    ROS:  General: Denies rigors, nightsweats  HEENT: Denies headache, rhinorrhea, sore throat, eye pain  CV: Denies CP, palpitations  PULM: Denies wheezing, hemoptysis  GI: Denies hematemesis, hematochezia, melena  : Denies discharge, hematuria  MSK: Denies arthralgias, myalgias  SKIN: Denies rash, lesions  NEURO: Denies paresthesias, weakness  PSYCH: Denies depression, anxiety    VITALS:  T(F): 99.7, Max: 100.5 (10-06-20 @ 04:49)  HR: 102  BP: 123/68  RR: 18Vital Signs Last 24 Hrs  T(C): 37.6 (06 Oct 2020 06:53), Max: 38.1 (06 Oct 2020 04:49)  T(F): 99.7 (06 Oct 2020 06:53), Max: 100.5 (06 Oct 2020 04:49)  HR: 102 (06 Oct 2020 09:19) (97 - 124)  BP: 123/68 (06 Oct 2020 09:19) (101/65 - 123/68)  BP(mean): --  RR: 18 (06 Oct 2020 04:49) (18 - 18)  SpO2: 98% (06 Oct 2020 09:19) (97% - 98%)    TESTS & MEASUREMENTS:                        8.1    13.49 )-----------( 453      ( 06 Oct 2020 06:48 )             26.8     10-06    134<L>  |  99  |  5<L>  ----------------------------<  115<H>  3.3<L>   |  25  |  <0.5<L>    Ca    7.3<L>      06 Oct 2020 06:48  Phos  1.8     10-06  Mg     1.6     10-06          Urinalysis Basic - ( 05 Oct 2020 06:50 )    Color: Yellow / Appearance: Slightly Turbid / S.030 / pH: x  Gluc: x / Ketone: Small  / Bili: Negative / Urobili: <2 mg/dL   Blood: x / Protein: 100 mg/dL / Nitrite: Negative   Leuk Esterase: Moderate / RBC: 6 /HPF / WBC 55 /HPF   Sq Epi: x / Non Sq Epi: >27 /HPF / Bacteria: Negative          RADIOLOGY & ADDITIONAL TESTS:  Personal review of radiological diagnostics performed  Echo and EKG results noted when applicable.     MEDICATIONS:  acetaminophen   Tablet .. 650 milliGRAM(s) Oral every 6 hours  ascorbic acid 500 milliGRAM(s) Oral two times a day  cefTRIAXone   IVPB 2000 milliGRAM(s) IV Intermittent every 24 hours  chlorhexidine 4% Liquid 1 Application(s) Topical daily  chlorhexidine 4% Liquid 1 Application(s) Topical daily  gabapentin 300 milliGRAM(s) Oral every 8 hours  heparin   Injectable 5000 Unit(s) SubCutaneous every 8 hours  magnesium sulfate  IVPB 2 Gram(s) IV Intermittent once  methocarbamol 500 milliGRAM(s) Oral every 6 hours  metoprolol tartrate 25 milliGRAM(s) Oral every 12 hours  metroNIDAZOLE  IVPB      metroNIDAZOLE  IVPB 500 milliGRAM(s) IV Intermittent every 8 hours  multivitamin/minerals 1 Tablet(s) Oral daily  ondansetron Injectable 4 milliGRAM(s) IV Push every 8 hours PRN  oxyCODONE    IR 5 milliGRAM(s) Oral every 4 hours PRN  oxyCODONE    IR 10 milliGRAM(s) Oral every 4 hours PRN  pantoprazole    Tablet 40 milliGRAM(s) Oral before breakfast  polyethylene glycol 3350 17 Gram(s) Oral daily  potassium chloride    Tablet ER 40 milliEquivalent(s) Oral every 4 hours  senna 1 Tablet(s) Oral at bedtime  sodium chloride 0.9%. 1000 milliLiter(s) IV Continuous <Continuous>  sodium phosphate IVPB 15 milliMole(s) IV Intermittent once      ANTIBIOTICS:  cefTRIAXone   IVPB 2000 milliGRAM(s) IV Intermittent every 24 hours  metroNIDAZOLE  IVPB      metroNIDAZOLE  IVPB 500 milliGRAM(s) IV Intermittent every 8 hours

## 2020-10-06 NOTE — PROGRESS NOTE ADULT - SUBJECTIVE AND OBJECTIVE BOX
GENERAL SURGERY PROGRESS NOTE     GLORIA SCHAFER  45y  Female  Hospital day :22d  POD:  Procedure: Incision and drainage, wound, torso    Insertion, arterial line, percutaneous    Washing out stomach contents    Small bowel resection with anastomosis    Lysis of intestinal adhesions    Diagnostic laparoscopy      OVERNIGHT EVENTS: low grade fever to 100.5    T(F): 99.7 (10-06-20 @ 06:53), Max: 100.5 (10-06-20 @ 04:49)  HR: 102 (10-06-20 @ 09:19) (97 - 124)  BP: 123/68 (10-06-20 @ 09:19) (101/65 - 123/68)  RR: 18 (10-06-20 @ 04:49) (18 - 18)  SpO2: 98% (10-06-20 @ 09:19) (97% - 98%)    DIET/FLUIDS: ascorbic acid 500 milliGRAM(s) Oral two times a day  multivitamin/minerals 1 Tablet(s) Oral daily  sodium chloride 0.9%. 1000 milliLiter(s) IV Continuous <Continuous>    BM:     EMESIS:   10-05-20 @ 07:01  -  10-06-20 @ 07:00  --------------------------------------------------------  OUT: 0.01 mL/kg/d      URINE:      GI proph:  pantoprazole    Tablet 40 milliGRAM(s) Oral before breakfast    AC/ proph: heparin   Injectable 5000 Unit(s) SubCutaneous every 8 hours    ABx: cefTRIAXone   IVPB 2000 milliGRAM(s) IV Intermittent every 24 hours  metroNIDAZOLE  IVPB      metroNIDAZOLE  IVPB 500 milliGRAM(s) IV Intermittent every 8 hours      PHYSICAL EXAM:  GENERAL: NAD, well-appearing  ABDOMEN: Soft, Nontender, Nondistended, wound vac in place  EXTREMITIES:  No clubbing, cyanosis, or edema      LABS  Labs:  CAPILLARY BLOOD GLUCOSE      POCT Blood Glucose.: 121 mg/dL (05 Oct 2020 11:30)                          8.1    13.49 )-----------( 453      ( 06 Oct 2020 06:48 )             26.8       Auto Neutrophil %: 81.8 % (10-06-20 @ 06:48)  Auto Immature Granulocyte %: 0.7 % (10-06-20 @ 06:48)    10-06    134<L>  |  99  |  5<L>  ----------------------------<  115<H>  3.3<L>   |  25  |  <0.5<L>      Calcium, Total Serum: 7.3 mg/dL (10-06-20 @ 06:48)    Urinalysis Basic - ( 05 Oct 2020 06:50 )    Color: Yellow / Appearance: Slightly Turbid / S.030 / pH: x  Gluc: x / Ketone: Small  / Bili: Negative / Urobili: <2 mg/dL   Blood: x / Protein: 100 mg/dL / Nitrite: Negative   Leuk Esterase: Moderate / RBC: 6 /HPF / WBC 55 /HPF   Sq Epi: x / Non Sq Epi: >27 /HPF / Bacteria: Negative

## 2020-10-07 LAB
ANION GAP SERPL CALC-SCNC: 9 MMOL/L — SIGNIFICANT CHANGE UP (ref 7–14)
BASOPHILS # BLD AUTO: 0.03 K/UL — SIGNIFICANT CHANGE UP (ref 0–0.2)
BASOPHILS NFR BLD AUTO: 0.3 % — SIGNIFICANT CHANGE UP (ref 0–1)
BUN SERPL-MCNC: <3 MG/DL — LOW (ref 10–20)
CALCIUM SERPL-MCNC: 7.2 MG/DL — LOW (ref 8.5–10.1)
CHLORIDE SERPL-SCNC: 102 MMOL/L — SIGNIFICANT CHANGE UP (ref 98–110)
CO2 SERPL-SCNC: 24 MMOL/L — SIGNIFICANT CHANGE UP (ref 17–32)
CREAT SERPL-MCNC: <0.5 MG/DL — LOW (ref 0.7–1.5)
EOSINOPHIL # BLD AUTO: 0.37 K/UL — SIGNIFICANT CHANGE UP (ref 0–0.7)
EOSINOPHIL NFR BLD AUTO: 3.4 % — SIGNIFICANT CHANGE UP (ref 0–8)
GLUCOSE SERPL-MCNC: 112 MG/DL — HIGH (ref 70–99)
HCT VFR BLD CALC: 25 % — LOW (ref 37–47)
HGB BLD-MCNC: 7.6 G/DL — LOW (ref 12–16)
IMM GRANULOCYTES NFR BLD AUTO: 0.7 % — HIGH (ref 0.1–0.3)
LYMPHOCYTES # BLD AUTO: 0.91 K/UL — LOW (ref 1.2–3.4)
LYMPHOCYTES # BLD AUTO: 8.3 % — LOW (ref 20.5–51.1)
MAGNESIUM SERPL-MCNC: 1.8 MG/DL — SIGNIFICANT CHANGE UP (ref 1.8–2.4)
MCHC RBC-ENTMCNC: 26.2 PG — LOW (ref 27–31)
MCHC RBC-ENTMCNC: 30.4 G/DL — LOW (ref 32–37)
MCV RBC AUTO: 86.2 FL — SIGNIFICANT CHANGE UP (ref 81–99)
MONOCYTES # BLD AUTO: 0.76 K/UL — HIGH (ref 0.1–0.6)
MONOCYTES NFR BLD AUTO: 6.9 % — SIGNIFICANT CHANGE UP (ref 1.7–9.3)
NEUTROPHILS # BLD AUTO: 8.84 K/UL — HIGH (ref 1.4–6.5)
NEUTROPHILS NFR BLD AUTO: 80.4 % — HIGH (ref 42.2–75.2)
NRBC # BLD: 0 /100 WBCS — SIGNIFICANT CHANGE UP (ref 0–0)
PHOSPHATE SERPL-MCNC: 2 MG/DL — LOW (ref 2.1–4.9)
PLATELET # BLD AUTO: 441 K/UL — HIGH (ref 130–400)
POTASSIUM SERPL-MCNC: 4.4 MMOL/L — SIGNIFICANT CHANGE UP (ref 3.5–5)
POTASSIUM SERPL-SCNC: 4.4 MMOL/L — SIGNIFICANT CHANGE UP (ref 3.5–5)
RBC # BLD: 2.9 M/UL — LOW (ref 4.2–5.4)
RBC # FLD: 16.6 % — HIGH (ref 11.5–14.5)
SODIUM SERPL-SCNC: 135 MMOL/L — SIGNIFICANT CHANGE UP (ref 135–146)
WBC # BLD: 10.99 K/UL — HIGH (ref 4.8–10.8)
WBC # FLD AUTO: 10.99 K/UL — HIGH (ref 4.8–10.8)

## 2020-10-07 PROCEDURE — 99024 POSTOP FOLLOW-UP VISIT: CPT

## 2020-10-07 RX ORDER — HYDROMORPHONE HYDROCHLORIDE 2 MG/ML
1 INJECTION INTRAMUSCULAR; INTRAVENOUS; SUBCUTANEOUS ONCE
Refills: 0 | Status: DISCONTINUED | OUTPATIENT
Start: 2020-10-07 | End: 2020-10-07

## 2020-10-07 RX ADMIN — METHOCARBAMOL 500 MILLIGRAM(S): 500 TABLET, FILM COATED ORAL at 05:48

## 2020-10-07 RX ADMIN — Medication 1 TABLET(S): at 11:51

## 2020-10-07 RX ADMIN — Medication 100 MILLIGRAM(S): at 23:03

## 2020-10-07 RX ADMIN — Medication 650 MILLIGRAM(S): at 12:39

## 2020-10-07 RX ADMIN — METHOCARBAMOL 500 MILLIGRAM(S): 500 TABLET, FILM COATED ORAL at 11:50

## 2020-10-07 RX ADMIN — HEPARIN SODIUM 5000 UNIT(S): 5000 INJECTION INTRAVENOUS; SUBCUTANEOUS at 13:35

## 2020-10-07 RX ADMIN — OXYCODONE HYDROCHLORIDE 10 MILLIGRAM(S): 5 TABLET ORAL at 11:30

## 2020-10-07 RX ADMIN — PANTOPRAZOLE SODIUM 40 MILLIGRAM(S): 20 TABLET, DELAYED RELEASE ORAL at 06:28

## 2020-10-07 RX ADMIN — Medication 500 MILLIGRAM(S): at 05:48

## 2020-10-07 RX ADMIN — HEPARIN SODIUM 5000 UNIT(S): 5000 INJECTION INTRAVENOUS; SUBCUTANEOUS at 05:48

## 2020-10-07 RX ADMIN — CEFTRIAXONE 100 MILLIGRAM(S): 500 INJECTION, POWDER, FOR SOLUTION INTRAMUSCULAR; INTRAVENOUS at 11:50

## 2020-10-07 RX ADMIN — HYDROMORPHONE HYDROCHLORIDE 1 MILLIGRAM(S): 2 INJECTION INTRAMUSCULAR; INTRAVENOUS; SUBCUTANEOUS at 14:25

## 2020-10-07 RX ADMIN — Medication 650 MILLIGRAM(S): at 23:03

## 2020-10-07 RX ADMIN — Medication 650 MILLIGRAM(S): at 00:25

## 2020-10-07 RX ADMIN — METHOCARBAMOL 500 MILLIGRAM(S): 500 TABLET, FILM COATED ORAL at 17:16

## 2020-10-07 RX ADMIN — Medication 100 MILLIGRAM(S): at 05:46

## 2020-10-07 RX ADMIN — GABAPENTIN 300 MILLIGRAM(S): 400 CAPSULE ORAL at 13:34

## 2020-10-07 RX ADMIN — OXYCODONE HYDROCHLORIDE 20 MILLIGRAM(S): 5 TABLET ORAL at 05:50

## 2020-10-07 RX ADMIN — HYDROMORPHONE HYDROCHLORIDE 1 MILLIGRAM(S): 2 INJECTION INTRAMUSCULAR; INTRAVENOUS; SUBCUTANEOUS at 15:20

## 2020-10-07 RX ADMIN — Medication 25 MILLIGRAM(S): at 17:23

## 2020-10-07 RX ADMIN — Medication 650 MILLIGRAM(S): at 05:48

## 2020-10-07 RX ADMIN — OXYCODONE HYDROCHLORIDE 20 MILLIGRAM(S): 5 TABLET ORAL at 18:15

## 2020-10-07 RX ADMIN — Medication 650 MILLIGRAM(S): at 17:16

## 2020-10-07 RX ADMIN — Medication 25 MILLIGRAM(S): at 05:48

## 2020-10-07 RX ADMIN — GABAPENTIN 300 MILLIGRAM(S): 400 CAPSULE ORAL at 23:03

## 2020-10-07 RX ADMIN — HEPARIN SODIUM 5000 UNIT(S): 5000 INJECTION INTRAVENOUS; SUBCUTANEOUS at 23:03

## 2020-10-07 RX ADMIN — OXYCODONE HYDROCHLORIDE 10 MILLIGRAM(S): 5 TABLET ORAL at 10:39

## 2020-10-07 RX ADMIN — SENNA PLUS 1 TABLET(S): 8.6 TABLET ORAL at 23:02

## 2020-10-07 RX ADMIN — OXYCODONE HYDROCHLORIDE 20 MILLIGRAM(S): 5 TABLET ORAL at 17:16

## 2020-10-07 RX ADMIN — GABAPENTIN 300 MILLIGRAM(S): 400 CAPSULE ORAL at 05:48

## 2020-10-07 RX ADMIN — Medication 650 MILLIGRAM(S): at 18:15

## 2020-10-07 RX ADMIN — Medication 650 MILLIGRAM(S): at 11:50

## 2020-10-07 RX ADMIN — METHOCARBAMOL 500 MILLIGRAM(S): 500 TABLET, FILM COATED ORAL at 23:02

## 2020-10-07 RX ADMIN — Medication 100 MILLIGRAM(S): at 13:33

## 2020-10-07 RX ADMIN — Medication 500 MILLIGRAM(S): at 17:16

## 2020-10-07 NOTE — PROGRESS NOTE ADULT - GUM GEN PE MLT EXAM PC
Normal genitalia; no lesions; no discharge

## 2020-10-07 NOTE — PROGRESS NOTE ADULT - CARDIOVASCULAR
Regular rate & rhythm, normal S1, S2; no murmurs, gallops or rubs; no S3, S4
detailed exam

## 2020-10-07 NOTE — PROGRESS NOTE ADULT - GASTROINTESTINAL DETAILS
no guarding/no rigidity/soft
soft/no guarding/nontender/no rebound tenderness/no rigidity
soft/nontender
soft/nontender/no guarding/no rebound tenderness/no rigidity
soft/nontender/no rebound tenderness/no rigidity/no guarding
no guarding/no rigidity
soft/nontender

## 2020-10-07 NOTE — PROGRESS NOTE ADULT - ASSESSMENT
IMPRESSION;  Presented with Feculent peritonitis secondary to perforated viscus  9/15 : s/p diagnostic lap, NARCISO, SBR (43cm ileum) w/ primary anastamosis (side to side)  Post op pelvic abscesses  CT A/P 9/22 : pelvic abscesses, enteritis, likely reactive, b/l pleural effusions/atalectasis.  No right flank soft tissue infection  Right flank cellulitis with no abscess/fascitis : resolved. Seems to have originated from the abdominal wound which was locally debrided 9/22 and presently with no ongoing infectious process. WCx Klebsiella  Right flank : resolved infection  with no pain and resolved erythema  BCx 9/17 NGTD  WCx 9/22 rare PMNs, no org, Klebsiella  Low grade fevers : see no new focus of infection. CXR no consolidation. PICC not infected. Abdominal wound appears unremarkable. Right flank infection has resolved.  pt ambulating and in good spirits  WBC 10.9    RECOMMENDATIONS;  On discharge hold all ABx  Rocephin 2 gm iv q24h  Flagyl 500 mg iv q8h  recall prn please

## 2020-10-07 NOTE — PROVIDER CONTACT NOTE (MEDICATION) - SITUATION
pt BP 96/65 P-102, parameters state to hold if SBP <110 P<60. Clarified if OK to give due to PULSE 102.

## 2020-10-07 NOTE — PROGRESS NOTE ADULT - EXTREMITIES
No cyanosis, clubbing or edema
No cyanosis, clubbing or edema
detailed exam
No cyanosis, clubbing or edema
No cyanosis, clubbing or edema
detailed exam

## 2020-10-07 NOTE — PROGRESS NOTE ADULT - ASSESSMENT
A/P:  GLORIA SCHAFER is a 45yFemale s/p for Incision and drainage, wound, torso  Insertion, arterial line, percutaneous  Washing out stomach contents  Small bowel resection with anastomosis  Lysis of intestinal adhesions  Diagnostic laparoscopy  WBC improving.    Plan:   - change wVac today  - trend WBC  - OOB  - IS  - f/u vitals  - f/u labs & replete if necessary  - DVT PPX  - GI PPX  - patient was explained plan, understood, and agreed

## 2020-10-07 NOTE — PROGRESS NOTE ADULT - EYES
EOMI; PERRL; no drainage or redness

## 2020-10-07 NOTE — PROGRESS NOTE ADULT - RESPIRATORY
detailed exam
Breath Sounds equal & clear to percussion & auscultation, no accessory muscle use
detailed exam

## 2020-10-07 NOTE — PROGRESS NOTE ADULT - SUBJECTIVE AND OBJECTIVE BOX
GENERAL SURGERY PROGRESS NOTE     GLORIA SCHAFER  [address] [place] [address] day :23d  POD:  Procedure: Incision and drainage, wound, torso  Insertion, arterial line, percutaneous  Washing out stomach contents  Small bowel resection with anastomosis  Lysis of intestinal adhesions  Diagnostic laparoscopy      Surgical Attending: Mariann Rubio  Overnight events: No acute events overnight. Patient found in NAD. No fever. IS: 750. WBC downtrending.    T(F): 98.9 (10-06-20 @ 21:00), Max: 100.5 (10-06-20 @ 04:49)  HR: 99 (10-06-20 @ 21:00) (99 - 122)  BP: 105/66 (10-06-20 @ 21:00) (105/66 - 123/68)  ABP: --  ABP(mean): --  RR: 18 (10-06-20 @ 21:00) (18 - 18)  SpO2: 98% (10-06-20 @ 17:02) (98% - 98%)      10-05-20 @ 07:01  -  10-06-20 @ 07:00  --------------------------------------------------------  IN:    IV PiggyBack: 200 mL    sodium chloride 0.9%: 650 mL  Total IN: 850 mL    OUT:    Emesis (mL): 1 mL    Voided (mL): 875 mL  Total OUT: 876 mL    Total NET: -26 mL      10-06-20 @ 07:01  -  10-07-20 @ 02:39  --------------------------------------------------------  IN:    IV PiggyBack: 400 mL    Oral Fluid: 40 mL    sodium chloride 0.9%: 650 mL  Total IN: 1090 mL    OUT:    Stool (mL): 300 mL    Voided (mL): 1050 mL  Total OUT: 1350 mL    Total NET: -260 mL        DIET/FLUIDS: ascorbic acid 500 milliGRAM(s) Oral two times a day  multivitamin/minerals 1 Tablet(s) Oral daily  sodium chloride 0.9%. 1000 milliLiter(s) IV Continuous <Continuous>    NG:                                                                                DRAINS:     BM:     EMESIS:   10-05-20 @ 07:01  -  10-06-20 @ 07:00  --------------------------------------------------------  OUT: 0.01 mL/kg/d      URINE:      GI proph:  pantoprazole    Tablet 40 milliGRAM(s) Oral before breakfast    AC/ proph: heparin   Injectable 5000 Unit(s) SubCutaneous every 8 hours    ABx: cefTRIAXone   IVPB 2000 milliGRAM(s) IV Intermittent every 24 hours  metroNIDAZOLE  IVPB      metroNIDAZOLE  IVPB 500 milliGRAM(s) IV Intermittent every 8 hours      GEN: NAD,   HEENT: NC/AT  CHEST: Symmetric chest wall expansion. low grade tachycardia  ABD: S/D, mild tenderness at midline    LABS  Labs:  CAPILLARY BLOOD GLUCOSE                              8.1    13.49 )-----------( 453      ( 06 Oct 2020 06:48 )             26.8       Auto Neutrophil %: 81.8 % (10-06-20 @ 06:48)  Auto Immature Granulocyte %: 0.7 % (10-06-20 @ 06:48)    10-06    134<L>  |  99  |  5<L>  ----------------------------<  115<H>  3.3<L>   |  25  |  <0.5<L>      Calcium, Total Serum: 7.3 mg/dL (10-06-20 @ 06:48)      LFTs:         Coags:            Urinalysis Basic - ( 05 Oct 2020 06:50 )    Color: Yellow / Appearance: Slightly Turbid / S.030 / pH: x  Gluc: x / Ketone: Small  / Bili: Negative / Urobili: <2 mg/dL   Blood: x / Protein: 100 mg/dL / Nitrite: Negative   Leuk Esterase: Moderate / RBC: 6 /HPF / WBC 55 /HPF   Sq Epi: x / Non Sq Epi: >27 /HPF / Bacteria: Negative            RADIOLOGY & ADDITIONAL TESTS:

## 2020-10-07 NOTE — PROGRESS NOTE ADULT - ATTENDING COMMENTS
46yo female with PSHx PAULIE/BSO 8/19/2020 for fibroids admitted for persistent small bowel obstruction s/p laparoscopic NARCISO, laparotomy, extensive NARCISO, small bowel resection with primary anastomosis 9/15 complicated by small bowel leak s/p re-exploration, wash out, small bowel resection and primary anastomosis 9/15. Complained of lower abdominal pain with cellulitis and induration. CT chest/A/P 9/18 showed speck of subcutaneous air and exam was concerning for crepitus. Patient underwent bilateral flank exploration of subcutaneous tissue - negative for necrotizing infection. CT chest/A/P with PO and IV contrast 9/26 showed improving fluid collection, improving bilateral pleural effusions, and enteritis.     At this time, patient is hemodynamically stable with overall improving tachycardia. Breathing comfortably on room air. Complains of some improving abdominal pain and occasional nausea. Tolerated regular diet, +bowel function - gas and BM. Off TPN. Continue antibiotics as per ID, will follow up for long-term IV antibiotics. Klebsiella from wound culture. Central line site looks clean after removal. Changing wound vac every other day. Afebrile over 24h, WBC 10.99 from 13 from 16 from 20. Consider CT A/P if up trends again or fever recurs. Hyponatremia 134 - resolved. Pain control, trend labs, continue antibiotics. Encourage ambulation/OOB, incentive spirometer, DVT ppx. Working with rehab. Discussed home care - patient does not think she can handle going home with pain and wound care and deconditioning. She is comfortable with going to skilled facility or rehab in her transition to home.

## 2020-10-07 NOTE — PROGRESS NOTE ADULT - SUBJECTIVE AND OBJECTIVE BOX
GLORIA SCHAFER  45y, Female    All available historical data reviewed    OVERNIGHT EVENTS:  no fevers  feels well and has no complaints      ROS:  General: Denies rigors, nightsweats  HEENT: Denies headache, rhinorrhea, sore throat, eye pain  CV: Denies CP, palpitations  PULM: Denies wheezing, hemoptysis  GI: Denies hematemesis, hematochezia, melena  : Denies discharge, hematuria  MSK: Denies arthralgias, myalgias  SKIN: Denies rash, lesions  NEURO: Denies paresthesias, weakness  PSYCH: Denies depression, anxiety    VITALS:  T(F): 98.9, Max: 99.6 (10-07-20 @ 04:52)  HR: 94  BP: 104/61  RR: 18Vital Signs Last 24 Hrs  T(C): 37.2 (07 Oct 2020 08:00), Max: 37.6 (07 Oct 2020 04:52)  T(F): 98.9 (07 Oct 2020 08:00), Max: 99.6 (07 Oct 2020 04:52)  HR: 94 (07 Oct 2020 08:00) (94 - 108)  BP: 104/61 (07 Oct 2020 08:00) (104/61 - 115/76)  BP(mean): --  RR: 18 (07 Oct 2020 08:00) (18 - 18)  SpO2: 98% (06 Oct 2020 17:02) (98% - 98%)    TESTS & MEASUREMENTS:                        7.6    10.99 )-----------( 441      ( 07 Oct 2020 07:20 )             25.0     10-07    135  |  102  |  <3<L>  ----------------------------<  112<H>  4.4   |  24  |  <0.5<L>    Ca    7.2<L>      07 Oct 2020 07:20  Phos  2.0     10-07  Mg     1.8     10-07                RADIOLOGY & ADDITIONAL TESTS:  Personal review of radiological diagnostics performed  Echo and EKG results noted when applicable.     MEDICATIONS:  acetaminophen   Tablet .. 650 milliGRAM(s) Oral every 6 hours  ascorbic acid 500 milliGRAM(s) Oral two times a day  cefTRIAXone   IVPB 2000 milliGRAM(s) IV Intermittent every 24 hours  chlorhexidine 4% Liquid 1 Application(s) Topical daily  chlorhexidine 4% Liquid 1 Application(s) Topical daily  gabapentin 300 milliGRAM(s) Oral every 8 hours  heparin   Injectable 5000 Unit(s) SubCutaneous every 8 hours  methocarbamol 500 milliGRAM(s) Oral every 6 hours  metoprolol tartrate 25 milliGRAM(s) Oral every 12 hours  metroNIDAZOLE  IVPB      metroNIDAZOLE  IVPB 500 milliGRAM(s) IV Intermittent every 8 hours  multivitamin/minerals 1 Tablet(s) Oral daily  ondansetron Injectable 4 milliGRAM(s) IV Push every 8 hours PRN  oxyCODONE    IR 5 milliGRAM(s) Oral every 4 hours PRN  oxyCODONE    IR 10 milliGRAM(s) Oral every 4 hours PRN  oxyCODONE  ER Tablet 20 milliGRAM(s) Oral every 12 hours  pantoprazole    Tablet 40 milliGRAM(s) Oral before breakfast  polyethylene glycol 3350 17 Gram(s) Oral daily  senna 1 Tablet(s) Oral at bedtime  sodium chloride 0.9%. 1000 milliLiter(s) IV Continuous <Continuous>      ANTIBIOTICS:  cefTRIAXone   IVPB 2000 milliGRAM(s) IV Intermittent every 24 hours  metroNIDAZOLE  IVPB      metroNIDAZOLE  IVPB 500 milliGRAM(s) IV Intermittent every 8 hours

## 2020-10-07 NOTE — PROGRESS NOTE ADULT - NEUROLOGICAL
Alert & oriented; no sensory, motor or coordination deficits, normal reflexes

## 2020-10-08 PROCEDURE — 99024 POSTOP FOLLOW-UP VISIT: CPT

## 2020-10-08 RX ORDER — SODIUM,POTASSIUM PHOSPHATES 278-250MG
1 POWDER IN PACKET (EA) ORAL EVERY 4 HOURS
Refills: 0 | Status: COMPLETED | OUTPATIENT
Start: 2020-10-08 | End: 2020-10-08

## 2020-10-08 RX ADMIN — GABAPENTIN 300 MILLIGRAM(S): 400 CAPSULE ORAL at 13:03

## 2020-10-08 RX ADMIN — Medication 1 TABLET(S): at 11:31

## 2020-10-08 RX ADMIN — HEPARIN SODIUM 5000 UNIT(S): 5000 INJECTION INTRAVENOUS; SUBCUTANEOUS at 21:23

## 2020-10-08 RX ADMIN — Medication 650 MILLIGRAM(S): at 17:31

## 2020-10-08 RX ADMIN — Medication 500 MILLIGRAM(S): at 17:31

## 2020-10-08 RX ADMIN — OXYCODONE HYDROCHLORIDE 10 MILLIGRAM(S): 5 TABLET ORAL at 03:56

## 2020-10-08 RX ADMIN — Medication 650 MILLIGRAM(S): at 11:31

## 2020-10-08 RX ADMIN — METHOCARBAMOL 500 MILLIGRAM(S): 500 TABLET, FILM COATED ORAL at 17:32

## 2020-10-08 RX ADMIN — Medication 1 PACKET(S): at 11:31

## 2020-10-08 RX ADMIN — HEPARIN SODIUM 5000 UNIT(S): 5000 INJECTION INTRAVENOUS; SUBCUTANEOUS at 13:03

## 2020-10-08 RX ADMIN — Medication 1 PACKET(S): at 06:20

## 2020-10-08 RX ADMIN — Medication 25 MILLIGRAM(S): at 05:12

## 2020-10-08 RX ADMIN — Medication 100 MILLIGRAM(S): at 13:03

## 2020-10-08 RX ADMIN — Medication 650 MILLIGRAM(S): at 18:00

## 2020-10-08 RX ADMIN — Medication 650 MILLIGRAM(S): at 05:12

## 2020-10-08 RX ADMIN — CEFTRIAXONE 100 MILLIGRAM(S): 500 INJECTION, POWDER, FOR SOLUTION INTRAMUSCULAR; INTRAVENOUS at 11:31

## 2020-10-08 RX ADMIN — Medication 100 MILLIGRAM(S): at 21:23

## 2020-10-08 RX ADMIN — OXYCODONE HYDROCHLORIDE 20 MILLIGRAM(S): 5 TABLET ORAL at 05:13

## 2020-10-08 RX ADMIN — METHOCARBAMOL 500 MILLIGRAM(S): 500 TABLET, FILM COATED ORAL at 23:13

## 2020-10-08 RX ADMIN — METHOCARBAMOL 500 MILLIGRAM(S): 500 TABLET, FILM COATED ORAL at 11:31

## 2020-10-08 RX ADMIN — METHOCARBAMOL 500 MILLIGRAM(S): 500 TABLET, FILM COATED ORAL at 05:12

## 2020-10-08 RX ADMIN — Medication 100 MILLIGRAM(S): at 05:10

## 2020-10-08 RX ADMIN — OXYCODONE HYDROCHLORIDE 10 MILLIGRAM(S): 5 TABLET ORAL at 21:25

## 2020-10-08 RX ADMIN — PANTOPRAZOLE SODIUM 40 MILLIGRAM(S): 20 TABLET, DELAYED RELEASE ORAL at 06:20

## 2020-10-08 RX ADMIN — HEPARIN SODIUM 5000 UNIT(S): 5000 INJECTION INTRAVENOUS; SUBCUTANEOUS at 05:12

## 2020-10-08 RX ADMIN — Medication 500 MILLIGRAM(S): at 05:12

## 2020-10-08 RX ADMIN — GABAPENTIN 300 MILLIGRAM(S): 400 CAPSULE ORAL at 21:23

## 2020-10-08 RX ADMIN — GABAPENTIN 300 MILLIGRAM(S): 400 CAPSULE ORAL at 05:12

## 2020-10-08 RX ADMIN — Medication 25 MILLIGRAM(S): at 17:32

## 2020-10-08 RX ADMIN — OXYCODONE HYDROCHLORIDE 20 MILLIGRAM(S): 5 TABLET ORAL at 17:32

## 2020-10-08 RX ADMIN — OXYCODONE HYDROCHLORIDE 20 MILLIGRAM(S): 5 TABLET ORAL at 18:00

## 2020-10-08 RX ADMIN — OXYCODONE HYDROCHLORIDE 20 MILLIGRAM(S): 5 TABLET ORAL at 05:14

## 2020-10-08 RX ADMIN — OXYCODONE HYDROCHLORIDE 10 MILLIGRAM(S): 5 TABLET ORAL at 03:23

## 2020-10-08 RX ADMIN — OXYCODONE HYDROCHLORIDE 10 MILLIGRAM(S): 5 TABLET ORAL at 12:56

## 2020-10-08 RX ADMIN — Medication 650 MILLIGRAM(S): at 12:00

## 2020-10-08 RX ADMIN — SENNA PLUS 1 TABLET(S): 8.6 TABLET ORAL at 21:23

## 2020-10-08 RX ADMIN — OXYCODONE HYDROCHLORIDE 10 MILLIGRAM(S): 5 TABLET ORAL at 13:25

## 2020-10-08 RX ADMIN — OXYCODONE HYDROCHLORIDE 10 MILLIGRAM(S): 5 TABLET ORAL at 22:15

## 2020-10-08 NOTE — PROGRESS NOTE ADULT - ATTENDING COMMENTS
44yo female with PSHx PAULIE/BSO 8/19/2020 for fibroids admitted for persistent small bowel obstruction s/p laparoscopic NARCISO, laparotomy, extensive NARCISO, small bowel resection with primary anastomosis 9/15 complicated by small bowel leak s/p re-exploration, wash out, small bowel resection and primary anastomosis 9/15. Complained of lower abdominal pain with cellulitis and induration. CT chest/A/P 9/18 showed speck of subcutaneous air and exam was concerning for crepitus. Patient underwent bilateral flank exploration of subcutaneous tissue - negative for necrotizing infection. CT chest/A/P with PO and IV contrast 9/26 showed improving fluid collection, improving bilateral pleural effusions, and enteritis.     At this time, patient is hemodynamically stable with overall improving tachycardia. Breathing comfortably on room air. Complains of some improving abdominal pain and occasional nausea. Tolerated regular diet, +bowel function - gas and BM. Off TPN. Continue antibiotics as per ID, will follow up for long-term IV antibiotics. Klebsiella from wound culture. Central line site looks clean after removal. Changing wound vac every other day. Afebrile over 24h, WBC 10.99 from 13 from 16 from 20. Consider CT A/P if up trends again or fever recurs. Hyponatremia resolved. Pain control, trend labs, continue antibiotics. Encourage ambulation/OOB, incentive spirometer, DVT ppx. Working with rehab. Dispo to rehab/SNF.

## 2020-10-08 NOTE — PROGRESS NOTE ADULT - ASSESSMENT
Assessment:  44yo female with PSHx PAULIE/BSO for fibroids c/b SBO requiring ex lap, SBR and anastamosis c/b leak requiring take back to OR, prolonged ICU course, b/l flank soft tissue infection s/p I and D, downgraded. Patient has been persistently tachycardic. WCC last AM 11 from 13, overall downtrending. Afebrile.    Plan:  - ID: no abx on discharge  - ambulate  - wound VAC change on friday  - discharge planning

## 2020-10-08 NOTE — PROGRESS NOTE ADULT - SUBJECTIVE AND OBJECTIVE BOX
GENERAL SURGERY PROGRESS NOTE     GLORIA SCHAFER  45y  Female  Hospital day :24d  POD:  Procedure: Incision and drainage, wound, torso    Insertion, arterial line, percutaneous    Washing out stomach contents    Small bowel resection with anastomosis    Lysis of intestinal adhesions    Diagnostic laparoscopy    OVERNIGHT EVENTS: no acute events overnight     T(F): 99.5 (10-07-20 @ 20:28), Max: 99.6 (10-07-20 @ 04:52)  HR: 96 (10-07-20 @ 20:28) (94 - 106)  BP: 106/64 (10-07-20 @ 20:28) (96/65 - 115/76)  RR: 18 (10-07-20 @ 20:28) (18 - 18)    DIET/FLUIDS: ascorbic acid 500 milliGRAM(s) Oral two times a day  multivitamin/minerals 1 Tablet(s) Oral daily  sodium chloride 0.9%. 1000 milliLiter(s) IV Continuous <Continuous>    BM:   10-06-20 @ 07:01  -  10-07-20 @ 07:00  --------------------------------------------------------  OUT: 300 mL     GI proph:  pantoprazole    Tablet 40 milliGRAM(s) Oral before breakfast    AC/ proph: heparin   Injectable 5000 Unit(s) SubCutaneous every 8 hours    ABx: cefTRIAXone   IVPB 2000 milliGRAM(s) IV Intermittent every 24 hours  metroNIDAZOLE  IVPB      metroNIDAZOLE  IVPB 500 milliGRAM(s) IV Intermittent every 8 hours    PHYSICAL EXAM:  GENERAL: NAD, well-appearing  ABDOMEN: wound vac in place, abdomen tender around surgical site, soft otherwise  EXTREMITIES:  No clubbing, cyanosis, or edema    LABS  CAPILLARY BLOOD GLUCOSE                              7.6    10.99 )-----------( 441      ( 07 Oct 2020 07:20 )             25.0       Auto Immature Granulocyte %: 0.7 % (10-07-20 @ 07:20)  Auto Neutrophil %: 80.4 % (10-07-20 @ 07:20)    10-07    135  |  102  |  <3<L>  ----------------------------<  112<H>  4.4   |  24  |  <0.5<L>      Calcium, Total Serum: 7.2 mg/dL (10-07-20 @ 07:20

## 2020-10-09 ENCOUNTER — TRANSCRIPTION ENCOUNTER (OUTPATIENT)
Age: 45
End: 2020-10-09

## 2020-10-09 VITALS
RESPIRATION RATE: 18 BRPM | SYSTOLIC BLOOD PRESSURE: 101 MMHG | DIASTOLIC BLOOD PRESSURE: 64 MMHG | TEMPERATURE: 99 F | HEART RATE: 100 BPM

## 2020-10-09 LAB
ANION GAP SERPL CALC-SCNC: 10 MMOL/L — SIGNIFICANT CHANGE UP (ref 7–14)
BASOPHILS # BLD AUTO: 0.05 K/UL — SIGNIFICANT CHANGE UP (ref 0–0.2)
BASOPHILS NFR BLD AUTO: 0.5 % — SIGNIFICANT CHANGE UP (ref 0–1)
BUN SERPL-MCNC: 3 MG/DL — LOW (ref 10–20)
CALCIUM SERPL-MCNC: 7.7 MG/DL — LOW (ref 8.5–10.1)
CHLORIDE SERPL-SCNC: 96 MMOL/L — LOW (ref 98–110)
CO2 SERPL-SCNC: 25 MMOL/L — SIGNIFICANT CHANGE UP (ref 17–32)
CREAT SERPL-MCNC: <0.5 MG/DL — LOW (ref 0.7–1.5)
EOSINOPHIL # BLD AUTO: 0.41 K/UL — SIGNIFICANT CHANGE UP (ref 0–0.7)
EOSINOPHIL NFR BLD AUTO: 3.9 % — SIGNIFICANT CHANGE UP (ref 0–8)
GLUCOSE SERPL-MCNC: 104 MG/DL — HIGH (ref 70–99)
HCT VFR BLD CALC: 28.4 % — LOW (ref 37–47)
HGB BLD-MCNC: 8.8 G/DL — LOW (ref 12–16)
IMM GRANULOCYTES NFR BLD AUTO: 0.6 % — HIGH (ref 0.1–0.3)
LYMPHOCYTES # BLD AUTO: 1.31 K/UL — SIGNIFICANT CHANGE UP (ref 1.2–3.4)
LYMPHOCYTES # BLD AUTO: 12.5 % — LOW (ref 20.5–51.1)
MAGNESIUM SERPL-MCNC: 1.7 MG/DL — LOW (ref 1.8–2.4)
MCHC RBC-ENTMCNC: 26.3 PG — LOW (ref 27–31)
MCHC RBC-ENTMCNC: 31 G/DL — LOW (ref 32–37)
MCV RBC AUTO: 85 FL — SIGNIFICANT CHANGE UP (ref 81–99)
MONOCYTES # BLD AUTO: 0.77 K/UL — HIGH (ref 0.1–0.6)
MONOCYTES NFR BLD AUTO: 7.3 % — SIGNIFICANT CHANGE UP (ref 1.7–9.3)
NEUTROPHILS # BLD AUTO: 7.91 K/UL — HIGH (ref 1.4–6.5)
NEUTROPHILS NFR BLD AUTO: 75.2 % — SIGNIFICANT CHANGE UP (ref 42.2–75.2)
NRBC # BLD: 0 /100 WBCS — SIGNIFICANT CHANGE UP (ref 0–0)
PHOSPHATE SERPL-MCNC: 3.2 MG/DL — SIGNIFICANT CHANGE UP (ref 2.1–4.9)
PLATELET # BLD AUTO: 537 K/UL — HIGH (ref 130–400)
POTASSIUM SERPL-MCNC: 4.3 MMOL/L — SIGNIFICANT CHANGE UP (ref 3.5–5)
POTASSIUM SERPL-SCNC: 4.3 MMOL/L — SIGNIFICANT CHANGE UP (ref 3.5–5)
RBC # BLD: 3.34 M/UL — LOW (ref 4.2–5.4)
RBC # FLD: 16.8 % — HIGH (ref 11.5–14.5)
SARS-COV-2 RNA SPEC QL NAA+PROBE: SIGNIFICANT CHANGE UP
SODIUM SERPL-SCNC: 131 MMOL/L — LOW (ref 135–146)
WBC # BLD: 10.51 K/UL — SIGNIFICANT CHANGE UP (ref 4.8–10.8)
WBC # FLD AUTO: 10.51 K/UL — SIGNIFICANT CHANGE UP (ref 4.8–10.8)

## 2020-10-09 RX ORDER — METHOCARBAMOL 500 MG/1
1 TABLET, FILM COATED ORAL
Qty: 0 | Refills: 0 | DISCHARGE
Start: 2020-10-09

## 2020-10-09 RX ORDER — ASCORBIC ACID 60 MG
1 TABLET,CHEWABLE ORAL
Qty: 0 | Refills: 0 | DISCHARGE
Start: 2020-10-09

## 2020-10-09 RX ORDER — OXYCODONE HYDROCHLORIDE 5 MG/1
1 TABLET ORAL
Qty: 0 | Refills: 0 | DISCHARGE
Start: 2020-10-09

## 2020-10-09 RX ORDER — HYDROMORPHONE HYDROCHLORIDE 2 MG/ML
1 INJECTION INTRAMUSCULAR; INTRAVENOUS; SUBCUTANEOUS ONCE
Refills: 0 | Status: DISCONTINUED | OUTPATIENT
Start: 2020-10-09 | End: 2020-10-09

## 2020-10-09 RX ORDER — MORPHINE SULFATE 50 MG/1
4 CAPSULE, EXTENDED RELEASE ORAL
Refills: 0 | Status: DISCONTINUED | OUTPATIENT
Start: 2020-10-09 | End: 2020-10-09

## 2020-10-09 RX ORDER — METOPROLOL TARTRATE 50 MG
1 TABLET ORAL
Qty: 0 | Refills: 0 | DISCHARGE
Start: 2020-10-09

## 2020-10-09 RX ORDER — MAGNESIUM SULFATE 500 MG/ML
1 VIAL (ML) INJECTION ONCE
Refills: 0 | Status: DISCONTINUED | OUTPATIENT
Start: 2020-10-09 | End: 2020-10-09

## 2020-10-09 RX ORDER — MULTIVIT-MIN/FERROUS GLUCONATE 9 MG/15 ML
1 LIQUID (ML) ORAL
Qty: 0 | Refills: 0 | DISCHARGE
Start: 2020-10-09

## 2020-10-09 RX ORDER — POLYETHYLENE GLYCOL 3350 17 G/17G
17 POWDER, FOR SOLUTION ORAL
Qty: 0 | Refills: 0 | DISCHARGE
Start: 2020-10-09

## 2020-10-09 RX ORDER — SODIUM,POTASSIUM PHOSPHATES 278-250MG
1 POWDER IN PACKET (EA) ORAL ONCE
Refills: 0 | Status: DISCONTINUED | OUTPATIENT
Start: 2020-10-09 | End: 2020-10-09

## 2020-10-09 RX ORDER — GABAPENTIN 400 MG/1
1 CAPSULE ORAL
Qty: 0 | Refills: 0 | DISCHARGE
Start: 2020-10-09

## 2020-10-09 RX ADMIN — OXYCODONE HYDROCHLORIDE 10 MILLIGRAM(S): 5 TABLET ORAL at 05:15

## 2020-10-09 RX ADMIN — Medication 650 MILLIGRAM(S): at 06:19

## 2020-10-09 RX ADMIN — Medication 650 MILLIGRAM(S): at 13:15

## 2020-10-09 RX ADMIN — Medication 25 MILLIGRAM(S): at 17:29

## 2020-10-09 RX ADMIN — HYDROMORPHONE HYDROCHLORIDE 1 MILLIGRAM(S): 2 INJECTION INTRAMUSCULAR; INTRAVENOUS; SUBCUTANEOUS at 16:23

## 2020-10-09 RX ADMIN — Medication 500 MILLIGRAM(S): at 05:24

## 2020-10-09 RX ADMIN — OXYCODONE HYDROCHLORIDE 20 MILLIGRAM(S): 5 TABLET ORAL at 17:29

## 2020-10-09 RX ADMIN — HEPARIN SODIUM 5000 UNIT(S): 5000 INJECTION INTRAVENOUS; SUBCUTANEOUS at 05:25

## 2020-10-09 RX ADMIN — OXYCODONE HYDROCHLORIDE 10 MILLIGRAM(S): 5 TABLET ORAL at 04:26

## 2020-10-09 RX ADMIN — METHOCARBAMOL 500 MILLIGRAM(S): 500 TABLET, FILM COATED ORAL at 05:24

## 2020-10-09 RX ADMIN — Medication 650 MILLIGRAM(S): at 18:05

## 2020-10-09 RX ADMIN — HYDROMORPHONE HYDROCHLORIDE 1 MILLIGRAM(S): 2 INJECTION INTRAMUSCULAR; INTRAVENOUS; SUBCUTANEOUS at 16:40

## 2020-10-09 RX ADMIN — Medication 650 MILLIGRAM(S): at 17:29

## 2020-10-09 RX ADMIN — HEPARIN SODIUM 5000 UNIT(S): 5000 INJECTION INTRAVENOUS; SUBCUTANEOUS at 15:28

## 2020-10-09 RX ADMIN — METHOCARBAMOL 500 MILLIGRAM(S): 500 TABLET, FILM COATED ORAL at 17:29

## 2020-10-09 RX ADMIN — CEFTRIAXONE 100 MILLIGRAM(S): 500 INJECTION, POWDER, FOR SOLUTION INTRAMUSCULAR; INTRAVENOUS at 12:41

## 2020-10-09 RX ADMIN — Medication 1 TABLET(S): at 12:44

## 2020-10-09 RX ADMIN — OXYCODONE HYDROCHLORIDE 20 MILLIGRAM(S): 5 TABLET ORAL at 05:26

## 2020-10-09 RX ADMIN — Medication 650 MILLIGRAM(S): at 07:00

## 2020-10-09 RX ADMIN — HYDROMORPHONE HYDROCHLORIDE 1 MILLIGRAM(S): 2 INJECTION INTRAMUSCULAR; INTRAVENOUS; SUBCUTANEOUS at 10:10

## 2020-10-09 RX ADMIN — Medication 25 MILLIGRAM(S): at 05:30

## 2020-10-09 RX ADMIN — Medication 100 MILLIGRAM(S): at 05:25

## 2020-10-09 RX ADMIN — METHOCARBAMOL 500 MILLIGRAM(S): 500 TABLET, FILM COATED ORAL at 12:44

## 2020-10-09 RX ADMIN — Medication 500 MILLIGRAM(S): at 17:29

## 2020-10-09 RX ADMIN — GABAPENTIN 300 MILLIGRAM(S): 400 CAPSULE ORAL at 15:28

## 2020-10-09 RX ADMIN — SODIUM CHLORIDE 50 MILLILITER(S): 9 INJECTION INTRAMUSCULAR; INTRAVENOUS; SUBCUTANEOUS at 15:28

## 2020-10-09 RX ADMIN — HYDROMORPHONE HYDROCHLORIDE 1 MILLIGRAM(S): 2 INJECTION INTRAMUSCULAR; INTRAVENOUS; SUBCUTANEOUS at 09:53

## 2020-10-09 RX ADMIN — Medication 100 MILLIGRAM(S): at 15:27

## 2020-10-09 RX ADMIN — OXYCODONE HYDROCHLORIDE 20 MILLIGRAM(S): 5 TABLET ORAL at 18:06

## 2020-10-09 RX ADMIN — GABAPENTIN 300 MILLIGRAM(S): 400 CAPSULE ORAL at 05:24

## 2020-10-09 RX ADMIN — OXYCODONE HYDROCHLORIDE 20 MILLIGRAM(S): 5 TABLET ORAL at 06:10

## 2020-10-09 RX ADMIN — Medication 650 MILLIGRAM(S): at 12:44

## 2020-10-09 NOTE — PROVIDER CONTACT NOTE (OTHER) - ACTION/TREATMENT ORDERED:
MD aware, no interventions at this time.
Per MD no further interventions at this time
per MD no further interventions at this time
Tylenol given as ordered

## 2020-10-09 NOTE — PROGRESS NOTE ADULT - ASSESSMENT
Assessment:  46yo female with PSHx PAULIE/BSO for fibroids c/b SBO requiring ex lap, SBR and anastamosis c/b leak requiring take back to OR, prolonged ICU course, b/l flank soft tissue infection s/p I and D, downgraded. Patient has been persistently tachycardic. WCC last AM 11 from 13, overall downtrending. Afebrile.    Plan:  - ID: no abx on discharge  - ambulate  - wound VAC change today  - discharge planning

## 2020-10-09 NOTE — PROGRESS NOTE ADULT - SUBJECTIVE AND OBJECTIVE BOX
GENERAL SURGERY PROGRESS NOTE     GLORIA SCHAFER  45y  Female  Hospital day :25d    OVERNIGHT EVENTS: no acute events overnight     T(F): 99.9 (10-09-20 @ 05:00), Max: 99.9 (10-09-20 @ 05:00)  HR: 86 (10-09-20 @ 05:00) (86 - 96)  BP: 105/55 (10-09-20 @ 05:00) (102/58 - 111/73)  RR: 18 (10-09-20 @ 05:00) (18 - 18)    DIET/FLUIDS: ascorbic acid 500 milliGRAM(s) Oral two times a day  multivitamin/minerals 1 Tablet(s) Oral daily  sodium chloride 0.9%. 1000 milliLiter(s) IV Continuous <Continuous>    BM:   10-07-20 @ 07:01  -  10-08-20 @ 07:00  --------------------------------------------------------  OUT: 1 mL     GI proph:  pantoprazole    Tablet 40 milliGRAM(s) Oral before breakfast    AC/ proph: heparin   Injectable 5000 Unit(s) SubCutaneous every 8 hours    ABx: cefTRIAXone   IVPB 2000 milliGRAM(s) IV Intermittent every 24 hours  metroNIDAZOLE  IVPB      metroNIDAZOLE  IVPB 500 milliGRAM(s) IV Intermittent every 8 hours      PHYSICAL EXAM:  GENERAL: NAD, well-appearing  CHEST/LUNG: Clear to auscultation bilaterally  HEART: Regular rate and rhythm  ABDOMEN: Soft, wound vac in palce  EXTREMITIES:  No clubbing, cyanosis, or edema      LABS  Labs:  CAPILLARY BLOOD GLUCOSE                              7.6    10.99 )-----------( 441      ( 07 Oct 2020 07:20 )             25.0         10-07    135  |  102  |  <3<L>  ----------------------------<  112<H>  4.4   |  24  |  <0.5<L>

## 2020-10-09 NOTE — PROGRESS NOTE ADULT - PROVIDER SPECIALTY LIST ADULT
GYN
Infectious Disease
OB
Pain Medicine
Physiatry
SICU
Surgery
SICU
SICU
Pain Medicine
Pain Medicine
Infectious Disease
Infectious Disease

## 2020-10-09 NOTE — DISCHARGE NOTE NURSING/CASE MANAGEMENT/SOCIAL WORK - PATIENT PORTAL LINK FT
You can access the FollowMyHealth Patient Portal offered by [facility] by registering at the following website: [email or web address]. By joining [employer]’s FollowMyHealth portal, you will also be able to view your health information using other applications (apps) compatible with our system.

## 2020-10-09 NOTE — DISCHARGE NOTE PROVIDER - HOSPITAL COURSE
Patient is a 44 y/o F with recent history of PAULIE/BSO on 8/19/20 for 15cm bleeding fibroid with readmission for abdominal pain and inability to tolerate PO on 8/30, found to have partial obstruction vs. ileus, resolved without operative intervention, patient was discharged. She re-presented on 9/4 with recurrent abdominal pain, consistent with partial SBO, NGT was placed and patient's symptoms improved with nonoperative management. She presented on 9/14/2020 with two days of abdominal pain with associated nausea and vomiting since the onset of pain. She reports having her last BM two days ago, passing flatus. In the ED she was afebrile, tachycardic to 115. On exam her abdomen is soft, nondistended, tender in the epigastrium. Labs significant for WBC of 11.6. Lactate 1.1.  CT scan demonstrated persistent findings of small bowel obstruction with a transition point in the lower anterior mid abdomen with mild wall thickening and hyperenhancement of the dilated small bowel loops, suspicious for infectious/inflammatory enteritis. Patient was brought to the operating room and underwent a diagnostic laparoscopy with small bowel resection with primary anastomosis (43 cm resected), lysis of adhesions. Patient was hemodynamically stable throughout the case and was brought to the SICU for monitoring post operatively. The following day, patient was downgraded to the step down unit but was found to have stool coming from the wound. Patient was brought back to the OR emergently for findings of a perforation.  Patient underwent further small bowel resection, washing out of abdominal contents, appendectomy for torn appendix.  Patient's skin was left open and packed with Kerlix. In PACU, patient was tachycardic and borderline hypotensive. Patient was started on pressors and brought to the ICU for monitoring.  ID was consulted for feculent peritonitis who recommended vancomycin + cefepime +flagyl.  While in ICU, patient consistently had elevated glucose and was started on an insulin drip on 9/22/2020. Patient was taken off pressors on 9/18/2020. Antibiotics were changed to zosyn, flagyl and caspofungin. Continued to be tachycardic throughout ICU course being managed with lopressor. Patient is a 46 y/o F with recent history of PAULIE/BSO on 8/19/20 for 15cm bleeding fibroid with readmission for abdominal pain and inability to tolerate PO on 8/30, found to have partial obstruction vs. ileus, resolved without operative intervention, patient was discharged. She re-presented on 9/4 with recurrent abdominal pain, consistent with partial SBO, NGT was placed and patient's symptoms improved with nonoperative management. She presented on 9/14/2020 with two days of abdominal pain with associated nausea and vomiting since the onset of pain. She reports having her last BM two days ago, passing flatus. In the ED she was afebrile, tachycardic to 115. On exam her abdomen is soft, nondistended, tender in the epigastrium. Labs significant for WBC of 11.6. Lactate 1.1.  CT scan demonstrated persistent findings of small bowel obstruction with a transition point in the lower anterior mid abdomen with mild wall thickening and hyperenhancement of the dilated small bowel loops, suspicious for infectious/inflammatory enteritis. Patient was brought to the operating room and underwent a diagnostic laparoscopy with small bowel resection with primary anastomosis (43 cm resected), lysis of adhesions. Patient was hemodynamically stable throughout the case and was brought to the SICU for monitoring post operatively. The following day, patient was downgraded to the step down unit but was found to have stool coming from the wound. Patient was brought back to the OR emergently for findings of a perforation.  Patient underwent further small bowel resection, washing out of abdominal contents, appendectomy for torn appendix.  Patient's skin was left open and packed with Kerlix. In PACU, patient was tachycardic and borderline hypotensive. Patient was started on pressors and brought to the ICU for monitoring.  ID was consulted for feculent peritonitis who recommended vancomycin + cefepime +flagyl.  While in ICU, patient consistently had elevated glucose and was started on an insulin drip on 9/22/2020. Patient was taken off pressors on 9/18/2020. Antibiotics were changed to zosyn, flagyl and caspofungin. Continued to be tachycardic throughout ICU course being managed with lopressor. Patient underwent a repeat CTAP which showed multiple small abdominal collections. Patient was hyponatremic, tachycardic, ,  febrile, and had increasing signs of abdominal wall cellulitis, so the decision was made to bring the patient back to the OR on 9/23/2020 for incision of drainage. No pus was expressed and there was no sign of soft tissue infection. Antibiotics started were zosyn and zyvox per infectious disease. Patient continued to have elevated WBC and persistent tachycardia but was afebrile until 10/1 to 101.7. Patient was downgraded to the floor on 10/2/2020. A wound vac was applied on 10/1 and has been changed every Monday, Wednesday and Friday. On the floor, patient had elevated WBC to 20 and low-grade tachycardic. CXR and UA negative. Over the last 48 hours, patient's WBC has downtrended and has been within normal limits. Patient is ready to be discharged from the hospital to SNF.

## 2020-10-09 NOTE — DISCHARGE NOTE PROVIDER - NSDCCPCAREPLAN_GEN_ALL_CORE_FT
PRINCIPAL DISCHARGE DIAGNOSIS  Diagnosis: Bowel obstruction  Assessment and Plan of Treatment:        PRINCIPAL DISCHARGE DIAGNOSIS  Diagnosis: Bowel obstruction  Assessment and Plan of Treatment: Pain control: Continue hospital regimen.  Per pain management, please monitor pupil size. If less than 2 mm, hold narcotics.   Wound: Please continue wound vac changes every Monday, Wednesday, and Friday. For dressing changes, please medicate with IM Morphine 4 mg, 30 mins prior to dressing change.   Antibiotics: No need to continue antibiotics. Monitor for fevers and tachycardia.   Diet: Please maintain a soft diet.  Follow up: Please call the number provided to make an appointment with Dr. Rubio in 2 weeks.

## 2020-10-09 NOTE — PROGRESS NOTE ADULT - REASON FOR ADMISSION
SBO

## 2020-10-09 NOTE — DISCHARGE NOTE PROVIDER - CARE PROVIDER_API CALL
Mariann Rubio  [facility]  [address] [place] [address], [facility] [address] [place] [address]  Phone: [phone]  Fax: [phone]  Follow Up Time:

## 2020-10-09 NOTE — DISCHARGE NOTE PROVIDER - NSDCMRMEDTOKEN_GEN_ALL_CORE_FT
acetaminophen 325 mg oral tablet: 2 tab(s) orally every 6 hours, As needed, Moderate Pain (4 - 6)  famotidine 40 mg oral tablet: 1 tab(s) orally 2 times a day   ibuprofen 600 mg oral tablet: 1 tab(s) orally every 6 hours, As needed, Mild Pain (1 - 3)  oxyCODONE 5 mg oral tablet: 1 tab(s) orally every 6 hours MDD:max of 4 pills per day  senna oral tablet: 1 tab(s) orally once a day   acetaminophen 325 mg oral tablet: 2 tab(s) orally every 6 hours, As needed, Moderate Pain (4 - 6)  ascorbic acid 500 mg oral tablet: 1 tab(s) orally 2 times a day  gabapentin 300 mg oral capsule: 1 cap(s) orally every 8 hours  ibuprofen 600 mg oral tablet: 1 tab(s) orally every 6 hours, As needed, Mild Pain (1 - 3)  methocarbamol 500 mg oral tablet: 1 tab(s) orally every 6 hours  metoprolol tartrate 25 mg oral tablet: 1 tab(s) orally every 12 hours  morphine 4 mg/mL preservative-free injectable solution: 4 milligram(s) injectable 3 times a week, As Needed for wound vac changes  Multiple Vitamins with Minerals oral tablet: 1 tab(s) orally once a day  oxyCODONE 20 mg oral tablet, extended release: 1 tab(s) orally every 12 hours  oxyCODONE 5 mg oral tablet: 1 tab(s) orally every 6 hours MDD:max of 4 pills per day  polyethylene glycol 3350 oral powder for reconstitution: 17 gram(s) orally once a day  senna oral tablet: 1 tab(s) orally once a day

## 2020-10-09 NOTE — PROGRESS NOTE ADULT - ATTENDING COMMENTS
46yo female with PSHx PAULIE/BSO 8/19/2020 for fibroids admitted for persistent small bowel obstruction s/p laparoscopic NARCISO, laparotomy, extensive NARCISO, small bowel resection with primary anastomosis 9/15 complicated by small bowel leak s/p re-exploration, wash out, small bowel resection and primary anastomosis 9/15. Complained of lower abdominal pain with cellulitis and induration. CT chest/A/P 9/18 showed speck of subcutaneous air and exam was concerning for crepitus. Patient underwent bilateral flank exploration of subcutaneous tissue - negative for necrotizing infection. CT chest/A/P with PO and IV contrast 9/26 showed improving fluid collection, improving bilateral pleural effusions, and enteritis.     At this time, patient is hemodynamically stable with overall improving tachycardia. Breathing comfortably on room air. Complains of some improving abdominal pain and occasional nausea. Tolerated regular diet, +bowel function - gas and BM. Off TPN. Continue antibiotics as per ID, will follow up for long-term IV antibiotics. Klebsiella from wound culture. Central line site looks clean after removal. Changing wound vac every other day. Tmax 38.1C over 24h, WBC 10.99 10/7, pending CBC from this morning. Consider CT A/P if up trends again or fever recurs. Hyponatremia - resolved. Pain control, trend labs, continue antibiotics. Encourage ambulation/OOB, incentive spirometer, DVT ppx. Working with rehab. Discussed home care - patient does not think she can handle going home with pain and wound care and deconditioning. She is comfortable with going to skilled facility or rehab in her transition to home.

## 2020-10-20 DIAGNOSIS — E43 UNSPECIFIED SEVERE PROTEIN-CALORIE MALNUTRITION: ICD-10-CM

## 2020-10-20 DIAGNOSIS — I95.81 POSTPROCEDURAL HYPOTENSION: ICD-10-CM

## 2020-10-20 DIAGNOSIS — I10 ESSENTIAL (PRIMARY) HYPERTENSION: ICD-10-CM

## 2020-10-20 DIAGNOSIS — G89.18 OTHER ACUTE POSTPROCEDURAL PAIN: ICD-10-CM

## 2020-10-20 DIAGNOSIS — J96.00 ACUTE RESPIRATORY FAILURE, UNSPECIFIED WHETHER WITH HYPOXIA OR HYPERCAPNIA: ICD-10-CM

## 2020-10-20 DIAGNOSIS — K56.51 INTESTINAL ADHESIONS [BANDS], WITH PARTIAL OBSTRUCTION: ICD-10-CM

## 2020-10-20 DIAGNOSIS — B96.1 KLEBSIELLA PNEUMONIAE [K. PNEUMONIAE] AS THE CAUSE OF DISEASES CLASSIFIED ELSEWHERE: ICD-10-CM

## 2020-10-20 DIAGNOSIS — K65.8 OTHER PERITONITIS: ICD-10-CM

## 2020-10-20 DIAGNOSIS — K91.89 OTHER POSTPROCEDURAL COMPLICATIONS AND DISORDERS OF DIGESTIVE SYSTEM: ICD-10-CM

## 2020-10-20 DIAGNOSIS — K68.11 POSTPROCEDURAL RETROPERITONEAL ABSCESS: ICD-10-CM

## 2020-10-20 DIAGNOSIS — T81.12XA POSTPROCEDURAL SEPTIC SHOCK, INITIAL ENCOUNTER: ICD-10-CM

## 2020-10-20 DIAGNOSIS — R73.9 HYPERGLYCEMIA, UNSPECIFIED: ICD-10-CM

## 2020-10-20 DIAGNOSIS — K65.0 GENERALIZED (ACUTE) PERITONITIS: ICD-10-CM

## 2020-10-20 DIAGNOSIS — E87.6 HYPOKALEMIA: ICD-10-CM

## 2020-10-20 DIAGNOSIS — E44.0 MODERATE PROTEIN-CALORIE MALNUTRITION: ICD-10-CM

## 2020-10-20 DIAGNOSIS — Z87.42 PERSONAL HISTORY OF OTHER DISEASES OF THE FEMALE GENITAL TRACT: ICD-10-CM

## 2020-10-20 DIAGNOSIS — Z53.31 LAPAROSCOPIC SURGICAL PROCEDURE CONVERTED TO OPEN PROCEDURE: ICD-10-CM

## 2020-10-20 DIAGNOSIS — L03.311 CELLULITIS OF ABDOMINAL WALL: ICD-10-CM

## 2020-10-20 DIAGNOSIS — E83.42 HYPOMAGNESEMIA: ICD-10-CM

## 2020-10-20 DIAGNOSIS — I08.1 RHEUMATIC DISORDERS OF BOTH MITRAL AND TRICUSPID VALVES: ICD-10-CM

## 2020-10-20 DIAGNOSIS — K63.1 PERFORATION OF INTESTINE (NONTRAUMATIC): ICD-10-CM

## 2020-10-20 DIAGNOSIS — Z90.710 ACQUIRED ABSENCE OF BOTH CERVIX AND UTERUS: ICD-10-CM

## 2020-10-20 DIAGNOSIS — J98.11 ATELECTASIS: ICD-10-CM

## 2020-10-20 DIAGNOSIS — E87.1 HYPO-OSMOLALITY AND HYPONATREMIA: ICD-10-CM

## 2020-10-20 DIAGNOSIS — A41.9 SEPSIS, UNSPECIFIED ORGANISM: ICD-10-CM

## 2020-10-20 DIAGNOSIS — J90 PLEURAL EFFUSION, NOT ELSEWHERE CLASSIFIED: ICD-10-CM

## 2020-10-20 DIAGNOSIS — D64.9 ANEMIA, UNSPECIFIED: ICD-10-CM

## 2020-10-20 DIAGNOSIS — T81.43XA INFECTION FOLLOWING A PROCEDURE, ORGAN AND SPACE SURGICAL SITE, INITIAL ENCOUNTER: ICD-10-CM

## 2020-10-20 DIAGNOSIS — N73.8 OTHER SPECIFIED FEMALE PELVIC INFLAMMATORY DISEASES: ICD-10-CM

## 2020-10-28 ENCOUNTER — APPOINTMENT (OUTPATIENT)
Dept: SURGERY | Facility: CLINIC | Age: 45
End: 2020-10-28
Payer: MEDICAID

## 2020-10-28 ENCOUNTER — APPOINTMENT (OUTPATIENT)
Dept: SURGERY | Facility: CLINIC | Age: 45
End: 2020-10-28

## 2020-10-28 VITALS
SYSTOLIC BLOOD PRESSURE: 108 MMHG | WEIGHT: 146 LBS | BODY MASS INDEX: 26.87 KG/M2 | DIASTOLIC BLOOD PRESSURE: 70 MMHG | TEMPERATURE: 97.2 F | OXYGEN SATURATION: 98 % | HEIGHT: 62 IN | HEART RATE: 130 BPM

## 2020-10-28 PROCEDURE — 99024 POSTOP FOLLOW-UP VISIT: CPT

## 2020-10-29 NOTE — PHYSICAL EXAM
[Normal Breath Sounds] : Normal breath sounds [Alert] : alert [Calm] : calm [Depressed] : depressed [de-identified] : no acute distress [de-identified] : tachycardia [de-identified] : soft, right lateral wound with good granulation tissue, left lateral wound with good granulation tissue; midline wound with wound vac in place, abdomen soft [de-identified] : wheelchair, full ROM bilateral UE

## 2020-10-29 NOTE — ASSESSMENT
[FreeTextEntry1] : 44yo female with previous PAULIE BSO for fibroids 8/19/2020 complicated hospital course for small bowel obstruction s/p ex lap, small bowel resection 9/15 complicated by leak s/p re-exploration, small bowel resection 9/15, developed soft tissue infection of abdominal wall s/p abdominal wall exploration 9/23. Now in nursing home\par -continue wound care as per NH\par -encouraged protein intake - protein shakes (patient refusing), high protein diet\par -encouraged continue work with rehab\par -follow up with Cardiology regarding tachycardia\par -return to office in 1 month

## 2020-11-14 ENCOUNTER — INPATIENT (INPATIENT)
Facility: HOSPITAL | Age: 45
LOS: 15 days | Discharge: HOME HEALTH | End: 2020-11-30
Attending: STUDENT IN AN ORGANIZED HEALTH CARE EDUCATION/TRAINING PROGRAM | Admitting: STUDENT IN AN ORGANIZED HEALTH CARE EDUCATION/TRAINING PROGRAM
Payer: MEDICAID

## 2020-11-14 VITALS
RESPIRATION RATE: 18 BRPM | HEART RATE: 67 BPM | TEMPERATURE: 98 F | OXYGEN SATURATION: 98 % | HEIGHT: 63 IN | DIASTOLIC BLOOD PRESSURE: 72 MMHG | SYSTOLIC BLOOD PRESSURE: 110 MMHG

## 2020-11-14 DIAGNOSIS — Z90.79 ACQUIRED ABSENCE OF OTHER GENITAL ORGAN(S): Chronic | ICD-10-CM

## 2020-11-14 DIAGNOSIS — Z90.710 ACQUIRED ABSENCE OF BOTH CERVIX AND UTERUS: Chronic | ICD-10-CM

## 2020-11-14 LAB
ALBUMIN SERPL ELPH-MCNC: 3.3 G/DL — LOW (ref 3.5–5.2)
ALP SERPL-CCNC: 75 U/L — SIGNIFICANT CHANGE UP (ref 30–115)
ALT FLD-CCNC: 18 U/L — SIGNIFICANT CHANGE UP (ref 0–41)
ANION GAP SERPL CALC-SCNC: 8 MMOL/L — SIGNIFICANT CHANGE UP (ref 7–14)
APPEARANCE UR: CLEAR — SIGNIFICANT CHANGE UP
AST SERPL-CCNC: 25 U/L — SIGNIFICANT CHANGE UP (ref 0–41)
BASOPHILS # BLD AUTO: 0.04 K/UL — SIGNIFICANT CHANGE UP (ref 0–0.2)
BASOPHILS NFR BLD AUTO: 0.6 % — SIGNIFICANT CHANGE UP (ref 0–1)
BILIRUB DIRECT SERPL-MCNC: <0.2 MG/DL — SIGNIFICANT CHANGE UP (ref 0–0.2)
BILIRUB INDIRECT FLD-MCNC: >0.1 MG/DL — LOW (ref 0.2–1.2)
BILIRUB SERPL-MCNC: 0.3 MG/DL — SIGNIFICANT CHANGE UP (ref 0.2–1.2)
BILIRUB UR-MCNC: NEGATIVE — SIGNIFICANT CHANGE UP
BUN SERPL-MCNC: 6 MG/DL — LOW (ref 10–20)
CALCIUM SERPL-MCNC: 9.1 MG/DL — SIGNIFICANT CHANGE UP (ref 8.5–10.1)
CHLORIDE SERPL-SCNC: 103 MMOL/L — SIGNIFICANT CHANGE UP (ref 98–110)
CO2 SERPL-SCNC: 25 MMOL/L — SIGNIFICANT CHANGE UP (ref 17–32)
COLOR SPEC: SIGNIFICANT CHANGE UP
CREAT SERPL-MCNC: <0.5 MG/DL — LOW (ref 0.7–1.5)
DIFF PNL FLD: NEGATIVE — SIGNIFICANT CHANGE UP
EOSINOPHIL # BLD AUTO: 0.04 K/UL — SIGNIFICANT CHANGE UP (ref 0–0.7)
EOSINOPHIL NFR BLD AUTO: 0.6 % — SIGNIFICANT CHANGE UP (ref 0–8)
GLUCOSE SERPL-MCNC: 121 MG/DL — HIGH (ref 70–99)
GLUCOSE UR QL: NEGATIVE — SIGNIFICANT CHANGE UP
HCG SERPL QL: NEGATIVE — SIGNIFICANT CHANGE UP
HCT VFR BLD CALC: 35.4 % — LOW (ref 37–47)
HGB BLD-MCNC: 10.8 G/DL — LOW (ref 12–16)
IMM GRANULOCYTES NFR BLD AUTO: 0.3 % — SIGNIFICANT CHANGE UP (ref 0.1–0.3)
KETONES UR-MCNC: ABNORMAL
LACTATE SERPL-SCNC: 0.9 MMOL/L — SIGNIFICANT CHANGE UP (ref 0.7–2)
LEUKOCYTE ESTERASE UR-ACNC: NEGATIVE — SIGNIFICANT CHANGE UP
LIDOCAIN IGE QN: 58 U/L — SIGNIFICANT CHANGE UP (ref 7–60)
LYMPHOCYTES # BLD AUTO: 1.49 K/UL — SIGNIFICANT CHANGE UP (ref 1.2–3.4)
LYMPHOCYTES # BLD AUTO: 23.4 % — SIGNIFICANT CHANGE UP (ref 20.5–51.1)
MCHC RBC-ENTMCNC: 25.9 PG — LOW (ref 27–31)
MCHC RBC-ENTMCNC: 30.5 G/DL — LOW (ref 32–37)
MCV RBC AUTO: 84.9 FL — SIGNIFICANT CHANGE UP (ref 81–99)
MONOCYTES # BLD AUTO: 0.46 K/UL — SIGNIFICANT CHANGE UP (ref 0.1–0.6)
MONOCYTES NFR BLD AUTO: 7.2 % — SIGNIFICANT CHANGE UP (ref 1.7–9.3)
NEUTROPHILS # BLD AUTO: 4.32 K/UL — SIGNIFICANT CHANGE UP (ref 1.4–6.5)
NEUTROPHILS NFR BLD AUTO: 67.9 % — SIGNIFICANT CHANGE UP (ref 42.2–75.2)
NITRITE UR-MCNC: NEGATIVE — SIGNIFICANT CHANGE UP
NRBC # BLD: 0 /100 WBCS — SIGNIFICANT CHANGE UP (ref 0–0)
PH UR: 7.5 — SIGNIFICANT CHANGE UP (ref 5–8)
PLATELET # BLD AUTO: 425 K/UL — HIGH (ref 130–400)
POTASSIUM SERPL-MCNC: 4 MMOL/L — SIGNIFICANT CHANGE UP (ref 3.5–5)
POTASSIUM SERPL-SCNC: 4 MMOL/L — SIGNIFICANT CHANGE UP (ref 3.5–5)
PROT SERPL-MCNC: 7 G/DL — SIGNIFICANT CHANGE UP (ref 6–8)
PROT UR-MCNC: NEGATIVE — SIGNIFICANT CHANGE UP
RBC # BLD: 4.17 M/UL — LOW (ref 4.2–5.4)
RBC # FLD: 16.2 % — HIGH (ref 11.5–14.5)
SARS-COV-2 RNA SPEC QL NAA+PROBE: SIGNIFICANT CHANGE UP
SODIUM SERPL-SCNC: 136 MMOL/L — SIGNIFICANT CHANGE UP (ref 135–146)
SP GR SPEC: 1.01 — LOW (ref 1.01–1.03)
UROBILINOGEN FLD QL: SIGNIFICANT CHANGE UP
WBC # BLD: 6.37 K/UL — SIGNIFICANT CHANGE UP (ref 4.8–10.8)
WBC # FLD AUTO: 6.37 K/UL — SIGNIFICANT CHANGE UP (ref 4.8–10.8)

## 2020-11-14 PROCEDURE — 99285 EMERGENCY DEPT VISIT HI MDM: CPT

## 2020-11-14 PROCEDURE — 74177 CT ABD & PELVIS W/CONTRAST: CPT | Mod: 26

## 2020-11-14 PROCEDURE — 93010 ELECTROCARDIOGRAM REPORT: CPT

## 2020-11-14 RX ORDER — METRONIDAZOLE 500 MG
500 TABLET ORAL ONCE
Refills: 0 | Status: COMPLETED | OUTPATIENT
Start: 2020-11-14 | End: 2020-11-14

## 2020-11-14 RX ORDER — IOHEXOL 300 MG/ML
30 INJECTION, SOLUTION INTRAVENOUS ONCE
Refills: 0 | Status: COMPLETED | OUTPATIENT
Start: 2020-11-14 | End: 2020-11-14

## 2020-11-14 RX ORDER — CIPROFLOXACIN LACTATE 400MG/40ML
400 VIAL (ML) INTRAVENOUS ONCE
Refills: 0 | Status: COMPLETED | OUTPATIENT
Start: 2020-11-14 | End: 2020-11-14

## 2020-11-14 RX ADMIN — Medication 200 MILLIGRAM(S): at 20:13

## 2020-11-14 RX ADMIN — Medication 100 MILLIGRAM(S): at 20:13

## 2020-11-14 RX ADMIN — IOHEXOL 30 MILLILITER(S): 300 INJECTION, SOLUTION INTRAVENOUS at 17:03

## 2020-11-14 NOTE — ED ADULT TRIAGE NOTE - CHIEF COMPLAINT QUOTE
pt c/o abdominal pain x 3 days, had sx on 9/17 to have fibroids removed and hysterectomy. pt also had SBO complication s/p

## 2020-11-14 NOTE — ED ADULT NURSE REASSESSMENT NOTE - NS ED NURSE REASSESS COMMENT FT1
Pt resting comfortably at this time.  Alert and oriented x 4 FF with no complaints of pain at this time.  Pt awatiing CT results.  Will continue to monitor and assess no further interventions at this time.

## 2020-11-14 NOTE — ED PROVIDER NOTE - PROGRESS NOTE DETAILS
spoke with surg resident dillan, will consult after labs and ct scan is done. requested ct scan be done with po and iv contrast. spoke with surgery resident dillan x 3 states she is with code trauma, has not been able to consult yet? received signout from Nacho Somers - ct abd/pelvis done - pt pending eval/dispo by surgery; abx given JR: patient signed out to Dr. Mullen; pending surgery consult and dispo. CT shows persistent abd abscess. s/p cipro/flagyl. Pt afebrile, well appearing. sp surgery eval, pending reeval with radiology. will callback

## 2020-11-14 NOTE — ED PROVIDER NOTE - PHYSICAL EXAMINATION
Physical Exam    Vital Signs: I have reviewed the initial vital signs.  Constitutional: well-nourished, appears stated age, no acute distress  Eyes: Conjunctiva pink, Sclera clear  Cardiovascular: S1 and S2, regular rate, regular rhythm, well-perfused extremities, radial pulses equal and 2+ b/l.   Respiratory: unlabored respiratory effort, clear to auscultation bilaterally no wheezing, rales and rhonchi. pt is speaking full sentences. no accessory muscle use.   Gastrointestinal: soft, (+) diffuse abdominal tenderness, nondistended abdomen, no pulsatile mass, normal bowl sounds, no rebound, no guarding, negative psoas, negative obturator, negative murphys. no cva tenderness.   Musculoskeletal: supple neck, no lower extremity edema, no calf tenderness, no midline tenderness, no palpable spinal step offs  Integumentary: warm, dry, no rash. (+) abdominal surgical incision of the left and right lower quadrants healing well. one surgical incision below the umbilicus and a second surgical incision even more inferior to the umbilicus are wet, and draining. the more inferior incision is foul smelling and draining green discharge. incision is pink with granulation tissue visible.   Neurologic: awake, alert  Psychiatric: appropriate mood, appropriate affect

## 2020-11-14 NOTE — ED PROVIDER NOTE - ATTENDING CONTRIBUTION TO CARE
44 y/o with a PMH of asthma, PAULIE/BSO due to uterine fibroid (performed 8/19/2020 by Dr. Rubio), SBO due to adhesions (operated on by Dr. Rubio 9/15/2020), and operation for leakage and abscess of abdominal anastomosis (performed by Dr. Emery 9/23/2020) presents to the ED for evaluation of drainage from surgical site x 6 days. Output foul smelling, green color. Last dressing on 11/12 and is changed q3d. Pt denies fevers, chills.     Gen - NAD, Head - NCAT, TMs - clear b/l, Pharynx - clear, MMM, Heart - RRR, no m/g/r, Lungs - CTAB, no w/c/r, Abdomen - soft, NT, ND, anastomoses site with green discharge, no erythema. Skin - No rash, Extremities - FROM, no edema, erythema, ecchymosis, Neuro - CN 2-12 intact, nl strength and sensation, nl gait.    P will assess for abscess, fistula, and will obtain ct abd/pelvis, abd labs, t&s, and will reassess. surgery consult obtained early.

## 2020-11-14 NOTE — ED PROVIDER NOTE - NS ED ROS FT
CONST: No fever, chills or bodyaches  EYES: No pain, redness, drainage or visual changes.  ENT: No ear pain or discharge, nasal discharge or congestion. No sore throat  CARD: No chest pain, palpitations  RESP: No SOB, cough, hemoptysis. No hx of asthma or COPD  GI: (+) abdominal pain. No N/V/D  : No urinary symptoms  MS: No joint pain, back pain or extremity pain/injury  SKIN: (+) drainage from abdominal surgical site. No rashes  NEURO: No headache, dizziness, paresthesias or LOC

## 2020-11-14 NOTE — ED PROVIDER NOTE - OBJECTIVE STATEMENT
44 y/o with a PMH of asthma, PAULIE/BSO due to uterine fibroid (performed 8/19/2020 by Dr. Rubio), SBO due to adhesions (operated on by Dr. Rubio 9/15/2020), and operation for leakage and abscess of abdominal anastomosis (performed by Dr. Emery 9/23/2020) presents to the ED for evaluation of drainage from surgical site x 6 days. pt reports she was in a nursing home for one month, and she notified the nursing home staff about the drainage on monday 11/14/2020, and was seen by a doctor on 11/19/2020 and was told they would contact her surgeon. pt reports she was never seen by the doctor or a surgeon again and was discharged from the nursing home yesterday 11/13/2020 because her insurance would not cover her nursing home stay any longer. pt reports her abdominal dressings were last changes 11/12/2020 and are changed q 3 days. pt reports worsening abdominal pain x 1 week. pt reports LBM was this morning, pt is passing gas and tolerating po. pt denies fever, chills, back pain, n/v/d/c, vaginal bleeding, malodorous or colored vaginal discharge, burning, pain or blood with urination, diarrhea, constipation, blood in the stool, recent trauma, chest pain, or sob.

## 2020-11-14 NOTE — ED PROVIDER NOTE - CLINICAL SUMMARY MEDICAL DECISION MAKING FREE TEXT BOX
pt presenting with drainage from abdominal wounds- s/o to me from Dr. Reyes- sp discussion with surgery, will admit

## 2020-11-15 DIAGNOSIS — Z90.49 ACQUIRED ABSENCE OF OTHER SPECIFIED PARTS OF DIGESTIVE TRACT: Chronic | ICD-10-CM

## 2020-11-15 LAB
ANION GAP SERPL CALC-SCNC: 14 MMOL/L — SIGNIFICANT CHANGE UP (ref 7–14)
BASOPHILS # BLD AUTO: 0.04 K/UL — SIGNIFICANT CHANGE UP (ref 0–0.2)
BASOPHILS NFR BLD AUTO: 0.7 % — SIGNIFICANT CHANGE UP (ref 0–1)
BUN SERPL-MCNC: 5 MG/DL — LOW (ref 10–20)
CALCIUM SERPL-MCNC: 8.9 MG/DL — SIGNIFICANT CHANGE UP (ref 8.5–10.1)
CHLORIDE SERPL-SCNC: 101 MMOL/L — SIGNIFICANT CHANGE UP (ref 98–110)
CO2 SERPL-SCNC: 22 MMOL/L — SIGNIFICANT CHANGE UP (ref 17–32)
CREAT SERPL-MCNC: <0.5 MG/DL — LOW (ref 0.7–1.5)
EOSINOPHIL # BLD AUTO: 0.11 K/UL — SIGNIFICANT CHANGE UP (ref 0–0.7)
EOSINOPHIL NFR BLD AUTO: 2.1 % — SIGNIFICANT CHANGE UP (ref 0–8)
GLUCOSE SERPL-MCNC: 79 MG/DL — SIGNIFICANT CHANGE UP (ref 70–99)
HCT VFR BLD CALC: 35.1 % — LOW (ref 37–47)
HGB BLD-MCNC: 10.4 G/DL — LOW (ref 12–16)
IMM GRANULOCYTES NFR BLD AUTO: 0 % — LOW (ref 0.1–0.3)
LYMPHOCYTES # BLD AUTO: 1.29 K/UL — SIGNIFICANT CHANGE UP (ref 1.2–3.4)
LYMPHOCYTES # BLD AUTO: 24.1 % — SIGNIFICANT CHANGE UP (ref 20.5–51.1)
MCHC RBC-ENTMCNC: 25.4 PG — LOW (ref 27–31)
MCHC RBC-ENTMCNC: 29.6 G/DL — LOW (ref 32–37)
MCV RBC AUTO: 85.6 FL — SIGNIFICANT CHANGE UP (ref 81–99)
MONOCYTES # BLD AUTO: 0.39 K/UL — SIGNIFICANT CHANGE UP (ref 0.1–0.6)
MONOCYTES NFR BLD AUTO: 7.3 % — SIGNIFICANT CHANGE UP (ref 1.7–9.3)
NEUTROPHILS # BLD AUTO: 3.52 K/UL — SIGNIFICANT CHANGE UP (ref 1.4–6.5)
NEUTROPHILS NFR BLD AUTO: 65.8 % — SIGNIFICANT CHANGE UP (ref 42.2–75.2)
NRBC # BLD: 0 /100 WBCS — SIGNIFICANT CHANGE UP (ref 0–0)
PLATELET # BLD AUTO: 417 K/UL — HIGH (ref 130–400)
POTASSIUM SERPL-MCNC: 3.6 MMOL/L — SIGNIFICANT CHANGE UP (ref 3.5–5)
POTASSIUM SERPL-SCNC: 3.6 MMOL/L — SIGNIFICANT CHANGE UP (ref 3.5–5)
RBC # BLD: 4.1 M/UL — LOW (ref 4.2–5.4)
RBC # FLD: 16.4 % — HIGH (ref 11.5–14.5)
SODIUM SERPL-SCNC: 137 MMOL/L — SIGNIFICANT CHANGE UP (ref 135–146)
WBC # BLD: 5.35 K/UL — SIGNIFICANT CHANGE UP (ref 4.8–10.8)
WBC # FLD AUTO: 5.35 K/UL — SIGNIFICANT CHANGE UP (ref 4.8–10.8)

## 2020-11-15 PROCEDURE — 99024 POSTOP FOLLOW-UP VISIT: CPT

## 2020-11-15 RX ORDER — OXYCODONE HYDROCHLORIDE 5 MG/1
20 TABLET ORAL EVERY 12 HOURS
Refills: 0 | Status: DISCONTINUED | OUTPATIENT
Start: 2020-11-15 | End: 2020-11-15

## 2020-11-15 RX ORDER — SODIUM CHLORIDE 9 MG/ML
1000 INJECTION, SOLUTION INTRAVENOUS
Refills: 0 | Status: DISCONTINUED | OUTPATIENT
Start: 2020-11-15 | End: 2020-11-17

## 2020-11-15 RX ORDER — METHOCARBAMOL 500 MG/1
500 TABLET, FILM COATED ORAL EVERY 6 HOURS
Refills: 0 | Status: DISCONTINUED | OUTPATIENT
Start: 2020-11-15 | End: 2020-11-20

## 2020-11-15 RX ORDER — MULTIVIT-MIN/FERROUS GLUCONATE 9 MG/15 ML
1 LIQUID (ML) ORAL DAILY
Refills: 0 | Status: DISCONTINUED | OUTPATIENT
Start: 2020-11-15 | End: 2020-11-22

## 2020-11-15 RX ORDER — ASCORBIC ACID 60 MG
500 TABLET,CHEWABLE ORAL
Refills: 0 | Status: DISCONTINUED | OUTPATIENT
Start: 2020-11-15 | End: 2020-11-15

## 2020-11-15 RX ORDER — ACETAMINOPHEN 500 MG
650 TABLET ORAL EVERY 6 HOURS
Refills: 0 | Status: DISCONTINUED | OUTPATIENT
Start: 2020-11-15 | End: 2020-11-22

## 2020-11-15 RX ORDER — OXYCODONE HYDROCHLORIDE 5 MG/1
5 TABLET ORAL EVERY 6 HOURS
Refills: 0 | Status: DISCONTINUED | OUTPATIENT
Start: 2020-11-15 | End: 2020-11-19

## 2020-11-15 RX ORDER — GABAPENTIN 400 MG/1
300 CAPSULE ORAL EVERY 8 HOURS
Refills: 0 | Status: DISCONTINUED | OUTPATIENT
Start: 2020-11-15 | End: 2020-11-22

## 2020-11-15 RX ORDER — ENOXAPARIN SODIUM 100 MG/ML
40 INJECTION SUBCUTANEOUS DAILY
Refills: 0 | Status: DISCONTINUED | OUTPATIENT
Start: 2020-11-15 | End: 2020-11-30

## 2020-11-15 RX ORDER — METOPROLOL TARTRATE 50 MG
25 TABLET ORAL EVERY 12 HOURS
Refills: 0 | Status: DISCONTINUED | OUTPATIENT
Start: 2020-11-15 | End: 2020-11-20

## 2020-11-15 RX ORDER — POLYETHYLENE GLYCOL 3350 17 G/17G
17 POWDER, FOR SOLUTION ORAL DAILY
Refills: 0 | Status: DISCONTINUED | OUTPATIENT
Start: 2020-11-15 | End: 2020-11-20

## 2020-11-15 RX ORDER — SENNA PLUS 8.6 MG/1
1 TABLET ORAL DAILY
Refills: 0 | Status: DISCONTINUED | OUTPATIENT
Start: 2020-11-15 | End: 2020-11-30

## 2020-11-15 RX ORDER — ONDANSETRON 8 MG/1
4 TABLET, FILM COATED ORAL EVERY 6 HOURS
Refills: 0 | Status: DISCONTINUED | OUTPATIENT
Start: 2020-11-15 | End: 2020-11-30

## 2020-11-15 RX ADMIN — METHOCARBAMOL 500 MILLIGRAM(S): 500 TABLET, FILM COATED ORAL at 05:30

## 2020-11-15 RX ADMIN — METHOCARBAMOL 500 MILLIGRAM(S): 500 TABLET, FILM COATED ORAL at 12:28

## 2020-11-15 RX ADMIN — METHOCARBAMOL 500 MILLIGRAM(S): 500 TABLET, FILM COATED ORAL at 17:24

## 2020-11-15 RX ADMIN — GABAPENTIN 300 MILLIGRAM(S): 400 CAPSULE ORAL at 14:23

## 2020-11-15 RX ADMIN — OXYCODONE HYDROCHLORIDE 5 MILLIGRAM(S): 5 TABLET ORAL at 23:23

## 2020-11-15 RX ADMIN — OXYCODONE HYDROCHLORIDE 5 MILLIGRAM(S): 5 TABLET ORAL at 12:26

## 2020-11-15 RX ADMIN — GABAPENTIN 300 MILLIGRAM(S): 400 CAPSULE ORAL at 05:30

## 2020-11-15 RX ADMIN — GABAPENTIN 300 MILLIGRAM(S): 400 CAPSULE ORAL at 21:18

## 2020-11-15 RX ADMIN — Medication 650 MILLIGRAM(S): at 23:53

## 2020-11-15 RX ADMIN — SODIUM CHLORIDE 100 MILLILITER(S): 9 INJECTION, SOLUTION INTRAVENOUS at 14:23

## 2020-11-15 RX ADMIN — SODIUM CHLORIDE 100 MILLILITER(S): 9 INJECTION, SOLUTION INTRAVENOUS at 01:44

## 2020-11-15 RX ADMIN — OXYCODONE HYDROCHLORIDE 20 MILLIGRAM(S): 5 TABLET ORAL at 05:30

## 2020-11-15 RX ADMIN — OXYCODONE HYDROCHLORIDE 5 MILLIGRAM(S): 5 TABLET ORAL at 17:24

## 2020-11-15 RX ADMIN — Medication 650 MILLIGRAM(S): at 23:23

## 2020-11-15 RX ADMIN — METHOCARBAMOL 500 MILLIGRAM(S): 500 TABLET, FILM COATED ORAL at 23:23

## 2020-11-15 NOTE — H&P ADULT - HISTORY OF PRESENT ILLNESS
45F with extensive surgical history and recent prolonged hospital course significant for PAULIE/BSO on 8/19/20 (Jesse) for 15cm bleeding fibroid with readmission x 2 for PSBO resolved with nonoperative management, s/p SBR, extensive NARCISO w/ 43cm ileum resected 2/2 serosal enterotomy 9/15 c/b RTOR for breakdown of proximal limb staple line of of anastomosis, 9/23 I&D of abdominal wound now presenting from home after recent discharge from nursing home with chief complaint of malodorous and murky brownish-green drainage from the inferiormost aspect of her midline abdominal incision. Per the patient, she had noted new onset drainage from her incision starting this Monday. She had alerted her nursing home care providers to this change and had been told that they would call the surgeon; however she never heard from from her providers in this regard. She was discharged from the nursing facility yesterday and presented to the ED today for evaluation. No apparent leukocytosis on labs and no gross lab abnormalities. CT AP was performed demonstrating lower intra-abdominal 3.3 x 2.4 x 2.4 cm rim-enhancing probable abscess, previously 5.8 x 3.3 x 2.8 cm as well as healing post surgical changes of anterior abdominal wall w/ subcutaneous 2.6 cm pocket of air within the abdominal wall of indeterminate clinical significance.    Vital Signs Last 24 Hrs  T(C): 36.5 (14 Nov 2020 16:12), Max: 36.5 (14 Nov 2020 16:12)  T(F): 97.7 (14 Nov 2020 16:12), Max: 97.7 (14 Nov 2020 16:12)  HR: 67 (14 Nov 2020 16:12) (67 - 67)  BP: 110/72 (14 Nov 2020 16:12) (110/72 - 110/72)  RR: 18 (14 Nov 2020 16:12) (18 - 18)  SpO2: 98% (14 Nov 2020 16:12) (98% - 98%)

## 2020-11-15 NOTE — H&P ADULT - NSHPPHYSICALEXAM_GEN_ALL_CORE
PHYSICAL EXAM:    General: NAD, well-developed  Head:  Atraumatic, Normocephalic  Eyes: EOMI, PERRLA, conjunctiva and sclera clear  Neck: Supple, No JVD  Chest/Lung: Clear to auscultation bilaterally; No wheeze  Heart: Regular rate and rhythm; No murmurs, rubs, or gallops  Abdomen: Soft, nondistended; well-healing midline incision w/ thick malodorous murky greenish-brown drainage at inferiormost aspect of wound w/ small punctate defect in center  Extremities:, No clubbing, cyanosis, or edema  Psych: AAOx3  Neurology: non-focal  Skin: No rashes or lesions

## 2020-11-15 NOTE — H&P ADULT - ASSESSMENT
45F with extensive surgical history and recent prolonged hospital course now presenting with chief complaint of drainage from the inferiormost aspect of her midline abdominal incision      Plan:  - admission  - NPO, IVF  - pain control  - DVT/GI ppx  - daily dressing changes  - encourage ambulation  - will continue to monitor wound  - plan d/w Dr. Rubio, ED, patient, surgical team

## 2020-11-15 NOTE — H&P ADULT - NSHPLABSRESULTS_GEN_ALL_CORE
10.8   6.37  )-----------( 425      ( 2020 17:19 )             35.4           136  |  103  |  6<L>  ----------------------------<  121<H>  4.0   |  25  |  <0.5<L>    Ca    9.1      2020 17:19    TPro  7.0  /  Alb  3.3<L>  /  TBili  0.3  /  DBili  <0.2  /  AST  25  /  ALT  18  /  AlkPhos  75  14              Urinalysis Basic - ( 2020 18:52 )    Color: Light Yellow / Appearance: Clear / S.007 / pH: x  Gluc: x / Ketone: Small  / Bili: Negative / Urobili: <2 mg/dL   Blood: x / Protein: Negative / Nitrite: Negative   Leuk Esterase: Negative / RBC: x / WBC x   Sq Epi: x / Non Sq Epi: x / Bacteria: x  Lactate Trend   @ 17:19 Lactate:0.9     RADIOLOGY  < from: CT Abdomen and Pelvis w/ Oral Cont and w/ IV Cont (20 @ 19:26) >    IMPRESSION:  Since 2020:    1. Lower intra-abdominal 3.3 x 2.4 x 2.4 cm rim-enhancing probable abscess, previously 5.8 x 3.3 x 2.8 cm.    2. Healing post surgical changes of anterior abdominal wall. Subcutaneous 2.6 cm pocket of air within the abdominal wall of indeterminate clinical significance.    < end of copied text >

## 2020-11-15 NOTE — H&P ADULT - NSICDXPASTSURGICALHX_GEN_ALL_CORE_FT
PAST SURGICAL HISTORY:  S/P small bowel resection     S/P PAULIE (total abdominal hysterectomy)     Status post bilateral salpingectomy

## 2020-11-15 NOTE — H&P ADULT - ATTENDING COMMENTS
46yo female with PSHx PAULIE/BSO 8/19/2020 for fibroids admitted for persistent small bowel obstruction s/p laparoscopic NARCISO, laparotomy, extensive NARCISO, small bowel resection with primary anastomosis 9/15 complicated by small bowel leak s/p re-exploration, wash out, small bowel resection and primary anastomosis 9/15. Patient underwent bilateral flank exploration of subcutaneous tissue due to concern for necrotizing infection. She was discharged to NH and then home.     At this time, patient returns for drainage from her wound. Patient states that she notes foul-smelling, stool-like fluid when she eats. +moderate intermittent pain. Denies fever/chills, chest pain or shortness of breath. On exam, there is a palpable stitch at the inferior aspect of the wound bed. Reportedly expressible yellow fluid, however none on my exam. Labs reviewed and WNL. CT A/P 11/14 demonstrates lower intra-abdominal 3.3 x 2.4 x 2.4 cm rim-enhancing probable abscess, previously 5.8 x 3.3 x 2.8 cm, and healing post surgical changes of anterior abdominal wall, subcutaneous 2.6 cm pocket of air within the abdominal wall of indeterminate clinical significance. Will give full liquid diet, IV fluid. Monitor drainage as none is expressibly by me. Pain control. Consider bedside I&D of subcutaneous air pocket. Part of stitch removed at bedside.    I had a lengthy discussion with the patient regarding possibility of abscess versus EC fistula. I explained to the patient that in the case that there is an EC fistula, it is likely low output and will resolve with non-operative management. Will inspect wound further for possible retained sponge from wound vac, additional pieces of stitches or other potential causes for fistula formation.  Encourage ambulation/OOB, incentive spirometer, DVT ppx.

## 2020-11-16 ENCOUNTER — TRANSCRIPTION ENCOUNTER (OUTPATIENT)
Age: 45
End: 2020-11-16

## 2020-11-16 LAB
ANION GAP SERPL CALC-SCNC: 10 MMOL/L — SIGNIFICANT CHANGE UP (ref 7–14)
BASOPHILS # BLD AUTO: 0.04 K/UL — SIGNIFICANT CHANGE UP (ref 0–0.2)
BASOPHILS NFR BLD AUTO: 0.8 % — SIGNIFICANT CHANGE UP (ref 0–1)
BUN SERPL-MCNC: <3 MG/DL — LOW (ref 10–20)
CALCIUM SERPL-MCNC: 8.9 MG/DL — SIGNIFICANT CHANGE UP (ref 8.5–10.1)
CHLORIDE SERPL-SCNC: 104 MMOL/L — SIGNIFICANT CHANGE UP (ref 98–110)
CO2 SERPL-SCNC: 24 MMOL/L — SIGNIFICANT CHANGE UP (ref 17–32)
CREAT SERPL-MCNC: <0.5 MG/DL — LOW (ref 0.7–1.5)
EOSINOPHIL # BLD AUTO: 0.29 K/UL — SIGNIFICANT CHANGE UP (ref 0–0.7)
EOSINOPHIL NFR BLD AUTO: 5.7 % — SIGNIFICANT CHANGE UP (ref 0–8)
GLUCOSE SERPL-MCNC: 83 MG/DL — SIGNIFICANT CHANGE UP (ref 70–99)
HCT VFR BLD CALC: 33 % — LOW (ref 37–47)
HGB BLD-MCNC: 10.1 G/DL — LOW (ref 12–16)
IMM GRANULOCYTES NFR BLD AUTO: 0.2 % — SIGNIFICANT CHANGE UP (ref 0.1–0.3)
LYMPHOCYTES # BLD AUTO: 1.32 K/UL — SIGNIFICANT CHANGE UP (ref 1.2–3.4)
LYMPHOCYTES # BLD AUTO: 25.9 % — SIGNIFICANT CHANGE UP (ref 20.5–51.1)
MAGNESIUM SERPL-MCNC: 1.6 MG/DL — LOW (ref 1.8–2.4)
MCHC RBC-ENTMCNC: 25.4 PG — LOW (ref 27–31)
MCHC RBC-ENTMCNC: 30.6 G/DL — LOW (ref 32–37)
MCV RBC AUTO: 83.1 FL — SIGNIFICANT CHANGE UP (ref 81–99)
MONOCYTES # BLD AUTO: 0.41 K/UL — SIGNIFICANT CHANGE UP (ref 0.1–0.6)
MONOCYTES NFR BLD AUTO: 8 % — SIGNIFICANT CHANGE UP (ref 1.7–9.3)
NEUTROPHILS # BLD AUTO: 3.03 K/UL — SIGNIFICANT CHANGE UP (ref 1.4–6.5)
NEUTROPHILS NFR BLD AUTO: 59.4 % — SIGNIFICANT CHANGE UP (ref 42.2–75.2)
NRBC # BLD: 0 /100 WBCS — SIGNIFICANT CHANGE UP (ref 0–0)
PHOSPHATE SERPL-MCNC: 4.8 MG/DL — SIGNIFICANT CHANGE UP (ref 2.1–4.9)
PLATELET # BLD AUTO: 360 K/UL — SIGNIFICANT CHANGE UP (ref 130–400)
POTASSIUM SERPL-MCNC: 3.5 MMOL/L — SIGNIFICANT CHANGE UP (ref 3.5–5)
POTASSIUM SERPL-SCNC: 3.5 MMOL/L — SIGNIFICANT CHANGE UP (ref 3.5–5)
RBC # BLD: 3.97 M/UL — LOW (ref 4.2–5.4)
RBC # FLD: 15.9 % — HIGH (ref 11.5–14.5)
SODIUM SERPL-SCNC: 138 MMOL/L — SIGNIFICANT CHANGE UP (ref 135–146)
WBC # BLD: 5.1 K/UL — SIGNIFICANT CHANGE UP (ref 4.8–10.8)
WBC # FLD AUTO: 5.1 K/UL — SIGNIFICANT CHANGE UP (ref 4.8–10.8)

## 2020-11-16 PROCEDURE — 99024 POSTOP FOLLOW-UP VISIT: CPT

## 2020-11-16 RX ORDER — MAGNESIUM SULFATE 500 MG/ML
2 VIAL (ML) INJECTION ONCE
Refills: 0 | Status: COMPLETED | OUTPATIENT
Start: 2020-11-16 | End: 2020-11-17

## 2020-11-16 RX ORDER — POTASSIUM CHLORIDE 20 MEQ
20 PACKET (EA) ORAL ONCE
Refills: 0 | Status: COMPLETED | OUTPATIENT
Start: 2020-11-16 | End: 2020-11-16

## 2020-11-16 RX ORDER — HYDROMORPHONE HYDROCHLORIDE 2 MG/ML
1 INJECTION INTRAMUSCULAR; INTRAVENOUS; SUBCUTANEOUS ONCE
Refills: 0 | Status: DISCONTINUED | OUTPATIENT
Start: 2020-11-16 | End: 2020-11-16

## 2020-11-16 RX ORDER — MAGNESIUM SULFATE 500 MG/ML
2 VIAL (ML) INJECTION ONCE
Refills: 0 | Status: COMPLETED | OUTPATIENT
Start: 2020-11-16 | End: 2020-11-16

## 2020-11-16 RX ORDER — POTASSIUM CHLORIDE 20 MEQ
20 PACKET (EA) ORAL ONCE
Refills: 0 | Status: DISCONTINUED | OUTPATIENT
Start: 2020-11-16 | End: 2020-11-17

## 2020-11-16 RX ORDER — POTASSIUM CHLORIDE 20 MEQ
20 PACKET (EA) ORAL
Refills: 0 | Status: DISCONTINUED | OUTPATIENT
Start: 2020-11-16 | End: 2020-11-17

## 2020-11-16 RX ADMIN — OXYCODONE HYDROCHLORIDE 5 MILLIGRAM(S): 5 TABLET ORAL at 05:55

## 2020-11-16 RX ADMIN — GABAPENTIN 300 MILLIGRAM(S): 400 CAPSULE ORAL at 05:23

## 2020-11-16 RX ADMIN — METHOCARBAMOL 500 MILLIGRAM(S): 500 TABLET, FILM COATED ORAL at 05:23

## 2020-11-16 RX ADMIN — Medication 50 GRAM(S): at 17:59

## 2020-11-16 RX ADMIN — METHOCARBAMOL 500 MILLIGRAM(S): 500 TABLET, FILM COATED ORAL at 17:59

## 2020-11-16 RX ADMIN — Medication 25 MILLIGRAM(S): at 17:59

## 2020-11-16 RX ADMIN — HYDROMORPHONE HYDROCHLORIDE 1 MILLIGRAM(S): 2 INJECTION INTRAMUSCULAR; INTRAVENOUS; SUBCUTANEOUS at 09:04

## 2020-11-16 RX ADMIN — Medication 25 MILLIGRAM(S): at 05:23

## 2020-11-16 RX ADMIN — OXYCODONE HYDROCHLORIDE 5 MILLIGRAM(S): 5 TABLET ORAL at 18:00

## 2020-11-16 RX ADMIN — GABAPENTIN 300 MILLIGRAM(S): 400 CAPSULE ORAL at 14:34

## 2020-11-16 RX ADMIN — Medication 50 MILLIEQUIVALENT(S): at 10:45

## 2020-11-16 RX ADMIN — Medication 50 MILLIEQUIVALENT(S): at 05:59

## 2020-11-16 RX ADMIN — Medication 650 MILLIGRAM(S): at 13:28

## 2020-11-16 RX ADMIN — GABAPENTIN 300 MILLIGRAM(S): 400 CAPSULE ORAL at 21:10

## 2020-11-16 RX ADMIN — HYDROMORPHONE HYDROCHLORIDE 1 MILLIGRAM(S): 2 INJECTION INTRAMUSCULAR; INTRAVENOUS; SUBCUTANEOUS at 09:05

## 2020-11-16 RX ADMIN — Medication 650 MILLIGRAM(S): at 05:53

## 2020-11-16 RX ADMIN — OXYCODONE HYDROCHLORIDE 5 MILLIGRAM(S): 5 TABLET ORAL at 18:30

## 2020-11-16 RX ADMIN — OXYCODONE HYDROCHLORIDE 5 MILLIGRAM(S): 5 TABLET ORAL at 13:28

## 2020-11-16 RX ADMIN — OXYCODONE HYDROCHLORIDE 5 MILLIGRAM(S): 5 TABLET ORAL at 05:25

## 2020-11-16 RX ADMIN — METHOCARBAMOL 500 MILLIGRAM(S): 500 TABLET, FILM COATED ORAL at 12:36

## 2020-11-16 RX ADMIN — Medication 650 MILLIGRAM(S): at 12:36

## 2020-11-16 RX ADMIN — OXYCODONE HYDROCHLORIDE 5 MILLIGRAM(S): 5 TABLET ORAL at 12:37

## 2020-11-16 RX ADMIN — Medication 650 MILLIGRAM(S): at 05:23

## 2020-11-16 NOTE — PROGRESS NOTE ADULT - ATTENDING COMMENTS
46yo female with PSHx PAULIE/BSO 8/19/2020 for fibroids admitted for persistent small bowel obstruction s/p laparoscopic NARCISO, laparotomy, extensive NARCISO, small bowel resection with primary anastomosis 9/15 complicated by small bowel leak s/p re-exploration, wash out, small bowel resection and primary anastomosis 9/15. Patient underwent bilateral flank exploration of subcutaneous tissue due to concern for necrotizing infection. She was discharged to NH and then home.     At this time, patient returns for drainage from her wound. Patient states that she notes foul-smelling, stool-like fluid when she eats. +moderate intermittent pain. Denies fever/chills, chest pain or shortness of breath. On exam, there is a palpable stitch at the inferior aspect of the wound bed. Reportedly expressible yellow fluid, however none on my exam. Labs reviewed and WNL. CT A/P 11/14 demonstrates lower intra-abdominal 3.3 x 2.4 x 2.4 cm rim-enhancing probable abscess, previously 5.8 x 3.3 x 2.8 cm, and healing post surgical changes of anterior abdominal wall, subcutaneous 2.6 cm pocket of air within the abdominal wall of indeterminate clinical significance. NPO, IV fluids. Pain control. Will perform I&D at bedside today. Monitor drainage as none is expressible by me. Pain control.

## 2020-11-16 NOTE — PROGRESS NOTE ADULT - REASON FOR ADMISSION
[facility] Psychiatric Consult





- Data


Date of interview: 11/02/19


Admission source: [facility]


Identifying data: Readmission to [facility] for this 36 y/o AA female self-

referred for detoxification (DULCE issues : alcohol, heroin, cocaine, cannabis, 

nicotine). Interviewed at [address] [place] [address]. Patient is single, mother of seven, domiciled

, unemployed and supported by relatives.


Substance Abuse History: Discussed with the patient. Details concordant with 

current [facility] report as follows : Smoking history: Former smoker.  Have you 

smoked in the past 12 months: No.  Aproximately how many cigarettes per day: 

20.  If you are a former smoker, when did you quit?: 1 yr ago.  Cigars Per Day: 

0.  Hx Chewing Tobacco Use: No.  Initiated information on smoking cessation: 

Yes.  'Breaking Loose' booklet given: 10/29/19.  - Substances abused.  ** 

Heroin.  Substance route: Inhalation.  Frequency: Daily.  Amount used: 50 bags.

  Age of first use: 19.  Date of last use: 10/28/19.  ** Marijuana/Hashish.  

Substance route: Smoking.  Frequency: Daily.  Amount used: 2 bags.  Age of 

first use: 19.  Date of last use: 06/30/19.  ** PCP.  Substance route: Smoking.

  Frequency: Daily.  Amount used: 2 blunts.  Age of first use: 21.  Date of 

last use: 10/28/19.  ** Alcohol.  Substance route: Oral.  Frequency: Daily.  

Amount used: 1 bottle of liquor.  Age of first use: 37.  Date of last use: 10/28

/19


Medical History: Patient endorses good general health.


Psychiatric History: Patient denies history of psychiatric hospitalizations, 

OPD care or suicide attempts.


Physical/Sexual Abuse/Trauma History: Patient denies.


Additional Comment: Urine drug screen results: THC-Marijuana, DARLYN-Cocaine, FEN-

Fentanyl, MOP-Opiates, BZO-Benzodiazepines. Noted.





Mental Status Exam





- Mental Status Exam


Alert and Oriented to: Time, Place, Person


Cognitive Function: Good


Patient Appearance: Well Groomed


Mood: Hopeful, Euthymic


Affect: Appropriate, Normal Range


Patient Behavior: Appropriate, Cooperative


Speech Pattern: Clear, Appropriate


Voice Loudness: Normal


Thought Process: Intact, Goal Oriented


Thought Disorder: Not Present


Hallucinations: Denies


Suicidal Ideation: Denies


Homicidal Ideation: Denies


Insight/Judgement: Poor


Sleep: Poorly, Difficulty falling asleep


Appetite: Good


Gait/Station: Normal





Psychiatric Findings





- Problem List (Axis 1, 2,3)


(1) Opioid dependence with withdrawal


Current Visit: Yes   Status: Acute   





(2) Alcohol abuse


Current Visit: Yes   Status: Chronic   





(3) Nicotine dependence


Current Visit: Yes   Status: Chronic   


Qualifiers: 


   Nicotine product type: cigarettes   Substance use status: uncomplicated   

Qualified Code(s): F17.210 - Nicotine dependence, cigarettes, uncomplicated   





(4) Marijuana dependence


Current Visit: Yes   Status: Chronic   





(5) Insomnia


Current Visit: Yes   Status: Chronic   





- Initial Treatment Plan


Initial Treatment Plan: Psychoeducation. Sleep hygiene. Detoxification. MAT 

services discusssed in session. AA/NA meetings. Insomnia is addressed with 

trazodone 50 mg po hs (alternate hypnotic medications reviewed with patient; 

expresses preference for trazodone). Side effects/benefits dicussed. Verbal 

consent obtained from the patient. Observation. abdominal wound drainage

## 2020-11-16 NOTE — DISCHARGE NOTE NURSING/CASE MANAGEMENT/SOCIAL WORK - PATIENT PORTAL LINK FT
You can access the FollowMyHealth Patient Portal offered by [facility] by registering at the following website: [email or web address]. By joining [facility]’s FollowMyHealth portal, you will also be able to view your health information using other applications (apps) compatible with our system.

## 2020-11-16 NOTE — PROGRESS NOTE ADULT - SUBJECTIVE AND OBJECTIVE BOX
Progress Note: Surgery  Patient: GLORIA SCHAFER , 45y (1975)Female   MRN: [id number]  Location: [address] A  Visit: 11-15-20 Inpatient  Date: 20 @ 08:08    Procedure/Diagnosis: HD 3 admitted for wound drainage  Events over 24h: No acute event overnight. No new complaint. Pt is hemodynamically stable. NPO after midnight. Denies nausea, vomiting. Patient is endorsing abdominal pain around her incision, same as previous day. Voiding adequately. Using Incentive spirometer and ICDs.  +gas, no BM.    Vitals: T(F): 97.8 (20 @ 07:16), Max: 97.9 (11-15-20 @ 23:25)  HR: 70 (20 @ 07:16)  BP: 100/55 (20 @ 07:16) (98/54 - 108/57)  RR: 20 (20 @ 07:16)  SpO2: --      Diet: Diet, NPO after Midnight:      NPO Start Date: 15-Nov-2020,   NPO Start Time: 23:59 (11-15-20 @ 10:25)  Diet, Full Liquid (11-15-20 @ 10:16)    IV Fluid: lactated ringers. 1000 milliLiter(s) (100 mL/Hr) IV Continuous <Continuous>  magnesium sulfate  IVPB 2 Gram(s) IV Intermittent once  multivitamin/minerals 1 Tablet(s) Oral daily  potassium chloride  20 mEq/100 mL IVPB 20 milliEquivalent(s) IV Intermittent every 2 hours    Physical Examination:  General Appearance: NAD, alert and cooperative  Heart: S1 and S2. No murmurs. Rhythm is regular.  Lungs: Clear to auscultation BL without rales, rhonchi, wheezing, crackles or diminished breath sounds.  Abdomen: Soft, nondistended, yanique-incisional tenderness. No rigidity, guarding, or rebound tenderness. Green tinged fluid around periumbilical portion of incision, no purulence or sanguienous output. Non foul smelling. Dressings tinged with fluid, not soaked.    Medications: [Standing]  acetaminophen   Tablet .. 650 milliGRAM(s) Oral every 6 hours  enoxaparin Injectable 40 milliGRAM(s) SubCutaneous daily  gabapentin 300 milliGRAM(s) Oral every 8 hours  lactated ringers. 1000 milliLiter(s) (100 mL/Hr) IV Continuous <Continuous>  magnesium sulfate  IVPB 2 Gram(s) IV Intermittent once  methocarbamol 500 milliGRAM(s) Oral every 6 hours  metoprolol tartrate 25 milliGRAM(s) Oral every 12 hours  multivitamin/minerals 1 Tablet(s) Oral daily  oxyCODONE    IR 5 milliGRAM(s) Oral every 6 hours  polyethylene glycol 3350 17 Gram(s) Oral daily  potassium chloride  20 mEq/100 mL IVPB 20 milliEquivalent(s) IV Intermittent every 2 hours  senna 1 Tablet(s) Oral daily    Medications:[PRN]  ondansetron Injectable 4 milliGRAM(s) IV Push every 6 hours PRN    Labs:                        10.1   5.10  )-----------( 360      ( 2020 00:59 )             33.0   11-16    138  |  104  |  <3<L>  ----------------------------<  83  3.5   |  24  |  <0.5<L>    Ca    8.9      2020 00:59  Phos  4.8     11-16  Mg     1.6     11-16    TPro  7.0  /  Alb  3.3<L>  /  TBili  0.3  /  DBili  <0.2  /  AST  25  /  ALT  18  /  AlkPhos  75  11-14  LIVER FUNCTIONS - ( 2020 17:19 )  Alb: 3.3 g/dL / Pro: 7.0 g/dL / ALK PHOS: 75 U/L / ALT: 18 U/L / AST: 25 U/L / GGT: x           Micro/Urine:  Urinalysis Basic - ( 2020 18:52 )    Color: Light Yellow / Appearance: Clear / S.007 / pH: x  Gluc: x / Ketone: Small  / Bili: Negative / Urobili: <2 mg/dL   Blood: x / Protein: Negative / Nitrite: Negative   Leuk Esterase: Negative / RBC: x / WBC x   Sq Epi: x / Non Sq Epi: x / Bacteria: x      Imaging:  None/24h

## 2020-11-16 NOTE — PROVIDER CONTACT NOTE (OTHER) - SITUATION
PT HAS NO IV ACCESS AND REFUSES. SURGERY TEAM COMES TO TALK TO PT AND INSTRUCTS TO TRY PLACING IV IN HALF HOUR. STAT LOVENOX ORDERED.

## 2020-11-16 NOTE — PROGRESS NOTE ADULT - ASSESSMENT
Assessment:  45y Female patient admitted HD 3 for abdominal wound drainage, found to have fluid collection subcutaneous air pocket as well    Plan:  - Bedside I&D of wound today  - will need ostomy appliance around wound to quantify output  - NPO for now  - IVF  - no ABX  -Pain control as needed  -Hemodynamic monitoring as per routine    Date/Time: 11-16-20 @ 08:08

## 2020-11-17 PROCEDURE — 99024 POSTOP FOLLOW-UP VISIT: CPT

## 2020-11-17 RX ORDER — POTASSIUM CHLORIDE 20 MEQ
20 PACKET (EA) ORAL
Refills: 0 | Status: COMPLETED | OUTPATIENT
Start: 2020-11-17 | End: 2020-11-17

## 2020-11-17 RX ORDER — DIPHENHYDRAMINE HCL 50 MG
25 CAPSULE ORAL ONCE
Refills: 0 | Status: COMPLETED | OUTPATIENT
Start: 2020-11-17 | End: 2020-11-18

## 2020-11-17 RX ORDER — SODIUM CHLORIDE 9 MG/ML
1000 INJECTION, SOLUTION INTRAVENOUS
Refills: 0 | Status: DISCONTINUED | OUTPATIENT
Start: 2020-11-17 | End: 2020-11-20

## 2020-11-17 RX ORDER — ELECTROLYTE SOLUTION,INJ
1 VIAL (ML) INTRAVENOUS
Refills: 0 | Status: DISCONTINUED | OUTPATIENT
Start: 2020-11-17 | End: 2020-11-17

## 2020-11-17 RX ORDER — SODIUM CHLORIDE 9 MG/ML
1000 INJECTION, SOLUTION INTRAVENOUS
Refills: 0 | Status: DISCONTINUED | OUTPATIENT
Start: 2020-11-17 | End: 2020-11-17

## 2020-11-17 RX ORDER — I.V. FAT EMULSION 20 G/100ML
1.79 EMULSION INTRAVENOUS
Qty: 100.18 | Refills: 0 | Status: DISCONTINUED | OUTPATIENT
Start: 2020-11-17 | End: 2020-11-17

## 2020-11-17 RX ADMIN — METHOCARBAMOL 500 MILLIGRAM(S): 500 TABLET, FILM COATED ORAL at 17:12

## 2020-11-17 RX ADMIN — GABAPENTIN 300 MILLIGRAM(S): 400 CAPSULE ORAL at 05:17

## 2020-11-17 RX ADMIN — OXYCODONE HYDROCHLORIDE 5 MILLIGRAM(S): 5 TABLET ORAL at 17:12

## 2020-11-17 RX ADMIN — OXYCODONE HYDROCHLORIDE 5 MILLIGRAM(S): 5 TABLET ORAL at 01:00

## 2020-11-17 RX ADMIN — Medication 25 MILLIGRAM(S): at 05:28

## 2020-11-17 RX ADMIN — METHOCARBAMOL 500 MILLIGRAM(S): 500 TABLET, FILM COATED ORAL at 00:36

## 2020-11-17 RX ADMIN — OXYCODONE HYDROCHLORIDE 5 MILLIGRAM(S): 5 TABLET ORAL at 12:27

## 2020-11-17 RX ADMIN — Medication 25 MILLIGRAM(S): at 17:12

## 2020-11-17 RX ADMIN — METHOCARBAMOL 500 MILLIGRAM(S): 500 TABLET, FILM COATED ORAL at 05:17

## 2020-11-17 RX ADMIN — Medication 20 MILLIEQUIVALENT(S): at 01:28

## 2020-11-17 RX ADMIN — Medication 20 MILLIEQUIVALENT(S): at 04:04

## 2020-11-17 RX ADMIN — OXYCODONE HYDROCHLORIDE 5 MILLIGRAM(S): 5 TABLET ORAL at 05:17

## 2020-11-17 RX ADMIN — OXYCODONE HYDROCHLORIDE 5 MILLIGRAM(S): 5 TABLET ORAL at 05:48

## 2020-11-17 RX ADMIN — I.V. FAT EMULSION 31.3 GM/KG/DAY: 20 EMULSION INTRAVENOUS at 22:09

## 2020-11-17 RX ADMIN — SODIUM CHLORIDE 100 MILLILITER(S): 9 INJECTION, SOLUTION INTRAVENOUS at 06:41

## 2020-11-17 RX ADMIN — GABAPENTIN 300 MILLIGRAM(S): 400 CAPSULE ORAL at 13:01

## 2020-11-17 RX ADMIN — Medication 1 EACH: at 22:08

## 2020-11-17 RX ADMIN — Medication 50 GRAM(S): at 01:28

## 2020-11-17 RX ADMIN — METHOCARBAMOL 500 MILLIGRAM(S): 500 TABLET, FILM COATED ORAL at 12:27

## 2020-11-17 RX ADMIN — OXYCODONE HYDROCHLORIDE 5 MILLIGRAM(S): 5 TABLET ORAL at 00:36

## 2020-11-17 NOTE — PROGRESS NOTE ADULT - ASSESSMENT
45y Female patient admitted HD 4 for abdominal wound drainage, S/P I&D with a fecaloid secretion for distal aspect of surgical wound    Plan:    - ostomy appliance around wound to quantify output  - NPO for now  - IVF  - no ABX  - Pain control as needed  - Hemodynamic monitoring as per routine  - CBC and electrolytes monitoring and repletion  - Continue current management

## 2020-11-17 NOTE — PROGRESS NOTE ADULT - ATTENDING COMMENTS
46yo female with PSHx PAULIE/BSO 8/19/2020 for fibroids admitted for persistent small bowel obstruction s/p laparoscopic NARCISO, laparotomy, extensive NARCISO, small bowel resection with primary anastomosis 9/15 complicated by small bowel leak s/p re-exploration, wash out, small bowel resection and primary anastomosis 9/15. Patient underwent bilateral flank exploration of subcutaneous tissue due to concern for necrotizing infection. She was discharged to NH and then home.     At this time, patient returns for drainage from her wound. Patient states that she notes foul-smelling, stool-like fluid when she eats. +moderate intermittent pain. Denies fever/chills, chest pain or shortness of breath. CT A/P 11/14 demonstrates lower intra-abdominal 3.3 x 2.4 x 2.4 cm rim-enhancing probable abscess, previously 5.8 x 3.3 x 2.8 cm, and healing post surgical changes of anterior abdominal wall, subcutaneous 2.6 cm pocket of air within the abdominal wall of indeterminate clinical significance. Incision and drainage of abdominal wall performed at bedside 11/16. Ostomy appliance was placed, will measure outputs although scant. Reportedly expressible yellow and green fluid as per patient, particularly when she eats. Labs significant for hypomagnesemia 1.6. NPO, IV fluid. Nutrition consult, possible TPN. Pain control. Encourage ambulation/OOB, incentive spirometer, DVT ppx.

## 2020-11-17 NOTE — PROVIDER CONTACT NOTE (OTHER) - SITUATION
pt's previous IN access was no longer patent. RN removed IV as per protocol. pt is adamantly refusing new IV despite RN teaching and education. pt stated to RN, " I do not want other poke. you can

## 2020-11-17 NOTE — PROVIDER CONTACT NOTE (MEDICATION) - SITUATION
pt is scheduled to receive 25m Metoprolol at this time BP is 108/61 HR 75. do you want NR to give or hold medication. pt refused Po Tylenol at this time. despite teaching and education.

## 2020-11-17 NOTE — CHART NOTE - NSCHARTNOTEFT_GEN_A_CORE
Patient seen at bedside, refusing repletion of potassium IV. Patient states she does not want to be any repletion of potassium IV due to burning sensation. Risks explained and patients acknowledges understanding, New repletion of potassium is indicated PO

## 2020-11-17 NOTE — PROGRESS NOTE ADULT - SUBJECTIVE AND OBJECTIVE BOX
GENERAL SURGERY PROGRESS NOTE     GLORIA SCHAFER  45y  Female  Hospital day :2d    EVENTS IN THE LAST 24 HRS: Patient seen and examine, I&D was performed yesterday, she started with secretion of fecaloid aspect through distal part of surgical wound, a ostomy bag was used for management of possible fistula. Denies fever, abdominal pain      T(F): 98.5 (11-16-20 @ 15:30), Max: 98.5 (11-16-20 @ 15:30)  HR: 78 (11-16-20 @ 15:30) (70 - 78)  BP: 111/59 (11-16-20 @ 15:30) (100/55 - 111/59)  RR: 20 (11-16-20 @ 15:30) (20 - 20)      PHYSICAL EXAM:  GENERAL: NAD,   CHEST/LUNG: Clear to auscultation bilaterally  HEART: Regular rate and rhythm  ABDOMEN: Soft, Nontender, Nondistended; surgical wound with a 3x2 cm defect with scant secretion of fecaloid aspect   EXTREMITIES:  No clubbing, cyanosis, or edema      DIET/FLUIDS: lactated ringers. 1000 milliLiter(s) IV Continuous <Continuous>  magnesium sulfate  IVPB 2 Gram(s) IV Intermittent once  multivitamin/minerals 1 Tablet(s) Oral daily  potassium chloride    Tablet ER 20 milliEquivalent(s) Oral every 2 hours      GI proph:    AC/ proph: enoxaparin Injectable 40 milliGRAM(s) SubCutaneous daily    ABx:         LABS  Labs:  CAPILLARY BLOOD GLUCOSE                              10.1   5.10  )-----------( 360      ( 16 Nov 2020 00:59 )             33.0         11-16    138  |  104  |  <3<L>  ----------------------------<  83  3.5   |  24  |  <0.5<L>          LFTs:     Lactate, Blood: 0.9 mmol/L (11-14-20 @ 17:19)      Coags:      Culture - Blood (collected 14 Nov 2020 17:19)  Source: .Blood Blood-Peripheral  Preliminary Report (16 Nov 2020 01:02):    No growth to date.    Culture - Blood (collected 14 Nov 2020 17:19)  Source: .Blood Blood-Peripheral  Preliminary Report (16 Nov 2020 01:02):    No growth to date.

## 2020-11-17 NOTE — CHART NOTE - NSCHARTNOTEFT_GEN_A_CORE
NUTRITION SUPPORT CONSULTATION    HPI:  45F with extensive surgical history and recent prolonged hospital course significant for PAULIE/BSO on 8/19/20 ([facility]) for 15cm bleeding fibroid with readmission x 2 for PSBO resolved with nonoperative management, s/p SBR, extensive NARCISO w/ 43cm ileum resected 2/2 serosal enterotomy 9/15 c/b RTOR for breakdown of proximal limb staple line of of anastomosis, 9/23 I&D of abdominal wound now presenting from home after recent discharge from nursing home with chief complaint of malodorous and murky brownish-green drainage from the inferiormost aspect of her midline abdominal incision. Per the patient, she had noted new onset drainage from her incision starting this Monday. She had alerted her nursing home care providers to this change and had been told that they would call the surgeon; however she never heard from from her providers in this regard. She was discharged from the nursing facility yesterday and presented to the ED today for evaluation. No apparent leukocytosis on labs and no gross lab abnormalities. CT AP was performed demonstrating lower intra-abdominal 3.3 x 2.4 x 2.4 cm rim-enhancing probable abscess, previously 5.8 x 3.3 x 2.8 cm as well as healing post surgical changes of anterior abdominal wall w/ subcutaneous 2.6 cm pocket of air within the abdominal wall of indeterminate clinical significance.    Pt very well known to service from previous admission when she was on TPN. On previous admission severe malnutrition was present and she was high risk of delayed wound healing       PAST MEDICAL & SURGICAL HISTORY:Uterine fibroid  S/P small bowel resection  Status post bilateral salpingectomy  S/P PAULIE (total abdominal hysterectomy)    Allergies  No Known Allergies    MEDICATIONS  (STANDING):  acetaminophen   Tablet .. 650 milliGRAM(s) Oral every 6 hours  dextrose 5% + sodium chloride 0.45%. 1000 milliLiter(s) (100 mL/Hr) IV Continuous <Continuous>  enoxaparin Injectable 40 milliGRAM(s) SubCutaneous daily  fat emulsion (Plant Based) 20% Infusion 1.789 Gm/kG/Day (31.3 mL/Hr) IV Continuous <Continuous>  gabapentin 300 milliGRAM(s) Oral every 8 hours  methocarbamol 500 milliGRAM(s) Oral every 6 hours  metoprolol tartrate 25 milliGRAM(s) Oral every 12 hours  multivitamin/minerals 1 Tablet(s) Oral daily  oxyCODONE    IR 5 milliGRAM(s) Oral every 6 hours  Parenteral Nutrition - Adult 1 Each (85 mL/Hr) TPN Continuous <Continuous>  polyethylene glycol 3350 17 Gram(s) Oral daily  senna 1 Tablet(s) Oral daily    MEDICATIONS  (PRN):  ondansetron Injectable 4 milliGRAM(s) IV Push every 6 hours PRN Nausea    ICU Vital Signs Last 24 Hrs  T(C): 36.3 (17 Nov 2020 08:04), Max: 36.9 (16 Nov 2020 15:30)  T(F): 97.3 (17 Nov 2020 08:04), Max: 98.5 (16 Nov 2020 15:30)  HR: 70 (17 Nov 2020 08:04) (70 - 78)  BP: 101/62 (17 Nov 2020 08:04) (101/62 - 111/59)  BP(mean): 74 (17 Nov 2020 05:27) (74 - 74)  RR: 20 (17 Nov 2020 08:04) (20 - 20)    Drug Dosing Weight  Height (cm): 160 (16 Nov 2020 06:11)  Weight (kg): 56 (16 Nov 2020 06:11)  BMI (kg/m2): 21.9 (16 Nov 2020 06:11)  BSA (m2): 1.58 (16 Nov 2020 06:11)    EXAM     Abd:    LE:   Enteral access: midline catheter  IV access: none    LABS  11-16    138  |  104  |  <3<L>  ----------------------------<  83  3.5   |  24  |  <0.5<L>    Ca    8.9      16 Nov 2020 00:59  Phos  4.8     11-16  Mg     1.6     11-16    Triglycerides, Serum: 149 mg/dL (09.25.20 @ 23:30)                        10.1   5.10  )-----------( 360      ( 16 Nov 2020 00:59 )             33.0     Radiology:  < from: CT Abdomen and Pelvis w/ Oral Cont and w/ IV Cont (11.14.20 @ 19:26) >    EXAM:  CT ABDOMEN AND PELVIS OC IC          PROCEDURE DATE:  11/14/2020      INTERPRETATION:  CLINICAL STATEMENT: Abdominal pain.    TECHNIQUE: Contiguous axial CT images were obtained from the lower chest to the pubic symphysis following administration of 100cc Optiray 320 intravenous contrast.  Oral contrast was administered.  Reformatted images in the coronal and sagittal planes were acquired.    Comparison made with CT abdomen and pelvis September 6, 2020.    FINDINGS:    LOWER CHEST: Trace left pleural effusion, decreased.    HEPATOBILIARY: Unremarkable.    SPLEEN: Unremarkable.    PANCREAS: Unremarkable.    ADRENAL GLANDS: Unremarkable.    KIDNEYS: No hydronephrosis. Punctate nonobstructive left renal calculus. Subcentimeter left renal hypodensity, too small to characterize.    ABDOMINOPELVIC NODES: Unremarkable.    PELVIC ORGANS: Post hysterectomy. Urinary bladder unremarkable.    PERITONEUM/MESENTERY/BOWEL: Lower intra-abdominal 3.3 x 2.4 x 2.4 cm rim-enhancing fluid collection, previously 5.8 x 3.3 x 2.8 cm (series 4, image 259). Post partial small bowel resection with anastomosis. No bowel obstruction.    BONES/SOFT TISSUES: Healing post surgical changes of anterior abdominal wall. Subcutaneous 2.6 cm pocket of airwithin the abdominal wall of indeterminate clinical significance (series 4, image 218). Degenerative change, thoracolumbar spine.    IMPRESSION:  Since September 26, 2020:    1. Lower intra-abdominal 3.3 x 2.4 x 2.4 cm rim-enhancing probable abscess, previously 5.8 x 3.3 x 2.8 cm.    2. Healing post surgical changes of anterior abdominal wall. Subcutaneous 2.6 cm pocket of air within the abdominal wall of indeterminate clinical significance.    < end of copied text >    Diet, NPO:   Except Medications (11-16-20 @ 17:55)    ASSESSMENT:        PLAN:  - start PPN tonight via midline catheter  - d/c IVF's when PPN begins  - pepcid added to PPN  - BMP, IP, Mg, vitamin D 25-OH, zinc, vitamin B-12 and folate levels in the AM  - needs central line for TPN NUTRITION SUPPORT CONSULTATION    HPI:  45F with extensive surgical history and recent prolonged hospital course significant for PAULIE/BSO on 8/19/20 ([facility]) for 15cm bleeding fibroid with readmission x 2 for PSBO resolved with nonoperative management, s/p SBR, extensive NARCISO w/ 43cm ileum resected 2/2 serosal enterotomy 9/15 c/b RTOR for breakdown of proximal limb staple line of of anastomosis, 9/23 I&D of abdominal wound now presenting from home after recent discharge from nursing home with chief complaint of malodorous and murky brownish-green drainage from the inferiormost aspect of her midline abdominal incision. Per the patient, she had noted new onset drainage from her incision starting this Monday. She had alerted her nursing home care providers to this change and had been told that they would call the surgeon; however she never heard from her providers in this regard. She was discharged from the nursing facility yesterday and presented to the ED today for evaluation. No apparent leukocytosis on labs and no gross lab abnormalities. CT AP was performed demonstrating lower intra-abdominal 3.3 x 2.4 x 2.4 cm rim-enhancing probable abscess, previously 5.8 x 3.3 x 2.8 cm as well as healing post surgical changes of anterior abdominal wall w/ subcutaneous 2.6 cm pocket of air within the abdominal wall of indeterminate clinical significance.    Pt very well known to service from previous admission when she was on TPN. On previous admission severe malnutrition was present and she was high risk of delayed wound healing. Pt very upset about abdominal wound and drainage, yesterday bedside I&D of abdominal wound by surgery with ostomy placed    PAST MEDICAL & SURGICAL HISTORY:  Uterine fibroid  S/P small bowel resection  Status post bilateral salpingectomy  S/P PAULIE (total abdominal hysterectomy)    Allergies  No Known Allergies    MEDICATIONS  (STANDING):  acetaminophen   Tablet .. 650 milliGRAM(s) Oral every 6 hours  dextrose 5% + sodium chloride 0.45%. 1000 milliLiter(s) (100 mL/Hr) IV Continuous <Continuous>  enoxaparin Injectable 40 milliGRAM(s) SubCutaneous daily  fat emulsion (Plant Based) 20% Infusion 1.789 Gm/kG/Day (31.3 mL/Hr) IV Continuous <Continuous>  gabapentin 300 milliGRAM(s) Oral every 8 hours  methocarbamol 500 milliGRAM(s) Oral every 6 hours  metoprolol tartrate 25 milliGRAM(s) Oral every 12 hours  multivitamin/minerals 1 Tablet(s) Oral daily  oxyCODONE    IR 5 milliGRAM(s) Oral every 6 hours  Parenteral Nutrition - Adult 1 Each (85 mL/Hr) TPN Continuous <Continuous>  polyethylene glycol 3350 17 Gram(s) Oral daily  senna 1 Tablet(s) Oral daily    MEDICATIONS  (PRN):  ondansetron Injectable 4 milliGRAM(s) IV Push every 6 hours PRN Nausea    ICU Vital Signs Last 24 Hrs  T(C): 36.3 (17 Nov 2020 08:04), Max: 36.9 (16 Nov 2020 15:30)  T(F): 97.3 (17 Nov 2020 08:04), Max: 98.5 (16 Nov 2020 15:30)  HR: 70 (17 Nov 2020 08:04) (70 - 78)  BP: 101/62 (17 Nov 2020 08:04) (101/62 - 111/59)  BP(mean): 74 (17 Nov 2020 05:27) (74 - 74)  RR: 20 (17 Nov 2020 08:04) (20 - 20)    Drug Dosing Weight  Height (cm): 160 (16 Nov 2020 06:11)  Weight (kg): 56 (16 Nov 2020 06:11)  BMI (kg/m2): 21.9 (16 Nov 2020 06:11)  BSA (m2): 1.58 (16 Nov 2020 06:11)    EXAM  A&O x3, NAD, denies pain  Abd: soft, ND. Lateral incisions healed,   lower abdominal wound with an colored drainage with ostomy bag in place  LE: no edema  Enteral access: none  IV access: RUE midline catheter    LABS  11-16    138  |  104  |  <3<L>  ----------------------------<  83  3.5   |  24  |  <0.5<L>    Ca    8.9      16 Nov 2020 00:59  Phos  4.8     11-16  Mg     1.6     11-16    Triglycerides, Serum: 149 mg/dL (09.25.20 @ 23:30)                        10.1   5.10  )-----------( 360      ( 16 Nov 2020 00:59 )             33.0     Radiology:  < from: CT Abdomen and Pelvis w/ Oral Cont and w/ IV Cont (11.14.20 @ 19:26) >    EXAM:  CT ABDOMEN AND PELVIS OC IC          PROCEDURE DATE:  11/14/2020      INTERPRETATION:  CLINICAL STATEMENT: Abdominal pain.    TECHNIQUE: Contiguous axial CT images were obtained from the lower chest to the pubic symphysis following administration of 100cc Optiray 320 intravenous contrast.  Oral contrast was administered.  Reformatted images in the coronal and sagittal planes were acquired.    Comparison made with CT abdomen and pelvis September 6, 2020.    FINDINGS:    LOWER CHEST: Trace left pleural effusion, decreased.    HEPATOBILIARY: Unremarkable.    SPLEEN: Unremarkable.    PANCREAS: Unremarkable.    ADRENAL GLANDS: Unremarkable.    KIDNEYS: No hydronephrosis. Punctate nonobstructive left renal calculus. Subcentimeter left renal hypodensity, too small to characterize.    ABDOMINOPELVIC NODES: Unremarkable.    PELVIC ORGANS: Post hysterectomy. Urinary bladder unremarkable.    PERITONEUM/MESENTERY/BOWEL: Lower intra-abdominal 3.3 x 2.4 x 2.4 cm rim-enhancing fluid collection, previously 5.8 x 3.3 x 2.8 cm (series 4, image 259). Post partial small bowel resection with anastomosis. No bowel obstruction.    BONES/SOFT TISSUES: Healing post surgical changes of anterior abdominal wall. Subcutaneous 2.6 cm pocket of air within the abdominal wall of indeterminate clinical significance (series 4, image 218). Degenerative change, thoracolumbar spine.    IMPRESSION:  Since September 26, 2020:    1. Lower intra-abdominal 3.3 x 2.4 x 2.4 cm rim-enhancing probable abscess, previously 5.8 x 3.3 x 2.8 cm.    2. Healing post surgical changes of anterior abdominal wall. Subcutaneous 2.6 cm pocket of air within the abdominal wall of indeterminate clinical significance.    < end of copied text >    Diet, NPO:   Except Medications (11-16-20 @ 17:55)    ASSESSMENT:  45y Female patient admitted for abdominal wound drainage, S/P I&D with a fecaloid secretion for distal aspect of surgical wound    - severe protein calorie malnutrition  - hypomagnesemia      PLAN:  - start PPN tonight via midline catheter  - d/c IVF's when PPN begins  - pepcid added to PPN  - BMP, IP, Mg, vitamin D 25-OH, zinc, vitamin B-12 and folate levels in the AM  - needs central line for TPN NUTRITION SUPPORT CONSULTATION    HPI:  45F with extensive surgical history and recent prolonged hospital course significant for PAULIE/BSO on 8/19/20 ([facility]) for 15cm bleeding fibroid with readmission x 2 for PSBO resolved with nonoperative management, s/p SBR, extensive NARCISO w/ 43cm ileum resected 2/2 serosal enterotomy 9/15 c/b RTOR for breakdown of proximal limb staple line of of anastomosis, 9/23 I&D of abdominal wound now presenting from home after recent discharge from nursing home with chief complaint of malodorous and murky brownish-green drainage from the inferiormost aspect of her midline abdominal incision. Per the patient, she had noted new onset drainage from her incision starting this Monday. She had alerted her nursing home care providers to this change and had been told that they would call the surgeon; however she never heard from her providers in this regard. She was discharged from the nursing facility yesterday and presented to the ED today for evaluation. No apparent leukocytosis on labs and no gross lab abnormalities. CT AP was performed demonstrating lower intra-abdominal 3.3 x 2.4 x 2.4 cm rim-enhancing probable abscess, previously 5.8 x 3.3 x 2.8 cm as well as healing post surgical changes of anterior abdominal wall w/ subcutaneous 2.6 cm pocket of air within the abdominal wall of indeterminate clinical significance.    Pt very well known to service from previous admission when she was on TPN. On previous admission severe malnutrition was present and she was high risk of delayed wound healing. Pt very upset about abdominal wound and drainage, yesterday bedside I&D of abdominal wound by surgery with ostomy placed    PAST MEDICAL & SURGICAL HISTORY:  Uterine fibroid  S/P small bowel resection  Status post bilateral salpingectomy  S/P PAULIE (total abdominal hysterectomy)    Allergies  No Known Allergies    MEDICATIONS  (STANDING):  acetaminophen   Tablet .. 650 milliGRAM(s) Oral every 6 hours  dextrose 5% + sodium chloride 0.45%. 1000 milliLiter(s) (100 mL/Hr) IV Continuous <Continuous>  enoxaparin Injectable 40 milliGRAM(s) SubCutaneous daily  fat emulsion (Plant Based) 20% Infusion 1.789 Gm/kG/Day (31.3 mL/Hr) IV Continuous <Continuous>  gabapentin 300 milliGRAM(s) Oral every 8 hours  methocarbamol 500 milliGRAM(s) Oral every 6 hours  metoprolol tartrate 25 milliGRAM(s) Oral every 12 hours  multivitamin/minerals 1 Tablet(s) Oral daily  oxyCODONE    IR 5 milliGRAM(s) Oral every 6 hours  Parenteral Nutrition - Adult 1 Each (85 mL/Hr) TPN Continuous <Continuous>  polyethylene glycol 3350 17 Gram(s) Oral daily  senna 1 Tablet(s) Oral daily    MEDICATIONS  (PRN):  ondansetron Injectable 4 milliGRAM(s) IV Push every 6 hours PRN Nausea    ICU Vital Signs Last 24 Hrs  T(C): 36.3 (17 Nov 2020 08:04), Max: 36.9 (16 Nov 2020 15:30)  T(F): 97.3 (17 Nov 2020 08:04), Max: 98.5 (16 Nov 2020 15:30)  HR: 70 (17 Nov 2020 08:04) (70 - 78)  BP: 101/62 (17 Nov 2020 08:04) (101/62 - 111/59)  BP(mean): 74 (17 Nov 2020 05:27) (74 - 74)  RR: 20 (17 Nov 2020 08:04) (20 - 20)    Drug Dosing Weight  Height (cm): 160 (16 Nov 2020 06:11)  Weight (kg): 56 (16 Nov 2020 06:11)  BMI (kg/m2): 21.9 (16 Nov 2020 06:11)  BSA (m2): 1.58 (16 Nov 2020 06:11)    EXAM  A&O x3, NAD, denies pain  Abd: soft, ND. Lateral incisions healed,   lower abdominal wound with an colored drainage with ostomy bag in place  LE: no edema  Enteral access: none  IV access: RUE midline catheter    LABS  11-16    138  |  104  |  <3<L>  ----------------------------<  83  3.5   |  24  |  <0.5<L>    Ca    8.9      16 Nov 2020 00:59  Phos  4.8     11-16  Mg     1.6     11-16    Triglycerides, Serum: 149 mg/dL (09.25.20 @ 23:30)                        10.1   5.10  )-----------( 360      ( 16 Nov 2020 00:59 )             33.0     Radiology:  < from: CT Abdomen and Pelvis w/ Oral Cont and w/ IV Cont (11.14.20 @ 19:26) >    EXAM:  CT ABDOMEN AND PELVIS OC IC          PROCEDURE DATE:  11/14/2020      INTERPRETATION:  CLINICAL STATEMENT: Abdominal pain.    TECHNIQUE: Contiguous axial CT images were obtained from the lower chest to the pubic symphysis following administration of 100cc Optiray 320 intravenous contrast.  Oral contrast was administered.  Reformatted images in the coronal and sagittal planes were acquired.    Comparison made with CT abdomen and pelvis September 6, 2020.    FINDINGS:    LOWER CHEST: Trace left pleural effusion, decreased.    HEPATOBILIARY: Unremarkable.    SPLEEN: Unremarkable.    PANCREAS: Unremarkable.    ADRENAL GLANDS: Unremarkable.    KIDNEYS: No hydronephrosis. Punctate nonobstructive left renal calculus. Subcentimeter left renal hypodensity, too small to characterize.    ABDOMINOPELVIC NODES: Unremarkable.    PELVIC ORGANS: Post hysterectomy. Urinary bladder unremarkable.    PERITONEUM/MESENTERY/BOWEL: Lower intra-abdominal 3.3 x 2.4 x 2.4 cm rim-enhancing fluid collection, previously 5.8 x 3.3 x 2.8 cm (series 4, image 259). Post partial small bowel resection with anastomosis. No bowel obstruction.    BONES/SOFT TISSUES: Healing post surgical changes of anterior abdominal wall. Subcutaneous 2.6 cm pocket of air within the abdominal wall of indeterminate clinical significance (series 4, image 218). Degenerative change, thoracolumbar spine.    IMPRESSION:  Since September 26, 2020:    1. Lower intra-abdominal 3.3 x 2.4 x 2.4 cm rim-enhancing probable abscess, previously 5.8 x 3.3 x 2.8 cm.    2. Healing post surgical changes of anterior abdominal wall. Subcutaneous 2.6 cm pocket of air within the abdominal wall of indeterminate clinical significance.    < end of copied text >    Diet, NPO:   Except Medications (11-16-20 @ 17:55)    ASSESSMENT:  45y Female patient admitted for abdominal wound drainage, S/P I&D with a fecaloid secretion for distal aspect of surgical wound, likely ECF.  pt known to us from prio admission.    - severe protein calorie malnutrition  - hypomagnesemia      PLAN:  - need to determine location of ECF (where in gut fistula arises)  - start PPN tonight via midline catheter  - d/c IVF's when PPN begins  - pepcid added to PPN  - BMP, IP, Mg, vitamin D 25-OH, zinc, vitamin B-12 and folate levels in the AM  - needs central line for TPN

## 2020-11-17 NOTE — PROVIDER CONTACT NOTE (MEDICATION) - SITUATION
pt refused PO Tylenol, Miralax, senna and multi vitamin stating since she is not eating she does not want to take too many medications and she feels theses are unnecessary despite RN teaching.

## 2020-11-18 LAB
ANION GAP SERPL CALC-SCNC: 17 MMOL/L — HIGH (ref 7–14)
BASOPHILS # BLD AUTO: 0.03 K/UL — SIGNIFICANT CHANGE UP (ref 0–0.2)
BASOPHILS NFR BLD AUTO: 0.6 % — SIGNIFICANT CHANGE UP (ref 0–1)
BUN SERPL-MCNC: <3 MG/DL — LOW (ref 10–20)
CALCIUM SERPL-MCNC: 9.1 MG/DL — SIGNIFICANT CHANGE UP (ref 8.5–10.1)
CHLORIDE SERPL-SCNC: 101 MMOL/L — SIGNIFICANT CHANGE UP (ref 98–110)
CO2 SERPL-SCNC: 19 MMOL/L — SIGNIFICANT CHANGE UP (ref 17–32)
CREAT SERPL-MCNC: 0.5 MG/DL — LOW (ref 0.7–1.5)
EOSINOPHIL # BLD AUTO: 0.16 K/UL — SIGNIFICANT CHANGE UP (ref 0–0.7)
EOSINOPHIL NFR BLD AUTO: 3.4 % — SIGNIFICANT CHANGE UP (ref 0–8)
GLUCOSE SERPL-MCNC: 89 MG/DL — SIGNIFICANT CHANGE UP (ref 70–99)
HCT VFR BLD CALC: 40.6 % — SIGNIFICANT CHANGE UP (ref 37–47)
HGB BLD-MCNC: 12.4 G/DL — SIGNIFICANT CHANGE UP (ref 12–16)
IMM GRANULOCYTES NFR BLD AUTO: 0.2 % — SIGNIFICANT CHANGE UP (ref 0.1–0.3)
LYMPHOCYTES # BLD AUTO: 1.35 K/UL — SIGNIFICANT CHANGE UP (ref 1.2–3.4)
LYMPHOCYTES # BLD AUTO: 28.7 % — SIGNIFICANT CHANGE UP (ref 20.5–51.1)
MAGNESIUM SERPL-MCNC: 1.9 MG/DL — SIGNIFICANT CHANGE UP (ref 1.8–2.4)
MCHC RBC-ENTMCNC: 25.7 PG — LOW (ref 27–31)
MCHC RBC-ENTMCNC: 30.5 G/DL — LOW (ref 32–37)
MCV RBC AUTO: 84.1 FL — SIGNIFICANT CHANGE UP (ref 81–99)
MONOCYTES # BLD AUTO: 0.34 K/UL — SIGNIFICANT CHANGE UP (ref 0.1–0.6)
MONOCYTES NFR BLD AUTO: 7.2 % — SIGNIFICANT CHANGE UP (ref 1.7–9.3)
NEUTROPHILS # BLD AUTO: 2.82 K/UL — SIGNIFICANT CHANGE UP (ref 1.4–6.5)
NEUTROPHILS NFR BLD AUTO: 59.9 % — SIGNIFICANT CHANGE UP (ref 42.2–75.2)
NRBC # BLD: 0 /100 WBCS — SIGNIFICANT CHANGE UP (ref 0–0)
PHOSPHATE SERPL-MCNC: 5.4 MG/DL — HIGH (ref 2.1–4.9)
PLATELET # BLD AUTO: 337 K/UL — SIGNIFICANT CHANGE UP (ref 130–400)
POTASSIUM SERPL-MCNC: 4.5 MMOL/L — SIGNIFICANT CHANGE UP (ref 3.5–5)
POTASSIUM SERPL-SCNC: 4.5 MMOL/L — SIGNIFICANT CHANGE UP (ref 3.5–5)
RBC # BLD: 4.83 M/UL — SIGNIFICANT CHANGE UP (ref 4.2–5.4)
RBC # FLD: 15.9 % — HIGH (ref 11.5–14.5)
SODIUM SERPL-SCNC: 137 MMOL/L — SIGNIFICANT CHANGE UP (ref 135–146)
WBC # BLD: 4.71 K/UL — LOW (ref 4.8–10.8)
WBC # FLD AUTO: 4.71 K/UL — LOW (ref 4.8–10.8)

## 2020-11-18 PROCEDURE — 99024 POSTOP FOLLOW-UP VISIT: CPT

## 2020-11-18 RX ADMIN — Medication 25 MILLIGRAM(S): at 17:15

## 2020-11-18 RX ADMIN — OXYCODONE HYDROCHLORIDE 5 MILLIGRAM(S): 5 TABLET ORAL at 23:21

## 2020-11-18 RX ADMIN — SODIUM CHLORIDE 100 MILLILITER(S): 9 INJECTION, SOLUTION INTRAVENOUS at 08:19

## 2020-11-18 RX ADMIN — METHOCARBAMOL 500 MILLIGRAM(S): 500 TABLET, FILM COATED ORAL at 23:19

## 2020-11-18 RX ADMIN — METHOCARBAMOL 500 MILLIGRAM(S): 500 TABLET, FILM COATED ORAL at 11:44

## 2020-11-18 RX ADMIN — OXYCODONE HYDROCHLORIDE 5 MILLIGRAM(S): 5 TABLET ORAL at 23:20

## 2020-11-18 RX ADMIN — METHOCARBAMOL 500 MILLIGRAM(S): 500 TABLET, FILM COATED ORAL at 00:26

## 2020-11-18 RX ADMIN — OXYCODONE HYDROCHLORIDE 5 MILLIGRAM(S): 5 TABLET ORAL at 17:14

## 2020-11-18 RX ADMIN — GABAPENTIN 300 MILLIGRAM(S): 400 CAPSULE ORAL at 00:18

## 2020-11-18 RX ADMIN — METHOCARBAMOL 500 MILLIGRAM(S): 500 TABLET, FILM COATED ORAL at 17:15

## 2020-11-18 RX ADMIN — Medication 25 MILLIGRAM(S): at 00:20

## 2020-11-18 RX ADMIN — Medication 650 MILLIGRAM(S): at 00:22

## 2020-11-18 RX ADMIN — OXYCODONE HYDROCHLORIDE 5 MILLIGRAM(S): 5 TABLET ORAL at 11:44

## 2020-11-18 RX ADMIN — Medication 650 MILLIGRAM(S): at 02:53

## 2020-11-18 RX ADMIN — GABAPENTIN 300 MILLIGRAM(S): 400 CAPSULE ORAL at 13:20

## 2020-11-18 RX ADMIN — GABAPENTIN 300 MILLIGRAM(S): 400 CAPSULE ORAL at 21:06

## 2020-11-18 NOTE — PROVIDER CONTACT NOTE (OTHER) - SITUATION
pt is complaining that IVF rate is too fast. pt stated it is uncomfortable and burns. NR explained that we must monitor I&O's and this is the desired rate. NR explained that it is important for pt

## 2020-11-18 NOTE — PROGRESS NOTE ADULT - ASSESSMENT
ASSESSMENT:  45y Female patient admitted for abdominal wound drainage, S/P I&D with a fecaloid secretion for distal aspect of surgical wound, likely ECF.  [facility]    - severe protein calorie malnutrition, + protein deficit  - hypomagnesemia      PLAN:  - need to determine location of ECF (where in gut fistula arises) - study planned for tomorrow  - note that pt received PN on the previous admission, without adverse events  - possible rxn to different lipid infusion - need to review entire last admission PN orders  - after fistula study, depending on findings and then treatment plan, if pt to remain npo or on sips, need central line for TPN.  Discussed with surgical team

## 2020-11-18 NOTE — PROVIDER CONTACT NOTE (OTHER) - ACTION/TREATMENT ORDERED:
provider stated RN can decrease IVF to 75cc/hr. provider stated that RN should reiterate that pt needs IVF and to increase PO intake of fluid  so IVF can be decreased further.

## 2020-11-18 NOTE — PROGRESS NOTE ADULT - ASSESSMENT
Assessment:  45y Female patient admitted HD 5 for drainage from chronic surgical wound likely secondary to ECF, minimal output in ostomy appliance, PPN started however had what seems to be an allergic reaction so PPN stopped and started on  IVF    Plan:  - repeat CTAP 11/18  - monitor output in ostomy appliance  - replete electrolytes as necessary  - f/u 430 AM labs  -Pain control as needed  -Hemodynamic monitoring as per routine  -Encourage ambulation and incentive spirometer use (10x/hr when awake)  -GI and DVT prophylaxis  -Strict input and output monitoring  -Continue current management    Date/Time: 11-18-20 @ 03:48   Assessment:  45y Female patient admitted HD 5 for drainage from chronic surgical wound likely secondary to ECF, minimal output in ostomy appliance, PPN started however had what seems to be an allergic reaction so PPN stopped and started on  IVF    Plan:  - repeat CTAP 11/19  - advance to [facility]  - f/u with Nutrition services regarding allergic reaction to PPN  - monitor output in ostomy appliance  - replete electrolytes as necessary  - f/u 430 AM labs  -Pain control as needed  -Hemodynamic monitoring as per routine  -Encourage ambulation and incentive spirometer use (10x/hr when awake)  -GI and DVT prophylaxis  -Strict input and output monitoring  -Continue current management    Date/Time: 11-18-20 @ 03:48

## 2020-11-18 NOTE — PROGRESS NOTE ADULT - SUBJECTIVE AND OBJECTIVE BOX
45 y o female readmitted with fecal drainage from previous surgical site.  pt reported pruritic rash shortly after starting PPN last night - infusion d/c and benadryl given, with resolution.  pt afebrile, VSS (VS during event unknown), tolerating sips of clears.  Vital Signs Last 24 Hrs  T(C): 35.6 (18 Nov 2020 07:44), Max: 35.7 (18 Nov 2020 00:21)  T(F): 96.1 (18 Nov 2020 07:44), Max: 96.3 (18 Nov 2020 00:21)  HR: 71 (18 Nov 2020 07:44) (71 - 71)  BP: 108/54 (18 Nov 2020 07:44) (105/54 - 108/54)  RR: 18 (18 Nov 2020 07:44) (18 - 20)    MEDICATIONS  (STANDING):  acetaminophen   Tablet .. 650 milliGRAM(s) Oral every 6 hours  dextrose 5% + sodium chloride 0.45%. 1000 milliLiter(s) (100 mL/Hr) IV Continuous <Continuous>  enoxaparin Injectable 40 milliGRAM(s) SubCutaneous daily  fat emulsion (Plant Based) 20% Infusion 1.789 Gm/kG/Day (31.3 mL/Hr) IV Continuous <Continuous>  gabapentin 300 milliGRAM(s) Oral every 8 hours  methocarbamol 500 milliGRAM(s) Oral every 6 hours  metoprolol tartrate 25 milliGRAM(s) Oral every 12 hours  multivitamin/minerals 1 Tablet(s) Oral daily  oxyCODONE    IR 5 milliGRAM(s) Oral every 6 hours  Parenteral Nutrition - Adult 1 Each (85 mL/Hr) TPN Continuous <Continuous>  polyethylene glycol 3350 17 Gram(s) Oral daily  senna 1 Tablet(s) Oral daily                        12.4   4.71  )-----------( 337      ( 18 Nov 2020 08:36 )             40.6   11-18    137  |  101  |  <3<L>  ----------------------------<  89  4.5   |  19  |  0.5<L>    Ca    9.1      18 Nov 2020 08:36  Phos  5.4     11-18  Mg     1.9     11-18  Triglycerides, Serum: 149 mg/dL (09.25.20 @ 23:30)

## 2020-11-18 NOTE — PROVIDER CONTACT NOTE (OTHER) - SITUATION
pt's BP is 103/54 HR 80. pt is ordered to receive 25mg Metoprolol. do you want RN to give ot hold medication. pt's BP is 103/54 HR 80. pt is ordered to receive 25mg Metoprolol. do you want RN to give ot hold medication. pt refused Tylenol despite teaching and education

## 2020-11-18 NOTE — CDI QUERY NOTE - NSCDIOTHERTXTBX2_GEN_ALL_CORE_FT
Clinical Indicators:    11/18 Surgery PN> 45y Female patient admitted HD 5 for drainage from chronic surgical wound likely secondary to ECF.    S/P I and D abscess abdominal wall.    Based on above clinical findings and your professional judgment please clarify ECF ( It's not an acceptable [facility] abbreviation formulary)    ?	Enterocutaneous Fistula  ?	Other please specify  ?	Clinically unable to determine

## 2020-11-18 NOTE — CDI QUERY NOTE - NSCDIOTHERTXTBX_GEN_ALL_CORE_HH
Clinical Indicators:  Hx: chronic surgical wound    11/17 RD Consulted > severe protein calorie malnutrition> start PPN    fat emulsion (Plant Based) 20% Infusion 1.789 Gm/kG/Day (31.3 mL/Hr) IV Continuous, multivitamin/minerals 1 Tablet(s) Oral daily    Based on above clinical findings and your professional judgment if you agree to above assessment please include in your progress Notes    ?	Severe protein malnutrition on PPN  ?	Other please specify  ?	Clinically unable to determine

## 2020-11-18 NOTE — PROGRESS NOTE ADULT - SUBJECTIVE AND OBJECTIVE BOX
Progress Note: Surgery  Patient: GLORIA SCHAFER , 45y (1975)Female   MRN: [id number]  Location: [address] 014 A  Visit: 11-15-20 Inpatient  Date: 11-18-20 @ 03:48    Procedure/Diagnosis: HD 5 admitted for drainage from previous surgical wound  Events over 24h: Overnight patient was started on PPN, however within 2 hours the patient developed 1-2 cm circular raised lesions which were pruritic on multiple surfaces of her body including thighs, arms, back and trunk. PPN was stopped and the patient was given 1dose of IV Benadryl with relief. No SOB/dyspnea, no swelling of lips or gingival mucosa at this time. Minimal output from ostomy appliance around what is presumed to be an ECF. No N/V, abd pain is improving.    Vitals: T(F): 96.3 (11-18-20 @ 00:21), Max: 97.3 (11-17-20 @ 08:04)  HR: 71 (11-18-20 @ 00:21)  BP: 105/54 (11-18-20 @ 00:21) (101/62 - 108/61)  RR: 20 (11-18-20 @ 00:21)  SpO2: --    Diet: Diet, NPO:   Except Medications (11-16-20 @ 17:55)    IV Fluid: dextrose 5% + sodium chloride 0.45%. 1000 milliLiter(s) (100 mL/Hr) IV Continuous <Continuous>  fat emulsion (Plant Based) 20% Infusion 1.789 Gm/kG/Day (31.3 mL/Hr) IV Continuous <Continuous>  multivitamin/minerals 1 Tablet(s) Oral daily  Parenteral Nutrition - Adult 1 Each (85 mL/Hr) TPN Continuous <Continuous>      In:   11-16-20 @ 07:01  -  11-17-20 @ 07:00  --------------------------------------------------------  IN: 850 mL    11-17-20 @ 07:01  -  11-18-20 @ 03:48  --------------------------------------------------------  IN: 1750 mL      Out:   11-16-20 @ 07:01  -  11-17-20 @ 07:00  --------------------------------------------------------  OUT:    Drain (mL): 0 mL    Voided (mL): 500 mL  Total OUT: 500 mL      11-17-20 @ 07:01  -  11-18-20 @ 03:48  --------------------------------------------------------  OUT:    Voided (mL): 300 mL  Total OUT: 300 mL        Net:   11-16-20 @ 07:01  -  11-17-20 @ 07:00  --------------------------------------------------------  NET: 350 mL    11-17-20 @ 07:01  -  11-18-20 @ 03:48  --------------------------------------------------------  NET: 1450 mL      Physical Examination:  General Appearance: NAD, alert and cooperative  Heart: S1 and S2. No murmurs. Rhythm is regular.  Lungs: Clear to auscultation BL without rales, rhonchi, wheezing, crackles or diminished breath sounds.  Abdomen: Soft, nondistended, nontender. No rigidity, guarding, or rebound tenderness.   Incisions/Wounds: ostomy appliance in place with minimal drainage, approximately 5-10cc of green bilious drainage    Medications: [Standing]  acetaminophen   Tablet .. 650 milliGRAM(s) Oral every 6 hours  dextrose 5% + sodium chloride 0.45%. 1000 milliLiter(s) (100 mL/Hr) IV Continuous <Continuous>  enoxaparin Injectable 40 milliGRAM(s) SubCutaneous daily  fat emulsion (Plant Based) 20% Infusion 1.789 Gm/kG/Day (31.3 mL/Hr) IV Continuous <Continuous>  gabapentin 300 milliGRAM(s) Oral every 8 hours  methocarbamol 500 milliGRAM(s) Oral every 6 hours  metoprolol tartrate 25 milliGRAM(s) Oral every 12 hours  multivitamin/minerals 1 Tablet(s) Oral daily  oxyCODONE    IR 5 milliGRAM(s) Oral every 6 hours  Parenteral Nutrition - Adult 1 Each (85 mL/Hr) TPN Continuous <Continuous>  polyethylene glycol 3350 17 Gram(s) Oral daily  senna 1 Tablet(s) Oral daily    Medications:[PRN]  ondansetron Injectable 4 milliGRAM(s) IV Push every 6 hours PRN    Micro/Urine:    Imaging:  None/24h

## 2020-11-18 NOTE — PROGRESS NOTE ADULT - ATTENDING COMMENTS
46yo female with PSHx PAULIE/BSO 8/19/2020 for fibroids admitted for persistent small bowel obstruction s/p laparoscopic NARCISO, laparotomy, extensive NARCISO, small bowel resection with primary anastomosis 9/15 complicated by small bowel leak s/p re-exploration, wash out, small bowel resection and primary anastomosis 9/15. Patient underwent bilateral flank exploration of subcutaneous tissue due to concern for necrotizing infection. She was discharged to NH and then home.     At this time, patient returns for drainage from her wound. Patient states that she notes foul-smelling, stool-like fluid when she eats. +moderate intermittent pain. Denies fever/chills, chest pain or shortness of breath. CT A/P 11/14 demonstrates lower intra-abdominal 3.3 x 2.4 x 2.4 cm rim-enhancing probable abscess, previously 5.8 x 3.3 x 2.8 cm, and healing post surgical changes of anterior abdominal wall, subcutaneous 2.6 cm pocket of air within the abdominal wall of indeterminate clinical significance. Incision and drainage of abdominal wall performed at bedside 11/16. Ostomy appliance was placed, will measure outputs although scant, approximately 5cc per 24h. Started on PPN yesterday but patient developed diffuse urticaria. Will discuss with Dr. Fortune regarding changing PPN/TPN formulary if necessary. Will start FLD today and monitor outputs. Explained to patient that output remains low, then she may be discharged home with FLD. However, if the output increases significantly, then will have to determine alternative nutrition. Encourage ambulation/OOB, incentive spirometer, DVT ppx.

## 2020-11-18 NOTE — PROVIDER CONTACT NOTE (MEDICATION) - SITUATION
pt is refusing Tylenol, senna, Miralax, Lovenox, and multivitamin at this time despite teaching and education provided by RN. pt is also refusing IVF to infuse at 100cc/hr and agreed to 75cc as we previously discussed.

## 2020-11-18 NOTE — PROVIDER CONTACT NOTE (OTHER) - ACTION/TREATMENT ORDERED:
provider stated RN can give medication. provider stated RN can give medication. provider aware pt refused Tylenol.

## 2020-11-19 LAB
ANION GAP SERPL CALC-SCNC: 11 MMOL/L — SIGNIFICANT CHANGE UP (ref 7–14)
BASOPHILS # BLD AUTO: 0.03 K/UL — SIGNIFICANT CHANGE UP (ref 0–0.2)
BASOPHILS NFR BLD AUTO: 0.4 % — SIGNIFICANT CHANGE UP (ref 0–1)
BUN SERPL-MCNC: <3 MG/DL — LOW (ref 10–20)
CALCIUM SERPL-MCNC: 9.2 MG/DL — SIGNIFICANT CHANGE UP (ref 8.5–10.1)
CHLORIDE SERPL-SCNC: 99 MMOL/L — SIGNIFICANT CHANGE UP (ref 98–110)
CO2 SERPL-SCNC: 23 MMOL/L — SIGNIFICANT CHANGE UP (ref 17–32)
CREAT SERPL-MCNC: 0.5 MG/DL — LOW (ref 0.7–1.5)
EOSINOPHIL # BLD AUTO: 0.17 K/UL — SIGNIFICANT CHANGE UP (ref 0–0.7)
EOSINOPHIL NFR BLD AUTO: 2.5 % — SIGNIFICANT CHANGE UP (ref 0–8)
GLUCOSE SERPL-MCNC: 117 MG/DL — HIGH (ref 70–99)
HCT VFR BLD CALC: 40.2 % — SIGNIFICANT CHANGE UP (ref 37–47)
HGB BLD-MCNC: 12.3 G/DL — SIGNIFICANT CHANGE UP (ref 12–16)
IMM GRANULOCYTES NFR BLD AUTO: 0.3 % — SIGNIFICANT CHANGE UP (ref 0.1–0.3)
LYMPHOCYTES # BLD AUTO: 0.6 K/UL — LOW (ref 1.2–3.4)
LYMPHOCYTES # BLD AUTO: 8.9 % — LOW (ref 20.5–51.1)
MAGNESIUM SERPL-MCNC: 1.7 MG/DL — LOW (ref 1.8–2.4)
MCHC RBC-ENTMCNC: 25.4 PG — LOW (ref 27–31)
MCHC RBC-ENTMCNC: 30.6 G/DL — LOW (ref 32–37)
MCV RBC AUTO: 82.9 FL — SIGNIFICANT CHANGE UP (ref 81–99)
MONOCYTES # BLD AUTO: 0.46 K/UL — SIGNIFICANT CHANGE UP (ref 0.1–0.6)
MONOCYTES NFR BLD AUTO: 6.8 % — SIGNIFICANT CHANGE UP (ref 1.7–9.3)
NEUTROPHILS # BLD AUTO: 5.48 K/UL — SIGNIFICANT CHANGE UP (ref 1.4–6.5)
NEUTROPHILS NFR BLD AUTO: 81.1 % — HIGH (ref 42.2–75.2)
NRBC # BLD: 0 /100 WBCS — SIGNIFICANT CHANGE UP (ref 0–0)
PHOSPHATE SERPL-MCNC: 4.8 MG/DL — SIGNIFICANT CHANGE UP (ref 2.1–4.9)
PLATELET # BLD AUTO: 360 K/UL — SIGNIFICANT CHANGE UP (ref 130–400)
POTASSIUM SERPL-MCNC: 4.4 MMOL/L — SIGNIFICANT CHANGE UP (ref 3.5–5)
POTASSIUM SERPL-SCNC: 4.4 MMOL/L — SIGNIFICANT CHANGE UP (ref 3.5–5)
RBC # BLD: 4.85 M/UL — SIGNIFICANT CHANGE UP (ref 4.2–5.4)
RBC # FLD: 16 % — HIGH (ref 11.5–14.5)
SODIUM SERPL-SCNC: 133 MMOL/L — LOW (ref 135–146)
WBC # BLD: 6.76 K/UL — SIGNIFICANT CHANGE UP (ref 4.8–10.8)
WBC # FLD AUTO: 6.76 K/UL — SIGNIFICANT CHANGE UP (ref 4.8–10.8)

## 2020-11-19 PROCEDURE — 99024 POSTOP FOLLOW-UP VISIT: CPT

## 2020-11-19 PROCEDURE — 36573 INSJ PICC RS&I 5 YR+: CPT

## 2020-11-19 PROCEDURE — 74177 CT ABD & PELVIS W/CONTRAST: CPT | Mod: 26

## 2020-11-19 RX ORDER — OXYCODONE HYDROCHLORIDE 5 MG/1
5 TABLET ORAL EVERY 6 HOURS
Refills: 0 | Status: DISCONTINUED | OUTPATIENT
Start: 2020-11-19 | End: 2020-11-20

## 2020-11-19 RX ORDER — ELECTROLYTE SOLUTION,INJ
1 VIAL (ML) INTRAVENOUS
Refills: 0 | Status: DISCONTINUED | OUTPATIENT
Start: 2020-11-19 | End: 2020-11-19

## 2020-11-19 RX ADMIN — GABAPENTIN 300 MILLIGRAM(S): 400 CAPSULE ORAL at 06:45

## 2020-11-19 RX ADMIN — METHOCARBAMOL 500 MILLIGRAM(S): 500 TABLET, FILM COATED ORAL at 17:25

## 2020-11-19 RX ADMIN — GABAPENTIN 300 MILLIGRAM(S): 400 CAPSULE ORAL at 21:23

## 2020-11-19 RX ADMIN — METHOCARBAMOL 500 MILLIGRAM(S): 500 TABLET, FILM COATED ORAL at 23:17

## 2020-11-19 RX ADMIN — OXYCODONE HYDROCHLORIDE 5 MILLIGRAM(S): 5 TABLET ORAL at 06:44

## 2020-11-19 RX ADMIN — GABAPENTIN 300 MILLIGRAM(S): 400 CAPSULE ORAL at 14:18

## 2020-11-19 RX ADMIN — Medication 1 EACH: at 22:19

## 2020-11-19 RX ADMIN — OXYCODONE HYDROCHLORIDE 5 MILLIGRAM(S): 5 TABLET ORAL at 17:27

## 2020-11-19 RX ADMIN — OXYCODONE HYDROCHLORIDE 5 MILLIGRAM(S): 5 TABLET ORAL at 14:22

## 2020-11-19 RX ADMIN — METHOCARBAMOL 500 MILLIGRAM(S): 500 TABLET, FILM COATED ORAL at 14:17

## 2020-11-19 RX ADMIN — METHOCARBAMOL 500 MILLIGRAM(S): 500 TABLET, FILM COATED ORAL at 06:44

## 2020-11-19 RX ADMIN — Medication 25 MILLIGRAM(S): at 06:44

## 2020-11-19 RX ADMIN — Medication 650 MILLIGRAM(S): at 23:17

## 2020-11-19 NOTE — PROCEDURE NOTE - NSPOSTPRCRAD_GEN_A_CORE
fluoroscopy/ultrasound/line in appropriate postion/depth of insertion/line adjusted to depth of insertion/chest radiograph

## 2020-11-19 NOTE — PROGRESS NOTE ADULT - ASSESSMENT
Assessment:  45y Female patient admitted HD 6 with long surgical history now presenting with enterocutaneous fistula (ECF)    Plan:  - CTAP with Contrast through fistula to better understand location of fistula  - c/w CLD  - monitor ECF output  - IVF  -Pain control as needed  -Hemodynamic monitoring as per routine  -Encourage ambulation and incentive spirometer use (10x/hr when awake)  -GI and DVT prophylaxis  -Check and replete CBC and BMP q daily  -Strict input and output monitoring  -Continue current management    Date/Time: 11-19-20 @ 06:52

## 2020-11-19 NOTE — PROCEDURE NOTE - NSPOSTCAREGUIDE_GEN_A_CORE
Verbal/written post procedure instructions were given to patient/caregiver/Instructed patient/caregiver regarding signs and symptoms of infection/Keep the cast/splint/dressing clean and dry/Care for catheter as per unit/ICU protocols/Instructed patient/caregiver to follow-up with primary care physician
Verbal/written post procedure instructions were given to patient/caregiver

## 2020-11-19 NOTE — PROGRESS NOTE ADULT - ATTENDING COMMENTS
44yo female with PSHx PAULIE/BSO 8/19/2020 for fibroids admitted for persistent small bowel obstruction s/p laparoscopic NARCISO, laparotomy, extensive NARCISO, small bowel resection with primary anastomosis 9/15 complicated by small bowel leak s/p re-exploration, wash out, small bowel resection and primary anastomosis 9/15. Patient underwent bilateral flank exploration of subcutaneous tissue due to concern for necrotizing infection. She was discharged to NH and then home.     At this time, patient returns for drainage from her wound. Patient states that she notes foul-smelling, stool-like fluid when she eats. +moderate intermittent pain. Denies fever/chills, chest pain or shortness of breath. CT A/P 11/14 demonstrates lower intra-abdominal 3.3 x 2.4 x 2.4 cm rim-enhancing probable abscess, previously 5.8 x 3.3 x 2.8 cm, and healing post surgical changes of anterior abdominal wall, subcutaneous 2.6 cm pocket of air within the abdominal wall of indeterminate clinical significance. Incision and drainage of abdominal wall performed at bedside 11/16. Ostomy appliance was placed, will measure outputs although scant, approximately 5cc per 24h. Started on PPN 11/17 but patient developed diffuse urticaria. Will discuss with Dr. Fortune regarding changing PPN/TPN formulary if necessary. Monitor quantity and quality of output, determine discharge diet versus IV nutrition. Consider repeat CT A/P with contrast tonight for further evaluation of abdominal wall. Encourage ambulation/OOB, incentive spirometer, DVT ppx.

## 2020-11-19 NOTE — CHART NOTE - NSCHARTNOTEFT_GEN_A_CORE
s/p CT abd to evaluate fistula and PICC placement today  Afebrile  c/o increased output from wound when drinking full liquids  Currently without parenteral infusion 2nd to PPN being d/c'd shortly after initiation 11/17 when pt with urticaria, relieved with benadryl. Last admission (9/2020) pt was on parenteral nutrition without issue    T(F): 97.2 (11-19-20 @ 16:40), Max: 97.2 (11-19-20 @ 16:40)  HR: 87 (11-19-20 @ 16:40) (71 - 87)  BP: 96/60 (11-19-20 @ 16:40) (96/60 - 108/55)  RR: 19 (11-19-20 @ 16:40) (18 - 19)  SpO2: 100% (11-18-20 @ 23:51) (100% - 100%)    I&O's Detail    18 Nov 2020 07:01  -  19 Nov 2020 07:00  --------------------------------------------------------  IN:    dextrose 5% + sodium chloride 0.45%: 900 mL    Oral Fluid: 850 mL  Total IN: 1750 mL    OUT:    Drain (mL): 110 mL    Voided (mL): 1520 mL  Total OUT: 1630 mL    Total NET: 120 mL    19 Nov 2020 07:01  -  19 Nov 2020 17:10  --------------------------------------------------------  IN:    Oral Fluid: 150 mL  Total IN: 150 mL    OUT:    Voided (mL): 600 mL  Total OUT: 600 mL    Total NET: -450 mL    11-18    137  |  101  |  <3<L>  ----------------------------<  89  4.5   |  19  |  0.5<L>    Ca    9.1      18 Nov 2020 08:36  Phos  5.4     11-18  Mg     1.9     11-18                        12.4   4.71  )-----------( 337      ( 18 Nov 2020 08:36 )             40.6     Diet, NPO:   Except Medications (11-19-20 @ 16:41)    ASSESSMENT:  45y Female patient admitted for abdominal wound drainage, S/P I&D with a fecaloid secretion for distal aspect of surgical wound, likely ECF.  [facility]    - severe protein calorie malnutrition, + protein deficit (BUN <3)  - hypomagnesemia      PLAN:  - restart TPN tonight via PICC. Lipids and MVI held to r/o allergy   - note that pt received PN on the previous admission, without adverse events, possible rxn to different lipid infusion   - f/u results of fistula study  - depending on location of fistula would give pt elemental diet, PO Peptamen 1.5 240ml X 4/day. If she is not willing to take it PO consider NGT placement for feeding however she will refuse PO as well as NG

## 2020-11-19 NOTE — PROGRESS NOTE ADULT - SUBJECTIVE AND OBJECTIVE BOX
Progress Note: Surgery  Patient: GLORIA SCHAFER , 45y (1975)Female   MRN: [id number]  Location: [address] 014   Visit: 11-15-20 Inpatient  Date: 11-19-20 @ 06:52    Procedure/Diagnosis: HD 6 admitted with enterocutaneous fistula  Events over 24h: Patient advanced to FLD, with increase in fistula output to 110cc / 24 hours. Diet was advanced afternoon 11/18.m Otherwise no acute event overnight. No new complaint. Pt is hemodynamically stable. Denies nausea, vomiting, and/or abdominal pain. Ambulating adequately, Voiding adequately.  +Gas, +Bowel movement. Patient refused PM labs, rescheduled for 11AM labs.    Vitals: T(F): 97 (11-18-20 @ 23:51), Max: 97 (11-18-20 @ 23:51)  HR: 76 (11-18-20 @ 23:51)  BP: 108/55 (11-18-20 @ 23:51) (103/54 - 108/55)  RR: 18 (11-18-20 @ 23:51)  SpO2: 100% (11-18-20 @ 23:51)      Diet: Diet, Full Liquid (11-18-20 @ 07:56)    IV Fluid: dextrose 5% + sodium chloride 0.45%. 1000 milliLiter(s) (100 mL/Hr) IV Continuous <Continuous>  multivitamin/minerals 1 Tablet(s) Oral daily      In:   11-17-20 @ 07:01  -  11-18-20 @ 07:00  --------------------------------------------------------  IN: 1850 mL    11-18-20 @ 07:01  -  11-19-20 @ 06:52  --------------------------------------------------------  IN: 1750 mL      Out:   11-17-20 @ 07:01  -  11-18-20 @ 07:00  --------------------------------------------------------  OUT:    Voided (mL): 300 mL  Total OUT: 300 mL      11-18-20 @ 07:01  -  11-19-20 @ 06:52  --------------------------------------------------------  OUT:    Drain (mL): 110 mL    Voided (mL): 1520 mL  Total OUT: 1630 mL        Net:   11-17-20 @ 07:01  -  11-18-20 @ 07:00  --------------------------------------------------------  NET: 1550 mL    11-18-20 @ 07:01  -  11-19-20 @ 06:52  --------------------------------------------------------  NET: 120 mL        Physical Examination:  General Appearance: NAD, alert and cooperative  Heart: S1 and S2. No murmurs. Rhythm is regular.  Lungs: Clear to auscultation BL without rales, rhonchi, wheezing, crackles or diminished breath sounds.  Abdomen: Soft, nondistended, nontender. No rigidity, guarding, or rebound tenderness.      Medications: [Standing]  acetaminophen   Tablet .. 650 milliGRAM(s) Oral every 6 hours  dextrose 5% + sodium chloride 0.45%. 1000 milliLiter(s) (100 mL/Hr) IV Continuous <Continuous>  enoxaparin Injectable 40 milliGRAM(s) SubCutaneous daily  gabapentin 300 milliGRAM(s) Oral every 8 hours  methocarbamol 500 milliGRAM(s) Oral every 6 hours  metoprolol tartrate 25 milliGRAM(s) Oral every 12 hours  multivitamin/minerals 1 Tablet(s) Oral daily  oxyCODONE    IR 5 milliGRAM(s) Oral every 6 hours  polyethylene glycol 3350 17 Gram(s) Oral daily  senna 1 Tablet(s) Oral daily    Medications:[PRN]  ondansetron Injectable 4 milliGRAM(s) IV Push every 6 hours PRN    Labs:                        12.4   4.71  )-----------( 337      ( 18 Nov 2020 08:36 )             40.6   11-18    137  |  101  |  <3<L>  ----------------------------<  89  4.5   |  19  |  0.5<L>    Ca    9.1      18 Nov 2020 08:36  Phos  5.4     11-18  Mg     1.9     11-18

## 2020-11-19 NOTE — CHART NOTE - NSCHARTNOTEFT_GEN_A_CORE
CDI Clarification 1:  Patient is not diagnosed with protein malnutrition. She was treated with PPN for bowel rest in the setting of enterocutaneous fistula.       CDI Clarification 2:  Patient has enterocutaneous fistula as primary diagnosis.

## 2020-11-19 NOTE — PROCEDURE NOTE - NSPROCDETAILS_GEN_ALL_CORE
location identified, draped/prepped, sterile technique used
sterile dressing applied/location identified, draped/prepped, sterile technique used/sterile technique, catheter placed/supine position/ultrasound guidance
incision left open, packing placed

## 2020-11-19 NOTE — PROCEDURE NOTE - NSINFORMCONSENT_GEN_A_CORE
Benefits, risks, and possible complications of procedure explained to patient/caregiver who verbalized understanding and gave verbal consent.
Benefits, risks, and possible complications of procedure explained to patient/caregiver who verbalized understanding and gave written consent./patient
Benefits, risks, and possible complications of procedure explained to patient/caregiver who verbalized understanding and gave verbal consent.

## 2020-11-20 LAB
CULTURE RESULTS: SIGNIFICANT CHANGE UP
CULTURE RESULTS: SIGNIFICANT CHANGE UP
SPECIMEN SOURCE: SIGNIFICANT CHANGE UP
SPECIMEN SOURCE: SIGNIFICANT CHANGE UP

## 2020-11-20 PROCEDURE — 99024 POSTOP FOLLOW-UP VISIT: CPT

## 2020-11-20 RX ORDER — ELECTROLYTE SOLUTION,INJ
1 VIAL (ML) INTRAVENOUS
Refills: 0 | Status: DISCONTINUED | OUTPATIENT
Start: 2020-11-22 | End: 2020-11-22

## 2020-11-20 RX ORDER — METOPROLOL TARTRATE 50 MG
12.5 TABLET ORAL EVERY 12 HOURS
Refills: 0 | Status: DISCONTINUED | OUTPATIENT
Start: 2020-11-20 | End: 2020-11-22

## 2020-11-20 RX ORDER — ELECTROLYTE SOLUTION,INJ
1 VIAL (ML) INTRAVENOUS
Refills: 0 | Status: DISCONTINUED | OUTPATIENT
Start: 2020-11-21 | End: 2020-11-21

## 2020-11-20 RX ORDER — ELECTROLYTE SOLUTION,INJ
1 VIAL (ML) INTRAVENOUS
Refills: 0 | Status: DISCONTINUED | OUTPATIENT
Start: 2020-11-20 | End: 2020-11-20

## 2020-11-20 RX ADMIN — Medication 1 EACH: at 21:11

## 2020-11-20 RX ADMIN — Medication 25 MILLIGRAM(S): at 05:08

## 2020-11-20 RX ADMIN — OXYCODONE HYDROCHLORIDE 5 MILLIGRAM(S): 5 TABLET ORAL at 14:13

## 2020-11-20 RX ADMIN — OXYCODONE HYDROCHLORIDE 5 MILLIGRAM(S): 5 TABLET ORAL at 13:13

## 2020-11-20 RX ADMIN — Medication 650 MILLIGRAM(S): at 05:08

## 2020-11-20 RX ADMIN — METHOCARBAMOL 500 MILLIGRAM(S): 500 TABLET, FILM COATED ORAL at 05:08

## 2020-11-20 RX ADMIN — METHOCARBAMOL 500 MILLIGRAM(S): 500 TABLET, FILM COATED ORAL at 17:22

## 2020-11-20 RX ADMIN — ONDANSETRON 4 MILLIGRAM(S): 8 TABLET, FILM COATED ORAL at 13:13

## 2020-11-20 RX ADMIN — METHOCARBAMOL 500 MILLIGRAM(S): 500 TABLET, FILM COATED ORAL at 12:15

## 2020-11-20 RX ADMIN — Medication 650 MILLIGRAM(S): at 15:05

## 2020-11-20 RX ADMIN — Medication 650 MILLIGRAM(S): at 17:18

## 2020-11-20 RX ADMIN — GABAPENTIN 300 MILLIGRAM(S): 400 CAPSULE ORAL at 21:11

## 2020-11-20 RX ADMIN — GABAPENTIN 300 MILLIGRAM(S): 400 CAPSULE ORAL at 05:08

## 2020-11-20 NOTE — PROGRESS NOTE ADULT - SUBJECTIVE AND OBJECTIVE BOX
GENERAL SURGERY PROGRESS NOTE     GLORIA SCHAFER  45y  Female  Hospital day :5d  POD:  Procedure: Midline catheter insertion      EVENTS IN THE LAST 24 HRS: No acute events, PICC line was placed yesterday, now on NPO with TPN. CT scan with contrast thorugh the possible fistula tract did not show any communication of contrast with bowel loops.     T(F): 97.8 (11-20-20 @ 04:16), Max: 100.7 (11-20-20 @ 00:35)  HR: 96 (11-20-20 @ 04:16) (87 - 111)  BP: 98/54 (11-20-20 @ 04:16) (96/60 - 102/63)  RR: 18 (11-20-20 @ 04:16) (18 - 19)  SpO2: 97% (11-20-20 @ 04:16) (97% - 97%)    PHYSICAL EXAM:  GENERAL: NAD, well-appearing  CHEST/LUNG: Clear to auscultation bilaterally  HEART: Regular rate and rhythm  ABDOMEN: Soft, Nontender, Nondistended; ECF with an ostomy bag, no output  EXTREMITIES:  No clubbing, cyanosis, or edema      DIET/FLUIDS: multivitamin/minerals 1 Tablet(s) Oral daily  Parenteral Nutrition - Adult 1 Each TPN Continuous <Continuous>    NG:                                                                                DRAINS:   11-19-20 @ 07:01  -  11-20-20 @ 07:00  --------------------------------------------------------  OUT: 0 mL      BM:     EMESIS:     URINE:      GI proph:    AC/ proph: enoxaparin Injectable 40 milliGRAM(s) SubCutaneous daily    ABx:         LABS  Labs:  CAPILLARY BLOOD GLUCOSE                              12.3   6.76  )-----------( 360      ( 19 Nov 2020 21:44 )             40.2       Auto Neutrophil %: 81.1 % (11-19-20 @ 21:44)  Auto Immature Granulocyte %: 0.3 % (11-19-20 @ 21:44)    11-19    133<L>  |  99  |  <3<L>  ----------------------------<  117<H>  4.4   |  23  |  0.5<L>      Calcium, Total Serum: 9.2 mg/dL (11-19-20 @ 21:44)          RADIOLOGY & ADDITIONAL TESTS:  < from: CT Abdomen and Pelvis w/ IV Cont (11.19.20 @ 12:54) >  IMPRESSION:    Unchanged subcutaneous left paracentral abdominal wall 2.6 cm pocket of air containing small amount of injected percutaneous contrast material, with no intra-abdominal communication of the contrast with any bowel loops, therefore no evidence of enterocutaneous fistula.    Interval decrease in size of the lower abdomen/pelvis 2.5 cmphlegmon since November 14, 2020, with no drainable fluid collection.    < end of copied text >      A/P

## 2020-11-20 NOTE — PROGRESS NOTE ADULT - ASSESSMENT
ASSESSMENT/PLAN  45y Female patient admitted for abdominal wound drainage, S/P I&D with a fecaloid secretion for distal aspect of surgical wound, likely ECF.  [facility]  - severe protein calorie malnutrition, + protein deficit (BUN <3)  - hypomagnesemia  PLAN:  - TPN order for tonight /sat/sunday  -check bmp/phos/mg and correct lytes  monitor and record ECF ostomy output ASSESSMENT/PLAN  45y Female patient admitted for abdominal wound drainage, S/P I&D with a fecaloid secretion for distal aspect of surgical wound, likely ECF.  [facility]  - severe protein calorie malnutrition, + protein deficit (BUN <3)  - hypomagnesemia  PLAN:  - TPN ordered for tonight   - no IVFE tonight (noted that pt received smof last admission)  - no MVI in TPN tonight  -check bmp/phos/mg and correct lytes  monitor and record ECF output

## 2020-11-20 NOTE — PROGRESS NOTE ADULT - SUBJECTIVE AND OBJECTIVE BOX
Patient is a 45y old  Female who presents with a chief complaint of abdominal wound drainage (20 Nov 2020 08:55)  pt seen and evaluated   awake, alert, talkavie  on TPN  frlt better last noght no itching  ICU Vital Signs Last 24 Hrs  T(C): 36.7 (20 Nov 2020 09:04), Max: 38.2 (20 Nov 2020 00:35)  T(F): 98 (20 Nov 2020 09:04), Max: 100.7 (20 Nov 2020 00:35)  HR: 80 (20 Nov 2020 09:04) (80 - 111)  BP: 96/61 (20 Nov 2020 09:04) (96/60 - 102/63)  BP(mean): 71 (20 Nov 2020 04:16) (71 - 71)  RR: 18 (20 Nov 2020 09:04) (18 - 19)  SpO2: 97% (20 Nov 2020 04:16) (97% - 97%)      Drug Dosing Weight  Height (cm): 160 (19 Nov 2020 18:58)  Weight (kg): 54.6 (19 Nov 2020 18:58)  BMI (kg/m2): 21.3 (19 Nov 2020 18:58)  BSA (m2): 1.56 (19 Nov 2020 18:58)    I&O's Detail    19 Nov 2020 07:01  -  20 Nov 2020 07:00  --------------------------------------------------------  IN:    Oral Fluid: 150 mL    TPN (Total Parenteral Nutrition): 765 mL  Total IN: 915 mL    OUT:    Drain (mL): 0 mL    Voided (mL): 900 mL  Total OUT: 900 mL    Total NET: 15 mL       PHYSICAL EXAM:  Constitutional:  alert, awake  , talkative  Gastrointestinal:  soft +ECF ostomy  in place functioning  IV access: left picc line DL site c/d/i      MEDICATIONS  (STANDING):  acetaminophen   Tablet .. 650 milliGRAM(s) Oral every 6 hours  enoxaparin Injectable 40 milliGRAM(s) SubCutaneous daily  gabapentin 300 milliGRAM(s) Oral every 8 hours  methocarbamol 500 milliGRAM(s) Oral every 6 hours  metoprolol tartrate 12.5 milliGRAM(s) Oral every 12 hours  multivitamin/minerals 1 Tablet(s) Oral daily  Parenteral Nutrition - Adult 1 Each (85 mL/Hr) TPN Continuous <Continuous>  Parenteral Nutrition - Adult 1 Each (85 mL/Hr) TPN Continuous <Continuous>  polyethylene glycol 3350 17 Gram(s) Oral daily  senna 1 Tablet(s) Oral daily      Diet, NPO:   Except Medications (11-19-20 @ 16:41)      LABS  11-19    133<L>  |  99  |  <3<L>  ----------------------------<  117<H>  4.4   |  23  |  0.5<L>    Ca    9.2      19 Nov 2020 21:44  Phos  4.8     11-19  Mg     1.7     11-19                            12.3   6.76  )-----------( 360      ( 19 Nov 2020 21:44 )             40.2      RADIOLOGY STUDIES  < from: CT Abdomen and Pelvis w/ IV Cont (11.19.20 @ 12:54) >  IMPRESSION:  Unchanged subcutaneous left paracentral abdominal wall 2.6 cm pocket of air containing small amount of injected percutaneous contrast material, with no intra-abdominal communication of the contrast with any bowel loops, therefore no evidence of enterocutaneous fistula.           Patient is a 45y old  Female who presents with a chief complaint of abdominal wound drainage (20 Nov 2020 08:55)  pt seen and evaluated   awake, alert, talkatvie  on TPN  felt better last noght no itching  ICU Vital Signs Last 24 Hrs  T(C): 36.7 (20 Nov 2020 09:04), Max: 38.2 (20 Nov 2020 00:35)  T(F): 98 (20 Nov 2020 09:04), Max: 100.7 (20 Nov 2020 00:35)  HR: 80 (20 Nov 2020 09:04) (80 - 111)  BP: 96/61 (20 Nov 2020 09:04) (96/60 - 102/63)  BP(mean): 71 (20 Nov 2020 04:16) (71 - 71)  RR: 18 (20 Nov 2020 09:04) (18 - 19)  SpO2: 97% (20 Nov 2020 04:16) (97% - 97%)    Drug Dosing Weight  Height (cm): 160 (19 Nov 2020 18:58)  Weight (kg): 54.6 (19 Nov 2020 18:58)  BMI (kg/m2): 21.3 (19 Nov 2020 18:58)  BSA (m2): 1.56 (19 Nov 2020 18:58)    I&O's Detail    19 Nov 2020 07:01  -  20 Nov 2020 07:00  --------------------------------------------------------  IN:    Oral Fluid: 150 mL    TPN (Total Parenteral Nutrition): 765 mL - incomplete documentation  Total IN: 915 mL    OUT:    Drain (mL): 0 mL    Voided (mL): 900 mL  Total OUT: 900 mL    Total NET: 15 mL       PHYSICAL EXAM:  Constitutional:  alert, awake  , talkative  Gastrointestinal:  soft +ECF ostomy  in place, + fecal looking output  IV access: left picc line DL site c/d/i      MEDICATIONS  (STANDING):  acetaminophen   Tablet .. 650 milliGRAM(s) Oral every 6 hours  enoxaparin Injectable 40 milliGRAM(s) SubCutaneous daily  gabapentin 300 milliGRAM(s) Oral every 8 hours  methocarbamol 500 milliGRAM(s) Oral every 6 hours  metoprolol tartrate 12.5 milliGRAM(s) Oral every 12 hours  multivitamin/minerals 1 Tablet(s) Oral daily  Parenteral Nutrition - Adult 1 Each (85 mL/Hr) TPN Continuous <Continuous>  Parenteral Nutrition - Adult 1 Each (85 mL/Hr) TPN Continuous <Continuous>  polyethylene glycol 3350 17 Gram(s) Oral daily  senna 1 Tablet(s) Oral daily      Diet, NPO:   Except Medications (11-19-20 @ 16:41)      LABS  11-19    133<L>  |  99  |  <3<L>  ----------------------------<  117<H>  4.4   |  23  |  0.5<L>    Ca    9.2      19 Nov 2020 21:44  Phos  4.8     11-19  Mg     1.7     11-19                            12.3   6.76  )-----------( 360      ( 19 Nov 2020 21:44 )             40.2      RADIOLOGY STUDIES  < from: CT Abdomen and Pelvis w/ IV Cont (11.19.20 @ 12:54) >  IMPRESSION:  Unchanged subcutaneous left paracentral abdominal wall 2.6 cm pocket of air containing small amount of injected percutaneous contrast material, with no intra-abdominal communication of the contrast with any bowel loops, therefore no evidence of enterocutaneous fistula.

## 2020-11-20 NOTE — PROVIDER CONTACT NOTE (OTHER) - ACTION/TREATMENT ORDERED:
[facility] PHYSICIAN PRACTICES  [facility] PRIMARY CARE  y [address]: [phone]         2020     Terrence Olivarez (:  1966) is a 47 y.o. female, here for evaluation of the following medical concerns:    Chief Complaint   Patient presents with    Hypertension    6 Month Follow-Up    Chest Pain     Patient complains of chest pain and tingling in left arm. Heart disease runs in the family and a former smoker. HPI  Chest pain  X 2 days  Heart felt cold when trying to go to bed  Had tingling down left arm and down leg to toes  + dizziness  Some SOB  Increased heart rate; not palpitations  Sitting up helped  No pain with pushing on that area of the chest- moved her daughter last weekend  Did have a cortisone injection last Friday  Is on OCPs  FH- Father- CHF, HTN, CAD- stents placed    HTN  Up a little today  Was checking at [place]  Has not been watching salt as closely  Taking Lisinopril    Asthma  Can feel wheezy  Not as bad  Has to use inhaler prior to exercise  Has been using inhaler qday or every other day    GERD  Takes pepcid  Symptoms well managed    Review of Systems   Constitutional: Negative for fatigue and fever. Respiratory: Positive for chest tightness and shortness of breath. Negative for cough. Cardiovascular: Positive for chest pain. Negative for palpitations and leg swelling. Increased heart rate   Gastrointestinal:        GERD   Neurological: Positive for dizziness, numbness and headaches. Negative for weakness. Prior to Visit Medications    Medication Sig Taking?  Authorizing Provider   famotidine (PEPCID) 20 MG tablet TAKE ONE TABLET BY MOUTH TWICE A DAY AS NEEDED FOR GERD Yes ANAI Santiago CNP   lisinopril-hydroCHLOROthiazide (PRINZIDE;ZESTORETIC) 20-12.5 MG per tablet Take 1 tablet by mouth daily Yes ANAI Santiago CNP   albuterol sulfate HFA (PROAIR HFA) 108 (90 Base) MCG/ACT inhaler Inhale 2 puffs into the lungs every 4 hours as needed for Wheezing Yes ANAI Kidd CNP   levonorgestrel-ethinyl estradiol Álvaro Kid) 0.15-30 MG-MCG per tablet TAKE ONE TABLET BY MOUTH DAILY Yes ANAI Kidd CNP        Social History     Tobacco Use    Smoking status: Former Smoker     Last attempt to quit: 2012     Years since quittin.8    Smokeless tobacco: Former User   Substance Use Topics    Alcohol use: Yes     Alcohol/week: 2.5 standard drinks     Types: 3 Standard drinks or equivalent per week        Vitals:    20 1323 20 1409   BP: (!) 142/94 (!) 146/92   Site: Left Upper Arm    Position: Sitting    Cuff Size: Medium Adult    Pulse: 113    Temp: 98.1 °F (36.7 °C)    TempSrc: Infrared    SpO2: 97%    Weight: 127 lb 12.8 oz (58 kg)    Height: 5' 0.75\" (1.543 m)      Estimated body mass index is 24.35 kg/m² as calculated from the following:    Height as of this encounter: 5' 0.75\" (1.543 m). Weight as of this encounter: 127 lb 12.8 oz (58 kg). Physical Exam  Vitals signs reviewed. Constitutional:       Appearance: Normal appearance. HENT:      Head: Normocephalic. Cardiovascular:      Rate and Rhythm: Normal rate and regular rhythm. Pulses: Normal pulses. Heart sounds: Normal heart sounds, S1 normal and S2 normal.   Pulmonary:      Effort: Pulmonary effort is normal.      Breath sounds: Normal breath sounds and air entry. Chest:      Chest wall: Tenderness present. No mass or edema. Musculoskeletal:      Right lower leg: No edema. Left lower leg: No edema. Neurological:      Mental Status: She is alert. Psychiatric:         Mood and Affect: Mood normal.         ASSESSMENT/PLAN:  1. Chest pain, unspecified type  Stable;  EKG: SR.  D-DIMER and holter monitor ordered. ED precautions discussed. - EKG 12 Lead  - D-DIMER, QUANTITATIVE; Future  - Holter Monitor 48 Hour; Future  - CTA CHEST W CONTRAST; Future    2.  Mild intermittent asthma without complication  Stable;  Discussed patient is using albuterol too often. Patient will try to use less often. Will get PFTs and add controller based on symptoms and dx. - Full PFT Study With Bronchodilator; Future    3. History of smoking  Stable;  See 2  - Full PFT Study With Bronchodilator; Future    4. Tachycardia  Stable;  None in the office. Holter monitor ordered. - Holter Monitor 48 Hour; Future    5. Essential hypertension  Stable;  Elevated today. Encouraged patient to monitor salt intake. Add hctz to lisinopril.  - lisinopril-hydroCHLOROthiazide (PRINZIDE;ZESTORETIC) 20-12.5 MG per tablet; Take 1 tablet by mouth daily  Dispense: 90 tablet; Refill: 1    6. Gastroesophageal reflux disease without esophagitis  Stable;  Continue current regimen. Follow Up:     Return in about 4 weeks (around 12/18/2020), or if symptoms worsen or fail to improve. MD speaks with pt/ pt still refusing seq stockings

## 2020-11-20 NOTE — PROGRESS NOTE ADULT - ATTENDING COMMENTS
44yo female with PSHx PAULIE/BSO 8/19/2020 for fibroids admitted for persistent small bowel obstruction s/p laparoscopic NARCISO, laparotomy, extensive NARCISO, small bowel resection with primary anastomosis 9/15 complicated by small bowel leak s/p re-exploration, wash out, small bowel resection and primary anastomosis 9/15. Patient underwent bilateral flank exploration of subcutaneous tissue due to concern for necrotizing infection. She was discharged to NH and then home.     At this time, patient returns for drainage from her wound. CT A/P 11/14 demonstrates lower intra-abdominal 3.3 x 2.4 x 2.4 cm rim-enhancing probable abscess, previously 5.8 x 3.3 x 2.8 cm, and healing post surgical changes of anterior abdominal wall, subcutaneous 2.6 cm pocket of air within the abdominal wall of indeterminate clinical significance. Incision and drainage of abdominal wall performed at bedside 11/16. Ostomy appliance was placed. Started on PPN 11/17 but patient developed diffuse urticaria. CT A/P 11/18 demonstrated resolving intra-abdominal fluid collection, no obvious communication between abdominal wall and small bowel. Started new formulary via Dr. Fortune 11/19. NPO, IV fluids. Trial of FLD - output was 150cc. Will monitor quantity and quality of output, determine discharge diet versus IV nutrition. Encourage ambulation/OOB, incentive spirometer, DVT ppx.

## 2020-11-20 NOTE — PROGRESS NOTE ADULT - ASSESSMENT
45 years old female patient  HD 7, admitted due to a new onset of enterocutaneous fistula (ECF)    Plan:  - NPO  - TPN  - F/U Nutrition recommendations   - monitor ECF output  - IVF  -Pain control as needed  -Hemodynamic monitoring as per routine  -Encourage ambulation and incentive spirometer use (10x/hr when awake)  -GI and DVT prophylaxis  -Check and replete Electrolytes  -Strict input and output monitoring  -Continue current management

## 2020-11-21 LAB
ANION GAP SERPL CALC-SCNC: 7 MMOL/L — SIGNIFICANT CHANGE UP (ref 7–14)
BASOPHILS # BLD AUTO: 0.02 K/UL — SIGNIFICANT CHANGE UP (ref 0–0.2)
BASOPHILS NFR BLD AUTO: 0.5 % — SIGNIFICANT CHANGE UP (ref 0–1)
BUN SERPL-MCNC: 9 MG/DL — LOW (ref 10–20)
CALCIUM SERPL-MCNC: 8.6 MG/DL — SIGNIFICANT CHANGE UP (ref 8.5–10.1)
CHLORIDE SERPL-SCNC: 105 MMOL/L — SIGNIFICANT CHANGE UP (ref 98–110)
CO2 SERPL-SCNC: 23 MMOL/L — SIGNIFICANT CHANGE UP (ref 17–32)
CREAT SERPL-MCNC: <0.5 MG/DL — LOW (ref 0.7–1.5)
EOSINOPHIL # BLD AUTO: 0.05 K/UL — SIGNIFICANT CHANGE UP (ref 0–0.7)
EOSINOPHIL NFR BLD AUTO: 1.2 % — SIGNIFICANT CHANGE UP (ref 0–8)
GLUCOSE SERPL-MCNC: 122 MG/DL — HIGH (ref 70–99)
HCT VFR BLD CALC: 39.4 % — SIGNIFICANT CHANGE UP (ref 37–47)
HGB BLD-MCNC: 11.8 G/DL — LOW (ref 12–16)
IMM GRANULOCYTES NFR BLD AUTO: 0.2 % — SIGNIFICANT CHANGE UP (ref 0.1–0.3)
LYMPHOCYTES # BLD AUTO: 1.43 K/UL — SIGNIFICANT CHANGE UP (ref 1.2–3.4)
LYMPHOCYTES # BLD AUTO: 33 % — SIGNIFICANT CHANGE UP (ref 20.5–51.1)
MAGNESIUM SERPL-MCNC: 2.4 MG/DL — SIGNIFICANT CHANGE UP (ref 1.8–2.4)
MCHC RBC-ENTMCNC: 25.1 PG — LOW (ref 27–31)
MCHC RBC-ENTMCNC: 29.9 G/DL — LOW (ref 32–37)
MCV RBC AUTO: 83.7 FL — SIGNIFICANT CHANGE UP (ref 81–99)
MONOCYTES # BLD AUTO: 0.49 K/UL — SIGNIFICANT CHANGE UP (ref 0.1–0.6)
MONOCYTES NFR BLD AUTO: 11.3 % — HIGH (ref 1.7–9.3)
NEUTROPHILS # BLD AUTO: 2.33 K/UL — SIGNIFICANT CHANGE UP (ref 1.4–6.5)
NEUTROPHILS NFR BLD AUTO: 53.8 % — SIGNIFICANT CHANGE UP (ref 42.2–75.2)
NRBC # BLD: 0 /100 WBCS — SIGNIFICANT CHANGE UP (ref 0–0)
PHOSPHATE SERPL-MCNC: 3.5 MG/DL — SIGNIFICANT CHANGE UP (ref 2.1–4.9)
PLATELET # BLD AUTO: 361 K/UL — SIGNIFICANT CHANGE UP (ref 130–400)
POTASSIUM SERPL-MCNC: 4.1 MMOL/L — SIGNIFICANT CHANGE UP (ref 3.5–5)
POTASSIUM SERPL-SCNC: 4.1 MMOL/L — SIGNIFICANT CHANGE UP (ref 3.5–5)
RBC # BLD: 4.71 M/UL — SIGNIFICANT CHANGE UP (ref 4.2–5.4)
RBC # FLD: 16.2 % — HIGH (ref 11.5–14.5)
SODIUM SERPL-SCNC: 135 MMOL/L — SIGNIFICANT CHANGE UP (ref 135–146)
WBC # BLD: 4.33 K/UL — LOW (ref 4.8–10.8)
WBC # FLD AUTO: 4.33 K/UL — LOW (ref 4.8–10.8)

## 2020-11-21 PROCEDURE — 99232 SBSQ HOSP IP/OBS MODERATE 35: CPT | Mod: 24

## 2020-11-21 RX ORDER — DIPHENHYDRAMINE HCL 50 MG
25 CAPSULE ORAL ONCE
Refills: 0 | Status: DISCONTINUED | OUTPATIENT
Start: 2020-11-21 | End: 2020-11-21

## 2020-11-21 RX ORDER — DIPHENHYDRAMINE HCL 50 MG
25 CAPSULE ORAL ONCE
Refills: 0 | Status: COMPLETED | OUTPATIENT
Start: 2020-11-21 | End: 2020-11-21

## 2020-11-21 RX ADMIN — Medication 25 MILLIGRAM(S): at 23:24

## 2020-11-21 RX ADMIN — Medication 650 MILLIGRAM(S): at 05:39

## 2020-11-21 RX ADMIN — GABAPENTIN 300 MILLIGRAM(S): 400 CAPSULE ORAL at 21:27

## 2020-11-21 RX ADMIN — Medication 650 MILLIGRAM(S): at 11:06

## 2020-11-21 RX ADMIN — Medication 12.5 MILLIGRAM(S): at 17:23

## 2020-11-21 RX ADMIN — GABAPENTIN 300 MILLIGRAM(S): 400 CAPSULE ORAL at 13:23

## 2020-11-21 RX ADMIN — Medication 650 MILLIGRAM(S): at 17:59

## 2020-11-21 RX ADMIN — Medication 650 MILLIGRAM(S): at 17:23

## 2020-11-21 RX ADMIN — SENNA PLUS 1 TABLET(S): 8.6 TABLET ORAL at 11:06

## 2020-11-21 RX ADMIN — GABAPENTIN 300 MILLIGRAM(S): 400 CAPSULE ORAL at 05:39

## 2020-11-21 RX ADMIN — Medication 1 EACH: at 21:47

## 2020-11-21 RX ADMIN — Medication 12.5 MILLIGRAM(S): at 05:39

## 2020-11-21 RX ADMIN — Medication 650 MILLIGRAM(S): at 12:32

## 2020-11-21 NOTE — PROGRESS NOTE ADULT - SUBJECTIVE AND OBJECTIVE BOX
GENERAL SURGERY PROGRESS NOTE     GLORIA SCHAFER  45y  Female  Hospital day :6d  POD:  Procedure: Midline catheter insertion      EVENTS IN THE LAST 24 HRS: Patient stable, seen and examined, with hyperthermia yesterday (Max 100.3), Currently on NPO, TPN, denies CP, SOB, abdominal pain, ECF output 100 cc. Pending laboratory AM      T(F): 99.4 (11-20-20 @ 21:04), Max: 100.3 (11-20-20 @ 15:04)  HR: 86 (11-20-20 @ 21:04) (80 - 98)  BP: 101/64 (11-20-20 @ 21:04) (96/61 - 103/59)  RR: 16 (11-20-20 @ 21:04) (16 - 18)  SpO2: 97% (11-20-20 @ 04:16) (97% - 97%)    PHYSICAL EXAM:  GENERAL: NAD,   CHEST/LUNG: Clear to auscultation bilaterally  HEART: Regular rate and rhythm  ABDOMEN: Soft, Nontender, Nondistended; ECF with ostomy bag and 100 cc output  EXTREMITIES:  No clubbing, cyanosis, or edema      DIET/FLUIDS: multivitamin/minerals 1 Tablet(s) Oral daily  Parenteral Nutrition - Adult 1 Each TPN Continuous <Continuous>  Parenteral Nutrition - Adult 1 Each TPN Continuous <Continuous>  Parenteral Nutrition - Adult 1 Each TPN Continuous <Continuous>    NG:                                                                                DRAINS:   11-19-20 @ 07:01  -  11-20-20 @ 07:00  --------------------------------------------------------  OUT: 0 mL         GI proph:    AC/ proph: enoxaparin Injectable 40 milliGRAM(s) SubCutaneous daily    ABx:         LABS  Labs:  CAPILLARY BLOOD GLUCOSE                              12.3   6.76  )-----------( 360      ( 19 Nov 2020 21:44 )             40.2         11-19    133<L>  |  99  |  <3<L>  ----------------------------<  117<H>  4.4   |  23  |  0.5<L>

## 2020-11-21 NOTE — PROGRESS NOTE ADULT - ATTENDING COMMENTS
44yo female with PSHx PAULIE/BSO 8/19/2020 for fibroids admitted for persistent small bowel obstruction s/p laparoscopic NARCISO, laparotomy, extensive NARCISO, small bowel resection with primary anastomosis 9/15 complicated by small bowel leak s/p re-exploration, wash out, small bowel resection and primary anastomosis 9/15. Patient underwent bilateral flank exploration of subcutaneous tissue due to concern for necrotizing infection. She was discharged to NH and then home.     At this time, patient returns for drainage from her wound. CT A/P 11/14 demonstrates lower intra-abdominal 3.3 x 2.4 x 2.4 cm rim-enhancing probable abscess, previously 5.8 x 3.3 x 2.8 cm, and healing post surgical changes of anterior abdominal wall, subcutaneous 2.6 cm pocket of air within the abdominal wall of indeterminate clinical significance. Incision and drainage of abdominal wall performed at bedside 11/16. Ostomy appliance was placed.   Started on PPN 11/17 but patient developed diffuse urticaria. CT A/P 11/18 demonstrated resolving intra-abdominal fluid collection, no obvious communication between abdominal wall and small bowel. Started new formulary via Dr. Fortune 11/19.     Examined 11/21:  Abdomen soft   ileostomy in place - bile tinged fluid   had detailed discussion with pt about future course over 45 min      NPO, IV fluids. Trial of FLD - output was 150cc. Will monitor quantity and quality of output, determine discharge diet versus IV nutrition. Encourage ambulation/OOB, incentive spirometer, DVT ppx.

## 2020-11-21 NOTE — PROGRESS NOTE ADULT - ASSESSMENT
45 years old female patient, HD# 8, admitted due to a new onset of enterocutaneous fistula (ECF). Currently in NPO and TPN, ECF output 100 cc    Plan:  - Continue NPO  - Continue TPN  - F/U Nutrition recommendations   - monitor ECF output  - Pain control as needed  - Hemodynamic monitoring as per routine  - Encourage ambulation and incentive spirometer use (10x/hr when awake)  - GI and DVT prophylaxis  - Check and replete Electrolytes AM  - Strict input and output monitoring  - Continue current management

## 2020-11-22 LAB
ANION GAP SERPL CALC-SCNC: 12 MMOL/L — SIGNIFICANT CHANGE UP (ref 7–14)
BASOPHILS # BLD AUTO: 0.02 K/UL — SIGNIFICANT CHANGE UP (ref 0–0.2)
BASOPHILS NFR BLD AUTO: 0.5 % — SIGNIFICANT CHANGE UP (ref 0–1)
BUN SERPL-MCNC: 10 MG/DL — SIGNIFICANT CHANGE UP (ref 10–20)
CALCIUM SERPL-MCNC: 8.1 MG/DL — LOW (ref 8.5–10.1)
CHLORIDE SERPL-SCNC: 103 MMOL/L — SIGNIFICANT CHANGE UP (ref 98–110)
CO2 SERPL-SCNC: 17 MMOL/L — SIGNIFICANT CHANGE UP (ref 17–32)
CREAT SERPL-MCNC: <0.5 MG/DL — LOW (ref 0.7–1.5)
EOSINOPHIL # BLD AUTO: 0.03 K/UL — SIGNIFICANT CHANGE UP (ref 0–0.7)
EOSINOPHIL NFR BLD AUTO: 0.7 % — SIGNIFICANT CHANGE UP (ref 0–8)
GLUCOSE SERPL-MCNC: 124 MG/DL — HIGH (ref 70–99)
HCT VFR BLD CALC: 39.2 % — SIGNIFICANT CHANGE UP (ref 37–47)
HGB BLD-MCNC: 11.6 G/DL — LOW (ref 12–16)
IMM GRANULOCYTES NFR BLD AUTO: 0.2 % — SIGNIFICANT CHANGE UP (ref 0.1–0.3)
LYMPHOCYTES # BLD AUTO: 1.21 K/UL — SIGNIFICANT CHANGE UP (ref 1.2–3.4)
LYMPHOCYTES # BLD AUTO: 29.7 % — SIGNIFICANT CHANGE UP (ref 20.5–51.1)
MAGNESIUM SERPL-MCNC: 2 MG/DL — SIGNIFICANT CHANGE UP (ref 1.8–2.4)
MCHC RBC-ENTMCNC: 25.4 PG — LOW (ref 27–31)
MCHC RBC-ENTMCNC: 29.6 G/DL — LOW (ref 32–37)
MCV RBC AUTO: 86 FL — SIGNIFICANT CHANGE UP (ref 81–99)
MONOCYTES # BLD AUTO: 0.35 K/UL — SIGNIFICANT CHANGE UP (ref 0.1–0.6)
MONOCYTES NFR BLD AUTO: 8.6 % — SIGNIFICANT CHANGE UP (ref 1.7–9.3)
NEUTROPHILS # BLD AUTO: 2.46 K/UL — SIGNIFICANT CHANGE UP (ref 1.4–6.5)
NEUTROPHILS NFR BLD AUTO: 60.3 % — SIGNIFICANT CHANGE UP (ref 42.2–75.2)
NRBC # BLD: 0 /100 WBCS — SIGNIFICANT CHANGE UP (ref 0–0)
PHOSPHATE SERPL-MCNC: 2.8 MG/DL — SIGNIFICANT CHANGE UP (ref 2.1–4.9)
PLATELET # BLD AUTO: 279 K/UL — SIGNIFICANT CHANGE UP (ref 130–400)
POTASSIUM SERPL-MCNC: 4.7 MMOL/L — SIGNIFICANT CHANGE UP (ref 3.5–5)
POTASSIUM SERPL-SCNC: 4.7 MMOL/L — SIGNIFICANT CHANGE UP (ref 3.5–5)
RBC # BLD: 4.56 M/UL — SIGNIFICANT CHANGE UP (ref 4.2–5.4)
RBC # FLD: 16.2 % — HIGH (ref 11.5–14.5)
SODIUM SERPL-SCNC: 132 MMOL/L — LOW (ref 135–146)
WBC # BLD: 4.08 K/UL — LOW (ref 4.8–10.8)
WBC # FLD AUTO: 4.08 K/UL — LOW (ref 4.8–10.8)

## 2020-11-22 PROCEDURE — 99232 SBSQ HOSP IP/OBS MODERATE 35: CPT | Mod: 24

## 2020-11-22 RX ORDER — ACETAMINOPHEN 500 MG
650 TABLET ORAL EVERY 6 HOURS
Refills: 0 | Status: DISCONTINUED | OUTPATIENT
Start: 2020-11-22 | End: 2020-11-30

## 2020-11-22 RX ORDER — PANTOPRAZOLE SODIUM 20 MG/1
40 TABLET, DELAYED RELEASE ORAL
Refills: 0 | Status: DISCONTINUED | OUTPATIENT
Start: 2020-11-22 | End: 2020-11-30

## 2020-11-22 RX ORDER — ACETAMINOPHEN 500 MG
650 TABLET ORAL ONCE
Refills: 0 | Status: COMPLETED | OUTPATIENT
Start: 2020-11-22 | End: 2020-11-22

## 2020-11-22 RX ADMIN — Medication 650 MILLIGRAM(S): at 08:52

## 2020-11-22 RX ADMIN — Medication 12.5 MILLIGRAM(S): at 05:19

## 2020-11-22 RX ADMIN — Medication 1 EACH: at 20:29

## 2020-11-22 RX ADMIN — GABAPENTIN 300 MILLIGRAM(S): 400 CAPSULE ORAL at 15:27

## 2020-11-22 RX ADMIN — Medication 650 MILLIGRAM(S): at 11:10

## 2020-11-22 RX ADMIN — GABAPENTIN 300 MILLIGRAM(S): 400 CAPSULE ORAL at 05:19

## 2020-11-22 RX ADMIN — SENNA PLUS 1 TABLET(S): 8.6 TABLET ORAL at 11:08

## 2020-11-22 NOTE — PROGRESS NOTE ADULT - ASSESSMENT
45 years old female patient, HD# 9, admitted due to a new onset of enterocutaneous fistula (ECF). Currently in NPO and TPN, ECF output 130 cc of yellow fluid.    Plan:  - Cont NPO/TPN  - F/U Nutrition recommendations   - monitor ECF output  - Pain control as needed  - vitals  -IS/OOB  - GI and DVT prophylaxis  - Check and replete Electrolytes AM  - Strict input and output monitoring

## 2020-11-22 NOTE — PROGRESS NOTE ADULT - SUBJECTIVE AND OBJECTIVE BOX
GENERAL SURGERY PROGRESS NOTE     GLORIA SCHAFER  [address] [place] [address] day :7d  POD:  Procedure: Midline catheter insertion      Surgical Attending: Mariann Rubio  Overnight events: No acute events overnight. Patient found in NAD. denies N/V.    T(F): 98.1 (11-21-20 @ 21:00), Max: 99 (11-21-20 @ 05:00)  HR: 73 (11-21-20 @ 21:00) (72 - 96)  BP: 109/63 (11-21-20 @ 21:00) (97/55 - 111/59)  ABP: --  ABP(mean): --  RR: 18 (11-21-20 @ 21:00) (16 - 18)  SpO2: 98% (11-21-20 @ 08:17) (98% - 98%)      11-20-20 @ 07:01  -  11-21-20 @ 07:00  --------------------------------------------------------  IN:    TPN (Total Parenteral Nutrition): 1955 mL  Total IN: 1955 mL    OUT:    Drain (mL): 100 mL  Total OUT: 100 mL    Total NET: 1855 mL      11-21-20 @ 07:01  -  11-22-20 @ 00:03  --------------------------------------------------------  IN:    TPN (Total Parenteral Nutrition): 1020 mL  Total IN: 1020 mL    OUT:    Drain (mL): 130 mL  Total OUT: 130 mL    Total NET: 890 mL        DIET/FLUIDS: multivitamin/minerals 1 Tablet(s) Oral daily  Parenteral Nutrition - Adult 1 Each TPN Continuous <Continuous>  Parenteral Nutrition - Adult 1 Each TPN Continuous <Continuous>    NG:                                                                                DRAINS:   11-20-20 @ 07:01  -  11-21-20 @ 07:00  --------------------------------------------------------  OUT: 100 mL       GI proph:    AC/ proph: enoxaparin Injectable 40 milliGRAM(s) SubCutaneous daily    ABx:     GEN: NAD,   HEENT: NC/AT  CHEST: Symmetric chest wall expansion. Regular heart rate  ABD: S/D, non-tender,ECF with ostomy bag with yellow colored fluid output    LABS  Labs:  CAPILLARY BLOOD GLUCOSE                              11.8   4.33  )-----------( 361      ( 21 Nov 2020 11:34 )             39.4       Auto Immature Granulocyte %: 0.2 % (11-21-20 @ 11:34)  Auto Neutrophil %: 53.8 % (11-21-20 @ 11:34)    11-21    135  |  105  |  9<L>  ----------------------------<  122<H>  4.1   |  23  |  <0.5<L>      Calcium, Total Serum: 8.6 mg/dL (11-21-20 @ 11:34)      LFTs:         Coags:                    RADIOLOGY & ADDITIONAL TESTS:      A/P:  GLORIA SCHAFER is a 45yFemale HD# POD# for Midline catheter insertion    . She    Plan:

## 2020-11-22 NOTE — PROGRESS NOTE ADULT - ATTENDING COMMENTS
44yo female with PSHx PAULIE/BSO 8/19/2020 for fibroids admitted for persistent small bowel obstruction s/p laparoscopic NARCISO, laparotomy, extensive NARCISO, small bowel resection with primary anastomosis 9/15 complicated by small bowel leak s/p re-exploration, wash out, small bowel resection and primary anastomosis 9/15. Patient underwent bilateral flank exploration of subcutaneous tissue due to concern for necrotizing infection. She was discharged to NH and then home.     At this time, patient returns for drainage from her wound. CT A/P 11/14 demonstrates lower intra-abdominal 3.3 x 2.4 x 2.4 cm rim-enhancing probable abscess, previously 5.8 x 3.3 x 2.8 cm, and healing post surgical changes of anterior abdominal wall, subcutaneous 2.6 cm pocket of air within the abdominal wall of indeterminate clinical significance. Incision and drainage of abdominal wall performed at bedside 11/16. Ostomy appliance was placed.   Started on PPN 11/17 but patient developed diffuse urticaria. CT A/P 11/18 demonstrated resolving intra-abdominal fluid collection, no obvious communication between abdominal wall and small bowel. Started new formulary via Dr. Fortune 11/19.     Examined 11/22:  Abdomen soft   ileostomy in place - bile tinged fluid         NPO, IV fluids. output was more than  150cc. Will monitor quantity and quality of output, determine discharge diet versus IV nutrition. Encourage ambulation/OOB, incentive spirometer, DVT ppx.

## 2020-11-23 LAB
ANION GAP SERPL CALC-SCNC: 10 MMOL/L — SIGNIFICANT CHANGE UP (ref 7–14)
BUN SERPL-MCNC: 10 MG/DL — SIGNIFICANT CHANGE UP (ref 10–20)
CALCIUM SERPL-MCNC: 8.4 MG/DL — LOW (ref 8.5–10.1)
CHLORIDE SERPL-SCNC: 105 MMOL/L — SIGNIFICANT CHANGE UP (ref 98–110)
CO2 SERPL-SCNC: 20 MMOL/L — SIGNIFICANT CHANGE UP (ref 17–32)
CREAT SERPL-MCNC: <0.5 MG/DL — LOW (ref 0.7–1.5)
GLUCOSE SERPL-MCNC: 125 MG/DL — HIGH (ref 70–99)
MAGNESIUM SERPL-MCNC: 2.1 MG/DL — SIGNIFICANT CHANGE UP (ref 1.8–2.4)
PHOSPHATE SERPL-MCNC: 3.2 MG/DL — SIGNIFICANT CHANGE UP (ref 2.1–4.9)
POTASSIUM SERPL-MCNC: 4 MMOL/L — SIGNIFICANT CHANGE UP (ref 3.5–5)
POTASSIUM SERPL-SCNC: 4 MMOL/L — SIGNIFICANT CHANGE UP (ref 3.5–5)
SODIUM SERPL-SCNC: 135 MMOL/L — SIGNIFICANT CHANGE UP (ref 135–146)

## 2020-11-23 PROCEDURE — 99024 POSTOP FOLLOW-UP VISIT: CPT

## 2020-11-23 RX ORDER — I.V. FAT EMULSION 20 G/100ML
0.55 EMULSION INTRAVENOUS
Qty: 30.03 | Refills: 0 | Status: DISCONTINUED | OUTPATIENT
Start: 2020-11-23 | End: 2020-11-23

## 2020-11-23 RX ORDER — LOPERAMIDE HCL 2 MG
2 TABLET ORAL
Refills: 0 | Status: DISCONTINUED | OUTPATIENT
Start: 2020-11-23 | End: 2020-11-30

## 2020-11-23 RX ORDER — ELECTROLYTE SOLUTION,INJ
1 VIAL (ML) INTRAVENOUS
Refills: 0 | Status: DISCONTINUED | OUTPATIENT
Start: 2020-11-23 | End: 2020-11-23

## 2020-11-23 RX ADMIN — Medication 2 MILLIGRAM(S): at 17:16

## 2020-11-23 RX ADMIN — PANTOPRAZOLE SODIUM 40 MILLIGRAM(S): 20 TABLET, DELAYED RELEASE ORAL at 05:29

## 2020-11-23 RX ADMIN — ENOXAPARIN SODIUM 40 MILLIGRAM(S): 100 INJECTION SUBCUTANEOUS at 13:10

## 2020-11-23 RX ADMIN — I.V. FAT EMULSION 18.8 GM/KG/DAY: 20 EMULSION INTRAVENOUS at 10:56

## 2020-11-23 RX ADMIN — Medication 1 EACH: at 20:56

## 2020-11-23 NOTE — PROGRESS NOTE ADULT - ASSESSMENT
Assessment:  45y Female patient admitted HD 10 for enterocutaneous fistula, now on TPN has been NPO, ECF drainage mildly increased from admission, with the above physical exam, labs, and imaging findings.    Plan:  - monitor ECF output   - f/u 11AM labs (pt prefers 11AM labs)  - encourage ambulation  - f/u with CM/SW re: TPN for home if patient is amenable  -Pain control as needed  -Hemodynamic monitoring as per routine  -GI and DVT prophylaxis if patient is amenable  -Check and replete BMP q daily per Nutrition recommendations via TPN  -Strict input and output monitoring  -Continue current management    Date/Time: 11-23-20 @ 03:22

## 2020-11-23 NOTE — PROGRESS NOTE ADULT - ATTENDING COMMENTS
44yo female with PSHx PAULIE/BSO 8/19/2020 for fibroids admitted for persistent small bowel obstruction s/p laparoscopic NARCISO, laparotomy, extensive NARCISO, small bowel resection with primary anastomosis 9/15 complicated by small bowel leak s/p re-exploration, wash out, small bowel resection and primary anastomosis 9/15. Patient underwent bilateral flank exploration of subcutaneous tissue due to concern for necrotizing infection. She was discharged to NH and then home.     At this time, patient returns for drainage from her wound. CT A/P 11/14 demonstrates lower intra-abdominal 3.3 x 2.4 x 2.4 cm rim-enhancing probable abscess, previously 5.8 x 3.3 x 2.8 cm, and healing post surgical changes of anterior abdominal wall, subcutaneous 2.6 cm pocket of air within the abdominal wall of indeterminate clinical significance. Incision and drainage of abdominal wall performed at bedside 11/16. Ostomy appliance was placed. Started on PPN 11/17 but patient developed diffuse urticaria. CT A/P 11/18 demonstrated resolving intra-abdominal fluid collection, no obvious communication between abdominal wall and small bowel. Started new formulary via Dr. Fortune 11/19. NPO, IV fluids. Will monitor quantity and quality of output - 150-250cc/24h. Determine discharge diet versus IV nutrition. Encourage ambulation/OOB, incentive spirometer, DVT ppx.

## 2020-11-23 NOTE — ADVANCED PRACTICE NURSE CONSULT - RECOMMEDATIONS
ECF pouch change: 	  -Gently remove pouch w/ water moistened gauze   -Cleanse ECF site with water moistened gauze, gently pat dry  -Apply 1/4 piece of [employer] flat Ceraring  (#3542) within deep crease at 7 o'clock to even out pouching surface.   -Apply remainder of ring circumferentially around open wound bed where ECF located to further even out pouching surface & provide "wall" against ECF output  -Measure open wound bed where ECF located currently 1" diameter   -Trace and cut current size on Lashae 2 1/4” CeraPlus flat barrier (#51768)  -Apply barrier to patient's skin  -Attach Lashae 2 1/4” high putput pouch w/ spout  (#06565) onto barrier; connect pouch to bedside drainage  If pouch not connected to bedside drainage, empty pouch when 1/3 to no more than 1/2 full of effluent/flatus. Goal of pouch 7-10 days; change sooner for any leakage.     Plan of Care: Patient's d/c pending at this time. WOCN service to assist w/ ECF pouching prn. Questions or concerns or pouch w/ consistent leakage and unable to obtain seal, please consult WOCN, [employer] #[id number]. If pouch leaks after WOC service hours, please repouch using supplies and technique as indicated above and document where leakage occurred so system can be modified by WOCN if needed. Primary RN Joann made aware.

## 2020-11-23 NOTE — PROGRESS NOTE ADULT - ASSESSMENT
ASSESSMENT/PLAN  45y Female patient admitted for abdominal wound drainage, S/P I&D with a fecaloid secretion for distal aspect of surgical wound, likely ECF.  [facility]  - severe protein calorie malnutrition, + protein deficit (BUN <3)    PLAN:  - TPN ordered for tonight   - -check bmp/phos/mg and correct lytes  monitor and record ECF output     ASSESSMENT/PLAN  45y Female patient admitted for abdominal wound drainage, S/P I&D with a fecaloid secretion for distal aspect of surgical wound, likely ECF.  [facility]  - severe protein calorie malnutrition, + protein deficit (BUN <3)    PLAN:  - TPN ordered for tonight   - -check bmp/phos/mg and correct lytes  - monitor and record ECF output  - to investigate location on fistula in GI tract, to confirm need for prolonged NPO (& therefore TPN)  - pt tolerates SMOF lipid without issue (she had tolerated it in Sept, as well)  - f/u labs in am then likely begin to cycle TPN

## 2020-11-23 NOTE — PHARMACOTHERAPY INTERVENTION NOTE - COMMENTS
Confirmed with KELLY Laws and dr. Fortune aware of possible allergic rx to TPN or Fat emulsion components.. continue as is and pt will be monitored.

## 2020-11-23 NOTE — PROGRESS NOTE ADULT - SUBJECTIVE AND OBJECTIVE BOX
Progress Note: Surgery  Patient: GLORIA SCHAFER , 45y (1975)Female   MRN: [id number]  Location: [address]  Visit: 11-15-20 Inpatient  Date: 11-23-20 @ 03:22    Procedure/Diagnosis: HD 10 admitted for Enterocutaneous fistula (ECF)  Events over 24h: No acute event overnight. No new complaint. Pt is hemodynamically stable. On TPN, NPO, tolerating well. Denies nausea, vomiting, and/or abdominal pain. Ambulating adequately, Voiding adequately. Ambulating, denies Lovenox injection despite counseling regarding the importance of DVT prevention.+Gas, +Bowel movement.    Vitals: T(F): 96.9 (11-22-20 @ 21:16), Max: 98.5 (11-22-20 @ 05:06)  HR: 69 (11-22-20 @ 21:16)  BP: 122/74 (11-22-20 @ 21:16) (95/52 - 122/74)  RR: 18 (11-22-20 @ 21:16)  SpO2: --      Diet: Diet, NPO:   Except Medications (11-19-20 @ 16:41)    IV Fluid: Parenteral Nutrition - Adult 1 Each (85 mL/Hr) TPN Continuous <Continuous>      In:   11-21-20 @ 07:01  -  11-22-20 @ 07:00  --------------------------------------------------------  IN: 2040 mL    11-22-20 @ 07:01  -  11-23-20 @ 03:22  --------------------------------------------------------  IN: 1360 mL      Out:   11-21-20 @ 07:01  -  11-22-20 @ 07:00  --------------------------------------------------------  OUT:    Drain (mL): 255 mL  Total OUT: 255 mL      11-22-20 @ 07:01  -  11-23-20 @ 03:22  --------------------------------------------------------  OUT:    Drain (mL): 150 mL    Voided (mL): 1050 mL  Total OUT: 1200 mL        Net:   11-21-20 @ 07:01  -  11-22-20 @ 07:00  --------------------------------------------------------  NET: 1785 mL    11-22-20 @ 07:01  -  11-23-20 @ 03:22  --------------------------------------------------------  NET: 160 mL        Physical Examination:  General Appearance: NAD, alert and cooperative  Heart: S1 and S2. No murmurs. Rhythm is regular.  Lungs: Clear to auscultation BL without rales, rhonchi, wheezing, crackles or diminished breath sounds.  Abdomen: Soft, nondistended, nontender. No rigidity, guarding, or rebound tenderness. ECF ostomy appliance in place with yellow-green bilious output    Medications: [Standing]  enoxaparin Injectable 40 milliGRAM(s) SubCutaneous daily  pantoprazole    Tablet 40 milliGRAM(s) Oral before breakfast  Parenteral Nutrition - Adult 1 Each (85 mL/Hr) TPN Continuous <Continuous>  senna 1 Tablet(s) Oral daily    Medications:[PRN]  acetaminophen   Tablet .. 650 milliGRAM(s) Oral every 6 hours PRN  ondansetron Injectable 4 milliGRAM(s) IV Push every 6 hours PRN  oxyCODONE    IR 5 milliGRAM(s) Oral every 6 hours PRN    Labs:                        11.6   4.08  )-----------( 279      ( 22 Nov 2020 11:18 )             39.2   11-22    132<L>  |  103  |  10  ----------------------------<  124<H>  4.7   |  17  |  <0.5<L>    Ca    8.1<L>      22 Nov 2020 11:18  Phos  2.8     11-22  Mg     2.0     11-22    Micro/Urine:    Imaging:  None/24h

## 2020-11-23 NOTE — PROGRESS NOTE ADULT - SUBJECTIVE AND OBJECTIVE BOX
Patient is a 45y old  Female who presents with a chief complaint of abdominal wound drainage (23 Nov 2020 03:21)  pt seen and evaluated   on TPN   spoke to the surgical team this morning on possibly HOME TPN upon discharge  today a trial of 150ml over 3hrs of the IVFat Emulsion   pt tolerate it well  confirm with nursimg team  ICU Vital Signs Last 24 Hrs  T(C): 36.1 (23 Nov 2020 13:28), Max: 36.4 (23 Nov 2020 05:00)  T(F): 97 (23 Nov 2020 13:28), Max: 97.5 (23 Nov 2020 05:00)  HR: 82 (23 Nov 2020 13:28) (69 - 91)  BP: 107/68 (23 Nov 2020 13:28) (90/58 - 122/74)  RR: 18 (23 Nov 2020 13:28) (18 - 18)    Drug Dosing Weight  Height (cm): 160 (19 Nov 2020 18:58)  Weight (kg): 54.6 (19 Nov 2020 18:58)  BMI (kg/m2): 21.3 (19 Nov 2020 18:58)  BSA (m2): 1.56 (19 Nov 2020 18:58)    I&O's Detail    22 Nov 2020 07:01  -  23 Nov 2020 07:00  --------------------------------------------------------  IN:    TPN (Total Parenteral Nutrition): 1785 mL  Total IN: 1785 mL    OUT:    Drain (mL): 150 mL    Voided (mL): 1650 mL  Total OUT: 1800 mL    Total NET: -15 mL      23 Nov 2020 07:01  -  23 Nov 2020 16:00  --------------------------------------------------------  IN:    Fat Emulsion (Fish Oil &amp; Plant Based) 20% Infusion: 112.8 mL    TPN (Total Parenteral Nutrition): 765 mL  Total IN: 877.8 mL    OUT:    Voided (mL): 500 mL  Total OUT: 500 mL    Total NET: 377.8 mL     PHYSICAL EXAM:  Constitutional:  alert and awake talkative  Gastrointestinal:  soft +ECF ostomy  in place, + fecal looking output  IV access: left picc line DL site c/d/i  MEDICATIONS  (STANDING):  enoxaparin Injectable 40 milliGRAM(s) SubCutaneous daily  fat emulsion (Fish Oil and Plant Based) 20% Infusion 0.55 Gm/kG/Day (18.8 mL/Hr) IV Continuous <Continuous>  loperamide 2 milliGRAM(s) Oral two times a day  pantoprazole    Tablet 40 milliGRAM(s) Oral before breakfast  Parenteral Nutrition - Adult 1 Each (85 mL/Hr) TPN Continuous <Continuous>  Parenteral Nutrition - Adult 1 Each (85 mL/Hr) TPN Continuous <Continuous>  senna 1 Tablet(s) Oral daily      Diet, NPO:   Except Medications (11-19-20 @ 16:41)      LABS  11-23    135  |  105  |  10  ----------------------------<  125<H>  4.0   |  20  |  <0.5<L>    Ca    8.4<L>      23 Nov 2020 11:29  Phos  3.2     11-23  Mg     2.1     11-23                        11.6   4.08  )-----------( 279      ( 22 Nov 2020 11:18 )             39.2            Patient is a 45y old  Female who presents with a chief complaint of abdominal wound drainage (23 Nov 2020 03:21)  pt seen and evaluated   on TPN   spoke to the surgical team this morning on possibly HOME TPN upon discharge  today a trial of 150ml over 3hrs of the IVFat Emulsion   pt tolerated it well  confirmed with pt's nurse  ICU Vital Signs Last 24 Hrs  T(C): 36.1 (23 Nov 2020 13:28), Max: 36.4 (23 Nov 2020 05:00)  T(F): 97 (23 Nov 2020 13:28), Max: 97.5 (23 Nov 2020 05:00)  HR: 82 (23 Nov 2020 13:28) (69 - 91)  BP: 107/68 (23 Nov 2020 13:28) (90/58 - 122/74)  RR: 18 (23 Nov 2020 13:28) (18 - 18)    Drug Dosing Weight  Height (cm): 160 (19 Nov 2020 18:58)  Weight (kg): 54.6 (19 Nov 2020 18:58)  BMI (kg/m2): 21.3 (19 Nov 2020 18:58)  BSA (m2): 1.56 (19 Nov 2020 18:58)    I&O's Detail    22 Nov 2020 07:01  -  23 Nov 2020 07:00  --------------------------------------------------------  IN:    TPN (Total Parenteral Nutrition): 1785 mL  Total IN: 1785 mL    OUT:    Drain (mL): 150 mL    Voided (mL): 1650 mL  Total OUT: 1800 mL    Total NET: -15 mL      23 Nov 2020 07:01  -  23 Nov 2020 16:00  --------------------------------------------------------  IN:    Fat Emulsion (Fish Oil &amp; Plant Based) 20% Infusion: 112.8 mL    TPN (Total Parenteral Nutrition): 765 mL  Total IN: 877.8 mL    OUT:    Voided (mL): 500 mL  Total OUT: 500 mL    Total NET: 377.8 mL     PHYSICAL EXAM:  Constitutional: pt ambulates to bathroom  alert and awake talkative  Gastrointestinal: + abd pain  soft +ECF ostomy  in place, + yellow liquid output today  IV access: left picc line DL site c/d/i  no LE edema    MEDICATIONS  (STANDING):  enoxaparin Injectable 40 milliGRAM(s) SubCutaneous daily  fat emulsion (Fish Oil and Plant Based) 20% Infusion 0.55 Gm/kG/Day (18.8 mL/Hr) IV Continuous <Continuous>  loperamide 2 milliGRAM(s) Oral two times a day  pantoprazole    Tablet 40 milliGRAM(s) Oral before breakfast  Parenteral Nutrition - Adult 1 Each (85 mL/Hr) TPN Continuous <Continuous>  Parenteral Nutrition - Adult 1 Each (85 mL/Hr) TPN Continuous <Continuous>  senna 1 Tablet(s) Oral daily      Diet, NPO:   Except Medications (11-19-20 @ 16:41)      LABS  11-23    135  |  105  |  10  ----------------------------<  125<H>  4.0   |  20  |  <0.5<L>    Ca    8.4<L>      23 Nov 2020 11:29  Phos  3.2     11-23  Mg     2.1     11-23                        11.6   4.08  )-----------( 279      ( 22 Nov 2020 11:18 )             39.2

## 2020-11-23 NOTE — ADVANCED PRACTICE NURSE CONSULT - ASSESSMENT
45F with extensive surgical history and recent prolonged hospital course significant for PAULIE/BSO on 8/19/20 ([facility]) for 15cm bleeding fibroid with readmission x 2 for PSBO resolved with nonoperative management, s/p SBR, extensive NARCISO w/ 43cm ileum resected 2/2 serosal enterotomy 9/15 c/b RTOR for breakdown of proximal limb staple line of of anastomosis, 9/23 I&D of abdominal wound now presenting from home after recent discharge from nursing home with chief complaint of malodorous and murky brownish-green drainage from the inferiormost aspect of her midline abdominal incision.Per the patient, she had noted new onset drainage from her incision starting this Monday. She had alerted her nursing home care providers to this change and had been told that they would call the surgeon; however she never heard from from her providers in this regard. She was discharged from the nursing facility yesterday and presented to the ED today (11/15)for evaluation. No apparent leukocytosis on labs and no gross lab abnormalities. CT AP was performed demonstrating lower intra-abdominal 3.3 x 2.4 x 2.4 cm rim-enhancing probable abscess, previously 5.8 x 3.3 x 2.8 cm as well as healing post surgical changes of anterior abdominal wall w/ subcutaneous 2.6 cm pocket of air within the abdominal wall of indeterminate clinical significance.  Per Surgery note (11/23)-"HD 10 admitted for Enterocutaneous fistula (ECF), now on TPN has been NPO, ECF drainage mildly increased from admission".    Received patient on 4C, laying supine in bed, awake, A&Ox3, made aware of purpose of WOCN visit, agreeable to consult. Enterocutaneous fistula w/ Lashae 1 3/4" flat drainable pouching system in place, barrier intact, no leakage. Patient reports pouching system applied by MD team yesterday r/t leakage.  Pouch gently removed from pt's abdomen w/ water moistened gauze.     Type of ostomy/wound: enterocutaneous fistula (ECF)  Location: midline abdomen-laying within open wound bed of most inferior portion of healed midline wound   Size: pinpoint opening within open wound bed measuring 1" in diameter  Peristomal skin: intact  Abdominal contours: ECF sitting within modesto in midline abdomen, deep crease w/ open wound bed at 7 o'clock   Output: scant amount of yellow liquid output present in removed pouch, + output (bubbling) from area during WOCN visit    Midline/lap sites/ drains: healed midline incision superior to ECF    1/4 piece of Fluker Adapt flat Ceraring  (6705) applied within deep crease at 7 o'clock to even out pouching surface. Remainder of ring applied circumferentially around open wound bed where ECF located to further even out pouching surface & provide "wall" against ECF output. ECF pouched w/ Fluker 2 1/4” CeraPlus flat barrier #[id number] (cut to 1") &  Fluker  2 1/4” high output pouch w/ spout #[id number] applied, pouch to be connected to bedside drainage. Skin barrier reinforced w/ Coloplast elastic barrier strips x2 (#[id number]).     Impression:  ECF to midline abdomen-laying within open wound bed of most inferior portion of healed midline wound -pouching system applied to contain & quantify output; if output continues to decrease, may contain w/ dressings as opposed to pouching system     Agree w/ keeping patient NPO to assist w/ decrease in ECF drainage & ? spontaneous closure; consider octreotide and/or opium tincture to further decrease ECF output-discussed w/ KELLY Panchal

## 2020-11-24 LAB
ANION GAP SERPL CALC-SCNC: 9 MMOL/L — SIGNIFICANT CHANGE UP (ref 7–14)
BUN SERPL-MCNC: 11 MG/DL — SIGNIFICANT CHANGE UP (ref 10–20)
CALCIUM SERPL-MCNC: 8.4 MG/DL — LOW (ref 8.5–10.1)
CHLORIDE SERPL-SCNC: 102 MMOL/L — SIGNIFICANT CHANGE UP (ref 98–110)
CO2 SERPL-SCNC: 24 MMOL/L — SIGNIFICANT CHANGE UP (ref 17–32)
CREAT SERPL-MCNC: <0.5 MG/DL — LOW (ref 0.7–1.5)
GLUCOSE SERPL-MCNC: 114 MG/DL — HIGH (ref 70–99)
MAGNESIUM SERPL-MCNC: 2.1 MG/DL — SIGNIFICANT CHANGE UP (ref 1.8–2.4)
PHOSPHATE SERPL-MCNC: 3.5 MG/DL — SIGNIFICANT CHANGE UP (ref 2.1–4.9)
POTASSIUM SERPL-MCNC: 3.8 MMOL/L — SIGNIFICANT CHANGE UP (ref 3.5–5)
POTASSIUM SERPL-SCNC: 3.8 MMOL/L — SIGNIFICANT CHANGE UP (ref 3.5–5)
SODIUM SERPL-SCNC: 135 MMOL/L — SIGNIFICANT CHANGE UP (ref 135–146)

## 2020-11-24 PROCEDURE — 99024 POSTOP FOLLOW-UP VISIT: CPT

## 2020-11-24 RX ORDER — ELECTROLYTE SOLUTION,INJ
1 VIAL (ML) INTRAVENOUS
Refills: 0 | Status: DISCONTINUED | OUTPATIENT
Start: 2020-11-24 | End: 2020-11-24

## 2020-11-24 RX ORDER — I.V. FAT EMULSION 20 G/100ML
0.92 EMULSION INTRAVENOUS
Qty: 50.23 | Refills: 0 | Status: DISCONTINUED | OUTPATIENT
Start: 2020-11-24 | End: 2020-11-24

## 2020-11-24 RX ADMIN — PANTOPRAZOLE SODIUM 40 MILLIGRAM(S): 20 TABLET, DELAYED RELEASE ORAL at 06:01

## 2020-11-24 RX ADMIN — I.V. FAT EMULSION 20.9 GM/KG/DAY: 20 EMULSION INTRAVENOUS at 20:01

## 2020-11-24 RX ADMIN — Medication 2 MILLIGRAM(S): at 18:10

## 2020-11-24 RX ADMIN — ENOXAPARIN SODIUM 40 MILLIGRAM(S): 100 INJECTION SUBCUTANEOUS at 12:06

## 2020-11-24 RX ADMIN — Medication 2 MILLIGRAM(S): at 06:01

## 2020-11-24 RX ADMIN — Medication 1 EACH: at 20:21

## 2020-11-24 NOTE — PROGRESS NOTE ADULT - SUBJECTIVE AND OBJECTIVE BOX
GENERAL SURGERY PROGRESS NOTE     GLORIA SCHAFER  45y  Female  Hospital day :9d  POD:  Procedure: Midline catheter insertion      EVENTS IN THE LAST 24 HRS: No acute events, patient is on NPO on TPN, had 2 watery BM yesterday, No output for the ECF. Denies fever, abdominal pain      T(F): 98.1 (11-24-20 @ 05:00), Max: 98.9 (11-23-20 @ 21:11)  HR: 79 (11-24-20 @ 05:00) (76 - 82)  BP: 96/59 (11-24-20 @ 05:00) (90/58 - 107/68)  RR: 18 (11-24-20 @ 05:00) (18 - 18)      PHYSICAL EXAM:  GENERAL: NAD, well-appearing  CHEST/LUNG: Clear to auscultation bilaterally  HEART: Regular rate and rhythm  ABDOMEN: Soft, Nontender, Nondistended; fistula with ostomy bag, no output since yesterday  EXTREMITIES:  No clubbing, cyanosis, or edema      DIET/FLUIDS: Parenteral Nutrition - Adult 1 Each TPN Continuous <Continuous>    NG:                                                                                DRAINS:   11-22-20 @ 07:01  -  11-23-20 @ 07:00  --------------------------------------------------------  OUT: 150 mL      BM:     EMESIS:     URINE:      GI proph:  pantoprazole    Tablet 40 milliGRAM(s) Oral before breakfast    AC/ proph: enoxaparin Injectable 40 milliGRAM(s) SubCutaneous daily    ABx:         LABS  Labs:  CAPILLARY BLOOD GLUCOSE                              11.6   4.08  )-----------( 279      ( 22 Nov 2020 11:18 )             39.2         11-23    135  |  105  |  10  ----------------------------<  125<H>  4.0   |  20  |  <0.5<L>      Calcium, Total Serum: 8.4 mg/dL (11-23-20 @ 11:29)

## 2020-11-24 NOTE — PROGRESS NOTE ADULT - ASSESSMENT
ASSESSMENT/PLAN  45y Female patient admitted for abdominal wound drainage, S/P I&D with a fecaloid secretion for distal aspect of surgical wound, likely ECF.  [facility]  - severe protein calorie malnutrition, + protein deficit (BUN <3)    PLAN:  - TPN ordered for tonight /cycle order 18hrs for tonight  - -check bmp/phos/mg and correct lytes  - monitor and record ECF output  - to investigate location on fistula in GI tract, to confirm need for prolonged NPO (& therefore TPN)  - pt tolerates SMOF lipid without issue (she had tolerated it in Sept, as well)

## 2020-11-24 NOTE — PROGRESS NOTE ADULT - ASSESSMENT
45 years Female patient admitted HD 11 for enterocutaneous fistula, Continue on NPO,  on TPN, Loperamide was added, no output from the ECF, had 2 BM yesterday    Plan:  - Continue NPO  - monitor ECF output   - f/u 11AM labs (pt prefers 11AM labs)  - Encourage ambulation  - f/u with CM/SW re: TPN for home if patient is amenable  - Pain control as needed  -Hemodynamic monitoring as per routine  -GI and DVT prophylaxis if patient is amenable  -Check and replete BMP q daily per Nutrition recommendations via TPN  -Strict input and output monitoring  -Continue current management

## 2020-11-24 NOTE — PROGRESS NOTE ADULT - SUBJECTIVE AND OBJECTIVE BOX
Patient is a 45y old  Female who presents with a chief complaint of abdominal wound drainage (24 Nov 2020 06:46)  pt seen and evaluated . pt has no complaints  on TPN  resting comfortably  ICU Vital Signs Last 24 Hrs  T(C): 36.7 (24 Nov 2020 05:00), Max: 37.2 (23 Nov 2020 21:11)  T(F): 98.1 (24 Nov 2020 05:00), Max: 98.9 (23 Nov 2020 21:11)  HR: 79 (24 Nov 2020 05:00) (76 - 82)  BP: 96/59 (24 Nov 2020 05:00) (96/59 - 107/68)  RR: 18 (24 Nov 2020 05:00) (18 - 18)  Drug Dosing Weight  Height (cm): 160 (19 Nov 2020 18:58)  Weight (kg): 54.6 (19 Nov 2020 18:58)  BMI (kg/m2): 21.3 (19 Nov 2020 18:58)  BSA (m2): 1.56 (19 Nov 2020 18:58)    I&O's Detail    23 Nov 2020 07:01  -  24 Nov 2020 07:00  --------------------------------------------------------  IN:    Fat Emulsion (Fish Oil &amp; Plant Based) 20% Infusion: 169.2 mL    TPN (Total Parenteral Nutrition): 1700 mL  Total IN: 1869.2 mL    OUT:    Drain (mL): 0 mL    Voided (mL): 2000 mL  Total OUT: 2000 mL    Total NET: -130.8 mL      24 Nov 2020 07:01  -  24 Nov 2020 12:08  --------------------------------------------------------  IN:  Total IN: 0 mL    OUT:    Voided (mL): 325 mL  Total OUT: 325 mL    Total NET: -325 mL     PHYSICAL EXAM:  Constitutional:  alert and awake talkative  Gastrointestinal: + abd pain  soft +ECF ostomy  in place, + yellow liquid output today  IV access: left picc line DL site c/d/i  no LE edema  MEDICATIONS  (STANDING):  enoxaparin Injectable 40 milliGRAM(s) SubCutaneous daily  fat emulsion (Fish Oil and Plant Based) 20% Infusion 0.92 Gm/kG/Day (20.9 mL/Hr) IV Continuous <Continuous>  loperamide 2 milliGRAM(s) Oral two times a day  pantoprazole    Tablet 40 milliGRAM(s) Oral before breakfast  Parenteral Nutrition - Adult 1 Each (85 mL/Hr) TPN Continuous <Continuous>  Parenteral Nutrition - Adult 1 Each TPN Continuous <Continuous>  senna 1 Tablet(s) Oral daily      Diet, NPO:   Except Medications (11-19-20 @ 16:41)      LABS  11-24    135  |  102  |  11  ----------------------------<  114<H>  3.8   |  24  |  <0.5<L>    Ca    8.4<L>      24 Nov 2020 07:53  Phos  3.5     11-24  Mg     2.1     11-24

## 2020-11-24 NOTE — PROGRESS NOTE ADULT - ATTENDING COMMENTS
46yo female with PSHx PAULIE/BSO 8/19/2020 for fibroids admitted for persistent small bowel obstruction s/p laparoscopic NARCISO, laparotomy, extensive NARCISO, small bowel resection with primary anastomosis 9/15 complicated by small bowel leak s/p re-exploration, wash out, small bowel resection and primary anastomosis 9/15. Patient underwent bilateral flank exploration of subcutaneous tissue due to concern for necrotizing infection. She was discharged to NH and then home.     At this time, patient returns for drainage from her wound. CT A/P 11/14 demonstrates lower intra-abdominal 3.3 x 2.4 x 2.4 cm rim-enhancing probable abscess, previously 5.8 x 3.3 x 2.8 cm, and healing post surgical changes of anterior abdominal wall, subcutaneous 2.6 cm pocket of air within the abdominal wall of indeterminate clinical significance. Incision and drainage of abdominal wall performed at bedside 11/16. Ostomy appliance was placed. Started on PPN 11/17 but patient developed diffuse urticaria. CT A/P 11/18 demonstrated resolving intra-abdominal fluid collection, no obvious communication between abdominal wall and small bowel. Started new formulary via Dr. Fortune 11/19. NPO, IV fluids, on loperamide. Will monitor quantity and quality of output, determine discharge diet versus IV nutrition. Encourage ambulation/OOB, incentive spirometer, DVT ppx.

## 2020-11-25 PROCEDURE — 99024 POSTOP FOLLOW-UP VISIT: CPT

## 2020-11-25 RX ORDER — ELECTROLYTE SOLUTION,INJ
1 VIAL (ML) INTRAVENOUS
Refills: 0 | Status: DISCONTINUED | OUTPATIENT
Start: 2020-11-25 | End: 2020-11-25

## 2020-11-25 RX ADMIN — Medication 2 MILLIGRAM(S): at 05:05

## 2020-11-25 RX ADMIN — PANTOPRAZOLE SODIUM 40 MILLIGRAM(S): 20 TABLET, DELAYED RELEASE ORAL at 05:05

## 2020-11-25 RX ADMIN — Medication 2 MILLIGRAM(S): at 17:23

## 2020-11-25 RX ADMIN — ONDANSETRON 4 MILLIGRAM(S): 8 TABLET, FILM COATED ORAL at 22:18

## 2020-11-25 RX ADMIN — Medication 1 EACH: at 20:37

## 2020-11-25 RX ADMIN — ENOXAPARIN SODIUM 40 MILLIGRAM(S): 100 INJECTION SUBCUTANEOUS at 11:44

## 2020-11-25 NOTE — PROGRESS NOTE ADULT - SUBJECTIVE AND OBJECTIVE BOX
Patient is a 45y old  Female who presents with a chief complaint of abdominal wound drainage (25 Nov 2020 03:17)  pt seen and evaluated   on TPN cycled    ICU Vital Signs Last 24 Hrs  T(C): 36.4 (25 Nov 2020 14:17), Max: 36.4 (24 Nov 2020 21:23)  T(F): 97.6 (25 Nov 2020 14:17), Max: 97.6 (25 Nov 2020 14:17)  HR: 80 (25 Nov 2020 14:17) (74 - 82)  BP: 119/60 (25 Nov 2020 14:17) (100/58 - 119/60)  RR: 20 (25 Nov 2020 14:17) (18 - 20)    Drug Dosing Weight  Height (cm): 160 (19 Nov 2020 18:58)  Weight (kg): 54.6 (19 Nov 2020 18:58)  BMI (kg/m2): 21.3 (19 Nov 2020 18:58)  BSA (m2): 1.56 (19 Nov 2020 18:58)    I&O's Detail    24 Nov 2020 07:01  -  25 Nov 2020 07:00  --------------------------------------------------------  IN:    Fat Emulsion (Fish Oil &amp; Plant Based) 20% Infusion: 271.7 mL    TPN (Total Parenteral Nutrition): 2401 mL  Total IN: 2672.7 mL    OUT:    Drain (mL): 5 mL    Voided (mL): 1375 mL  Total OUT: 1380 mL    Total NET: 1292.7 mL      25 Nov 2020 07:01  -  25 Nov 2020 16:37  --------------------------------------------------------  IN:    Fat Emulsion (Fish Oil &amp; Plant Based) 20% Infusion: 20.9 mL    TPN (Total Parenteral Nutrition): 776 mL  Total IN: 796.9 mL    OUT:    Voided (mL): 700 mL  Total OUT: 700 mL    Total NET: 96.9 mL    PHYSICAL EXAM:  Constitutional:    alert and awake talkative  Gastrointestinal: + abd pain  soft +ECF ostomy  in place, + yellow liquid output  IV access:  left picc line DL site c/d/i  no LE edema  MEDICATIONS  (STANDING):  enoxaparin Injectable 40 milliGRAM(s) SubCutaneous daily  loperamide 2 milliGRAM(s) Oral two times a day  pantoprazole    Tablet 40 milliGRAM(s) Oral before breakfast  Parenteral Nutrition - Adult 1 Each TPN Continuous <Continuous>  Parenteral Nutrition - Adult 1 Each TPN Continuous <Continuous>  senna 1 Tablet(s) Oral daily      Diet, NPO:   Except Medications (11-19-20 @ 16:41)      LABS  11-24    135  |  102  |  11  ----------------------------<  114<H>  3.8   |  24  |  <0.5<L>    Ca    8.4<L>      24 Nov 2020 07:53  Phos  3.5     11-24  Mg     2.1     11-24

## 2020-11-25 NOTE — PROGRESS NOTE ADULT - ATTENDING COMMENTS
44yo female with PSHx PAULIE/BSO 8/19/2020 for fibroids admitted for persistent small bowel obstruction s/p laparoscopic NARCISO, laparotomy, extensive NARCISO, small bowel resection with primary anastomosis 9/15 complicated by small bowel leak s/p re-exploration, wash out, small bowel resection and primary anastomosis 9/15. Patient underwent bilateral flank exploration of subcutaneous tissue due to concern for necrotizing infection. She was discharged to NH and then home.     At this time, patient returns for drainage from her wound. CT A/P 11/14 demonstrates lower intra-abdominal 3.3 x 2.4 x 2.4 cm rim-enhancing probable abscess, previously 5.8 x 3.3 x 2.8 cm, and healing post surgical changes of anterior abdominal wall, subcutaneous 2.6 cm pocket of air within the abdominal wall of indeterminate clinical significance. Incision and drainage of abdominal wall performed at bedside 11/16. Ostomy appliance was placed. Started on PPN 11/17 but patient developed diffuse urticaria. CT A/P 11/18 demonstrated resolving intra-abdominal fluid collection, no obvious communication between abdominal wall and small bowel. Started new formulary via Dr. Fortune 11/19. NPO, IV fluids. Will monitor quantity and quality of output, determine discharge diet versus IV nutrition. Possible CT A/P with wound exploration. Encourage ambulation/OOB, incentive spirometer, DVT ppx. 46yo female with PSHx PAULIE/BSO 8/19/2020 for fibroids admitted for persistent small bowel obstruction s/p laparoscopic NARCISO, laparotomy, extensive NARCISO, small bowel resection with primary anastomosis 9/15 complicated by small bowel leak s/p re-exploration, wash out, small bowel resection and primary anastomosis 9/15. Patient underwent bilateral flank exploration of subcutaneous tissue due to concern for necrotizing infection. She was discharged to NH and then home.     At this time, patient returns for drainage from her wound. CT A/P 11/14 demonstrates lower intra-abdominal 3.3 x 2.4 x 2.4 cm rim-enhancing probable abscess, previously 5.8 x 3.3 x 2.8 cm, and healing post surgical changes of anterior abdominal wall, subcutaneous 2.6 cm pocket of air within the abdominal wall of indeterminate clinical significance. Incision and drainage of abdominal wall performed at bedside 11/16. Ostomy appliance was placed. Started on PPN 11/17 but patient developed diffuse urticaria. CT A/P 11/18 demonstrated resolving intra-abdominal fluid collection, no obvious communication between abdominal wall and small bowel. Started new formulary via Dr. Fortune 11/19. NPO, IV fluids, Loperamide started. Outputs have been 0-5cc/24h. Will continue to monitor quantity and quality of output, determine discharge diet versus IV nutrition. Possible CT A/P tomorrow to determine location of fistula. Encourage ambulation/OOB, incentive spirometer, DVT ppx.

## 2020-11-25 NOTE — PROGRESS NOTE ADULT - ASSESSMENT
ASSESSMENT/PLAN  45y Female patient admitted for abdominal wound drainage, S/P I&D with a fecaloid secretion for distal aspect of surgical wound, likely ECF.  pt known to us from [facility] admission.  - severe protein calorie malnutrition, + protein deficit (BUN <3)    PLAN:  - TPN ordered for tonight /cycle order 16HRS  for tonight  - -check bmp/phos/mg and correct lytes IN AM since pt did not have lytes drawn today   - monitor and record ECF output  - to investigate location on fistula in GI tract, to confirm need for prolonged NPO (& therefore TPN)  - pt tolerates SMOF lipid without issue (she had tolerated it in Sept, as well)

## 2020-11-25 NOTE — PROGRESS NOTE ADULT - ASSESSMENT
Assessment:  45y Female patient admitted HD 12, admitted for enterocutaneous fistula with the above physical exam, labs, and imaging findings.    Plan:  - c/w TPN, lab [place] on Thursday  - monitor ECF output  - c/w Loperamide, reduced ECF output  - plan to get repeat CTAP w/Contrast through fistula on Friday  -Pain control as needed  -Hemodynamic monitoring as per routine  -Encourage ambulation and incentive spirometer use (10x/hr when awake)  -GI and DVT prophylaxis  -Check and replete CBC and BMP q daily  -Strict input and output monitoring  -Continue current management    Date/Time: 11-25-20 @ 03:18

## 2020-11-26 PROCEDURE — 99024 POSTOP FOLLOW-UP VISIT: CPT

## 2020-11-26 RX ORDER — I.V. FAT EMULSION 20 G/100ML
0.92 EMULSION INTRAVENOUS
Qty: 50.01 | Refills: 0 | Status: DISCONTINUED | OUTPATIENT
Start: 2020-11-26 | End: 2020-11-26

## 2020-11-26 RX ORDER — ELECTROLYTE SOLUTION,INJ
1 VIAL (ML) INTRAVENOUS
Refills: 0 | Status: DISCONTINUED | OUTPATIENT
Start: 2020-11-26 | End: 2020-11-26

## 2020-11-26 RX ADMIN — Medication 2 MILLIGRAM(S): at 05:15

## 2020-11-26 RX ADMIN — PANTOPRAZOLE SODIUM 40 MILLIGRAM(S): 20 TABLET, DELAYED RELEASE ORAL at 05:15

## 2020-11-26 RX ADMIN — Medication 2 MILLIGRAM(S): at 17:15

## 2020-11-26 RX ADMIN — Medication 1 EACH: at 20:01

## 2020-11-26 RX ADMIN — I.V. FAT EMULSION 20.8 GM/KG/DAY: 20 EMULSION INTRAVENOUS at 20:01

## 2020-11-26 NOTE — PROGRESS NOTE ADULT - SUBJECTIVE AND OBJECTIVE BOX
GENERAL SURGERY PROGRESS NOTE     GLORIA SCHAFER  45y  Female  Hospital day :11d      EVENTS IN THE LAST 24 HRS: No acute events, patient stable, on NPO, ECF no output.     T(F): 97.4 (11-25-20 @ 21:18), Max: 97.6 (11-25-20 @ 14:17)  HR: 75 (11-25-20 @ 21:18) (75 - 82)  BP: 100/62 (11-25-20 @ 21:18) (100/58 - 119/60)    RR: 18 (11-25-20 @ 21:18) (18 - 20)      PHYSICAL EXAM:  GENERAL: NAD, well-appearing  CHEST/LUNG: Clear to auscultation bilaterally  HEART: Regular rate and rhythm  ABDOMEN: Soft, Nontender, Nondistended; ECF with ostomy bag, no output  EXTREMITIES:  No clubbing, cyanosis, or edema      DIET/FLUIDS: Parenteral Nutrition - Adult 1 Each TPN Continuous <Continuous>  Parenteral Nutrition - Adult 1 Each TPN Continuous <Continuous>    NG:                                                                                DRAINS:   11-24-20 @ 07:01  -  11-25-20 @ 07:00  --------------------------------------------------------  OUT: 5 mL        GI proph:  pantoprazole    Tablet 40 milliGRAM(s) Oral before breakfast    AC/ proph: enoxaparin Injectable 40 milliGRAM(s) SubCutaneous daily    ABx:         LABS  Labs:  CAPILLARY BLOOD GLUCOSE              11-24    135  |  102  |  11  ----------------------------<  114<H>  3.8   |  24  |  <0.5<L>

## 2020-11-26 NOTE — PROGRESS NOTE ADULT - ATTENDING COMMENTS
46yo female with PSHx PAULIE/BSO 8/19/2020 for fibroids admitted for persistent small bowel obstruction s/p laparoscopic NARCISO, laparotomy, extensive NARCISO, small bowel resection with primary anastomosis 9/15 complicated by small bowel leak s/p re-exploration, wash out, small bowel resection and primary anastomosis 9/15. Patient underwent bilateral flank exploration of subcutaneous tissue due to concern for necrotizing infection. She was discharged to NH and then home.     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 CT A/P 11/14 demonstrates lower intra-abdominal 3.3 x 2.4 x 2.4 cm rim-enhancing probable abscess, previously 5.8 x 3.3 x 2.8 cm, and healing post surgical changes of anterior abdominal wall, subcutaneous 2.6 cm pocket of air within the abdominal wall of indeterminate clinical significance. Incision and drainage of abdominal wall performed at bedside 11/16. Ostomy appliance was placed. Started on PPN 11/17 but patient developed diffuse urticaria. CT A/P 11/18 demonstrated resolving intra-abdominal fluid collection, no obvious communication between abdominal wall and small bowel. Started new formulary via Dr. Fortune 11/19. NPO, IV fluids, Loperamide started. Outputs have been 0-5cc/24h. Will continue to monitor quantity and quality of output, determine discharge diet versus IV nutrition. Possible CT A/P tomorrow to determine location of fistula. Encourage ambulation/OOB, incentive spirometer, DVT ppx.

## 2020-11-26 NOTE — PROGRESS NOTE ADULT - ASSESSMENT
45y Female patient admitted HD 13, admitted for enterocutaneous fistula with the above physical exam, labs, and imaging findings.    Plan:    - C/w TPN, NPO  - Monitor ECF output  - C/w Loperamide, reduced ECF output  - Plan to get fistulogram under fluoroscope on 11/27/20  - Pain control as needed  - Hemodynamic monitoring as per routine  - Encourage ambulation and incentive spirometer use (10x/hr when awake)  - GI and DVT prophylaxis  - Check and replete CBC and BMP   - Strict input and output monitoring  - Continue current management

## 2020-11-27 LAB
GLUCOSE BLDC GLUCOMTR-MCNC: 107 MG/DL — HIGH (ref 70–99)
GLUCOSE BLDC GLUCOMTR-MCNC: 81 MG/DL — SIGNIFICANT CHANGE UP (ref 70–99)
GLUCOSE BLDC GLUCOMTR-MCNC: 89 MG/DL — SIGNIFICANT CHANGE UP (ref 70–99)
GLUCOSE BLDC GLUCOMTR-MCNC: 92 MG/DL — SIGNIFICANT CHANGE UP (ref 70–99)

## 2020-11-27 PROCEDURE — 74176 CT ABD & PELVIS W/O CONTRAST: CPT | Mod: 26

## 2020-11-27 PROCEDURE — 76000 FLUOROSCOPY <1 HR PHYS/QHP: CPT | Mod: 26

## 2020-11-27 PROCEDURE — 99024 POSTOP FOLLOW-UP VISIT: CPT

## 2020-11-27 RX ORDER — ELECTROLYTE SOLUTION,INJ
1 VIAL (ML) INTRAVENOUS
Refills: 0 | Status: DISCONTINUED | OUTPATIENT
Start: 2020-11-27 | End: 2020-11-27

## 2020-11-27 RX ORDER — DEXTROSE 10 % IN WATER 10 %
1000 INTRAVENOUS SOLUTION INTRAVENOUS
Refills: 0 | Status: DISCONTINUED | OUTPATIENT
Start: 2020-11-27 | End: 2020-11-27

## 2020-11-27 RX ORDER — ELECTROLYTE SOLUTION,INJ
1 VIAL (ML) INTRAVENOUS
Refills: 0 | Status: DISCONTINUED | OUTPATIENT
Start: 2020-11-28 | End: 2020-11-28

## 2020-11-27 RX ORDER — ELECTROLYTE SOLUTION,INJ
1 VIAL (ML) INTRAVENOUS
Refills: 0 | Status: DISCONTINUED | OUTPATIENT
Start: 2020-11-29 | End: 2020-11-29

## 2020-11-27 RX ADMIN — PANTOPRAZOLE SODIUM 40 MILLIGRAM(S): 20 TABLET, DELAYED RELEASE ORAL at 05:06

## 2020-11-27 RX ADMIN — Medication 2 MILLIGRAM(S): at 05:06

## 2020-11-27 RX ADMIN — Medication 50 MILLILITER(S): at 12:05

## 2020-11-27 RX ADMIN — Medication 1 EACH: at 21:11

## 2020-11-27 RX ADMIN — Medication 2 MILLIGRAM(S): at 18:58

## 2020-11-27 NOTE — PROGRESS NOTE ADULT - SUBJECTIVE AND OBJECTIVE BOX
Patient is a 45y old  Female who presents with a chief complaint of abdominal wound drainage (27 Nov 2020 00:51)  pt seen and evaluated   npo on TPN cycled    ICU Vital Signs Last 24 Hrs  T(C): 36.1 (27 Nov 2020 09:30), Max: 36.2 (27 Nov 2020 05:00)  T(F): 97 (27 Nov 2020 09:30), Max: 97.1 (27 Nov 2020 05:00)  HR: 83 (27 Nov 2020 09:30) (66 - 97)  BP: 100/59 (27 Nov 2020 09:30) (94/61 - 10mean): --  RR: 18 (27 Nov 2020 09:30) (18 - 18)    Drug Dosing Weight  Height (cm): 160 (19 Nov 2020 18:58)  Weight (kg): 54.6 (19 Nov 2020 18:58)  BMI (kg/m2): 21.3 (19 Nov 2020 18:58)  BSA (m2): 1.56 (19 Nov 2020 18:58)    I&O's Detail    26 Nov 2020 07:01  -  27 Nov 2020 07:00  --------------------------------------------------------  IN:    Fat Emulsion (Fish Oil &amp; Plant Based) 20% Infusion: 228.8 mL    TPN (Total Parenteral Nutrition): 2076 mL  Total IN: 2304.8 mL    OUT:    Drain (mL): 0 mL    Voided (mL): 1700 mL  Total OUT: 1700 mL    Total NET: 604.8 mL      27 Nov 2020 07:01  -  27 Nov 2020 16:18  --------------------------------------------------------  IN:  Total IN: 0 mL    OUT:    Voided (mL): 400 mL  Total OUT: 400 mL    Total NET: -400 mL    PHYSICAL EXAM:  Constitutional:  alert and awake talkative  Gastrointestinal: + abd pain  soft +ECF ostomy  in place, + yellow liquid output  IV access:  left picc line DL site c/d/i  no LE edema  MEDICATIONS  (STANDING):  enoxaparin Injectable 40 milliGRAM(s) SubCutaneous daily  loperamide 2 milliGRAM(s) Oral two times a day  pantoprazole    Tablet 40 milliGRAM(s) Oral before breakfast  Parenteral Nutrition - Adult 1 Each TPN Continuous <Continuous>  Parenteral Nutrition - Adult 1 Each TPN Continuous <Continuous>  senna 1 Tablet(s) Oral daily      Diet, NPO:   Except Medications (11-19-20 @ 16:41)  CAPILLARY BLOOD GLUCOSE  107 (27 Nov 2020 14:25)  89 (27 Nov 2020 11:45)  POCT Blood Glucose.: 107 mg/dL (27 Nov 2020 14:22)  POCT Blood Glucose.: 89 mg/dL (27 Nov 2020 11:44)   RADIOLOGY STUDIES  < from: CT Abdomen and Pelvis No Cont (11.27.20 @ 14:25) >    IMPRESSION:  Contrast material is present within the subcutaneous, left abdominal wall collection, that measures 3.5 x 4.7 cm. Communication between this collection and the peritoneal cavity or bowel loops is not demonstrated. No CT evidence of enterocutaneous fistula. Drain is noted in place.

## 2020-11-27 NOTE — PROGRESS NOTE ADULT - ASSESSMENT
ASSESSMENT/PLAN  45y Female patient admitted for abdominal wound drainage, S/P I&D with a fecaloid secretion for distal aspect of surgical wound, likely ECF.  pt known to us from prio admission.  - severe protein calorie malnutrition, + protein deficit (BUN <3)    PLAN:  - TPN ordered for tonight/SATURDAY/SUNDAY /cycle order DONE  - -check bmp/phos/mg and correct lytes IN AM since pt did not have lytes drawn today   - monitor and record ECF output  - to investigate location on fistula in GI tract, to confirm need for prolonged NPO (& therefore TPN)  - pt tolerates SMOF lipid without issue (she had tolerated it in Sept, as well)

## 2020-11-27 NOTE — PROGRESS NOTE ADULT - ASSESSMENT
A/P:  GLORIA SCHAFER is a 45yFemale HD#14 Patient doing well. Declines pain, passing gas/BM. May go for fistulogram today. Patient refusing labs, but aware that she we need new labs soon, agreed.    Plan:   output tracking  vitals  f/u possible fistulogram today.  OOB  IS

## 2020-11-27 NOTE — PROGRESS NOTE ADULT - SUBJECTIVE AND OBJECTIVE BOX
GENERAL SURGERY PROGRESS NOTE     GLORIA SCHAFER  [address] [place] [address] day :12d  POD:  Procedure: Midline catheter insertion      Surgical Attending: Mariann Rubio  Overnight events: No acute events overnight. Patient found in NAD.     T(F): 96.7 (11-26-20 @ 21:32), Max: 97.8 (11-26-20 @ 13:02)  HR: 97 (11-26-20 @ 21:32) (74 - 97)  BP: 94/61 (11-26-20 @ 21:32) (86/54 - 94/61)  ABP: --  ABP(mean): --  RR: 18 (11-26-20 @ 21:32) (18 - 18)  SpO2: --      11-25-20 @ 07:01  -  11-26-20 @ 07:00  --------------------------------------------------------  IN:    Fat Emulsion (Fish Oil &amp; Plant Based) 20% Infusion: 20.9 mL    Oral Fluid: 360 mL    TPN (Total Parenteral Nutrition): 2355 mL  Total IN: 2735.9 mL    OUT:    Drain (mL): 0 mL    Voided (mL): 1300 mL  Total OUT: 1300 mL    Total NET: 1435.9 mL      11-26-20 @ 07:01  -  11-27-20 @ 00:51  --------------------------------------------------------  IN:    Fat Emulsion (Fish Oil &amp; Plant Based) 20% Infusion: 104 mL    TPN (Total Parenteral Nutrition): 1224 mL  Total IN: 1328 mL    OUT:    Drain (mL): 0 mL    Voided (mL): 900 mL  Total OUT: 900 mL    Total NET: 428 mL        DIET/FLUIDS: fat emulsion (Fish Oil and Plant Based) 20% Infusion 0.916 Gm/kG/Day IV Continuous <Continuous>  Parenteral Nutrition - Adult 1 Each TPN Continuous <Continuous>  Parenteral Nutrition - Adult 1 Each TPN Continuous <Continuous>    NG:                                                                                DRAINS:   11-25-20 @ 07:01  -  11-26-20 @ 07:00  --------------------------------------------------------  OUT: 0 mL       GI proph:  pantoprazole    Tablet 40 milliGRAM(s) Oral before breakfast    AC/ proph: enoxaparin Injectable 40 milliGRAM(s) SubCutaneous daily    ABx:     GEN: NAD,   HEENT: NC/AT  CHEST: Symmetric chest wall expansion. Regular heart rate  ABD: S/D, non-tender, fistula covered over with ostomy bag, with yellow fluid output.    LABS  Labs:  CAPILLARY BLOOD GLUCOSE                  RADIOLOGY & ADDITIONAL TESTS:

## 2020-11-27 NOTE — PROGRESS NOTE ADULT - ATTENDING COMMENTS
44yo female with PSHx PAULIE/BSO 8/19/2020 for fibroids admitted for persistent small bowel obstruction s/p laparoscopic NARCISO, laparotomy, extensive NARCISO, small bowel resection with primary anastomosis 9/15 complicated by small bowel leak s/p re-exploration, wash out, small bowel resection and primary anastomosis 9/15. Patient underwent bilateral flank exploration of subcutaneous tissue due to concern for necrotizing infection. She was discharged to NH and then home.     At this time, patient returns for drainage from her wound. CT A/P 11/14 demonstrates lower intra-abdominal 3.3 x 2.4 x 2.4 cm rim-enhancing probable abscess, previously 5.8 x 3.3 x 2.8 cm, and healing post surgical changes of anterior abdominal wall, subcutaneous 2.6 cm pocket of air within the abdominal wall of indeterminate clinical significance. Incision and drainage of abdominal wall performed at bedside 11/16. Ostomy appliance was placed. Started on PPN 11/17 but patient developed diffuse urticaria. CT A/P 11/18 demonstrated resolving intra-abdominal fluid collection, no obvious communication between abdominal wall and small bowel. Started new formulary via Dr. Fortune 11/19. NPO, IV fluids, Loperamide started. Outputs have been 0-5cc/24h. Will continue to monitor quantity and quality of output, determine discharge diet versus IV nutrition. Possible CT A/P today to determine location of fistula. Patient refusing labs and Lovenox. Will plan for discharge on TPN. Encourage ambulation/OOB, incentive spirometer, DVT ppx.

## 2020-11-28 PROCEDURE — 99231 SBSQ HOSP IP/OBS SF/LOW 25: CPT | Mod: 24

## 2020-11-28 RX ORDER — SODIUM CHLORIDE 9 MG/ML
1000 INJECTION, SOLUTION INTRAVENOUS
Refills: 0 | Status: DISCONTINUED | OUTPATIENT
Start: 2020-11-28 | End: 2020-11-30

## 2020-11-28 RX ORDER — I.V. FAT EMULSION 20 G/100ML
0.92 EMULSION INTRAVENOUS
Qty: 50.01 | Refills: 0 | Status: DISCONTINUED | OUTPATIENT
Start: 2020-11-28 | End: 2020-11-28

## 2020-11-28 RX ORDER — SODIUM CHLORIDE 9 MG/ML
1000 INJECTION, SOLUTION INTRAVENOUS
Refills: 0 | Status: DISCONTINUED | OUTPATIENT
Start: 2020-11-28 | End: 2020-11-28

## 2020-11-28 RX ADMIN — PANTOPRAZOLE SODIUM 40 MILLIGRAM(S): 20 TABLET, DELAYED RELEASE ORAL at 05:05

## 2020-11-28 RX ADMIN — Medication 2 MILLIGRAM(S): at 05:05

## 2020-11-28 RX ADMIN — SODIUM CHLORIDE 100 MILLILITER(S): 9 INJECTION, SOLUTION INTRAVENOUS at 21:34

## 2020-11-28 RX ADMIN — Medication 2 MILLIGRAM(S): at 17:54

## 2020-11-28 NOTE — PROGRESS NOTE ADULT - ASSESSMENT
A/P:  GLORIA SCHAFER is a 45yFemale, Patient doing well. Declines pain, passing gas/BM. May go for fistulogram today. Patient refusing labs, but aware that she we need new labs soon, agreed.    Plan:   output tracking  Monitor vitals  f/u possible fistulogram   OOB  IS  replete labs as needed

## 2020-11-28 NOTE — PROGRESS NOTE ADULT - SUBJECTIVE AND OBJECTIVE BOX
GENERAL SURGERY PROGRESS NOTE     GLORIA SCHAFER  [address] [place] [address] day :15  Procedure: Midline catheter insertion      Surgical Attending: Mariann Rubio  Overnight events: No acute events overnight. Patient found in NAD.     T(C): 36.3 (11-28-20 @ 04:42), Max: 36.5 (11-27-20 @ 21:00)  HR: 82 (11-28-20 @ 04:42) (76 - 83)  BP: 89/54 (11-28-20 @ 04:42) (89/50 - 100/59)  RR: 18 (11-28-20 @ 04:42) (18 - 18)  SpO2: --  --------------------------------------------------------  IN:    Fat Emulsion (Fish Oil &amp; Plant Based) 20% Infusion: 20.9 mL    Oral Fluid: 360 mL    TPN (Total Parenteral Nutrition): 2355 mL  Total IN: 2735.9 mL    OUT:    Drain (mL): 0 mL    Voided (mL): 1300 mL  Total OUT: 1300 mL    Total NET: 1435.9 mL      11-26-20 @ 07:01  -  11-27-20 @ 00:51  --------------------------------------------------------  IN:    Fat Emulsion (Fish Oil &amp; Plant Based) 20% Infusion: 104 mL    TPN (Total Parenteral Nutrition): 1224 mL  Total IN: 1328 mL    OUT:    Drain (mL): 0 mL    Voided (mL): 900 mL  Total OUT: 900 mL    Total NET: 428 mL        DIET/FLUIDS: fat emulsion (Fish Oil and Plant Based) 20% Infusion 0.916 Gm/kG/Day IV Continuous <Continuous>  Parenteral Nutrition - Adult 1 Each TPN Continuous <Continuous>  Parenteral Nutrition - Adult 1 Each TPN Continuous <Continuous>    NG:                                                                                DRAINS:   11-25-20 @ 07:01  -  11-26-20 @ 07:00  --------------------------------------------------------  OUT: 0 mL       GI proph:  pantoprazole    Tablet 40 milliGRAM(s) Oral before breakfast    AC/ proph: enoxaparin Injectable 40 milliGRAM(s) SubCutaneous daily    ABx:     GEN: NAD,   HEENT: NC/AT  CHEST: Symmetric chest wall expansion. Regular heart rate  ABD: S/D, non-tender, fistula covered over with ostomy bag, with yellow fluid output.    LABS  Labs:  CAPILLARY BLOOD GLUCOSE                  RADIOLOGY & ADDITIONAL TESTS:      < from: Xray Abdomen w/ Fluoro (11.27.20 @ 18:43) >    FINDINGS/  IMPRESSION:    There is a contained, focal collection of contrast in the lower abdomen without evidence of extravasation or flow into the bowel loops.    < end of copied text >  < from: CT Abdomen and Pelvis No Cont (11.27.20 @ 14:25) >  IMPRESSION:    Contrast material is present within the subcutaneous, left abdominal wall collection, that measures 3.5 x 4.7 cm. Communication between this collection and the peritoneal cavity or bowel loops is not demonstrated. No CT evidence of enterocutaneous fistula. Drain is noted in place.    < end of copied text >

## 2020-11-28 NOTE — PROGRESS NOTE ADULT - ATTENDING COMMENTS
Patient seen and examined with surgery team on rounds and discussed management plans.  Patient comfortable tolerating diet no output in ostomy drainage. TPN being tapered.

## 2020-11-29 LAB
ANION GAP SERPL CALC-SCNC: 9 MMOL/L — SIGNIFICANT CHANGE UP (ref 7–14)
BUN SERPL-MCNC: 9 MG/DL — LOW (ref 10–20)
CALCIUM SERPL-MCNC: 8.5 MG/DL — SIGNIFICANT CHANGE UP (ref 8.5–10.1)
CHLORIDE SERPL-SCNC: 100 MMOL/L — SIGNIFICANT CHANGE UP (ref 98–110)
CO2 SERPL-SCNC: 22 MMOL/L — SIGNIFICANT CHANGE UP (ref 17–32)
CREAT SERPL-MCNC: <0.5 MG/DL — LOW (ref 0.7–1.5)
GLUCOSE SERPL-MCNC: 107 MG/DL — HIGH (ref 70–99)
HCT VFR BLD CALC: 34.6 % — LOW (ref 37–47)
HGB BLD-MCNC: 10.8 G/DL — LOW (ref 12–16)
MAGNESIUM SERPL-MCNC: 1.8 MG/DL — SIGNIFICANT CHANGE UP (ref 1.8–2.4)
MCHC RBC-ENTMCNC: 25.7 PG — LOW (ref 27–31)
MCHC RBC-ENTMCNC: 31.2 G/DL — LOW (ref 32–37)
MCV RBC AUTO: 82.4 FL — SIGNIFICANT CHANGE UP (ref 81–99)
NRBC # BLD: 0 /100 WBCS — SIGNIFICANT CHANGE UP (ref 0–0)
PHOSPHATE SERPL-MCNC: 4.9 MG/DL — SIGNIFICANT CHANGE UP (ref 2.1–4.9)
PLATELET # BLD AUTO: 290 K/UL — SIGNIFICANT CHANGE UP (ref 130–400)
POTASSIUM SERPL-MCNC: 3.6 MMOL/L — SIGNIFICANT CHANGE UP (ref 3.5–5)
POTASSIUM SERPL-SCNC: 3.6 MMOL/L — SIGNIFICANT CHANGE UP (ref 3.5–5)
RBC # BLD: 4.2 M/UL — SIGNIFICANT CHANGE UP (ref 4.2–5.4)
RBC # FLD: 15.7 % — HIGH (ref 11.5–14.5)
SODIUM SERPL-SCNC: 131 MMOL/L — LOW (ref 135–146)
WBC # BLD: 3.06 K/UL — LOW (ref 4.8–10.8)
WBC # FLD AUTO: 3.06 K/UL — LOW (ref 4.8–10.8)

## 2020-11-29 PROCEDURE — 99231 SBSQ HOSP IP/OBS SF/LOW 25: CPT | Mod: 24

## 2020-11-29 RX ORDER — I.V. FAT EMULSION 20 G/100ML
0.92 EMULSION INTRAVENOUS
Qty: 50.01 | Refills: 0 | Status: DISCONTINUED | OUTPATIENT
Start: 2020-11-29 | End: 2020-11-29

## 2020-11-29 RX ADMIN — SODIUM CHLORIDE 100 MILLILITER(S): 9 INJECTION, SOLUTION INTRAVENOUS at 11:18

## 2020-11-29 RX ADMIN — I.V. FAT EMULSION 20.8 GM/KG/DAY: 20 EMULSION INTRAVENOUS at 20:46

## 2020-11-29 RX ADMIN — Medication 1 EACH: at 20:46

## 2020-11-29 RX ADMIN — PANTOPRAZOLE SODIUM 40 MILLIGRAM(S): 20 TABLET, DELAYED RELEASE ORAL at 05:41

## 2020-11-29 RX ADMIN — Medication 2 MILLIGRAM(S): at 05:41

## 2020-11-29 NOTE — PROGRESS NOTE ADULT - ASSESSMENT
45y Female patient on HD 16, admitted for enterocutaneous fistula with the above physical exam, labs, and imaging findings.     Plan:    - Continue NPO  - Monitor ECF output  - C/w Loperamide, reduced ECF output  - Lab control  - Pain control as needed  - Hemodynamic monitoring as per routine  - Encourage ambulation and incentive spirometer use (10x/hr when awake)  - GI and DVT prophylaxis  - Check and replete CBC and BMP   - Strict input and output monitoring  - Dispo: Discharge home next week with TPN   - Continue current management

## 2020-11-29 NOTE — PROGRESS NOTE ADULT - SUBJECTIVE AND OBJECTIVE BOX
GENERAL SURGERY PROGRESS NOTE     GLORIA SCHAFER  45y  Female  Hospital day :14d  POD:  Procedure: Midline catheter insertion      EVENTS IN THE LAST 24 HRS: Patient stable, no acute events, on NPO, ECF without output.       T(F): 98.9 (11-28-20 @ 20:52), Max: 98.9 (11-28-20 @ 20:52)  HR: 80 (11-28-20 @ 20:52) (76 - 82)  BP: 93/54 (11-28-20 @ 20:52) (84/51 - 99/55)  RR: 18 (11-28-20 @ 20:52) (18 - 18)  SpO2: --    PHYSICAL EXAM:  GENERAL: NAD, well-appearing  CHEST/LUNG: Clear to auscultation bilaterally  HEART: Regular rate and rhythm  ABDOMEN: Soft, Nontender, Nondistended; ECF without output  EXTREMITIES:  No clubbing, cyanosis, or edema      DIET/FLUIDS: dextrose 5% + sodium chloride 0.45%. 1000 milliLiter(s) IV Continuous <Continuous>  fat emulsion (Fish Oil and Plant Based) 20% Infusion 0.916 Gm/kG/Day IV Continuous <Continuous>  Parenteral Nutrition - Adult 1 Each TPN Continuous <Continuous>  Parenteral Nutrition - Adult 1 Each TPN Continuous <Continuous>    NG:                                                                                DRAINS:   11-27-20 @ 07:01  -  11-28-20 @ 07:00  --------------------------------------------------------  OUT: 0 mL         GI proph:  pantoprazole    Tablet 40 milliGRAM(s) Oral before breakfast    AC/ proph: enoxaparin Injectable 40 milliGRAM(s) SubCutaneous daily    ABx:

## 2020-11-29 NOTE — PROGRESS NOTE ADULT - ATTENDING COMMENTS
Patient seen and examined with surgery team on rounds and discussed management plans. Clinically stable abd soft ostomy bag prest over drainage area Minimal brownish drainage only. remains on parentral nutrition. Plans for discharge in 24 hours with outpatient follow up

## 2020-11-30 ENCOUNTER — TRANSCRIPTION ENCOUNTER (OUTPATIENT)
Age: 45
End: 2020-11-30

## 2020-11-30 VITALS
SYSTOLIC BLOOD PRESSURE: 85 MMHG | TEMPERATURE: 98 F | RESPIRATION RATE: 16 BRPM | HEART RATE: 89 BPM | DIASTOLIC BLOOD PRESSURE: 51 MMHG

## 2020-11-30 LAB
ANION GAP SERPL CALC-SCNC: 7 MMOL/L — SIGNIFICANT CHANGE UP (ref 7–14)
BUN SERPL-MCNC: 10 MG/DL — SIGNIFICANT CHANGE UP (ref 10–20)
CALCIUM SERPL-MCNC: 8.1 MG/DL — LOW (ref 8.5–10.1)
CHLORIDE SERPL-SCNC: 104 MMOL/L — SIGNIFICANT CHANGE UP (ref 98–110)
CO2 SERPL-SCNC: 23 MMOL/L — SIGNIFICANT CHANGE UP (ref 17–32)
CREAT SERPL-MCNC: <0.5 MG/DL — LOW (ref 0.7–1.5)
GLUCOSE SERPL-MCNC: 164 MG/DL — HIGH (ref 70–99)
MAGNESIUM SERPL-MCNC: 2 MG/DL — SIGNIFICANT CHANGE UP (ref 1.8–2.4)
PHOSPHATE SERPL-MCNC: 3.3 MG/DL — SIGNIFICANT CHANGE UP (ref 2.1–4.9)
POTASSIUM SERPL-MCNC: 4 MMOL/L — SIGNIFICANT CHANGE UP (ref 3.5–5)
POTASSIUM SERPL-SCNC: 4 MMOL/L — SIGNIFICANT CHANGE UP (ref 3.5–5)
SODIUM SERPL-SCNC: 134 MMOL/L — LOW (ref 135–146)

## 2020-11-30 PROCEDURE — 99024 POSTOP FOLLOW-UP VISIT: CPT

## 2020-11-30 RX ORDER — LOPERAMIDE HCL 2 MG
1 TABLET ORAL
Qty: 28 | Refills: 0
Start: 2020-11-30 | End: 2020-12-13

## 2020-11-30 RX ORDER — LOPERAMIDE HCL 2 MG
1 TABLET ORAL
Qty: 0 | Refills: 0 | DISCHARGE
Start: 2020-11-30

## 2020-11-30 RX ORDER — MORPHINE SULFATE 50 MG/1
4 CAPSULE, EXTENDED RELEASE ORAL
Qty: 0 | Refills: 0 | DISCHARGE

## 2020-11-30 RX ADMIN — Medication 2 MILLIGRAM(S): at 17:47

## 2020-11-30 RX ADMIN — PANTOPRAZOLE SODIUM 40 MILLIGRAM(S): 20 TABLET, DELAYED RELEASE ORAL at 05:17

## 2020-11-30 RX ADMIN — Medication 2 MILLIGRAM(S): at 05:17

## 2020-11-30 NOTE — PROGRESS NOTE ADULT - SUBJECTIVE AND OBJECTIVE BOX
Progress Note: Surgery  Patient: GLORIA SCHAFER , 45y (1975)Female   MRN: [id number]  Location: [address]  Visit: 11-15-20 Inpatient  Date: 11-30-20 @ 02:25    Procedure/Diagnosis: HD 17 admitted for enterocutaneous fistula  Events over 24h: No acute event overnight. No new complaint. Pt is hemodynamically stable. NPO on TPN, minimal output form ECF. No abdominal pain, passing gas and having BM.    Vitals: T(F): 96.9 (11-30-20 @ 01:34), Max: 97.8 (11-29-20 @ 22:20)  HR: 66 (11-30-20 @ 01:34)  BP: 94/53 (11-30-20 @ 01:34) (90/51 - 94/53)  RR: 18 (11-30-20 @ 01:34)  SpO2: --      Diet: Diet, NPO:   Except Medications (11-19-20 @ 16:41)    IV Fluid: dextrose 5% + sodium chloride 0.45%. 1000 milliLiter(s) (100 mL/Hr) IV Continuous <Continuous>  fat emulsion (Fish Oil and Plant Based) 20% Infusion 0.916 Gm/kG/Day (20.8 mL/Hr) IV Continuous <Continuous>  Parenteral Nutrition - Adult 1 Each TPN Continuous <Continuous>      In:   11-28-20 @ 07:01  -  11-29-20 @ 07:00  --------------------------------------------------------  IN: 1434 mL    11-29-20 @ 07:01  -  11-30-20 @ 02:25  --------------------------------------------------------  IN: 1024.8 mL      Out:   11-28-20 @ 07:01  -  11-29-20 @ 07:00  --------------------------------------------------------  OUT:    Drain (mL): 0 mL    Voided (mL): 1000 mL  Total OUT: 1000 mL      11-29-20 @ 07:01  -  11-30-20 @ 02:25  --------------------------------------------------------  OUT:    Voided (mL): 2150 mL  Total OUT: 2150 mL        Net:   11-28-20 @ 07:01  -  11-29-20 @ 07:00  --------------------------------------------------------  NET: 434 mL    11-29-20 @ 07:01  -  11-30-20 @ 02:25  --------------------------------------------------------  NET: -1125.2 mL        Physical Examination:  General Appearance: NAD, alert and cooperative  Heart: S1 and S2. No murmurs. Rhythm is regular.  Lungs: Clear to auscultation BL without rales, rhonchi, wheezing, crackles or diminished breath sounds.  Abdomen: soft, nondistended, nontender. No rigidity, guarding, or rebound tenderness.      Medications: [Standing]  dextrose 5% + sodium chloride 0.45%. 1000 milliLiter(s) (100 mL/Hr) IV Continuous <Continuous>  enoxaparin Injectable 40 milliGRAM(s) SubCutaneous daily  fat emulsion (Fish Oil and Plant Based) 20% Infusion 0.916 Gm/kG/Day (20.8 mL/Hr) IV Continuous <Continuous>  loperamide 2 milliGRAM(s) Oral two times a day  pantoprazole    Tablet 40 milliGRAM(s) Oral before breakfast  Parenteral Nutrition - Adult 1 Each TPN Continuous <Continuous>  senna 1 Tablet(s) Oral daily    Medications:[PRN]  acetaminophen   Tablet .. 650 milliGRAM(s) Oral every 6 hours PRN  ondansetron Injectable 4 milliGRAM(s) IV Push every 6 hours PRN    Labs:                        10.8   3.06  )-----------( 290      ( 29 Nov 2020 05:28 )             34.6   11-29    131<L>  |  100  |  9<L>  ----------------------------<  107<H>  3.6   |  22  |  <0.5<L>    Ca    8.5      29 Nov 2020 05:28  Phos  4.9     11-29  Mg     1.8     11-29    Micro/Urine:    Imaging:  None/24h

## 2020-11-30 NOTE — PROGRESS NOTE ADULT - ASSESSMENT
ASSESSMENT/PLAN  45y Female patient admitted for abdominal wound drainage, S/P I&D with a fecaloid secretion for distal aspect of surgical wound, likely ECF.  [facility]  - severe protein calorie malnutrition, + protein deficit (BUN <3)    PLAN:  - HOME TPN done and fax to [facility]     - ASSESSMENT/PLAN  45y Female patient admitted for abdominal wound drainage, S/P I&D with a fecaloid secretion for distal aspect of surgical wound, likely ECF.    - severe protein calorie malnutrition, + protein deficit (BUN <3 on adm)    PLAN:  - HOME TPN discharge today  - HPN Rx faxed to [facility]  - outpt f/u in 2 weeks [phone]

## 2020-11-30 NOTE — CHART NOTE - NSCHARTNOTEFT_GEN_A_CORE
ECF pouch change:   	  -Gently remove pouch w/ water moistened gauze   -Cleanse ECF site with water moistened gauze, gently pat dry  -Apply 1/4 piece of [place] Adapt flat Ceraring  (#2547) within deep crease at 7 o'clock to even out pouching surface.   -Apply remainder of ring circumferentially around open wound bed where ECF located to further even out pouching surface & provide "wall" against ECF output  -Measure open wound bed where ECF located currently 1" diameter   -Trace and cut current size on [place] 2 1/4” CeraPlus flat barrier (#07147)  -Apply barrier to patient's skin  -Attach [place] 2 1/4” high putput pouch w/ spout  (#11237) onto barrier; connect pouch to bedside drainage  If pouch not connected to bedside drainage, empty pouch when 1/3 to no more than 1/2 full of effluent/flatus. Goal of pouch 7-10 days; change sooner for any leakage.     Plan of Care:  WOCN service to assist w/ ECF pouching prn. Questions or concerns or pouch w/ consistent leakage and unable to obtain seal, please consult WOCN, [employer] #[id number]. If pouch leaks after WOC service hours, please repouch using supplies and technique as indicated above and document where leakage occurred so system can be modified by WOCN if needed.

## 2020-11-30 NOTE — DISCHARGE NOTE PROVIDER - NSDCFUSCHEDAPPT_GEN_ALL_CORE_FT
GLORIA SCHAFER ; 12/02/2020 ; [facility] [address] GLORIA Mcbride ; 12/14/2020 ; NPP [facility] [address]

## 2020-11-30 NOTE — PROGRESS NOTE ADULT - ATTENDING COMMENTS
46yo female with PSHx PAULIE/BSO 8/19/2020 for fibroids admitted for persistent small bowel obstruction s/p laparoscopic NARCISO, laparotomy, extensive NARCISO, small bowel resection with primary anastomosis 9/15 complicated by small bowel leak s/p re-exploration, wash out, small bowel resection and primary anastomosis 9/15. Patient underwent bilateral flank exploration of subcutaneous tissue due to concern for necrotizing infection. She was discharged to NH and then home.     Patient was admitted 11/14/2020 for drainage from her wound, determined to be enterocutaneous fistula. CT A/P 11/14 demonstrates lower intra-abdominal 3.3 x 2.4 x 2.4 cm rim-enhancing probable abscess, previously 5.8 x 3.3 x 2.8 cm, and healing post surgical changes of anterior abdominal wall, subcutaneous 2.6 cm pocket of air within the abdominal wall of indeterminate clinical significance. Incision and drainage of abdominal wall performed at bedside 11/16. Ostomy appliance was placed. Started on PPN 11/17 but patient developed diffuse urticaria. CT A/P 11/18 demonstrated resolving intra-abdominal fluid collection, no obvious communication between abdominal wall and small bowel. Started new formulary via Dr. Fortune 11/19. NPO, IV fluids, Loperamide. Outputs have been 0-5cc/24h. Repeat CT A/P 11/27 unchanged. Discharge home with TPN and NPO except medications. Follow up in office in 1-2 weeks. Encourage ambulation/OOB, incentive spirometer, DVT ppx.

## 2020-11-30 NOTE — DISCHARGE NOTE PROVIDER - NSDCHHNEEDSERVICE_GEN_ALL_CORE
Ostomy care and management/Wound care and assessment/Other, specify.../Medication teaching and assessment

## 2020-11-30 NOTE — PROGRESS NOTE ADULT - NSHPATTENDINGPLANDISCUSS_GEN_ALL_CORE
Patient and team
patient
patient, surgery team
surgery team, patient

## 2020-11-30 NOTE — PROGRESS NOTE ADULT - SUBJECTIVE AND OBJECTIVE BOX
Patient is a 45y old  Female who presents with a chief complaint of abdominal wound drainage (30 Nov 2020 10:04)  pt seen and evaluated   on TPN cycled   poss. d/c home on Home TPN today  spoke in length to pt about her home tpn and lab work       ICU Vital Signs Last 24 Hrs  T(C): 36.2 (30 Nov 2020 09:21), Max: 36.6 (29 Nov 2020 22:20)  T(F): 97.1 (30 Nov 2020 09:21), Max: 97.8 (29 Nov 2020 22:20)  HR: 84 (30 Nov 2020 09:21) (66 - 89)  BP: 86/52 (30 Nov 2020 09:21) (86/52 - 94/53)  RR: 16 (30 Nov 2020 09:21) (16 - 18)  Drug Dosing Weight  Height (cm): 160 (19 Nov 2020 18:58)  Weight (kg): 54.6 (19 Nov 2020 18:58)  BMI (kg/m2): 21.3 (19 Nov 2020 18:58)  BSA (m2): 1.56 (19 Nov 2020 18:58)    I&O's Detail    29 Nov 2020 07:01  -  30 Nov 2020 07:00  --------------------------------------------------------  IN:    Fat Emulsion (Fish Oil &amp; Plant Based) 20% Infusion: 228.8 mL    TPN (Total Parenteral Nutrition): 1750 mL  Total IN: 1978.8 mL    OUT:    Voided (mL): 2650 mL  Total OUT: 2650 mL    Total NET: -671.2 mL      30 Nov 2020 07:01  -  30 Nov 2020 11:56  --------------------------------------------------------  IN:  Total IN: 0 mL    OUT:    Voided (mL): 900 mL  Total OUT: 900 mL    Total NET: -900 mL     PHYSICAL EXAM:  Constitutional:  alert and awake , talkative  Gastrointestinal:   soft +midline scar site c/d/i , +ECF bag in place no output noted  Extremities  no edema  IV access:  left picc line DL site c/d/i  MEDICATIONS  (STANDING):  dextrose 5% + sodium chloride 0.45%. 1000 milliLiter(s) (100 mL/Hr) IV Continuous <Continuous>  enoxaparin Injectable 40 milliGRAM(s) SubCutaneous daily  fat emulsion (Fish Oil and Plant Based) 20% Infusion 0.916 Gm/kG/Day (20.8 mL/Hr) IV Continuous <Continuous>  loperamide 2 milliGRAM(s) Oral two times a day  pantoprazole    Tablet 40 milliGRAM(s) Oral before breakfast  Parenteral Nutrition - Adult 1 Each TPN Continuous <Continuous>  senna 1 Tablet(s) Oral daily    Diet, NPO:   Except Medications (11-19-20 @ 16:41)      LABS  11-30    134<L>  |  104  |  10  ----------------------------<  164<H>  4.0   |  23  |  <0.5<L>    Ca    8.1<L>      30 Nov 2020 04:51  Phos  3.3     11-30  Mg     2.0     11-30                        10.8   3.06  )-----------( 290      ( 29 Nov 2020 05:28 )             34.6      RADIOLOGY STUDIES    < from: Xray Abdomen w/ Fluoro (11.27.20 @ 18:43) >  IMPRESSION:  There is a contained, focal collection of contrast in the lower abdomen without evidence of extravasation or flow into the bowel loops.           Patient is a 45y old  Female who presents with a chief complaint of abdominal wound drainage (30 Nov 2020 10:04)  pt seen and evaluated   pt has been refusing labwork frequently  on TPN cycled 12 h overnight   poss. d/c home on Home TPN today  spoke in length to pt about her home tpn and lab work & line care    ICU Vital Signs Last 24 Hrs  T(C): 36.2 (30 Nov 2020 09:21), Max: 36.6 (29 Nov 2020 22:20)  T(F): 97.1 (30 Nov 2020 09:21), Max: 97.8 (29 Nov 2020 22:20)  HR: 84 (30 Nov 2020 09:21) (66 - 89)  BP: 86/52 (30 Nov 2020 09:21) (86/52 - 94/53)  RR: 16 (30 Nov 2020 09:21) (16 - 18)  Drug Dosing Weight  Height (cm): 160 (19 Nov 2020 18:58)  Weight (kg): 54.6 (19 Nov 2020 18:58) - no recent wt  BMI (kg/m2): 21.3 (19 Nov 2020 18:58)  BSA (m2): 1.56 (19 Nov 2020 18:58)    I&O's Detail    29 Nov 2020 07:01  -  30 Nov 2020 07:00  --------------------------------------------------------  IN:    Fat Emulsion (Fish Oil &amp; Plant Based) 20% Infusion: 228.8 mL    TPN (Total Parenteral Nutrition): 1750 mL  Total IN: 1978.8 mL    OUT:    Voided (mL): 2650 mL  Total OUT: 2650 mL    Total NET: -671.2 mL     PHYSICAL EXAM:  Constitutional:  alert and awake , talkative  Gastrointestinal:   soft +midline scar site c/d/i , +ECF bag in place no output noted  Extremities  no edema  IV access: left picc line DL site c/d/i    MEDICATIONS  (STANDING):  enoxaparin Injectable 40 milliGRAM(s) SubCutaneous daily  fat emulsion (Fish Oil and Plant Based) 20% Infusion 0.916 Gm/kG/Day (20.8 mL/Hr) IV Continuous <Continuous>  loperamide 2 milliGRAM(s) Oral two times a day  pantoprazole    Tablet 40 milliGRAM(s) Oral before breakfast  Parenteral Nutrition - Adult 1 Each TPN Continuous <Continuous>  senna 1 Tablet(s) Oral daily    Diet, NPO:   Except Medications (11-19-20 @ 16:41)      LABS  11-30    134<L>  |  104  |  10  ----------------------------<  164<H>  4.0   |  23  |  <0.5<L>    Ca    8.1<L>      30 Nov 2020 04:51  Phos  3.3     11-30  Mg     2.0     11-30                        10.8   3.06  )-----------( 290      ( 29 Nov 2020 05:28 )             34.6      RADIOLOGY STUDIES    < from: Xray Abdomen w/ Fluoro (11.27.20 @ 18:43) >  IMPRESSION:  There is a contained, focal collection of contrast in the lower abdomen without evidence of extravasation or flow into the bowel loops.

## 2020-11-30 NOTE — CHART NOTE - NSCHARTNOTEFT_GEN_A_CORE
This is a note of medical necessity for ESTELLA SCHAFER.    This is a 45 year old patient with history of extensive surgical history and recent prolonged hospital course significant for PAULIE/BSO on 20 (Jesse) for 15cm bleeding fibroid with readmission x 2 for PSBO resolved with nonoperative management, s/p SBR, extensive NARCISO w/ 43cm ileum resected 2/2 serosal enterotomy 9/15 c/b RTOR for breakdown of proximal limb staple line of of anastomosis,  I&D of abdominal wound. 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 Throughout stay patient's diet was modified multiple times to attempt to control ECF output, ultimately patient made NPO with addition of multiple medications to minimize output. Patient is now on TPN. Additionally, patient has been evaluated by wound care specialists to determine optimal management of midline incision drainage.     At this time patient is ready for discharge as her vitals are stable, electrolyte derangements corrected with extensive work up of enterocutaneous fistula. Output is now minimal and being management by wound care nurse and is optimized to patient needs. Upon discharge patient will need the followin. VNS: wound care per wound care nurse for entercutaneous fistula   2. VNS: infusions service for continued TPN per nutrition team  3. Wheelchair: patient necessitates wheelchair for activities of daily living. Patient has issues with ambulation second to gait, imbalance and weakness second to her condition. Due to her extensive surgical history and complicated hospital course patient is unable to perform activities of daily living including moving through out home, dressing, bathing, obtaining necessities and going to vital follow up visits with multiple physicians. At this time patient needs a wheelchair upon discharge until condition improves. Patient's mobility limitation second to surgical incision location and post operative condition cannot be resolved with walker/cane at this time. Patient has sufficient upper extremity strength to use wheelchair. This medical device will be used on a daily basis to complete ADLs. Patient has agreed to the use of this wheelchair that is provided.

## 2020-11-30 NOTE — PROVIDER CONTACT NOTE (OTHER) - BACKGROUND
MD on floor/ made aware pt refusing to have seq stockings applied despite education
pt received ATC Tylenol as ordered at 0000
Pt ordered for Q 4 hour fingersticks
either use this IV or no IV".
pt has an order for q4h FS.
receive IVF and that it is the same rate as yesterday. IV is patent, flushes without difficulty and pt denies pain when RN flushes IV. pt is demanding that rate be lowered.

## 2020-11-30 NOTE — PROVIDER CONTACT NOTE (MEDICATION) - ACTION/TREATMENT ORDERED:
No intervention
PA states ok
provider aware of pt refusal, no further intervention
provider stated RN should give Metroprolol. provider aware pt refused Tylenol.
MD Will come speak with patient
provider aware and to speak with pt.

## 2020-11-30 NOTE — DISCHARGE NOTE PROVIDER - NSDCMRMEDTOKEN_GEN_ALL_CORE_FT
acetaminophen 325 mg oral tablet: 2 tab(s) orally every 6 hours, As needed, Moderate Pain (4 - 6)  loperamide 2 mg oral capsule: 1 cap(s) orally 2 times a day

## 2020-11-30 NOTE — DISCHARGE NOTE PROVIDER - NSDCCPCAREPLAN_GEN_ALL_CORE_FT
PRINCIPAL DISCHARGE DIAGNOSIS  Diagnosis: Enterocutaneous fistula  Assessment and Plan of Treatment:       SECONDARY DISCHARGE DIAGNOSES  Diagnosis: Abscess, intestinal  Assessment and Plan of Treatment:

## 2020-11-30 NOTE — DISCHARGE NOTE PROVIDER - PROVIDER TOKENS
PROVIDER:[TOKEN:[[id number]],FOLLOWUP:[2 weeks]],PROVIDER:[TOKEN:[[id number]],FOLLOWUP:[1 week]]

## 2020-11-30 NOTE — DISCHARGE NOTE PROVIDER - HOSPITAL COURSE
This is a 45 year old patient with history of extensive surgical history and recent prolonged hospital course significant for PAULIE/BSO on 20 (Jesse) for 15cm bleeding fibroid with readmission x 2 for PSBO resolved with nonoperative management, s/p SBR, extensive NARCISO w/ 43cm ileum resected 2/2 serosal enterotomy 9/15 c/b RTOR for breakdown of proximal limb staple line of of anastomosis,  I&D of abdominal wound. 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 Throughout stay patient's diet was modified multiple times to attempt to control ECF output, ultimately patient made NPO with addition of multiple medications to minimize output. Patient is now on TPN. Additionally, patient has been evaluated by wound care specialists to determine optimal management of midline incision drainage.     At this time patient is ready for discharge as her vitals are stable, electrolyte derangements corrected with extensive work up of enterocutaneous fistula. Output is now minimal and being management by wound care nurse and is optimized to patient needs. Upon discharge patient will need the followin. VNS: wound care per wound care nurse for entercutaneous fistula   2. VNS: infusions service for continued TPN per nutrition team  3. Wheelchair: patient necessitates wheelchair for activities of daily living

## 2020-11-30 NOTE — DISCHARGE NOTE PROVIDER - CARE PROVIDER_API CALL
Mariann Rubio  [facility]  [address] [place] [address], [address] [place] [address]  Phone: [phone]  Fax: [phone]  Follow Up Time: 2 weeks    Delma Fortune  [facility]  [address] [place] [address]  Phone: [phone]  Fax: [phone]  Follow Up Time: 1 week

## 2020-11-30 NOTE — DISCHARGE NOTE PROVIDER - NSDCFUADDINST_GEN_ALL_CORE_FT
You are being discharged from the hospital after being admitted for an enterocutaneous fistula  Continue with the Total Parental Nutrition as prescribed and indicated by Dr Fotrune.  Enterocutaneous fistula Care: continue with ostomy bag for quantify fistula output  VNS services was set up for the wound care and infusion services for continued TPN  Pain: Take ibuprofen, tylenol every 6 hours for pain as needed. You are also being discharge with Loperamide 2 mg every 12 hours. Please continue your own home medications as previously prescribed. Contact your primary care provider with any questions.   Activity: Start walking as soon as possible to increase circulation and prevent blood clots. You may take care of your personal needs as desired, however, no lifting or strenuous activity is allowed for 6 weeks following surgery. Please avoid heavy lifting (anything over 10 pounds).  Follow up: Please plan to follow up with Dr. Rubio in 2 weeks and Dr. Fortune in 1 week. Contact the office to confirm appointment. The phone number and address are available within discharge paperwork.  Please call the office or return to Emergency Department if you experience fever, shortness of breath, increasing abdominal pain, vomiting. Increasing output for the fistula

## 2020-11-30 NOTE — PROGRESS NOTE ADULT - ASSESSMENT
Assessment:  45y Female patient admitted HD 17 for enterocutaneous fistula, NPO on TPN with minimal output from ECF, with the above physical exam, labs, and imaging findings.    Plan:  - f/u CM/SW re TPN for home  - f/u with Dr. Bruno for official recommendations for home TPN  - f/u AM BMP  -Pain control as needed  -Hemodynamic monitoring as per routine  - likely d/c today pending home TPN set up    Date/Time: 11-30-20 @ 02:25

## 2020-12-09 ENCOUNTER — APPOINTMENT (OUTPATIENT)
Dept: SURGERY | Facility: CLINIC | Age: 45
End: 2020-12-09
Payer: MEDICAID

## 2020-12-09 VITALS
HEIGHT: 62 IN | DIASTOLIC BLOOD PRESSURE: 60 MMHG | HEART RATE: 105 BPM | BODY MASS INDEX: 20.61 KG/M2 | SYSTOLIC BLOOD PRESSURE: 92 MMHG | WEIGHT: 112 LBS | TEMPERATURE: 97.2 F

## 2020-12-09 DIAGNOSIS — K63.0 ABSCESS OF INTESTINE: ICD-10-CM

## 2020-12-09 DIAGNOSIS — R26.89 OTHER ABNORMALITIES OF GAIT AND MOBILITY: ICD-10-CM

## 2020-12-09 DIAGNOSIS — Z91.19 PATIENT'S NONCOMPLIANCE WITH OTHER MEDICAL TREATMENT AND REGIMEN: ICD-10-CM

## 2020-12-09 DIAGNOSIS — L02.211 CUTANEOUS ABSCESS OF ABDOMINAL WALL: ICD-10-CM

## 2020-12-09 DIAGNOSIS — E87.1 HYPO-OSMOLALITY AND HYPONATREMIA: ICD-10-CM

## 2020-12-09 DIAGNOSIS — T50.905A ADVERSE EFFECT OF UNSPECIFIED DRUGS, MEDICAMENTS AND BIOLOGICAL SUBSTANCES, INITIAL ENCOUNTER: ICD-10-CM
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
## 2020-12-09 DIAGNOSIS — L50.0 ALLERGIC URTICARIA: ICD-10-CM

## 2020-12-09 DIAGNOSIS — T81.83XA PERSISTENT POSTPROCEDURAL FISTULA, INITIAL ENCOUNTER: ICD-10-CM
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
## 2020-12-09 DIAGNOSIS — E87.6 HYPOKALEMIA: ICD-10-CM

## 2020-12-09 DIAGNOSIS — Z90.710 ACQUIRED ABSENCE OF BOTH CERVIX AND UTERUS: ICD-10-CM

## 2020-12-09 DIAGNOSIS — E43 UNSPECIFIED SEVERE PROTEIN-CALORIE MALNUTRITION: ICD-10-CM

## 2020-12-09 DIAGNOSIS — E86.9 VOLUME DEPLETION, UNSPECIFIED: ICD-10-CM

## 2020-12-09 DIAGNOSIS — E83.42 HYPOMAGNESEMIA: ICD-10-CM

## 2020-12-09 DIAGNOSIS — K91.89 OTHER POSTPROCEDURAL COMPLICATIONS AND DISORDERS OF DIGESTIVE SYSTEM: ICD-10-CM

## 2020-12-09 DIAGNOSIS — J45.909 UNSPECIFIED ASTHMA, UNCOMPLICATED: ICD-10-CM

## 2020-12-09 DIAGNOSIS — Z90.49 ACQUIRED ABSENCE OF OTHER SPECIFIED PARTS OF DIGESTIVE TRACT: ICD-10-CM
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
## 2020-12-09 PROCEDURE — 99024 POSTOP FOLLOW-UP VISIT: CPT

## 2020-12-09 NOTE — PHYSICAL EXAM
[Normal Breath Sounds] : Normal breath sounds [Normal Heart Sounds] : normal heart sounds [Alert] : alert [Calm] : calm [Depressed] : depressed [de-identified] : no acute distress [de-identified] : soft, right lateral wound closed and healed, left lateral wound skin edge closed and healed; midline wound with granulation tissue at inferior aspect, ostomy appliance in place with <5cc serous fluid in bag [de-identified] : wheelchair, full ROM bilateral UE

## 2020-12-09 NOTE — HISTORY OF PRESENT ILLNESS
[de-identified] : 44yo female with PMHx of fibroids s/p PAULIE BSO 8/19/2020 complicated by small bowel obstruction s/p exploratory laparotomy, small bowel resection 9/15/2020 complicated by leakage from staple line s/p exploratory laparotomy, small bowel resection 9/15, developed concern for necrotizing soft tissue infection of the abdominal wall s/p exploration of abdominal wall 9/23, prolonged hospitalization, then discharged to skilled nursing facility. She was re-admitted for enterocutaneous fistula. She was placed on antibiotics, TPN, Loperimide and made NPO. At this time, patient states that she is feeling better overall. Her pain is improving. She reports feeling extremely hungry when she is not receiving the TPN. She is having bowel function, occasional diarrhea. Denies nausea/vomiting. She undergoes wound care by visiting nurse. The last time the ostomy bag was changed was Saturday daily 12/5 and output has been minimal <5cc. She denies fever/chills, chest pain or shortness of breath.

## 2020-12-09 NOTE — ASSESSMENT
[FreeTextEntry1] : 46yo female with previous PAULIE BSO for fibroids 8/19/2020 complicated hospital course for small bowel obstruction s/p ex lap, small bowel resection 9/15 complicated by leak s/p re-exploration, small bowel resection 9/15, developed soft tissue infection of abdominal wall s/p abdominal wall exploration 9/23. Complicated by enteroatmospheric fistula now on TPN, NPO. \par -continue wound care as per VNS\par -continue NPO\par -TPN adjusted for hunger\par -ice chips okay\par -Rx Bactrim for possible UTI\par -return to office in 2 weeks\par -will consider starting PO at that time

## 2020-12-14 ENCOUNTER — APPOINTMENT (OUTPATIENT)
Dept: CARDIOLOGY | Facility: CLINIC | Age: 45
End: 2020-12-14

## 2020-12-23 ENCOUNTER — APPOINTMENT (OUTPATIENT)
Dept: SURGERY | Facility: CLINIC | Age: 45
End: 2020-12-23
Payer: MEDICAID

## 2020-12-23 VITALS
SYSTOLIC BLOOD PRESSURE: 112 MMHG | TEMPERATURE: 97.3 F | DIASTOLIC BLOOD PRESSURE: 70 MMHG | WEIGHT: 112 LBS | BODY MASS INDEX: 20.61 KG/M2 | HEIGHT: 62 IN | HEART RATE: 101 BPM

## 2020-12-23 PROCEDURE — 99213 OFFICE O/P EST LOW 20 MIN: CPT

## 2020-12-23 PROCEDURE — 99072 ADDL SUPL MATRL&STAF TM PHE: CPT

## 2020-12-24 NOTE — ASSESSMENT
[FreeTextEntry1] : 44yo female with previous PAULIE BSO for fibroids 8/19/2020 complicated hospital course for small bowel obstruction s/p ex lap, small bowel resection 9/15 complicated by leak s/p re-exploration, small bowel resection 9/15, developed soft tissue infection of abdominal wall s/p abdominal wall exploration 9/23. Complicated by enteroatmospheric fistula now on TPN, NPO. \par -continue wound care as per VNS\par -start 20oz of water per day for 1 week and monitor outputs\par -instructed to increase to 40 oz per day after 1 week if output continues to be minimal\par -continue TPN as per Dr. Fortune\par -ice chips okay\par -return to office in 2 weeks\par -consider CT A/P if pelvic pain continues

## 2020-12-24 NOTE — PHYSICAL EXAM
[Normal Breath Sounds] : Normal breath sounds [Normal Heart Sounds] : normal heart sounds [Alert] : alert [Calm] : calm [Depressed] : depressed [de-identified] : no acute distress [de-identified] : soft, right lateral wound closed and healed, left lateral wound skin edge closed and healed; midline wound with granulation tissue at inferior aspect, ostomy appliance in place with <5cc serous fluid in bag [de-identified] : wheelchair, full ROM bilateral UE

## 2020-12-24 NOTE — HISTORY OF PRESENT ILLNESS
[de-identified] : 46yo female with PMHx of fibroids s/p PAULIE BSO 8/19/2020 complicated by small bowel obstruction s/p exploratory laparotomy, small bowel resection 9/15/2020 complicated by leakage from staple line s/p exploratory laparotomy, small bowel resection 9/15, developed concern for necrotizing soft tissue infection of the abdominal wall s/p exploration of abdominal wall 9/23, prolonged hospitalization, then discharged to skilled nursing facility. She was re-admitted for enterocutaneous fistula. She was placed on antibiotics, TPN, Loperimide and made NPO. At this time, patient states that she is feeling better overall. Her pain is improving. She reports feeling hungry when she is not receiving the TPN, which is scheduled for 11 hours per day. She says her hunger is alleviated by salt tablets. She is having bowel function. Denies fever/chills, nausea/vomiting. She undergoes wound care by visiting nurse weekly and there is minimal output. She has had small amounts to drink but otherwise does not take much by PO. Patient also mentions pelvic pain that has persisted since she was last discharged. She took Bactrim without improvement.

## 2020-12-28 NOTE — ED PROVIDER NOTE - SCRIBE NAME
[Clinical] : TNM Stage: c [IV] : IV [TTNM] : x [NTNM] : x [MTNM] : 1 [de-identified] : 7555 [de-identified] : 2400 cGy [de-identified] : T4-T8 Spine Leydi Madsen

## 2021-01-06 ENCOUNTER — APPOINTMENT (OUTPATIENT)
Dept: SURGERY | Facility: CLINIC | Age: 46
End: 2021-01-06
Payer: MEDICAID

## 2021-01-06 VITALS
HEART RATE: 99 BPM | DIASTOLIC BLOOD PRESSURE: 60 MMHG | WEIGHT: 126 LBS | TEMPERATURE: 97.2 F | BODY MASS INDEX: 23.19 KG/M2 | SYSTOLIC BLOOD PRESSURE: 90 MMHG | HEIGHT: 62 IN

## 2021-01-06 PROCEDURE — 99072 ADDL SUPL MATRL&STAF TM PHE: CPT

## 2021-01-06 PROCEDURE — 99212 OFFICE O/P EST SF 10 MIN: CPT

## 2021-01-07 RX ORDER — SULFAMETHOXAZOLE AND TRIMETHOPRIM 800; 160 MG/1; MG/1
800-160 TABLET ORAL TWICE DAILY
Qty: 14 | Refills: 0 | Status: COMPLETED | COMMUNITY
Start: 2020-12-09 | End: 2021-01-07

## 2021-01-07 RX ORDER — SULFAMETHOXAZOLE AND TRIMETHOPRIM 800; 160 MG/1; MG/1
800-160 TABLET ORAL TWICE DAILY
Qty: 14 | Refills: 0 | Status: COMPLETED | COMMUNITY
Start: 2020-12-15 | End: 2021-01-07

## 2021-01-07 NOTE — PHYSICAL EXAM
[Normal Breath Sounds] : Normal breath sounds [Normal Heart Sounds] : normal heart sounds [Alert] : alert [Calm] : calm [Depressed] : depressed [de-identified] : no acute distress [de-identified] : soft, right lateral wound closed and healed, left lateral wound skin edge closed and healed; midline wound with granulation tissue, no ostomy appliance in place, serofibrinous drainage on wound [de-identified] : wheelchair, full ROM bilateral UE

## 2021-01-07 NOTE — HISTORY OF PRESENT ILLNESS
[de-identified] : 46yo female with PMHx of fibroids s/p PAULIE BSO 8/19/2020 complicated by small bowel obstruction s/p exploratory laparotomy, small bowel resection 9/15/2020 complicated by leakage from staple line s/p exploratory laparotomy, small bowel resection 9/15, developed concern for necrotizing soft tissue infection of the abdominal wall s/p exploration of abdominal wall 9/23, prolonged hospitalization, then discharged to skilled nursing facility. She was re-admitted for enterocutaneous fistula. She was placed on antibiotics, TPN, Loperimide and made NPO. At last visit, as output has continued to be 0cc, patient was instructed to drink 20oz of water per day x 1 week and then increase to 40 oz per day if continues to have no output.\par \par At this time, patient states that she is feeling well. She is ambulating at home without issue, but uses wheelchair while going out. She continues to have pain with urination and sometimes with bowel movements. Trial of Bactrim did not improve symptoms. She reports feeling hungry and admits to drinking more than 40 oz per day and trying broth as well. She continues to have 0cc output from the wound. The dressing is being changed by VNS daily and granulating well as per nurse. She continues to have TPN and is tolerating the infusions, but she complains of irritation from the left upper extremity PICC line site. She continues to take Loperamide daily. She continues to have bowel function. Denies fever/chills, nausea/vomiting, chest pain or shortness of breath.

## 2021-01-07 NOTE — ASSESSMENT
[FreeTextEntry1] : 46yo female with previous PAULIE BSO for fibroids 8/19/2020 complicated hospital course for small bowel obstruction s/p ex lap, small bowel resection 9/15 complicated by leak s/p re-exploration, small bowel resection 9/15, developed soft tissue infection of abdominal wall s/p abdominal wall exploration 9/23. Complicated by enteroatmospheric fistula now on TPN, tolerating CLD without increased output from fistula.\par -continue wound care as per VNS\par -progress diet to soft diet\par -continue Loperamide for now\par -will contact Dr. Fortune regarding TPN taper\par -return to office in 1 week\par -consider CT A/P if pelvic pain continues

## 2021-01-13 ENCOUNTER — APPOINTMENT (OUTPATIENT)
Dept: SURGERY | Facility: CLINIC | Age: 46
End: 2021-01-13
Payer: MEDICAID

## 2021-01-13 VITALS
HEIGHT: 62 IN | TEMPERATURE: 97.5 F | HEART RATE: 94 BPM | BODY MASS INDEX: 23 KG/M2 | DIASTOLIC BLOOD PRESSURE: 60 MMHG | SYSTOLIC BLOOD PRESSURE: 112 MMHG | WEIGHT: 125 LBS

## 2021-01-13 PROCEDURE — 99072 ADDL SUPL MATRL&STAF TM PHE: CPT

## 2021-01-13 PROCEDURE — 99212 OFFICE O/P EST SF 10 MIN: CPT

## 2021-01-13 NOTE — HISTORY OF PRESENT ILLNESS
[de-identified] : 46yo female with PMHx of fibroids s/p PAULIE BSO 8/19/2020 complicated by small bowel obstruction s/p exploratory laparotomy, small bowel resection 9/15/2020 complicated by leakage from staple line s/p exploratory laparotomy, small bowel resection 9/15, developed concern for necrotizing soft tissue infection of the abdominal wall s/p exploration of abdominal wall 9/23, prolonged hospitalization, then discharged to skilled nursing facility. She was re-admitted for enterocutaneous fistula. She was placed on antibiotics, TPN, Loperimide and made NPO. At last visit, as output has continued to be 0cc, wound has granulated well. She is undergoing wound care by VNS. She has been tolerating soft foods, but limited to small amounts as she is not very hungry. Saw Dr. Fortune 1/12 and will be tapering TPN to every other day. Patient complains of irritation and pain at the PICC line site, stating the dressings are hurting her skin. She also continues to have pelvic pain while having bowel movements and urinating. She has loose bowel movements. Denies fever/chills, nausea/vomiting, chest pain or shortness of breath.\par \par At this time, patient states that she is feeling well. She is ambulating at home without issue, but uses wheelchair while going out. She continues to have pain with urination and sometimes with bowel movements. Trial of Bactrim did not improve symptoms. She reports feeling hungry and admits to drinking more than 40 oz per day and trying broth as well. She continues to have 0cc output from the wound. The dressing is being changed by VNS daily and granulating well as per nurse. She continues to have TPN and is tolerating the infusions, but she complains of irritation from the left upper extremity PICC line site. She continues to take Loperamide daily. She continues to have bowel function. Denies fever/chills, nausea/vomiting, chest pain or shortness of breath.

## 2021-01-13 NOTE — ASSESSMENT
[FreeTextEntry1] : 46yo female with previous PAULIE BSO for fibroids 8/19/2020 complicated hospital course for small bowel obstruction s/p ex lap, small bowel resection 9/15 complicated by leak s/p re-exploration, small bowel resection 9/15, developed soft tissue infection of abdominal wall s/p abdominal wall exploration 9/23. Complicated by enteroatmospheric fistula now on TPN, tolerating soft diet without increased output from fistula.\par -continue wound care as per VNS\par -tolerating soft diet - advance as tolerated\par -continue Loperamide - decrease to once per day from BID\par -spoke with Dr. Fortune regarding TPN taper - patient will be following up in 2 weeks [address] Fortune\par -ordered CT A/P for evaluation of pelvic pain\par -return to office in 2 weeks

## 2021-01-13 NOTE — PHYSICAL EXAM
[Normal Breath Sounds] : Normal breath sounds [Normal Heart Sounds] : normal heart sounds [Alert] : alert [Calm] : calm [Depressed] : depressed [de-identified] : no acute distress [de-identified] : soft, right lateral wound closed and healed, left lateral wound skin edge closed and healed; midline wound with granulation tissue at inferior aspect, no expressible drainage, serofibrinous drainage on dressing [de-identified] : wheelchair, full ROM bilateral UE, PICC line in place LUE with skin irritation

## 2021-01-14 ENCOUNTER — NON-APPOINTMENT (OUTPATIENT)
Age: 46
End: 2021-01-14

## 2021-01-21 ENCOUNTER — NON-APPOINTMENT (OUTPATIENT)
Age: 46
End: 2021-01-21

## 2021-01-22 ENCOUNTER — OUTPATIENT (OUTPATIENT)
Dept: OUTPATIENT SERVICES | Facility: HOSPITAL | Age: 46
LOS: 1 days | Discharge: HOME | End: 2021-01-22
Payer: MEDICAID

## 2021-01-22 ENCOUNTER — RESULT REVIEW (OUTPATIENT)
Age: 46
End: 2021-01-22

## 2021-01-22 DIAGNOSIS — Z90.710 ACQUIRED ABSENCE OF BOTH CERVIX AND UTERUS: Chronic | ICD-10-CM

## 2021-01-22 DIAGNOSIS — Z90.79 ACQUIRED ABSENCE OF OTHER GENITAL ORGAN(S): Chronic | ICD-10-CM

## 2021-01-22 DIAGNOSIS — R10.9 UNSPECIFIED ABDOMINAL PAIN: ICD-10-CM

## 2021-01-22 DIAGNOSIS — Z90.49 ACQUIRED ABSENCE OF OTHER SPECIFIED PARTS OF DIGESTIVE TRACT: Chronic | ICD-10-CM

## 2021-01-22 PROCEDURE — 74177 CT ABD & PELVIS W/CONTRAST: CPT | Mod: 26

## 2021-01-26 LAB
ANION GAP SERPL CALC-SCNC: 12 MMOL/L
BUN SERPL-MCNC: 12 MG/DL
CALCIUM SERPL-MCNC: 9.3 MG/DL
CHLORIDE SERPL-SCNC: 104 MMOL/L
CO2 SERPL-SCNC: 24 MMOL/L
CREAT SERPL-MCNC: 0.7 MG/DL
GLUCOSE SERPL-MCNC: 89 MG/DL
POTASSIUM SERPL-SCNC: 4.3 MMOL/L
SODIUM SERPL-SCNC: 140 MMOL/L

## 2021-01-29 ENCOUNTER — APPOINTMENT (OUTPATIENT)
Dept: SURGERY | Facility: CLINIC | Age: 46
End: 2021-01-29
Payer: MEDICAID

## 2021-01-29 VITALS
HEART RATE: 90 BPM | OXYGEN SATURATION: 99 % | SYSTOLIC BLOOD PRESSURE: 122 MMHG | DIASTOLIC BLOOD PRESSURE: 66 MMHG | TEMPERATURE: 97.6 F | HEIGHT: 62 IN

## 2021-01-29 DIAGNOSIS — Z86.018 PERSONAL HISTORY OF OTHER BENIGN NEOPLASM: ICD-10-CM

## 2021-01-29 DIAGNOSIS — Z87.19 PERSONAL HISTORY OF OTHER DISEASES OF THE DIGESTIVE SYSTEM: ICD-10-CM

## 2021-01-29 PROCEDURE — 99212 OFFICE O/P EST SF 10 MIN: CPT

## 2021-01-29 PROCEDURE — 99072 ADDL SUPL MATRL&STAF TM PHE: CPT

## 2021-01-29 RX ORDER — HYDROCORTISONE 10 MG/G
1 CREAM TOPICAL TWICE DAILY
Qty: 1 | Refills: 3 | Status: ACTIVE | COMMUNITY
Start: 2021-01-29 | End: 1900-01-01

## 2021-02-01 ENCOUNTER — NON-APPOINTMENT (OUTPATIENT)
Age: 46
End: 2021-02-01

## 2021-02-02 PROBLEM — Z86.018 HISTORY OF FIBROIDS: Status: RESOLVED | Noted: 2020-08-18 | Resolved: 2021-01-13

## 2021-02-02 PROBLEM — Z86.018 HISTORY OF UTERINE LEIOMYOMA: Status: RESOLVED | Noted: 2020-09-11 | Resolved: 2021-02-02

## 2021-02-02 PROBLEM — Z87.19 HISTORY OF ILEUS: Status: RESOLVED | Noted: 2020-09-14 | Resolved: 2021-01-13

## 2021-02-02 NOTE — HISTORY OF PRESENT ILLNESS
[de-identified] : 44yo female with PMHx of fibroids s/p PAULIE BSO 8/19/2020 complicated by small bowel obstruction s/p exploratory laparotomy, small bowel resection 9/15/2020 complicated by leakage from staple line s/p exploratory laparotomy, small bowel resection 9/15, developed concern for necrotizing soft tissue infection of the abdominal wall s/p exploration of abdominal wall 9/23, prolonged hospitalization, then discharged to skilled nursing facility. She was re-admitted for enterocutaneous fistula. She was placed on antibiotics, TPN, Loperimide and made NPO. At last visit, as output has continued to be 0cc, wound has granulated well. She is undergoing wound care by [facility] and on her own. She has been tolerating soft foods. Saw Dr. Fortune 1/12 and will be tapering TPN to every other day, she is now off TPN completely and tolerating full diet. Patient complains of irritation and pain at the PICC line site, which has been removed. Pain has persisted and becomes difficult to move her left arm. She says the skin is irritated and itches, and that there are small bumps appearing throughout her arm. She also continues to have pelvic pain while having bowel movements and urinating. She has regular bowel movements. Activity is improving overall, still uses wheelchair sometimes but also walks around on her own. She continues to get fatigued easily. Denies fever/chills, nausea/vomiting, chest pain or shortness of breath.\par \par At this time, patient states that she is feeling well. She is ambulating at home without issue, but uses wheelchair while going out. She continues to have pain with urination and sometimes with bowel movements. Trial of Bactrim did not improve symptoms. She reports feeling hungry and admits to drinking more than 40 oz per day and trying broth as well. She continues to have 0cc output from the wound. The dressing is being changed by [facility] daily and granulating well as per nurse. She continues to have TPN and is tolerating the infusions, but she complains of irritation from the left upper extremity PICC line site. She continues to take Loperamide daily. She continues to have bowel function. Denies fever/chills, nausea/vomiting, chest pain or shortness of breath.

## 2021-02-02 NOTE — PHYSICAL EXAM
[Normal Breath Sounds] : Normal breath sounds [Normal Heart Sounds] : normal heart sounds [Alert] : alert [Calm] : calm [Depressed] : depressed [de-identified] : no acute distress [de-identified] : soft, right lateral wound closed and healed, left lateral wound skin edge closed and healed; midline wound with granulation tissue at inferior aspect 1x1cm, no expressible drainage, serofibrinous drainage on dressing [de-identified] : wheelchair, decreased LUE mobility, PICC line removed, skin irritated with irregular raised bumps diffusely, moderate swelling of LUE

## 2021-02-02 NOTE — ASSESSMENT
[FreeTextEntry1] : 44yo female with previous PAULIE BSO for fibroids 8/19/2020 complicated hospital course for small bowel obstruction s/p ex lap, small bowel resection 9/15 complicated by leak s/p re-exploration, small bowel resection 9/15, developed soft tissue infection of abdominal wall s/p abdominal wall exploration 9/23. Complicated by enteroatmospheric fistula treated non-operatively with TPN.\par -continue wound care as per [facility]\par -entercutaneous fistula - resolved, now tolerating soft diet - advance as tolerated; off TPN\par -LUE swelling and pain - recommend hydrocortisone cream, Rx written, LUE duplex US ordered to r/o DVT\par -continue Loperamide once per day\par -CT A/P reviewed - fluid collection improving, MRI recommended\par -MRI ordered\par -follow up in 2 weeks

## 2021-02-11 ENCOUNTER — OUTPATIENT (OUTPATIENT)
Dept: OUTPATIENT SERVICES | Facility: HOSPITAL | Age: 46
LOS: 1 days | Discharge: HOME | End: 2021-02-11
Payer: MEDICAID

## 2021-02-11 ENCOUNTER — RESULT REVIEW (OUTPATIENT)
Age: 46
End: 2021-02-11

## 2021-02-11 DIAGNOSIS — Z90.79 ACQUIRED ABSENCE OF OTHER GENITAL ORGAN(S): Chronic | ICD-10-CM

## 2021-02-11 DIAGNOSIS — R10.9 UNSPECIFIED ABDOMINAL PAIN: ICD-10-CM

## 2021-02-11 DIAGNOSIS — Z90.710 ACQUIRED ABSENCE OF BOTH CERVIX AND UTERUS: Chronic | ICD-10-CM

## 2021-02-11 DIAGNOSIS — Z90.49 ACQUIRED ABSENCE OF OTHER SPECIFIED PARTS OF DIGESTIVE TRACT: Chronic | ICD-10-CM

## 2021-02-11 PROCEDURE — 72197 MRI PELVIS W/O & W/DYE: CPT | Mod: 26

## 2021-02-11 PROCEDURE — 74183 MRI ABD W/O CNTR FLWD CNTR: CPT | Mod: 26

## 2021-02-11 PROCEDURE — 93971 EXTREMITY STUDY: CPT | Mod: 26,LT

## 2021-02-12 ENCOUNTER — APPOINTMENT (OUTPATIENT)
Dept: SURGERY | Facility: CLINIC | Age: 46
End: 2021-02-12

## 2021-02-12 ENCOUNTER — APPOINTMENT (OUTPATIENT)
Dept: SURGERY | Facility: CLINIC | Age: 46
End: 2021-02-12
Payer: MEDICAID

## 2021-02-12 VITALS
HEIGHT: 62 IN | SYSTOLIC BLOOD PRESSURE: 99 MMHG | HEART RATE: 84 BPM | DIASTOLIC BLOOD PRESSURE: 69 MMHG | TEMPERATURE: 98 F

## 2021-02-12 PROCEDURE — 99072 ADDL SUPL MATRL&STAF TM PHE: CPT

## 2021-02-12 PROCEDURE — 99212 OFFICE O/P EST SF 10 MIN: CPT

## 2021-02-12 RX ORDER — LOPERAMIDE HYDROCHLORIDE 2 MG/1
2 CAPSULE ORAL TWICE DAILY
Qty: 90 | Refills: 3 | Status: ACTIVE | COMMUNITY
Start: 2021-02-12 | End: 1900-01-01

## 2021-02-12 NOTE — HISTORY OF PRESENT ILLNESS
[de-identified] : 46yo female with PMHx of fibroids s/p PAULIE BSO 8/19/2020 complicated by small bowel obstruction s/p exploratory laparotomy, small bowel resection 9/15/2020 complicated by leakage from staple line s/p exploratory laparotomy, small bowel resection 9/15, developed concern for necrotizing soft tissue infection of the abdominal wall s/p exploration of abdominal wall 9/23, prolonged hospitalization, then discharged to skilled nursing facility. She was re-admitted for enterocutaneous fistula. She was placed on antibiotics, TPN, Loperimide and made NPO. Since then, she has been discharged and EA fistula has healed. Wound has granulated, no output. Patient is doing her own wound care. She has been tolerating soft foods - eating rice and soups. Last saw Dr. Fortune 1/12 - TPN has been discontinued, PICC line removed. At last visit, patient has been complaining of skin irritation and immobility of her arm. Duplex US was ordered at the last visit. Steroid cream was ordered as well. She says her skin is no longer irritating at this time but she is still having limited movement of her arm. She cannot even hold a cup. She also continues to have pelvic pain while having bowel movements and urinating. CT was unrevealing. MRI was ordered at last visit. She has regular bowel movements that have been loose. She is not taking Imodium or Loperamide anymore. Activity is improving overall, still uses wheelchair sometimes but also walks around on her own. She continues to get fatigued easily. Denies fever/chills, nausea/vomiting, chest pain or shortness of breath.

## 2021-02-12 NOTE — PHYSICAL EXAM
[Normal Breath Sounds] : Normal breath sounds [Normal Heart Sounds] : normal heart sounds [Alert] : alert [Calm] : calm [Depressed] : depressed [de-identified] : no acute distress [de-identified] : soft, right lateral wound closed and healed, left lateral wound skin edge closed and healed; midline wound with granulation tissue at inferior aspect 0.5x0.5cm, no expressible drainage, serofibrinous drainage on dressing [de-identified] : wheelchair, decreased LUE mobility, PICC line removed, skin irritated with irregular raised bumps diffusely, moderate swelling of LUE

## 2021-02-12 NOTE — ASSESSMENT
[FreeTextEntry1] : 46yo female with previous PAULIE BSO for fibroids 8/19/2020 complicated hospital course for small bowel obstruction s/p ex lap, small bowel resection 9/15 complicated by leak s/p re-exploration, small bowel resection 9/15, developed soft tissue infection of abdominal wall s/p abdominal wall exploration 9/23. Complicated by enteroatmospheric fistula treated non-operatively with TPN.\par -continue daily wound care by patient - healing granulation tissue\par -entercutaneous fistula - resolved, now tolerating soft diet - advance as tolerated; off TPN\par -diarrhea - Rx Loperamide BID \par -LUE swelling and pain - continue hydrocortisone cream PRN, LUE duplex negative, Physical therapy ordered\par -CT A/P reviewed - fluid collection improving\par -MRI reviewed - colitis\par -GI recommendations made for possible colonoscopy as there was pan-colitis found on MRI and patient has persistent diarrhea\par -consider pain management if negative colonoscopy and pain persists\par -follow up in 1 month

## 2021-03-12 ENCOUNTER — APPOINTMENT (OUTPATIENT)
Dept: SURGERY | Facility: CLINIC | Age: 46
End: 2021-03-12
Payer: MEDICAID

## 2021-03-12 VITALS
TEMPERATURE: 97.6 F | DIASTOLIC BLOOD PRESSURE: 70 MMHG | HEIGHT: 62 IN | SYSTOLIC BLOOD PRESSURE: 99 MMHG | HEART RATE: 84 BPM

## 2021-03-12 PROCEDURE — 99212 OFFICE O/P EST SF 10 MIN: CPT

## 2021-03-12 PROCEDURE — 99072 ADDL SUPL MATRL&STAF TM PHE: CPT

## 2021-03-12 NOTE — HISTORY OF PRESENT ILLNESS
Refill request received from pharmacy via [facility].   [de-identified] : 46yo female with PMHx of fibroids s/p PAULIE BSO 8/19/2020 complicated by small bowel obstruction s/p exploratory laparotomy, small bowel resection 9/15/2020 complicated by leakage from staple line s/p exploratory laparotomy, small bowel resection 9/15, developed concern for necrotizing soft tissue infection of the abdominal wall s/p exploration of abdominal wall 9/23, prolonged hospitalization, then discharged to skilled nursing facility. She was re-admitted for enterocutaneous fistula. She was placed on antibiotics, TPN, Loperimide and made NPO. Since then, she has been discharged and EA fistula has healed. Wound has granulated, no output, +minimal drainage from granulation tissue. Patient is doing her own wound care. She has been tolerating soft foods - eating rice and soups. Last saw Dr. Fortune 1/12 - TPN has been discontinued, PICC line removed. Skin irritation around PICC site has improved with steroid cream, but she complains of continued limited mobility of the left arm. Duplex was done and negative for DVT. She cannot even hold a cup. She also continues to have pelvic pain while having bowel movements and urinating. CT was unrevealing. MRI was showed colitis. She has regular bowel movements but complains that they are loose. She was taking Loperamide BID without improvement. Activity is limited due to abdominal pain as per patient. She still uses wheelchair when going outside of the house and admits she does not move very much at home, other than to go to the bathroom and other simple tasks. Denies fever/chills, nausea/vomiting, chest pain or shortness of breath.

## 2021-03-12 NOTE — PHYSICAL EXAM
[Normal Breath Sounds] : Normal breath sounds [Normal Heart Sounds] : normal heart sounds [Alert] : alert [Calm] : calm [Depressed] : depressed [de-identified] : no acute distress [de-identified] : soft, right lateral wound closed and healed, left lateral wound skin edge closed and healed; midline wound with extruding granulation tissue, no expressible drainage, serofibrinous drainage on dressing [de-identified] : wheelchair, decreased LUE mobility, PICC line removed, skin less irritated with irregular raised bumps diffusely, moderate swelling of LUE

## 2021-03-12 NOTE — ASSESSMENT
[FreeTextEntry1] : 46yo female with previous PAULIE BSO for fibroids 8/19/2020 complicated hospital course for small bowel obstruction s/p ex lap, small bowel resection 9/15 complicated by leak s/p re-exploration, small bowel resection 9/15, developed soft tissue infection of abdominal wall s/p abdominal wall exploration 9/23. Complicated by enteroatmospheric fistula treated non-operatively with TPN.\par -continue daily wound care by patient - healing granulation tissue\par -entercutaneous fistula - resolved, now tolerating soft diet - advance as tolerated; off TPN\par -silver nitrate applied to granulation tissue of abdominal wound\par -diarrhea - Loperamide BID did not help\par -LUE swelling and pain - continue hydrocortisone cream PRN, LUE duplex negative, Physical therapy ordered\par -CT A/P reviewed - fluid collection improving\par -MRI reviewed - colitis\par -GI - recommend GI consultation for evaluation of diarrhea and colitis. \par -recommend pain consult\par -physical therapy for arm and overall status\par -follow up in 3 month

## 2021-03-16 ENCOUNTER — NON-APPOINTMENT (OUTPATIENT)
Age: 46
End: 2021-03-16

## 2021-03-29 ENCOUNTER — APPOINTMENT (OUTPATIENT)
Dept: GASTROENTEROLOGY | Facility: CLINIC | Age: 46
End: 2021-03-29
Payer: MEDICAID

## 2021-03-29 PROCEDURE — 99204 OFFICE O/P NEW MOD 45 MIN: CPT | Mod: 95

## 2021-03-30 RX ORDER — HYDROCORTISONE 10 MG/G
1 CREAM TOPICAL
Qty: 28 | Refills: 0 | Status: ACTIVE | COMMUNITY
Start: 2021-01-29

## 2021-03-30 NOTE — HISTORY OF PRESENT ILLNESS
[Home] : at home, [unfilled] , at the time of the visit. [Medical Office: ([facility])___] : at the medical office located in  [Verbal consent obtained from patient] : the patient, [unfilled] [FreeTextEntry4] : Stephanie Cerrato [de-identified] : 44yo female with PMHx of fibroids s/p PAULIE BSO 8/19/2020 complicated by small bowel obstruction s/p exploratory laparotomy, small bowel resection 9/15/2020 complicated by leakage from staple line s/p exploratory laparotomy, small bowel resection 9/15, developed concern for necrotizing soft tissue infection of the abdominal wall s/p exploration of abdominal wall 9/23, prolonged hospitalization, then discharged to skilled nursing facility. She was re-admitted for enterocutaneous fistula. She was placed on antibiotics, TPN, Loperimide and made NPO. Since then, she has been discharged and EA fistula has healed. Wound has granulated, no output, +minimal drainage from granulation tissue. Patient is doing her own wound care. She has been tolerating soft foods - eating rice and soups. Last saw Dr. Fortune 1/12 - TPN has been discontinued, PICC line removed. Skin irritation around PICC site has improved with steroid cream, but she complains of continued limited mobility of the left arm. Duplex was done and negative for DVT. She cannot even hold a cup. She also continues to have pelvic pain while having bowel movements and urinating. CT was unrevealing. MRI was showed colitis. She has regular bowel movements but complains that they are loose. She was taking Loperamide BID without improvement. Activity is limited due to abdominal pain as per patient. She still uses wheelchair when going outside of the house and admits she does not move very much at home, other than to go to the bathroom and other simple tasks. Denies fever/chills, nausea/vomiting, chest pain or shortness of breath.

## 2021-03-30 NOTE — ASSESSMENT
[FreeTextEntry1] : 46yo female with previous PAULIE BSO for fibroids 8/19/2020 complicated hospital course for small bowel obstruction s/p ex lap, small bowel resection 9/15 complicated by leak s/p re-exploration, small bowel resection 9/15, developed soft tissue infection of abdominal wall s/p abdominal wall exploration 9/23. Complicated by enteroatmospheric fistula treated non-operatively with TPN, now resolved. Now complaining of diarrhea, MRI shows colitis in rectosigmoid. \par \par Diarrhea - concerning for colitis given MRI findings, r/o infectious vs. inflammatory \par - will send ANCA, ASCA\par - send calprotecin, lactoferrin\par - send C.dif, GI PCR\par - plan for colonoscopy

## 2021-04-02 ENCOUNTER — OUTPATIENT (OUTPATIENT)
Dept: OUTPATIENT SERVICES | Facility: HOSPITAL | Age: 46
LOS: 1 days | Discharge: HOME | End: 2021-04-02

## 2021-04-02 DIAGNOSIS — R53.1 WEAKNESS: ICD-10-CM

## 2021-04-02 DIAGNOSIS — Z90.710 ACQUIRED ABSENCE OF BOTH CERVIX AND UTERUS: Chronic | ICD-10-CM

## 2021-04-02 DIAGNOSIS — Z90.49 ACQUIRED ABSENCE OF OTHER SPECIFIED PARTS OF DIGESTIVE TRACT: Chronic | ICD-10-CM

## 2021-04-02 DIAGNOSIS — Z90.79 ACQUIRED ABSENCE OF OTHER GENITAL ORGAN(S): Chronic | ICD-10-CM

## 2021-05-19 ENCOUNTER — APPOINTMENT (OUTPATIENT)
Dept: SURGERY | Facility: CLINIC | Age: 46
End: 2021-05-19
Payer: MEDICAID

## 2021-05-19 VITALS
SYSTOLIC BLOOD PRESSURE: 99 MMHG | DIASTOLIC BLOOD PRESSURE: 70 MMHG | TEMPERATURE: 97.6 F | HEART RATE: 99 BPM | HEIGHT: 62 IN

## 2021-05-19 DIAGNOSIS — R30.0 DYSURIA: ICD-10-CM

## 2021-05-19 PROCEDURE — 99212 OFFICE O/P EST SF 10 MIN: CPT

## 2021-05-19 NOTE — HISTORY OF PRESENT ILLNESS
[de-identified] : 46yo female with PMHx of fibroids s/p PAULIE BSO 8/19/2020 complicated by small bowel obstruction s/p exploratory laparotomy, small bowel resection 9/15/2020 complicated by anastomotic breakdown s/p exploratory laparotomy, small bowel resection 9/15, developed concern for necrotizing soft tissue infection of the abdominal wall s/p exploration of abdominal wall 9/23, prolonged hospitalization, then discharged to skilled nursing facility. She was re-admitted for enterocutaneous fistula. She was placed on antibiotics, TPN, Loperimide and made NPO. Since then, she has been discharged and EA fistula has healed. Wound has granulated, no output, +minimal drainage from granulation tissue. Patient is doing her own wound care. She has been tolerating soft foods - eating rice and soups. Last saw Dr. Fortune 1/12 - TPN has been discontinued, PICC line removed. Skin irritation around PICC site has improved with steroid cream, but she complains of continued limited mobility of the left arm. Duplex was done and negative for DVT. \par \par At this time, she continues to have pelvic pain while having bowel movements and urinating. CT was unrevealing. MRI was showed colitis. She has loose bowel movements, but overall "better" while taking Loperamide. Activity is limited due to abdominal pain as per patient. She still uses wheelchair when going outside of the house and admits she does not move very much at home. Denies fever/chills, nausea/vomiting, chest pain or shortness of breath. She was seen by GI, pending stool studies and planning for colonoscopy next month. Patient also mentions numbness of right and left flanks since last hospitalization. She saw pain management, medications were ordered but she is waiting for her new insurance to start next month before filling the Rx.

## 2021-05-19 NOTE — PHYSICAL EXAM
[Normal Breath Sounds] : Normal breath sounds [Normal Heart Sounds] : normal heart sounds [Alert] : alert [Calm] : calm [de-identified] : no acute distress [Depressed] : depressed [de-identified] : soft, right lateral wound closed and healed, left lateral wound skin edge closed and healed; midline wound with overlying healthy skin, no expressible drainage, no erythema or induration [de-identified] : wheelchair, decreased LUE mobility, PICC line removed, skin less irritated with irregular raised bumps diffusely, moderate swelling of LUE

## 2021-05-19 NOTE — ASSESSMENT
[FreeTextEntry1] : 46yo female with previous PAULIE BSO for fibroids 8/19/2020 complicated hospital course for small bowel obstruction s/p ex lap, small bowel resection 9/15 complicated by leak s/p re-exploration, small bowel resection 9/15, developed soft tissue infection of abdominal wall s/p abdominal wall exploration 9/23. Complicated by enteroatmospheric fistula treated non-operatively with TPN. Now with persistent abdominal pain - colitis?\par -midline abdominal wound well-healed\par -entercutaneous fistula - resolved, now tolerating soft diet - advance as tolerated; off TPN\par -diarrhea - monitor, stool studies pending\par -LUE swelling and pain - continue hydrocortisone cream PRN, LUE duplex negative, Physical therapy ordered\par -CT A/P reviewed - fluid collection improving\par -MRI reviewed - colitis\par -GI - pending stool studies and colonoscopy 6/2021\par -pain consult - pending starting medications\par -physical therapy for arm and overall status\par -encouraged to continue regular activity and avoid sitting for prolonged periods of time\par -flank numbness - recommend neurology evaluation, unlikely related to surgery, but possible spinal compression of nerves\par -follow up in 2 months after colonoscopy

## 2021-05-24 ENCOUNTER — LABORATORY RESULT (OUTPATIENT)
Age: 46
End: 2021-05-24

## 2021-06-10 DIAGNOSIS — A04.72 ENTEROCOLITIS DUE TO CLOSTRIDIUM DIFFICILE, NOT SPECIFIED AS RECURRENT: ICD-10-CM

## 2021-06-10 LAB
BACTERIA STL CULT: NORMAL
BAKER'S YEAST AB QL: 72.4 UNITS
BAKER'S YEAST IGA QL IA: 15 UNITS
BAKER'S YEAST IGA QN IA: NEGATIVE
BAKER'S YEAST IGG QN IA: POSITIVE
C DIFF TOX GENS STL QL NAA+PROBE: NORMAL
CDIFF BY PCR: POSITIVE
GI PCR PANEL, STOOL: NORMAL
LACTOFERRIN STL-MCNC: <1
MPO AB + PR3 PNL SER: NORMAL

## 2021-06-10 RX ORDER — VANCOMYCIN HYDROCHLORIDE 125 MG/1
125 CAPSULE ORAL
Qty: 56 | Refills: 0 | Status: ACTIVE | COMMUNITY
Start: 2021-06-10 | End: 1900-01-01

## 2021-06-14 RX ORDER — POLYETHYLENE GLYCOL 3350 AND ELECTROLYTES WITH LEMON FLAVOR 236; 22.74; 6.74; 5.86; 2.97 G/4L; G/4L; G/4L; G/4L; G/4L
236 POWDER, FOR SOLUTION ORAL
Qty: 1 | Refills: 0 | Status: ACTIVE | COMMUNITY
Start: 2021-03-29 | End: 1900-01-01

## 2021-07-03 ENCOUNTER — OUTPATIENT (OUTPATIENT)
Dept: OUTPATIENT SERVICES | Facility: HOSPITAL | Age: 46
LOS: 1 days | Discharge: HOME | End: 2021-07-03

## 2021-07-03 DIAGNOSIS — Z11.59 ENCOUNTER FOR SCREENING FOR OTHER VIRAL DISEASES: ICD-10-CM

## 2021-07-03 DIAGNOSIS — Z90.79 ACQUIRED ABSENCE OF OTHER GENITAL ORGAN(S): Chronic | ICD-10-CM

## 2021-07-03 DIAGNOSIS — Z90.710 ACQUIRED ABSENCE OF BOTH CERVIX AND UTERUS: Chronic | ICD-10-CM

## 2021-07-03 DIAGNOSIS — Z90.49 ACQUIRED ABSENCE OF OTHER SPECIFIED PARTS OF DIGESTIVE TRACT: Chronic | ICD-10-CM

## 2021-07-04 NOTE — PATIENT PROFILE ADULT - [facility]
Progress Notes by Vane Martin CNP at 6/4/2021  8:30 AM     Author: Vane Martin CNP Service: -- Author Type: Nurse Practitioner    Filed: 6/4/2021  9:12 AM Encounter Date: 6/4/2021 Status: Signed    : Vane Martin CNP (Nurse Practitioner)             Assessment/Recommendations   Assessment:    1. Nonischemic cardiomyopathy with systolic dysfunction, LVEF 40%, NYHA class III: He states he has had more dyspnea on exertion and abdominal distention the last week. He states he has not been watching his salt intake. He did have more pickles and a bloody Lindsay over the holiday weekend. He does not weigh himself on a daily basis. His clinic weight is up 6 pounds in 3 weeks.    2. Hypertension: Controlled. Blood pressure 100/58. Denies any dizziness or lightheadedness    Plan:  1.   Heart failure medications:  - Beta blocker therapy with carvedilol 18.75 mg two times a day   - ACEI therapy with enalapril 10 mg daily  - Aldosterone blocker therapy with spironolactone 12.5 mg daily.    - Diuretic therapy with furosemide 20 mg daily   2. Instructed him to take an extra 20 mg of Lasix today  3. BMP and BNP pending    Matthew W Behr will follow up with Dr. Oliveira June 22 and in the heart failure clinic in 7 weeks.     History of Present Illness/Subjective    Mr. Matthew W Behr is a 54 y.o. male seen at [facility] heart failure clinic today for continued follow-up.    He follows up for heart failure with reduced ejection fraction, nonischemic cardiomyopathy.    He had an echocardiogram May 14, 2021 which showed an improved ejection fraction of 40% from previously 17%. He was hospitalized with acute heart failure in March and suffered a CVA to the right MCA.  He has mild numbness in his left hand and fingers.  He has a past medical history significant for hypertension and anxiety.    Today, he states he has had increased dyspnea on exertion and abdominal distention. He denies orthopnea, PND or  edema.  He denies lightheadedness, orthopnea, PND, palpitations, chest pain and lower extremity edema.      He does not monitor his weight at home. His clinic weight is up 6 pounds in 3 weeks. He states he has not been watching his salt intake and has been eating pickles and had a bloody Lindsay over the holiday weekend.    ECHOCARDIOGRAM: 5/14/2021  Summary      Left ventricle ejection fraction is moderately decreased. The estimated left ventricular ejection fraction is 40%.    Normal right ventricular size and systolic function.    When compared to the previous study dated 3/18/2021, LVEF is higher             Physical Examination Review of Systems   There were no vitals filed for this visit.  Body mass index is 28.65 kg/m .  Wt Readings from Last 3 Encounters:   05/14/21 194 lb (88 kg)   05/14/21 195 lb (88.5 kg)   04/26/21 194 lb (88 kg)       General Appearance:   no acute distress   ENT/Mouth: membranes moist, no oral lesions or bleeding gums.      EYES:  no scleral icterus, normal conjunctivae   Neck: no carotid bruits or thyromegaly   Chest/Lungs:   lungs are wheezing throughout, equal chest wall expansion    Cardiovascular:   Regular. Normal first and second heart sounds with no murmurs, rubs, or gallops; Jugular venous pressure normal, no edema bilaterally    Abdomen:   Mildly distended, no organomegaly, masses, bruits, or tenderness; bowel sounds are present   Extremities: no cyanosis or clubbing   Skin: no xanthelasma, warm.    Neurologic: normal  bilateral, no tremors     Psychiatric: alert and oriented x3    General: WNL  Eyes: WNL  Ears/Nose/Throat: WNL  Lungs: WNL  Heart: WNL  Stomach: WNL  Bladder: WNL  Muscle/Joints: WNL  Skin: WNL  Nervous System: WNL  Mental Health: WNL     Blood: WNL     Medical History  Surgical History Family History Social History   Past Medical History:   Diagnosis Date   ? Acute respiratory failure with hypoxia (H)    ? Amphetamine urine screen positive 12/31/2017   ?  Asthma    ? Cellulitis     Left hand   ? Chest pain with normal CT coronary angiography 1/3/2018   ? Nonischemic cardiomyopathy (H) 1/10/2018   ? Janel Parkinson White pattern seen on electrocardiogram     Past Surgical History:   Procedure Laterality Date   ? OTHER SURGICAL HISTORY  About 10 + years ago    Cardiac surgery for Janel Parkinson at [facility]    Family History   Problem Relation Age of Onset   ? COPD Mother     Social History     Socioeconomic History   ? Marital status:      Spouse name: Not on file   ? Number of children: Not on file   ? Years of education: Not on file   ? Highest education level: Not on file   Occupational History     Employer: [employer]   Social Needs   ? Financial resource strain: Not on file   ? Food insecurity     Worry: Not on file     Inability: Not on file   ? Transportation needs     Medical: Not on file     Non-medical: Not on file   Tobacco Use   ? Smoking status: Never Smoker   ? Smokeless tobacco: Current User     Types: Chew   Substance and Sexual Activity   ? Alcohol use: Yes     Comment: 5 beers per week   ? Drug use: No   ? Sexual activity: Not on file   Lifestyle   ? Physical activity     Days per week: Not on file     Minutes per session: Not on file   ? Stress: Not on file   Relationships   ? Social connections     Talks on phone: Not on file     Gets together: Not on file     Attends Hoahaoism service: Not on file     Active member of club or organization: Not on file     Attends meetings of clubs or organizations: Not on file     Relationship status: Not on file   ? Intimate partner violence     Fear of current or ex partner: Not on file     Emotionally abused: Not on file     Physically abused: Not on file     Forced sexual activity: Not on file   Other Topics Concern   ? Not on file   Social History Narrative   ? Not on file          Medications  Allergies   Current Outpatient Medications   Medication Sig Dispense Refill   ? ASMANEX   mcg/actuation HFAA daily.     ? carvediloL (COREG) 12.5 MG tablet Take 1.5 tablets (18.75 mg total) by mouth 2 (two) times a day with meals. 180 tablet 3   ? furosemide (LASIX) 20 MG tablet Take 1 tablet (20 mg total) by mouth daily. 90 tablet 3   ? hydrOXYzine HCL (ATARAX) 25 MG tablet Take 1 tablet (25 mg total) by mouth every 6 (six) hours as needed for anxiety. 12 tablet 0   ? sertraline (ZOLOFT) 50 MG tablet Take 50 mg by mouth daily.     ? VENTOLIN HFA 90 mcg/actuation inhaler Take 2 puffs by mouth every 4 (four) hours as needed.         0   ? warfarin ANTICOAGULANT (COUMADIN/JANTOVEN) 3 MG tablet Take 1 tablets (3 mg) by mouth daily. Adjust dose based on INR results as directed. 30 tablet 0   ? atorvastatin (LIPITOR) 40 MG tablet Take 1 tablet (40 mg total) by mouth daily. 90 tablet 0   ? enalapril (VASOTEC) 10 MG tablet Take 1 tablet (10 mg total) by mouth daily. 90 tablet 0   ? spironolactone (ALDACTONE) 25 MG tablet Take 0.5 tablets (12.5 mg total) by mouth daily. 45 tablet 0     No current facility-administered medications for this visit.     No Known Allergies      Lab Results    Chemistry/lipid CBC Cardiac Enzymes/BNP/TSH/INR   Lab Results   Component Value Date    CHOL 245 (H) 03/18/2021    HDL 49 03/18/2021    LDLCALC 147 (H) 03/18/2021    TRIG 243 (H) 03/18/2021    CREATININE 0.82 04/26/2021    BUN 19 04/26/2021    K 4.3 04/26/2021     04/26/2021     04/26/2021    CO2 27 04/26/2021    Lab Results   Component Value Date    WBC 10.1 03/20/2021    HGB 16.4 03/20/2021    HCT 47.8 03/20/2021    MCV 88 03/20/2021     03/20/2021    Lab Results   Component Value Date    TROPONINI 0.03 03/18/2021    BNP 99 (H) 04/26/2021    TSH 1.96 04/22/2019    INR 1.13 (H) 03/22/2021             This note has been dictated using voice recognition software. Any grammatical, typographical, or context distortions are unintentional and inherent to the software    30 minutes spent on the date of encounter  doing chart review, review of outside records, review of test results, interpretation with above tests, patient visit, documentation and discussion with other provider.                    (4) no limitation

## 2021-07-06 ENCOUNTER — TRANSCRIPTION ENCOUNTER (OUTPATIENT)
Age: 46
End: 2021-07-06

## 2021-07-06 ENCOUNTER — OUTPATIENT (OUTPATIENT)
Dept: OUTPATIENT SERVICES | Facility: HOSPITAL | Age: 46
LOS: 1 days | Discharge: HOME | End: 2021-07-06
Payer: MEDICAID

## 2021-07-06 ENCOUNTER — RESULT REVIEW (OUTPATIENT)
Age: 46
End: 2021-07-06

## 2021-07-06 VITALS
SYSTOLIC BLOOD PRESSURE: 107 MMHG | HEART RATE: 117 BPM | RESPIRATION RATE: 18 BRPM | DIASTOLIC BLOOD PRESSURE: 86 MMHG | WEIGHT: 134.92 LBS | HEIGHT: 63 IN | TEMPERATURE: 99 F

## 2021-07-06 VITALS
OXYGEN SATURATION: 98 % | TEMPERATURE: 98 F | DIASTOLIC BLOOD PRESSURE: 62 MMHG | SYSTOLIC BLOOD PRESSURE: 116 MMHG | RESPIRATION RATE: 23 BRPM | HEART RATE: 64 BPM

## 2021-07-06 DIAGNOSIS — Z90.79 ACQUIRED ABSENCE OF OTHER GENITAL ORGAN(S): Chronic | ICD-10-CM

## 2021-07-06 DIAGNOSIS — Z90.49 ACQUIRED ABSENCE OF OTHER SPECIFIED PARTS OF DIGESTIVE TRACT: Chronic | ICD-10-CM

## 2021-07-06 DIAGNOSIS — Z90.710 ACQUIRED ABSENCE OF BOTH CERVIX AND UTERUS: Chronic | ICD-10-CM

## 2021-07-06 PROCEDURE — 45380 COLONOSCOPY AND BIOPSY: CPT

## 2021-07-06 PROCEDURE — 88305 TISSUE EXAM BY PATHOLOGIST: CPT | Mod: 26

## 2021-07-06 NOTE — ASU PREOP CHECKLIST - SURGICAL CONSENT
Silver Nitrate Text: The wound bed was treated with silver nitrate after the biopsy was performed. done

## 2021-07-06 NOTE — ASU PATIENT PROFILE, ADULT - PSH
S/P small bowel resection    S/P PAULIE (total abdominal hysterectomy)    Status post bilateral salpingectomy

## 2021-07-06 NOTE — CHART NOTE - NSCHARTNOTEFT_GEN_A_CORE
PACU ANESTHESIA ADMISSION NOTE      Procedure: Colonoscopy  Post op diagnosis:  Polyp    ____  Intubated  TV:______       Rate: ______      FiO2: ______    __x__  Patent Airway    _x___  Full return of protective reflexes    __x__  Full recovery from anesthesia / back to baseline     Vitals:   T:  97.3         R:  16                BP:  101/61                Sat:  98                 P:  93      Mental Status:  _x___ Awake   _____ Alert   _____ Drowsy   _____ Sedated    Nausea/Vomiting:  _x___ NO  ______Yes,   See Post - Op Orders          Pain Scale (0-10):  _____    Treatment: ____ None    __x__ See Post - Op/PCA Orders    Post - Operative Fluids:   ____ Oral   __x__ See Post - Op Orders    Plan: Discharge:   __x__Home       _____Floor     _____Critical Care    _____  Other:_________________    Comments:

## 2021-07-07 LAB — SURGICAL PATHOLOGY STUDY: SIGNIFICANT CHANGE UP

## 2021-07-08 DIAGNOSIS — K63.5 POLYP OF COLON: ICD-10-CM

## 2021-07-08 DIAGNOSIS — K64.8 OTHER HEMORRHOIDS: ICD-10-CM

## 2021-07-08 DIAGNOSIS — R19.7 DIARRHEA, UNSPECIFIED: ICD-10-CM

## 2021-07-08 DIAGNOSIS — Z90.710 ACQUIRED ABSENCE OF BOTH CERVIX AND UTERUS: ICD-10-CM

## 2021-07-08 DIAGNOSIS — K52.9 NONINFECTIVE GASTROENTERITIS AND COLITIS, UNSPECIFIED: ICD-10-CM

## 2021-07-08 DIAGNOSIS — K59.89 OTHER SPECIFIED FUNCTIONAL INTESTINAL DISORDERS: ICD-10-CM

## 2021-07-12 NOTE — ED PROVIDER NOTE - NS ED MD EM SELECTION
Consent: The patient's consent was obtained including but not limited to risks of crusting, scabbing, blistering, scarring, darker or lighter pigmentary change, recurrence, incomplete removal and infection. Detail Level: Detailed Post-Care Instructions: I reviewed with the patient in detail post-care instructions. Patient is to wear sunprotection, and avoid picking at any of the treated lesions. Pt may apply Vaseline to crusted or scabbing areas. Anesthesia Volume In Cc: 0.5 Medical Necessity Information: It is in your best interest to select a reason for this procedure from the list below. All of these items fulfill various CMS LCD requirements except the new and changing color options. Include Z78.9 (Other Specified Conditions Influencing Health Status) As An Associated Diagnosis?: No Medical Necessity Clause: This procedure was medically necessary because the lesions that were treated were: Treatment Number (Will Not Render If 0): 0 [id number] Comprehensive

## 2021-07-21 ENCOUNTER — APPOINTMENT (OUTPATIENT)
Dept: SURGERY | Facility: CLINIC | Age: 46
End: 2021-07-21
Payer: MEDICAID

## 2021-07-21 VITALS
SYSTOLIC BLOOD PRESSURE: 100 MMHG | HEIGHT: 62 IN | DIASTOLIC BLOOD PRESSURE: 80 MMHG | HEART RATE: 90 BPM | TEMPERATURE: 97 F

## 2021-07-21 DIAGNOSIS — M79.89 OTHER SPECIFIED SOFT TISSUE DISORDERS: ICD-10-CM

## 2021-07-21 DIAGNOSIS — R63.0 ANOREXIA: ICD-10-CM

## 2021-07-21 PROCEDURE — 99212 OFFICE O/P EST SF 10 MIN: CPT

## 2021-07-21 NOTE — PHYSICAL EXAM
[Normal Breath Sounds] : Normal breath sounds [Normal Heart Sounds] : normal heart sounds [Alert] : alert [Calm] : calm [Depressed] : depressed [de-identified] : no acute distress [de-identified] : soft, right lateral wound closed and healed, left lateral wound skin edge closed and healed; midline wound with overlying healthy skin, no expressible drainage, no erythema or induration [de-identified] : right flank fullness with tenderness with palpable 2x2cm fixed deep lesion [de-identified] : wheelchair, decreased LUE mobility, skin less irritated with irregular raised bumps diffusely, moderate swelling of LUE

## 2021-07-21 NOTE — HISTORY OF PRESENT ILLNESS
[de-identified] : 46yo female with PMHx of fibroids s/p PAULIE BSO 8/19/2020 complicated by small bowel obstruction s/p exploratory laparotomy, small bowel resection 9/15/2020 complicated by anastomotic breakdown s/p exploratory laparotomy, small bowel resection 9/15, developed concern for necrotizing soft tissue infection of the abdominal wall s/p exploration of abdominal wall 9/23, prolonged hospitalization, then discharged to skilled nursing facility. She was re-admitted for enterocutaneous fistula. She was placed on antibiotics, TPN, Loperimide and made NPO. Since then, she has been discharged and EA fistula has healed. Wound has granulated, no output, +minimal drainage from granulation tissue. Patient is doing her own wound care. She has been tolerating soft foods - eating rice and soups. Last saw Dr. Fortune 1/12/2021 - TPN has been discontinued, PICC line removed. Skin irritation around PICC site has improved with steroid cream, but she complains of continued limited mobility of the left arm. Duplex was done and negative for DVT. \par \par At this time, she continues to have lower abdominal pain. She saw pain management and started on Fentanyl patch, which does not seem to help with the pain.CT was unrevealing with post-operative changes. MRI was showed colitis. She has loose bowel movements 1-3 times per day, depending on what she eats. Activity is limited due to abdominal pain as per patient. She has been working with physical therapy and says she has improved movement of her left arm. She has been walking around out of the wheelchair as well. Denies fever/chills, nausea/vomiting, chest pain or shortness of breath. She was seen by GI, underwent colonoscopy by Dr. John 6/2021. Patient also mentions pain of the right flank that was previously numb.

## 2021-07-21 NOTE — ASSESSMENT
[FreeTextEntry1] : 44yo female with previous PAULIE BSO for fibroids 8/19/2020 complicated hospital course for small bowel obstruction s/p ex lap, small bowel resection 9/15 complicated by leak s/p re-exploration, small bowel resection 9/15, developed soft tissue infection of abdominal wall s/p abdominal wall exploration 9/23. Complicated by enteroatmospheric fistula treated non-operatively with TPN. Now with persistent abdominal pain of unknown origin - adhesions, colitis, ovarian cyst?\par -midline abdominal wound well-healed\par -entercutaneous fistula - resolved, now tolerating soft diet - advance as tolerated; off TPN\par -diarrhea - monitor, stool studies reviewed\par -LUE swelling and pain - resolved - hydrocortisone cream PRN, LUE duplex negative, Physical therapy ordered\par -CT A/P reviewed - fluid collection improving\par -MRI reviewed - colitis\par -GI - colonoscopy 6/2021 - reviewed \par -pain consult - fentanyl patch, recommend re-evaluation for pain\par -discussed abdominal pain - would consider diagnostic laparoscopy but explained that she is at extremely high risk for complications and will defer for now\par -continue physical therapy for arm and overall status\par -encouraged to continue regular activity and avoid sitting for prolonged periods of time\par -nutritional labs ordered\par -low residue diet\par -flank pain/numbness - CT reviewed and fluid collection overlying right flank, will monitor evolution of symptoms, consider resection/drainage of collection if persists\par -follow up in 3-4 months

## 2021-07-30 ENCOUNTER — APPOINTMENT (OUTPATIENT)
Dept: GASTROENTEROLOGY | Facility: CLINIC | Age: 46
End: 2021-07-30
Payer: MEDICAID

## 2021-07-30 PROCEDURE — 99443: CPT

## 2021-07-30 RX ORDER — LOPERAMIDE HYDROCHLORIDE 2 MG/1
2 CAPSULE ORAL TWICE DAILY
Qty: 90 | Refills: 3 | Status: DISCONTINUED | COMMUNITY
Start: 2020-12-15 | End: 2021-07-30

## 2021-07-30 RX ORDER — DICYCLOMINE HYDROCHLORIDE 10 MG/1
10 CAPSULE ORAL
Qty: 120 | Refills: 0 | Status: ACTIVE | COMMUNITY
Start: 2021-07-30 | End: 1900-01-01

## 2021-07-30 NOTE — ASSESSMENT
[FreeTextEntry1] : Abd pain - unclear etiology, maybe related to scar tissue from surgery?\par - will trial antispasmodic\par \par Diarrhea - previously positive for C.dif but symptoms did not improve with vanco\par - retest for C.dif\par - trial antispasmodic as above\par - if C.dif negative now and still having diarrhea can trial lomotil

## 2021-07-30 NOTE — HISTORY OF PRESENT ILLNESS
[___ Month(s) Ago] : [unfilled] month(s) ago [_________] : Performed [unfilled] [de-identified] : 44yo female with PMHx of fibroids s/p PAULIE BSO 8/19/2020 complicated by small bowel obstruction s/p exploratory laparotomy, small bowel resection 9/15/2020 complicated by leakage from staple line s/p exploratory laparotomy, small bowel resection 9/15, developed concern for necrotizing soft tissue infection of the abdominal wall s/p exploration of abdominal wall 9/23, prolonged hospitalization, then discharged to skilled nursing facility. She was re-admitted for enterocutaneous fistula. She was placed on antibiotics, TPN, Loperimide and made NPO. Since then, she has been discharged and EA fistula has healed. Wound has granulated, no output, +minimal drainage from granulation tissue. Patient is doing her own wound care. She has been tolerating soft foods - eating rice and soups. Last saw Dr. Fortune 1/12 - TPN has been discontinued, PICC line removed. Skin irritation around PICC site has improved with steroid cream, but she complains of continued limited mobility of the left arm. Duplex was done and negative for DVT. \par \par She also continues to have pelvic pain while having bowel movements and urinating. Complains of diffuse diarrhea. CT was unrevealing. MRI was showed colitis. She was taking Loperamide BID without improvement. Activity is limited due to abdominal pain as per patient. Pt reports every time she eats she gets diarrhea, if she doesn't eat she doesn't have BMs. \par \par Last visit she was found to be C.dif positive, treated with vanco x 2 weeks with no improvement in symptoms. Positive ASCA, otherwise unremarkable stool studies. Colonoscopy was done 7/6 which was good prep, two polyps removed, biopsies taken. Polyps were benign mucosa, biopsies showed no e/o inflammation. \par

## 2021-08-10 LAB
C DIFF TOX GENS STL QL NAA+PROBE: NORMAL
CDIFF BY PCR: NEGATIVE

## 2021-08-10 RX ORDER — BIFIDOBACTERIUM LONGUM 10MM CELL
4 CAPSULE ORAL
Qty: 30 | Refills: 4 | Status: ACTIVE | COMMUNITY
Start: 2021-08-10 | End: 1900-01-01

## 2021-08-10 RX ORDER — DIPHENOXYLATE HYDROCHLORIDE AND ATROPINE SULFATE 2.5; .025 MG/1; MG/1
2.5-0.025 TABLET ORAL
Qty: 60 | Refills: 0 | Status: ACTIVE | COMMUNITY
Start: 2021-08-10 | End: 1900-01-01

## 2021-08-23 ENCOUNTER — OUTPATIENT (OUTPATIENT)
Dept: OUTPATIENT SERVICES | Facility: HOSPITAL | Age: 46
LOS: 1 days | Discharge: HOME | End: 2021-08-23

## 2021-08-23 DIAGNOSIS — Z90.79 ACQUIRED ABSENCE OF OTHER GENITAL ORGAN(S): Chronic | ICD-10-CM

## 2021-08-23 DIAGNOSIS — Z90.710 ACQUIRED ABSENCE OF BOTH CERVIX AND UTERUS: Chronic | ICD-10-CM

## 2021-08-23 DIAGNOSIS — R53.1 WEAKNESS: ICD-10-CM

## 2021-08-23 DIAGNOSIS — Z90.49 ACQUIRED ABSENCE OF OTHER SPECIFIED PARTS OF DIGESTIVE TRACT: Chronic | ICD-10-CM

## 2021-10-01 ENCOUNTER — OUTPATIENT (OUTPATIENT)
Dept: OUTPATIENT SERVICES | Facility: HOSPITAL | Age: 46
LOS: 1 days | End: 2021-10-01
Payer: MEDICAID

## 2021-10-01 DIAGNOSIS — Z90.79 ACQUIRED ABSENCE OF OTHER GENITAL ORGAN(S): Chronic | ICD-10-CM

## 2021-10-01 DIAGNOSIS — Z90.710 ACQUIRED ABSENCE OF BOTH CERVIX AND UTERUS: Chronic | ICD-10-CM

## 2021-10-01 DIAGNOSIS — Z90.49 ACQUIRED ABSENCE OF OTHER SPECIFIED PARTS OF DIGESTIVE TRACT: Chronic | ICD-10-CM

## 2021-10-18 ENCOUNTER — RESULT REVIEW (OUTPATIENT)
Age: 46
End: 2021-10-18

## 2021-10-18 ENCOUNTER — INPATIENT (INPATIENT)
Facility: HOSPITAL | Age: 46
LOS: 4 days | Discharge: HOME HEALTH | End: 2021-10-23
Attending: SURGERY | Admitting: SURGERY
Payer: MEDICAID

## 2021-10-18 VITALS
SYSTOLIC BLOOD PRESSURE: 113 MMHG | DIASTOLIC BLOOD PRESSURE: 77 MMHG | WEIGHT: 138.89 LBS | OXYGEN SATURATION: 97 % | TEMPERATURE: 98 F | RESPIRATION RATE: 18 BRPM | HEIGHT: 63 IN | HEART RATE: 75 BPM

## 2021-10-18 DIAGNOSIS — Z90.710 ACQUIRED ABSENCE OF BOTH CERVIX AND UTERUS: Chronic | ICD-10-CM

## 2021-10-18 DIAGNOSIS — Z90.49 ACQUIRED ABSENCE OF OTHER SPECIFIED PARTS OF DIGESTIVE TRACT: Chronic | ICD-10-CM

## 2021-10-18 DIAGNOSIS — Z90.79 ACQUIRED ABSENCE OF OTHER GENITAL ORGAN(S): Chronic | ICD-10-CM

## 2021-10-18 LAB
ALBUMIN SERPL ELPH-MCNC: 4.3 G/DL — SIGNIFICANT CHANGE UP (ref 3.5–5.2)
ALP SERPL-CCNC: 199 U/L — HIGH (ref 30–115)
ALT FLD-CCNC: 381 U/L — HIGH (ref 0–41)
ANION GAP SERPL CALC-SCNC: 13 MMOL/L — SIGNIFICANT CHANGE UP (ref 7–14)
AST SERPL-CCNC: 272 U/L — HIGH (ref 0–41)
BASOPHILS # BLD AUTO: 0.02 K/UL — SIGNIFICANT CHANGE UP (ref 0–0.2)
BASOPHILS NFR BLD AUTO: 0.4 % — SIGNIFICANT CHANGE UP (ref 0–1)
BILIRUB SERPL-MCNC: 3.7 MG/DL — HIGH (ref 0.2–1.2)
BUN SERPL-MCNC: 7 MG/DL — LOW (ref 10–20)
CALCIUM SERPL-MCNC: 8.9 MG/DL — SIGNIFICANT CHANGE UP (ref 8.5–10.1)
CHLORIDE SERPL-SCNC: 105 MMOL/L — SIGNIFICANT CHANGE UP (ref 98–110)
CO2 SERPL-SCNC: 21 MMOL/L — SIGNIFICANT CHANGE UP (ref 17–32)
CREAT SERPL-MCNC: 0.7 MG/DL — SIGNIFICANT CHANGE UP (ref 0.7–1.5)
EOSINOPHIL # BLD AUTO: 0.08 K/UL — SIGNIFICANT CHANGE UP (ref 0–0.7)
EOSINOPHIL NFR BLD AUTO: 1.6 % — SIGNIFICANT CHANGE UP (ref 0–8)
GLUCOSE SERPL-MCNC: 156 MG/DL — HIGH (ref 70–99)
HCG SERPL QL: NEGATIVE — SIGNIFICANT CHANGE UP
HCT VFR BLD CALC: 40.3 % — SIGNIFICANT CHANGE UP (ref 37–47)
HGB BLD-MCNC: 13.6 G/DL — SIGNIFICANT CHANGE UP (ref 12–16)
IMM GRANULOCYTES NFR BLD AUTO: 0.2 % — SIGNIFICANT CHANGE UP (ref 0.1–0.3)
LACTATE SERPL-SCNC: 1.1 MMOL/L — SIGNIFICANT CHANGE UP (ref 0.7–2)
LIDOCAIN IGE QN: 20 U/L — SIGNIFICANT CHANGE UP (ref 7–60)
LYMPHOCYTES # BLD AUTO: 1.1 K/UL — LOW (ref 1.2–3.4)
LYMPHOCYTES # BLD AUTO: 22.2 % — SIGNIFICANT CHANGE UP (ref 20.5–51.1)
MCHC RBC-ENTMCNC: 30 PG — SIGNIFICANT CHANGE UP (ref 27–31)
MCHC RBC-ENTMCNC: 33.7 G/DL — SIGNIFICANT CHANGE UP (ref 32–37)
MCV RBC AUTO: 88.8 FL — SIGNIFICANT CHANGE UP (ref 81–99)
MONOCYTES # BLD AUTO: 0.3 K/UL — SIGNIFICANT CHANGE UP (ref 0.1–0.6)
MONOCYTES NFR BLD AUTO: 6 % — SIGNIFICANT CHANGE UP (ref 1.7–9.3)
NEUTROPHILS # BLD AUTO: 3.45 K/UL — SIGNIFICANT CHANGE UP (ref 1.4–6.5)
NEUTROPHILS NFR BLD AUTO: 69.6 % — SIGNIFICANT CHANGE UP (ref 42.2–75.2)
NRBC # BLD: 0 /100 WBCS — SIGNIFICANT CHANGE UP (ref 0–0)
PLATELET # BLD AUTO: 255 K/UL — SIGNIFICANT CHANGE UP (ref 130–400)
POTASSIUM SERPL-MCNC: 4.2 MMOL/L — SIGNIFICANT CHANGE UP (ref 3.5–5)
POTASSIUM SERPL-SCNC: 4.2 MMOL/L — SIGNIFICANT CHANGE UP (ref 3.5–5)
PROT SERPL-MCNC: 7.2 G/DL — SIGNIFICANT CHANGE UP (ref 6–8)
RBC # BLD: 4.54 M/UL — SIGNIFICANT CHANGE UP (ref 4.2–5.4)
RBC # FLD: 12.9 % — SIGNIFICANT CHANGE UP (ref 11.5–14.5)
SODIUM SERPL-SCNC: 139 MMOL/L — SIGNIFICANT CHANGE UP (ref 135–146)
WBC # BLD: 4.96 K/UL — SIGNIFICANT CHANGE UP (ref 4.8–10.8)
WBC # FLD AUTO: 4.96 K/UL — SIGNIFICANT CHANGE UP (ref 4.8–10.8)

## 2021-10-18 PROCEDURE — 74177 CT ABD & PELVIS W/CONTRAST: CPT | Mod: 26,MA

## 2021-10-18 PROCEDURE — 99285 EMERGENCY DEPT VISIT HI MDM: CPT

## 2021-10-18 PROCEDURE — 76705 ECHO EXAM OF ABDOMEN: CPT | Mod: 26

## 2021-10-18 RX ORDER — IOHEXOL 300 MG/ML
30 INJECTION, SOLUTION INTRAVENOUS ONCE
Refills: 0 | Status: COMPLETED | OUTPATIENT
Start: 2021-10-18 | End: 2021-10-18

## 2021-10-18 RX ORDER — MORPHINE SULFATE 50 MG/1
4 CAPSULE, EXTENDED RELEASE ORAL ONCE
Refills: 0 | Status: DISCONTINUED | OUTPATIENT
Start: 2021-10-18 | End: 2021-10-18

## 2021-10-18 RX ORDER — SODIUM CHLORIDE 9 MG/ML
1000 INJECTION, SOLUTION INTRAVENOUS ONCE
Refills: 0 | Status: COMPLETED | OUTPATIENT
Start: 2021-10-18 | End: 2021-10-18

## 2021-10-18 RX ADMIN — MORPHINE SULFATE 4 MILLIGRAM(S): 50 CAPSULE, EXTENDED RELEASE ORAL at 22:34

## 2021-10-18 RX ADMIN — SODIUM CHLORIDE 1000 MILLILITER(S): 9 INJECTION, SOLUTION INTRAVENOUS at 20:12

## 2021-10-18 RX ADMIN — IOHEXOL 30 MILLILITER(S): 300 INJECTION, SOLUTION INTRAVENOUS at 20:12

## 2021-10-18 RX ADMIN — MORPHINE SULFATE 4 MILLIGRAM(S): 50 CAPSULE, EXTENDED RELEASE ORAL at 20:12

## 2021-10-18 NOTE — ED PROVIDER NOTE - CARE PLAN
1 Principal Discharge DX:	Gallbladder sludge  Secondary Diagnosis:	Dilation of common bile duct  Secondary Diagnosis:	Transaminitis

## 2021-10-18 NOTE — ED ADULT NURSE NOTE - NSIMPLEMENTINTERV_GEN_ALL_ED
Implemented All Universal Safety Interventions:  [place] to call system. Call bell, personal items and telephone within reach. Instruct patient to call for assistance. Room bathroom lighting operational. Non-slip footwear when patient is off stretcher. Physically safe environment: no spills, clutter or unnecessary equipment. Stretcher in lowest position, wheels locked, appropriate side rails in place.

## 2021-10-18 NOTE — ED PROVIDER NOTE - ATTENDING CONTRIBUTION TO CARE
46 year old female, pmhx as documented presenting with diffuse abdominal pain and diarrhea x 4 days. Pain is diffuse, non-radiating, no palliative or provocative factors, moderate severity. Otherwise denies fevers, dyspnea, N/V, blood in stool, urinary symptoms or leg swelling.    Vital Signs: I have reviewed the initial vital signs.  Constitutional: NAD, well-nourished, appears stated age, no acute distress.  HEENT: Airway patent, moist MM, no erythema/swelling/deformity of oral structures. EOMI, PERRLA.  CV: regular rate, regular rhythm, well-perfused extremities, 2+ b/l DP and radial pulses equal.  Lungs: BCTA, no increased WOB.  ABD: (+) diffuse tenderness, no guarding or rebound, no pulsatile mass, no hernias. (+) midline surgical scar, well healed  MSK: Neck supple, nontender, nl ROM, no stepoff. Chest nontender. Back nontender in TLS spine or to b/l bony structures or flanks. Ext nontender, nl rom, no deformity.   INTEG: Skin warm, dry, no rash.  NEURO: A&Ox3, normal strength, nl sensation throughout, normal speech.   PSYCH: Calm, cooperative, normal affect and interaction.    Will obtain labs, CT abd/pelvis, UA, IVF, symptomatic control, re-eval.

## 2021-10-18 NOTE — ED PROVIDER NOTE - PHYSICAL EXAMINATION
CONST: Well appearing in NAD  EYES: PERRL, EOMI, Sclera and conjunctiva clear.   ENT: No nasal discharge. Oropharynx normal appearing  NECK: Non-tender, no meningeal signs. normal ROM. supple   CARD: Normal S1 S2; Normal rate and rhythm  RESP: Equal BS B/L, No wheezes, rhonchi or rales. No distress  GI: Soft, diffuse abdominal tenderness, non-distended. no cva tenderness. normal BS  MS: Normal ROM in all extremities. No midline spinal tenderness. pulses 2 +. no calf tenderness or swelling  SKIN: Warm, dry, no acute rashes. Good turgor  NEURO: A&Ox3, No focal deficits.

## 2021-10-18 NOTE — ED PROVIDER NOTE - OBJECTIVE STATEMENT
46 year old female with pmhx of fibroids , s/p hysterectomy, with partial colostomy due to complication, presents with abdominal pain x 4 days. Pain to epigastrium. Cramping in nature. radiates to lower abdomen. mild nausea. no vomiting, diarrhea, fever, chills, chest pain or sob.

## 2021-10-18 NOTE — ED PROVIDER NOTE - PROGRESS NOTE DETAILS
HEATHER: Case endorsed to Dr. Tolbert pending CT abd/pelvis, RUQ US, re-eval, dispo. surg consulted, recommends admission to medicine - gi evaluation for ercp/mrcp

## 2021-10-19 LAB
A1C WITH ESTIMATED AVERAGE GLUCOSE RESULT: 5.2 % — SIGNIFICANT CHANGE UP (ref 4–5.6)
ALBUMIN SERPL ELPH-MCNC: 4.1 G/DL — SIGNIFICANT CHANGE UP (ref 3.5–5.2)
ALBUMIN SERPL ELPH-MCNC: 4.2 G/DL — SIGNIFICANT CHANGE UP (ref 3.5–5.2)
ALP SERPL-CCNC: 180 U/L — HIGH (ref 30–115)
ALP SERPL-CCNC: 185 U/L — HIGH (ref 30–115)
ALT FLD-CCNC: 303 U/L — HIGH (ref 0–41)
ALT FLD-CCNC: 321 U/L — HIGH (ref 0–41)
ANION GAP SERPL CALC-SCNC: 14 MMOL/L — SIGNIFICANT CHANGE UP (ref 7–14)
ANION GAP SERPL CALC-SCNC: 16 MMOL/L — HIGH (ref 7–14)
APTT BLD: 40.3 SEC — HIGH (ref 27–39.2)
APTT BLD: 45.9 SEC — HIGH (ref 27–39.2)
AST SERPL-CCNC: 117 U/L — HIGH (ref 0–41)
AST SERPL-CCNC: 139 U/L — HIGH (ref 0–41)
BASOPHILS # BLD AUTO: 0.04 K/UL — SIGNIFICANT CHANGE UP (ref 0–0.2)
BASOPHILS NFR BLD AUTO: 0.7 % — SIGNIFICANT CHANGE UP (ref 0–1)
BILIRUB DIRECT SERPL-MCNC: 1 MG/DL — HIGH (ref 0–0.2)
BILIRUB INDIRECT FLD-MCNC: 0.7 MG/DL — SIGNIFICANT CHANGE UP (ref 0.2–1.2)
BILIRUB SERPL-MCNC: 1.5 MG/DL — HIGH (ref 0.2–1.2)
BILIRUB SERPL-MCNC: 1.7 MG/DL — HIGH (ref 0.2–1.2)
BUN SERPL-MCNC: 5 MG/DL — LOW (ref 10–20)
BUN SERPL-MCNC: 6 MG/DL — LOW (ref 10–20)
CALCIUM SERPL-MCNC: 8.9 MG/DL — SIGNIFICANT CHANGE UP (ref 8.5–10.1)
CALCIUM SERPL-MCNC: 9.1 MG/DL — SIGNIFICANT CHANGE UP (ref 8.5–10.1)
CHLORIDE SERPL-SCNC: 101 MMOL/L — SIGNIFICANT CHANGE UP (ref 98–110)
CHLORIDE SERPL-SCNC: 103 MMOL/L — SIGNIFICANT CHANGE UP (ref 98–110)
CHOLEST SERPL-MCNC: 166 MG/DL — SIGNIFICANT CHANGE UP
CO2 SERPL-SCNC: 21 MMOL/L — SIGNIFICANT CHANGE UP (ref 17–32)
CO2 SERPL-SCNC: 22 MMOL/L — SIGNIFICANT CHANGE UP (ref 17–32)
CREAT SERPL-MCNC: 0.5 MG/DL — LOW (ref 0.7–1.5)
CREAT SERPL-MCNC: 0.6 MG/DL — LOW (ref 0.7–1.5)
EOSINOPHIL # BLD AUTO: 0.15 K/UL — SIGNIFICANT CHANGE UP (ref 0–0.7)
EOSINOPHIL NFR BLD AUTO: 2.7 % — SIGNIFICANT CHANGE UP (ref 0–8)
ESTIMATED AVERAGE GLUCOSE: 103 MG/DL — SIGNIFICANT CHANGE UP (ref 68–114)
GLUCOSE SERPL-MCNC: 76 MG/DL — SIGNIFICANT CHANGE UP (ref 70–99)
GLUCOSE SERPL-MCNC: 78 MG/DL — SIGNIFICANT CHANGE UP (ref 70–99)
HCT VFR BLD CALC: 41.1 % — SIGNIFICANT CHANGE UP (ref 37–47)
HCT VFR BLD CALC: 43.8 % — SIGNIFICANT CHANGE UP (ref 37–47)
HDLC SERPL-MCNC: 70 MG/DL — SIGNIFICANT CHANGE UP
HGB BLD-MCNC: 13.5 G/DL — SIGNIFICANT CHANGE UP (ref 12–16)
HGB BLD-MCNC: 14.3 G/DL — SIGNIFICANT CHANGE UP (ref 12–16)
IMM GRANULOCYTES NFR BLD AUTO: 0.2 % — SIGNIFICANT CHANGE UP (ref 0.1–0.3)
INR BLD: 1 RATIO — SIGNIFICANT CHANGE UP (ref 0.65–1.3)
INR BLD: 1.01 RATIO — SIGNIFICANT CHANGE UP (ref 0.65–1.3)
LIPID PNL WITH DIRECT LDL SERPL: 84 MG/DL — SIGNIFICANT CHANGE UP
LYMPHOCYTES # BLD AUTO: 1.53 K/UL — SIGNIFICANT CHANGE UP (ref 1.2–3.4)
LYMPHOCYTES # BLD AUTO: 27.3 % — SIGNIFICANT CHANGE UP (ref 20.5–51.1)
MAGNESIUM SERPL-MCNC: 2.1 MG/DL — SIGNIFICANT CHANGE UP (ref 1.8–2.4)
MAGNESIUM SERPL-MCNC: 2.1 MG/DL — SIGNIFICANT CHANGE UP (ref 1.8–2.4)
MCHC RBC-ENTMCNC: 29.4 PG — SIGNIFICANT CHANGE UP (ref 27–31)
MCHC RBC-ENTMCNC: 29.5 PG — SIGNIFICANT CHANGE UP (ref 27–31)
MCHC RBC-ENTMCNC: 32.6 G/DL — SIGNIFICANT CHANGE UP (ref 32–37)
MCHC RBC-ENTMCNC: 32.8 G/DL — SIGNIFICANT CHANGE UP (ref 32–37)
MCV RBC AUTO: 89.7 FL — SIGNIFICANT CHANGE UP (ref 81–99)
MCV RBC AUTO: 90.1 FL — SIGNIFICANT CHANGE UP (ref 81–99)
MONOCYTES # BLD AUTO: 0.37 K/UL — SIGNIFICANT CHANGE UP (ref 0.1–0.6)
MONOCYTES NFR BLD AUTO: 6.6 % — SIGNIFICANT CHANGE UP (ref 1.7–9.3)
NEUTROPHILS # BLD AUTO: 3.51 K/UL — SIGNIFICANT CHANGE UP (ref 1.4–6.5)
NEUTROPHILS NFR BLD AUTO: 62.5 % — SIGNIFICANT CHANGE UP (ref 42.2–75.2)
NON HDL CHOLESTEROL: 96 MG/DL — SIGNIFICANT CHANGE UP
NRBC # BLD: 0 /100 WBCS — SIGNIFICANT CHANGE UP (ref 0–0)
NRBC # BLD: 0 /100 WBCS — SIGNIFICANT CHANGE UP (ref 0–0)
PHOSPHATE SERPL-MCNC: 2.9 MG/DL — SIGNIFICANT CHANGE UP (ref 2.1–4.9)
PLATELET # BLD AUTO: 237 K/UL — SIGNIFICANT CHANGE UP (ref 130–400)
PLATELET # BLD AUTO: 250 K/UL — SIGNIFICANT CHANGE UP (ref 130–400)
POTASSIUM SERPL-MCNC: 3.8 MMOL/L — SIGNIFICANT CHANGE UP (ref 3.5–5)
POTASSIUM SERPL-MCNC: 4 MMOL/L — SIGNIFICANT CHANGE UP (ref 3.5–5)
POTASSIUM SERPL-SCNC: 3.8 MMOL/L — SIGNIFICANT CHANGE UP (ref 3.5–5)
POTASSIUM SERPL-SCNC: 4 MMOL/L — SIGNIFICANT CHANGE UP (ref 3.5–5)
PROT SERPL-MCNC: 7.1 G/DL — SIGNIFICANT CHANGE UP (ref 6–8)
PROT SERPL-MCNC: 7.3 G/DL — SIGNIFICANT CHANGE UP (ref 6–8)
PROTHROM AB SERPL-ACNC: 11.5 SEC — SIGNIFICANT CHANGE UP (ref 9.95–12.87)
PROTHROM AB SERPL-ACNC: 11.6 SEC — SIGNIFICANT CHANGE UP (ref 9.95–12.87)
RBC # BLD: 4.58 M/UL — SIGNIFICANT CHANGE UP (ref 4.2–5.4)
RBC # BLD: 4.86 M/UL — SIGNIFICANT CHANGE UP (ref 4.2–5.4)
RBC # FLD: 13.2 % — SIGNIFICANT CHANGE UP (ref 11.5–14.5)
RBC # FLD: 13.2 % — SIGNIFICANT CHANGE UP (ref 11.5–14.5)
SARS-COV-2 RNA SPEC QL NAA+PROBE: SIGNIFICANT CHANGE UP
SODIUM SERPL-SCNC: 138 MMOL/L — SIGNIFICANT CHANGE UP (ref 135–146)
SODIUM SERPL-SCNC: 139 MMOL/L — SIGNIFICANT CHANGE UP (ref 135–146)
TRIGL SERPL-MCNC: 85 MG/DL — SIGNIFICANT CHANGE UP
TSH SERPL-MCNC: 1.12 UIU/ML — SIGNIFICANT CHANGE UP (ref 0.27–4.2)
WBC # BLD: 5.56 K/UL — SIGNIFICANT CHANGE UP (ref 4.8–10.8)
WBC # BLD: 5.61 K/UL — SIGNIFICANT CHANGE UP (ref 4.8–10.8)
WBC # FLD AUTO: 5.56 K/UL — SIGNIFICANT CHANGE UP (ref 4.8–10.8)
WBC # FLD AUTO: 5.61 K/UL — SIGNIFICANT CHANGE UP (ref 4.8–10.8)

## 2021-10-19 PROCEDURE — 88305 TISSUE EXAM BY PATHOLOGIST: CPT | Mod: 26

## 2021-10-19 PROCEDURE — 99285 EMERGENCY DEPT VISIT HI MDM: CPT

## 2021-10-19 PROCEDURE — 88112 CYTOPATH CELL ENHANCE TECH: CPT | Mod: 26

## 2021-10-19 PROCEDURE — 93010 ELECTROCARDIOGRAM REPORT: CPT

## 2021-10-19 PROCEDURE — 99223 1ST HOSP IP/OBS HIGH 75: CPT

## 2021-10-19 PROCEDURE — 71045 X-RAY EXAM CHEST 1 VIEW: CPT | Mod: 26

## 2021-10-19 PROCEDURE — 99222 1ST HOSP IP/OBS MODERATE 55: CPT

## 2021-10-19 RX ORDER — MORPHINE SULFATE 50 MG/1
2 CAPSULE, EXTENDED RELEASE ORAL ONCE
Refills: 0 | Status: DISCONTINUED | OUTPATIENT
Start: 2021-10-19 | End: 2021-10-19

## 2021-10-19 RX ORDER — ACETAMINOPHEN 500 MG
650 TABLET ORAL EVERY 6 HOURS
Refills: 0 | Status: DISCONTINUED | OUTPATIENT
Start: 2021-10-19 | End: 2021-10-22

## 2021-10-19 RX ORDER — MORPHINE SULFATE 50 MG/1
2 CAPSULE, EXTENDED RELEASE ORAL EVERY 6 HOURS
Refills: 0 | Status: DISCONTINUED | OUTPATIENT
Start: 2021-10-19 | End: 2021-10-20

## 2021-10-19 RX ORDER — CHLORHEXIDINE GLUCONATE 213 G/1000ML
1 SOLUTION TOPICAL
Refills: 0 | Status: DISCONTINUED | OUTPATIENT
Start: 2021-10-19 | End: 2021-10-22

## 2021-10-19 RX ORDER — SODIUM CHLORIDE 9 MG/ML
1000 INJECTION, SOLUTION INTRAVENOUS
Refills: 0 | Status: DISCONTINUED | OUTPATIENT
Start: 2021-10-19 | End: 2021-10-21

## 2021-10-19 RX ORDER — KETOROLAC TROMETHAMINE 30 MG/ML
15 SYRINGE (ML) INJECTION EVERY 8 HOURS
Refills: 0 | Status: DISCONTINUED | OUTPATIENT
Start: 2021-10-19 | End: 2021-10-22

## 2021-10-19 RX ORDER — PANTOPRAZOLE SODIUM 20 MG/1
40 TABLET, DELAYED RELEASE ORAL
Refills: 0 | Status: DISCONTINUED | OUTPATIENT
Start: 2021-10-19 | End: 2021-10-22

## 2021-10-19 RX ORDER — ENOXAPARIN SODIUM 100 MG/ML
40 INJECTION SUBCUTANEOUS AT BEDTIME
Refills: 0 | Status: DISCONTINUED | OUTPATIENT
Start: 2021-10-19 | End: 2021-10-22

## 2021-10-19 RX ORDER — ONDANSETRON 8 MG/1
4 TABLET, FILM COATED ORAL EVERY 8 HOURS
Refills: 0 | Status: DISCONTINUED | OUTPATIENT
Start: 2021-10-19 | End: 2021-10-22

## 2021-10-19 RX ADMIN — ENOXAPARIN SODIUM 40 MILLIGRAM(S): 100 INJECTION SUBCUTANEOUS at 22:09

## 2021-10-19 RX ADMIN — MORPHINE SULFATE 2 MILLIGRAM(S): 50 CAPSULE, EXTENDED RELEASE ORAL at 12:40

## 2021-10-19 RX ADMIN — MORPHINE SULFATE 2 MILLIGRAM(S): 50 CAPSULE, EXTENDED RELEASE ORAL at 22:12

## 2021-10-19 RX ADMIN — MORPHINE SULFATE 2 MILLIGRAM(S): 50 CAPSULE, EXTENDED RELEASE ORAL at 03:15

## 2021-10-19 RX ADMIN — MORPHINE SULFATE 2 MILLIGRAM(S): 50 CAPSULE, EXTENDED RELEASE ORAL at 02:41

## 2021-10-19 RX ADMIN — MORPHINE SULFATE 2 MILLIGRAM(S): 50 CAPSULE, EXTENDED RELEASE ORAL at 12:30

## 2021-10-19 RX ADMIN — MORPHINE SULFATE 2 MILLIGRAM(S): 50 CAPSULE, EXTENDED RELEASE ORAL at 22:42

## 2021-10-19 RX ADMIN — Medication 650 MILLIGRAM(S): at 12:42

## 2021-10-19 NOTE — CONSULT NOTE ADULT - SUBJECTIVE AND OBJECTIVE BOX
Patient is a 46y old  Female who presents with a chief complaint of Abdominal Pain (19 Oct 2021 02:54)      Admitted on: 10-19-21    HPI:      Ms. Baptiste is a 46 year old (never smoker) female patient known to have History of Fibroids s/p hysterectomy with bilateral salpingooophorectomy with complications s/p partial small bowel resection comes in with epigastric pain for the last 4 days. History goes back to around 4 days prior to presentation when the patient started complaining of epigastric pain. Pain is in epigastrium, crampy in nature, of around 5/10 intensity, and radiating to B/L upper flanks and back. It is not exacerbated with food or positioning, and seems to be partially relieved with Tylenol.  It has been associated with some nausea but no vomiting, jaundice, fever, or chills.    On review of systems, patient reports chronic diarrhea (loose non bloody non mucoid stools since partial colectomy) but denies any recent fever, chills, night sweats, URTI symptoms (cough, rhinorrhea, sore throat), urinary symptoms (urinary frequency, urgency, intermittence, dysuria, foul smelling urine, cloudy urine), headache.   No sick contacts.   No recent travel or exposure to recent travelers.      Upon presentation to the ED, the patient was hemodynamically stable:  Vital Signs in ED   - /77 mmHg  - HR 75 bpm  - RR 18 bpm  - T 97.6        COVID 19: Negative      Prior Colonoscopy July 2021 by Dr. Ott: Mucosa Normal mucosa was noted in the whole colon and terminal ileum. We took  random cold forceps biopsies of TI and each segment of the colon for  histopathology   Protruding lesions A single sessile 5 mm polyp was found in the descending  colon. A single-piece polypectomy was performed using a cold forceps. The polyp  was completely removed.   A single sessile 5 mm polyp was found in the descending colon. A single-piece  polypectomy was performed using a cold forceps. The polyp was completely  removed.   Medium internal and external hemorrhoids were noted.   Additional findings -We took some colonic aspirate and sent for culture to R/O  infectious process..         PAST MEDICAL & SURGICAL HISTORY:  Uterine fibroid    S/P PAULIE (total abdominal hysterectomy)    Status post bilateral salpingectomy    S/P small bowel resection        FAMILY HISTORY:  No pertinent family history in first degree relatives        Social History:  No IV drug abuse  Non alcoholic    Home Medications:  buprenorphine 10 mcg/hr transdermal film, extended release: 1 patch transdermal once a week (06 Jul 2021 10:12)    MEDICATIONS  (STANDING):  chlorhexidine 4% Liquid 1 Application(s) Topical <User Schedule>  enoxaparin Injectable 40 milliGRAM(s) SubCutaneous at bedtime  lactated ringers. 1000 milliLiter(s) (75 mL/Hr) IV Continuous <Continuous>  pantoprazole    Tablet 40 milliGRAM(s) Oral before breakfast    MEDICATIONS  (PRN):  acetaminophen   Tablet .. 650 milliGRAM(s) Oral every 6 hours PRN Mild Pain (1 - 3)  morphine  - Injectable 2 milliGRAM(s) IV Push every 6 hours PRN Moderate Pain (4 - 6)  ondansetron  IVPB 4 milliGRAM(s) IV Intermittent every 8 hours PRN Nausea and/or Vomiting      Allergies  No Known Allergies      Review of Systems:   Constitutional:  No Fever, No Chills  ENT/Mouth:  No Hearing Changes,  No Difficulty Swallowing  Eyes:  No Eye Pain, No Vision Changes  Cardiovascular:  No Chest Pain, No Palpitations  Respiratory:  No Cough, No Dyspnea  Gastrointestinal:  As described in HPI  Musculoskeletal:  No Joint Swelling, No Back Pain  Skin:  Jaundice  Neuro:  No Syncope, No Dizziness  Heme/Lymph:  No Bruising, No Bleeding.          Physical Examination:  T(C): 35.7 (10-19-21 @ 07:46), Max: 36.6 (10-18-21 @ 17:56)  HR: 63 (10-19-21 @ 07:46) (56 - 75)  BP: 113/74 (10-19-21 @ 07:46) (107/56 - 113/77)  RR: 18 (10-19-21 @ 07:46) (17 - 18)  SpO2: 98% (10-19-21 @ 07:46) (97% - 99%)  Height (cm): 160 (10-18-21 @ 16:37)  Weight (kg): 63 (10-18-21 @ 16:37)      Constitutional: No acute distress.  Eyes:. Conjunctivae are clear  Ears Nose and Throat: The external ears are normal appearing,  Oral mucosa is pink and moist.  Respiratory:  No signs of respiratory distress. Lung sounds are clear bilaterally.  Cardiovascular:  S1 S2, Regular rate and rhythm.  GI: Abdomen is soft, symmetric, and non-tender without distention.   Neuro: No Tremor, No involuntary movements  Skin:  Jaundice.          Data: (reviewed by attending)                        13.5   5.61  )-----------( 250      ( 19 Oct 2021 04:30 )             41.1     Hgb Trend:  13.5  10-19-21 @ 04:30  13.6  10-18-21 @ 19:33      10-19    139  |  103  |  5<L>  ----------------------------<  78  3.8   |  22  |  0.6<L>    Ca    8.9      19 Oct 2021 04:30  Phos  2.9     10-19  Mg     2.1     10-19    TPro  7.1  /  Alb  4.1  /  TBili  1.5<H>  /  DBili  0.7<H>  /  AST  139<H>  /  ALT  321<H>  /  AlkPhos  180<H>  10-19    Liver panel trend:  TBili 1.5   /      /      /   AlkP 180   /   Tptn 7.1   /   Alb 4.1    /   DBili 0.7      10-19  TBili 3.7   /      /      /   AlkP 199   /   Tptn 7.2   /   Alb 4.3    /   DBili --      10-18      PT/INR - ( 19 Oct 2021 04:30 )   PT: 11.60 sec;   INR: 1.01 ratio         PTT - ( 19 Oct 2021 04:30 )  PTT:40.3 sec        Radiology:(reviewed by attending)  CT Abdomen and Pelvis w/ Oral Cont and w/ IV Cont:   EXAM:  CT ABDOMEN AND PELVIS OC IC            PROCEDURE DATE:  10/18/2021            INTERPRETATION:  CLINICAL STATEMENT: Abdominal pain. Hysterectomy.    TECHNIQUE: Contiguous axial CT images were obtained from the lower chest to the pubic symphysis following administration of 100cc Optiray 320 intravenous contrast.  Oral contrast was administered.  Reformatted images in the coronal and sagittal planes were acquired.    Comparison: CT abdomen and pelvis 1/22/2021 and ultrasound dated 10/18/2021.    FINDINGS:    LOWER CHEST: Unremarkable.    HEPATOBILIARY: The gallbladder is distended with fluid and demonstrates wall thickening to 4 mm. It is dilated 9 mm and tapers distally. The pancreatic duct. There is a too small to further characterizehypodensity region gallbladder fossa. There is central intrahepatic biliary ductal dilatation, mild.    SPLEEN: Unremarkable.    PANCREAS: Unremarkable.    ADRENAL GLANDS: Unremarkable.    KIDNEYS: Punctate calculus is noted in the UVJ without evidence of left hydroureter and hydronephrosis. Unremarkable right kidney and ureter. Symmetric pattern of renal enhancement.    ABDOMINOPELVIC NODES: Unremarkable.    PELVIC ORGANS: Post hysterectomy. 1.3 cm cyst in the left adnexa. Previously noted largecystic structure is not delineated.    PERITONEUM/MESENTERY/BOWEL/SOFT TISSUES: No evidence of pneumoperitoneum, ascites or bowel obstruction. The oral contrast has reached the descending colon. Post partial small bowel resection with anastomosis.    BONES/SOFT TISSUES: Degenerative change of lumbar spine. Postoperative changes of the anterior abdomen    There is a 3.3 cm nodular density or concentrated fibroglandular tissue in the left retroareolar region of the breast. Dedicated mammogram and sonogram is recommended.      IMPRESSION:      Punctate calculus in the left UVJ. No evidence of hydroureter or hydronephrosis however    Circumferential wall thickening of the gallbladder. Cholecystitis is not excluded.    Dilated CBD with gradual tapering as well as mild central intrahepatic biliary ductal dilatation. If clinically warranted, MRI/MRCP may be of benefit.    3.3 cm nodular density or concentrated fibroglandular tissue in the left retroareolar region of the breast. Dedicated mammogram and sonogram is recommended.    --- End of Report ---              ZENAIDA GOVEA MD; Attending Radiologist  This document has been electronically signed. Oct 19 2021 12:48AM (10-18-21 @ 23:59)    US Abdomen Upper Quadrant Right:   EXAM:  US ABDOMEN RT UPR QUADRANT            PROCEDURE DATE:  10/18/2021            INTERPRETATION:  CLINICAL HISTORY / REASON FOR EXAM: Transaminitis.    COMPARISON: MR abdomen from February 11, 2021    PROCEDURE: Ultrasound of the right upper quadrant was performed.    FINDINGS:    LIVER: Within normal limits.  BILE DUCTS: Normal caliber. Common bile duct measures 9 mm.  GALLBLADDER: Gallbladder wall thickened up to 5 mm containing sludge.  PANCREAS: Poorly visualized.  RIGHT KIDNEY: 9 cm. Nohydronephrosis.  ASCITES: None.  IVC: Visualized portions are within normal limits.      IMPRESSION:    Gallbladder sludge and wall thickening up to 5 mm.    --- End of Report ---              JOSE POTTER MD; Attending Radiologist  This document has been electronically signed. Oct 18 2021 10:43PM (10-18-21 @ 22:39)

## 2021-10-19 NOTE — CONSULT NOTE ADULT - ATTENDING COMMENTS
Pt known to service  labs , images and vitals reviewed  likely to have choledocho  needs ercp  will schedule for lap david after it.

## 2021-10-19 NOTE — CONSULT NOTE ADULT - ASSESSMENT
Ms. Baptiste is a 46 year old (never smoker) female patient known to have History of Fibroids s/p hysterectomy with bilateral salpingooophorectomy with complications s/p partial small bowel resection comes in with epigastric pain for the last 4 days and elevated liver enzymes      #Biliary colic and elevated liver enzymes: R/O Choledocholithiasis vs acute cholecystitis  Liver enzymes : 1.5/180/139/321 trending down  Lipase, lactate, WBC negative.  No signs of cholangitis  US abdomen and CT abdomen with IV contrast: GB sludge, thickening, CBD 9mm.  Only on DVT PPX (last dose yesterday)  COVID neg, electrolytes normal.  She is NPO      Rec:  She is at intermediate risk for CBD stone so will perform EUS with possible ERCP today  I explained her the risks and benefits of the procedure     Ms. Baptiste is a 46 year old (never smoker) female patient known to have History of Fibroids s/p hysterectomy with bilateral salpingooophorectomy with complications s/p partial small bowel resection comes in with epigastric pain for the last 4 days and elevated liver enzymes      #Biliary colic and elevated liver enzymes: R/O Choledocholithiasis vs acute cholecystitis  Liver enzymes : 1.5/180/139/321 trending down  Lipase, lactate, WBC negative.  No signs of cholangitis  US abdomen and CT abdomen with IV contrast: GB sludge, thickening, CBD 9mm.  Only on DVT PPX (last dose yesterday)  COVID neg, electrolytes normal.  She is NPO      Rec:  Get MRCP.  if MRCP positive for CBD stone then ERCP  F/U with surgery for CCY

## 2021-10-19 NOTE — H&P ADULT - HISTORY OF PRESENT ILLNESS
History of Present Illness    Ms. Baptiste is a 46 year old (never smoker) female patient known to have:  - History of Fibroids s/p hysterectomy with bilateral salpingooophorectomy with complications s/p partial colectomy (August 2020)      She presented to the ED on 10/18 for evaluation of abdominal pain.  History goes back to around 4 days prior to presentation when the patient started complaining of epigastric pain.  Pain is in epigastrium, crampy in nature, of around 5/10 intensity, and non radiating.  It is not exacerbated with food or positioning, and seems to be partially relieved with Tylenol.  It has been associated with some nausea but no vomiting, jaundice, fever, or chills.    On review of systems, patient reports chronic diarrhea (loose non bloody non mucoid stools since partial colectomy) but denies any recent fever, chills, night sweats, URTI symptoms (cough, rhinorrhea, sore throat), urinary symptoms (urinary frequency, urgency, intermittence, dysuria, foul smelling urine, cloudy urine), headache.   No sick contacts.   No recent travel or exposure to recent travelers.      Upon presentation to the ED, the patient was hemodynamically stable:  Vital Signs in ED   - /77 mmHg  - HR 75 bpm  - RR 18 bpm  - T 97.6      Investigations   Laboratory Workup  - CBC:                        13.6   4.96  )-----------( 255      ( 18 Oct 2021 19:33 )             40.3     - Chemistry:  10-18    139  |  105  |  7<L>  ----------------------------<  156<H>  4.2   |  21  |  0.7    Ca    8.9      18 Oct 2021 19:33    TPro  7.2  /  Alb  4.3  /  TBili  3.7<H>  /  DBili  x   /  AST  272<H>  /  ALT  381<H>  /  AlkPhos  199<H>  10-18      Radiological Workup  * CT AP IC Punctate calculus in the left UVJ. No evidence of hydroureter or hydronephrosis however Circumferential wall thickening of the gallbladder. Cholecystitis is not excluded. Dilated CBD with gradual tapering as well as mild central intrahepatic biliary ductal dilatation. If clinically warranted, MRI/MRCP may be of benefit. 3.3 cm nodular density or concentrated fibroglandular tissue in the left retroareolar region of the breast. Dedicated mammogram and sonogram is recommended.      - Patient was evaluated by General Surgery in ED: no acute surgical intervention  - She received LR 1L bolus and IV morphine 4mg + 2mg for pain control  - She will be admitted to the floor as a case of Choledocholithiasis for management, for further investigations and monitoring.         History of Present Illness    Ms. Baptiste is a 46 year old (never smoker) female patient known to have:  - History of Fibroids s/p hysterectomy with bilateral salpingooophorectomy with complications s/p partial colectomy (August 2020)      She presented to the ED on 10/18 for evaluation of abdominal pain.  History goes back to around 4 days prior to presentation when the patient started complaining of epigastric pain.  Pain is in epigastrium, crampy in nature, of around 5/10 intensity, and non radiating.  It is not exacerbated with food or positioning, and seems to be partially relieved with Tylenol.  It has been associated with some nausea but no vomiting, jaundice, fever, or chills.    On review of systems, patient reports chronic diarrhea (loose non bloody non mucoid stools since partial colectomy) but denies any recent fever, chills, night sweats, URTI symptoms (cough, rhinorrhea, sore throat), urinary symptoms (urinary frequency, urgency, intermittence, dysuria, foul smelling urine, cloudy urine), headache.   No sick contacts.   No recent travel or exposure to recent travelers.      Upon presentation to the ED, the patient was hemodynamically stable:  Vital Signs in ED   - /77 mmHg  - HR 75 bpm  - RR 18 bpm  - T 97.6      Investigations   Laboratory Workup  - CBC:                        13.6   4.96  )-----------( 255      ( 18 Oct 2021 19:33 )             40.3     - Chemistry:  10-18    139  |  105  |  7<L>  ----------------------------<  156<H>  4.2   |  21  |  0.7    Ca    8.9      18 Oct 2021 19:33    TPro  7.2  /  Alb  4.3  /  TBili  3.7<H>  /  DBili  x   /  AST  272<H>  /  ALT  381<H>  /  AlkPhos  199<H>  10-18      Radiological Workup  * CT AP IC Punctate calculus in the left UVJ. No evidence of hydroureter or hydronephrosis however Circumferential wall thickening of the gallbladder. Cholecystitis is not excluded. Dilated CBD with gradual tapering as well as mild central intrahepatic biliary ductal dilatation. If clinically warranted, MRI/MRCP may be of benefit. 3.3 cm nodular density or concentrated fibroglandular tissue in the left retroareolar region of the breast. Dedicated mammogram and sonogram is recommended.  * RUQ US GB sludge, thick 5mm      - Patient was evaluated by General Surgery in ED: no acute surgical intervention  - She received LR 1L bolus and IV morphine 4mg + 2mg for pain control  - She will be admitted to the floor as a case of Choledocholithiasis for management (possible ERCP), further investigations (MRCP), and monitoring.

## 2021-10-19 NOTE — CONSULT NOTE ADULT - ATTENDING COMMENTS
Patient seen and examined on the GI–advance endoscopy rounds assessment and plan as above, will continue to follow closely with you

## 2021-10-19 NOTE — H&P ADULT - ATTENDING COMMENTS
patient seen and examined independently on morning rounds for the first time today in the ED, chart reviewed and discussed with medicine resident and agree with the above H/P and assessment and plan with the following addendum:     in brief, 45 yo woman with h/o prior multiple abdominal surgeries including PAULIE/bilat salpingooopherectomy for bleeding fibroids 2020 and partial colectomy post-procedure who p/w 1 week of abdominal pain.  She reports pain in epigastric area, 5/10 intensity and nonradiating,  in the ED found to have tbili 3.7 and slightly elevated lfts and admitted to medicine service for choledocholithiasis.    ROS- as per above otherwise review of systems unremarkable    PE:  GEN-NAD, AAOx3  PULM- fair air entry  CVS- +s1/s2 RRR   GI- soft +tenderness to palp- ND +bs  EXT- no edema    labs/radiology reviewed    a/p:   #abdominal pain- choledocholithiasis  -pain control---serial abdominal exam  -pending MRCP  -GI consult--may need ERCP after MRCP done  -Surgery consult (for possible cholecystectomy)  -monitor lft and tbili (already trending down 3.7--->1.5)  -monitor wbc and fever curve  -cont ivf and suppl electrolytes prn    DVT/GI ppx  guarded prognosis    FULL CODE

## 2021-10-19 NOTE — H&P ADULT - ASSESSMENT
Assessment and Plan  Case of a 46 year old (never smoker) female patient known to have History of Fibroids s/p hysterectomy with bilateral salpingooophorectomy with complications s/p partial colectomy (August 2020) who presented on 10/18 for evaluation of epigastric pain, found to have elevated LFTs on labwork and evidence of choledocholithiasis versus cholecystitis on imaging, to be admitted for possible ERCP. Currently hemodynamically stable.      Choledocholithiasis  Less Likely Cholecystitis  No Sepsis on Presentation  * Complain: 4 days of epigastric pain, nausea, chronic diarrhea, no vomiting or fever or jaundice  * ED /77mmHg, HR 75bpm, T 97.6 (no sepsis)  * WBC 4.96  * PE as above  * CT AP IC Punctate calculus in the left UVJ. No evidence of hydroureter or hydronephrosis however Circumferential wall thickening of the gallbladder. Cholecystitis is not excluded. Dilated CBD with gradual tapering as well as mild central intrahepatic biliary ductal dilatation. If clinically warranted, MRI/MRCP may be of benefit. 3.3 cm nodular density or concentrated fibroglandular tissue in the left retroareolar region of the breast. Dedicated mammogram and sonogram is recommended  * RUQ US GB sludge, thick 5mm  * Evaluated by General Surgery in ED: no acute surgical intervention  * S/P LR 1L bolus and IV morphine 4mg + 2mg for pain control    - General Surgery Team on board: no acute surgical intervention  - Follow up Gastroenterology Team recommendations for possible ERCP (not urgent in absence of cholangitis or sepsis)  - Follow up MRCP once performed  - Will keep NPO for now pending MRCP and start IV fluids at LR 75mL/hour  - Trend LFTs daily  - Monitor T: afebrile since admission  - Trend WBC: no leukocytosis  - Follow up blood cultures once collected  - Will keep off antibiotics for now  - Monitor pain and keep score at 01/10: Morphine IV 2mg Q6h PRN  - Monitor nausea and start IV Zofran 4mg Q8h PRN  - Check lipid profile      History of Fibroids   * s/p hysterectomy with bilateral salpingooophorectomy with complications s/p partial colectomy (August 2020)   * Stable  - Outpatient follow up as needed      Others  - DVT Prophylaxis: Lovenox 40mg Subcutaneously daily  - GI Prophylaxis: Pantoprazole 40mg PO QD  - Diet: NPO as above  - Code Status: Full      Barriers to learning: NO  Discharge Planning: Patient will be discharged once stable and  Plan was communicated with patient and medical team      Ángel Burdick MD  PGY - 2 Internal Medicine   [facility]   Assessment and Plan  Case of a 46 year old (never smoker) female patient known to have History of Fibroids s/p hysterectomy with bilateral salpingooophorectomy with complications s/p partial colectomy (August 2020) who presented on 10/18 for evaluation of epigastric pain, found to have elevated LFTs on labwork and evidence of choledocholithiasis versus cholecystitis on imaging, to be admitted for possible ERCP. Currently hemodynamically stable.      Choledocholithiasis  Less Likely Cholecystitis  No Sepsis on Presentation  * Complain: 4 days of epigastric pain, nausea, chronic diarrhea, no vomiting or fever or jaundice  * ED /77mmHg, HR 75bpm, T 97.6 (no sepsis)  * WBC 4.96  * PE as above  * CT AP IC Punctate calculus in the left UVJ. No evidence of hydroureter or hydronephrosis however Circumferential wall thickening of the gallbladder. Cholecystitis is not excluded. Dilated CBD with gradual tapering as well as mild central intrahepatic biliary ductal dilatation. If clinically warranted, MRI/MRCP may be of benefit. 3.3 cm nodular density or concentrated fibroglandular tissue in the left retroareolar region of the breast. Dedicated mammogram and sonogram is recommended  * RUQ US GB sludge, thick 5mm  * Evaluated by General Surgery in ED: no acute surgical intervention  * S/P LR 1L bolus and IV morphine 4mg + 2mg for pain control    - General Surgery Team on board: no acute surgical intervention  - Follow up Gastroenterology Team recommendations for possible ERCP (not urgent in absence of cholangitis or sepsis)  - Follow up MRCP once performed  - Will keep NPO for now pending MRCP and start IV fluids at LR 75mL/hour  - Trend LFTs daily  - Monitor T: afebrile since admission  - Trend WBC: no leukocytosis  - Follow up blood cultures once collected  - Will keep off antibiotics for now  - Monitor pain and keep score at 01/10: Morphine IV 2mg Q6h PRN  - Monitor nausea and start IV Zofran 4mg Q8h PRN  - Check lipid profile      Left UV Junction Calculus  No Hydronephrosis  * CT AP IC Punctate calculus in the left UVJ. No evidence of hydroureter or hydronephrosis however Circumferential wall thickening of the gallbladder. Cholecystitis is not excluded. Dilated CBD with gradual tapering as well as mild central intrahepatic biliary ductal dilatation. If clinically warranted, MRI/MRCP may be of benefit. 3.3 cm nodular density or concentrated fibroglandular tissue in the left retroareolar region of the breast. Dedicated mammogram and sonogram is recommended  * Normal BUN and Cr  - Consider US renal for better evaluation  - Trend BUN and Cr      Fibroglandular Tissue and Nodule in Breast  * CT AP IC Punctate calculus in the left UVJ. No evidence of hydroureter or hydronephrosis however Circumferential wall thickening of the gallbladder. Cholecystitis is not excluded. Dilated CBD with gradual tapering as well as mild central intrahepatic biliary ductal dilatation. If clinically warranted, MRI/MRCP may be of benefit. 3.3 cm nodular density or concentrated fibroglandular tissue in the left retroareolar region of the breast. Dedicated mammogram and sonogram is recommended  - Outpatient mammography      History of Fibroids   * s/p hysterectomy with bilateral salpingooophorectomy with complications s/p partial colectomy (August 2020)   * Stable  - Outpatient follow up as needed      Others  - DVT Prophylaxis: Lovenox 40mg Subcutaneously daily  - GI Prophylaxis: Pantoprazole 40mg PO QD  - Diet: NPO as above  - Code Status: Full      Barriers to learning: NO  Discharge Planning: Patient will be discharged once stable and  Plan was communicated with patient and medical team      Ángel Burdick MD  PGY - 2 Internal Medicine   [facility]

## 2021-10-19 NOTE — CONSULT NOTE ADULT - ASSESSMENT
ASSESSMENT:   Ms. Baptiste is a 45 year old patient with history of extensive surgical history and recent prolonged hospital course significant for PAULIE/BSO on 8/19/20 ([facility]) for 15cm bleeding fibroid with readmission x 2 for PSBO resolved with nonoperative management, s/p SBR, extensive NARCISO w/ 43cm ileum resected 2/2 serosal enterotomy 9/15 c/b RTOR for breakdown of proximal limb staple line of of anastomosis, 9/23 I&D of abdominal wound, complicated by enterocutaneous fistula. Patient presents to ED with 4 days of epigastric abdominal pain radiating to her back exacerbated by PO intake, found to have CBD dilation to 9 mm on CTAP and RUQ US.    PLAN:  - T bili elevated to 3.7  - GI consult for possible ERCP  - Obtain MRCP  - Monitor for fevers  - GI PPX  - DVT PPX  - Trend LFTs  - Admit to medicine  - Will follow    Lines/Tubes: PIV, Midline, Central Line, A-Line, Chest tubes, Enrrique/ZOHAIB drains, Silva Catheter.    Above plan discussed with Attending Surgeon Dr. Sandra  , patient, patient family, and Primary team  10-19-21 @ 02:35    CONSULT SPECTRA: [id number]   ASSESSMENT:   Ms. Baptiste is a 45 year old patient with history of extensive surgical history and recent prolonged hospital course significant for PAULIE/BSO on 8/19/20 ([facility]) for 15cm bleeding fibroid with readmission x 2 for PSBO resolved with nonoperative management, s/p SBR, extensive NARCISO w/ 43cm ileum resected 2/2 serosal enterotomy 9/15 c/b RTOR for breakdown of proximal limb staple line of of anastomosis, 9/23 I&D of abdominal wound, complicated by enterocutaneous fistula. Patient presents to ED with 4 days of epigastric abdominal pain radiating to her back exacerbated by PO intake, found to have CBD dilation to 9 mm on CTAP and RUQ US.    PLAN:  - T bili elevated to 3.7, GI consult for possible ERCP  - Obtain MRCP  - Monitor for fevers  - GI PPX  - DVT PPX  - Trend LFTs  - Admit to medicine  - Will follow    Senior Resident Note  Pt seen and examined 45F with previous surgical admission for ECF and Small bowel resection now presenting with likely choledocholithiasis. Will admit to medicine and get GI consult for ERCP and plan for lap david during this admission   Above note has been reviewed and edited  Plan d/w patient and Dr Boaz Chow    CONSULT SPECTRA: [id number]

## 2021-10-19 NOTE — H&P ADULT - NSHPPHYSICALEXAM_GEN_ALL_CORE
- Physical Exam in ED  * General Appearance: Alert, cooperative, interactive, oriented to time, place, and person, in no acute distress  * Head: Normocephalic, without obvious abnormality, atraumatic  * Eyes: PERRL, conjunctiva/corneas clear, EOM's intact, fundi benign, both eyes  * Neck: Supple, symmetrical, trachea midline, no adenopathy;   * Thyroid:  No enlargement/tenderness/nodules; no carotid bruit or JVD  * Lungs: Respirations unlabored, Good bilateral air entry, normal breath sounds (Clear to auscultation bilaterally, no audible wheezes, crackles, or rhonchi)  * Heart: Regular Rate and Rhythm, normal S1 and S2, no audible murmur, rub, or gallop  * Abdomen: Symmetric, non-distended, soft, tenderness along epigastrium and RUQ, no guarding, bowel sounds active all four quadrants, no masses, no organomegaly (no hepatosplenomegaly)  * Extremities: Extremities normal, atraumatic, no cyanosis, no lower extremity pitting edema bilaterally, adequate dorsalis pedis pulses  * Pulses: 2+ and symmetric all extremities  * Skin: Skin color, texture, turgor normal, no rashes or lesions  * Lymph nodes: Cervical, supraclavicular, and axillary nodes normal  * Neurologic: CNII-XII intact, normal strength, sensation and reflexes throughout

## 2021-10-19 NOTE — CONSULT NOTE ADULT - SUBJECTIVE AND OBJECTIVE BOX
GENERAL SURGERY CONSULT NOTE    Patient: GLORIA SCHAFER , 46y ([birth year])Female   MRN: [id number]  Location: [facility] ER Hold 015 A  Visit: 10-19-21 Inpatient  Date: 10-19-21 @ 02:35    HPI: Ms. Schafer is a 45 year old patient with history of extensive surgical history and recent prolonged hospital course significant for PAULIE/BSO on 8/19/20 ([facility]) for 15cm bleeding fibroid with readmission x 2 for PSBO resolved with nonoperative management, s/p SBR, extensive NARCISO w/ 43cm ileum resected 2/2 serosal enterotomy 9/15 c/b RTOR for breakdown of proximal limb staple line of of anastomosis, 9/23 I&D of abdominal wound, complicated by enterocutaneous fistula. Patient presents to the ED with four days of epigastric pain radiating to her back exacerbated by PO intake. CT scan and RUQ US revealed GB wall thickening to 5mm, and dilated CBD to 9 mm. Denies fevers/ chills, SOB, chest pain, diarrhea/constipation, nausea/vomiting.      PAST MEDICAL & SURGICAL HISTORY:  Uterine fibroid    S/P PAULIE (total abdominal hysterectomy)    Status post bilateral salpingectomy    S/P small bowel resection        Home Medications:  buprenorphine 10 mcg/hr transdermal film, extended release: 1 patch transdermal once a week (06 Jul 2021 10:12)        VITALS:  T(F): 97.8 (10-18-21 @ 17:56), Max: 97.8 (10-18-21 @ 17:56)  HR: 71 (10-18-21 @ 17:56) (71 - 75)  BP: 109/68 (10-18-21 @ 17:56) (109/68 - 113/77)  RR: 17 (10-18-21 @ 17:56) (17 - 18)  SpO2: 99% (10-18-21 @ 17:56) (97% - 99%)    PHYSICAL EXAM:  General: NAD, AAOx3, calm and cooperative  HEENT: NCAT, CAMI, EOMI, Trachea ML, Neck supple  Cardiac: RRR S1, S2, no Murmurs, rubs or gallops  Respiratory: CTAB, normal respiratory effort, breath sounds equal BL, no wheeze, rhonchi or crackles  Abdomen: Soft, non-distended, tender to palpation in RUQ and epigastric region.    MEDICATIONS  (STANDING):  morphine  - Injectable 2 milliGRAM(s) IV Push once    MEDICATIONS  (PRN):      LAB/STUDIES:                        13.6   4.96  )-----------( 255      ( 18 Oct 2021 19:33 )             40.3     10-18    139  |  105  |  7<L>  ----------------------------<  156<H>  4.2   |  21  |  0.7    Ca    8.9      18 Oct 2021 19:33    TPro  7.2  /  Alb  4.3  /  TBili  3.7<H>  /  DBili  x   /  AST  272<H>  /  ALT  381<H>  /  AlkPhos  199<H>  10-18      LIVER FUNCTIONS - ( 18 Oct 2021 19:33 )  Alb: 4.3 g/dL / Pro: 7.2 g/dL / ALK PHOS: 199 U/L / ALT: 381 U/L / AST: 272 U/L / GGT: x                         IMAGING:      ACCESS DEVICES:  [X] Peripheral IV  [ ] Central Venous Line	[ ] R	[ ] L	[ ] IJ	[ ] Fem	[ ] SC	Placed:   [ ] Arterial Line		[ ] R	[ ] L	[ ] Fem	[ ] Rad	[ ] Ax	Placed:   [ ] PICC:					[ ] Mediport  [ ] Urinary Catheter, Date Placed:      GENERAL SURGERY CONSULT NOTE    Patient: GLORIA SCHAFER , 46y ([birth year])Female   MRN: [id number]  Location: [facility] ER Hold 015 A  Visit: 10-19-21 Inpatient  Date: 10-19-21 @ 02:35    HPI: Ms. Schafer is a 45 year old patient with history of extensive surgical history and recent prolonged hospital course significant for PAULIE/BSO on 8/19/20 ([facility]) for 15cm bleeding fibroid with readmission x 2 for PSBO resolved with nonoperative management, s/p SBR, extensive NARCISO w/ 43cm ileum resected 2/2 serosal enterotomy 9/15 c/b RTOR for breakdown of proximal limb staple line of of anastomosis, 9/23 I&D of abdominal wound, complicated by enterocutaneous fistula. Patient presents to the ED with four days of epigastric pain radiating to her back exacerbated by PO intake. CT scan and RUQ US revealed GB wall thickening to 5mm, and dilated CBD to 9 mm. Denies fevers/ chills, SOB, chest pain, diarrhea/constipation, nausea/vomiting.      PAST MEDICAL & SURGICAL HISTORY:  Uterine fibroid  S/P PAULIE (total abdominal hysterectomy)  Status post bilateral salpingectomy  S/P small bowel resection    Home Medications:  buprenorphine 10 mcg/hr transdermal film, extended release: 1 patch transdermal once a week (06 Jul 2021 10:12)    VITALS:  T(F): 97.8 (10-18-21 @ 17:56), Max: 97.8 (10-18-21 @ 17:56)  HR: 71 (10-18-21 @ 17:56) (71 - 75)  BP: 109/68 (10-18-21 @ 17:56) (109/68 - 113/77)  RR: 17 (10-18-21 @ 17:56) (17 - 18)  SpO2: 99% (10-18-21 @ 17:56) (97% - 99%)    PHYSICAL EXAM:  General: NAD, AAOx3, calm and cooperative  HEENT: NCAT, CAMI, EOMI, Trachea ML, Neck supple  Cardiac: RRR S1, S2, no Murmurs, rubs or gallops  Respiratory: CTAB, normal respiratory effort, breath sounds equal BL, no wheeze, rhonchi or crackles  Abdomen: Soft, non-distended, tender to palpation in RUQ and epigastric region.    MEDICATIONS  (STANDING):  morphine  - Injectable 2 milliGRAM(s) IV Push once    LAB/STUDIES:                        13.6   4.96  )-----------( 255      ( 18 Oct 2021 19:33 )             40.3     10-18    139  |  105  |  7<L>  ----------------------------<  156<H>  4.2   |  21  |  0.7    Ca    8.9      18 Oct 2021 19:33    TPro  7.2  /  Alb  4.3  /  TBili  3.7<H>  /  DBili  x   /  AST  272<H>  /  ALT  381<H>  /  AlkPhos  199<H>  10-18    LIVER FUNCTIONS - ( 18 Oct 2021 19:33 )  Alb: 4.3 g/dL / Pro: 7.2 g/dL / ALK PHOS: 199 U/L / ALT: 381 U/L / AST: 272 U/L / GGT: x           ACCESS DEVICES:  [X] Peripheral IV  [ ] Central Venous Line	[ ] R	[ ] L	[ ] IJ	[ ] Fem	[ ] SC	Placed:   [ ] Arterial Line		[ ] R	[ ] L	[ ] Fem	[ ] Rad	[ ] Ax	Placed:   [ ] PICC:					[ ] Mediport  [ ] Urinary Catheter, Date Placed:     < from: CT Abdomen and Pelvis w/ Oral Cont and w/ IV Cont (10.18.21 @ 23:59) >  IMPRESSION:      Punctate calculus in the left UVJ. No evidence of hydroureter or hydronephrosis however    Circumferential wall thickening of the gallbladder. Cholecystitis is not excluded.    Dilated CBD with gradual tapering as well as mild central intrahepatic biliary ductal dilatation. If clinically warranted, MRI/MRCP may be of benefit.    3.3 cm nodular density or concentrated fibroglandular tissue in the left retroareolar region of the breast. Dedicated mammogram and sonogram is recommended.    < end of copied text >

## 2021-10-20 LAB
ALBUMIN SERPL ELPH-MCNC: 4 G/DL — SIGNIFICANT CHANGE UP (ref 3.5–5.2)
ALP SERPL-CCNC: 178 U/L — HIGH (ref 30–115)
ALT FLD-CCNC: 263 U/L — HIGH (ref 0–41)
ANION GAP SERPL CALC-SCNC: 13 MMOL/L — SIGNIFICANT CHANGE UP (ref 7–14)
APPEARANCE UR: ABNORMAL
AST SERPL-CCNC: 149 U/L — HIGH (ref 0–41)
BACTERIA # UR AUTO: ABNORMAL
BASOPHILS # BLD AUTO: 0.04 K/UL — SIGNIFICANT CHANGE UP (ref 0–0.2)
BASOPHILS NFR BLD AUTO: 0.7 % — SIGNIFICANT CHANGE UP (ref 0–1)
BILIRUB SERPL-MCNC: 3.1 MG/DL — HIGH (ref 0.2–1.2)
BILIRUB UR-MCNC: ABNORMAL
BUN SERPL-MCNC: 5 MG/DL — LOW (ref 10–20)
CALCIUM SERPL-MCNC: 9 MG/DL — SIGNIFICANT CHANGE UP (ref 8.5–10.1)
CHLORIDE SERPL-SCNC: 100 MMOL/L — SIGNIFICANT CHANGE UP (ref 98–110)
CO2 SERPL-SCNC: 25 MMOL/L — SIGNIFICANT CHANGE UP (ref 17–32)
COLOR SPEC: ABNORMAL
COVID-19 SPIKE DOMAIN AB INTERP: POSITIVE
COVID-19 SPIKE DOMAIN ANTIBODY RESULT: 43.6 U/ML — HIGH
CREAT SERPL-MCNC: 0.6 MG/DL — LOW (ref 0.7–1.5)
DIFF PNL FLD: ABNORMAL
EOSINOPHIL # BLD AUTO: 0.14 K/UL — SIGNIFICANT CHANGE UP (ref 0–0.7)
EOSINOPHIL NFR BLD AUTO: 2.6 % — SIGNIFICANT CHANGE UP (ref 0–8)
EPI CELLS # UR: 4 /HPF — SIGNIFICANT CHANGE UP (ref 0–5)
GLUCOSE SERPL-MCNC: 71 MG/DL — SIGNIFICANT CHANGE UP (ref 70–99)
GLUCOSE UR QL: SIGNIFICANT CHANGE UP
HAV IGM SER-ACNC: SIGNIFICANT CHANGE UP
HBV CORE IGM SER-ACNC: SIGNIFICANT CHANGE UP
HBV SURFACE AG SER-ACNC: SIGNIFICANT CHANGE UP
HCT VFR BLD CALC: 42 % — SIGNIFICANT CHANGE UP (ref 37–47)
HCV AB S/CO SERPL IA: 0.17 S/CO — SIGNIFICANT CHANGE UP (ref 0–0.99)
HCV AB SERPL-IMP: SIGNIFICANT CHANGE UP
HGB BLD-MCNC: 13.8 G/DL — SIGNIFICANT CHANGE UP (ref 12–16)
HYALINE CASTS # UR AUTO: 1 /LPF — SIGNIFICANT CHANGE UP (ref 0–7)
IMM GRANULOCYTES NFR BLD AUTO: 0.4 % — HIGH (ref 0.1–0.3)
KETONES UR-MCNC: ABNORMAL
LEUKOCYTE ESTERASE UR-ACNC: NEGATIVE — SIGNIFICANT CHANGE UP
LYMPHOCYTES # BLD AUTO: 1.38 K/UL — SIGNIFICANT CHANGE UP (ref 1.2–3.4)
LYMPHOCYTES # BLD AUTO: 25.4 % — SIGNIFICANT CHANGE UP (ref 20.5–51.1)
MAGNESIUM SERPL-MCNC: 2 MG/DL — SIGNIFICANT CHANGE UP (ref 1.8–2.4)
MCHC RBC-ENTMCNC: 29.8 PG — SIGNIFICANT CHANGE UP (ref 27–31)
MCHC RBC-ENTMCNC: 32.9 G/DL — SIGNIFICANT CHANGE UP (ref 32–37)
MCV RBC AUTO: 90.7 FL — SIGNIFICANT CHANGE UP (ref 81–99)
MONOCYTES # BLD AUTO: 0.45 K/UL — SIGNIFICANT CHANGE UP (ref 0.1–0.6)
MONOCYTES NFR BLD AUTO: 8.3 % — SIGNIFICANT CHANGE UP (ref 1.7–9.3)
NEUTROPHILS # BLD AUTO: 3.4 K/UL — SIGNIFICANT CHANGE UP (ref 1.4–6.5)
NEUTROPHILS NFR BLD AUTO: 62.6 % — SIGNIFICANT CHANGE UP (ref 42.2–75.2)
NITRITE UR-MCNC: NEGATIVE — SIGNIFICANT CHANGE UP
NRBC # BLD: 0 /100 WBCS — SIGNIFICANT CHANGE UP (ref 0–0)
PH UR: 6.5 — SIGNIFICANT CHANGE UP (ref 5–8)
PLATELET # BLD AUTO: 253 K/UL — SIGNIFICANT CHANGE UP (ref 130–400)
POTASSIUM SERPL-MCNC: 4 MMOL/L — SIGNIFICANT CHANGE UP (ref 3.5–5)
POTASSIUM SERPL-SCNC: 4 MMOL/L — SIGNIFICANT CHANGE UP (ref 3.5–5)
PROT SERPL-MCNC: 6.8 G/DL — SIGNIFICANT CHANGE UP (ref 6–8)
PROT UR-MCNC: ABNORMAL
RBC # BLD: 4.63 M/UL — SIGNIFICANT CHANGE UP (ref 4.2–5.4)
RBC # FLD: 13.2 % — SIGNIFICANT CHANGE UP (ref 11.5–14.5)
RBC CASTS # UR COMP ASSIST: 225 /HPF — HIGH (ref 0–4)
SARS-COV-2 IGG+IGM SERPL QL IA: 43.6 U/ML — HIGH
SARS-COV-2 IGG+IGM SERPL QL IA: POSITIVE
SODIUM SERPL-SCNC: 138 MMOL/L — SIGNIFICANT CHANGE UP (ref 135–146)
SP GR SPEC: 1.02 — SIGNIFICANT CHANGE UP (ref 1.01–1.03)
UROBILINOGEN FLD QL: SIGNIFICANT CHANGE UP
WBC # BLD: 5.43 K/UL — SIGNIFICANT CHANGE UP (ref 4.8–10.8)
WBC # FLD AUTO: 5.43 K/UL — SIGNIFICANT CHANGE UP (ref 4.8–10.8)
WBC UR QL: 5 /HPF — SIGNIFICANT CHANGE UP (ref 0–5)

## 2021-10-20 PROCEDURE — 99233 SBSQ HOSP IP/OBS HIGH 50: CPT

## 2021-10-20 RX ADMIN — Medication 650 MILLIGRAM(S): at 18:24

## 2021-10-20 RX ADMIN — Medication 15 MILLIGRAM(S): at 12:00

## 2021-10-20 RX ADMIN — MORPHINE SULFATE 2 MILLIGRAM(S): 50 CAPSULE, EXTENDED RELEASE ORAL at 04:32

## 2021-10-20 RX ADMIN — MORPHINE SULFATE 2 MILLIGRAM(S): 50 CAPSULE, EXTENDED RELEASE ORAL at 05:08

## 2021-10-20 RX ADMIN — Medication 15 MILLIGRAM(S): at 23:49

## 2021-10-20 RX ADMIN — CHLORHEXIDINE GLUCONATE 1 APPLICATION(S): 213 SOLUTION TOPICAL at 05:10

## 2021-10-20 RX ADMIN — SODIUM CHLORIDE 75 MILLILITER(S): 9 INJECTION, SOLUTION INTRAVENOUS at 00:06

## 2021-10-20 RX ADMIN — Medication 15 MILLIGRAM(S): at 11:42

## 2021-10-20 RX ADMIN — ENOXAPARIN SODIUM 40 MILLIGRAM(S): 100 INJECTION SUBCUTANEOUS at 21:19

## 2021-10-20 RX ADMIN — Medication 650 MILLIGRAM(S): at 18:54

## 2021-10-20 RX ADMIN — PANTOPRAZOLE SODIUM 40 MILLIGRAM(S): 20 TABLET, DELAYED RELEASE ORAL at 05:10

## 2021-10-20 RX ADMIN — Medication 15 MILLIGRAM(S): at 23:34

## 2021-10-20 NOTE — PROGRESS NOTE ADULT - SUBJECTIVE AND OBJECTIVE BOX
Patient is a 46y old  Female who presents with a chief complaint of Abdominal Pain (20 Oct 2021 08:41)    HPI:  Ms. Baptiste is a 46 year old (never smoker) female patient known to have:  - History of Fibroids s/p hysterectomy with bilateral salpingooophorectomy with complications s/p partial colectomy (2020)    She presented to the ED on 10/18 for evaluation of abdominal pain.  History goes back to around 4 days prior to presentation when the patient started complaining of epigastric pain.  Pain is in epigastrium, crampy in nature, of around 5/10 intensity, and non radiating.  It is not exacerbated with food or positioning, and seems to be partially relieved with Tylenol.  It has been associated with some nausea but no vomiting, jaundice, fever, or chills.    On review of systems, patient reports chronic diarrhea (loose non bloody non mucoid stools since partial colectomy) but denies any recent fever, chills, night sweats, URTI symptoms (cough, rhinorrhea, sore throat), urinary symptoms (urinary frequency, urgency, intermittence, dysuria, foul smelling urine, cloudy urine), headache.   - She received LR 1L bolus and IV morphine 4mg + 2mg for pain control  - She will be admitted to the floor as a case of Choledocholithiasis for management (possible ERCP), further investigations (MRCP), and monitoring.   (19 Oct 2021 02:54)    PAST MEDICAL & SURGICAL HISTORY:  Uterine fibroid  S/P PAULIE (total abdominal hysterectomy)  Status post bilateral salpingectomy  S/P small bowel resection    Vital Signs Last 24 Hrs  T(C): 35.7 (20 Oct 2021 07:30), Max: 36.5 (19 Oct 2021 15:40)  T(F): 96.2 (20 Oct 2021 07:30), Max: 97.7 (19 Oct 2021 15:40)  HR: 58 (20 Oct 2021 07:30) (56 - 65)  BP: 103/59 (20 Oct 2021 07:30) (103/59 - 119/69)  BP(mean): --  RR: 18 (20 Oct 2021 07:30) (18 - 18)  SpO2: 96% (20 Oct 2021 07:30) (96% - 97%)             patient seen and examined independently on morning rounds, chart reviewed and discussed with the medicine resident.    no overnight events--awaiting MRCP today- says she would prefer not having additional abdominal surgeries due to her complicated surgical history but explained that she might need cholecystectomy?    PE:  GEN-NAD, AAOx3  PULM- Clear to auscultation bilaterally, fair air entry  CVS- +s1/s2 RRR no murmurs  GI- soft +tenderenss diffuse on deep palpation- ND +bs, +multiple healed surgical scars  EXT- no edema  SKIN- no rashes/no lesions               13.8   5.43  )-----------( 253      ( 20 Oct 2021 04:30 )             42.0     10-20    138  |  100  |  5<L>  ----------------------------<  71  4.0   |  25  |  0.6<L>    Ca    9.0      20 Oct 2021 04:30  Phos  2.9     10-19  Mg     2.0     10-20    TPro  6.8  /  Alb  4.0  /  TBili  3.1<H>  /  DBili  x   /  AST  149<H>  /  ALT  263<H>  /  AlkPhos  178<H>  10-20        Urinalysis Basic - ( 20 Oct 2021 06:45 )    Color: Ursula / Appearance: Slightly Turbid / S.024 / pH: x  Gluc: x / Ketone: Moderate  / Bili: Small / Urobili: <2 mg/dL   Blood: x / Protein: 30 mg/dL / Nitrite: Negative   Leuk Esterase: Negative / RBC: 225 /HPF / WBC 5 /HPF   Sq Epi: x / Non Sq Epi: 4 /HPF / Bacteria: Few          MEDICATIONS  (STANDING):  chlorhexidine 4% Liquid 1 Application(s) Topical <User Schedule>  enoxaparin Injectable 40 milliGRAM(s) SubCutaneous at bedtime  lactated ringers. 1000 milliLiter(s) (75 mL/Hr) IV Continuous <Continuous>  pantoprazole    Tablet 40 milliGRAM(s) Oral before breakfast

## 2021-10-20 NOTE — PROGRESS NOTE ADULT - ASSESSMENT
a/p:   #abdominal pain- choledocholithiasis  -pain control---serial abdominal exam  -pending MRCP (to bbe done today)  -GI following  -Surgery consult (may need cholecystectomy however patient stated to me today she is hesitant about further abdominal surgeries due to her prior complicated surgical history)  -monitor lft (Tbili was trending down 3.7--->1.5 but now up to 3.1 again today)--ast and ALT also slightly up (ast 149/alt 263)  -monitor wbc and fever curve  -cont ivf and suppl electrolytes prn- tolearting minimal clear liquids but if planning for ercp or cholecystectomy will place npo    DVT/GI ppx  guarded prognosis    FULL CODE

## 2021-10-20 NOTE — PROGRESS NOTE ADULT - SUBJECTIVE AND OBJECTIVE BOX
LENGTH OF HOSPITAL STAY: 10-19-21 (1d)  CHIEF COMPLAINT:   Patient is a 46y old  Female who presents with a chief complaint of Abdominal Pain (20 Oct 2021 12:42)      Progress:     Interval hx: No events overnight  Subjective complaints: Patient denies any pain today. No complaints.     HISTORY OF PRESENTING ILLNESS:    HPI:  History of Present Illness    Ms. Bapitste is a 46 year old (never smoker) female patient known to have:  - History of Fibroids s/p hysterectomy with bilateral salpingooophorectomy with complications s/p partial colectomy (2020)      She presented to the ED on 10/18 for evaluation of abdominal pain.  History goes back to around 4 days prior to presentation when the patient started complaining of epigastric pain.  Pain is in epigastrium, crampy in nature, of around 5/10 intensity, and non radiating.  It is not exacerbated with food or positioning, and seems to be partially relieved with Tylenol.  It has been associated with some nausea but no vomiting, jaundice, fever, or chills.    On review of systems, patient reports chronic diarrhea (loose non bloody non mucoid stools since partial colectomy) but denies any recent fever, chills, night sweats, URTI symptoms (cough, rhinorrhea, sore throat), urinary symptoms (urinary frequency, urgency, intermittence, dysuria, foul smelling urine, cloudy urine), headache.   No sick contacts.   No recent travel or exposure to recent travelers.      Upon presentation to the ED, the patient was hemodynamically stable:  Vital Signs in ED   - /77 mmHg  - HR 75 bpm  - RR 18 bpm  - T 97.6      Investigations   Laboratory Workup  - CBC:                        13.6   4.96  )-----------( 255      ( 18 Oct 2021 19:33 )             40.3     - Chemistry:  10-18    139  |  105  |  7<L>  ----------------------------<  156<H>  4.2   |  21  |  0.7    Ca    8.9      18 Oct 2021 19:33    TPro  7.2  /  Alb  4.3  /  TBili  3.7<H>  /  DBili  x   /  AST  272<H>  /  ALT  381<H>  /  AlkPhos  199<H>  10-18      Radiological Workup  * CT AP IC Punctate calculus in the left UVJ. No evidence of hydroureter or hydronephrosis however Circumferential wall thickening of the gallbladder. Cholecystitis is not excluded. Dilated CBD with gradual tapering as well as mild central intrahepatic biliary ductal dilatation. If clinically warranted, MRI/MRCP may be of benefit. 3.3 cm nodular density or concentrated fibroglandular tissue in the left retroareolar region of the breast. Dedicated mammogram and sonogram is recommended.  * RUQ US GB sludge, thick 5mm      - Patient was evaluated by General Surgery in ED: no acute surgical intervention  - She received LR 1L bolus and IV morphine 4mg + 2mg for pain control  - She will be admitted to the floor as a case of Choledocholithiasis for management (possible ERCP), further investigations (MRCP), and monitoring.         (19 Oct 2021 02:54)      PHYSICAL EXAM:   GENERAL: No acute distress  NEURO: Alert & Oriented X3. Speech clear and fluent.   HEAD: Atraumatic, normocephalic.   EYES: EOMI. Sclera non-icteric.  ENT: Moist mucous membranes.    NECK: No JVD.  No cervical lymphadenopathy.   LUNGS: Clear to auscultation bilaterally. No wheezes or rhonchi. No accessory muscle use.  HEART: RRR. S1/S2 present.   ABDOMEN: Bowel sounds present. Soft. Nontender. Nondistended. No guarding or rigidity    EXTREMITIES: No edema, cyanosis, clubbing. 2+ peripheral pulses bilaterally.  SKIN: Warm and dry.    OBJECTIVE DATA:    T(C): 35.7 (10-20-21 @ 07:30), Max: 36.5 (10-19-21 @ 15:40)  T(F): 96.2 (10-20-21 @ 07:30), Max: 97.7 (10-19-21 @ 15:40)  HR: 58 (10-20-21 @ 07:30) (56 - 65)  BP: 103/59 (10-20-21 @ 07:30) (103/59 - 119/69)  ABP: --  ABP(mean): --  RR: 18 (10-20-21 @ 07:30) (18 - 18)  SpO2: 96% (10-20-21 @ 07:30) (96% - 97%)      I&O's Summary                            13.8   5.43  )-----------( 253      ( 20 Oct 2021 04:30 )             42.0       10-20    138  |  100  |  5<L>  ----------------------------<  71  4.0   |  25  |  0.6<L>    Ca    9.0      20 Oct 2021 04:30  Phos  2.9     10-19  Mg     2.0     10-20    TPro  6.8  /  Alb  4.0  /  TBili  3.1<H>  /  DBili  x   /  AST  149<H>  /  ALT  263<H>  /  AlkPhos  178<H>  10-20              Urinalysis Basic - ( 20 Oct 2021 06:45 )    Color: Ursula / Appearance: Slightly Turbid / S.024 / pH: x  Gluc: x / Ketone: Moderate  / Bili: Small / Urobili: <2 mg/dL   Blood: x / Protein: 30 mg/dL / Nitrite: Negative   Leuk Esterase: Negative / RBC: 225 /HPF / WBC 5 /HPF   Sq Epi: x / Non Sq Epi: 4 /HPF / Bacteria: Few        PT/INR - ( 19 Oct 2021 21:17 )   PT: 11.50 sec;   INR: 1.00 ratio         PTT - ( 19 Oct 2021 21:17 )  PTT:45.9 sec          CAPILLARY BLOOD GLUCOSE                             LENGTH OF HOSPITAL STAY: 10-19-21 (1d)  CHIEF COMPLAINT:   Patient is a 46y old  Female who presents with a chief complaint of Abdominal Pain (20 Oct 2021 12:42)      Progress:     Interval hx: No events overnight  Subjective complaints: Patient denies any pain today. No complaints.     HISTORY OF PRESENTING ILLNESS:    HPI:  History of Present Illness    Ms. Baptiste is a 46 year old (never smoker) female patient known to have:  - History of Fibroids s/p hysterectomy with bilateral salpingooophorectomy with complications s/p partial colectomy (2020)      She presented to the ED on 10/18 for evaluation of abdominal pain.  History goes back to around 4 days prior to presentation when the patient started complaining of epigastric pain.  Pain is in epigastrium, crampy in nature, of around 5/10 intensity, and non radiating.  It is not exacerbated with food or positioning, and seems to be partially relieved with Tylenol.  It has been associated with some nausea but no vomiting, jaundice, fever, or chills.    On review of systems, patient reports chronic diarrhea (loose non bloody non mucoid stools since partial colectomy) but denies any recent fever, chills, night sweats, URTI symptoms (cough, rhinorrhea, sore throat), urinary symptoms (urinary frequency, urgency, intermittence, dysuria, foul smelling urine, cloudy urine), headache.   No sick contacts.   No recent travel or exposure to recent travelers.      Upon presentation to the ED, the patient was hemodynamically stable:  Vital Signs in ED   - /77 mmHg  - HR 75 bpm  - RR 18 bpm  - T 97.6      Investigations   Laboratory Workup  - CBC:                        13.6   4.96  )-----------( 255      ( 18 Oct 2021 19:33 )             40.3     - Chemistry:  10-18    139  |  105  |  7<L>  ----------------------------<  156<H>  4.2   |  21  |  0.7    Ca    8.9      18 Oct 2021 19:33    TPro  7.2  /  Alb  4.3  /  TBili  3.7<H>  /  DBili  x   /  AST  272<H>  /  ALT  381<H>  /  AlkPhos  199<H>  10-18      Radiological Workup  * CT AP IC Punctate calculus in the left UVJ. No evidence of hydroureter or hydronephrosis however Circumferential wall thickening of the gallbladder. Cholecystitis is not excluded. Dilated CBD with gradual tapering as well as mild central intrahepatic biliary ductal dilatation. If clinically warranted, MRI/MRCP may be of benefit. 3.3 cm nodular density or concentrated fibroglandular tissue in the left retroareolar region of the breast. Dedicated mammogram and sonogram is recommended.  * RUQ US GB sludge, thick 5mm      - Patient was evaluated by General Surgery in ED: no acute surgical intervention  - She received LR 1L bolus and IV morphine 4mg + 2mg for pain control  - She will be admitted to the floor as a case of Choledocholithiasis for management (possible ERCP), further investigations (MRCP), and monitoring.         (19 Oct 2021 02:54)      PHYSICAL EXAM:   GENERAL: No acute distress  NEURO: Alert & Oriented X3. Speech clear and fluent.   HEAD: Atraumatic, normocephalic.   EYES: EOMI. Sclera non-icteric.  ENT: Moist mucous membranes.    NECK: No JVD.  No cervical lymphadenopathy.   LUNGS: Clear to auscultation bilaterally. No wheezes or rhonchi. No accessory muscle use.  HEART: RRR. S1/S2 present.   ABDOMEN: Bowel sounds present. Soft. Nontender. Nondistended. No guarding or rigidity    EXTREMITIES: No edema, cyanosis, clubbing. 2+ peripheral pulses bilaterally.  SKIN: Warm and dry.    OBJECTIVE DATA:    T(C): 35.7 (10-20-21 @ 07:30), Max: 36.5 (10-19-21 @ 15:40)  T(F): 96.2 (10-20-21 @ 07:30), Max: 97.7 (10-19-21 @ 15:40)  HR: 58 (10-20-21 @ 07:30) (56 - 65)  BP: 103/59 (10-20-21 @ 07:30) (103/59 - 119/69)  ABP: --  ABP(mean): --  RR: 18 (10-20-21 @ 07:30) (18 - 18)  SpO2: 96% (10-20-21 @ 07:30) (96% - 97%)      I&O's Summary                            13.8   5.43  )-----------( 253      ( 20 Oct 2021 04:30 )             42.0       10-20    138  |  100  |  5<L>  ----------------------------<  71  4.0   |  25  |  0.6<L>    Ca    9.0      20 Oct 2021 04:30  Phos  2.9     10-19  Mg     2.0     10-20    TPro  6.8  /  Alb  4.0  /  TBili  3.1<H>  /  DBili  x   /  AST  149<H>  /  ALT  263<H>  /  AlkPhos  178<H>  10-20              Urinalysis Basic - ( 20 Oct 2021 06:45 )    Color: Ursula / Appearance: Slightly Turbid / S.024 / pH: x  Gluc: x / Ketone: Moderate  / Bili: Small / Urobili: <2 mg/dL   Blood: x / Protein: 30 mg/dL / Nitrite: Negative   Leuk Esterase: Negative / RBC: 225 /HPF / WBC 5 /HPF   Sq Epi: x / Non Sq Epi: 4 /HPF / Bacteria: Few        PT/INR - ( 19 Oct 2021 21:17 )   PT: 11.50 sec;   INR: 1.00 ratio         PTT - ( 19 Oct 2021 21:17 )  PTT:45.9 sec          CAPILLARY BLOOD GLUCOSE            LFTs  Protein:   6.8 (10-20-21 @ 04:30)  7.3 (10-19-21 @ 21:17)  7.1 (10-19-21 @ 04:30)  7.2 (10-18-21 @ 19:33)  7.0 (20 @ 17:19)    Albumin:   4.0 (10-20-21 @ 04:30)  4.2 (10-19-21 @ 21:17)  4.1 (10-19-21 @ 04:30)  4.3 (10-18-21 @ 19:33)  3.3 (20 @ 17:19)    T Bili:  3.1 (10-20-21 @ 04:30)  1.7 (10-19-21 @ 21:17)  1.5 (10-19-21 @ 04:30)  3.7 (10-18-21 @ 19:33)  0.3 (20 @ 17:19)    D Bili :   1.0 (10-19-21 @ 21:17)  0.7 (10-19-21 @ :30)  <0.2 (20 @ :)    Alk Phosp:  178 (10-20-21 @ 04:30)  185 (10-19-21 @ 21:17)  180 (10-19-21 @ 04:30)  199 (10-18-21 @ 19:33)  75 (20 @ 17:19)    AST:   149 (10-20-21 @ 04:30)  117 (10-19-21 @ 21:17)  139 (10-19-21 @ 04:30)  272 (10-18-21 @ 19:33)  25 (20 @ 17:19)    ALT:  263 (10-20-21 @ 04:30)  303 (10-19-21 @ 21:17)  321 (10-19-21 @ 04:30)  381 (10-18-21 @ 19:33)  18 (20 @ 17:19)    LDH:      Lipase:  20 (10-18-21 @ 19:33)  58 (20 @ :19)    Amylase:

## 2021-10-20 NOTE — PROGRESS NOTE ADULT - ASSESSMENT
Ms. Baptiste is a 46 year old (never smoker) female patient known to have History of Fibroids s/p hysterectomy with bilateral salpingooophorectomy with complications s/p partial small bowel resection comes in with epigastric pain for the last 4 days and elevated liver enzymes      #Biliary colic and elevated liver enzymes: R/O Choledocholithiasis vs acute cholecystitis  Liver enzymes trending up: 3.1/178/149/263  Lipase, lactate, WBC negative.  No signs of cholangitis  US abdomen and CT abdomen with IV contrast: GB sludge, thickening, CBD 9mm.  Only on DVT PPX (last dose yesterday)  COVID neg, electrolytes normal.        Rec:  can have clear liquids.  F/U MRCP.  if MRCP positive for CBD stone then ERCP  F/U with surgery for CCY Ms. Baptiste is a 46 year old (never smoker) female patient known to have History of Fibroids s/p hysterectomy with bilateral salpingooophorectomy with complications s/p partial small bowel resection comes in with epigastric pain for the last 4 days and elevated liver enzymes      #Biliary colic and elevated liver enzymes: R/O Choledocholithiasis vs acute cholecystitis  Liver enzymes trending up: 3.1/178/149/263  Lipase, lactate, WBC negative.  No signs of cholangitis  US abdomen and CT abdomen with IV contrast: GB sludge, thickening, CBD 9mm.  Only on DVT PPX (last dose yesterday)  COVID neg, electrolytes normal.        Rec:  can have clear liquids.  F/U MRCP.  if MRCP positive for CBD stone then ERCP  F/U with surgery for CCY  keep NPO after midnight tentatively for EUS and ERCP tomorrow

## 2021-10-20 NOTE — PROGRESS NOTE ADULT - SUBJECTIVE AND OBJECTIVE BOX
GENERAL SURGERY PROGRESS NOTE    Events of past 24 hours:   CALI.  Patient pending MRCP      Vitals:   T(F): 96.2 (10-20-21 @ 07:30), Max: 97.7 (10-19-21 @ 15:40)  HR: 58 (10-20-21 @ 07:30)  BP: 103/59 (10-20-21 @ 07:30)  RR: 18 (10-20-21 @ 07:30)  SpO2: 96% (10-20-21 @ 07:30)      Diet, NPO after Midnight:      NPO Start Date: 19-Oct-2021,   NPO Start Time: 23:59  Diet, Clear Liquid  Diet, NPO after Midnight:      NPO Start Date: 19-Oct-2021,   NPO Start Time: 23:59  Except Medications        PHYSICAL EXAM:  General Appearance: NAD  HEENT: EOMI, sclera non-icteric.  Heart: RR  Lungs: No increased work of breathing or accessory muscle use. Symmetric chest wall rise and fall.   Abdomen:  Soft, nontender, nondistended.   MSK/Extremities: Warm & well-perfused.   Skin: Warm, dry. No jaundice.       MEDICATIONS  (STANDING):  chlorhexidine 4% Liquid 1 Application(s) Topical <User Schedule>  enoxaparin Injectable 40 milliGRAM(s) SubCutaneous at bedtime  lactated ringers. 1000 milliLiter(s) (75 mL/Hr) IV Continuous <Continuous>  pantoprazole    Tablet 40 milliGRAM(s) Oral before breakfast    MEDICATIONS  (PRN):  acetaminophen   Tablet .. 650 milliGRAM(s) Oral every 6 hours PRN Mild Pain (1 - 3)  ketorolac   Injectable 15 milliGRAM(s) IV Push every 8 hours PRN Moderate Pain (4 - 6)  ondansetron  IVPB 4 milliGRAM(s) IV Intermittent every 8 hours PRN Nausea and/or Vomiting      DVT PROPHYLAXIS: enoxaparin Injectable 40 milliGRAM(s) SubCutaneous at bedtime    GI PROPHYLAXIS: pantoprazole    Tablet 40 milliGRAM(s) Oral before breakfast      LAB/STUDIES:  Labs:  CAPILLARY BLOOD GLUCOSE                              14.3   5.56  )-----------( 237      ( 19 Oct 2021 21:17 )             43.8         10-19    138  |  101  |  6<L>  ----------------------------<  76  4.0   |  21  |  0.5<L>      Calcium, Total Serum: 9.1 mg/dL (10-19-21 @ 21:17)      LFTs:             7.3  | 1.7  | 117      ------------------[185     ( 19 Oct 2021 21:17 )  4.2  | 1.0  | 303         Lipase:x      Amylase:x         Lactate, Blood: 1.1 mmol/L (10-18-21 @ 19:33)      Coags:     11.50  ----< 1.00    ( 19 Oct 2021 21:17 )     45.9                Urinalysis Basic - ( 20 Oct 2021 06:45 )    Color: Ursula / Appearance: Slightly Turbid / S.024 / pH: x  Gluc: x / Ketone: Moderate  / Bili: Small / Urobili: <2 mg/dL   Blood: x / Protein: 30 mg/dL / Nitrite: Negative   Leuk Esterase: Negative / RBC: 225 /HPF / WBC 5 /HPF   Sq Epi: x / Non Sq Epi: 4 /HPF / Bacteria: Few

## 2021-10-20 NOTE — PROGRESS NOTE ADULT - ASSESSMENT
ASSESSMENT:  Ms. Baptiste is a 45 year old patient with history of extensive surgical history and recent prolonged hospital course significant for PAULIE/BSO on 8/19/20 ([facility]) for 15cm bleeding fibroid with readmission x 2 for PSBO resolved with nonoperative management, s/p SBR, extensive NARCISO w/ 43cm ileum resected 2/2 serosal enterotomy 9/15 c/b RTOR for breakdown of proximal limb staple line of of anastomosis, 9/23 I&D of abdominal wound, complicated by enterocutaneous fistula. Patient presents to ED with 4 days of epigastric abdominal pain radiating to her back exacerbated by PO intake, found to have CBD dilation to 9 mm on CTAP and RUQ US.    PLAN:  - FU MRCP, GI plan for possible ERCP  - Plan for laparoscopic cholecystectomy this admission  - Trend LFTs, Tbili slightly elevatd this AM  - DVT and GI Prophylaxis        Eron Manjarrez MD  General Surgery PGY-1  [id number]

## 2021-10-20 NOTE — PROGRESS NOTE ADULT - SUBJECTIVE AND OBJECTIVE BOX
Patient is a 46y old  Female who presents with a chief complaint of Abdominal Pain (20 Oct 2021 11:38)       Admitted on: 10-19-21    We are following the patient for abdominal pain     Interval History: Patient's abdominal pain is better but still present. No fever, no vomiting.         PAST MEDICAL & SURGICAL HISTORY:  Uterine fibroid    S/P PAULIE (total abdominal hysterectomy)    Status post bilateral salpingectomy    S/P small bowel resection        MEDICATIONS  (STANDING):  chlorhexidine 4% Liquid 1 Application(s) Topical <User Schedule>  enoxaparin Injectable 40 milliGRAM(s) SubCutaneous at bedtime  lactated ringers. 1000 milliLiter(s) (75 mL/Hr) IV Continuous <Continuous>  pantoprazole    Tablet 40 milliGRAM(s) Oral before breakfast    MEDICATIONS  (PRN):  acetaminophen   Tablet .. 650 milliGRAM(s) Oral every 6 hours PRN Mild Pain (1 - 3)  ketorolac   Injectable 15 milliGRAM(s) IV Push every 8 hours PRN Moderate Pain (4 - 6)  ondansetron  IVPB 4 milliGRAM(s) IV Intermittent every 8 hours PRN Nausea and/or Vomiting      Allergies  No Known Allergies      Review of Systems:   Cardiovascular:  No Chest Pain, No Palpitations  Respiratory:  No Cough, No Dyspnea  Gastrointestinal:  As described in HPI    Physical Examination:  T(C): 35.7 (10-20-21 @ 07:30), Max: 36.5 (10-19-21 @ 15:40)  HR: 58 (10-20-21 @ 07:30) (56 - 65)  BP: 103/59 (10-20-21 @ 07:30) (103/59 - 119/69)  RR: 18 (10-20-21 @ 07:30) (18 - 18)  SpO2: 96% (10-20-21 @ 07:30) (96% - 97%)  Weight (kg): 60.3 (10-19-21 @ 22:23)    Constitutional: No acute distress.  Respiratory:  No signs of respiratory distress. Lung sounds are clear bilaterally.  Cardiovascular:  S1 S2, Regular rate and rhythm.  Abdominal: Abdomen is soft, symmetric, and non-tender without distention.   Skin:  Jaundice.        Data: (reviewed by attending)                        13.8   5.43  )-----------( 253      ( 20 Oct 2021 04:30 )             42.0     Hgb trend:  13.8  10-20-21 @ 04:30  14.3  10-19-21 @ 21:17  13.5  10-19-21 @ 04:30  13.6  10-18-21 @ 19:33      10-20    138  |  100  |  5<L>  ----------------------------<  71  4.0   |  25  |  0.6<L>    Ca    9.0      20 Oct 2021 04:30  Phos  2.9     10-19  Mg     2.0     10-20    TPro  6.8  /  Alb  4.0  /  TBili  3.1<H>  /  DBili  x   /  AST  149<H>  /  ALT  263<H>  /  AlkPhos  178<H>  10-20    Liver panel trend:  TBili 3.1   /      /      /   AlkP 178   /   Tptn 6.8   /   Alb 4.0    /   DBili --      10-20  TBili 1.7   /      /      /   AlkP 185   /   Tptn 7.3   /   Alb 4.2    /   DBili 1.0      10-19  TBili 1.5   /      /      /   AlkP 180   /   Tptn 7.1   /   Alb 4.1    /   DBili 0.7      10-19  TBili 3.7   /      /      /   AlkP 199   /   Tptn 7.2   /   Alb 4.3    /   DBili --      10-18      PT/INR - ( 19 Oct 2021 21:17 )   PT: 11.50 sec;   INR: 1.00 ratio         PTT - ( 19 Oct 2021 21:17 )  PTT:45.9 sec       Radiology: (reviewed by attending)    US Abdomen Upper Quadrant Right:   EXAM:  US ABDOMEN RT UPR QUADRANT            PROCEDURE DATE:  10/18/2021            INTERPRETATION:  CLINICAL HISTORY / REASON FOR EXAM: Transaminitis.    COMPARISON: MR abdomen from February 11, 2021    PROCEDURE: Ultrasound of the right upper quadrant was performed.    FINDINGS:    LIVER: Within normal limits.  BILE DUCTS: Normal caliber. Common bile duct measures 9 mm.  GALLBLADDER: Gallbladder wall thickened up to 5 mm containing sludge.  PANCREAS: Poorly visualized.  RIGHT KIDNEY: 9 cm. Nohydronephrosis.  ASCITES: None.  IVC: Visualized portions are within normal limits.      IMPRESSION:    Gallbladder sludge and wall thickening up to 5 mm.    --- End of Report ---              JOSE POTTER MD; Attending Radiologist  This document has been electronically signed. Oct 18 2021 10:43PM (10-18-21 @ 22:39)

## 2021-10-20 NOTE — PROGRESS NOTE ADULT - ASSESSMENT
ASSESSMENT/PLAN:      #DVT ppx -     #GI ppx -    #Diet -     #Handoff pending -  ASSESSMENT/PLAN:  Ms. Baptiste is a 46 year old (never smoker) female patient known to have History of Fibroids s/p hysterectomy with bilateral salpingooophorectomy with complications s/p partial small bowel resection comes in with epigastric pain for the last 4 days and elevated liver enzymes    #Biliary colic and elevated liver enzymes: R/O Choledocholithiasis vs acute cholecystitis  Liver enzymes trending up: 3.1/178/149/263  Lipase, lactate, WBC negative.  No signs of cholangitis  US abdomen and CT abdomen with IV contrast: GB sludge, thickening, CBD 9mm.  Only on DVT PPX (last dose yesterday)  COVID neg, electrolytes normal.        #DVT ppx -     #GI ppx -    #Diet -     #Handoff pending -  ASSESSMENT/PLAN:  Ms. Baptiste is a 46 year old (never smoker) female patient known to have History of Fibroids s/p hysterectomy with bilateral salpingooophorectomy with complications s/p partial small bowel resection comes in with epigastric pain for the last 4 days and elevated liver enzymes    #Biliary colic and elevated liver enzymes: R/O Choledocholithiasis vs acute cholecystitis  TBili  3.7<H>  /  DBili  0.7   /  AST  272<H>  /  ALT  381<H>  /  AlkPhos  199<H> on admission  -T BILI trending up, ALT/AST stable   -no signs of infection or cholangitis  - US abdomen and CT abdomen with IV contrast: GB sludge, thickening, CBD 9mm.  - MRCP today  - Possible ERCP w/GI if CBD stone on MRCP  - Possible CCY w/surgery  - monitor daily CBC/BMP/LFTs  - clear liquid for now, can transition to NPO if ERCP or CCY    #HO Fibroids -s/p hysterectomy with bilateral salpingooophorectomy with complications s/p partial small bowel resection  -no complaints, just reports concern about another abdominal surgery given her multiple abdominal surgeries  -follow up with surgery  -follow up with gynecologist outpatient    #DVT ppx -  ppx lovenox  #GI ppx -  none indicated  #Diet - clear liquid    #Handoff pending - MRCP, possible ERCP and CCY

## 2021-10-21 ENCOUNTER — RESULT REVIEW (OUTPATIENT)
Age: 46
End: 2021-10-21

## 2021-10-21 ENCOUNTER — TRANSCRIPTION ENCOUNTER (OUTPATIENT)
Age: 46
End: 2021-10-21

## 2021-10-21 LAB
ALBUMIN SERPL ELPH-MCNC: 3.7 G/DL — SIGNIFICANT CHANGE UP (ref 3.5–5.2)
ALBUMIN SERPL ELPH-MCNC: 3.9 G/DL — SIGNIFICANT CHANGE UP (ref 3.5–5.2)
ALP SERPL-CCNC: 157 U/L — HIGH (ref 30–115)
ALP SERPL-CCNC: 166 U/L — HIGH (ref 30–115)
ALT FLD-CCNC: 174 U/L — HIGH (ref 0–41)
ALT FLD-CCNC: 205 U/L — HIGH (ref 0–41)
ANION GAP SERPL CALC-SCNC: 12 MMOL/L — SIGNIFICANT CHANGE UP (ref 7–14)
ANION GAP SERPL CALC-SCNC: 14 MMOL/L — SIGNIFICANT CHANGE UP (ref 7–14)
APTT BLD: 46.3 SEC — HIGH (ref 27–39.2)
AST SERPL-CCNC: 72 U/L — HIGH (ref 0–41)
AST SERPL-CCNC: 96 U/L — HIGH (ref 0–41)
BASOPHILS # BLD AUTO: 0.02 K/UL — SIGNIFICANT CHANGE UP (ref 0–0.2)
BASOPHILS # BLD AUTO: 0.03 K/UL — SIGNIFICANT CHANGE UP (ref 0–0.2)
BASOPHILS NFR BLD AUTO: 0.3 % — SIGNIFICANT CHANGE UP (ref 0–1)
BASOPHILS NFR BLD AUTO: 0.6 % — SIGNIFICANT CHANGE UP (ref 0–1)
BILIRUB DIRECT SERPL-MCNC: 0.5 MG/DL — HIGH (ref 0–0.2)
BILIRUB DIRECT SERPL-MCNC: 1 MG/DL — HIGH (ref 0–0.2)
BILIRUB INDIRECT FLD-MCNC: 0.7 MG/DL — SIGNIFICANT CHANGE UP (ref 0.2–1.2)
BILIRUB INDIRECT FLD-MCNC: 0.9 MG/DL — SIGNIFICANT CHANGE UP (ref 0.2–1.2)
BILIRUB SERPL-MCNC: 1.2 MG/DL — SIGNIFICANT CHANGE UP (ref 0.2–1.2)
BILIRUB SERPL-MCNC: 1.9 MG/DL — HIGH (ref 0.2–1.2)
BLD GP AB SCN SERPL QL: SIGNIFICANT CHANGE UP
BUN SERPL-MCNC: 3 MG/DL — LOW (ref 10–20)
BUN SERPL-MCNC: 4 MG/DL — LOW (ref 10–20)
CALCIUM SERPL-MCNC: 8.8 MG/DL — SIGNIFICANT CHANGE UP (ref 8.5–10.1)
CALCIUM SERPL-MCNC: 8.8 MG/DL — SIGNIFICANT CHANGE UP (ref 8.5–10.1)
CHLORIDE SERPL-SCNC: 101 MMOL/L — SIGNIFICANT CHANGE UP (ref 98–110)
CHLORIDE SERPL-SCNC: 102 MMOL/L — SIGNIFICANT CHANGE UP (ref 98–110)
CO2 SERPL-SCNC: 20 MMOL/L — SIGNIFICANT CHANGE UP (ref 17–32)
CO2 SERPL-SCNC: 24 MMOL/L — SIGNIFICANT CHANGE UP (ref 17–32)
CREAT SERPL-MCNC: 0.5 MG/DL — LOW (ref 0.7–1.5)
CREAT SERPL-MCNC: 0.6 MG/DL — LOW (ref 0.7–1.5)
EOSINOPHIL # BLD AUTO: 0.15 K/UL — SIGNIFICANT CHANGE UP (ref 0–0.7)
EOSINOPHIL # BLD AUTO: 0.24 K/UL — SIGNIFICANT CHANGE UP (ref 0–0.7)
EOSINOPHIL NFR BLD AUTO: 2.4 % — SIGNIFICANT CHANGE UP (ref 0–8)
EOSINOPHIL NFR BLD AUTO: 4.8 % — SIGNIFICANT CHANGE UP (ref 0–8)
GLUCOSE SERPL-MCNC: 76 MG/DL — SIGNIFICANT CHANGE UP (ref 70–99)
GLUCOSE SERPL-MCNC: 91 MG/DL — SIGNIFICANT CHANGE UP (ref 70–99)
HCT VFR BLD CALC: 39.1 % — SIGNIFICANT CHANGE UP (ref 37–47)
HCT VFR BLD CALC: 41.1 % — SIGNIFICANT CHANGE UP (ref 37–47)
HGB BLD-MCNC: 13 G/DL — SIGNIFICANT CHANGE UP (ref 12–16)
HGB BLD-MCNC: 13.6 G/DL — SIGNIFICANT CHANGE UP (ref 12–16)
IMM GRANULOCYTES NFR BLD AUTO: 0.2 % — SIGNIFICANT CHANGE UP (ref 0.1–0.3)
IMM GRANULOCYTES NFR BLD AUTO: 0.2 % — SIGNIFICANT CHANGE UP (ref 0.1–0.3)
INR BLD: 1.06 RATIO — SIGNIFICANT CHANGE UP (ref 0.65–1.3)
LYMPHOCYTES # BLD AUTO: 1.44 K/UL — SIGNIFICANT CHANGE UP (ref 1.2–3.4)
LYMPHOCYTES # BLD AUTO: 1.48 K/UL — SIGNIFICANT CHANGE UP (ref 1.2–3.4)
LYMPHOCYTES # BLD AUTO: 23.3 % — SIGNIFICANT CHANGE UP (ref 20.5–51.1)
LYMPHOCYTES # BLD AUTO: 28.6 % — SIGNIFICANT CHANGE UP (ref 20.5–51.1)
MAGNESIUM SERPL-MCNC: 1.7 MG/DL — LOW (ref 1.8–2.4)
MAGNESIUM SERPL-MCNC: 1.9 MG/DL — SIGNIFICANT CHANGE UP (ref 1.8–2.4)
MCHC RBC-ENTMCNC: 29.4 PG — SIGNIFICANT CHANGE UP (ref 27–31)
MCHC RBC-ENTMCNC: 29.6 PG — SIGNIFICANT CHANGE UP (ref 27–31)
MCHC RBC-ENTMCNC: 33.1 G/DL — SIGNIFICANT CHANGE UP (ref 32–37)
MCHC RBC-ENTMCNC: 33.2 G/DL — SIGNIFICANT CHANGE UP (ref 32–37)
MCV RBC AUTO: 88.5 FL — SIGNIFICANT CHANGE UP (ref 81–99)
MCV RBC AUTO: 89.3 FL — SIGNIFICANT CHANGE UP (ref 81–99)
MONOCYTES # BLD AUTO: 0.32 K/UL — SIGNIFICANT CHANGE UP (ref 0.1–0.6)
MONOCYTES # BLD AUTO: 0.4 K/UL — SIGNIFICANT CHANGE UP (ref 0.1–0.6)
MONOCYTES NFR BLD AUTO: 6.3 % — SIGNIFICANT CHANGE UP (ref 1.7–9.3)
MONOCYTES NFR BLD AUTO: 6.3 % — SIGNIFICANT CHANGE UP (ref 1.7–9.3)
NEUTROPHILS # BLD AUTO: 3 K/UL — SIGNIFICANT CHANGE UP (ref 1.4–6.5)
NEUTROPHILS # BLD AUTO: 4.28 K/UL — SIGNIFICANT CHANGE UP (ref 1.4–6.5)
NEUTROPHILS NFR BLD AUTO: 59.5 % — SIGNIFICANT CHANGE UP (ref 42.2–75.2)
NEUTROPHILS NFR BLD AUTO: 67.5 % — SIGNIFICANT CHANGE UP (ref 42.2–75.2)
NRBC # BLD: 0 /100 WBCS — SIGNIFICANT CHANGE UP (ref 0–0)
NRBC # BLD: 0 /100 WBCS — SIGNIFICANT CHANGE UP (ref 0–0)
PHOSPHATE SERPL-MCNC: 3 MG/DL — SIGNIFICANT CHANGE UP (ref 2.1–4.9)
PLATELET # BLD AUTO: 230 K/UL — SIGNIFICANT CHANGE UP (ref 130–400)
PLATELET # BLD AUTO: 238 K/UL — SIGNIFICANT CHANGE UP (ref 130–400)
POTASSIUM SERPL-MCNC: 4 MMOL/L — SIGNIFICANT CHANGE UP (ref 3.5–5)
POTASSIUM SERPL-MCNC: 4.2 MMOL/L — SIGNIFICANT CHANGE UP (ref 3.5–5)
POTASSIUM SERPL-SCNC: 4 MMOL/L — SIGNIFICANT CHANGE UP (ref 3.5–5)
POTASSIUM SERPL-SCNC: 4.2 MMOL/L — SIGNIFICANT CHANGE UP (ref 3.5–5)
PROT SERPL-MCNC: 6.4 G/DL — SIGNIFICANT CHANGE UP (ref 6–8)
PROT SERPL-MCNC: 6.4 G/DL — SIGNIFICANT CHANGE UP (ref 6–8)
PROTHROM AB SERPL-ACNC: 12.2 SEC — SIGNIFICANT CHANGE UP (ref 9.95–12.87)
RBC # BLD: 4.42 M/UL — SIGNIFICANT CHANGE UP (ref 4.2–5.4)
RBC # BLD: 4.6 M/UL — SIGNIFICANT CHANGE UP (ref 4.2–5.4)
RBC # FLD: 12.5 % — SIGNIFICANT CHANGE UP (ref 11.5–14.5)
RBC # FLD: 13 % — SIGNIFICANT CHANGE UP (ref 11.5–14.5)
SODIUM SERPL-SCNC: 136 MMOL/L — SIGNIFICANT CHANGE UP (ref 135–146)
SODIUM SERPL-SCNC: 137 MMOL/L — SIGNIFICANT CHANGE UP (ref 135–146)
WBC # BLD: 5.04 K/UL — SIGNIFICANT CHANGE UP (ref 4.8–10.8)
WBC # BLD: 6.34 K/UL — SIGNIFICANT CHANGE UP (ref 4.8–10.8)
WBC # FLD AUTO: 5.04 K/UL — SIGNIFICANT CHANGE UP (ref 4.8–10.8)
WBC # FLD AUTO: 6.34 K/UL — SIGNIFICANT CHANGE UP (ref 4.8–10.8)

## 2021-10-21 PROCEDURE — 43261 ENDO CHOLANGIOPANCREATOGRAPH: CPT | Mod: XU

## 2021-10-21 PROCEDURE — 43274 ERCP DUCT STENT PLACEMENT: CPT

## 2021-10-21 PROCEDURE — 43264 ERCP REMOVE DUCT CALCULI: CPT | Mod: XU

## 2021-10-21 PROCEDURE — 88312 SPECIAL STAINS GROUP 1: CPT | Mod: 26,59

## 2021-10-21 PROCEDURE — 74328 X-RAY BILE DUCT ENDOSCOPY: CPT | Mod: 26

## 2021-10-21 PROCEDURE — 43262 ENDO CHOLANGIOPANCREATOGRAPH: CPT | Mod: XU

## 2021-10-21 PROCEDURE — 88305 TISSUE EXAM BY PATHOLOGIST: CPT | Mod: 26

## 2021-10-21 PROCEDURE — 99233 SBSQ HOSP IP/OBS HIGH 50: CPT

## 2021-10-21 PROCEDURE — 43259 EGD US EXAM DUODENUM/JEJUNUM: CPT | Mod: XU

## 2021-10-21 RX ORDER — SODIUM CHLORIDE 9 MG/ML
1000 INJECTION, SOLUTION INTRAVENOUS
Refills: 0 | Status: DISCONTINUED | OUTPATIENT
Start: 2021-10-21 | End: 2021-10-21

## 2021-10-21 RX ORDER — SODIUM CHLORIDE 9 MG/ML
1000 INJECTION, SOLUTION INTRAVENOUS ONCE
Refills: 0 | Status: COMPLETED | OUTPATIENT
Start: 2021-10-21 | End: 2021-10-21

## 2021-10-21 RX ORDER — MORPHINE SULFATE 50 MG/1
2 CAPSULE, EXTENDED RELEASE ORAL ONCE
Refills: 0 | Status: DISCONTINUED | OUTPATIENT
Start: 2021-10-21 | End: 2021-10-21

## 2021-10-21 RX ORDER — SODIUM CHLORIDE 9 MG/ML
1000 INJECTION, SOLUTION INTRAVENOUS
Refills: 0 | Status: DISCONTINUED | OUTPATIENT
Start: 2021-10-21 | End: 2021-10-22

## 2021-10-21 RX ORDER — INDOMETHACIN 50 MG
100 CAPSULE ORAL ONCE
Refills: 0 | Status: COMPLETED | OUTPATIENT
Start: 2021-10-21 | End: 2021-10-21

## 2021-10-21 RX ADMIN — Medication 100 MILLIGRAM(S): at 13:17

## 2021-10-21 RX ADMIN — MORPHINE SULFATE 2 MILLIGRAM(S): 50 CAPSULE, EXTENDED RELEASE ORAL at 15:26

## 2021-10-21 RX ADMIN — SODIUM CHLORIDE 1000 MILLILITER(S): 9 INJECTION, SOLUTION INTRAVENOUS at 17:39

## 2021-10-21 RX ADMIN — Medication 15 MILLIGRAM(S): at 10:07

## 2021-10-21 RX ADMIN — MORPHINE SULFATE 2 MILLIGRAM(S): 50 CAPSULE, EXTENDED RELEASE ORAL at 19:03

## 2021-10-21 RX ADMIN — SODIUM CHLORIDE 1000 MILLILITER(S): 9 INJECTION, SOLUTION INTRAVENOUS at 18:24

## 2021-10-21 RX ADMIN — SODIUM CHLORIDE 75 MILLILITER(S): 9 INJECTION, SOLUTION INTRAVENOUS at 04:52

## 2021-10-21 RX ADMIN — CHLORHEXIDINE GLUCONATE 1 APPLICATION(S): 213 SOLUTION TOPICAL at 05:44

## 2021-10-21 RX ADMIN — SODIUM CHLORIDE 1000 MILLILITER(S): 9 INJECTION, SOLUTION INTRAVENOUS at 15:34

## 2021-10-21 RX ADMIN — PANTOPRAZOLE SODIUM 40 MILLIGRAM(S): 20 TABLET, DELAYED RELEASE ORAL at 05:44

## 2021-10-21 RX ADMIN — MORPHINE SULFATE 2 MILLIGRAM(S): 50 CAPSULE, EXTENDED RELEASE ORAL at 14:43

## 2021-10-21 NOTE — PROGRESS NOTE ADULT - SUBJECTIVE AND OBJECTIVE BOX
LENGTH OF HOSPITAL STAY: 10-19-21 (2d)  CHIEF COMPLAINT:   Patient is a 46y old  Female who presents with a chief complaint of Abdominal Pain (20 Oct 2021 12:42)      Progress:     Interval hx: No events overnight  Subjective complaints: Patient complaining of L eyelid swollen since this morning. No pain, visual disturbance, pruritus.     HISTORY OF PRESENTING ILLNESS:    HPI:  History of Present Illness    Ms. Baptiste is a 46 year old (never smoker) female patient known to have:  - History of Fibroids s/p hysterectomy with bilateral salpingooophorectomy with complications s/p partial colectomy (2020)      She presented to the ED on 10/18 for evaluation of abdominal pain.  History goes back to around 4 days prior to presentation when the patient started complaining of epigastric pain.  Pain is in epigastrium, crampy in nature, of around 5/10 intensity, and non radiating.  It is not exacerbated with food or positioning, and seems to be partially relieved with Tylenol.  It has been associated with some nausea but no vomiting, jaundice, fever, or chills.    On review of systems, patient reports chronic diarrhea (loose non bloody non mucoid stools since partial colectomy) but denies any recent fever, chills, night sweats, URTI symptoms (cough, rhinorrhea, sore throat), urinary symptoms (urinary frequency, urgency, intermittence, dysuria, foul smelling urine, cloudy urine), headache.   No sick contacts.   No recent travel or exposure to recent travelers.      Upon presentation to the ED, the patient was hemodynamically stable:  Vital Signs in ED   - /77 mmHg  - HR 75 bpm  - RR 18 bpm  - T 97.6      Investigations   Laboratory Workup  - CBC:                        13.6   4.96  )-----------( 255      ( 18 Oct 2021 19:33 )             40.3     - Chemistry:  10-18    139  |  105  |  7<L>  ----------------------------<  156<H>  4.2   |  21  |  0.7    Ca    8.9      18 Oct 2021 19:33    TPro  7.2  /  Alb  4.3  /  TBili  3.7<H>  /  DBili  x   /  AST  272<H>  /  ALT  381<H>  /  AlkPhos  199<H>  10-18      Radiological Workup  * CT AP IC Punctate calculus in the left UVJ. No evidence of hydroureter or hydronephrosis however Circumferential wall thickening of the gallbladder. Cholecystitis is not excluded. Dilated CBD with gradual tapering as well as mild central intrahepatic biliary ductal dilatation. If clinically warranted, MRI/MRCP may be of benefit. 3.3 cm nodular density or concentrated fibroglandular tissue in the left retroareolar region of the breast. Dedicated mammogram and sonogram is recommended.  * RUQ US GB sludge, thick 5mm      - Patient was evaluated by General Surgery in ED: no acute surgical intervention  - She received LR 1L bolus and IV morphine 4mg + 2mg for pain control  - She will be admitted to the floor as a case of Choledocholithiasis for management (possible ERCP), further investigations (MRCP), and monitoring.         (19 Oct 2021 02:54)      PHYSICAL EXAM:   GENERAL: No acute distress  NEURO: Alert & Oriented X3. Speech clear and fluent.   HEAD: Atraumatic, normocephalic.   EYES: EOMI. L eyelid edematous, non-tender, with appearance of injected sclera, no visual defciits  ENT: Moist mucous membranes.    NECK: No JVD.  No cervical lymphadenopathy.   LUNGS: Clear to auscultation bilaterally. No wheezes or rhonchi. No accessory muscle use.  HEART: RRR. S1/S2 present.   ABDOMEN: +RUQ/Epigastric TTP/ Bowel sounds present. Soft. Nondistended. No guarding or rigidity    EXTREMITIES: No edema, cyanosis, clubbing. 2+ peripheral pulses bilaterally.  SKIN: Warm and dry.    OBJECTIVE DATA:    T(C): 35.7 (10-20-21 @ 07:30), Max: 36.5 (10-19-21 @ 15:40)  T(F): 96.2 (10-20-21 @ 07:30), Max: 97.7 (10-19-21 @ 15:40)  HR: 58 (10-20-21 @ 07:30) (56 - 65)  BP: 103/59 (10-20-21 @ 07:30) (103/59 - 119/69)  ABP: --  ABP(mean): --  RR: 18 (10-20-21 @ 07:30) (18 - 18)  SpO2: 96% (10-20-21 @ 07:30) (96% - 97%)      I&O's Summary                            13.8   5.43  )-----------( 253      ( 20 Oct 2021 04:30 )             42.0       10-20    138  |  100  |  5<L>  ----------------------------<  71  4.0   |  25  |  0.6<L>    Ca    9.0      20 Oct 2021 04:30  Phos  2.9     10-19  Mg     2.0     10-20    TPro  6.8  /  Alb  4.0  /  TBili  3.1<H>  /  DBili  x   /  AST  149<H>  /  ALT  263<H>  /  AlkPhos  178<H>  10-20              Urinalysis Basic - ( 20 Oct 2021 06:45 )    Color: Ursula / Appearance: Slightly Turbid / S.024 / pH: x  Gluc: x / Ketone: Moderate  / Bili: Small / Urobili: <2 mg/dL   Blood: x / Protein: 30 mg/dL / Nitrite: Negative   Leuk Esterase: Negative / RBC: 225 /HPF / WBC 5 /HPF   Sq Epi: x / Non Sq Epi: 4 /HPF / Bacteria: Few        PT/INR - ( 19 Oct 2021 21:17 )   PT: 11.50 sec;   INR: 1.00 ratio         PTT - ( 19 Oct 2021 21:17 )  PTT:45.9 sec          CAPILLARY BLOOD GLUCOSE            LFTs  Protein:   6.8 (10-20-21 @ 04:30)  7.3 (10-19-21 @ 21:17)  7.1 (10-19-21 @ 04:30)  7.2 (10-18-21 @ 19:33)  7.0 (20 @ 17:19)    Albumin:   4.0 (10-20-21 @ 04:30)  4.2 (10-19-21 @ 21:17)  4.1 (10-19-21 @ 04:30)  4.3 (10-18-21 @ 19:33)  3.3 (20 @ 17:19)    T Bili:  3.1 (10-20-21 @ 04:30)  1.7 (10-19-21 @ 21:17)  1.5 (10-19-21 @ 04:30)  3.7 (10-18-21 @ 19:33)  0.3 (20 @ :)    D Bili :   1.0 (10-19-21 @ 21:17)  0.7 (10-19-21 @ 04:30)  <0.2 (20 @ :)    Alk Phosp:  178 (10-20-21 @ 04:30)  185 (10-19-21 @ 21:17)  180 (10-19-21 @ 04:30)  199 (10-18-21 @ 19:33)  75 (20 @ 17:19)    AST:   149 (10-20-21 @ 04:30)  117 (10-19-21 @ 21:17)  139 (10-19-21 @ 04:30)  272 (10-18-21 @ 19:33)  25 (20 @ :19)    ALT:  263 (10-20-21 @ 04:30)  303 (10-19-21 @ 21:17)  321 (10-19-21 @ 04:30)  381 (10-18-21 @ 19:33)  18 (20 @ 17:19)    LDH:      Lipase:  20 (10-18-21 @ 19:33)  58 (20 @ :19)    Amylase:

## 2021-10-21 NOTE — PROGRESS NOTE ADULT - ASSESSMENT
ASSESSMENT:  Ms. Baptiste is a 45 year old patient with history of extensive surgical history and recent prolonged hospital course significant for PAULIE/BSO on 8/19/20 ([facility]) for 15cm bleeding fibroid with readmission x 2 for PSBO resolved with nonoperative management, s/p SBR, extensive NARCISO w/ 43cm ileum resected 2/2 serosal enterotomy 9/15 c/b RTOR for breakdown of proximal limb staple line of of anastomosis, 9/23 I&D of abdominal wound, complicated by enterocutaneous fistula. Patient presents to ED with 4 days of epigastric abdominal pain radiating to her back exacerbated by PO intake, found to have CBD dilation to 9 mm on CTAP and RUQ US.    PLAN:  - FU MRCP, GI plan for possible ERCP  - Plan for laparoscopic cholecystectomy this admission  - Trend LFTs, Tbili slightly elevatd this AM  - DVT and GI Prophylaxis    BLUE TEAM SPECTRA [id number]

## 2021-10-21 NOTE — CHART NOTE - NSCHARTNOTEFT_GEN_A_CORE
Pt is s/p EUS and ERCP.  EUS/ERCP Impression:  -EGD w/ a 0.5 cm clean based gastric ulcer and erosive gastritis (biopsy).    -Choledocholithiasis: EUS w/ distal CBD stone at the level of the ampulla. Dilated CBD to 9 mm. ERCP with sphincterotomy using needle knife pre-cut followed by completion of sphincterotomy with a standard sphincterotome, removal of stone, and occlusion cholangiogram showing an abrupt distal CBD tapering concerning for a stricture s/p brush biopsy sampling, and placement of a 10 fr x 7 cm plastic biliary stent.          Patient examined at the bedside after the procedure and is having epigastric abd pain radiating to her back that is not similar to the pain she had on admission.  This is concerning for post-ERCP pancreatitis as she had a difficult cannulation requiring needle-knife sphincterotomy.        Plan:   -Keep NPO for now  -IVF w/ 1 L LR bolus followed by 250 ml/hr  -Analgesia and anti-emetics as needed for pain and nausea  -Recommend cholecystectomy per surgery plans  -Trend LFTs daily   -Await pathology results  -Repeat ERCP as outpatient in 6-8 weeks for stent removal  -We will follow

## 2021-10-21 NOTE — CHART NOTE - NSCHARTNOTEFT_GEN_A_CORE
PACU ANESTHESIA ADMISSION NOTE      Procedure: ERCP  Post op diagnosis:      ____  Intubated  TV:______       Rate: ______      FiO2: ______    _x___  Patent Airway    _x___  Full return of protective reflexes    _x___  Full recovery from anesthesia / back to baseline status    Vitals:            T:  97.2              BP :  139/92              R:16              Sat: 100              P:85      Mental Status:  _x___ Awake   _____ Alert   _____ Drowsy   _____ Sedated    Nausea/Vomiting:  _x___  NO       ______Yes,   See Post - Op Orders         Pain Scale (0-10):  __0___    Treatment: _x___ None    ____ See Post - Op/PCA Orders    Post - Operative Fluids:   __x__ Oral   ____ See Post - Op Orders    Plan: Discharge:   ___Home       _x____Floor     _____Critical Care    _____  Other:_________________    Comments:  No anesthesia issues or complications noted.  Discharge when criteria met.

## 2021-10-21 NOTE — PROGRESS NOTE ADULT - ASSESSMENT
ASSESSMENT/PLAN:  Ms. Baptiste is a 46 year old (never smoker) female patient known to have History of Fibroids s/p hysterectomy with bilateral salpingooophorectomy with complications s/p partial small bowel resection comes in with epigastric pain for the last 4 days and elevated liver enzymes    #Biliary colic and elevated liver enzymes: R/O Choledocholithiasis vs acute cholecystitis  TBili  3.7<H>  /  DBili  0.7   /  AST  272<H>  /  ALT  381<H>  /  AlkPhos  199<H> on admission  -T BILI trending up, ALT/AST stable   -no signs of infection or cholangitis  - US abdomen and CT abdomen with IV contrast: GB sludge, thickening, CBD 9mm.  - MRCP today  - Possible ERCP w/GI if CBD stone on MRCP  - Possible CCY w/surgery  - monitor daily CBC/BMP/LFTs  - clear liquid for now, can transition to NPO if ERCP or CCY    #HO Fibroids -s/p hysterectomy with bilateral salpingooophorectomy with complications s/p partial small bowel resection  -no complaints, just reports concern about another abdominal surgery given her multiple abdominal surgeries  -follow up with surgery  -follow up with gynecologist outpatient    #DVT ppx -  ppx lovenox  #GI ppx -  none indicated  #Diet - clear liquid    #Handoff pending - MRCP, possible ERCP and CCY ASSESSMENT/PLAN:  Ms. Baptiste is a 46 year old (never smoker) female patient known to have History of Fibroids s/p hysterectomy with bilateral salpingooophorectomy with complications s/p partial small bowel resection comes in with epigastric pain for the last 4 days and elevated liver enzymes    #Biliary colic and elevated liver enzymes: R/O Choledocholithiasis vs acute cholecystitis  TBili  3.7<H>  /  DBili  0.7   /  AST  272<H>  /  ALT  381<H>  /  AlkPhos  199<H> on admission  -T BILI trending up, ALT/AST stable   -no signs of infection or cholangitis  - US abdomen and CT abdomen with IV contrast: GB sludge, thickening, CBD 9mm.  - MRCP not done - confirmed with radiology scheduled today  - Possible ERCP w/GI if CBD stone on MRCP  - Possible CCY w/surgery   - monitor daily CBC/BMP/LFTs  - clear liquid for now, can transition to NPO if ERCP or CCY    #L eye swelling-  10/21  - denies any associated symptoms, no medications to induce swelling  - continue to monitor    #HO Fibroids -s/p hysterectomy with bilateral salpingooophorectomy with complications s/p partial small bowel resection  -no complaints, just reports concern about another abdominal surgery given her multiple abdominal surgeries  -follow up with surgery  -follow up with gynecologist outpatient    #DVT ppx -  ppx lovenox  #GI ppx -  none indicated  #Diet - clear liquid    #Handoff pending - MRCP, possible ERCP and CCY ASSESSMENT/PLAN:  Ms. Baptiste is a 46 year old (never smoker) female patient known to have History of Fibroids s/p hysterectomy with bilateral salpingooophorectomy with complications s/p partial small bowel resection comes in with epigastric pain for the last 4 days and elevated liver enzymes    #Biliary colic and elevated liver enzymes: R/O Choledocholithiasis vs acute cholecystitis  TBili  3.7<H>  /  DBili  0.7   /  AST  272<H>  /  ALT  381<H>  /  AlkPhos  199<H> on admission  -T BILI trending up, ALT/AST stable   -no signs of infection or cholangitis  - US abdomen and CT abdomen with IV contrast: GB sludge, thickening, CBD 9mm.  - s/p ERCP 10/21 found stones, stent placed f/u note, NPO and LR  monitor for pancreatitis, can advance clear liquid if remains hemodynamically stable  - Possible CCY w/surgery   - monitor daily CBC/BMP/LFTs      #L eye swelling-  10/21  - denies any associated symptoms, no medications to induce swelling  - continue to monitor    #HO Fibroids -s/p hysterectomy with bilateral salpingooophorectomy with complications s/p partial small bowel resection  -no complaints, just reports concern about another abdominal surgery given her multiple abdominal surgeries  -follow up with surgery  -follow up with gynecologist outpatient    #DVT ppx -  ppx lovenox  #GI ppx -  none indicated  #Diet - NPO    #Handoff pending - post-ERCP, possible CCY

## 2021-10-21 NOTE — PROGRESS NOTE ADULT - SUBJECTIVE AND OBJECTIVE BOX
GENERAL SURGERY PROGRESS NOTE    Events of past 24 hours:  Patient pending MRCP. possible ERCP today, added on for oR      Vitals:   T(F): 97.3 (10-21-21 @ 05:00), Max: 97.3 (10-21-21 @ 05:00)  HR: 57 (10-21-21 @ 05:00)  BP: 117/65 (10-21-21 @ 05:00)  RR: 18 (10-21-21 @ 05:00)  SpO2: 98% (10-21-21 @ 08:04)      Diet, Clear Liquid  Diet, NPO after Midnight:      NPO Start Date: 19-Oct-2021,   NPO Start Time: 23:59  Diet, NPO after Midnight:      NPO Start Date: 19-Oct-2021,   NPO Start Time: 23:59  Except Medications      Fluids:     I & O's:    Bowel Movement: : [] YES [] NO  Flatus: : [] YES [] NO    PHYSICAL EXAM:  General Appearance: NAD  HEENT: EOMI, sclera non-icteric.  Heart: RRR   Lungs: No increased work of breathing or accessory muscle use. Symmetric chest wall rise and fall.   Abdomen:  Soft, nontender, nondistended.   MSK/Extremities: Warm & well-perfused.   Skin: Warm, dry. No jaundice.   Incision/wound: healing well, dressings in place, clean, dry and intact    MEDICATIONS  (STANDING):  chlorhexidine 4% Liquid 1 Application(s) Topical <User Schedule>  enoxaparin Injectable 40 milliGRAM(s) SubCutaneous at bedtime  lactated ringers. 1000 milliLiter(s) (75 mL/Hr) IV Continuous <Continuous>  pantoprazole    Tablet 40 milliGRAM(s) Oral before breakfast    MEDICATIONS  (PRN):  acetaminophen   Tablet .. 650 milliGRAM(s) Oral every 6 hours PRN Mild Pain (1 - 3)  ketorolac   Injectable 15 milliGRAM(s) IV Push every 8 hours PRN Moderate Pain (4 - 6)  ondansetron  IVPB 4 milliGRAM(s) IV Intermittent every 8 hours PRN Nausea and/or Vomiting      DVT PROPHYLAXIS: enoxaparin Injectable 40 milliGRAM(s) SubCutaneous at bedtime    GI PROPHYLAXIS: pantoprazole    Tablet 40 milliGRAM(s) Oral before breakfast    ANTICOAGULATION:   ANTIBIOTICS:            LAB/STUDIES:  Labs:  CAPILLARY BLOOD GLUCOSE                              13.8   5.43  )-----------( 253      ( 20 Oct 2021 04:30 )             42.0         10-    138  |  100  |  5<L>  ----------------------------<  71  4.0   |  25  |  0.6<L>          LFTs:             6.8  | 3.1  | 149      ------------------[178     ( 20 Oct 2021 04:30 )  4.0  | x    | 263         Lipase:x      Amylase:x         Lactate, Blood: 1.1 mmol/L (10-18-21 @ 19:33)      Coags:     11.50  ----< 1.00    ( 19 Oct 2021 21:17 )     45.9                Urinalysis Basic - ( 20 Oct 2021 06:45 )    Color: Ursula / Appearance: Slightly Turbid / S.024 / pH: x  Gluc: x / Ketone: Moderate  / Bili: Small / Urobili: <2 mg/dL   Blood: x / Protein: 30 mg/dL / Nitrite: Negative   Leuk Esterase: Negative / RBC: 225 /HPF / WBC 5 /HPF   Sq Epi: x / Non Sq Epi: 4 /HPF / Bacteria: Few                IMAGING:      ACCESS/ DEVICES:  [ ] Peripheral IV  [ ] Central Venous Line	[ ] R	[ ] L	[ ] IJ	[ ] Fem	[ ] SC	Placed:   [ ] Arterial Line		[ ] R	[ ] L	[ ] Fem	[ ] Rad	[ ] Ax	Placed:   [ ] PICC:					[ ] Mediport  [ ] Urinary Catheter,  Date Placed:   [ ] Chest tube: [ ] Right, [ ] Left  [ ] ZOHAIB/Enrrique Drains  [ ] Wound vac      BLUE TEAM SPECTRA #[id number]

## 2021-10-22 ENCOUNTER — RESULT REVIEW (OUTPATIENT)
Age: 46
End: 2021-10-22

## 2021-10-22 LAB
ALBUMIN SERPL ELPH-MCNC: 4.1 G/DL — SIGNIFICANT CHANGE UP (ref 3.5–5.2)
ALP SERPL-CCNC: 157 U/L — HIGH (ref 30–115)
ALT FLD-CCNC: 178 U/L — HIGH (ref 0–41)
ANION GAP SERPL CALC-SCNC: 17 MMOL/L — HIGH (ref 7–14)
APTT BLD: 41.1 SEC — HIGH (ref 27–39.2)
AST SERPL-CCNC: 64 U/L — HIGH (ref 0–41)
BASOPHILS # BLD AUTO: 0.04 K/UL — SIGNIFICANT CHANGE UP (ref 0–0.2)
BASOPHILS NFR BLD AUTO: 0.5 % — SIGNIFICANT CHANGE UP (ref 0–1)
BILIRUB DIRECT SERPL-MCNC: 0.5 MG/DL — HIGH (ref 0–0.2)
BILIRUB INDIRECT FLD-MCNC: 0.8 MG/DL — SIGNIFICANT CHANGE UP (ref 0.2–1.2)
BILIRUB SERPL-MCNC: 1.3 MG/DL — HIGH (ref 0.2–1.2)
BLD GP AB SCN SERPL QL: SIGNIFICANT CHANGE UP
BUN SERPL-MCNC: 3 MG/DL — LOW (ref 10–20)
CALCIUM SERPL-MCNC: 9.1 MG/DL — SIGNIFICANT CHANGE UP (ref 8.5–10.1)
CHLORIDE SERPL-SCNC: 101 MMOL/L — SIGNIFICANT CHANGE UP (ref 98–110)
CO2 SERPL-SCNC: 21 MMOL/L — SIGNIFICANT CHANGE UP (ref 17–32)
CREAT SERPL-MCNC: 0.5 MG/DL — LOW (ref 0.7–1.5)
EOSINOPHIL # BLD AUTO: 0.25 K/UL — SIGNIFICANT CHANGE UP (ref 0–0.7)
EOSINOPHIL NFR BLD AUTO: 3.1 % — SIGNIFICANT CHANGE UP (ref 0–8)
GLUCOSE SERPL-MCNC: 71 MG/DL — SIGNIFICANT CHANGE UP (ref 70–99)
HCT VFR BLD CALC: 41.5 % — SIGNIFICANT CHANGE UP (ref 37–47)
HGB BLD-MCNC: 13.9 G/DL — SIGNIFICANT CHANGE UP (ref 12–16)
IMM GRANULOCYTES NFR BLD AUTO: 0.1 % — SIGNIFICANT CHANGE UP (ref 0.1–0.3)
INR BLD: 1.03 RATIO — SIGNIFICANT CHANGE UP (ref 0.65–1.3)
LIDOCAIN IGE QN: 19 U/L — SIGNIFICANT CHANGE UP (ref 7–60)
LYMPHOCYTES # BLD AUTO: 1.37 K/UL — SIGNIFICANT CHANGE UP (ref 1.2–3.4)
LYMPHOCYTES # BLD AUTO: 16.9 % — LOW (ref 20.5–51.1)
MCHC RBC-ENTMCNC: 29.8 PG — SIGNIFICANT CHANGE UP (ref 27–31)
MCHC RBC-ENTMCNC: 33.5 G/DL — SIGNIFICANT CHANGE UP (ref 32–37)
MCV RBC AUTO: 89.1 FL — SIGNIFICANT CHANGE UP (ref 81–99)
MONOCYTES # BLD AUTO: 0.51 K/UL — SIGNIFICANT CHANGE UP (ref 0.1–0.6)
MONOCYTES NFR BLD AUTO: 6.3 % — SIGNIFICANT CHANGE UP (ref 1.7–9.3)
NEUTROPHILS # BLD AUTO: 5.94 K/UL — SIGNIFICANT CHANGE UP (ref 1.4–6.5)
NEUTROPHILS NFR BLD AUTO: 73.1 % — SIGNIFICANT CHANGE UP (ref 42.2–75.2)
NRBC # BLD: 0 /100 WBCS — SIGNIFICANT CHANGE UP (ref 0–0)
PLATELET # BLD AUTO: 208 K/UL — SIGNIFICANT CHANGE UP (ref 130–400)
POTASSIUM SERPL-MCNC: 4 MMOL/L — SIGNIFICANT CHANGE UP (ref 3.5–5)
POTASSIUM SERPL-SCNC: 4 MMOL/L — SIGNIFICANT CHANGE UP (ref 3.5–5)
PROT SERPL-MCNC: 6.9 G/DL — SIGNIFICANT CHANGE UP (ref 6–8)
PROTHROM AB SERPL-ACNC: 11.8 SEC — SIGNIFICANT CHANGE UP (ref 9.95–12.87)
RBC # BLD: 4.66 M/UL — SIGNIFICANT CHANGE UP (ref 4.2–5.4)
RBC # FLD: 12.7 % — SIGNIFICANT CHANGE UP (ref 11.5–14.5)
SODIUM SERPL-SCNC: 139 MMOL/L — SIGNIFICANT CHANGE UP (ref 135–146)
SURGICAL PATHOLOGY STUDY: SIGNIFICANT CHANGE UP
WBC # BLD: 8.12 K/UL — SIGNIFICANT CHANGE UP (ref 4.8–10.8)
WBC # FLD AUTO: 8.12 K/UL — SIGNIFICANT CHANGE UP (ref 4.8–10.8)

## 2021-10-22 PROCEDURE — 99233 SBSQ HOSP IP/OBS HIGH 50: CPT

## 2021-10-22 PROCEDURE — 47563 LAPARO CHOLECYSTECTOMY/GRAPH: CPT | Mod: 22

## 2021-10-22 PROCEDURE — 99233 SBSQ HOSP IP/OBS HIGH 50: CPT | Mod: 25

## 2021-10-22 PROCEDURE — 88304 TISSUE EXAM BY PATHOLOGIST: CPT | Mod: 26

## 2021-10-22 PROCEDURE — 99233 SBSQ HOSP IP/OBS HIGH 50: CPT | Mod: 57

## 2021-10-22 RX ORDER — SODIUM CHLORIDE 9 MG/ML
1000 INJECTION, SOLUTION INTRAVENOUS
Refills: 0 | Status: DISCONTINUED | OUTPATIENT
Start: 2021-10-22 | End: 2021-10-23

## 2021-10-22 RX ORDER — BUPIVACAINE 13.3 MG/ML
20 INJECTION, SUSPENSION, LIPOSOMAL INFILTRATION ONCE
Refills: 0 | Status: DISCONTINUED | OUTPATIENT
Start: 2021-10-22 | End: 2021-10-22

## 2021-10-22 RX ORDER — SODIUM CHLORIDE 9 MG/ML
1000 INJECTION, SOLUTION INTRAVENOUS
Refills: 0 | Status: DISCONTINUED | OUTPATIENT
Start: 2021-10-22 | End: 2021-10-22

## 2021-10-22 RX ORDER — HYDROMORPHONE HYDROCHLORIDE 2 MG/ML
1 INJECTION INTRAMUSCULAR; INTRAVENOUS; SUBCUTANEOUS
Refills: 0 | Status: DISCONTINUED | OUTPATIENT
Start: 2021-10-22 | End: 2021-10-22

## 2021-10-22 RX ORDER — OXYCODONE HYDROCHLORIDE 5 MG/1
5 TABLET ORAL EVERY 6 HOURS
Refills: 0 | Status: DISCONTINUED | OUTPATIENT
Start: 2021-10-22 | End: 2021-10-23

## 2021-10-22 RX ORDER — ACETAMINOPHEN 500 MG
650 TABLET ORAL EVERY 6 HOURS
Refills: 0 | Status: DISCONTINUED | OUTPATIENT
Start: 2021-10-22 | End: 2021-10-23

## 2021-10-22 RX ORDER — PANTOPRAZOLE SODIUM 20 MG/1
40 TABLET, DELAYED RELEASE ORAL
Refills: 0 | Status: DISCONTINUED | OUTPATIENT
Start: 2021-10-22 | End: 2021-10-23

## 2021-10-22 RX ORDER — HYDROMORPHONE HYDROCHLORIDE 2 MG/ML
0.5 INJECTION INTRAMUSCULAR; INTRAVENOUS; SUBCUTANEOUS
Refills: 0 | Status: DISCONTINUED | OUTPATIENT
Start: 2021-10-22 | End: 2021-10-22

## 2021-10-22 RX ORDER — ENOXAPARIN SODIUM 100 MG/ML
40 INJECTION SUBCUTANEOUS DAILY
Refills: 0 | Status: DISCONTINUED | OUTPATIENT
Start: 2021-10-22 | End: 2021-10-23

## 2021-10-22 RX ORDER — ONDANSETRON 8 MG/1
4 TABLET, FILM COATED ORAL ONCE
Refills: 0 | Status: DISCONTINUED | OUTPATIENT
Start: 2021-10-22 | End: 2021-10-22

## 2021-10-22 RX ORDER — INDOCYANINE GREEN 25 MG
0.5 KIT INTRAVASCULAR; INTRAVENOUS ONCE
Refills: 0 | Status: DISCONTINUED | OUTPATIENT
Start: 2021-10-22 | End: 2021-10-22

## 2021-10-22 RX ORDER — MAGNESIUM SULFATE 500 MG/ML
2 VIAL (ML) INJECTION
Refills: 0 | Status: COMPLETED | OUTPATIENT
Start: 2021-10-22 | End: 2021-10-22

## 2021-10-22 RX ORDER — IBUPROFEN 200 MG
400 TABLET ORAL EVERY 8 HOURS
Refills: 0 | Status: DISCONTINUED | OUTPATIENT
Start: 2021-10-22 | End: 2021-10-23

## 2021-10-22 RX ADMIN — CHLORHEXIDINE GLUCONATE 1 APPLICATION(S): 213 SOLUTION TOPICAL at 05:54

## 2021-10-22 RX ADMIN — HYDROMORPHONE HYDROCHLORIDE 0.5 MILLIGRAM(S): 2 INJECTION INTRAMUSCULAR; INTRAVENOUS; SUBCUTANEOUS at 20:35

## 2021-10-22 RX ADMIN — PANTOPRAZOLE SODIUM 40 MILLIGRAM(S): 20 TABLET, DELAYED RELEASE ORAL at 05:54

## 2021-10-22 RX ADMIN — Medication 15 MILLIGRAM(S): at 11:55

## 2021-10-22 RX ADMIN — HYDROMORPHONE HYDROCHLORIDE 0.5 MILLIGRAM(S): 2 INJECTION INTRAMUSCULAR; INTRAVENOUS; SUBCUTANEOUS at 21:05

## 2021-10-22 RX ADMIN — Medication 50 GRAM(S): at 11:56

## 2021-10-22 RX ADMIN — Medication 50 GRAM(S): at 14:28

## 2021-10-22 RX ADMIN — Medication 650 MILLIGRAM(S): at 23:16

## 2021-10-22 RX ADMIN — HYDROMORPHONE HYDROCHLORIDE 0.5 MILLIGRAM(S): 2 INJECTION INTRAMUSCULAR; INTRAVENOUS; SUBCUTANEOUS at 20:49

## 2021-10-22 RX ADMIN — HYDROMORPHONE HYDROCHLORIDE 0.5 MILLIGRAM(S): 2 INJECTION INTRAMUSCULAR; INTRAVENOUS; SUBCUTANEOUS at 20:19

## 2021-10-22 NOTE — PROGRESS NOTE ADULT - ASSESSMENT
Patient is a 46-year-old female with past medical history of uterine fibroid, prior small bowel resection, and total hysterectomy who presented to [facility] with complaints of abdominal pain.  Patient is status post ERCP done October 21.  Endoscopic ultrasound was done first which noted distal CBD at level of the ampulla with a dilated common bile duct to 9 mm.  Patient had ERCP with sphincterotomy in which needle-knife was utilized for precut.  Patient had removal of stones and CBD tapering was noted and brush biopsy was done of the strictured area.  Patient then had placement of a CBD stent plastic.  Patient developed some pain after the procedure located in her upper abdomen with radiation to the back.  Patient this morning does feel better but still has some dull pain.  Noted no fluids were running at the time of evaluation. Fluid ordered. Keep NPO for now. AM labs pending. Will follow up with her later today to reassess pain.     Choledocholithiasis / CBD stricture- brushings done   - Patient with some pain  - Fluid ordered LR at 200ml/hr  - NPO for now  - Follow AM labs  - Surgery on board  - Will need ERCP in 4-6 weeks to remove CBD stent- [phone]  - Will follow

## 2021-10-22 NOTE — PRE-ANESTHESIA EVALUATION ADULT - MALLAMPATI CLASS
Class II - visualization of the soft palate, fauces, and uvula
Class II - visualization of the soft palate, fauces, and uvula
Class III - visualization of the soft palate and the base of the uvula
Class II - visualization of the soft palate, fauces, and uvula

## 2021-10-22 NOTE — PROGRESS NOTE ADULT - SUBJECTIVE AND OBJECTIVE BOX
LENGTH OF HOSPITAL STAY: 10-19-21 (3d)  CHIEF COMPLAINT:   Patient is a 46y old  Female who presents with a chief complaint of Abdominal Pain (20 Oct 2021 12:42)      Progress:     Interval hx: No events overnight  Subjective complaints: Patient reports abdominal pain is a little better today. No new complaints. Eyelid swelling has resolved spontaneously. [employer]  phone was used ID # [id number].     HISTORY OF PRESENTING ILLNESS:    HPI:  History of Present Illness    Ms. Baptiste is a 46 year old (never smoker) female patient known to have:  - History of Fibroids s/p hysterectomy with bilateral salpingooophorectomy with complications s/p partial colectomy (August 2020)      She presented to the ED on 10/18 for evaluation of abdominal pain.  History goes back to around 4 days prior to presentation when the patient started complaining of epigastric pain.  Pain is in epigastrium, crampy in nature, of around 5/10 intensity, and non radiating.  It is not exacerbated with food or positioning, and seems to be partially relieved with Tylenol.  It has been associated with some nausea but no vomiting, jaundice, fever, or chills.    On review of systems, patient reports chronic diarrhea (loose non bloody non mucoid stools since partial colectomy) but denies any recent fever, chills, night sweats, URTI symptoms (cough, rhinorrhea, sore throat), urinary symptoms (urinary frequency, urgency, intermittence, dysuria, foul smelling urine, cloudy urine), headache.   No sick contacts.   No recent travel or exposure to recent travelers.      Upon presentation to the ED, the patient was hemodynamically stable:  Vital Signs in ED   - /77 mmHg  - HR 75 bpm  - RR 18 bpm  - T 97.6      Investigations   Laboratory Workup  - CBC:                        13.6   4.96  )-----------( 255      ( 18 Oct 2021 19:33 )             40.3     - Chemistry:  10-18    139  |  105  |  7<L>  ----------------------------<  156<H>  4.2   |  21  |  0.7    Ca    8.9      18 Oct 2021 19:33    TPro  7.2  /  Alb  4.3  /  TBili  3.7<H>  /  DBili  x   /  AST  272<H>  /  ALT  381<H>  /  AlkPhos  199<H>  10-18      Radiological Workup  * CT AP IC Punctate calculus in the left UVJ. No evidence of hydroureter or hydronephrosis however Circumferential wall thickening of the gallbladder. Cholecystitis is not excluded. Dilated CBD with gradual tapering as well as mild central intrahepatic biliary ductal dilatation. If clinically warranted, MRI/MRCP may be of benefit. 3.3 cm nodular density or concentrated fibroglandular tissue in the left retroareolar region of the breast. Dedicated mammogram and sonogram is recommended.  * RUQ US GB sludge, thick 5mm      - Patient was evaluated by General Surgery in ED: no acute surgical intervention  - She received LR 1L bolus and IV morphine 4mg + 2mg for pain control  - She will be admitted to the floor as a case of Choledocholithiasis for management (possible ERCP), further investigations (MRCP), and monitoring.         (19 Oct 2021 02:54)      PHYSICAL EXAM:   GENERAL: No acute distress  NEURO: Alert & Oriented X3. Speech clear and fluent.   HEAD: Atraumatic, normocephalic.   EYES: EOMI. No scleral icterus.   ENT: Moist mucous membranes.    NECK: No JVD.  No cervical lymphadenopathy.   LUNGS: Clear to auscultation bilaterally. No wheezes or rhonchi. No accessory muscle use.  HEART: RRR. S1/S2 present.   ABDOMEN: +RUQ/Epigastric mild TTP/ Bowel sounds present. Soft. Nondistended. No guarding or rigidity    EXTREMITIES: No edema, cyanosis, clubbing. 2+ peripheral pulses bilaterally.  SKIN: Warm and dry.

## 2021-10-22 NOTE — PROGRESS NOTE ADULT - ASSESSMENT
ASSESSMENT:  Ms. Baptiste is a 45 year old patient with history of extensive surgical history and recent prolonged hospital course significant for PAULIE/BSO on 8/19/20 ([facility]se) for 15cm bleeding fibroid with readmission x 2 for PSBO resolved with nonoperative management, s/p SBR, extensive NARCISO w/ 43cm ileum resected 2/2 serosal enterotomy 9/15 c/b RTOR for breakdown of proximal limb staple line of of anastomosis, 9/23 I&D of abdominal wound, complicated by enterocutaneous fistula. Patient presents to ED with 4 days of epigastric abdominal pain radiating to her back exacerbated by PO intake, found to have CBD dilation to 9 mm on CTAP and RUQ US.    PLAN:  - keep npo  - pain control  - gi/dvt ppx  - added on for OR today    BLUE TEAM SPECTRA [id number]

## 2021-10-22 NOTE — PROGRESS NOTE ADULT - SUBJECTIVE AND OBJECTIVE BOX
GENERAL SURGERY PROGRESS NOTE    Events of past 24 hours:  s/p ERCP today, added on for oR      Vitals:   T(F): 96.8 (10-22-21 @ 05:00), Max: 97.2 (10-21-21 @ 13:45)  HR: 75 (10-22-21 @ 05:00)  BP: 102/60 (10-22-21 @ 05:00)  RR: 18 (10-22-21 @ 05:00)  SpO2: 97% (10-22-21 @ 07:57)      Diet, NPO after Midnight:      NPO Start Date: 21-Oct-2021,   NPO Start Time: 23:59  Diet, Clear Liquid  Diet, NPO after Midnight:      NPO Start Date: 19-Oct-2021,   NPO Start Time: 23:59  Diet, NPO after Midnight:      NPO Start Date: 19-Oct-2021,   NPO Start Time: 23:59  Except Medications      Fluids: lactated ringers.: Solution, 1000 milliLiter(s) infuse at 200 mL/Hr  Provider's Contact #: [phone]      I & O's:    Bowel Movement: : [] YES [] NO  Flatus: : [] YES [] NO    PHYSICAL EXAM:  General Appearance: NAD  HEENT: EOMI, sclera non-icteric.  Heart: RRR   Lungs: No increased work of breathing or accessory muscle use. Symmetric chest wall rise and fall.   Abdomen:  Soft, nontender, nondistended.   MSK/Extremities: Warm & well-perfused.   Skin: Warm, dry. No jaundice.   Incision/wound: healing well, dressings in place, clean, dry and intact    MEDICATIONS  (STANDING):  chlorhexidine 4% Liquid 1 Application(s) Topical <User Schedule>  enoxaparin Injectable 40 milliGRAM(s) SubCutaneous at bedtime  lactated ringers. 1000 milliLiter(s) (200 mL/Hr) IV Continuous <Continuous>  magnesium sulfate  IVPB 2 Gram(s) IV Intermittent every 2 hours  pantoprazole    Tablet 40 milliGRAM(s) Oral before breakfast    MEDICATIONS  (PRN):  acetaminophen   Tablet .. 650 milliGRAM(s) Oral every 6 hours PRN Mild Pain (1 - 3)  ondansetron  IVPB 4 milliGRAM(s) IV Intermittent every 8 hours PRN Nausea and/or Vomiting      DVT PROPHYLAXIS: enoxaparin Injectable 40 milliGRAM(s) SubCutaneous at bedtime    GI PROPHYLAXIS: pantoprazole    Tablet 40 milliGRAM(s) Oral before breakfast    ANTICOAGULATION:   ANTIBIOTICS:            LAB/STUDIES:  Labs:  CAPILLARY BLOOD GLUCOSE                              13.9   8.12  )-----------( 208      ( 22 Oct 2021 04:30 )             41.5       Auto Neutrophil %: 73.1 % (10-22-21 @ 04:30)  Auto Immature Granulocyte %: 0.1 % (10-22-21 @ 04:30)  Auto Neutrophil %: 67.5 % (10-21-21 @ 22:22)  Auto Immature Granulocyte %: 0.2 % (10-21-21 @ 22:22)    10-22    139  |  101  |  3<L>  ----------------------------<  71  4.0   |  21  |  0.5<L>      Calcium, Total Serum: 9.1 mg/dL (10-22-21 @ 04:30)      LFTs:             x    | x    | x        ------------------[x       ( 22 Oct 2021 10:31 )  x    | x    | x           Lipase:19     Amylase:x             Coags:     11.80  ----< 1.03    ( 22 Oct 2021 04:30 )     41.1                            IMAGING:      ACCESS/ DEVICES:  [ ] Peripheral IV  [ ] Central Venous Line	[ ] R	[ ] L	[ ] IJ	[ ] Fem	[ ] SC	Placed:   [ ] Arterial Line		[ ] R	[ ] L	[ ] Fem	[ ] Rad	[ ] Ax	Placed:   [ ] PICC:					[ ] Mediport  [ ] Urinary Catheter,  Date Placed:   [ ] Chest tube: [ ] Right, [ ] Left  [ ] ZOHAIB/Enrrique Drains  [ ] Wound vac      [employer] TEAM S[facility] #[id number]

## 2021-10-22 NOTE — PROGRESS NOTE ADULT - ASSESSMENT
ASSESSMENT/PLAN:  Ms. Baptiste is a 46 year old (never smoker) female patient known to have History of Fibroids s/p hysterectomy with bilateral salpingooophorectomy with complications s/p partial small bowel resection comes in with epigastric pain for the last 4 days and elevated liver enzymes    #Biliary colic and elevated liver enzymes: R/O Choledocholithiasis vs acute cholecystitis  #Possible Pancreatitis s/p ERCP  TBili  3.7<H>  /  DBili  0.7   /  AST  272<H>  /  ALT  381<H>  /  AlkPhos  199<H> on admission  -T BILI trending up, ALT/AST stable   -no signs of infection or cholangitis  - US abdomen and CT abdomen with IV contrast: GB sludge, thickening, CBD 9mm.  - s/p ERCP 10/21 found stones, stent placed, NPO and LR  monitor for pancreatitis, can advance clear liquid if remains hemodynamically stable, follow up ERCP in 4-6 weeks for stent removal  ---- Endoscopic ultrasound was done first which noted distal CBD at level of the ampulla with a dilated common bile duct to 9 mm.  Patient had ERCP with sphincterotomy in which needle-knife was utilized for precut.  Patient had removal of stones and CBD tapering was noted and brush biopsy was done of the strictured area.  Patient then had placement of a CBD stent plastic.  Patient developed some pain after the procedure located in her upper abdomen with radiation to the back.   - Planned CCY w/surgery  today, maintain NPO and LR  - monitor daily CBC/BMP/LFTs, added lipase level    #HO Fibroids -s/p hysterectomy with bilateral salpingooophorectomy with complications s/p partial small bowel resection  -no complaints, just reports concern about another abdominal surgery given her multiple abdominal surgeries  -follow up with surgery  -follow up with gynecologist outpatient    #DVT ppx -  ppx lovenox  #GI ppx -  none indicated  #Diet - NPO for now    #Handoff pending - CCY

## 2021-10-22 NOTE — CDI QUERY NOTE - NSCDIOTHERTXTBX_GEN_ALL_CORE_HH
10/19 /2021 HPI: Internal medicine: 46F w/ abdominal pain admitted for Choledocholithiasis.  S/P ERCP, sphincterotomy    Laboratory finding  10/21 Magnesium 1.7    Treatment  10/22 MgSO4 2gms IVPB    Based on your professional judgment and above clinical findings please clarify magnesium result as  -Hypomagnesemia  -Other Please specify  - Clinically not significant

## 2021-10-22 NOTE — PROGRESS NOTE ADULT - SUBJECTIVE AND OBJECTIVE BOX
Patient is a 46-year-old female with past medical history of uterine fibroid, prior small bowel resection, and total hysterectomy who presented to [facility] with complaints of abdominal pain.  Patient is status post ERCP done October 21.  Endoscopic ultrasound was done first which noted distal CBD at level of the ampulla with a dilated common bile duct to 9 mm.  Patient had ERCP with sphincterotomy in which needle-knife was utilized for precut.  Patient had removal of stones and CBD tapering was noted and brush biopsy was done of the strictured area.  Patient then had placement of a CBD stent plastic.  Patient developed some pain after the procedure located in her upper abdomen with radiation to the back.  Patient this morning does feel better but still has some dull pain.  Noted no fluids were running at the time of evaluation.        PAST MEDICAL & SURGICAL HISTORY:  Uterine fibroid  S/P PAULIE (total abdominal hysterectomy)  Status post bilateral salpingectomy  S/P small bowel resection        MEDICATIONS  (STANDING):  chlorhexidine 4% Liquid 1 Application(s) Topical <User Schedule>  enoxaparin Injectable 40 milliGRAM(s) SubCutaneous at bedtime  lactated ringers. 1000 milliLiter(s) (75 mL/Hr) IV Continuous <Continuous>  pantoprazole    Tablet 40 milliGRAM(s) Oral before breakfast    MEDICATIONS  (PRN):  acetaminophen   Tablet .. 650 milliGRAM(s) Oral every 6 hours PRN Mild Pain (1 - 3)  ketorolac   Injectable 15 milliGRAM(s) IV Push every 8 hours PRN Moderate Pain (4 - 6)  ondansetron  IVPB 4 milliGRAM(s) IV Intermittent every 8 hours PRN Nausea and/or Vomiting      Allergies  No Known Allergies      Review of Systems  General:  Denies Fatigue, Denies Fever, Denies Weakness ,Denies Weight Loss   HEENT: Denies Trouble Swallowing ,Denies  Sore Throat , Denies Change in hearing/vision/speech ,Denies Dizziness    Cardio: Denies  Chest Pain , Palpitations    Respiratory: Denies worsening of SOB, Denies Cough  Abdomen: See detailed HPI  Neuro: Denies Headache Denies Dizziness, Denies Paresthesias  MSK: Denies pain in Bones/Joints/Muscles   Psych: Patient denies depression, denies suicidal or homicidal ideations  Integ: Patient Denies rash, or new skin lesions       Vital Signs   T(F): 96.8 (22 Oct 2021 05:00), Max: 97.3 (21 Oct 2021 11:17)  HR: 75 (22 Oct 2021 05:00) (52 - 82)  BP: 102/60 (22 Oct 2021 05:00) (102/60 - 171/93)  RR: 18 (22 Oct 2021 05:00) (13 - 18)  SpO2: 100% (21 Oct 2021 21:16) (98% - 100%)  Physical Exam  Gen: NAD  HEENT: NC/AT, Mucosal Membranes Moist  Cardio: S1/S2 No S3/S4, Regular  Resp: CTA B/L  Abdomen: Soft, ND/TTP on deep palpation  Neuro: AAOx3, Cranial Nerve II-XII intact   Extremities: FROM x 4  Skin: No notable jaundice      Labs:  10-21    137  |  101  |  3<L>  ----------------------------<  91  4.0   |  24  |  0.5<L>    Ca    8.8      21 Oct 2021 22:22  Phos  3.0     10-21  Mg     1.7     10-21    TPro  6.4  /  Alb  3.7  /  TBili  1.2  /  DBili  0.5<H>  /  AST  72<H>  /  ALT  174<H>  /  AlkPhos  157<H>  10-21      Radiology:  CT Abdomen and Pelvis w/ Oral Cont and w/ IV Cont 10.18.21  IMPRESSION:    Punctate calculus in the left UVJ. No evidence of hydroureter or hydronephrosis however    Circumferential wall thickening of the gallbladder. Cholecystitis is not excluded.    Dilated CBD with gradual tapering as well as mild central intrahepatic biliary ductal dilatation. If clinically warranted, MRI/MRCP may be of benefit.    3.3 cm nodular density or concentrated fibroglandular tissue in the left retroareolar region of the breast. Dedicated mammogram and sonogram is recommended.

## 2021-10-22 NOTE — CHART NOTE - NSCHARTNOTEFT_GEN_A_CORE
Anesthesia Post Op Assessment  		(    ) Intubated           TV _____	Rate _sv____	FiO2___4lnc__  		(  x  ) Patent airway. Full return of protective reflexes  		( x   )Full recovery from anesthesia/sedation to baseline status      Cardiovascular Function:  		BP:	136/62	                  Pulse:		96                  RR:		12                  Temp:		37C                  O2Sat:   99      Mental Status:  	        (  x  ) awake		  (  x  ) alert		 (    ) drowsy	               (    ) sedated      Nausea/Vomiting:  		(    ) Yes, See post-op orders		   (  x  ) No      Pain Scale: (0-10):			Treatment:     (    ) None	            ( x   ) See Post-Op/PCA Orders      Post-operative Fluids: 	   (    ) Oral	          (  x  ) See post-op Orders        Comments:    Uneventful. No complications from anesthesia.  Discharge when criteria met.

## 2021-10-22 NOTE — PRE-ANESTHESIA EVALUATION ADULT - NSANTHPMHFT_GEN_ALL_CORE
46 year old (never smoker) female patient known to have History of Fibroids s/p hysterectomy with bilateral salpingooophorectomy with complications s/p partial small bowel resection comes in with epigastric pain for the last 4 days and elevated liver enzymes    -Biliary colic and elevated liver enzymes: R/O Choledocholithiasis vs acute cholecystitis  -Possible Pancreatitis s/p ERCP    Now going for cholecystectomy

## 2021-10-23 ENCOUNTER — TRANSCRIPTION ENCOUNTER (OUTPATIENT)
Age: 46
End: 2021-10-23

## 2021-10-23 VITALS
SYSTOLIC BLOOD PRESSURE: 121 MMHG | OXYGEN SATURATION: 99 % | DIASTOLIC BLOOD PRESSURE: 60 MMHG | RESPIRATION RATE: 18 BRPM | HEART RATE: 108 BPM | TEMPERATURE: 99 F

## 2021-10-23 RX ORDER — IBUPROFEN 200 MG
1 TABLET ORAL
Qty: 0 | Refills: 0 | DISCHARGE
Start: 2021-10-23

## 2021-10-23 RX ORDER — ACETAMINOPHEN 500 MG
2 TABLET ORAL
Qty: 0 | Refills: 0 | DISCHARGE
Start: 2021-10-23

## 2021-10-23 RX ADMIN — Medication 650 MILLIGRAM(S): at 12:50

## 2021-10-23 RX ADMIN — PANTOPRAZOLE SODIUM 40 MILLIGRAM(S): 20 TABLET, DELAYED RELEASE ORAL at 06:28

## 2021-10-23 RX ADMIN — Medication 650 MILLIGRAM(S): at 12:49

## 2021-10-23 RX ADMIN — Medication 650 MILLIGRAM(S): at 05:44

## 2021-10-23 NOTE — PROGRESS NOTE ADULT - ASSESSMENT
ASSESSMENT:  46F s/p ERCP on 10/21 w/ stone removal, sphincterotomy, CBD stent placement, brush biopsy, now POD 1 s/p laparoscopic cholecystectomy 2/2 acute cholecystitis.    PLAN:  - No labs  - Regular diet  - Pain control  - Encourage OOB, ambulation, IS  - Likely DC 10/23    Eron Manjarrez MD  General Surgery PGY-1  [place] TEAM [facility] [id number]

## 2021-10-23 NOTE — DISCHARGE NOTE PROVIDER - NSDCMRMEDTOKEN_GEN_ALL_CORE_FT
acetaminophen 325 mg oral tablet: 2 tab(s) orally every 6 hours  buprenorphine 10 mcg/hr transdermal film, extended release: 1 patch transdermal once a week  ibuprofen 400 mg oral tablet: 1 tab(s) orally every 8 hours, As needed, Mild Pain (1 - 3)   acetaminophen 325 mg oral tablet: 2 tab(s) orally every 6 hours  buprenorphine 10 mcg/hr transdermal film, extended release: 1 patch transdermal once a week  ibuprofen 400 mg oral tablet: 1 tab(s) orally every 8 hours, As needed, Mild Pain (1 - 3)  oxycodone-acetaminophen 5 mg-300 mg oral tablet: 1 tab(s) orally every 6 hours MDD:4

## 2021-10-23 NOTE — DISCHARGE NOTE PROVIDER - HOSPITAL COURSE
This is a 47yo female with history of extensive surgical history and recent prolonged hospital course significant for PAULIE/BSO on 8/19/20 (Jesse) for 15cm bleeding fibroid with readmission x 2 for PSBO resolved with nonoperative management, s/p SBR, extensive NARCISO w/ 43cm ileum resected 2/2 serosal enterotomy 9/15 c/b RTOR for breakdown of proximal limb staple line of of anastomosis, 9/23 I&D of abdominal wound, complicated by enterocutaneous fistula who presented to the ED on 10/18 for evaluation of abdominal pain. In the ED patient was HD stable with WBC of 4.96. CT scan and RUQ US revealed GB wall thickening to 5mm, and dilated CBD to 9 mm.  GI was consulted for Tbili of 3.7 and possible ERCP. Patient underwent laparoscopic cholecystectomy 10/22/2021 without complications. Operative findings showed inflamed and thickened GB wall, cystic duct endolooped and back wall left on liver. Postoperatively patient's diet was advanced. Patient is tolerating diet, passing gas, having bowel movements, and ambulating. Her pain is well controlled. Patient has been instructed to follow up with Dr. Sandra in 1-2 weeks and GI Dr. Faith ([phone]) in 4-6weeks for ERCP to remove CBD stent. Patient is stable and ready for discharge.

## 2021-10-23 NOTE — DISCHARGE NOTE NURSING/CASE MANAGEMENT/SOCIAL WORK - PATIENT PORTAL LINK FT
You can access the FollowMyHealth Patient Portal offered by [facility] by registering at the following website: [email or web address]. By joining [employer]’s FollowMyHealth portal, you will also be able to view your health information using other applications (apps) compatible with our system.

## 2021-10-23 NOTE — DISCHARGE NOTE PROVIDER - CARE PROVIDER_API CALL
Nancy Sandra)  Surgery  [address] [place] [address], [address] [place] [address]  Phone: [phone]  Fax: [phone]  Follow Up Time:

## 2021-10-23 NOTE — DISCHARGE NOTE PROVIDER - NSDCCPCAREPLAN_GEN_ALL_CORE_FT
PRINCIPAL DISCHARGE DIAGNOSIS  Diagnosis: Gallbladder sludge  Assessment and Plan of Treatment: Activity: No heavy lifting > 10 lbs for 2 weeks. Avoid straining or excessive activity x 6 weeks.   Dressings: Remove outer dressings in 48 hours and steri strips underneath will fall off on their own. Do not scrub wounds. You may shower but do not bathe. May use ice packs for pain and swelling.   Pain control: You may take over-the-counter tylenol and motrin three times per day with food for up to 3 days.    Follow up: Please call the number provided to make an appointment with Dr. Sandra in 1-2 weeks. Please call with any questions or concerns including fevers, worsening pain, pus from the wounds, or redness of the skin.   Please also follow up with GI Dr. Faith ([phone]) in 4-6 weeks for ERCP to remove CBD stent.      SECONDARY DISCHARGE DIAGNOSES  Diagnosis: Dilation of common bile duct  Assessment and Plan of Treatment:     Diagnosis: Transaminitis  Assessment and Plan of Treatment:      PRINCIPAL DISCHARGE DIAGNOSIS  Diagnosis: Gallbladder sludge  Assessment and Plan of Treatment: Activity: No heavy lifting > 10 lbs for 2 weeks. Avoid straining or excessive activity x 6 weeks.   Dressings: Remove outer dressings in 48 hours and steri strips underneath will fall off on their own. Do not scrub wounds. You may shower but do not bathe. May use ice packs for pain and swelling.   Pain control: You may take over-the-counter tylenol and motrin three times per day with food for up to 3 days.  Percocet was sent to your pharmacy. Please do not drive, operate machinery, or make important decisions while taking this medication. Please take only for severe pain.   Follow up: Please call the number provided to make an appointment with Dr. Sandra in 1-2 weeks. Please call with any questions or concerns including fevers, worsening pain, pus from the wounds, or redness of the skin.   Please also follow up with GI Dr. Faith ([phone]) in 4-6 weeks for ERCP to remove CBD stent.        SECONDARY DISCHARGE DIAGNOSES  Diagnosis: Dilation of common bile duct  Assessment and Plan of Treatment:     Diagnosis: Transaminitis  Assessment and Plan of Treatment:

## 2021-10-23 NOTE — PROGRESS NOTE ADULT - PROVIDER SPECIALTY LIST ADULT
Gastroenterology
Gastroenterology
Hospitalist
Internal Medicine
Internal Medicine
Surgery
Surgery
Internal Medicine
Surgery
Surgery

## 2021-10-23 NOTE — PROGRESS NOTE ADULT - ATTENDING COMMENTS
Pt known to service  labs , images and vitals reviewed  likely to have choledocho  needs ercp  will schedule for lap david after it.
Pt known to service  labs , images and vitals reviewed  likely to have choledocho  needs ercp  will schedule for lap david after it.
As noted above, no complaints, feels well    home today.
Patient seen and examined on GI advanced endoscopy around assessment and plan as above
Pt known to service  labs , images and vitals reviewed  likely to have choledocho  s/p ercp  will daysi for  lap david today.

## 2021-10-25 DIAGNOSIS — Z71.89 OTHER SPECIFIED COUNSELING: ICD-10-CM

## 2021-10-25 NOTE — CDI QUERY NOTE - NSCDIOTHERTXTBX_GEN_ALL_CORE_HH
Documentation  10/19/2021 Hospitalist: 46F presented w/ abdominal pain, nausea, chronic diarrhea  ..admitted for Choledocholithiasis    Diagnostic Procedure  10/21 S/P ERCP w/stent  10/22 Hospitalist: ercp-> spincterectomy c removal of stones, found to have cbd tapering c brush bx of strictured area and placement of cbd plastic stent, possibly complicated by post ercp pancreatitis clinically.  IVF  10/21> LR @ 200ml/hr, LR 2L @ 1000ml/hr, LR @ 100/hr    Based on above clinical findings and your professional judgment please clarify post ERCP pancreatitis as  -Post ERCP Pancreatitis ruled in  -Post ERCP Pancreatitis ruled out  -Other please specify  -Clinically unable to determine

## 2021-10-28 LAB — SURGICAL PATHOLOGY STUDY: SIGNIFICANT CHANGE UP

## 2021-10-29 DIAGNOSIS — E83.42 HYPOMAGNESEMIA: ICD-10-CM

## 2021-10-29 DIAGNOSIS — K25.9 GASTRIC ULCER, UNSPECIFIED AS ACUTE OR CHRONIC, WITHOUT HEMORRHAGE OR PERFORATION: ICD-10-CM

## 2021-10-29 DIAGNOSIS — K29.60 OTHER GASTRITIS WITHOUT BLEEDING: ICD-10-CM

## 2021-10-29 DIAGNOSIS — R10.11 RIGHT UPPER QUADRANT PAIN: ICD-10-CM

## 2021-10-29 DIAGNOSIS — N63.20 UNSPECIFIED LUMP IN THE LEFT BREAST, UNSPECIFIED QUADRANT: ICD-10-CM

## 2021-10-29 DIAGNOSIS — K80.43 CALCULUS OF BILE DUCT WITH ACUTE CHOLECYSTITIS WITH OBSTRUCTION: ICD-10-CM

## 2021-10-29 DIAGNOSIS — R22.0 LOCALIZED SWELLING, MASS AND LUMP, HEAD: ICD-10-CM

## 2021-10-29 DIAGNOSIS — N20.0 CALCULUS OF KIDNEY: ICD-10-CM

## 2021-11-03 ENCOUNTER — APPOINTMENT (OUTPATIENT)
Dept: SURGERY | Facility: CLINIC | Age: 46
End: 2021-11-03
Payer: MEDICAID

## 2021-11-03 VITALS — SYSTOLIC BLOOD PRESSURE: 100 MMHG | HEART RATE: 106 BPM | TEMPERATURE: 95.6 F | DIASTOLIC BLOOD PRESSURE: 80 MMHG

## 2021-11-03 PROCEDURE — 99024 POSTOP FOLLOW-UP VISIT: CPT

## 2021-11-03 NOTE — PHYSICAL EXAM
[Normal Breath Sounds] : Normal breath sounds [Normal Heart Sounds] : normal heart sounds [Alert] : alert [Calm] : calm [Depressed] : depressed [de-identified] : no acute distress [de-identified] : soft, right lateral wound closed and healed, left lateral wound skin edge closed and healed; midline wound with overlying healthy skin, no expressible drainage, no erythema or induration [de-identified] : right flank fullness with tenderness with palpable 2x2cm fixed deep lesion [de-identified] : wheelchair, decreased LUE mobility, skin less irritated with irregular raised bumps diffusely, moderate swelling of LUE

## 2021-11-03 NOTE — ASSESSMENT
[FreeTextEntry1] : 45 yo female pt of dr webb" with PMHx of fibroids s/p PAULIE BSO 8/19/2020 complicated by small bowel obstruction s/p exploratory laparotomy, small bowel resection 9/15/2020 complicated by anastomotic breakdown s/p exploratory laparotomy, small bowel resection 9/15, developed concern for necrotizing soft tissue infection of the abdominal wall s/p exploration of abdominal wall 9/23, prolonged hospitalization, then discharged to skilled nursing facility. She was re-admitted for enterocutaneous fistula. She was placed on antibiotics, TPN, Loperimide and made NPO. Since then, she has been discharged and EA fistula has healed. Wound has granulated, no output, +minimal drainage from granulation tissue. Patient is doing her own wound care. She has been tolerating soft foods - eating rice and soups. Last saw Dr. Fortune 1/12/2021 - TPN has been discontinued, PICC line removed. Skin irritation around PICC site has improved with steroid cream, but she complains of continued limited mobility of the left arm. Duplex was done and negative for DVT. \par \par At this time, she continues to have lower abdominal pain. She saw pain management and started on Fentanyl patch, which does not seem to help with the pain.CT was unrevealing with post-operative changes. MRI was showed colitis. She has loose bowel movements 1-3 times per day, depending on what she eats. Activity is limited due to abdominal pain as per patient. She has been working with physical therapy and says she has improved movement of her left arm. She has been walking around out of the wheelchair as well. Denies fever/chills, nausea/vomiting, chest pain or shortness of breath. She was seen by GI, underwent colonoscopy by Dr. John 6/2021. Patient also mentions pain of the right flank that was previously numb. "\par \par \par 11/3\par she presented with gallstone pancreatitis - she underwent a lap david . \par She was discharged . she is doing well. \par her pain is improving. \par she is eating well and is having soft tanesha, but brown in color. \par \par We explained in great detail the pathophysiology of the disease process. We giulia diagrams and discussed the workup for diagnosis and management.\par The various options were explained to the patient. The Risk , benefit and alternatives were discussed. We discussed recovery and possible complications.\par The Post operative care was explained to the patient. She was counselled on diet , exercise and wound care.\par We discussed the pathology and surgery with her.\par \par We discussed the importance of close follow up. \par We informed that she needs to follow up as needed  .\par We also informed that she can call us if anything changes or has any questions.\par \par

## 2021-11-03 NOTE — HISTORY OF PRESENT ILLNESS
[de-identified] : 47 yo female pt of dr webb" with PMHx of fibroids s/p PAULIE BSO 8/19/2020 complicated by small bowel obstruction s/p exploratory laparotomy, small bowel resection 9/15/2020 complicated by anastomotic breakdown s/p exploratory laparotomy, small bowel resection 9/15, developed concern for necrotizing soft tissue infection of the abdominal wall s/p exploration of abdominal wall 9/23, prolonged hospitalization, then discharged to skilled nursing facility. She was re-admitted for enterocutaneous fistula. She was placed on antibiotics, TPN, Loperimide and made NPO. Since then, she has been discharged and EA fistula has healed. Wound has granulated, no output, +minimal drainage from granulation tissue. Patient is doing her own wound care. She has been tolerating soft foods - eating rice and soups. Last saw Dr. Fortune 1/12/2021 - TPN has been discontinued, PICC line removed. Skin irritation around PICC site has improved with steroid cream, but she complains of continued limited mobility of the left arm. Duplex was done and negative for DVT. \par \par At this time, she continues to have lower abdominal pain. She saw pain management and started on Fentanyl patch, which does not seem to help with the pain.CT was unrevealing with post-operative changes. MRI was showed colitis. She has loose bowel movements 1-3 times per day, depending on what she eats. Activity is limited due to abdominal pain as per patient. She has been working with physical therapy and says she has improved movement of her left arm. She has been walking around out of the wheelchair as well. Denies fever/chills, nausea/vomiting, chest pain or shortness of breath. She was seen by GI, underwent colonoscopy by Dr. John 6/2021. Patient also mentions pain of the right flank that was previously numb. "\par 11/3\par she presented with gallstone pancreatitis - she underwent a lap david . \par She was discharged . she is doing well. \par her pain is improving. \par she is eating well and is having soft tanesha, but brown in color.

## 2021-11-19 NOTE — PROVIDER CONTACT NOTE (CHANGE IN STATUS NOTIFICATION) - SITUATION
Md made aware pt refusing medication

## 2021-11-24 ENCOUNTER — APPOINTMENT (OUTPATIENT)
Dept: SURGERY | Facility: CLINIC | Age: 46
End: 2021-11-24
Payer: MEDICAID

## 2021-11-24 VITALS
OXYGEN SATURATION: 98 % | HEIGHT: 62 IN | TEMPERATURE: 98 F | DIASTOLIC BLOOD PRESSURE: 76 MMHG | HEART RATE: 87 BPM | SYSTOLIC BLOOD PRESSURE: 122 MMHG

## 2021-11-24 PROCEDURE — 99024 POSTOP FOLLOW-UP VISIT: CPT

## 2021-12-01 PROCEDURE — G9005: CPT

## 2021-12-01 NOTE — HISTORY OF PRESENT ILLNESS
[de-identified] : 47yo female with PMHx of fibroids s/p PAULIE BSO 8/19/2020 complicated by small bowel obstruction s/p exploratory laparotomy, small bowel resection 9/15/2020 complicated by anastomotic breakdown s/p exploratory laparotomy, small bowel resection 9/15, developed concern for necrotizing soft tissue infection of the abdominal wall s/p exploration of abdominal wall 9/23, prolonged hospitalization, then discharged to skilled nursing facility. She was re-admitted for enterocutaneous fistula. She was placed on antibiotics, TPN, Loperimide and made NPO. Since then, she has been discharged and EA fistula has healed. Wound has granulated, no output, +minimal drainage from granulation tissue. Patient is doing her own wound care. She has been tolerating soft foods - eating rice and soups. Last saw Dr. Fortune 1/12/2021 - TPN has been discontinued, PICC line removed. Skin irritation around PICC site has improved with steroid cream, but she complains of continued limited mobility of the left arm. Duplex was done and negative for DVT. She was seen by GI, underwent colonoscopy by Dr. John 6/2021.\par \par Patient was recently admitted for choledocholithiasis s/p laparoscopic cholecystectomy 11/2021. She states she is feeling well since surgery, denies fever/chils, nausea/vomiting, chest pain or shortness of breath. At this time, she continues to have lower abdominal pain that is localized laterally on both sides. She was previously seen by pain management and has follow up soon. CT was unrevealing with post-operative changes. MRI was showed colitis. She continues to have pain with bowel movements. Activity is limited due to fatigue. She is no longer working with physical therapy. Using wheelchair and walker to move around outside of house.

## 2021-12-01 NOTE — ASSESSMENT
[FreeTextEntry1] : 47yo female with previous PAULIE BSO for fibroids 8/19/2020 complicated hospital course for small bowel obstruction s/p ex lap, small bowel resection 9/15 complicated by leak s/p re-exploration, small bowel resection 9/15, developed soft tissue infection of abdominal wall s/p abdominal wall exploration 9/23. Complicated by enteroatmospheric fistula treated non-operatively with TPN. Now with persistent abdominal pain of unknown origin -midline abdominal wound well-healed\par -entercutaneous fistula - resolved, now tolerating soft diet - advance as tolerated; off TPN\par -diarrhea - monitor, stool studies reviewed\par -LUE swelling and pain - resolved - hydrocortisone cream PRN, LUE duplex negative, Physical therapy completed\par -CT A/P reviewed - fluid collection improving\par -MRI reviewed - colitis\par -GI - colonoscopy 6/2021 - reviewed \par -pain consult - fentanyl patch, recommend re-evaluation for pain\par -encouraged to continue regular activity and avoid sitting for prolonged periods of time\par -low residue diet - eats mostly soft and liquid diet\par -follow up in 6 months

## 2021-12-01 NOTE — PHYSICAL EXAM
[Normal Breath Sounds] : Normal breath sounds [Normal Heart Sounds] : normal heart sounds [Alert] : alert [Calm] : calm [Depressed] : depressed [de-identified] : no acute distress [de-identified] : soft, right lateral wound closed and healed, left lateral wound skin edge closed and healed; midline wound with overlying healthy skin, no expressible drainage, no erythema or induration [de-identified] : wheelchair, decreased LUE mobility

## 2021-12-08 ENCOUNTER — APPOINTMENT (OUTPATIENT)
Dept: GASTROENTEROLOGY | Facility: CLINIC | Age: 46
End: 2021-12-08
Payer: MEDICAID

## 2021-12-08 DIAGNOSIS — K80.50 CALCULUS OF BILE DUCT W/OUT CHOLANGITIS OR CHOLECYSTITIS W/OUT OBSTRUCTION: ICD-10-CM

## 2021-12-08 PROCEDURE — 99442: CPT

## 2021-12-08 NOTE — ASSESSMENT
[FreeTextEntry1] : Patient is a 46-year-old female with past medical history of uterine fibroid, prior small bowel resection, and total hysterectomy who presented to [facility] with complaints of abdominal pain.  Patient is status post ERCP done October 21.  Endoscopic ultrasound was done first which noted distal CBD at level of the ampulla with a dilated common bile duct to 9 mm.  Patient had ERCP with sphincterotomy in which needle-knife was utilized for precut.  Patient had removal of stones and CBD tapering was noted and brush biopsy was done of the strictured area.  Patient then had placement of a CBD stent plastic.  Patient developed some pain after the procedure located in her upper abdomen with radiation to the back.  Patient notes on follow-up that similar severe pain like hospital presentation has returned.\par \par Spoke with patient and given severity of symptoms advised to present to the emergency room for evaluation.  Concerned that may be stent is occluded which can cause a lot of the similar symptoms as prior to ERCP.  If was not having symptoms would book for ambulatory ERCP to remove stent.  Also would benefit from pantoprazole.

## 2021-12-08 NOTE — HISTORY OF PRESENT ILLNESS
[Home] : at home, [unfilled] , at the time of the visit. [Medical Office: ([facility])___] : at the medical office located in  [Verbal consent obtained from patient] : the patient, [unfilled] [___ Month(s) Ago] : [unfilled] month(s) ago [Test: ___] : [unfilled] [_________] : Performed [unfilled] [FreeTextEntry4] : Joceline [de-identified] : Patient is a 46-year-old female with past medical history of uterine fibroid, prior small bowel resection, and total hysterectomy who presented to [facility] with complaints of abdominal pain.  Patient is status post ERCP done October 21.  Endoscopic ultrasound was done first which noted distal CBD at level of the ampulla with a dilated common bile duct to 9 mm.  Patient had ERCP with sphincterotomy in which needle-knife was utilized for precut.  Patient had removal of stones and CBD tapering was noted and brush biopsy was done of the strictured area.  Patient then had placement of a CBD stent plastic.  Patient developed some pain after the procedure located in her upper abdomen with radiation to the back.  Patient notes on follow-up that similar severe pain like hospital presentation has returned.\par

## 2022-02-01 NOTE — DISCHARGE NOTE NURSING/CASE MANAGEMENT/SOCIAL WORK - FLU SEASON?
Patient is here for dizziness, and he cant hear out his left ear. Symptoms started yesterday. He states the dizziness is worse when he moves his head.  He complains of a small headache.  He denies any head injuries.      Patient was advised and in agreement they do not meet Urgent Care criteria based on triage symptoms.       Yes...

## 2022-02-09 NOTE — PHARMACOTHERAPY INTERVENTION NOTE - COMMENTS
Mr Rock is a 62M with HTN, DM c/b neuropathy, hypothyroid, BPH, EtOH liver cirrhosis, toxic megacolon c/b pneumatosis s/p colectomy/ileostomy, with multiple recent admissions (7/27-8/1/2021, 1/6-1/19/2022, and 1/24-1/30/2022) for acute renal failure and hyperkalemia, the last 2 times requiring emergent dialysis, presenting from shelter for altered mental status x1 day. Admitted to MICU for ARF and hyperkalemia w/EKG changes; now resolved and stable for step down to [facility].  Patient has two orders for both morphine and oxycodone with pain scales 7-10. Spoke to doctor to change pain scale on oxycodone to 4-6.

## 2022-02-21 ENCOUNTER — EMERGENCY (EMERGENCY)
Facility: HOSPITAL | Age: 47
LOS: 0 days | Discharge: HOME | End: 2022-02-21
Attending: EMERGENCY MEDICINE | Admitting: EMERGENCY MEDICINE
Payer: MEDICAID

## 2022-02-21 VITALS
RESPIRATION RATE: 20 BRPM | DIASTOLIC BLOOD PRESSURE: 68 MMHG | SYSTOLIC BLOOD PRESSURE: 110 MMHG | HEIGHT: 63 IN | TEMPERATURE: 97 F | HEART RATE: 97 BPM

## 2022-02-21 DIAGNOSIS — Z20.822 CONTACT WITH AND (SUSPECTED) EXPOSURE TO COVID-19: ICD-10-CM

## 2022-02-21 DIAGNOSIS — Z90.49 ACQUIRED ABSENCE OF OTHER SPECIFIED PARTS OF DIGESTIVE TRACT: Chronic | ICD-10-CM

## 2022-02-21 DIAGNOSIS — Z90.710 ACQUIRED ABSENCE OF BOTH CERVIX AND UTERUS: ICD-10-CM

## 2022-02-21 DIAGNOSIS — Z90.49 ACQUIRED ABSENCE OF OTHER SPECIFIED PARTS OF DIGESTIVE TRACT: ICD-10-CM

## 2022-02-21 DIAGNOSIS — R10.9 UNSPECIFIED ABDOMINAL PAIN: ICD-10-CM

## 2022-02-21 DIAGNOSIS — Z90.710 ACQUIRED ABSENCE OF BOTH CERVIX AND UTERUS: Chronic | ICD-10-CM

## 2022-02-21 DIAGNOSIS — N83.209 UNSPECIFIED OVARIAN CYST, UNSPECIFIED SIDE: ICD-10-CM

## 2022-02-21 DIAGNOSIS — Z90.79 ACQUIRED ABSENCE OF OTHER GENITAL ORGAN(S): Chronic | ICD-10-CM

## 2022-02-21 DIAGNOSIS — B37.3 CANDIDIASIS OF VULVA AND VAGINA: ICD-10-CM

## 2022-02-21 LAB
ALBUMIN SERPL ELPH-MCNC: 4.4 G/DL — SIGNIFICANT CHANGE UP (ref 3.5–5.2)
ALP SERPL-CCNC: 62 U/L — SIGNIFICANT CHANGE UP (ref 30–115)
ALT FLD-CCNC: 15 U/L — SIGNIFICANT CHANGE UP (ref 0–41)
ANION GAP SERPL CALC-SCNC: 11 MMOL/L — SIGNIFICANT CHANGE UP (ref 7–14)
APPEARANCE UR: ABNORMAL
APTT BLD: 38.3 SEC — SIGNIFICANT CHANGE UP (ref 27–39.2)
AST SERPL-CCNC: 32 U/L — SIGNIFICANT CHANGE UP (ref 0–41)
BACTERIA # UR AUTO: NEGATIVE — SIGNIFICANT CHANGE UP
BASOPHILS # BLD AUTO: 0.04 K/UL — SIGNIFICANT CHANGE UP (ref 0–0.2)
BASOPHILS NFR BLD AUTO: 0.4 % — SIGNIFICANT CHANGE UP (ref 0–1)
BILIRUB DIRECT SERPL-MCNC: <0.2 MG/DL — SIGNIFICANT CHANGE UP (ref 0–0.3)
BILIRUB INDIRECT FLD-MCNC: >0.2 MG/DL — SIGNIFICANT CHANGE UP (ref 0.2–1.2)
BILIRUB SERPL-MCNC: 0.4 MG/DL — SIGNIFICANT CHANGE UP (ref 0.2–1.2)
BILIRUB UR-MCNC: NEGATIVE — SIGNIFICANT CHANGE UP
BLD GP AB SCN SERPL QL: SIGNIFICANT CHANGE UP
BUN SERPL-MCNC: 7 MG/DL — LOW (ref 10–20)
CALCIUM SERPL-MCNC: 8.8 MG/DL — SIGNIFICANT CHANGE UP (ref 8.5–10.1)
CHLORIDE SERPL-SCNC: 101 MMOL/L — SIGNIFICANT CHANGE UP (ref 98–110)
CO2 SERPL-SCNC: 19 MMOL/L — SIGNIFICANT CHANGE UP (ref 17–32)
COLOR SPEC: YELLOW — SIGNIFICANT CHANGE UP
CREAT SERPL-MCNC: 0.6 MG/DL — LOW (ref 0.7–1.5)
DIFF PNL FLD: NEGATIVE — SIGNIFICANT CHANGE UP
EOSINOPHIL # BLD AUTO: 0.06 K/UL — SIGNIFICANT CHANGE UP (ref 0–0.7)
EOSINOPHIL NFR BLD AUTO: 0.5 % — SIGNIFICANT CHANGE UP (ref 0–8)
EPI CELLS # UR: >27 /HPF — HIGH (ref 0–5)
GLUCOSE SERPL-MCNC: 98 MG/DL — SIGNIFICANT CHANGE UP (ref 70–99)
GLUCOSE UR QL: NEGATIVE — SIGNIFICANT CHANGE UP
HCG SERPL QL: NEGATIVE — SIGNIFICANT CHANGE UP
HCT VFR BLD CALC: 42.5 % — SIGNIFICANT CHANGE UP (ref 37–47)
HGB BLD-MCNC: 14.5 G/DL — SIGNIFICANT CHANGE UP (ref 12–16)
HYALINE CASTS # UR AUTO: 1 /LPF — SIGNIFICANT CHANGE UP (ref 0–7)
IMM GRANULOCYTES NFR BLD AUTO: 0.2 % — SIGNIFICANT CHANGE UP (ref 0.1–0.3)
INR BLD: 1 RATIO — SIGNIFICANT CHANGE UP (ref 0.65–1.3)
KETONES UR-MCNC: NEGATIVE — SIGNIFICANT CHANGE UP
LACTATE SERPL-SCNC: 1.4 MMOL/L — SIGNIFICANT CHANGE UP (ref 0.7–2)
LEUKOCYTE ESTERASE UR-ACNC: NEGATIVE — SIGNIFICANT CHANGE UP
LIDOCAIN IGE QN: 23 U/L — SIGNIFICANT CHANGE UP (ref 7–60)
LYMPHOCYTES # BLD AUTO: 1.63 K/UL — SIGNIFICANT CHANGE UP (ref 1.2–3.4)
LYMPHOCYTES # BLD AUTO: 14.7 % — LOW (ref 20.5–51.1)
MCHC RBC-ENTMCNC: 29.8 PG — SIGNIFICANT CHANGE UP (ref 27–31)
MCHC RBC-ENTMCNC: 34.1 G/DL — SIGNIFICANT CHANGE UP (ref 32–37)
MCV RBC AUTO: 87.4 FL — SIGNIFICANT CHANGE UP (ref 81–99)
MONOCYTES # BLD AUTO: 0.49 K/UL — SIGNIFICANT CHANGE UP (ref 0.1–0.6)
MONOCYTES NFR BLD AUTO: 4.4 % — SIGNIFICANT CHANGE UP (ref 1.7–9.3)
NEUTROPHILS # BLD AUTO: 8.86 K/UL — HIGH (ref 1.4–6.5)
NEUTROPHILS NFR BLD AUTO: 79.8 % — HIGH (ref 42.2–75.2)
NITRITE UR-MCNC: NEGATIVE — SIGNIFICANT CHANGE UP
NRBC # BLD: 0 /100 WBCS — SIGNIFICANT CHANGE UP (ref 0–0)
PH UR: 5.5 — SIGNIFICANT CHANGE UP (ref 5–8)
PLATELET # BLD AUTO: 244 K/UL — SIGNIFICANT CHANGE UP (ref 130–400)
POTASSIUM SERPL-MCNC: 5.2 MMOL/L — HIGH (ref 3.5–5)
POTASSIUM SERPL-SCNC: 5.2 MMOL/L — HIGH (ref 3.5–5)
PROT SERPL-MCNC: 7.5 G/DL — SIGNIFICANT CHANGE UP (ref 6–8)
PROT UR-MCNC: ABNORMAL
PROTHROM AB SERPL-ACNC: 11.5 SEC — SIGNIFICANT CHANGE UP (ref 9.95–12.87)
RBC # BLD: 4.86 M/UL — SIGNIFICANT CHANGE UP (ref 4.2–5.4)
RBC # FLD: 12.3 % — SIGNIFICANT CHANGE UP (ref 11.5–14.5)
RBC CASTS # UR COMP ASSIST: 8 /HPF — HIGH (ref 0–4)
SARS-COV-2 RNA SPEC QL NAA+PROBE: SIGNIFICANT CHANGE UP
SODIUM SERPL-SCNC: 131 MMOL/L — LOW (ref 135–146)
SP GR SPEC: 1.03 — SIGNIFICANT CHANGE UP (ref 1.01–1.03)
UROBILINOGEN FLD QL: SIGNIFICANT CHANGE UP
WBC # BLD: 11.1 K/UL — HIGH (ref 4.8–10.8)
WBC # FLD AUTO: 11.1 K/UL — HIGH (ref 4.8–10.8)
WBC UR QL: 3 /HPF — SIGNIFICANT CHANGE UP (ref 0–5)

## 2022-02-21 PROCEDURE — 99285 EMERGENCY DEPT VISIT HI MDM: CPT

## 2022-02-21 PROCEDURE — 74177 CT ABD & PELVIS W/CONTRAST: CPT | Mod: 26,MA

## 2022-02-21 PROCEDURE — 76830 TRANSVAGINAL US NON-OB: CPT | Mod: 26

## 2022-02-21 RX ORDER — MORPHINE SULFATE 50 MG/1
4 CAPSULE, EXTENDED RELEASE ORAL ONCE
Refills: 0 | Status: DISCONTINUED | OUTPATIENT
Start: 2022-02-21 | End: 2022-02-21

## 2022-02-21 RX ORDER — KETOROLAC TROMETHAMINE 30 MG/ML
15 SYRINGE (ML) INJECTION ONCE
Refills: 0 | Status: DISCONTINUED | OUTPATIENT
Start: 2022-02-21 | End: 2022-02-21

## 2022-02-21 RX ORDER — SODIUM CHLORIDE 9 MG/ML
1000 INJECTION, SOLUTION INTRAVENOUS ONCE
Refills: 0 | Status: COMPLETED | OUTPATIENT
Start: 2022-02-21 | End: 2022-02-21

## 2022-02-21 RX ORDER — ONDANSETRON 8 MG/1
4 TABLET, FILM COATED ORAL ONCE
Refills: 0 | Status: COMPLETED | OUTPATIENT
Start: 2022-02-21 | End: 2022-02-21

## 2022-02-21 RX ORDER — IOHEXOL 300 MG/ML
30 INJECTION, SOLUTION INTRAVENOUS ONCE
Refills: 0 | Status: COMPLETED | OUTPATIENT
Start: 2022-02-21 | End: 2022-02-21

## 2022-02-21 RX ADMIN — Medication 15 MILLIGRAM(S): at 14:31

## 2022-02-21 RX ADMIN — SODIUM CHLORIDE 1000 MILLILITER(S): 9 INJECTION, SOLUTION INTRAVENOUS at 10:02

## 2022-02-21 RX ADMIN — ONDANSETRON 4 MILLIGRAM(S): 8 TABLET, FILM COATED ORAL at 10:00

## 2022-02-21 RX ADMIN — MORPHINE SULFATE 4 MILLIGRAM(S): 50 CAPSULE, EXTENDED RELEASE ORAL at 10:01

## 2022-02-21 RX ADMIN — IOHEXOL 30 MILLILITER(S): 300 INJECTION, SOLUTION INTRAVENOUS at 10:00

## 2022-02-21 NOTE — ED PROVIDER NOTE - OBJECTIVE STATEMENT
45 yo female, no pmh, psh of cholecystectomy and hysterectomy, sb resection, uterine fibroid, presents to ed for left sided abd pain, mild, aching, no radiation, started last night. a/w nausea. denies fever, chills, cp, sob, v/d, dysuria, hematuria.

## 2022-02-21 NOTE — CONSULT NOTE ADULT - ASSESSMENT
47yo P0 s/p PAULIE hysterectomy and bilateral salpingectomy, low suspicion for ovarian torsion, pain now resolved and likely secondary to chronic pain/adhesions from post-op complications, clinically and hemodynamically stable.  -Recommend terconazole 0.8% for 3 nights for suspected yeast infection  -Recommend general surgery consult  -May take tylenol and motrin for pain outpatient  -Discussed with patient the importance of following up with OBGYN for care. Recommended to patient to see Dr. Molina at [facility] in the next month  -No acute GYN intervention  -Dispo per ED    Dr Major and Dr Gallardo aware. 45yo P0 s/p PAULIE hysterectomy and bilateral salpingectomy, low suspicion for ovarian torsion, pain now resolved and likely secondary to chronic pain/adhesions from post-op complications, clinically and hemodynamically stable.  -Recommend terconazole 0.8% for 3 nights for suspected yeast infection  -Recommend general surgery consult  -May take tylenol and motrin for pain outpatient  -Discussed with patient the importance of following up with OBGYN for care. Recommended to patient to see Dr. Molina at [facility] in the next month  -No acute GYN intervention  -f/u vaginitis swab  -Dispo per ED    Dr Major and Dr Gallardo aware.

## 2022-02-21 NOTE — ED PROVIDER NOTE - PHYSICAL EXAMINATION
Physical Exam    Vital Signs: I have reviewed the initial vital signs.  Constitutional: well-nourished, appears stated age, no acute distress  Eyes: Conjunctiva pink, Sclera clear,   Cardiovascular: S1 and S2, regular rate, regular rhythm, well-perfused extremities, radial pulses equal and 2+  Respiratory: unlabored respiratory effort, clear to auscultation bilaterally no wheezing, rales and rhonchi  Gastrointestinal: soft, + left sided abd pain, no guarding or rebound, no pulsatile mass, normal bowl sounds  Musculoskeletal: supple neck, no lower extremity edema, no midline tenderness  Integumentary: warm, dry, no rash  Neurologic: awake, alert, nvi

## 2022-02-21 NOTE — ED PROVIDER NOTE - NS ED ROS FT
Constitutional: (-) fever, (-) chills  Eyes: (-) visual changes  ENT: (-) nasal congestions  Cardiovascular: (-) chest pain, (-) syncope  Respiratory: (-) cough, (-) shortness of breath, (-) dyspnea,   Gastrointestinal: (-) vomiting, (-) diarrhea, (+)nausea,  Musculoskeletal: (-) neck pain, (-) back pain, (-) joint pain,  Integumentary: (-) rash, (-) edema, (-) bruises  Neurological: (-) headache, (-) loc, (-) dizziness, (-) tingling, (-)numbness  Peripheral Vascular: (-) leg swelling  :  (-)dysuria,  (-) hematuria  Allergic/Immunologic: (-) pruritus

## 2022-02-21 NOTE — ED PROVIDER NOTE - ATTENDING CONTRIBUTION TO CARE
I personally evaluated the patient. I reviewed the Resident’s or Physician Assistant’s note (as assigned above), and agree with the findings and plan except as documented in my note.    46-year-old female with multiple abdominal surgeries as above presents with abdominal pain, lower abdomen.  No vaginal bleeding or discharge, some nausea but no significant vomiting.  Denies any urinary symptoms.  States that she gets intermittent pain, but this is worse.  On exam nontoxic, mildly uncomfortable appearing, vital signs stable, no CVA tenderness, diffuse mild abdominal tenderness, most in bilateral lower quadrant, no rebound or guarding.  CT with bilateral pelvic cysts, no free fluid.  Status post hysterectomy.  Given still in persistent pain, will obtain pelvic ultrasound and consult gynecology.

## 2022-02-21 NOTE — ED PROVIDER NOTE - NS ED MD EM SELECTION
[address] Detailed Sarecycline Counseling: Patient advised regarding possible photosensitivity and discoloration of the teeth, skin, lips, tongue and gums.  Patient instructed to avoid sunlight, if possible.  When exposed to sunlight, patients should wear protective clothing, sunglasses, and sunscreen.  The patient was instructed to call the office immediately if the following severe adverse effects occur:  hearing changes, easy bruising/bleeding, severe headache, or vision changes.  The patient verbalized understanding of the proper use and possible adverse effects of sarecycline.  All of the patient's questions and concerns were addressed.

## 2022-02-21 NOTE — ED PROVIDER NOTE - PATIENT PORTAL LINK FT
You can access the FollowMyHealth Patient Portal offered by [facility] by registering at the following website: [email or web address]. By joining [employer]’s FollowMyHealth portal, you will also be able to view your health information using other applications (apps) compatible with our system.

## 2022-02-21 NOTE — ED PROVIDER NOTE - NSFOLLOWUPINSTRUCTIONS_ED_ALL_ED_FT
Ovarian Cyst    An ovarian cyst is a fluid-filled sac that forms on an ovary. The ovaries are small organs that produce eggs in women. Various types of cysts can form on the ovaries. Most are not cancerous. Many do not cause problems, and they often go away on their own. Some may cause symptoms and require treatment. Common types of ovarian cysts include:     Functional cysts—These cysts may occur every month during the menstrual cycle. This is normal. The cysts usually go away with the next menstrual cycle if the woman does not get pregnant. Usually, there are no symptoms with a functional cyst.  Endometrioma cysts—These cysts form from the tissue that lines the uterus. They are also called "chocolate cysts" because they become filled with blood that turns brown. This type of cyst can cause pain in the lower abdomen during intercourse and with your menstrual period.  Cystadenoma cysts—This type develops from the cells on the outside of the ovary. These cysts can get very big and cause lower abdomen pain and pain with intercourse. This type of cyst can twist on itself, cut off its blood supply, and cause severe pain. It can also easily rupture and cause a lot of pain.  Dermoid cysts—This type of cyst is sometimes found in both ovaries. These cysts may contain different kinds of body tissue, such as skin, teeth, hair, or cartilage. They usually do not cause symptoms unless they get very big.   Theca lutein cysts—These cysts occur when too much of a certain hormone (human chorionic gonadotropin) is produced and overstimulates the ovaries to produce an egg. This is most common after procedures used to assist with the conception of a baby (in vitro fertilization).    CAUSES  Fertility drugs can cause a condition in which multiple large cysts are formed on the ovaries. This is called ovarian hyperstimulation syndrome.   A condition called polycystic ovary syndrome can cause hormonal imbalances that can lead to nonfunctional ovarian cysts.     SIGNS AND SYMPTOMS  Many ovarian cysts do not cause symptoms. If symptoms are present, they may include:    Pelvic pain or pressure.  Pain in the lower abdomen.  Pain during sexual intercourse.  Increasing girth (swelling) of the abdomen.  Abnormal menstrual periods.  Increasing pain with menstrual periods.  Stopping having menstrual periods without being pregnant.    DIAGNOSIS  These cysts are commonly found during a routine or annual pelvic exam. Tests may be ordered to find out more about the cyst. These tests may include:    Ultrasound.  X-ray of the pelvis.  CT scan.  MRI.  Blood tests.    TREATMENT  Many ovarian cysts go away on their own without treatment. Your health care provider may want to check your cyst regularly for 2–3 months to see if it changes. For women in menopause, it is particularly important to monitor a cyst closely because of the higher rate of ovarian cancer in menopausal women. When treatment is needed, it may include any of the following:    A procedure to drain the cyst (aspiration). This may be done using a long needle and ultrasound. It can also be done through a laparoscopic procedure. This involves using a thin, lighted tube with a tiny camera on the end (laparoscope) inserted through a small incision.   Surgery to remove the whole cyst. This may be done using laparoscopic surgery or an open surgery involving a larger incision in the lower abdomen.   Hormone treatment or birth control pills. These methods are sometimes used to help dissolve a cyst.    HOME CARE INSTRUCTIONS  Only take over-the-counter or prescription medicines as directed by your health care provider.   Follow up with your health care provider as directed.   Get regular pelvic exams and Pap tests.    SEEK MEDICAL CARE IF:  Your periods are late, irregular, or painful, or they stop.  Your pelvic pain or abdominal pain does not go away.  Your abdomen becomes larger or swollen.  You have pressure on your bladder or trouble emptying your bladder completely.  You have pain during sexual intercourse.  You have feelings of fullness, pressure, or discomfort in your stomach.  You lose weight for no apparent reason.  You feel generally ill.  You become constipated.  You lose your appetite.  You develop acne.  You have an increase in body and facial hair.  You are gaining weight, without changing your exercise and eating habits.  You think you are pregnant.    SEEK IMMEDIATE MEDICAL CARE IF:  You have increasing abdominal pain.  You feel sick to your stomach (nauseous), and you throw up (vomit).  You develop a fever that comes on suddenly.  You have abdominal pain during a bowel movement.  Your menstrual periods become heavier than usual.      Vaginal Yeast infection, Adult    Vaginal yeast infection is a condition that causes soreness, swelling, and redness (inflammation) of the vagina. It also causes vaginal discharge. This is a common condition. Some women get this infection frequently.     CAUSES  This condition is caused by a change in the normal balance of the yeast (candida) and bacteria that live in the vagina. This change causes an overgrowth of yeast, which causes the inflammation.    RISK FACTORS  This condition is more likely to develop in:    Women who take antibiotic medicines.  Women who have diabetes.  Women who take birth control pills.  Women who are pregnant.  Women who douche often.  Women who have a weak defense (immune) system.  Women who have been taking steroid medicines for a long time.  Women who frequently wear tight clothing.    SYMPTOMS  Symptoms of this condition include:    White, thick vaginal discharge.  Swelling, itching, redness, and irritation of the vagina. The lips of the vagina (vulva) may be affected as well.  Pain or a burning feeling while urinating.  Pain during sex.    DIAGNOSIS  This condition is diagnosed with a medical history and physical exam. This will include a pelvic exam. Your health care provider will examine a sample of your vaginal discharge under a microscope. Your health care provider may send this sample for testing to confirm the diagnosis.    TREATMENT  This condition is treated with medicine. Medicines may be over-the-counter or prescription. You may be told to use one or more of the following:    Medicine that is taken orally.  Medicine that is applied as a cream.  Medicine that is inserted directly into the vagina (suppository).    HOME CARE INSTRUCTIONS  Take or apply over-the-counter and prescription medicines only as told by your health care provider.  Do not have sex until your health care provider has approved. Tell your sex partner that you have a yeast infection. That person should go to his or her health care provider if he or she develops symptoms.  Do not wear tight clothes, such as pantyhose or tight pants.  Avoid using tampons until your health care provider approves.  Eat more yogurt. This may help to keep your yeast infection from returning.  Try taking a sitz bath to help with discomfort. This is a warm water bath that is taken while you are sitting down. The water should only come up to your hips and should cover your buttocks. Do this 3–4 times per day or as told by your health care provider.  Do not douche.  Wear breathable, cotton underwear.  If you have diabetes, keep your blood sugar levels under control.    SEEK MEDICAL CARE IF:  You have a fever.  Your symptoms go away and then return.  Your symptoms do not get better with treatment.  Your symptoms get worse.  You have new symptoms.  You develop blisters in or around your vagina.  You have blood coming from your vagina and it is not your menstrual period.  You develop pain in your abdomen.    ADDITIONAL NOTES AND INSTRUCTIONS    Please follow up with your Primary MD in 24-48 hr.  Seek immediate medical care for any new/worsening signs or symptoms.

## 2022-02-21 NOTE — ED PROVIDER NOTE - NSFOLLOWUPCLINICS_GEN_ALL_ED_FT
[facility]  OB/GYN  [address] [place] [address]  Phone: [phone]  Fax:   Follow Up Time: 1-3 Days

## 2022-02-22 LAB
CULTURE RESULTS: SIGNIFICANT CHANGE UP
SPECIMEN SOURCE: SIGNIFICANT CHANGE UP

## 2022-02-23 ENCOUNTER — LABORATORY RESULT (OUTPATIENT)
Age: 47
End: 2022-02-23

## 2022-02-24 NOTE — PATIENT PROFILE ADULT - FALL HARM RISK TYPE OF ASSESSMENT
5-Fu Counseling: 5-Fluorouracil Counseling:  I discussed with the patient the risks of 5-fluorouracil including but not limited to erythema, scaling, itching, weeping, crusting, and pain. admission

## 2022-02-25 ENCOUNTER — TRANSCRIPTION ENCOUNTER (OUTPATIENT)
Age: 47
End: 2022-02-25

## 2022-02-25 ENCOUNTER — OUTPATIENT (OUTPATIENT)
Dept: OUTPATIENT SERVICES | Facility: HOSPITAL | Age: 47
LOS: 1 days | Discharge: HOME | End: 2022-02-25
Payer: MEDICAID

## 2022-02-25 VITALS
SYSTOLIC BLOOD PRESSURE: 126 MMHG | HEART RATE: 81 BPM | RESPIRATION RATE: 18 BRPM | HEIGHT: 62.2 IN | TEMPERATURE: 97 F | DIASTOLIC BLOOD PRESSURE: 73 MMHG | WEIGHT: 134.04 LBS

## 2022-02-25 VITALS
OXYGEN SATURATION: 99 % | RESPIRATION RATE: 15 BRPM | HEART RATE: 58 BPM | SYSTOLIC BLOOD PRESSURE: 120 MMHG | DIASTOLIC BLOOD PRESSURE: 60 MMHG

## 2022-02-25 DIAGNOSIS — Z90.710 ACQUIRED ABSENCE OF BOTH CERVIX AND UTERUS: Chronic | ICD-10-CM

## 2022-02-25 DIAGNOSIS — Z90.79 ACQUIRED ABSENCE OF OTHER GENITAL ORGAN(S): Chronic | ICD-10-CM

## 2022-02-25 DIAGNOSIS — Z90.49 ACQUIRED ABSENCE OF OTHER SPECIFIED PARTS OF DIGESTIVE TRACT: Chronic | ICD-10-CM

## 2022-02-25 LAB
A VAGINAE DNA VAG QL NAA+PROBE: SIGNIFICANT CHANGE UP
BVAB2 DNA VAG QL NAA+PROBE: SIGNIFICANT CHANGE UP
C ALBICANS DNA VAG QL NAA+PROBE: NEGATIVE — SIGNIFICANT CHANGE UP
C GLABRATA DNA VAG QL NAA+PROBE: POSITIVE
C KRUSEI DNA VAG QL NAA+PROBE: NEGATIVE — SIGNIFICANT CHANGE UP
C LUSITANIAE DNA VAG QL NAA+PROBE: NEGATIVE — SIGNIFICANT CHANGE UP
C TRACH RRNA SPEC QL NAA+PROBE: NEGATIVE — SIGNIFICANT CHANGE UP
MEGA1 DNA VAG QL NAA+PROBE: SIGNIFICANT CHANGE UP
N GONORRHOEA RRNA SPEC QL NAA+PROBE: NEGATIVE — SIGNIFICANT CHANGE UP
T VAGINALIS RRNA SPEC QL NAA+PROBE: NEGATIVE — SIGNIFICANT CHANGE UP

## 2022-02-25 PROCEDURE — 43275 ERCP REMOVE FORGN BODY DUCT: CPT

## 2022-02-25 PROCEDURE — 43264 ERCP REMOVE DUCT CALCULI: CPT | Mod: XU

## 2022-02-25 PROCEDURE — 74328 X-RAY BILE DUCT ENDOSCOPY: CPT | Mod: 26

## 2022-02-25 RX ORDER — BUPRENORPHINE 10 UG/H
1 PATCH, EXTENDED RELEASE TRANSDERMAL
Qty: 0 | Refills: 0 | DISCHARGE

## 2022-02-25 NOTE — ASU PATIENT PROFILE, ADULT - FALL HARM RISK - UNIVERSAL INTERVENTIONS
Bed in lowest position, wheels locked, appropriate side rails in place/Call bell, personal items and telephone in reach/Instruct patient to call for assistance before getting out of bed or chair/Non-slip footwear when patient is out of bed/[place] to call system/Physically safe environment - no spills, clutter or unnecessary equipment/Purposeful Proactive Rounding/Room/bathroom lighting operational, light cord in reach

## 2022-02-25 NOTE — H&P PST ADULT - HISTORY OF PRESENT ILLNESS
46 y.o pt w/ recent ERCP found to have choledocholithiasis, CBD stricture and ampullary stenosis, s/p stent placement here for stent removal

## 2022-02-25 NOTE — PRE-ANESTHESIA EVALUATION ADULT - NSANTHRISKNONERD_GEN_ALL_CORE
Writer attempted to contact pt's [facility] insurance to determine care team and pan of care. Wraletha PEREZ with [employer]. [phone] asking for return call.      Jitendra Espino MSW, CAPSW, MFT [phone]
No risk alerts present

## 2022-02-25 NOTE — ASU PATIENT PROFILE, ADULT - AS SC BRADEN MOISTURE
lymphadenopathy. Respiratory:  Clear to auscultation and breath sounds equal.  CV:  Regular rate and rhythm. GI:  Abdomen Soft, nontender, +BS. Integument:  Skin turgor: Normal.              There is a healing laceration to LMF tip which has sutures intact. No rashes, erythema, or swelling. Neurological:  Orientation age-appropriate unless noted elseware. Motor functions intact. Lab / Imaging Results   (All laboratory and radiology results have been personally reviewed by myself)  Labs:  No results found for this visit on 06/26/21. Imaging: All Radiology results interpreted by Radiologist unless otherwise noted. No orders to display       ED Course / Medical Decision Making   Medications - No data to display     Consults:   None      Plan of Care/Counseling:  KIMMY Solis reviewed today's visit with the patient in addition to providing specific details for the plan of care and counseling regarding the diagnosis and prognosis. Questions are answered at this time and are agreeable with the plan. Assessment      1. Rash and other nonspecific skin eruption      Plan   Discharged home. Patient condition is good    New Medications     New Prescriptions    HYDROCORTISONE 1 % CREAM    Apply topically 2 times daily. Electronically signed by KIMMY Solis   DD: 6/26/21  **This report was transcribed using voice recognition software. Every effort was made to ensure accuracy; however, inadvertent computerized transcription errors may be present.   END OF ED PROVIDER NOTE       Eldon Wilson, [address]  06/26/21 0482 (4) rarely moist

## 2022-02-25 NOTE — ASU DISCHARGE PLAN (ADULT/PEDIATRIC) - NS MD DC FALL RISK RISK
For information on Fall & Injury Prevention, visit: [email or web address] OR  [email or web address] OR  https://www.cdc.gov/steadi/patient.html

## 2022-03-03 DIAGNOSIS — K80.50 CALCULUS OF BILE DUCT WITHOUT CHOLANGITIS OR CHOLECYSTITIS WITHOUT OBSTRUCTION: ICD-10-CM

## 2022-03-14 ENCOUNTER — APPOINTMENT (OUTPATIENT)
Dept: OBGYN | Facility: CLINIC | Age: 47
End: 2022-03-14
Payer: MEDICAID

## 2022-03-14 ENCOUNTER — OUTPATIENT (OUTPATIENT)
Dept: OUTPATIENT SERVICES | Facility: HOSPITAL | Age: 47
LOS: 1 days | Discharge: HOME | End: 2022-03-14

## 2022-03-14 VITALS
BODY MASS INDEX: 23.55 KG/M2 | WEIGHT: 128 LBS | HEIGHT: 62 IN | SYSTOLIC BLOOD PRESSURE: 122 MMHG | DIASTOLIC BLOOD PRESSURE: 60 MMHG

## 2022-03-14 DIAGNOSIS — Z90.49 ACQUIRED ABSENCE OF OTHER SPECIFIED PARTS OF DIGESTIVE TRACT: Chronic | ICD-10-CM

## 2022-03-14 DIAGNOSIS — Z90.79 ACQUIRED ABSENCE OF OTHER GENITAL ORGAN(S): Chronic | ICD-10-CM

## 2022-03-14 DIAGNOSIS — N83.209 UNSPECIFIED OVARIAN CYST, UNSPECIFIED SIDE: ICD-10-CM

## 2022-03-14 DIAGNOSIS — Z90.710 ACQUIRED ABSENCE OF BOTH CERVIX AND UTERUS: Chronic | ICD-10-CM

## 2022-03-14 PROCEDURE — 99214 OFFICE O/P EST MOD 30 MIN: CPT

## 2022-03-15 NOTE — PHYSICAL EXAM
[Appropriately responsive] : appropriately responsive [Alert] : alert [No Acute Distress] : no acute distress [Regular Rate Rhythm] : regular rate rhythm [No Murmurs] : no murmurs [Clear to Auscultation B/L] : clear to auscultation bilaterally [Oriented x3] : oriented x3 [FreeTextEntry7] : Multiple scars in abdomen. Diffuse non specific tenderness. No rebound. No guarding, [Examination Of The Breasts] : a normal appearance [No Masses] : no breast masses were palpable [Labia Majora] : normal [Labia Minora] : normal [Normal] : normal [Absent] : absent [Uterine Adnexae] : non-palpable

## 2022-03-15 NOTE — HISTORY OF PRESENT ILLNESS
[FreeTextEntry1] : 46 p1 s/p PAULIE/BSO for 15cm cervical fibroid that was complicated by postop bowel obstruction requiring a bowel resection. The bowel resection was complicated with an anastomotic leak requiring emergency surgery. Patient had a maki postop course. She develop an infection. 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 She has been worked up with GI and  there is no explanation. She has h/o C-Diff. She had an endoscopy and colonoscopy.\par \par She reports she was sent to pain mgmt and has been offered a device for her pain. \par \par Currently she is only taking tylenol for pain. She reports 1-3 BM per day. THe bowel mvmts increase if she eats. \par \par She is not sexually active. She is / from her partner.\par \par She has had multiple CT scans which do not show any pathology. She does have a 4cm cyst on the ovary which appears physiologic.\par

## 2022-03-15 NOTE — PLAN
[FreeTextEntry1] : Lower abdominal pain and diarrhea after complications from hysterectomy and bowel resection.\par Pain is likely due to adhesive disease.\par We must r/o GI causes\par Will again check for Cdiff, and infectious causes.\par R/O celiac disease / gluten sensitivity\par I recommend that she f/u with GI\par I also believe that there is a degree of depression given everything that the patient has endured. SHe may benefit from an SSRI.\par I will call her with the results of the blood tests and stool studies. If cdiff is negative - consider starting loperamide.\par

## 2022-03-16 DIAGNOSIS — N83.209 UNSPECIFIED OVARIAN CYST, UNSPECIFIED SIDE: ICD-10-CM

## 2022-03-16 DIAGNOSIS — R10.9 UNSPECIFIED ABDOMINAL PAIN: ICD-10-CM

## 2022-03-16 DIAGNOSIS — R19.7 DIARRHEA, UNSPECIFIED: ICD-10-CM

## 2022-03-18 ENCOUNTER — OUTPATIENT (OUTPATIENT)
Dept: OUTPATIENT SERVICES | Facility: HOSPITAL | Age: 47
LOS: 1 days | Discharge: HOME | End: 2022-03-18
Payer: MEDICAID

## 2022-03-18 ENCOUNTER — RESULT REVIEW (OUTPATIENT)
Age: 47
End: 2022-03-18

## 2022-03-18 DIAGNOSIS — Z90.79 ACQUIRED ABSENCE OF OTHER GENITAL ORGAN(S): Chronic | ICD-10-CM

## 2022-03-18 DIAGNOSIS — Z90.49 ACQUIRED ABSENCE OF OTHER SPECIFIED PARTS OF DIGESTIVE TRACT: Chronic | ICD-10-CM

## 2022-03-18 DIAGNOSIS — Z12.31 ENCOUNTER FOR SCREENING MAMMOGRAM FOR MALIGNANT NEOPLASM OF BREAST: ICD-10-CM

## 2022-03-18 DIAGNOSIS — Z90.710 ACQUIRED ABSENCE OF BOTH CERVIX AND UTERUS: Chronic | ICD-10-CM

## 2022-03-18 PROCEDURE — 77063 BREAST TOMOSYNTHESIS BI: CPT | Mod: 26

## 2022-03-18 PROCEDURE — 77067 SCR MAMMO BI INCL CAD: CPT | Mod: 26

## 2022-03-20 LAB
ALBUMIN SERPL ELPH-MCNC: 4.6 G/DL
ALP BLD-CCNC: 77 U/L
ALT SERPL-CCNC: 15 U/L
ANION GAP SERPL CALC-SCNC: 12 MMOL/L
AST SERPL-CCNC: 18 U/L
BASOPHILS # BLD AUTO: 0.06 K/UL
BASOPHILS NFR BLD AUTO: 0.9 %
BILIRUB SERPL-MCNC: 0.7 MG/DL
BUN SERPL-MCNC: 8 MG/DL
CALCIUM SERPL-MCNC: 9.5 MG/DL
CANCER AG125 SERPL-ACNC: 7 U/ML
CEA SERPL-MCNC: 1.1 NG/ML
CHLORIDE SERPL-SCNC: 100 MMOL/L
CO2 SERPL-SCNC: 26 MMOL/L
CREAT SERPL-MCNC: 0.7 MG/DL
EGFR: 108 ML/MIN/1.73M2
ENDOMYSIUM IGA SER QL: NEGATIVE
ENDOMYSIUM IGA TITR SER: NORMAL
EOSINOPHIL # BLD AUTO: 0.13 K/UL
EOSINOPHIL NFR BLD AUTO: 2 %
ERYTHROCYTE [SEDIMENTATION RATE] IN BLOOD BY WESTERGREN METHOD: 12 MM/HR
GI PCR PANEL, STOOL: NORMAL
GLUCOSE SERPL-MCNC: 84 MG/DL
HCT VFR BLD CALC: 44.9 %
HGB BLD-MCNC: 14.7 G/DL
IMM GRANULOCYTES NFR BLD AUTO: 0.2 %
LYMPHOCYTES # BLD AUTO: 2.07 K/UL
LYMPHOCYTES NFR BLD AUTO: 31.6 %
MAN DIFF?: NORMAL
MCHC RBC-ENTMCNC: 29.6 PG
MCHC RBC-ENTMCNC: 32.7 G/DL
MCV RBC AUTO: 90.3 FL
MONOCYTES # BLD AUTO: 0.42 K/UL
MONOCYTES NFR BLD AUTO: 6.4 %
NEUTROPHILS # BLD AUTO: 3.86 K/UL
NEUTROPHILS NFR BLD AUTO: 58.9 %
PLATELET # BLD AUTO: 318 K/UL
POTASSIUM SERPL-SCNC: 4.3 MMOL/L
PROT SERPL-MCNC: 7.4 G/DL
RBC # BLD: 4.97 M/UL
RBC # FLD: 13.2 %
SODIUM SERPL-SCNC: 138 MMOL/L
WBC # FLD AUTO: 6.55 K/UL

## 2022-03-27 LAB
C DIFF TOXIN B QL PCR REFLEX: NORMAL
GDH ANTIGEN: NOT DETECTED
GLIADIN IGA SER QL: 5.7 UNITS
GLIADIN IGG SER QL: <5 UNITS
GLIADIN PEPTIDE IGA SER-ACNC: NEGATIVE
GLIADIN PEPTIDE IGG SER-ACNC: NEGATIVE
TOXIN A AND B: NOT DETECTED
TTG IGG SER IA-ACNC: <1.2 U/ML
TTG IGG SER IA-ACNC: NEGATIVE

## 2022-03-28 RX ORDER — LOPERAMIDE HYDROCHLORIDE 2 MG/1
2 CAPSULE ORAL
Qty: 50 | Refills: 3 | Status: ACTIVE | COMMUNITY
Start: 2022-03-28 | End: 1900-01-01

## 2022-04-06 ENCOUNTER — OUTPATIENT (OUTPATIENT)
Dept: OUTPATIENT SERVICES | Facility: HOSPITAL | Age: 47
LOS: 1 days | Discharge: HOME | End: 2022-04-06
Payer: MEDICAID

## 2022-04-06 ENCOUNTER — RESULT REVIEW (OUTPATIENT)
Age: 47
End: 2022-04-06

## 2022-04-06 DIAGNOSIS — Z90.710 ACQUIRED ABSENCE OF BOTH CERVIX AND UTERUS: Chronic | ICD-10-CM

## 2022-04-06 DIAGNOSIS — R92.8 OTHER ABNORMAL AND INCONCLUSIVE FINDINGS ON DIAGNOSTIC IMAGING OF BREAST: ICD-10-CM

## 2022-04-06 DIAGNOSIS — Z90.79 ACQUIRED ABSENCE OF OTHER GENITAL ORGAN(S): Chronic | ICD-10-CM

## 2022-04-06 DIAGNOSIS — Z90.49 ACQUIRED ABSENCE OF OTHER SPECIFIED PARTS OF DIGESTIVE TRACT: Chronic | ICD-10-CM

## 2022-04-06 PROCEDURE — 77065 DX MAMMO INCL CAD UNI: CPT | Mod: 26,LT

## 2022-04-06 PROCEDURE — 76642 ULTRASOUND BREAST LIMITED: CPT | Mod: 26,LT

## 2022-04-06 PROCEDURE — G0279: CPT | Mod: 26

## 2022-04-20 ENCOUNTER — APPOINTMENT (OUTPATIENT)
Dept: SURGERY | Facility: CLINIC | Age: 47
End: 2022-04-20
Payer: MEDICAID

## 2022-04-20 VITALS
HEIGHT: 62 IN | OXYGEN SATURATION: 98 % | SYSTOLIC BLOOD PRESSURE: 112 MMHG | HEART RATE: 70 BPM | DIASTOLIC BLOOD PRESSURE: 70 MMHG | WEIGHT: 136 LBS | TEMPERATURE: 96.5 F | BODY MASS INDEX: 25.03 KG/M2

## 2022-04-20 DIAGNOSIS — K52.3 INDETERMINATE COLITIS: ICD-10-CM

## 2022-04-20 DIAGNOSIS — Z87.19 PERSONAL HISTORY OF OTHER DISEASES OF THE DIGESTIVE SYSTEM: ICD-10-CM

## 2022-04-20 DIAGNOSIS — K63.2 FISTULA OF INTESTINE: ICD-10-CM

## 2022-04-20 PROCEDURE — 99214 OFFICE O/P EST MOD 30 MIN: CPT

## 2022-04-27 NOTE — PATIENT PROFILE ADULT - MEDICATIONS/VISITS
[FreeTextEntry1] : Attending Attestation\par \par GERD \par A low acid / reflux diet was discussed in great detail including not smoking, not drinking alcohol, and not\par consuming foods that irritate the esophagus. It is helpful to eat small meals throughout the day instead\par of large meals. You should avoid eating before bedtime or lying down after you eat. It can be helpful to\par raise the head of your bed six inches. Additionally, you should maintain a healthy weight and good\par posture.. The patient was given written material to take home and review. \par \par \par The patient mentioned feeling BLOATED\par \par  A low FODMAP diet was discussed with the patient at length. The patient had multiple questions all of which were answered. I recommended a nutritionist. Also recommended that the patient keep a food diary. We discussed  options such as Vegetables. Fresh fruits. Dairy that is lactose-free, and hard cheeses, or ripened/matured cheeses including... Beef, pork, chicken, fish, eggs. Avoid breadcrumbs, marinades, and sauces/gravies that may be high in FODMAPs. Soy products including tofu, tempeh. Grains.\par \par Patient will take Antrantil 3 times per day for abdominal bloating. Medication reviewed side effects and \par adverse reactions. \par \par The above medical issues were discussed in detail and all questions answered over a 30 minute period.\par Patient verbalized understanding of all information provided. All questions answered and reviewed.  \par 
no

## 2022-05-10 ENCOUNTER — NON-APPOINTMENT (OUTPATIENT)
Age: 47
End: 2022-05-10

## 2022-05-11 ENCOUNTER — APPOINTMENT (OUTPATIENT)
Dept: GASTROENTEROLOGY | Facility: CLINIC | Age: 47
End: 2022-05-11

## 2022-05-26 PROBLEM — Z87.19 HISTORY OF SMALL BOWEL OBSTRUCTION: Status: RESOLVED | Noted: 2020-10-28 | Resolved: 2022-05-26

## 2022-05-26 PROBLEM — K63.2 ENTEROCUTANEOUS FISTULA: Status: RESOLVED | Noted: 2020-12-09 | Resolved: 2021-01-13

## 2022-05-26 PROBLEM — K52.3 COLITIS, INDETERMINATE: Status: ACTIVE | Noted: 2021-03-29

## 2022-05-26 NOTE — PHYSICAL EXAM
[Normal Breath Sounds] : Normal breath sounds [Normal Heart Sounds] : normal heart sounds [Alert] : alert [Calm] : calm [Depressed] : depressed [de-identified] : no acute distress [de-identified] : soft, right lateral wound closed and healed, left lateral wound skin edge closed and healed; midline wound well-healed with overlying healthy skin, no expressible drainage, no erythema or induration

## 2022-05-26 NOTE — HISTORY OF PRESENT ILLNESS
[de-identified] : 45yo female with PMHx of fibroids s/p PAULIE BSO 8/19/2020 complicated by small bowel obstruction s/p exploratory laparotomy, small bowel resection 9/15/2020 complicated by anastomotic breakdown s/p exploratory laparotomy, small bowel resection 9/15/2020, developed concern for necrotizing soft tissue infection of the abdominal wall s/p exploration of abdominal wall 9/23, prolonged hospitalization, then discharged to skilled nursing facility. She was re-admitted for enterocutaneous fistula. She was placed on antibiotics, TPN, Loperimide and made NPO. Since then, she has been discharged and EA fistula has healed. Wound has granulated, no output, +minimal drainage from granulation tissue. Last saw Dr. Fortune 1/12/2021 - TPN has been discontinued, PICC line removed. Skin irritation around PICC site has improved with steroid cream, but she complains of continued limited mobility of the left arm. Duplex was done and negative for DVT. She was seen by GI, underwent colonoscopy by Dr. John 6/2021. Patient was admitted for choledocholithiasis s/p laparoscopic cholecystectomy 11/2021. She states she is feeling well since surgery, denies fever/chills, nausea/vomiting, chest pain or shortness of breath. At this time, she continues to have lower abdominal pain that is localized laterally on both sides. She was previously seen by pain management and has follow up soon. CT was unrevealing with post-operative changes. MRI was showed colitis. She continues to have pain with bowel movements. Activity is limited due to fatigue, although she is no longer regularly using a wheelchair. She is no longer working with physical therapy. Using wheelchair and walker to move around outside of house.

## 2022-05-26 NOTE — ASSESSMENT
[FreeTextEntry1] : 47yo female with previous PAULIE BSO for fibroids 8/19/2020 complicated hospital course for small bowel obstruction s/p ex lap, small bowel resection 9/15 complicated by leak s/p re-exploration, small bowel resection 9/15, developed soft tissue infection of abdominal wall s/p abdominal wall exploration 9/23. Complicated by enteroatmospheric fistula treated non-operatively with TPN. Now with persistent abdominal pain of unknown origin -abdominal wounds all healed\par -entercutaneous fistula - resolved, now tolerating soft diet - advance as tolerated; off TPN\par -low residue diet - eats mostly soft and liquid diet\par -diarrhea - monitor, stool studies reviewed\par -LUE swelling and pain - resolved - hydrocortisone cream PRN, LUE duplex negative, Physical therapy completed\par -CT A/P reviewed \par -MRI reviewed - colitis\par -GI - colonoscopy 6/2021 - reviewed \par -pain consult - no longer seeing pain management\par -encouraged to continue regular activity and avoid sitting for prolonged periods of time\par -follow up in 6 months

## 2022-07-22 ENCOUNTER — APPOINTMENT (OUTPATIENT)
Dept: SURGERY | Facility: CLINIC | Age: 47
End: 2022-07-22

## 2022-07-22 VITALS
WEIGHT: 143 LBS | SYSTOLIC BLOOD PRESSURE: 120 MMHG | HEART RATE: 82 BPM | BODY MASS INDEX: 26.31 KG/M2 | DIASTOLIC BLOOD PRESSURE: 75 MMHG | HEIGHT: 62 IN | OXYGEN SATURATION: 96 % | TEMPERATURE: 97 F

## 2022-07-22 DIAGNOSIS — R19.7 DIARRHEA, UNSPECIFIED: ICD-10-CM

## 2022-07-22 DIAGNOSIS — R10.9 UNSPECIFIED ABDOMINAL PAIN: ICD-10-CM

## 2022-07-22 PROCEDURE — 99212 OFFICE O/P EST SF 10 MIN: CPT

## 2022-07-22 NOTE — PHYSICAL EXAM
[Normal Breath Sounds] : Normal breath sounds [Normal Heart Sounds] : normal heart sounds [Alert] : alert [Calm] : calm [Depressed] : depressed [de-identified] : no acute distress [de-identified] : soft, multiple abdominal scars including midline with periumbilical tenderness, lateral abdominal wall scars, no expressible drainage, no erythema or induration [de-identified] : full ROM x 4

## 2022-07-22 NOTE — ASSESSMENT
[FreeTextEntry1] : 45yo female with previous PAULIE BSO for fibroids 8/19/2020 complicated hospital course for small bowel obstruction s/p ex lap, small bowel resection 9/15 complicated by leak s/p re-exploration, small bowel resection 9/15, developed soft tissue infection of abdominal wall s/p abdominal wall exploration 9/23. Complicated by enteroatmospheric fistula treated non-operatively with TPN. Now with persistent abdominal pain of unknown origin -abdominal wounds all healed\par -entercutaneous fistula - resolved, now tolerating soft diet - advance as tolerated; off TPN\par -low residue diet - eats mostly soft and liquid diet\par -diarrhea - monitor, stool studies reviewed, encouraged adequate fluid intake\par -LUE swelling and pain - resolved - hydrocortisone cream PRN, LUE duplex negative, Physical therapy completed\par -CT A/P reviewed \par -MRI reviewed - colitis\par -GI - colonoscopy 6/2021 - reviewed \par -pain consult - no longer seeing pain management but is planning to see another pain consultant soon\par -encouraged to continue regular activity and avoid sitting for prolonged periods of time\par -follow up PRN\par

## 2022-07-22 NOTE — HISTORY OF PRESENT ILLNESS
[de-identified] : 47yo female with PMHx of fibroids s/p PAULIE BSO 8/19/2020 complicated by small bowel obstruction s/p exploratory laparotomy, small bowel resection 9/15/2020 complicated by anastomotic breakdown s/p exploratory laparotomy, small bowel resection 9/15/2020, developed concern for necrotizing soft tissue infection of the abdominal wall s/p exploration of abdominal wall 9/23, prolonged hospitalization, then discharged to skilled nursing facility. She was re-admitted for enterocutaneous fistula. She was placed on antibiotics, TPN, Loperimide and made NPO. Since then, she has been discharged and EA fistula has healed. Wound has granulated, no output, +minimal drainage from granulation tissue. Last saw Dr. Fortune 1/12/2021 - TPN has been discontinued, PICC line removed. Skin irritation around PICC site has improved with steroid cream, but she complains of continued limited mobility of the left arm. Duplex was done and negative for DVT. She was seen by GI, underwent colonoscopy by Dr. John 6/2021. Patient was admitted for choledocholithiasis s/p laparoscopic cholecystectomy 11/2021. She states she is feeling well since surgery, denies fever/chills, nausea/vomiting, chest pain or shortness of breath. At this time, she continues to have lower abdominal pain that is localized laterally on both sides. She was previously seen by pain management and has follow up soon. CT was unrevealing with post-operative changes. MRI was showed colitis. She continues to have pain with bowel movements. Activity is limited due to fatigue, although she is no longer regularly using a wheelchair. She is no longer working with physical therapy. Using wheelchair and walker to move around outside of house. [de-identified] : No longer seeing pain management, but planning to see another doctor because pain is not fully controlled. She continues to have lower abdominal pain and diarrhea. Sleep medications. Injections. Diet - rice and noodles. Ambulating and moving around better than a year ago as per patient, but continues to have limitation due to pain.

## 2022-08-18 ENCOUNTER — NON-APPOINTMENT (OUTPATIENT)
Age: 47
End: 2022-08-18

## 2022-09-23 NOTE — PATIENT PROFILE ADULT - NSPROIMPLANTSMEDDEV_GEN_A_NUR
[facility]te from [facility] called. He states that patient states she is lightheaded. Patient was here on Monday (while the office was closed) stating her daughter sent her. Chente claims her pulse ox is 97% /80  HR 71 She has had this issue in the past and has been advised to follow-up with an appointment. Patient will be seen by Dr. Bowens on Monday.  
None

## 2022-10-21 ENCOUNTER — NON-APPOINTMENT (OUTPATIENT)
Age: 47
End: 2022-10-21

## 2022-10-26 ENCOUNTER — APPOINTMENT (OUTPATIENT)
Dept: SURGERY | Facility: CLINIC | Age: 47
End: 2022-10-26

## 2022-12-27 NOTE — ED ADULT TRIAGE NOTE - BP NONINVASIVE DIASTOLIC (MM HG)
Spoke to pt's spouse regarding doxycycline not being covered by insurance. Notified SAVANNAH Connors, who is sending alternative antibiotic to pharmacy. Spouse verbalized understanding.    72

## 2023-01-30 ENCOUNTER — APPOINTMENT (OUTPATIENT)
Dept: NEUROSURGERY | Facility: CLINIC | Age: 48
End: 2023-01-30
Payer: MEDICAID

## 2023-01-30 VITALS — BODY MASS INDEX: 26.31 KG/M2 | WEIGHT: 143 LBS | HEIGHT: 62 IN

## 2023-01-30 PROCEDURE — 99204 OFFICE O/P NEW MOD 45 MIN: CPT

## 2023-01-30 NOTE — ASSESSMENT
[FreeTextEntry1] : Due to this new radicular pain post success SCS trial I will obtain an MRI of the thoracolumbar spine.  This is being done to rule out epidural hematoma and or new disc herniation.\par \par I do think she will ultimately be a candidate for the SCS permanent implant we will discuss this when I see her back in follow-up with the imaging I have also ordered her meloxicam she did have some relief with a Medrol Dosepak however it was temporary.\par \par She is aware to go to the emergency room or call for any progression of symptoms.  At this time she has no weakness nor bowel or bladder dysfunction.

## 2023-01-30 NOTE — HISTORY OF PRESENT ILLNESS
[de-identified] : I am seeing Ms. 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  She underwent total abdominal hysterectomy complicated by bowel obstruction resulting in multiple surgeries and infections requiring at one-point IV antibiotics TPN.\par \par While she has recovered from this and has left her with severe intractable burning pain in the lower abdominal area bilaterally she has been following with pain management doctor time off.  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  This improved once the trial was removed however it has resumed.  She has never had this radicular type pain in the past.

## 2023-01-31 RX ORDER — FAMOTIDINE 20 MG/1
20 TABLET, FILM COATED ORAL
Qty: 14 | Refills: 0 | Status: ACTIVE | COMMUNITY
Start: 2023-01-30 | End: 1900-01-01

## 2023-01-31 RX ORDER — MELOXICAM 7.5 MG/1
7.5 TABLET ORAL DAILY
Qty: 14 | Refills: 0 | Status: ACTIVE | COMMUNITY
Start: 2023-01-30 | End: 1900-01-01

## 2023-02-13 ENCOUNTER — OUTPATIENT (OUTPATIENT)
Dept: OUTPATIENT SERVICES | Facility: HOSPITAL | Age: 48
LOS: 1 days | End: 2023-02-13
Payer: MEDICAID

## 2023-02-13 DIAGNOSIS — Z90.710 ACQUIRED ABSENCE OF BOTH CERVIX AND UTERUS: Chronic | ICD-10-CM

## 2023-02-13 DIAGNOSIS — Z90.49 ACQUIRED ABSENCE OF OTHER SPECIFIED PARTS OF DIGESTIVE TRACT: Chronic | ICD-10-CM

## 2023-02-13 DIAGNOSIS — Z00.8 ENCOUNTER FOR OTHER GENERAL EXAMINATION: ICD-10-CM

## 2023-02-13 DIAGNOSIS — M54.16 RADICULOPATHY, LUMBAR REGION: ICD-10-CM

## 2023-02-13 DIAGNOSIS — Z90.79 ACQUIRED ABSENCE OF OTHER GENITAL ORGAN(S): Chronic | ICD-10-CM

## 2023-02-13 PROCEDURE — 72146 MRI CHEST SPINE W/O DYE: CPT

## 2023-02-13 PROCEDURE — 72148 MRI LUMBAR SPINE W/O DYE: CPT

## 2023-02-13 PROCEDURE — 72146 MRI CHEST SPINE W/O DYE: CPT | Mod: 26

## 2023-02-13 PROCEDURE — 72148 MRI LUMBAR SPINE W/O DYE: CPT | Mod: 26

## 2023-02-14 DIAGNOSIS — M54.16 RADICULOPATHY, LUMBAR REGION: ICD-10-CM

## 2023-02-24 ENCOUNTER — NON-APPOINTMENT (OUTPATIENT)
Age: 48
End: 2023-02-24

## 2023-03-06 ENCOUNTER — APPOINTMENT (OUTPATIENT)
Dept: NEUROSURGERY | Facility: CLINIC | Age: 48
End: 2023-03-06
Payer: MEDICAID

## 2023-03-06 VITALS — HEIGHT: 62 IN | BODY MASS INDEX: 26.31 KG/M2 | WEIGHT: 143 LBS

## 2023-03-06 DIAGNOSIS — M54.16 RADICULOPATHY, LUMBAR REGION: ICD-10-CM

## 2023-03-06 PROCEDURE — 99214 OFFICE O/P EST MOD 30 MIN: CPT

## 2023-03-07 NOTE — PHYSICAL EXAM
[FreeTextEntry1] : Constitutional: Well appearing, no distress\par HEENT: Normocephalic Atraumatic\par Psychiatric: Alert and oriented to all spheres, normal mood\par Pulmonary: No respiratory distress\par Neurologic: \par CN II-XII grossly intact\par Palpation: no pain to palpation \par Strength: Full strength in all major muscle groups, except RLE 4/5 pain limited. \par Sensation: Full sensation to light touch in all extremities\par Reflexes: \par  2+ patellar\par  2+ ankle jerk\par Full ROM \par SLR negative\par Gait: steady, walking w/o assistance.

## 2023-03-07 NOTE — ASSESSMENT
[FreeTextEntry1] : This patient has been indicated for permanent implantation of SCS device due to:\par \par 1. diagnosis of : CRPS II\par 2. No further need/benefit for/of structural surgery based on imaging review by general surgery for abdominal CRPS II\par 3. A positive approved trial with more than 80% relief of abdominal and upper thigh neuropathic pain over  the course of a 7 DAY TRIAL\par \par 4. failure of conservative therapy including but not limited to:\par  >6 weeks of physical therapy\par injections with pain management\par Medications including NSAIDs, antispasmodics, GABAPENTIN\par \par 5. failure of home exercise program\par 6.Has undergone psychological evaluation and deemed mentally fit for the implant and not noted to have any substance abuse issues which would be contraindicated. \par \par Based on these findings we will proceed to authorization of permanent implant\par \par \par We have had a thorough discussion regarding her current condition, findings, and treatment options. Ms. SCHAFER would like to proceed with percutaneous placement of the spinal cord stimulator (Abbott) . The risk, benefits and alternatives of SCS implantation were discussed including but not limited to bleeding, infection, CSF leak, spinal cord injury, nerve damage, lead migration, hardware failure, pocket pain, lack of pain relief, worse pain, need for further procedures including removal/revision. She is agreeable and has requested to proceed. All questions answered today.\par \par \par Kellee Charles, MS PEDRO\par Winifred Nation MD \par \par \par

## 2023-03-07 NOTE — HISTORY OF PRESENT ILLNESS
[FreeTextEntry1] : Ms. 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 She has had a complicated surgical/medical course. She underwent total abdominal hysterectomy complicated by bowel obstruction resulting in multiple surgeries and infections requiring at one-point IV antibiotics TPN. While she has recovered from this and has left her with severe intractable burning pain in the lower abdominal area bilaterally she has been following with pain management doctor time off. She is undergone extensive conservative management for this pain including but not limited to physical therapy more than 6 weeks nerve blocks medications including tramadol gabapentin Tylenol. 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 This improved once the trial was removed however it has resumed. She has never had this radicular type pain in the past. \par \par Today, we have reviewed an MRI thoracic and lumbar spine from , 2/13/23. Thoracic MRI was normal. Lumbar MRI showed mild spondylosis, no jaycee disc herniation. Mild L4/5 stenosis. \par

## 2023-04-24 ENCOUNTER — OUTPATIENT (OUTPATIENT)
Dept: OUTPATIENT SERVICES | Facility: HOSPITAL | Age: 48
LOS: 1 days | End: 2023-04-24
Payer: MEDICAID

## 2023-04-24 VITALS
TEMPERATURE: 98 F | OXYGEN SATURATION: 98 % | HEIGHT: 63 IN | HEART RATE: 86 BPM | SYSTOLIC BLOOD PRESSURE: 116 MMHG | DIASTOLIC BLOOD PRESSURE: 62 MMHG | RESPIRATION RATE: 17 BRPM

## 2023-04-24 DIAGNOSIS — Z90.49 ACQUIRED ABSENCE OF OTHER SPECIFIED PARTS OF DIGESTIVE TRACT: Chronic | ICD-10-CM

## 2023-04-24 DIAGNOSIS — Z90.79 ACQUIRED ABSENCE OF OTHER GENITAL ORGAN(S): Chronic | ICD-10-CM

## 2023-04-24 DIAGNOSIS — Z90.710 ACQUIRED ABSENCE OF BOTH CERVIX AND UTERUS: Chronic | ICD-10-CM

## 2023-04-24 DIAGNOSIS — Z01.818 ENCOUNTER FOR OTHER PREPROCEDURAL EXAMINATION: ICD-10-CM

## 2023-04-24 DIAGNOSIS — G56.40 CAUSALGIA OF UNSPECIFIED UPPER LIMB: ICD-10-CM

## 2023-04-24 LAB
ALBUMIN SERPL ELPH-MCNC: 4.6 G/DL — SIGNIFICANT CHANGE UP (ref 3.5–5.2)
ALP SERPL-CCNC: 78 U/L — SIGNIFICANT CHANGE UP (ref 30–115)
ALT FLD-CCNC: 30 U/L — SIGNIFICANT CHANGE UP (ref 0–41)
ANION GAP SERPL CALC-SCNC: 11 MMOL/L — SIGNIFICANT CHANGE UP (ref 7–14)
APTT BLD: 37.4 SEC — SIGNIFICANT CHANGE UP (ref 27–39.2)
AST SERPL-CCNC: 23 U/L — SIGNIFICANT CHANGE UP (ref 0–41)
BILIRUB SERPL-MCNC: 0.6 MG/DL — SIGNIFICANT CHANGE UP (ref 0.2–1.2)
BLD GP AB SCN SERPL QL: SIGNIFICANT CHANGE UP
BUN SERPL-MCNC: 9 MG/DL — LOW (ref 10–20)
CALCIUM SERPL-MCNC: 9.1 MG/DL — SIGNIFICANT CHANGE UP (ref 8.4–10.5)
CHLORIDE SERPL-SCNC: 104 MMOL/L — SIGNIFICANT CHANGE UP (ref 98–110)
CO2 SERPL-SCNC: 24 MMOL/L — SIGNIFICANT CHANGE UP (ref 17–32)
CREAT SERPL-MCNC: 0.6 MG/DL — LOW (ref 0.7–1.5)
EGFR: 111 ML/MIN/1.73M2 — SIGNIFICANT CHANGE UP
GLUCOSE SERPL-MCNC: 90 MG/DL — SIGNIFICANT CHANGE UP (ref 70–99)
HCT VFR BLD CALC: 42.3 % — SIGNIFICANT CHANGE UP (ref 37–47)
HGB BLD-MCNC: 13.7 G/DL — SIGNIFICANT CHANGE UP (ref 12–16)
INR BLD: 0.9 RATIO — SIGNIFICANT CHANGE UP (ref 0.65–1.3)
MCHC RBC-ENTMCNC: 29.3 PG — SIGNIFICANT CHANGE UP (ref 27–31)
MCHC RBC-ENTMCNC: 32.4 G/DL — SIGNIFICANT CHANGE UP (ref 32–37)
MCV RBC AUTO: 90.6 FL — SIGNIFICANT CHANGE UP (ref 81–99)
NRBC # BLD: 0 /100 WBCS — SIGNIFICANT CHANGE UP (ref 0–0)
PLATELET # BLD AUTO: 275 K/UL — SIGNIFICANT CHANGE UP (ref 130–400)
PMV BLD: 10.3 FL — SIGNIFICANT CHANGE UP (ref 7.4–10.4)
POTASSIUM SERPL-MCNC: 4 MMOL/L — SIGNIFICANT CHANGE UP (ref 3.5–5)
POTASSIUM SERPL-SCNC: 4 MMOL/L — SIGNIFICANT CHANGE UP (ref 3.5–5)
PROT SERPL-MCNC: 7.4 G/DL — SIGNIFICANT CHANGE UP (ref 6–8)
PROTHROM AB SERPL-ACNC: 10.2 SEC — SIGNIFICANT CHANGE UP (ref 9.95–12.87)
RBC # BLD: 4.67 M/UL — SIGNIFICANT CHANGE UP (ref 4.2–5.4)
RBC # FLD: 12.2 % — SIGNIFICANT CHANGE UP (ref 11.5–14.5)
SODIUM SERPL-SCNC: 139 MMOL/L — SIGNIFICANT CHANGE UP (ref 135–146)
WBC # BLD: 6.89 K/UL — SIGNIFICANT CHANGE UP (ref 4.8–10.8)
WBC # FLD AUTO: 6.89 K/UL — SIGNIFICANT CHANGE UP (ref 4.8–10.8)

## 2023-04-24 PROCEDURE — 85610 PROTHROMBIN TIME: CPT

## 2023-04-24 PROCEDURE — 86850 RBC ANTIBODY SCREEN: CPT

## 2023-04-24 PROCEDURE — 36415 COLL VENOUS BLD VENIPUNCTURE: CPT

## 2023-04-24 PROCEDURE — 87641 MR-STAPH DNA AMP PROBE: CPT

## 2023-04-24 PROCEDURE — 86901 BLOOD TYPING SEROLOGIC RH(D): CPT

## 2023-04-24 PROCEDURE — 99214 OFFICE O/P EST MOD 30 MIN: CPT | Mod: 25

## 2023-04-24 PROCEDURE — 93010 ELECTROCARDIOGRAM REPORT: CPT

## 2023-04-24 PROCEDURE — 87640 STAPH A DNA AMP PROBE: CPT

## 2023-04-24 PROCEDURE — 85027 COMPLETE CBC AUTOMATED: CPT

## 2023-04-24 PROCEDURE — 85730 THROMBOPLASTIN TIME PARTIAL: CPT

## 2023-04-24 PROCEDURE — 93005 ELECTROCARDIOGRAM TRACING: CPT

## 2023-04-24 PROCEDURE — 86900 BLOOD TYPING SEROLOGIC ABO: CPT

## 2023-04-24 PROCEDURE — 80053 COMPREHEN METABOLIC PANEL: CPT

## 2023-04-24 NOTE — H&P PST ADULT - HISTORY OF PRESENT ILLNESS
47 year old female here for spinal cord stimulator, with Dr. Zarco on 5/10/23 under general anesthesia  Patient states after hysterectomy  in 2020 back pain started spead to abdomen and lower back and now down to legs  fos of stairs would be 2-3 but has to do it slowly do to the pain, denies chest pain sob palp  denies recent uri or uti  pt denies any covid s/s, or tested positive in the past  pt advised self quarantine till day of procedure  Patient verbalized understanding of instructions and was given the opportunity to ask questions and have them answered.  written and verbal instructions with teach back on chlorhexidine shampoo provided,  pt verbalized understanding with returned demonstration  Anesthesia Alert  NO--Difficult Airway  NO--History of neck surgery or radiation  NO--Limited ROM of neck  NO--History of Malignant hyperthermia  NO--Personal or family history of Pseudocholinesterase deficiency  NO--Prior Anesthesia Complication  NO--Latex Allergy  NO--Loose teeth  NO--History of Rheumatoid Arthritis  NO--SETH  NO BLEEDING RISK  NO--Other_____

## 2023-04-24 NOTE — H&P PST ADULT - ADDITIONAL PE
patient is in pain , bent over unable to stand straight up, holds on while walking, lying down needs to keep knees bent, slow gait and guarded movements

## 2023-04-25 DIAGNOSIS — G56.40 CAUSALGIA OF UNSPECIFIED UPPER LIMB: ICD-10-CM

## 2023-04-25 DIAGNOSIS — Z01.818 ENCOUNTER FOR OTHER PREPROCEDURAL EXAMINATION: ICD-10-CM

## 2023-04-25 LAB — MRSA PCR RESULT.: NEGATIVE — SIGNIFICANT CHANGE UP

## 2023-04-26 PROBLEM — Z87.19 PERSONAL HISTORY OF OTHER DISEASES OF THE DIGESTIVE SYSTEM: Chronic | Status: ACTIVE | Noted: 2023-04-24

## 2023-04-26 PROBLEM — M54.9 DORSALGIA, UNSPECIFIED: Chronic | Status: ACTIVE | Noted: 2023-04-24

## 2023-04-27 ENCOUNTER — OUTPATIENT (OUTPATIENT)
Dept: OUTPATIENT SERVICES | Facility: HOSPITAL | Age: 48
LOS: 1 days | End: 2023-04-27
Payer: MEDICAID

## 2023-04-27 DIAGNOSIS — Z12.31 ENCOUNTER FOR SCREENING MAMMOGRAM FOR MALIGNANT NEOPLASM OF BREAST: ICD-10-CM

## 2023-04-27 DIAGNOSIS — Z90.49 ACQUIRED ABSENCE OF OTHER SPECIFIED PARTS OF DIGESTIVE TRACT: Chronic | ICD-10-CM

## 2023-04-27 DIAGNOSIS — Z90.710 ACQUIRED ABSENCE OF BOTH CERVIX AND UTERUS: Chronic | ICD-10-CM

## 2023-04-27 DIAGNOSIS — Z90.79 ACQUIRED ABSENCE OF OTHER GENITAL ORGAN(S): Chronic | ICD-10-CM

## 2023-04-27 DIAGNOSIS — M79.671 PAIN IN RIGHT FOOT: ICD-10-CM

## 2023-04-27 PROCEDURE — 73630 X-RAY EXAM OF FOOT: CPT | Mod: 26,RT

## 2023-04-27 PROCEDURE — 77067 SCR MAMMO BI INCL CAD: CPT | Mod: 26

## 2023-04-27 PROCEDURE — 77067 SCR MAMMO BI INCL CAD: CPT

## 2023-04-27 PROCEDURE — 73630 X-RAY EXAM OF FOOT: CPT | Mod: RT

## 2023-04-27 PROCEDURE — 77063 BREAST TOMOSYNTHESIS BI: CPT

## 2023-04-27 PROCEDURE — 77063 BREAST TOMOSYNTHESIS BI: CPT | Mod: 26

## 2023-04-28 DIAGNOSIS — M79.671 PAIN IN RIGHT FOOT: ICD-10-CM

## 2023-04-28 DIAGNOSIS — Z12.31 ENCOUNTER FOR SCREENING MAMMOGRAM FOR MALIGNANT NEOPLASM OF BREAST: ICD-10-CM

## 2023-05-09 RX ORDER — OXYCODONE AND ACETAMINOPHEN 5; 325 MG/1; MG/1
5-325 TABLET ORAL
Qty: 42 | Refills: 0 | Status: ACTIVE | COMMUNITY
Start: 2023-05-09 | End: 1900-01-01

## 2023-05-09 RX ORDER — DOCUSATE SODIUM 100 MG/1
100 CAPSULE ORAL
Qty: 14 | Refills: 0 | Status: ACTIVE | COMMUNITY
Start: 2023-05-09 | End: 1900-01-01

## 2023-05-09 RX ORDER — METHOCARBAMOL 750 MG/1
750 TABLET, FILM COATED ORAL EVERY 8 HOURS
Qty: 21 | Refills: 0 | Status: ACTIVE | COMMUNITY
Start: 2023-05-09 | End: 1900-01-01

## 2023-05-09 NOTE — ASU PATIENT PROFILE, ADULT - URINARY CATHETER
PE, on 10/20/2021  • Previous: 9, on 2021    ORT: 0, on 10/20/2021  · Previous: 0, on 2021    Oswestry Disability: 52%, on 10/20/2021  • Previous: 52%, on       HPI INITIAL (Portions of this note are brought forward from Guille Lopez MD initial note on 2018; reviewed and edited as appropriate):  52 year old male  with chronic both shoulders (R>L), low back and hip pain as described below. Referred by Dr. Mcclain for evaluation and management. The worst pain is shoulders and detailed below.     Pain Score (0-10): Current 9/10;  Worst 10/10;  Least 6/10;  Average 7/10;  Acceptable 3/10  Duration: 3 years   Context of pain: Pain started three years ago after he ran into a tree playing [place]  Location: both shoulders   Radiation: upper back, both arms   Quality: aching, sharp, throbbing  Improves: None  Exacerbates:? lifting arm, picking up objects      Numbness/Tingling: fingers bilateral  Weakness: none   Sleep: 5 hours   Mood: Frustrated, sad    ?  Bowel and bladder - Denies new onset incontinence, or saddle anesthesia    INTERVENTIONS (from last visit with updates):  1.   Injections: -  shoulder, back at [facility] Trigger point injection 50% pain reduction.  ·  18-Right GH injection  ·  18- Left GH injection  · 2019- right suprascapular nerve block with ultrasound- 80% relief last for about 2 weeks  · 2019- Right suprascapular never black, with ultrasound- 75% relief last for about 3 weeks  ·  19 Right Biceps tendon injection with Ultrasound guidance -75% relief for 3 weeks  · 2019-Right  Subacromial bursa injection 70% relief ultrasound guided.  · 2019-Right subacromial bursa injection, ultrasound guided. 67% currently  · 2020-   · 2020-: Left Glenohumoral joint injection- 75% with rest and 67% with activity.  · 20 Left GH injection ultrasound guided; 33% relief   · 20 Right Glenohumoral joint injection, ultrasound guided;  40-44% relief  · 01/22/2021  Left Glenohumoral joint injection; 100% relief   · 03/19/2021  Right Glenohumoral joint injection; 63% relief  · 04/16/2021 Right Suprascapular Nerve block  · 06/23/2021 Radiofrequency Ablation Suprascapular, Right; 22% relief; 2 weeks   · 08/06/2021 Left glenohumeral injection; 40% relief   · 10/6/2021 Bilateral trapezius, rhomboid,levator scapulae; 0-11% relief   2. Physical Therapy: Not in a structured PT program.  Did help when he was in PT  3. Surgeries: None  4. Medications:  with doses, duration of use, side effects, etc  Current  · Tizanidine 4-8 mg TID PRN  · Tylenol 1000 mg TID PRN, max dose of 3000 mg daily   · Melatonin over the counter (OTC) 2-4 mg PRN at bedtime PRN  · Tylenol #3 TID   Previous  · Tramadol 50mg tablet- not effective  · Flexeril 5 mg ([facility]th[facility])   · Ibuprofen 400 mg TID PRN  · Aspercreme OTC cream four times daily PRN  · Diclofenac gel 1% QID PRN  · Gabapentin- not effective  · Hydrocodone- not effective     5. Other - none     If on contract (update):  • Urine tox screen: 09/22/2021 and 10/13/2021 benzodiazpines present  • Prescription Drug Monitoring Program verified this visit.  • Opioid contract: No   no

## 2023-05-09 NOTE — ASU PATIENT PROFILE, ADULT - FALL HARM RISK - UNIVERSAL INTERVENTIONS
Bed in lowest position, wheels locked, appropriate side rails in place/Call bell, personal items and telephone in reach/Instruct patient to call for assistance before getting out of bed or chair/Non-slip footwear when patient is out of bed/[place] to call system/Physically safe environment - no spills, clutter or unnecessary equipment/Purposeful Proactive Rounding/Room/bathroom lighting operational, light cord in reach

## 2023-05-10 ENCOUNTER — TRANSCRIPTION ENCOUNTER (OUTPATIENT)
Age: 48
End: 2023-05-10

## 2023-05-10 ENCOUNTER — APPOINTMENT (OUTPATIENT)
Dept: NEUROSURGERY | Facility: HOSPITAL | Age: 48
End: 2023-05-10

## 2023-05-10 ENCOUNTER — OUTPATIENT (OUTPATIENT)
Dept: INPATIENT UNIT | Facility: HOSPITAL | Age: 48
LOS: 1 days | Discharge: HOME | End: 2023-05-10
Payer: MEDICAID

## 2023-05-10 VITALS
TEMPERATURE: 98 F | SYSTOLIC BLOOD PRESSURE: 116 MMHG | OXYGEN SATURATION: 98 % | HEART RATE: 83 BPM | RESPIRATION RATE: 19 BRPM | WEIGHT: 145.06 LBS | HEIGHT: 63 IN | DIASTOLIC BLOOD PRESSURE: 61 MMHG

## 2023-05-10 VITALS
HEART RATE: 63 BPM | SYSTOLIC BLOOD PRESSURE: 105 MMHG | RESPIRATION RATE: 16 BRPM | DIASTOLIC BLOOD PRESSURE: 70 MMHG | OXYGEN SATURATION: 99 %

## 2023-05-10 DIAGNOSIS — G56.40 CAUSALGIA OF UNSPECIFIED UPPER LIMB: ICD-10-CM

## 2023-05-10 DIAGNOSIS — Z90.49 ACQUIRED ABSENCE OF OTHER SPECIFIED PARTS OF DIGESTIVE TRACT: Chronic | ICD-10-CM

## 2023-05-10 DIAGNOSIS — Z90.79 ACQUIRED ABSENCE OF OTHER GENITAL ORGAN(S): Chronic | ICD-10-CM

## 2023-05-10 DIAGNOSIS — G89.4 CHRONIC PAIN SYNDROME: ICD-10-CM

## 2023-05-10 DIAGNOSIS — Z90.710 ACQUIRED ABSENCE OF BOTH CERVIX AND UTERUS: Chronic | ICD-10-CM

## 2023-05-10 PROCEDURE — C9399: CPT

## 2023-05-10 PROCEDURE — 72100 X-RAY EXAM L-S SPINE 2/3 VWS: CPT

## 2023-05-10 PROCEDURE — 63685 INS/RPLC SPI NPG/RCVR POCKET: CPT

## 2023-05-10 PROCEDURE — C1767: CPT

## 2023-05-10 PROCEDURE — C1778: CPT

## 2023-05-10 PROCEDURE — 63650 IMPLANT NEUROELECTRODES: CPT

## 2023-05-10 RX ORDER — LORATADINE 10 MG/1
1 TABLET ORAL
Refills: 0 | DISCHARGE

## 2023-05-10 RX ORDER — METHOCARBAMOL 500 MG/1
750 TABLET, FILM COATED ORAL ONCE
Refills: 0 | Status: COMPLETED | OUTPATIENT
Start: 2023-05-10 | End: 2023-05-10

## 2023-05-10 RX ORDER — GABAPENTIN 400 MG/1
300 CAPSULE ORAL ONCE
Refills: 0 | Status: COMPLETED | OUTPATIENT
Start: 2023-05-10 | End: 2023-05-10

## 2023-05-10 RX ORDER — MORPHINE SULFATE 50 MG/1
4 CAPSULE, EXTENDED RELEASE ORAL
Refills: 0 | Status: DISCONTINUED | OUTPATIENT
Start: 2023-05-10 | End: 2023-05-10

## 2023-05-10 RX ORDER — SODIUM CHLORIDE 9 MG/ML
1000 INJECTION, SOLUTION INTRAVENOUS
Refills: 0 | Status: DISCONTINUED | OUTPATIENT
Start: 2023-05-10 | End: 2023-05-10

## 2023-05-10 RX ORDER — ACETAMINOPHEN 500 MG
1000 TABLET ORAL ONCE
Refills: 0 | Status: COMPLETED | OUTPATIENT
Start: 2023-05-10 | End: 2023-05-10

## 2023-05-10 RX ORDER — OMEPRAZOLE 10 MG/1
1 CAPSULE, DELAYED RELEASE ORAL
Refills: 0 | DISCHARGE

## 2023-05-10 RX ORDER — TRAMADOL HYDROCHLORIDE 50 MG/1
0 TABLET ORAL
Refills: 0 | DISCHARGE

## 2023-05-10 RX ORDER — APREPITANT 80 MG/1
40 CAPSULE ORAL ONCE
Refills: 0 | Status: COMPLETED | OUTPATIENT
Start: 2023-05-10 | End: 2023-05-10

## 2023-05-10 RX ORDER — CELECOXIB 200 MG/1
200 CAPSULE ORAL ONCE
Refills: 0 | Status: COMPLETED | OUTPATIENT
Start: 2023-05-10 | End: 2023-05-10

## 2023-05-10 RX ADMIN — APREPITANT 40 MILLIGRAM(S): 80 CAPSULE ORAL at 09:48

## 2023-05-10 RX ADMIN — GABAPENTIN 300 MILLIGRAM(S): 400 CAPSULE ORAL at 15:45

## 2023-05-10 RX ADMIN — METHOCARBAMOL 750 MILLIGRAM(S): 500 TABLET, FILM COATED ORAL at 15:45

## 2023-05-10 RX ADMIN — Medication 1000 MILLIGRAM(S): at 09:47

## 2023-05-10 RX ADMIN — GABAPENTIN 300 MILLIGRAM(S): 400 CAPSULE ORAL at 09:48

## 2023-05-10 RX ADMIN — CELECOXIB 200 MILLIGRAM(S): 200 CAPSULE ORAL at 09:47

## 2023-05-10 NOTE — BRIEF OPERATIVE NOTE - OPERATION/FINDINGS
perc electrodes x2 to T7, left flank IPG, complex intraoperative programming, [employer]tronic Perc electrodes x4, IPG, Abbott.

## 2023-05-10 NOTE — ASU DISCHARGE PLAN (ADULT/PEDIATRIC) - ASU DC SPECIAL INSTRUCTIONSFT
- Upon discharge,  please call to schedule a follow up with Dr. Nation in 1-2 weeks.  - Upon discharge, please call to make a follow up appointment with your primary care provider to discuss your recent hospitalization/operation.  - Keep dressings dry for 48 hours after which you may remove dressing and cleanse with soap and water in the shower (no scrubbing). Leave the steri strips (white tape) on your incisions, they will fall off on their own. Run water and soap over incision sites and pat dry (no scrubbing). No submerging your incision sites in water (i.e. no swimming or baths) for 2-3 weeks and avoid exposing the area to jets/streams of water.   - You can resume your normal activities as tolerated, but avoid heavy (>15lb.) lifting and strenuous exercise for 4-6 weeks.    - You were prescribed pain medication from the doctors office, your pain should subside over the next few days.   - If you experience fevers, chills, increasing abdominal pain, nausea, vomiting, inability to pass stool or gas, bleeding, or any other acute symptoms, please call your doctor and report to the emergency room immediately for further management.

## 2023-05-10 NOTE — ASU DISCHARGE PLAN (ADULT/PEDIATRIC) - NS MD DC FALL RISK RISK
For information on Fall & Injury Prevention, visit: [email or web address] OR  [email or web address] OR  https://www.cdc.gov/steadi/patient.html

## 2023-05-10 NOTE — ASU DISCHARGE PLAN (ADULT/PEDIATRIC) - CARE PROVIDER_API CALL
Winifred Nation)  Surgery  Neurosurgery  [address] [place] [address], [address]  [place] [address]  Phone: [phone]  Fax: [phone]  Follow Up Time:

## 2023-05-11 DIAGNOSIS — G89.18 OTHER ACUTE POSTPROCEDURAL PAIN: ICD-10-CM

## 2023-05-11 RX ORDER — METHYLPREDNISOLONE 4 MG/1
4 TABLET ORAL
Qty: 1 | Refills: 0 | Status: ACTIVE | COMMUNITY
Start: 2023-05-11 | End: 1900-01-01

## 2023-05-11 RX ORDER — GABAPENTIN 100 MG/1
100 CAPSULE ORAL
Qty: 42 | Refills: 0 | Status: ACTIVE | COMMUNITY
Start: 2023-05-11 | End: 1900-01-01

## 2023-05-11 RX ORDER — FAMOTIDINE 20 MG/1
20 TABLET, FILM COATED ORAL DAILY
Qty: 6 | Refills: 0 | Status: COMPLETED | COMMUNITY
Start: 2023-05-11 | End: 2023-05-17

## 2023-05-22 NOTE — ED ADULT NURSE NOTE - SUICIDE SCREENING DEPRESSION
Negative Cosentyx Pregnancy And Lactation Text: This medication is Pregnancy Category B and is considered safe during pregnancy. It is unknown if this medication is excreted in breast milk.

## 2023-05-25 ENCOUNTER — APPOINTMENT (OUTPATIENT)
Dept: NEUROSURGERY | Facility: CLINIC | Age: 48
End: 2023-05-25
Payer: MEDICAID

## 2023-05-25 VITALS — BODY MASS INDEX: 25.69 KG/M2 | HEIGHT: 63 IN | WEIGHT: 145 LBS

## 2023-05-25 DIAGNOSIS — G62.9 POLYNEUROPATHY, UNSPECIFIED: ICD-10-CM

## 2023-05-25 PROCEDURE — 99213 OFFICE O/P EST LOW 20 MIN: CPT

## 2023-05-26 PROBLEM — G62.9 NEUROPATHY: Status: ACTIVE | Noted: 2023-05-11

## 2023-05-26 NOTE — HISTORY OF PRESENT ILLNESS
[FreeTextEntry1] : Pt is s/p DRG ([facility]) for CRPs after multiple abdominal surgery. Pt with more than 80% pain relief. Incisions healing well. BLE 5/5. Overall very happy with her implant. Some post operative discomfort around healing IPG site as expected.

## 2023-06-21 ENCOUNTER — NON-APPOINTMENT (OUTPATIENT)
Age: 48
End: 2023-06-21

## 2023-06-22 ENCOUNTER — APPOINTMENT (OUTPATIENT)
Dept: NEUROSURGERY | Facility: CLINIC | Age: 48
End: 2023-06-22
Payer: MEDICAID

## 2023-06-22 DIAGNOSIS — G56.40 CAUSALGIA OF UNSPECIFIED UPPER LIMB: ICD-10-CM

## 2023-06-22 PROCEDURE — 99213 OFFICE O/P EST LOW 20 MIN: CPT

## 2023-06-22 NOTE — HISTORY OF PRESENT ILLNESS
[FreeTextEntry1] : Ms. Baptiste s/p Perc DRG ( ABBOTT) on 5/10 for CRPD after multiple abdominal surgeries presents today for programming, reports of the pain in the abdomen and right lower extremity, but her overall pain is much better now than before the implant. Teena is here to assist with programming. \par

## 2023-11-20 ENCOUNTER — APPOINTMENT (OUTPATIENT)
Dept: NEUROSURGERY | Facility: CLINIC | Age: 48
End: 2023-11-20

## 2024-02-15 NOTE — DISCHARGE NOTE NURSING/CASE MANAGEMENT/SOCIAL WORK - NSDCVIVACCINE_GEN_ALL_CORE_FT
[Today's Date:] : Date: [unfilled] [Risks] : risks No Vaccines Administered. [

## 2024-03-12 NOTE — H&P ADULT - REASON FOR ADMISSION
SBO [de-identified] : Patient is a 76-year-old female presenting for annual wellness visit. She reports no changes in health, saw cardiologist 2 weeks ago. EKG at the time showed LBBB. Had previous false positive stress test with negative cath. She reports she has increased stress due to her ex moving and trying to find somewhere to live. Mood is improved from last visit, but she is interested in going up on fluoxetine 2/2 increased life stressors. She has a hx thyroid nodules, last US 06/2023 showed increased size.  Last mammo- 6/30/23 (BI-RADS 2) colonoscopy- benign polyp in cecum, f/u in 7 years skin check- last year, hx SCC dexa- 6/30/23 (osteopenia, on raloxifene. Handled by GYN) pap- aged out flu vaccine- 10/23 [FreeTextEntry1] : annual wellness visit

## 2024-04-11 NOTE — H&P ADULT - NSICDXFAMILYHX_GEN_ALL_CORE_FT
XR demonstrated symmetric OA. This could be exacerbated by positioning that is sometimes used during hysterectomy. Would rcc PT if pt agreeable.    Jacqueline Lawrence MD, MPH     No pertinent family history in first degree relatives

## 2024-04-28 NOTE — ASU PREOP CHECKLIST - VERIFY SURGICAL SITE/SIDE WITH PATIENT
done Initial vital signs reviewed.  General: NAD, nontoxic appearing.  HENT: AT/NC. Tolerating secretions. Limited oropharynx exam, no obvious trauma or purulent discharge. Unable to assess posterior oropharynx secondary to trismus.  Eyes: non-injected conjunctivae b/l.  Neck: left-sided neck swelling, mild right-sided neck swelling.  CV: RRR, no murmurs. 2+ distal pulses x4.  Pulm: nonlabored work of breathing, CTAB.  Abd: soft, nondistended, nontender.  MSK: no joint deformity.  Skin: warm, dry, well-perfused.  Neuro: A&Ox4.  Psych: appropriate mood and affect. Detail Level: Detailed Size Of Lesion: 0.7 X Size Of Lesion In Cm (Optional): 0 Anatomic Location From Referring Provider: left buttock UOQ Other Plan: excisional biopsy

## 2024-05-08 NOTE — ASU PATIENT PROFILE, ADULT - ALCOHOL USE HISTORY SINGLE SELECT
From: Nikki Benz  To: Dr. Mariella Yusuf  Sent: 5/7/2024 5:34 PM EDT  Subject: Mounjaro    Hello,    The pharmacy cannot process the prescription unless they have a prior authorization. Is this something you can provide?    Thanks,  Nikki    never

## 2024-12-11 NOTE — ED ADULT NURSE NOTE - ALCOHOL PRE SCREEN (AUDIT - C)
----- Message from Natalia sent at 12/11/2024  7:20 AM CST -----  Contact: [phone] patient  [email or web address]     Patient is calling for Medical Advice regarding:Patient is calling . Patient need a letter for work that she will be out all this week.    How long has patient had these symptoms:    Pharmacy name and phone#:    Patient wants a call back or thru [facility]:call     Comments:    Please advise patient replies from provider may take up to 48 hours.  
Pt was informed that a message has been put in to the provider and that once she responds we will let her know if and when she cydney  her excuse. Pt verbalized understanding.  
Statement Selected

## 2025-01-25 NOTE — PRE-ANESTHESIA EVALUATION ADULT - BP NONINVASIVE SYSTOLIC (MM HG)
Nick Lally       Patient brief summary:  Christel Ndiaye 
138
134
117
134

## 2025-04-14 NOTE — PATIENT PROFILE ADULT - SURGICAL SITE DRAIN
Medicine Refill Request    Last Office Visit: 10/28/2024   Last Consult Visit: 7/29/2024  Last Telemedicine Visit: Visit date not found    Next Appointment: Visit date not found      Current Outpatient Medications:     ARIPiprazole (ABILIFY) 2 mg tablet, Take 1 tablet (2 mg total) by mouth daily., Disp: 90 tablet, Rfl: 0    aspirin 81 mg enteric coated tablet, Take 81 mg by mouth daily., Disp: , Rfl:     atorvastatin (LIPITOR) 80 mg tablet, Take 80 mg by mouth daily. , Disp: , Rfl:     busPIRone (BUSPAR) 10 mg tablet, Take 2 tablets (20 mg total) by mouth 3 (three) times a day., Disp: 180 tablet, Rfl: 2    diphenhydrAMINE (BENADRYL) 25 mg capsule, Take 25 mg by mouth nightly as needed for sleep., Disp: , Rfl:     ezetimibe (ZETIA) 10 mg tablet, Take 10 mg by mouth daily., Disp: , Rfl:     FLUoxetine (PROzac) 20 mg capsule, Take 1 capsule (20 mg total) by mouth every morning., Disp: 90 capsule, Rfl: 0    gabapentin (NEURONTIN) 600 mg tablet, Take 1 tablet (600 mg total) by mouth every morning AND 1.5 tablets (900 mg total) nightly., Disp: 225 tablet, Rfl: 1    omeprazole (PriLOSEC) 40 mg capsule, Take 1 capsule (40 mg total) by mouth daily before breakfast., Disp: 90 capsule, Rfl: 3    BP Readings from Last 3 Encounters:   10/28/24 112/68   09/16/24 124/68   07/29/24 110/72       Recent Lab results:  Lab Results   Component Value Date    CHOL 119 10/08/2024   ,   Lab Results   Component Value Date    HDL 51 10/08/2024   ,   Lab Results   Component Value Date    LDLCALC 46 10/08/2024   ,   Lab Results   Component Value Date    TRIG 125 10/08/2024        Lab Results   Component Value Date    GLUCOSE 104 (H) 10/08/2024   ,   Lab Results   Component Value Date    HGBA1C 5.6 10/08/2024         Lab Results   Component Value Date    CREATININE 0.68 10/08/2024       Lab Results   Component Value Date    TSH 2.050 08/18/2022           Lab Results   Component Value Date    HGBA1C 5.6 10/08/2024     
no

## 2025-07-18 NOTE — PATIENT PROFILE ADULT - PUBLIC BENEFITS
Virtual ICU Quality Rounds    Admit Date: 6/29/2025  Hospital Day: 19    ICU Day: 3d 9h    24H Vital Sign Range:  Temp:  [89.1 °F (31.7 °C)-98.1 °F (36.7 °C)]   Pulse:  [63-86]   Resp:  [11-62]   BP: (100-176)/(53-75)   SpO2:  [99 %-100 %]     VICU Surveillance Screening  LDA reconciliation : Yes              no
